# Patient Record
Sex: FEMALE | Race: WHITE | NOT HISPANIC OR LATINO | Employment: OTHER | URBAN - METROPOLITAN AREA
[De-identification: names, ages, dates, MRNs, and addresses within clinical notes are randomized per-mention and may not be internally consistent; named-entity substitution may affect disease eponyms.]

---

## 2017-01-03 ENCOUNTER — HOSPITAL ENCOUNTER (OUTPATIENT)
Dept: RADIOLOGY | Facility: HOSPITAL | Age: 72
Discharge: HOME | End: 2017-01-03
Attending: INTERNAL MEDICINE
Payer: MEDICARE

## 2017-01-03 DIAGNOSIS — Z85.048 HISTORY OF RECTAL OR ANAL CANCER: ICD-10-CM

## 2017-01-03 PROCEDURE — 78815 PET IMAGE W/CT SKULL-THIGH: CPT | Mod: TC,PS

## 2017-01-03 PROCEDURE — A9552 F18 FDG: HCPCS

## 2017-01-03 PROCEDURE — 78815 PET IMAGE W/CT SKULL-THIGH: CPT | Mod: 26,PS,, | Performed by: RADIOLOGY

## 2017-01-05 ENCOUNTER — LAB VISIT (OUTPATIENT)
Dept: LAB | Facility: HOSPITAL | Age: 72
End: 2017-01-05
Attending: INTERNAL MEDICINE
Payer: MEDICARE

## 2017-01-05 ENCOUNTER — OFFICE VISIT (OUTPATIENT)
Dept: HEMATOLOGY/ONCOLOGY | Facility: CLINIC | Age: 72
End: 2017-01-05
Payer: MEDICARE

## 2017-01-05 VITALS
WEIGHT: 157.88 LBS | HEIGHT: 62 IN | HEART RATE: 67 BPM | SYSTOLIC BLOOD PRESSURE: 136 MMHG | DIASTOLIC BLOOD PRESSURE: 61 MMHG | BODY MASS INDEX: 29.05 KG/M2 | TEMPERATURE: 98 F | OXYGEN SATURATION: 99 % | RESPIRATION RATE: 18 BRPM

## 2017-01-05 DIAGNOSIS — Z85.048 HISTORY OF RECTAL OR ANAL CANCER: Primary | ICD-10-CM

## 2017-01-05 DIAGNOSIS — N18.30 TYPE 2 DM WITH CKD STAGE 3 AND HYPERTENSION: Chronic | ICD-10-CM

## 2017-01-05 DIAGNOSIS — I12.9 TYPE 2 DM WITH CKD STAGE 3 AND HYPERTENSION: Chronic | ICD-10-CM

## 2017-01-05 DIAGNOSIS — E11.22 TYPE 2 DM WITH CKD STAGE 3 AND HYPERTENSION: Chronic | ICD-10-CM

## 2017-01-05 DIAGNOSIS — Z85.048 HISTORY OF RECTAL OR ANAL CANCER: ICD-10-CM

## 2017-01-05 LAB
ALBUMIN SERPL BCP-MCNC: 3 G/DL
ALP SERPL-CCNC: 50 U/L
ALT SERPL W/O P-5'-P-CCNC: 18 U/L
ANION GAP SERPL CALC-SCNC: 8 MMOL/L
AST SERPL-CCNC: 35 U/L
BILIRUB SERPL-MCNC: 0.4 MG/DL
BUN SERPL-MCNC: 43 MG/DL
CALCIUM SERPL-MCNC: 8.8 MG/DL
CHLORIDE SERPL-SCNC: 112 MMOL/L
CO2 SERPL-SCNC: 21 MMOL/L
CREAT SERPL-MCNC: 1.9 MG/DL
ERYTHROCYTE [DISTWIDTH] IN BLOOD BY AUTOMATED COUNT: 14.6 %
EST. GFR  (AFRICAN AMERICAN): 30.6 ML/MIN/1.73 M^2
EST. GFR  (NON AFRICAN AMERICAN): 26.5 ML/MIN/1.73 M^2
GLUCOSE SERPL-MCNC: 138 MG/DL
HCT VFR BLD AUTO: 30.4 %
HGB BLD-MCNC: 10 G/DL
MCH RBC QN AUTO: 31.9 PG
MCHC RBC AUTO-ENTMCNC: 32.9 %
MCV RBC AUTO: 97 FL
NEUTROPHILS # BLD AUTO: 1.9 K/UL
PLATELET # BLD AUTO: 136 K/UL
PMV BLD AUTO: 9.8 FL
POTASSIUM SERPL-SCNC: 4.5 MMOL/L
PROT SERPL-MCNC: 6.2 G/DL
RBC # BLD AUTO: 3.13 M/UL
SODIUM SERPL-SCNC: 141 MMOL/L
WBC # BLD AUTO: 3.08 K/UL

## 2017-01-05 PROCEDURE — 1160F RVW MEDS BY RX/DR IN RCRD: CPT | Mod: S$GLB,,, | Performed by: INTERNAL MEDICINE

## 2017-01-05 PROCEDURE — 99214 OFFICE O/P EST MOD 30 MIN: CPT | Mod: S$GLB,,, | Performed by: INTERNAL MEDICINE

## 2017-01-05 PROCEDURE — 80053 COMPREHEN METABOLIC PANEL: CPT

## 2017-01-05 PROCEDURE — 1157F ADVNC CARE PLAN IN RCRD: CPT | Mod: S$GLB,,, | Performed by: INTERNAL MEDICINE

## 2017-01-05 PROCEDURE — 3075F SYST BP GE 130 - 139MM HG: CPT | Mod: S$GLB,,, | Performed by: INTERNAL MEDICINE

## 2017-01-05 PROCEDURE — 3044F HG A1C LEVEL LT 7.0%: CPT | Mod: S$GLB,,, | Performed by: INTERNAL MEDICINE

## 2017-01-05 PROCEDURE — 85027 COMPLETE CBC AUTOMATED: CPT

## 2017-01-05 PROCEDURE — 99999 PR PBB SHADOW E&M-EST. PATIENT-LVL IV: CPT | Mod: PBBFAC,,, | Performed by: INTERNAL MEDICINE

## 2017-01-05 PROCEDURE — 3078F DIAST BP <80 MM HG: CPT | Mod: S$GLB,,, | Performed by: INTERNAL MEDICINE

## 2017-01-05 PROCEDURE — 36415 COLL VENOUS BLD VENIPUNCTURE: CPT

## 2017-01-05 PROCEDURE — 2022F DILAT RTA XM EVC RTNOPTHY: CPT | Mod: S$GLB,,, | Performed by: INTERNAL MEDICINE

## 2017-01-05 PROCEDURE — 1126F AMNT PAIN NOTED NONE PRSNT: CPT | Mod: S$GLB,,, | Performed by: INTERNAL MEDICINE

## 2017-01-05 PROCEDURE — 1159F MED LIST DOCD IN RCRD: CPT | Mod: S$GLB,,, | Performed by: INTERNAL MEDICINE

## 2017-01-05 PROCEDURE — 3060F POS MICROALBUMINURIA REV: CPT | Mod: S$GLB,,, | Performed by: INTERNAL MEDICINE

## 2017-01-05 RX ORDER — ERGOCALCIFEROL 1.25 MG/1
50000 CAPSULE ORAL
COMMUNITY
End: 2023-05-02

## 2017-01-05 NOTE — PROGRESS NOTES
"PATIENT: Steph Monroe  MRN: [id number]  DATE: 1/5/2017    Subjective:     Chief complaint:  Chief Complaint   Patient presents with    History of rectal or anal cancer       Oncologic History:  Ms. Monroe is a 70-year-old female with essential  hypertension, chronic kidney disease, hyperlipidemia, peripheral vascular diseasediagnosis of anal cancer.  She has been worked up in [place] so far and was noted to have bright red blood per  rectum gushing at the time of hospitalization and the scope was done by Dr. Buitrago.  Plavix was stopped to perform the scope safely and pathology  revealed squamous cell carcinoma.  She was then referred to see Dr. De Santiago for    evaluation and he referred her to me today for further evaluation of anal squamous cell carcinoma.      She completed Mitomycin and 5FU and RT (DAYAMI PROTOCOL) on 5/16/16 8/17/16 PET scan  reveals "Soft tissue thickening and persistent hypermetabolic activity is again identified at the anus. Interval decrease in size and activity of the previously seen right groin lymph node.  No new areas of abnormal hypermetabolic foci"    9/12/16 Anoscopy Procedure Note: "Findings: Right Anterior thickening with small superficial ulceration appreciated, no active bleeding . Pt tolerated procedure well. No complications."     Interval History: Ms. Monroe returns for follow up and to review PET. 1/3/17 PET reveals "No evidence of recurrent or residual neoplasm."  She denies any nausea, vomiting, diarrhea, constipation, abdominal pain, weight loss or loss of appetite, chest pain, shortness of breath, leg swelling, fatigue, pain, headache, dizziness, or mood changes. She is alone.    ECOG Performance Status:   ECOG SCORE    0 - Fully active-able to carry on all pre-disease performance without restriction          PMFSH: all information reviewed and updated as relevant to today's visit    Review of Systems:   Review of Systems   Constitutional: Negative for activity " "change, appetite change, fatigue and fever.   HENT: Negative for mouth sores, nosebleeds and sore throat.    Eyes: Negative for visual disturbance.   Respiratory: Negative for cough and shortness of breath.    Cardiovascular: Negative for chest pain, palpitations and leg swelling.   Gastrointestinal: Negative for abdominal pain, constipation, diarrhea, nausea and vomiting.   Genitourinary: Negative for difficulty urinating and frequency.   Musculoskeletal: Negative for arthralgias and back pain.   Skin: Negative for rash.   Neurological: Negative for dizziness, numbness and headaches.   Hematological: Negative for adenopathy. Does not bruise/bleed easily.   Psychiatric/Behavioral: Negative for confusion and sleep disturbance. The patient is not nervous/anxious.    All other systems reviewed and are negative.        Objective:      Vitals:   Vitals:    01/05/17 1030   BP: 136/61   Pulse: 67   Resp: 18   Temp: 98.4 °F (36.9 °C)   TempSrc: Oral   SpO2: 99%   Weight: 71.6 kg (157 lb 13.6 oz)   Height: 5' 2" (1.575 m)     BMI: Body mass index is 28.87 kg/(m^2).      Physical Exam:   Physical Exam   Constitutional: She is oriented to person, place, and time. She appears well-developed and well-nourished. No distress.   HENT:   Head: Normocephalic.   Right Ear: External ear normal.   Left Ear: External ear normal.   Mouth/Throat: No oropharyngeal exudate.   No sinus tenderness.   Eyes: Conjunctivae and lids are normal. Pupils are equal, round, and reactive to light. No scleral icterus.   Neck: Trachea normal and normal range of motion. Neck supple. No thyromegaly present.   Cardiovascular: Normal rate, regular rhythm and normal heart sounds.    Pulmonary/Chest: Effort normal and breath sounds normal.   Abdominal: Soft. Normal appearance and bowel sounds are normal. She exhibits no distension and no mass. There is no tenderness.   Musculoskeletal: Normal range of motion.   Lymphadenopathy:        Head (right side): No " submental and no submandibular adenopathy present.        Head (left side): No submental and no submandibular adenopathy present.     She has no cervical adenopathy.   Neurological: She is alert and oriented to person, place, and time. She has normal strength and normal reflexes. No cranial nerve deficit. Gait normal.   Skin: Skin is warm, dry and intact. No bruising and no rash noted. No cyanosis. Nails show no clubbing.   Psychiatric: She has a normal mood and affect. Her speech is normal and behavior is normal.   Nursing note and vitals reviewed.        Laboratory Data:  WBC   Date Value Ref Range Status   01/05/2017 3.08 (L) 3.90 - 12.70 K/uL Final     Hemoglobin   Date Value Ref Range Status   01/05/2017 10.0 (L) 12.0 - 16.0 g/dL Final     Hematocrit   Date Value Ref Range Status   01/05/2017 30.4 (L) 37.0 - 48.5 % Final     Platelets   Date Value Ref Range Status   01/05/2017 136 (L) 150 - 350 K/uL Final     Gran #   Date Value Ref Range Status   01/05/2017 1.9 1.8 - 7.7 K/uL Final     Comment:     The ANC is based on a white cell differential from an   automated cell counter. It has not been microscopically   reviewed for the presence of abnormal cells. Clinical   correlation is required.       Gran%   Date Value Ref Range Status   06/16/2016 43.4 38.0 - 73.0 % Final       Chemistry        Component Value Date/Time     01/05/2017 0915    K 4.5 01/05/2017 0915     (H) 01/05/2017 0915    CO2 21 (L) 01/05/2017 0915    BUN 43 (H) 01/05/2017 0915    CREATININE 1.9 (H) 01/05/2017 0915     (H) 01/05/2017 0915        Component Value Date/Time    CALCIUM 8.8 01/05/2017 0915    ALKPHOS 50 (L) 01/05/2017 0915    AST 35 01/05/2017 0915    ALT 18 01/05/2017 0915    BILITOT 0.4 01/05/2017 0915              Assessment/Plan:     1. History of rectal or anal cancer    2. 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 Patient is clinically stable. PET without evidence of recurrence. Ok to  remove PORT-Will schedule an appointment with Dr. Florez. RTC 6 months to see Dr. Contreras with CBC, CMP, PET scan. If PET negative in 6 months then no more scans needed.   2. BUN/Crt elevated. Patient states she has not taken in any liquids in about 12 hours. Encouraged hydration. Follow up with Nephrologist Dr. Goodrich.   Med and Orders:  Orders Placed This Encounter    NM PET CT Routine Skull to Mid Thigh    CBC Oncology    Comprehensive metabolic panel       Follow Up:  Return in about 6 months (around 7/5/2017).        Patient was also seen and examined by Dr. Contreras. Patient is in agreement with the proposed treatment plan. All questions were answered to the patient's satisfaction. Pt knows to call clinic if anything is needed before the next clinic visit.    MASON Gardiner-CRISTIAN  Hematology and Medical Oncology      STAFF NOTE:  I have reviewed the notes, assessments, and/or procedures performed by the nurse practitioner, as above.  I have personally interviewed the patient at the beside, and rounded with the nurse practitioner. I formulated the plan of care.  I concur with her documentation of Steph Monroe.

## 2017-01-05 NOTE — Clinical Note
Please schedule an appoinment with Dr. Florez for port removal.  RTC 6 months to see Dr. Contreras with CBC, CMP, PET scan.

## 2017-01-17 ENCOUNTER — TELEPHONE (OUTPATIENT)
Dept: HEMATOLOGY/ONCOLOGY | Facility: CLINIC | Age: 72
End: 2017-01-17

## 2017-01-17 NOTE — TELEPHONE ENCOUNTER
----- Message from Sonny Lay NP sent at 1/5/2017 11:10 AM CST -----  Please schedule an appoinment with Dr. Florez for port removal.   RTC 6 months to see Dr. Contreras with CBC, CMP, PET scan.

## 2017-04-17 ENCOUNTER — OFFICE VISIT (OUTPATIENT)
Dept: SURGERY | Facility: CLINIC | Age: 72
End: 2017-04-17
Payer: MEDICARE

## 2017-04-17 VITALS
HEIGHT: 62 IN | WEIGHT: 164.88 LBS | HEART RATE: 75 BPM | DIASTOLIC BLOOD PRESSURE: 66 MMHG | SYSTOLIC BLOOD PRESSURE: 175 MMHG | BODY MASS INDEX: 30.34 KG/M2

## 2017-04-17 DIAGNOSIS — Z85.048 HISTORY OF ANAL CANCER: Primary | ICD-10-CM

## 2017-04-17 PROCEDURE — 99999 PR PBB SHADOW E&M-EST. PATIENT-LVL III: CPT | Mod: PBBFAC,,, | Performed by: COLON & RECTAL SURGERY

## 2017-04-17 PROCEDURE — 1159F MED LIST DOCD IN RCRD: CPT | Mod: S$GLB,,, | Performed by: COLON & RECTAL SURGERY

## 2017-04-17 PROCEDURE — 3077F SYST BP >= 140 MM HG: CPT | Mod: S$GLB,,, | Performed by: COLON & RECTAL SURGERY

## 2017-04-17 PROCEDURE — 1160F RVW MEDS BY RX/DR IN RCRD: CPT | Mod: S$GLB,,, | Performed by: COLON & RECTAL SURGERY

## 2017-04-17 PROCEDURE — 99213 OFFICE O/P EST LOW 20 MIN: CPT | Mod: 25,S$GLB,, | Performed by: COLON & RECTAL SURGERY

## 2017-04-17 PROCEDURE — 1126F AMNT PAIN NOTED NONE PRSNT: CPT | Mod: S$GLB,,, | Performed by: COLON & RECTAL SURGERY

## 2017-04-17 PROCEDURE — 3078F DIAST BP <80 MM HG: CPT | Mod: S$GLB,,, | Performed by: COLON & RECTAL SURGERY

## 2017-04-21 NOTE — PROGRESS NOTES
HPI 71F with anal squamous cell carcinoma presents after chemo radiation therapy. Last treatment was in May2015. She denies any pain or bleeding from anus. Her last PET CT Jan 2017 scan showed no activity. Good bowel function with increased bms/day.   Review of Systems   Constitutional: Negative for chills and fever.   Respiratory: Negative for cough and shortness of breath.   Genitourinary: Negative for dysuria and urgency.   Neurological: Negative for seizures and numbness.       Objective:      Physical Exam   Constitutional: She is oriented to person, place, and time. She appears well-developed and well-nourished.   Eyes: EOM are normal. Pupils are equal, round, and reactive to light.   Abdominal: Soft. She exhibits no distension.   Genitourinary:   Genitourinary Comments: Anorectal: Normal perianal skin. BETZAIDA: no masses. Anoscopy: no mass, normal distal rectal mucosa.       Musculoskeletal: Normal range of motion. She exhibits no edema.   Neurological: She is alert and oriented to person, place, and time.   Skin: Skin is warm and dry.   Psychiatric: She has a normal mood and affect. Her behavior is normal.       Assessment:       1. History of anal cancer no evidence of recurrence       Plan:       F/u with me in 4 months.

## 2017-07-17 ENCOUNTER — HOSPITAL ENCOUNTER (OUTPATIENT)
Dept: RADIOLOGY | Facility: HOSPITAL | Age: 72
Discharge: HOME | End: 2017-07-17
Attending: NURSE PRACTITIONER
Payer: MEDICARE

## 2017-07-17 DIAGNOSIS — Z85.048 HISTORY OF RECTAL OR ANAL CANCER: ICD-10-CM

## 2017-07-17 PROCEDURE — 78815 PET IMAGE W/CT SKULL-THIGH: CPT | Mod: 26,PS,, | Performed by: NUCLEAR MEDICINE

## 2017-07-17 PROCEDURE — A9552 F18 FDG: HCPCS

## 2017-07-18 ENCOUNTER — OFFICE VISIT (OUTPATIENT)
Dept: HEMATOLOGY/ONCOLOGY | Facility: CLINIC | Age: 72
End: 2017-07-18
Payer: MEDICARE

## 2017-07-18 VITALS
WEIGHT: 176.56 LBS | HEART RATE: 71 BPM | SYSTOLIC BLOOD PRESSURE: 180 MMHG | BODY MASS INDEX: 32.49 KG/M2 | HEIGHT: 62 IN | DIASTOLIC BLOOD PRESSURE: 73 MMHG | RESPIRATION RATE: 19 BRPM | OXYGEN SATURATION: 99 % | TEMPERATURE: 98 F

## 2017-07-18 DIAGNOSIS — Z85.048 HISTORY OF RECTAL OR ANAL CANCER: Primary | ICD-10-CM

## 2017-07-18 DIAGNOSIS — N18.30 TYPE 2 DM WITH CKD STAGE 3 AND HYPERTENSION: Chronic | ICD-10-CM

## 2017-07-18 DIAGNOSIS — I12.9 TYPE 2 DM WITH CKD STAGE 3 AND HYPERTENSION: Chronic | ICD-10-CM

## 2017-07-18 DIAGNOSIS — E11.22 TYPE 2 DM WITH CKD STAGE 3 AND HYPERTENSION: Chronic | ICD-10-CM

## 2017-07-18 DIAGNOSIS — N28.89 RENAL MASS: ICD-10-CM

## 2017-07-18 PROCEDURE — 1159F MED LIST DOCD IN RCRD: CPT | Mod: S$GLB,,, | Performed by: INTERNAL MEDICINE

## 2017-07-18 PROCEDURE — 1126F AMNT PAIN NOTED NONE PRSNT: CPT | Mod: S$GLB,,, | Performed by: INTERNAL MEDICINE

## 2017-07-18 PROCEDURE — 99499 UNLISTED E&M SERVICE: CPT | Mod: S$GLB,,, | Performed by: INTERNAL MEDICINE

## 2017-07-18 PROCEDURE — 99214 OFFICE O/P EST MOD 30 MIN: CPT | Mod: S$GLB,,, | Performed by: INTERNAL MEDICINE

## 2017-07-18 PROCEDURE — 99999 PR PBB SHADOW E&M-EST. PATIENT-LVL III: CPT | Mod: PBBFAC,,, | Performed by: INTERNAL MEDICINE

## 2017-07-18 NOTE — PROGRESS NOTES
Patient, Steph Monroe (MRN #[id number]), presented with a recent Platelet count less than 150 K/uL consistent with the definition of thrombocytopenia (ICD10 - D69.6).    Platelets   Date Value Ref Range Status   07/17/2017 149 (L) 150 - 350 K/uL Final     The patient's thrombocytopenia was monitored, evaluated, addressed and/or treated. This addendum to the medical record is made on 07/18/2017.

## 2017-07-18 NOTE — PROGRESS NOTES
"Subjective:       Patient ID: Steph Monroe is a 71 y.o. female.    Chief Complaint: History of rectal or anal cancer  Oncologic History:  Ms. Monroe is a 70-year-old female with essential  hypertension, chronic kidney disease, hyperlipidemia, peripheral vascular diseasediagnosis of anal cancer.  She has been worked up in [place] so far and was noted to have bright red blood per  rectum gushing at the time of hospitalization and the scope was done by Dr. Buitrago.  Plavix was stopped to perform the scope safely and pathology  revealed squamous cell carcinoma.  She was then referred to see Dr. De Santiago for    evaluation and he referred her to me today for further evaluation of anal squamous cell carcinoma.      She completed Mitomycin and 5FU and RT (DAYAMI PROTOCOL) on 5/16/16     HPIShe comes in to review her PET scan which reveals "In this patient with a history of anal cancer, status post chemoradiation, and no PET/CT findings to suggest recurrent or metastatic disease. Abnormal soft tissue hypermetabolic activity in the right medial gluteal region and left thigh, thought to represent postinflammatory changes. Clinical correlation and evaluation with direct visualization is recommended. Soft tissue mass in the lower pole of the left kidney that demonstrates no evidence of increased FDG uptake.  Further evaluation with dedicated retroperitoneal ultrasound is recommended"  She notes that she fell about a year ago and has a scar in her buttock area.\  She had Anoscopy done in April 2017 with Dr. De Santiago and was normal.    Review of Systems   Constitutional: Negative for appetite change, fatigue and unexpected weight change.   HENT: Negative for mouth sores.    Eyes: Negative for visual disturbance.   Respiratory: Negative for cough and shortness of breath.    Cardiovascular: Negative for chest pain.   Gastrointestinal: Negative for abdominal pain and diarrhea.   Genitourinary: Negative for frequency. "   Musculoskeletal: Negative for back pain.   Skin: Negative for rash.   Neurological: Negative for headaches.   Hematological: Negative for adenopathy.   Psychiatric/Behavioral: The patient is not nervous/anxious.    All other systems reviewed and are negative.      Objective:      Physical Exam   Constitutional: She is oriented to person, place, and time. She appears well-developed and well-nourished.   HENT:   Mouth/Throat: No oropharyngeal exudate.   Cardiovascular: Normal rate and normal heart sounds.    Pulmonary/Chest: Effort normal and breath sounds normal. She has no wheezes.   Abdominal: Soft. Bowel sounds are normal. There is no tenderness.   Musculoskeletal: She exhibits no edema or tenderness.   Lymphadenopathy:     She has no cervical adenopathy.   Neurological: She is alert and oriented to person, place, and time. Coordination normal.   Skin: Skin is warm and dry. No rash noted.   Psychiatric: She has a normal mood and affect. Judgment and thought content normal.   Vitals reviewed.      LABS:  WBC   Date Value Ref Range Status   07/17/2017 3.90 3.90 - 12.70 K/uL Final     Hemoglobin   Date Value Ref Range Status   07/17/2017 11.7 (L) 12.0 - 16.0 g/dL Final     Hematocrit   Date Value Ref Range Status   07/17/2017 36.1 (L) 37.0 - 48.5 % Final     Platelets   Date Value Ref Range Status   07/17/2017 149 (L) 150 - 350 K/uL Final     Gran #   Date Value Ref Range Status   07/17/2017 2.4 1.8 - 7.7 K/uL Final     Comment:     The ANC is based on a white cell differential from an   automated cell counter. It has not been microscopically   reviewed for the presence of abnormal cells. Clinical   correlation is required.         Chemistry        Component Value Date/Time     07/17/2017 0844    K 5.3 (H) 07/17/2017 0844     (H) 07/17/2017 0844    CO2 20 (L) 07/17/2017 0844    BUN 47 (H) 07/17/2017 0844    CREATININE 2.0 (H) 07/17/2017 0844     (H) 07/17/2017 0844        Component Value  Date/Time    CALCIUM 9.2 07/17/2017 0844    ALKPHOS 73 07/17/2017 0844    AST 33 07/17/2017 0844    ALT 19 07/17/2017 0844    BILITOT 0.4 07/17/2017 0844    ESTGFRAFRICA 28.7 (A) 07/17/2017 0844    EGFRNONAA 24.9 (A) 07/17/2017 0844          Assessment:       1. History of rectal or anal cancer    2. 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 Renal mass        Plan:        1. Reviewed PET scan, no evidence of anal cancer. The gluteal thickening noted on medial side of right buttock on PET is scar tissue. Will repeat PET in 6 months  2,3. She has an outside nephrologist and is undergoing work-up with them with renal ultrasound for kidney mass. She knows to keep me posted on that,    Above care plan was discussed with patient and all questions were addressed to her satisfaction

## 2018-01-11 ENCOUNTER — HOSPITAL ENCOUNTER (OUTPATIENT)
Dept: RADIOLOGY | Facility: HOSPITAL | Age: 73
Discharge: HOME | End: 2018-01-11
Attending: INTERNAL MEDICINE
Payer: MEDICARE

## 2018-01-11 DIAGNOSIS — Z85.048 HISTORY OF RECTAL OR ANAL CANCER: ICD-10-CM

## 2018-01-11 LAB — POCT GLUCOSE: 103 MG/DL (ref 70–110)

## 2018-01-11 PROCEDURE — 78815 PET IMAGE W/CT SKULL-THIGH: CPT | Mod: TC,PS

## 2018-01-11 PROCEDURE — 78815 PET IMAGE W/CT SKULL-THIGH: CPT | Mod: 26,PS,, | Performed by: RADIOLOGY

## 2018-01-11 PROCEDURE — A9552 F18 FDG: HCPCS

## 2018-01-16 ENCOUNTER — OFFICE VISIT (OUTPATIENT)
Dept: HEMATOLOGY/ONCOLOGY | Facility: CLINIC | Age: 73
End: 2018-01-16
Payer: MEDICARE

## 2018-01-16 ENCOUNTER — LAB VISIT (OUTPATIENT)
Dept: LAB | Facility: HOSPITAL | Age: 73
End: 2018-01-16
Attending: INTERNAL MEDICINE
Payer: MEDICARE

## 2018-01-16 ENCOUNTER — TELEPHONE (OUTPATIENT)
Dept: HEMATOLOGY/ONCOLOGY | Facility: CLINIC | Age: 73
End: 2018-01-16

## 2018-01-16 VITALS
HEIGHT: 62 IN | OXYGEN SATURATION: 99 % | BODY MASS INDEX: 35.25 KG/M2 | RESPIRATION RATE: 20 BRPM | DIASTOLIC BLOOD PRESSURE: 75 MMHG | SYSTOLIC BLOOD PRESSURE: 181 MMHG | WEIGHT: 191.56 LBS | HEART RATE: 68 BPM | TEMPERATURE: 98 F

## 2018-01-16 DIAGNOSIS — N18.30 TYPE 2 DM WITH CKD STAGE 3 AND HYPERTENSION: Chronic | ICD-10-CM

## 2018-01-16 DIAGNOSIS — E11.22 TYPE 2 DM WITH CKD STAGE 3 AND HYPERTENSION: Chronic | ICD-10-CM

## 2018-01-16 DIAGNOSIS — I73.9 PVD (PERIPHERAL VASCULAR DISEASE): Chronic | ICD-10-CM

## 2018-01-16 DIAGNOSIS — N18.4 STAGE 4 CHRONIC KIDNEY DISEASE: ICD-10-CM

## 2018-01-16 DIAGNOSIS — I12.9 TYPE 2 DM WITH CKD STAGE 3 AND HYPERTENSION: Chronic | ICD-10-CM

## 2018-01-16 DIAGNOSIS — Z85.048 HISTORY OF RECTAL OR ANAL CANCER: Primary | ICD-10-CM

## 2018-01-16 DIAGNOSIS — Z85.048 HISTORY OF RECTAL OR ANAL CANCER: ICD-10-CM

## 2018-01-16 LAB
ALBUMIN SERPL BCP-MCNC: 2.9 G/DL
ALP SERPL-CCNC: 63 U/L
ALT SERPL W/O P-5'-P-CCNC: 18 U/L
ANION GAP SERPL CALC-SCNC: 5 MMOL/L
AST SERPL-CCNC: 27 U/L
BILIRUB SERPL-MCNC: 0.5 MG/DL
BUN SERPL-MCNC: 47 MG/DL
CALCIUM SERPL-MCNC: 9.2 MG/DL
CHLORIDE SERPL-SCNC: 112 MMOL/L
CO2 SERPL-SCNC: 24 MMOL/L
CREAT SERPL-MCNC: 2.1 MG/DL
ERYTHROCYTE [DISTWIDTH] IN BLOOD BY AUTOMATED COUNT: 15 %
EST. GFR  (AFRICAN AMERICAN): 26.9 ML/MIN/1.73 M^2
EST. GFR  (NON AFRICAN AMERICAN): 23.3 ML/MIN/1.73 M^2
GLUCOSE SERPL-MCNC: 80 MG/DL
HCT VFR BLD AUTO: 33.8 %
HGB BLD-MCNC: 10.8 G/DL
IMM GRANULOCYTES # BLD AUTO: 0.03 K/UL
MCH RBC QN AUTO: 30.5 PG
MCHC RBC AUTO-ENTMCNC: 32 G/DL
MCV RBC AUTO: 96 FL
NEUTROPHILS # BLD AUTO: 3 K/UL
PLATELET # BLD AUTO: 163 K/UL
PMV BLD AUTO: 10.7 FL
POTASSIUM SERPL-SCNC: 5.7 MMOL/L
PROT SERPL-MCNC: 6.5 G/DL
RBC # BLD AUTO: 3.54 M/UL
SODIUM SERPL-SCNC: 141 MMOL/L
WBC # BLD AUTO: 5.17 K/UL

## 2018-01-16 PROCEDURE — 99999 PR PBB SHADOW E&M-EST. PATIENT-LVL IV: CPT | Mod: PBBFAC,,, | Performed by: INTERNAL MEDICINE

## 2018-01-16 PROCEDURE — 85027 COMPLETE CBC AUTOMATED: CPT

## 2018-01-16 PROCEDURE — 80053 COMPREHEN METABOLIC PANEL: CPT

## 2018-01-16 PROCEDURE — 99214 OFFICE O/P EST MOD 30 MIN: CPT | Mod: S$GLB,,, | Performed by: INTERNAL MEDICINE

## 2018-01-16 PROCEDURE — 36415 COLL VENOUS BLD VENIPUNCTURE: CPT

## 2018-01-16 RX ORDER — SODIUM BICARBONATE 650 MG/1
650 TABLET ORAL 2 TIMES DAILY
Status: ON HOLD | COMMUNITY
End: 2023-05-16 | Stop reason: HOSPADM

## 2018-01-16 NOTE — TELEPHONE ENCOUNTER
----- Message from Sonny Link NP sent at 1/16/2018  3:36 PM CST -----  Please let patient know her Potassium was elevated. Encourage her to hydrate and recheck CMP tomorrow. Thanks,

## 2018-01-16 NOTE — TELEPHONE ENCOUNTER
Spoke with patient.  Notified her her K is elevated.   Encouraged her to drink plenty fluids---will recheck cmp tomorrow in [place].  Patient thanked nurse.

## 2018-01-16 NOTE — PROGRESS NOTES
Patient, Steph Monroe (MRN #[id number]), presented with a recorded BMI of 35.04 kg/m^2 and a documented comorbidity(s):  - Diabetes Mellitus Type 2  - Hypertension  to which the severe obesity is a contributing factor. This is consistent with the definition of severe obesity (BMI 35.0-35.9) with comorbidity (ICD-10 E66.01, Z68.35). The patient's severe obesity was monitored, evaluated, addressed and/or treated. This addendum to the medical record is made on 01/16/2018.

## 2018-01-16 NOTE — PROGRESS NOTES
"PATIENT: Steph Monroe  MRN: [id number]  DATE: 1/16/2018    Subjective:     Chief complaint:  Chief Complaint   Patient presents with    History of rectal or anal cancer       Oncologic History:  Ms. Monroe is a 70-year-old female with essential  hypertension, chronic kidney disease, hyperlipidemia, peripheral vascular diseasediagnosis of anal cancer.  She has been worked up in [place] so far and was noted to have bright red blood per  rectum gushing at the time of hospitalization and the scope was done by Dr. Buitrago.  Plavix was stopped to perform the scope safely and pathology  revealed squamous cell carcinoma.  She was then referred to see Dr. De Santiago for    evaluation and he referred her to me today for further evaluation of anal squamous cell carcinoma.      She completed Mitomycin and 5FU and RT (DAYAMI PROTOCOL) on 5/16/16       PET scan which reveals "In this patient with a history of anal cancer, status post chemoradiation, and no PET/CT findings to suggest recurrent or metastatic disease. Abnormal soft tissue hypermetabolic activity in the right medial gluteal region and left thigh, thought to represent postinflammatory changes. Clinical correlation and evaluation with direct visualization is recommended. Soft tissue mass in the lower pole of the left kidney that demonstrates no evidence of increased FDG uptake.  Further evaluation with dedicated retroperitoneal ultrasound is recommended"    She had Anoscopy done in April 2017 with Dr. De Santiago and was normal.     Interval History: Ms. Monroe returns for follow up and PET results. PET from 1/11/18 reveals "No evidence of recurrent or residual malignancy".   She denies any nausea, vomiting, diarrhea, constipation, abdominal pain, weight loss or loss of appetite, chest pain, shortness of breath, leg swelling, fatigue, pain, headache, dizziness, or mood changes. She is alone.     ECOG Performance Status:   ECOG SCORE    0 - Fully active-able to carry on all " "pre-disease performance without restriction         PMFSH: all information reviewed and updated as relevant to today's visit    Review of Systems:   Review of Systems   Constitutional: Negative for activity change, appetite change, fatigue and fever.   HENT: Negative for mouth sores, nosebleeds and sore throat.    Eyes: Negative for visual disturbance.   Respiratory: Negative for cough and shortness of breath.    Cardiovascular: Negative for chest pain, palpitations and leg swelling.   Gastrointestinal: Negative for abdominal pain, constipation, diarrhea, nausea and vomiting.   Genitourinary: Negative for difficulty urinating and frequency.   Musculoskeletal: Negative for arthralgias and back pain.   Skin: Negative for rash.   Neurological: Negative for dizziness, numbness and headaches.   Hematological: Negative for adenopathy. Does not bruise/bleed easily.   Psychiatric/Behavioral: Negative for confusion and sleep disturbance. The patient is not nervous/anxious.    All other systems reviewed and are negative.        Objective:      Vitals:   Vitals:    01/16/18 1158   BP: (!) 181/75   Pulse: 68   Resp: 20   Temp: 97.5 °F (36.4 °C)   TempSrc: Oral   SpO2: 99%   Weight: 86.9 kg (191 lb 9.3 oz)   Height: 5' 2" (1.575 m)     BMI: Body mass index is 35.04 kg/m².      Physical Exam:   Physical Exam   Constitutional: She is oriented to person, place, and time. She appears well-developed and well-nourished. No distress.   HENT:   Head: Normocephalic.   Right Ear: External ear normal.   Left Ear: External ear normal.   Mouth/Throat: No oropharyngeal exudate.   No sinus tenderness.   Eyes: Conjunctivae and lids are normal. Pupils are equal, round, and reactive to light. No scleral icterus.   Neck: Trachea normal and normal range of motion. Neck supple. No thyromegaly present.   Cardiovascular: Normal rate, regular rhythm and normal heart sounds.    Pulmonary/Chest: Effort normal and breath sounds normal.   Abdominal: Soft. " Normal appearance and bowel sounds are normal. She exhibits no distension and no mass. There is no tenderness.   Musculoskeletal: Normal range of motion.   Lymphadenopathy:        Head (right side): No submental and no submandibular adenopathy present.        Head (left side): No submental and no submandibular adenopathy present.     She has no cervical adenopathy.   Neurological: She is alert and oriented to person, place, and time. She has normal strength and normal reflexes. No cranial nerve deficit. Gait normal.   Skin: Skin is warm, dry and intact. No bruising and no rash noted. No cyanosis. Nails show no clubbing.   Psychiatric: She has a normal mood and affect. Her speech is normal and behavior is normal.   Nursing note and vitals reviewed.        Laboratory Data:  WBC   Date Value Ref Range Status   01/16/2018 5.17 3.90 - 12.70 K/uL Final     Hemoglobin   Date Value Ref Range Status   01/16/2018 10.8 (L) 12.0 - 16.0 g/dL Final     Hematocrit   Date Value Ref Range Status   01/16/2018 33.8 (L) 37.0 - 48.5 % Final     Platelets   Date Value Ref Range Status   01/16/2018 163 150 - 350 K/uL Final     Gran #   Date Value Ref Range Status   01/16/2018 3.0 1.8 - 7.7 K/uL Final     Comment:     The ANC is based on a white cell differential from an   automated cell counter. It has not been microscopically   reviewed for the presence of abnormal cells. Clinical   correlation is required.         Chemistry        Component Value Date/Time     01/16/2018 1040    K 5.7 (H) 01/16/2018 1040     (H) 01/16/2018 1040    CO2 24 01/16/2018 1040    BUN 47 (H) 01/16/2018 1040    CREATININE 2.1 (H) 01/16/2018 1040    GLU 80 01/16/2018 1040        Component Value Date/Time    CALCIUM 9.2 01/16/2018 1040    ALKPHOS 63 01/16/2018 1040    AST 27 01/16/2018 1040    ALT 18 01/16/2018 1040    BILITOT 0.5 01/16/2018 1040    ESTGFRAFRICA 26.9 (A) 01/16/2018 1040    EGFRNONAA 23.3 (A) 01/16/2018 1040               Assessment/Plan:     1. History of rectal or anal cancer    2. 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 PVD (peripheral vascular disease)    4. Stage 4 chronic kidney disease        Plan:    PET scan without evidence of disease. See Dr. De Santiago as scheduled next week. RTC in 6 months to see Dr. Contreras with CBC, CMP and PET scan.  Continue to Monitor glucose and follow up with appropriate Md. F/u with cards for PVD.  Patient left prior to lab results. Potassium elevated today. Encouraged hydration and Repeat potassium tomorrow and follow up with nephrology.  4. Stable monitor for now.    Med and Orders:  Orders Placed This Encounter    NM PET CT Routine Skull to Mid Thigh    CBC Oncology    Comprehensive metabolic panel       Follow Up:  Follow-up in about 6 months (around 7/16/2018).      More than 30 mins were spent during this encounter, greater than 50% was spent in direct counseling and/or coordination of care.   Patient was also seen and examined by Dr. Contreras who agrees with above plan. Patient is in agreement with the proposed treatment plan. All questions were answered to the patient's satisfaction. Pt knows to call clinic if anything is needed before the next clinic visit.      MASON Merino-CRISTIAN  Hematology and Medical Oncology    Distress Screening Results: Psychosocial Distress screening score of Distress Score: 0 noted and reviewed. No intervention indicated.     STAFF NOTE:  I have reviewed the notes, assessments, and/or procedures performed by the nurse practitioner, as above.  I have personally interviewed the patient at the beside, and rounded with the nurse practitioner. I formulated the plan of care.  I concur with her documentation of Steph Monroe.

## 2018-01-16 NOTE — Clinical Note
Please let patient know her Potassium was elevated. Encourage her to hydrate and recheck CMP tomorrow. Thanks,

## 2018-01-19 ENCOUNTER — TELEPHONE (OUTPATIENT)
Dept: HEMATOLOGY/ONCOLOGY | Facility: CLINIC | Age: 73
End: 2018-01-19

## 2018-01-19 ENCOUNTER — LAB VISIT (OUTPATIENT)
Dept: LAB | Facility: HOSPITAL | Age: 73
End: 2018-01-19
Attending: NURSE PRACTITIONER
Payer: MEDICARE

## 2018-01-19 DIAGNOSIS — Z85.048 HISTORY OF RECTAL OR ANAL CANCER: ICD-10-CM

## 2018-01-19 LAB
ALBUMIN SERPL BCP-MCNC: 2.8 G/DL
ALP SERPL-CCNC: 64 U/L
ALT SERPL W/O P-5'-P-CCNC: 15 U/L
ANION GAP SERPL CALC-SCNC: 8 MMOL/L
AST SERPL-CCNC: 26 U/L
BILIRUB SERPL-MCNC: 0.5 MG/DL
BUN SERPL-MCNC: 44 MG/DL
CALCIUM SERPL-MCNC: 9.2 MG/DL
CHLORIDE SERPL-SCNC: 109 MMOL/L
CO2 SERPL-SCNC: 25 MMOL/L
CREAT SERPL-MCNC: 2.2 MG/DL
EST. GFR  (AFRICAN AMERICAN): 25 ML/MIN/1.73 M^2
EST. GFR  (NON AFRICAN AMERICAN): 22 ML/MIN/1.73 M^2
GLUCOSE SERPL-MCNC: 132 MG/DL
POTASSIUM SERPL-SCNC: 4.9 MMOL/L
PROT SERPL-MCNC: 6.6 G/DL
SODIUM SERPL-SCNC: 142 MMOL/L

## 2018-01-19 PROCEDURE — 80053 COMPREHEN METABOLIC PANEL: CPT

## 2018-01-19 PROCEDURE — 36415 COLL VENOUS BLD VENIPUNCTURE: CPT

## 2018-01-19 NOTE — TELEPHONE ENCOUNTER
Spoke with patient. Informed patient her K is normal. Kidney function is elevated. Patient states she has a nephrologist. She sees dr garcia (neph) in a private practice. She understands to contact dr garcia asap for evaluation.   Informed patient if she needs nurse to fax recent labs to her nephrologist---to contact nurse with fax number.  Patient voiced understanding.

## 2018-01-19 NOTE — TELEPHONE ENCOUNTER
----- Message from Sonny Link NP sent at 1/19/2018  4:02 PM CST -----  Potassium normal. Kidney function elevated. Does she see renal? If not, she needs to get in to see her PCP for evaluation and referral. LEDA

## 2018-01-26 ENCOUNTER — OFFICE VISIT (OUTPATIENT)
Dept: SURGERY | Facility: CLINIC | Age: 73
End: 2018-01-26
Payer: MEDICARE

## 2018-01-26 VITALS
HEART RATE: 76 BPM | WEIGHT: 189.13 LBS | BODY MASS INDEX: 34.8 KG/M2 | HEIGHT: 62 IN | SYSTOLIC BLOOD PRESSURE: 171 MMHG | DIASTOLIC BLOOD PRESSURE: 82 MMHG

## 2018-01-26 DIAGNOSIS — Z85.048 HISTORY OF ANAL CANCER: Primary | ICD-10-CM

## 2018-01-26 PROCEDURE — 99999 PR PBB SHADOW E&M-EST. PATIENT-LVL III: CPT | Mod: PBBFAC,,, | Performed by: COLON & RECTAL SURGERY

## 2018-01-26 PROCEDURE — 3008F BODY MASS INDEX DOCD: CPT | Mod: S$GLB,,, | Performed by: COLON & RECTAL SURGERY

## 2018-01-26 PROCEDURE — 1126F AMNT PAIN NOTED NONE PRSNT: CPT | Mod: S$GLB,,, | Performed by: COLON & RECTAL SURGERY

## 2018-01-26 PROCEDURE — 46600 DIAGNOSTIC ANOSCOPY SPX: CPT | Mod: S$GLB,,, | Performed by: COLON & RECTAL SURGERY

## 2018-01-26 PROCEDURE — 1159F MED LIST DOCD IN RCRD: CPT | Mod: S$GLB,,, | Performed by: COLON & RECTAL SURGERY

## 2018-01-26 PROCEDURE — 99213 OFFICE O/P EST LOW 20 MIN: CPT | Mod: 25,S$GLB,, | Performed by: COLON & RECTAL SURGERY

## 2018-01-26 RX ORDER — CLOPIDOGREL BISULFATE 75 MG/1
TABLET ORAL
COMMUNITY
Start: 2018-01-25 | End: 2023-05-02 | Stop reason: SDUPTHER

## 2018-01-26 NOTE — LETTER
February 2, 2018      Amanda Contreras MD  [address]  [facility] [address]           [facility]  [address]  [facility] [address]  Phone: [phone]          Patient: Steph Monroe   MR Number: [id number]   YOB: 1945   Date of Visit: 1/26/2018       Dear Dr. Amanda Contreras:    Thank you for referring Steph Monroe to me for evaluation. Attached you will find relevant portions of my assessment and plan of care.    If you have questions, please do not hesitate to call me. I look forward to following Steph Monroe along with you.    Sincerely,    Se De Santiago MD    Enclosure  CC:  No Recipients    If you would like to receive this communication electronically, please contact [email or web address] or [phone] to request more information on [employer] Link access.    For providers and/or their staff who would like to refer a patient to [facility], please contact us through our one-stop-shop provider referral line, [facility], at [phone].    If you feel you have received this communication in error or would no longer like to receive these types of communications, please e-mail [email or web address]

## 2018-01-31 NOTE — PROGRESS NOTES
Subjective:       Patient ID: Steph Monroe is a 72 y.o. female.    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 Last treatment was in May2015. She denies any pain or bleeding from anus. Her last PET CT Jan 2017 scan showed no activity. Good bowel function with increased bms/day.   PET 1/11 - There is physiologic intracranial, head, and neck activity. There is vocal cord activation. Heart and mediastinum are normal. There is a stable lesion lower pole left kidney not hypermetabolic. Right kidney is normal. There is inflammatory activity right gluteal left hip region. Bone show nothing unusual. There is physiologic liver, spleen, GI and  activity. Lungs are clear.     Review of Systems   Constitutional: Negative for chills and fever.   Respiratory: Negative for cough.    Genitourinary: Negative for dysuria and urgency.   Neurological: Negative for seizures and numbness.       Objective:      Physical Exam   Constitutional: She is oriented to person, place, and time. She appears well-developed and well-nourished.   Pulmonary/Chest: Effort normal. No respiratory distress.   Abdominal: Soft. She exhibits no distension.   Genitourinary:   Genitourinary Comments: Anorectal: Normal perianal skin. BETZAIDA: no masses. Anoscopy: no mass, normal distal rectal mucosa.    Musculoskeletal: Normal range of motion. She exhibits no edema.   Neurological: She is alert and oriented to person, place, and time.       Assessment:       1. History of anal cancer        Plan:       No evidence of recurrence.  F/u 4 months for surveillance exam.

## 2018-07-12 ENCOUNTER — HOSPITAL ENCOUNTER (OUTPATIENT)
Dept: RADIOLOGY | Facility: HOSPITAL | Age: 73
Discharge: HOME | End: 2018-07-12
Attending: NURSE PRACTITIONER
Payer: MEDICARE

## 2018-07-12 DIAGNOSIS — Z85.048 HISTORY OF RECTAL OR ANAL CANCER: ICD-10-CM

## 2018-07-12 LAB — POCT GLUCOSE: 105 MG/DL (ref 70–110)

## 2018-07-12 PROCEDURE — A9552 F18 FDG: HCPCS

## 2018-07-12 PROCEDURE — 78815 PET IMAGE W/CT SKULL-THIGH: CPT | Mod: TC,PS

## 2018-07-12 PROCEDURE — 78815 PET IMAGE W/CT SKULL-THIGH: CPT | Mod: 26,PS,, | Performed by: RADIOLOGY

## 2018-07-16 ENCOUNTER — LAB VISIT (OUTPATIENT)
Dept: LAB | Facility: HOSPITAL | Age: 73
End: 2018-07-16
Payer: MEDICARE

## 2018-07-16 ENCOUNTER — OFFICE VISIT (OUTPATIENT)
Dept: HEMATOLOGY/ONCOLOGY | Facility: CLINIC | Age: 73
End: 2018-07-16
Payer: MEDICARE

## 2018-07-16 VITALS
HEIGHT: 62 IN | OXYGEN SATURATION: 98 % | TEMPERATURE: 98 F | WEIGHT: 190.25 LBS | BODY MASS INDEX: 35.01 KG/M2 | DIASTOLIC BLOOD PRESSURE: 78 MMHG | HEART RATE: 71 BPM | RESPIRATION RATE: 18 BRPM | SYSTOLIC BLOOD PRESSURE: 186 MMHG

## 2018-07-16 DIAGNOSIS — Z85.048 HISTORY OF RECTAL OR ANAL CANCER: ICD-10-CM

## 2018-07-16 DIAGNOSIS — Z85.048 HISTORY OF RECTAL OR ANAL CANCER: Primary | ICD-10-CM

## 2018-07-16 LAB
ALBUMIN SERPL BCP-MCNC: 2.9 G/DL
ALP SERPL-CCNC: 53 U/L
ALT SERPL W/O P-5'-P-CCNC: 18 U/L
ANION GAP SERPL CALC-SCNC: 7 MMOL/L
AST SERPL-CCNC: 28 U/L
BILIRUB SERPL-MCNC: 0.4 MG/DL
BUN SERPL-MCNC: 44 MG/DL
CALCIUM SERPL-MCNC: 9.7 MG/DL
CHLORIDE SERPL-SCNC: 110 MMOL/L
CO2 SERPL-SCNC: 22 MMOL/L
CREAT SERPL-MCNC: 1.8 MG/DL
ERYTHROCYTE [DISTWIDTH] IN BLOOD BY AUTOMATED COUNT: 16.5 %
EST. GFR  (AFRICAN AMERICAN): 32.4 ML/MIN/1.73 M^2
EST. GFR  (NON AFRICAN AMERICAN): 28.1 ML/MIN/1.73 M^2
GLUCOSE SERPL-MCNC: 99 MG/DL
HCT VFR BLD AUTO: 33.4 %
HGB BLD-MCNC: 10.6 G/DL
IMM GRANULOCYTES # BLD AUTO: 0.02 K/UL
MCH RBC QN AUTO: 30.5 PG
MCHC RBC AUTO-ENTMCNC: 31.7 G/DL
MCV RBC AUTO: 96 FL
NEUTROPHILS # BLD AUTO: 2.6 K/UL
PLATELET # BLD AUTO: 179 K/UL
PMV BLD AUTO: 10.4 FL
POTASSIUM SERPL-SCNC: 5.5 MMOL/L
PROT SERPL-MCNC: 6.3 G/DL
RBC # BLD AUTO: 3.48 M/UL
SODIUM SERPL-SCNC: 139 MMOL/L
WBC # BLD AUTO: 4.5 K/UL

## 2018-07-16 PROCEDURE — 99214 OFFICE O/P EST MOD 30 MIN: CPT | Mod: S$GLB,,, | Performed by: INTERNAL MEDICINE

## 2018-07-16 PROCEDURE — 3077F SYST BP >= 140 MM HG: CPT | Mod: CPTII,S$GLB,, | Performed by: INTERNAL MEDICINE

## 2018-07-16 PROCEDURE — 80053 COMPREHEN METABOLIC PANEL: CPT

## 2018-07-16 PROCEDURE — 36415 COLL VENOUS BLD VENIPUNCTURE: CPT

## 2018-07-16 PROCEDURE — 99999 PR PBB SHADOW E&M-EST. PATIENT-LVL III: CPT | Mod: PBBFAC,,, | Performed by: INTERNAL MEDICINE

## 2018-07-16 PROCEDURE — 3078F DIAST BP <80 MM HG: CPT | Mod: CPTII,S$GLB,, | Performed by: INTERNAL MEDICINE

## 2018-07-16 PROCEDURE — 85027 COMPLETE CBC AUTOMATED: CPT

## 2018-07-16 NOTE — PROGRESS NOTES
"Subjective:       Patient ID: Steph Monroe is a 72 y.o. female.    Chief Complaint: History of rectal or anal cancer  Oncologic History:  Ms. Monroe is a 72-year-old female with essential  hypertension, chronic kidney disease, hyperlipidemia, peripheral vascular diseasediagnosis of anal cancer.  She has been worked up in [place] so far and was noted to have bright red blood per  rectum gushing at the time of hospitalization and the scope was done by Dr. Buitrago.  Plavix was stopped to perform the scope safely and pathology  revealed squamous cell carcinoma.  She was then referred to see Dr. De Santiago for    evaluation and he referred her to me today for further evaluation of anal squamous cell carcinoma.      She completed Mitomycin and 5FU and RT (DAYAMI PROTOCOL) on 5/16/16       PET scan which reveals "In this patient with a history of anal cancer, status post chemoradiation, and no PET/CT findings to suggest recurrent or metastatic disease. Abnormal soft tissue hypermetabolic activity in the right medial gluteal region and left thigh, thought to represent postinflammatory changes. Clinical correlation and evaluation with direct visualization is recommended. Soft tissue mass in the lower pole of the left kidney that demonstrates no evidence of increased FDG uptake.  Further evaluation with dedicated retroperitoneal ultrasound is recommended"     She had Anoscopy done in April 2017 with Dr. De Santiago and was normal.    HPIShe comes in to review her PET scan which reveals no evidence of recurrence,  She feels well and denies any new issues    Review of Systems   Constitutional: Negative for appetite change, fatigue and unexpected weight change.   HENT: Negative for mouth sores.    Eyes: Negative for visual disturbance.   Respiratory: Negative for cough and shortness of breath.    Cardiovascular: Negative for chest pain.   Gastrointestinal: Negative for abdominal pain and diarrhea.   Genitourinary: Negative for " frequency.   Musculoskeletal: Negative for back pain.   Skin: Negative for rash.   Neurological: Negative for headaches.   Hematological: Negative for adenopathy.   Psychiatric/Behavioral: The patient is not nervous/anxious.    All other systems reviewed and are negative.      Objective:      Physical Exam   Constitutional: She is oriented to person, place, and time. She appears well-developed and well-nourished.   HENT:   Mouth/Throat: No oropharyngeal exudate.   Cardiovascular: Normal rate and normal heart sounds.    Pulmonary/Chest: Effort normal and breath sounds normal. She has no wheezes.   Abdominal: Soft. Bowel sounds are normal. There is no tenderness.   Musculoskeletal: She exhibits no edema or tenderness.   Lymphadenopathy:     She has no cervical adenopathy.   Neurological: She is alert and oriented to person, place, and time. Coordination normal.   Skin: Skin is warm and dry. No rash noted.   Psychiatric: She has a normal mood and affect. Judgment and thought content normal.   Vitals reviewed.        LABS:  WBC   Date Value Ref Range Status   07/16/2018 4.50 3.90 - 12.70 K/uL Final     Hemoglobin   Date Value Ref Range Status   07/16/2018 10.6 (L) 12.0 - 16.0 g/dL Final     Hematocrit   Date Value Ref Range Status   07/16/2018 33.4 (L) 37.0 - 48.5 % Final     Platelets   Date Value Ref Range Status   07/16/2018 179 150 - 350 K/uL Final     Gran # (ANC)   Date Value Ref Range Status   07/16/2018 2.6 1.8 - 7.7 K/uL Final     Comment:     The ANC is based on a white cell differential from an   automated cell counter. It has not been microscopically   reviewed for the presence of abnormal cells. Clinical   correlation is required.         Chemistry        Component Value Date/Time     07/16/2018 1403    K 5.5 (H) 07/16/2018 1403     07/16/2018 1403    CO2 22 (L) 07/16/2018 1403    BUN 44 (H) 07/16/2018 1403    CREATININE 1.8 (H) 07/16/2018 1403    GLU 99 07/16/2018 1403        Component Value  Date/Time    CALCIUM 9.7 07/16/2018 1403    ALKPHOS 53 (L) 07/16/2018 1403    AST 28 07/16/2018 1403    ALT 18 07/16/2018 1403    BILITOT 0.4 07/16/2018 1403    ESTGFRAFRICA 32.4 (A) 07/16/2018 1403    EGFRNONAA 28.1 (A) 07/16/2018 1403          Assessment:       1. History of rectal or anal cancer        Plan:        1. She is doing well with no evidence of recurrence on EPt scan. Will see her in 1 yeat with PET scan. Will have her follow-up with Dr. De Santiago.    Above care plan was discussed with patient and accompanying daughter and all questions were addressed to their satisfaction

## 2018-08-13 ENCOUNTER — OFFICE VISIT (OUTPATIENT)
Dept: SURGERY | Facility: CLINIC | Age: 73
End: 2018-08-13
Payer: MEDICARE

## 2018-08-13 VITALS
DIASTOLIC BLOOD PRESSURE: 71 MMHG | SYSTOLIC BLOOD PRESSURE: 140 MMHG | HEART RATE: 75 BPM | WEIGHT: 189.63 LBS | BODY MASS INDEX: 34.89 KG/M2 | HEIGHT: 62 IN

## 2018-08-13 DIAGNOSIS — C21.0 ANAL CANCER: Primary | ICD-10-CM

## 2018-08-13 PROCEDURE — 99213 OFFICE O/P EST LOW 20 MIN: CPT | Mod: 25,S$GLB,, | Performed by: COLON & RECTAL SURGERY

## 2018-08-13 PROCEDURE — 99999 PR PBB SHADOW E&M-EST. PATIENT-LVL III: CPT | Mod: PBBFAC,,, | Performed by: COLON & RECTAL SURGERY

## 2018-08-13 PROCEDURE — 3077F SYST BP >= 140 MM HG: CPT | Mod: CPTII,S$GLB,, | Performed by: COLON & RECTAL SURGERY

## 2018-08-13 PROCEDURE — 3078F DIAST BP <80 MM HG: CPT | Mod: CPTII,S$GLB,, | Performed by: COLON & RECTAL SURGERY

## 2018-08-13 PROCEDURE — 46600 DIAGNOSTIC ANOSCOPY SPX: CPT | Mod: S$GLB,,, | Performed by: COLON & RECTAL SURGERY

## 2018-08-13 NOTE — PROGRESS NOTES
Subjective:       Patient ID: Steph Monroe is a 72 y.o. female.    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 Last treatment was in May 2016. She denies any pain or bleeding from anus. Her last PET CT July 2018 scan showed no activity.  Scheduled for nephrectomy next week.       Review of Systems   Constitutional: Negative for chills and fever.   Respiratory: Negative for cough and shortness of breath.    Cardiovascular: Negative for chest pain and palpitations.   Gastrointestinal: Negative for nausea and vomiting.   Genitourinary: Negative for dysuria and urgency.   Neurological: Negative for seizures and numbness.       Objective:      Physical Exam   Constitutional: She is oriented to person, place, and time. She appears well-developed and well-nourished.   Eyes: Conjunctivae and EOM are normal.   Pulmonary/Chest: Effort normal. No respiratory distress.   Abdominal: Soft. She exhibits no distension.   Genitourinary:   Genitourinary Comments: Anorectal Exam:     Perianal skin: normal    BETZAIDA: Normal resting and squeeze tone.  No masses. Nontender    Anoscopy:  Normal distal rectal mucosa. Internal hemorrhoids without stigmata of bleeding or prolapse. Mild radiation changes in distal rectum/prox anal canal without contact bleeding.    Musculoskeletal: Normal range of motion. She exhibits no edema.   Neurological: She is alert and oriented to person, place, and time.   Skin: Skin is warm and dry.   Psychiatric: She has a normal mood and affect. Her behavior is normal.       Assessment:       1. Anal cancer        Plan:       No evidence of disease.  Follow-up 6 months for surveillance exam.

## 2018-08-13 NOTE — LETTER
August 13, 2018      Mane Carmona MD  [address] [place] [address] [address]  [facility] [address]           [facility] include 234 beds at the [facility]  [address]  [place] [address]  Phone: [phone]          Patient: Steph Monroe   MR Number: [id number]   YOB: 1945   Date of Visit: 8/13/2018       Dear Dr. Mane Carmona:    Thank you for referring Steph Monroe to me for evaluation. Attached you will find relevant portions of my assessment and plan of care.    If you have questions, please do not hesitate to call me. I look forward to following Steph Monroe along with you.    Sincerely,    Se De Santiago MD    Enclosure  CC:  No Recipients    If you would like to receive this communication electronically, please contact [email or web address] or [phone] to request more information on [employer] Link access.    For providers and/or their staff who would like to refer a patient to [facility], please contact us through our one-stop-shop provider referral line, [facility], at [phone].    If you feel you have received this communication in error or would no longer like to receive these types of communications, please e-mail [email or web address]

## 2019-01-31 ENCOUNTER — OFFICE VISIT (OUTPATIENT)
Dept: SURGERY | Facility: CLINIC | Age: 74
End: 2019-01-31
Payer: MEDICARE

## 2019-01-31 VITALS
BODY MASS INDEX: 34.89 KG/M2 | DIASTOLIC BLOOD PRESSURE: 76 MMHG | WEIGHT: 189.63 LBS | HEIGHT: 62 IN | SYSTOLIC BLOOD PRESSURE: 149 MMHG | HEART RATE: 76 BPM

## 2019-01-31 DIAGNOSIS — Z85.048 HISTORY OF ANAL CANCER: Primary | ICD-10-CM

## 2019-01-31 PROCEDURE — 99999 PR PBB SHADOW E&M-EST. PATIENT-LVL III: CPT | Mod: PBBFAC,,, | Performed by: COLON & RECTAL SURGERY

## 2019-01-31 PROCEDURE — 46600 DIAGNOSTIC ANOSCOPY SPX: CPT | Mod: S$GLB,,, | Performed by: COLON & RECTAL SURGERY

## 2019-01-31 PROCEDURE — 3078F DIAST BP <80 MM HG: CPT | Mod: CPTII,S$GLB,, | Performed by: COLON & RECTAL SURGERY

## 2019-01-31 PROCEDURE — 1101F PT FALLS ASSESS-DOCD LE1/YR: CPT | Mod: CPTII,S$GLB,, | Performed by: COLON & RECTAL SURGERY

## 2019-01-31 PROCEDURE — 1101F PR PT FALLS ASSESS DOC 0-1 FALLS W/OUT INJ PAST YR: ICD-10-PCS | Mod: CPTII,S$GLB,, | Performed by: COLON & RECTAL SURGERY

## 2019-01-31 PROCEDURE — 99213 OFFICE O/P EST LOW 20 MIN: CPT | Mod: 25,S$GLB,, | Performed by: COLON & RECTAL SURGERY

## 2019-01-31 PROCEDURE — 3077F PR MOST RECENT SYSTOLIC BLOOD PRESSURE >= 140 MM HG: ICD-10-PCS | Mod: CPTII,S$GLB,, | Performed by: COLON & RECTAL SURGERY

## 2019-01-31 PROCEDURE — 46600 PR DIAG2STIC A2SCOPY: ICD-10-PCS | Mod: S$GLB,,, | Performed by: COLON & RECTAL SURGERY

## 2019-01-31 PROCEDURE — 3078F PR MOST RECENT DIASTOLIC BLOOD PRESSURE < 80 MM HG: ICD-10-PCS | Mod: CPTII,S$GLB,, | Performed by: COLON & RECTAL SURGERY

## 2019-01-31 PROCEDURE — 99999 PR PBB SHADOW E&M-EST. PATIENT-LVL III: ICD-10-PCS | Mod: PBBFAC,,, | Performed by: COLON & RECTAL SURGERY

## 2019-01-31 PROCEDURE — 99213 PR OFFICE/OUTPT VISIT, EST, LEVL III, 20-29 MIN: ICD-10-PCS | Mod: 25,S$GLB,, | Performed by: COLON & RECTAL SURGERY

## 2019-01-31 PROCEDURE — 3077F SYST BP >= 140 MM HG: CPT | Mod: CPTII,S$GLB,, | Performed by: COLON & RECTAL SURGERY

## 2019-01-31 RX ORDER — CARVEDILOL 25 MG/1
TABLET ORAL
COMMUNITY
Start: 2019-01-21 | End: 2023-05-02 | Stop reason: SDUPTHER

## 2019-01-31 NOTE — PROGRESS NOTES
Subjective:       Patient ID: Steph Monroe is a 73 y.o. female.    Chief Complaint: anal cancer    HPI established pt. History of anal squamous cell carcinoma presents after chemo radiation therapy. Last treatment was in May 2016.  C/o small amt BRB on toilet paper.  Cscope 2016    Past Surgical History:   Procedure Laterality Date    COLONOSCOPY N/A 3/28/2016    Performed by Mitul Buitrago Jr., MD at [facility]O    EYE SURGERY Left     cataract removal with lens implant.    FEMORAL ARTERY STENT  before 2010    INSERTION-PORT Right 4/7/2016    Performed by Amanda Florez MD at [facility] OR    RENAL ARTERY STENT  2010         Review of Systems   Constitutional: Negative for chills and fever.   Respiratory: Negative for cough and shortness of breath.    Cardiovascular: Negative for chest pain and palpitations.   Gastrointestinal: Negative for nausea and vomiting.   Genitourinary: Negative for dysuria and urgency.   Neurological: Negative for seizures and numbness.       Objective:      Physical Exam   Constitutional: She is oriented to person, place, and time. She appears well-developed and well-nourished.   Eyes: Conjunctivae and EOM are normal.   Pulmonary/Chest: Effort normal. No respiratory distress.   Abdominal: Soft. She exhibits no distension.   Genitourinary:   Genitourinary Comments: Anorectal Exam:     Perianal skin: normal    BETZAIDA: Normal resting and squeeze tone.  No masses. Nontender    Anoscopy: Distal 3cm of rectal mucosa as circumferential telangectasia c/w prior radiation tx. Internal hemorrhoids without stigmata of bleeding or prolapse.    Musculoskeletal: Normal range of motion. She exhibits no edema.   Neurological: She is alert and oriented to person, place, and time.   Skin: Skin is warm and dry.   Psychiatric: She has a normal mood and affect. Her behavior is normal.       Assessment:       1. History of anal cancer        Plan:       No evidence of recurrence.  F/u 6 months for  surveillance.

## 2019-07-11 ENCOUNTER — HOSPITAL ENCOUNTER (OUTPATIENT)
Dept: RADIOLOGY | Facility: HOSPITAL | Age: 74
Discharge: HOME | End: 2019-07-11
Attending: INTERNAL MEDICINE
Payer: MEDICARE

## 2019-07-11 DIAGNOSIS — Z85.048 HISTORY OF RECTAL OR ANAL CANCER: ICD-10-CM

## 2019-07-11 LAB — POCT GLUCOSE: 107 MG/DL (ref 70–110)

## 2019-07-11 PROCEDURE — 78815 PET IMAGE W/CT SKULL-THIGH: CPT | Mod: 26,PS,, | Performed by: RADIOLOGY

## 2019-07-11 PROCEDURE — 78815 NM PET CT ROUTINE: ICD-10-PCS | Mod: 26,PS,, | Performed by: RADIOLOGY

## 2019-07-11 PROCEDURE — 78815 PET IMAGE W/CT SKULL-THIGH: CPT | Mod: TC

## 2019-07-11 PROCEDURE — A9552 F18 FDG: HCPCS

## 2019-07-15 ENCOUNTER — OFFICE VISIT (OUTPATIENT)
Dept: HEMATOLOGY/ONCOLOGY | Facility: CLINIC | Age: 74
End: 2019-07-15
Payer: MEDICARE

## 2019-07-15 VITALS
WEIGHT: 186.31 LBS | DIASTOLIC BLOOD PRESSURE: 70 MMHG | OXYGEN SATURATION: 95 % | RESPIRATION RATE: 20 BRPM | HEART RATE: 69 BPM | SYSTOLIC BLOOD PRESSURE: 167 MMHG | TEMPERATURE: 98 F | BODY MASS INDEX: 34.29 KG/M2 | HEIGHT: 62 IN

## 2019-07-15 DIAGNOSIS — Z85.048 HISTORY OF RECTAL OR ANAL CANCER: Primary | ICD-10-CM

## 2019-07-15 PROCEDURE — 3078F DIAST BP <80 MM HG: CPT | Mod: CPTII,S$GLB,, | Performed by: INTERNAL MEDICINE

## 2019-07-15 PROCEDURE — 99213 PR OFFICE/OUTPT VISIT, EST, LEVL III, 20-29 MIN: ICD-10-PCS | Mod: S$GLB,,, | Performed by: INTERNAL MEDICINE

## 2019-07-15 PROCEDURE — 3077F SYST BP >= 140 MM HG: CPT | Mod: CPTII,S$GLB,, | Performed by: INTERNAL MEDICINE

## 2019-07-15 PROCEDURE — 1101F PT FALLS ASSESS-DOCD LE1/YR: CPT | Mod: CPTII,S$GLB,, | Performed by: INTERNAL MEDICINE

## 2019-07-15 PROCEDURE — 99999 PR PBB SHADOW E&M-EST. PATIENT-LVL III: ICD-10-PCS | Mod: PBBFAC,,, | Performed by: INTERNAL MEDICINE

## 2019-07-15 PROCEDURE — 99999 PR PBB SHADOW E&M-EST. PATIENT-LVL III: CPT | Mod: PBBFAC,,, | Performed by: INTERNAL MEDICINE

## 2019-07-15 PROCEDURE — 3078F PR MOST RECENT DIASTOLIC BLOOD PRESSURE < 80 MM HG: ICD-10-PCS | Mod: CPTII,S$GLB,, | Performed by: INTERNAL MEDICINE

## 2019-07-15 PROCEDURE — 3077F PR MOST RECENT SYSTOLIC BLOOD PRESSURE >= 140 MM HG: ICD-10-PCS | Mod: CPTII,S$GLB,, | Performed by: INTERNAL MEDICINE

## 2019-07-15 PROCEDURE — 1101F PR PT FALLS ASSESS DOC 0-1 FALLS W/OUT INJ PAST YR: ICD-10-PCS | Mod: CPTII,S$GLB,, | Performed by: INTERNAL MEDICINE

## 2019-07-15 PROCEDURE — 99213 OFFICE O/P EST LOW 20 MIN: CPT | Mod: S$GLB,,, | Performed by: INTERNAL MEDICINE

## 2019-07-15 NOTE — PROGRESS NOTES
"Subjective:       Patient ID: Steph Monroe is a 73 y.o. female.    Chief Complaint: History of rectal or anal cancer  Oncologic History:  Ms. Monroe is a 72-year-old female with essential  hypertension, chronic kidney disease, hyperlipidemia, peripheral vascular diseasediagnosis of anal cancer.  She has been worked up in [place] so far and was noted to have bright red blood per  rectum gushing at the time of hospitalization and the scope was done by Dr. Buitrago.  Plavix was stopped to perform the scope safely and pathology  revealed squamous cell carcinoma.  She was then referred to see Dr. De Santiago for    evaluation and he referred her to me today for further evaluation of anal squamous cell carcinoma.      She completed Mitomycin and 5FU and RT (DAYAMI PROTOCOL) on 5/16/16       PET scan which reveals "In this patient with a history of anal cancer, status post chemoradiation, and no PET/CT findings to suggest recurrent or metastatic disease. Abnormal soft tissue hypermetabolic activity in the right medial gluteal region and left thigh, thought to represent postinflammatory changes. Clinical correlation and evaluation with direct visualization is recommended. Soft tissue mass in the lower pole of the left kidney that demonstrates no evidence of increased FDG uptake.  Further evaluation with dedicated retroperitoneal ultrasound is recommended"     She had Anoscopy done in April 2017 with Dr. De Santiago and was normal.     HPI She comes in to review her PET scan from 7/11/19 which reveals no evidence of recurrence  She feels well and denies any new issues    Review of Systems   Constitutional: Negative for appetite change, fatigue and unexpected weight change.   HENT: Negative for mouth sores.    Eyes: Negative for visual disturbance.   Respiratory: Negative for cough and shortness of breath.    Cardiovascular: Negative for chest pain.   Gastrointestinal: Negative for abdominal pain and diarrhea.   Genitourinary: " Negative for frequency.   Musculoskeletal: Negative for back pain.   Skin: Negative for rash.   Neurological: Negative for headaches.   Hematological: Negative for adenopathy.   Psychiatric/Behavioral: The patient is not nervous/anxious.    All other systems reviewed and are negative.      PMFSH: all information reviewed and updated as relevant to today's visit  Objective:      Physical Exam   Constitutional: She is oriented to person, place, and time. She appears well-developed and well-nourished.   HENT:   Mouth/Throat: No oropharyngeal exudate.   Cardiovascular: Normal rate and normal heart sounds.   Pulmonary/Chest: Effort normal and breath sounds normal. She has no wheezes.   Abdominal: Soft. Bowel sounds are normal. There is no tenderness.   Musculoskeletal: She exhibits no edema or tenderness.   Lymphadenopathy:     She has no cervical adenopathy.   Neurological: She is alert and oriented to person, place, and time. Coordination normal.   Skin: Skin is warm and dry. No rash noted.   Psychiatric: She has a normal mood and affect. Judgment and thought content normal.   Vitals reviewed.      LABS:  WBC   Date Value Ref Range Status   07/16/2018 4.50 3.90 - 12.70 K/uL Final     Hemoglobin   Date Value Ref Range Status   07/16/2018 10.6 (L) 12.0 - 16.0 g/dL Final     Hematocrit   Date Value Ref Range Status   07/16/2018 33.4 (L) 37.0 - 48.5 % Final     Platelets   Date Value Ref Range Status   07/16/2018 179 150 - 350 K/uL Final     Gran # (ANC)   Date Value Ref Range Status   07/16/2018 2.6 1.8 - 7.7 K/uL Final     Comment:     The ANC is based on a white cell differential from an   automated cell counter. It has not been microscopically   reviewed for the presence of abnormal cells. Clinical   correlation is required.         Chemistry        Component Value Date/Time     07/16/2018 1403    K 5.5 (H) 07/16/2018 1403     07/16/2018 1403    CO2 22 (L) 07/16/2018 1403    BUN 44 (H) 07/16/2018 1403     CREATININE 1.8 (H) 07/16/2018 1403    GLU 99 07/16/2018 1403        Component Value Date/Time    CALCIUM 9.7 07/16/2018 1403    ALKPHOS 53 (L) 07/16/2018 1403    AST 28 07/16/2018 1403    ALT 18 07/16/2018 1403    BILITOT 0.4 07/16/2018 1403    ESTGFRAFRICA 32.4 (A) 07/16/2018 1403    EGFRNONAA 28.1 (A) 07/16/2018 1403           Assessment:       1. History of rectal or anal cancer        Plan:        1. She is doing well with no evidence of recurrence  And will return in 1 year with labs. She will follow-up with Dr. De Santiago.    Above care plan was discussed with patient and accompanying daughter and all questions were addressed to their satisfaction

## 2020-07-16 ENCOUNTER — TELEPHONE (OUTPATIENT)
Dept: HEMATOLOGY/ONCOLOGY | Facility: CLINIC | Age: 75
End: 2020-07-16

## 2020-07-16 DIAGNOSIS — Z85.048 HISTORY OF RECTAL OR ANAL CANCER: Primary | ICD-10-CM

## 2020-08-06 ENCOUNTER — TELEPHONE (OUTPATIENT)
Dept: SURGERY | Facility: CLINIC | Age: 75
End: 2020-08-06

## 2020-08-06 ENCOUNTER — LAB VISIT (OUTPATIENT)
Dept: LAB | Facility: HOSPITAL | Age: 75
End: 2020-08-06
Payer: MEDICARE

## 2020-08-06 ENCOUNTER — OFFICE VISIT (OUTPATIENT)
Dept: HEMATOLOGY/ONCOLOGY | Facility: CLINIC | Age: 75
End: 2020-08-06
Payer: MEDICARE

## 2020-08-06 VITALS
DIASTOLIC BLOOD PRESSURE: 86 MMHG | HEIGHT: 62 IN | BODY MASS INDEX: 31.11 KG/M2 | SYSTOLIC BLOOD PRESSURE: 143 MMHG | RESPIRATION RATE: 18 BRPM | OXYGEN SATURATION: 97 % | TEMPERATURE: 98 F | WEIGHT: 169.06 LBS | HEART RATE: 67 BPM

## 2020-08-06 DIAGNOSIS — I12.9 TYPE 2 DM WITH CKD STAGE 3 AND HYPERTENSION: Chronic | ICD-10-CM

## 2020-08-06 DIAGNOSIS — Z85.048 HISTORY OF RECTAL OR ANAL CANCER: ICD-10-CM

## 2020-08-06 DIAGNOSIS — E11.22 TYPE 2 DM WITH CKD STAGE 3 AND HYPERTENSION: Chronic | ICD-10-CM

## 2020-08-06 DIAGNOSIS — Z85.048 HISTORY OF RECTAL OR ANAL CANCER: Primary | ICD-10-CM

## 2020-08-06 DIAGNOSIS — N18.30 TYPE 2 DM WITH CKD STAGE 3 AND HYPERTENSION: Chronic | ICD-10-CM

## 2020-08-06 DIAGNOSIS — N18.4 STAGE 4 CHRONIC KIDNEY DISEASE: ICD-10-CM

## 2020-08-06 LAB
ALBUMIN SERPL BCP-MCNC: 3.2 G/DL (ref 3.5–5.2)
ALP SERPL-CCNC: 84 U/L (ref 55–135)
ALT SERPL W/O P-5'-P-CCNC: 9 U/L (ref 10–44)
ANION GAP SERPL CALC-SCNC: 6 MMOL/L (ref 8–16)
AST SERPL-CCNC: 18 U/L (ref 10–40)
BILIRUB SERPL-MCNC: 0.7 MG/DL (ref 0.1–1)
BUN SERPL-MCNC: 34 MG/DL (ref 8–23)
CALCIUM SERPL-MCNC: 9.3 MG/DL (ref 8.7–10.5)
CHLORIDE SERPL-SCNC: 111 MMOL/L (ref 95–110)
CO2 SERPL-SCNC: 23 MMOL/L (ref 23–29)
CREAT SERPL-MCNC: 2.2 MG/DL (ref 0.5–1.4)
ERYTHROCYTE [DISTWIDTH] IN BLOOD BY AUTOMATED COUNT: 17 % (ref 11.5–14.5)
EST. GFR  (AFRICAN AMERICAN): 25.1 ML/MIN/1.73 M^2
EST. GFR  (NON AFRICAN AMERICAN): 21.7 ML/MIN/1.73 M^2
GLUCOSE SERPL-MCNC: 169 MG/DL (ref 70–110)
HCT VFR BLD AUTO: 33.6 % (ref 37–48.5)
HGB BLD-MCNC: 10.2 G/DL (ref 12–16)
IMM GRANULOCYTES # BLD AUTO: 0.02 K/UL (ref 0–0.04)
MCH RBC QN AUTO: 29.6 PG (ref 27–31)
MCHC RBC AUTO-ENTMCNC: 30.4 G/DL (ref 32–36)
MCV RBC AUTO: 97 FL (ref 82–98)
NEUTROPHILS # BLD AUTO: 3.1 K/UL (ref 1.8–7.7)
PLATELET # BLD AUTO: 95 K/UL (ref 150–350)
PMV BLD AUTO: 11.1 FL (ref 9.2–12.9)
POTASSIUM SERPL-SCNC: 5.1 MMOL/L (ref 3.5–5.1)
PROT SERPL-MCNC: 7 G/DL (ref 6–8.4)
RBC # BLD AUTO: 3.45 M/UL (ref 4–5.4)
SODIUM SERPL-SCNC: 140 MMOL/L (ref 136–145)
WBC # BLD AUTO: 4.11 K/UL (ref 3.9–12.7)

## 2020-08-06 PROCEDURE — 3008F PR BODY MASS INDEX (BMI) DOCUMENTED: ICD-10-PCS | Mod: CPTII,S$GLB,, | Performed by: INTERNAL MEDICINE

## 2020-08-06 PROCEDURE — 36415 COLL VENOUS BLD VENIPUNCTURE: CPT

## 2020-08-06 PROCEDURE — 80053 COMPREHEN METABOLIC PANEL: CPT

## 2020-08-06 PROCEDURE — 3077F SYST BP >= 140 MM HG: CPT | Mod: CPTII,S$GLB,, | Performed by: INTERNAL MEDICINE

## 2020-08-06 PROCEDURE — 1126F AMNT PAIN NOTED NONE PRSNT: CPT | Mod: S$GLB,,, | Performed by: INTERNAL MEDICINE

## 2020-08-06 PROCEDURE — 85027 COMPLETE CBC AUTOMATED: CPT

## 2020-08-06 PROCEDURE — 3008F BODY MASS INDEX DOCD: CPT | Mod: CPTII,S$GLB,, | Performed by: INTERNAL MEDICINE

## 2020-08-06 PROCEDURE — 3079F DIAST BP 80-89 MM HG: CPT | Mod: CPTII,S$GLB,, | Performed by: INTERNAL MEDICINE

## 2020-08-06 PROCEDURE — 3077F PR MOST RECENT SYSTOLIC BLOOD PRESSURE >= 140 MM HG: ICD-10-PCS | Mod: CPTII,S$GLB,, | Performed by: INTERNAL MEDICINE

## 2020-08-06 PROCEDURE — 3079F PR MOST RECENT DIASTOLIC BLOOD PRESSURE 80-89 MM HG: ICD-10-PCS | Mod: CPTII,S$GLB,, | Performed by: INTERNAL MEDICINE

## 2020-08-06 PROCEDURE — 99999 PR PBB SHADOW E&M-EST. PATIENT-LVL III: ICD-10-PCS | Mod: PBBFAC,,, | Performed by: INTERNAL MEDICINE

## 2020-08-06 PROCEDURE — 99213 PR OFFICE/OUTPT VISIT, EST, LEVL III, 20-29 MIN: ICD-10-PCS | Mod: S$GLB,,, | Performed by: INTERNAL MEDICINE

## 2020-08-06 PROCEDURE — 1101F PT FALLS ASSESS-DOCD LE1/YR: CPT | Mod: CPTII,S$GLB,, | Performed by: INTERNAL MEDICINE

## 2020-08-06 PROCEDURE — 99999 PR PBB SHADOW E&M-EST. PATIENT-LVL III: CPT | Mod: PBBFAC,,, | Performed by: INTERNAL MEDICINE

## 2020-08-06 PROCEDURE — 1159F PR MEDICATION LIST DOCUMENTED IN MEDICAL RECORD: ICD-10-PCS | Mod: S$GLB,,, | Performed by: INTERNAL MEDICINE

## 2020-08-06 PROCEDURE — 1101F PR PT FALLS ASSESS DOC 0-1 FALLS W/OUT INJ PAST YR: ICD-10-PCS | Mod: CPTII,S$GLB,, | Performed by: INTERNAL MEDICINE

## 2020-08-06 PROCEDURE — 1126F PR PAIN SEVERITY QUANTIFIED, NO PAIN PRESENT: ICD-10-PCS | Mod: S$GLB,,, | Performed by: INTERNAL MEDICINE

## 2020-08-06 PROCEDURE — 99213 OFFICE O/P EST LOW 20 MIN: CPT | Mod: S$GLB,,, | Performed by: INTERNAL MEDICINE

## 2020-08-06 PROCEDURE — 1159F MED LIST DOCD IN RCRD: CPT | Mod: S$GLB,,, | Performed by: INTERNAL MEDICINE

## 2020-08-06 NOTE — Clinical Note
Please schedule follow-up with Dr. De Santiago for history of anal cancer, She was supposed to see him 6 months ago

## 2020-08-06 NOTE — TELEPHONE ENCOUNTER
Spoke with patient.  Informed her of a follow up that is due for anal cancer.  Appointment scheduled on Thursday 8/13 @ 9:40.

## 2020-08-06 NOTE — TELEPHONE ENCOUNTER
----- Message from Yolande Smith sent at 8/6/2020  2:31 PM CDT -----  Please schedule follow-up with Dr. De Santiago for history of anal cancer, She was supposed to see him 6 months ago       Can you all assist with this?    Thanks!

## 2020-08-06 NOTE — PROGRESS NOTES
"Subjective:       Patient ID: Steph Monroe is a 74 y.o. female.    Chief Complaint: History of rectal or anal cancer  Oncologic History:  Ms. Monroe is a 74-year-old female with essential  hypertension, chronic kidney disease, hyperlipidemia, peripheral vascular diseasediagnosis of anal cancer.  She has been worked up in [place] so far and was noted to have bright red blood per  rectum gushing at the time of hospitalization and the scope was done by Dr. Buitrago.  Plavix was stopped to perform the scope safely and pathology  revealed squamous cell carcinoma.  She was then referred to see Dr. De Santiago for    evaluation and he referred her to me today for further evaluation of anal squamous cell carcinoma.      She completed Mitomycin and 5FU and RT (DAYAMI PROTOCOL) on 5/16/16       PET scan reveals "In this patient with a history of anal cancer, status post chemoradiation, and no PET/CT findings to suggest recurrent or metastatic disease. Abnormal soft tissue hypermetabolic activity in the right medial gluteal region and left thigh, thought to represent postinflammatory changes. Clinical correlation and evaluation with direct visualization is recommended. Soft tissue mass in the lower pole of the left kidney that demonstrates no evidence of increased FDG uptake.  Further evaluation with dedicated retroperitoneal ultrasound is recommended"     She had Anoscopy done in April 2017 with Dr. De Santiago and was normal.     She is on surveillance     HPIHer last PET scan in July 2019 was normal. She feels well and denies any new issues. Bowel movements are regular. Her ECOG PS is zero  She is accompanied by her daughter.  She is eating healthy and has lost 16 lbs in 1 year. Appetite is good. She is able to do her own chores.      Review of Systems   Constitutional: Negative for appetite change, fatigue and unexpected weight change.   HENT: Negative for mouth sores.    Eyes: Negative for visual disturbance.   Respiratory: " Negative for cough and shortness of breath.    Cardiovascular: Negative for chest pain.   Gastrointestinal: Negative for abdominal pain and diarrhea.   Genitourinary: Negative for frequency.   Musculoskeletal: Negative for back pain.   Integumentary:  Negative for rash.   Neurological: Negative for headaches.   Hematological: Negative for adenopathy.   Psychiatric/Behavioral: The patient is not nervous/anxious.    All other systems reviewed and are negative.        Objective:      Physical Exam  Vitals signs reviewed.   Constitutional:       Appearance: She is well-developed.   HENT:      Mouth/Throat:      Pharynx: No oropharyngeal exudate.   Cardiovascular:      Rate and Rhythm: Normal rate.      Heart sounds: Normal heart sounds.   Pulmonary:      Effort: Pulmonary effort is normal.      Breath sounds: Normal breath sounds. No wheezing.   Abdominal:      General: Bowel sounds are normal.      Palpations: Abdomen is soft.      Tenderness: There is no abdominal tenderness.   Musculoskeletal:         General: No tenderness.   Lymphadenopathy:      Cervical: No cervical adenopathy.   Skin:     General: Skin is warm and dry.      Findings: No rash.   Neurological:      Mental Status: She is alert and oriented to person, place, and time.      Coordination: Coordination normal.   Psychiatric:         Thought Content: Thought content normal.         Judgment: Judgment normal.         LABS:  WBC   Date Value Ref Range Status   08/06/2020 4.11 3.90 - 12.70 K/uL Final     Hemoglobin   Date Value Ref Range Status   08/06/2020 10.2 (L) 12.0 - 16.0 g/dL Final     Hematocrit   Date Value Ref Range Status   08/06/2020 33.6 (L) 37.0 - 48.5 % Final     Platelets   Date Value Ref Range Status   08/06/2020 95 (L) 150 - 350 K/uL Final     Gran # (ANC)   Date Value Ref Range Status   08/06/2020 3.1 1.8 - 7.7 K/uL Final     Comment:     The ANC is based on a white cell differential from an   automated cell counter. It has not been  microscopically   reviewed for the presence of abnormal cells. Clinical   correlation is required.         Chemistry        Component Value Date/Time     07/16/2018 1403    K 5.5 (H) 07/16/2018 1403     07/16/2018 1403    CO2 22 (L) 07/16/2018 1403    BUN 44 (H) 07/16/2018 1403    CREATININE 1.8 (H) 07/16/2018 1403    GLU 99 07/16/2018 1403        Component Value Date/Time    CALCIUM 9.7 07/16/2018 1403    ALKPHOS 53 (L) 07/16/2018 1403    AST 28 07/16/2018 1403    ALT 18 07/16/2018 1403    BILITOT 0.4 07/16/2018 1403    ESTGFRAFRICA 32.4 (A) 07/16/2018 1403    EGFRNONAA 28.1 (A) 07/16/2018 1403          Assessment:       1. History of rectal or anal cancer    2. 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 Stage 4 chronic kidney disease        Plan:        1. She is doing well clinically, will have her return to see Dr. De Santiago for Anoscopy, Will be due for a full colonoscopy in March 2021  2, Stable on Actos., sees PCP  3. Stable, followed by Nephrology    Above care plan was discussed with patient and accompanying daughter and all questions were addressed to their satisfaction

## 2020-08-13 ENCOUNTER — OFFICE VISIT (OUTPATIENT)
Dept: SURGERY | Facility: CLINIC | Age: 75
End: 2020-08-13
Payer: MEDICARE

## 2020-08-13 VITALS
BODY MASS INDEX: 30.83 KG/M2 | DIASTOLIC BLOOD PRESSURE: 80 MMHG | WEIGHT: 167.56 LBS | HEIGHT: 62 IN | SYSTOLIC BLOOD PRESSURE: 120 MMHG

## 2020-08-13 DIAGNOSIS — Z85.048 HISTORY OF ANAL CANCER: Primary | ICD-10-CM

## 2020-08-13 PROCEDURE — 3074F PR MOST RECENT SYSTOLIC BLOOD PRESSURE < 130 MM HG: ICD-10-PCS | Mod: CPTII,S$GLB,, | Performed by: COLON & RECTAL SURGERY

## 2020-08-13 PROCEDURE — 99999 PR PBB SHADOW E&M-EST. PATIENT-LVL III: CPT | Mod: PBBFAC,,, | Performed by: COLON & RECTAL SURGERY

## 2020-08-13 PROCEDURE — 3079F PR MOST RECENT DIASTOLIC BLOOD PRESSURE 80-89 MM HG: ICD-10-PCS | Mod: CPTII,S$GLB,, | Performed by: COLON & RECTAL SURGERY

## 2020-08-13 PROCEDURE — 99499 UNLISTED E&M SERVICE: CPT | Mod: S$GLB,,, | Performed by: COLON & RECTAL SURGERY

## 2020-08-13 PROCEDURE — 3008F PR BODY MASS INDEX (BMI) DOCUMENTED: ICD-10-PCS | Mod: CPTII,S$GLB,, | Performed by: COLON & RECTAL SURGERY

## 2020-08-13 PROCEDURE — 99999 PR PBB SHADOW E&M-EST. PATIENT-LVL III: ICD-10-PCS | Mod: PBBFAC,,, | Performed by: COLON & RECTAL SURGERY

## 2020-08-13 PROCEDURE — 1159F PR MEDICATION LIST DOCUMENTED IN MEDICAL RECORD: ICD-10-PCS | Mod: CPTII,S$GLB,, | Performed by: COLON & RECTAL SURGERY

## 2020-08-13 PROCEDURE — 1125F PR PAIN SEVERITY QUANTIFIED, PAIN PRESENT: ICD-10-PCS | Mod: CPTII,S$GLB,, | Performed by: COLON & RECTAL SURGERY

## 2020-08-13 PROCEDURE — 3074F SYST BP LT 130 MM HG: CPT | Mod: CPTII,S$GLB,, | Performed by: COLON & RECTAL SURGERY

## 2020-08-13 PROCEDURE — 1101F PR PT FALLS ASSESS DOC 0-1 FALLS W/OUT INJ PAST YR: ICD-10-PCS | Mod: CPTII,S$GLB,, | Performed by: COLON & RECTAL SURGERY

## 2020-08-13 PROCEDURE — 3079F DIAST BP 80-89 MM HG: CPT | Mod: CPTII,S$GLB,, | Performed by: COLON & RECTAL SURGERY

## 2020-08-13 PROCEDURE — 3288F PR FALLS RISK ASSESSMENT DOCUMENTED: ICD-10-PCS | Mod: CPTII,S$GLB,, | Performed by: COLON & RECTAL SURGERY

## 2020-08-13 PROCEDURE — 1101F PT FALLS ASSESS-DOCD LE1/YR: CPT | Mod: CPTII,S$GLB,, | Performed by: COLON & RECTAL SURGERY

## 2020-08-13 PROCEDURE — 3288F FALL RISK ASSESSMENT DOCD: CPT | Mod: CPTII,S$GLB,, | Performed by: COLON & RECTAL SURGERY

## 2020-08-13 PROCEDURE — 1159F MED LIST DOCD IN RCRD: CPT | Mod: CPTII,S$GLB,, | Performed by: COLON & RECTAL SURGERY

## 2020-08-13 PROCEDURE — 1125F AMNT PAIN NOTED PAIN PRSNT: CPT | Mod: CPTII,S$GLB,, | Performed by: COLON & RECTAL SURGERY

## 2020-08-13 PROCEDURE — 3008F BODY MASS INDEX DOCD: CPT | Mod: CPTII,S$GLB,, | Performed by: COLON & RECTAL SURGERY

## 2020-08-13 PROCEDURE — 99499 NO LOS: ICD-10-PCS | Mod: S$GLB,,, | Performed by: COLON & RECTAL SURGERY

## 2020-08-13 NOTE — PROGRESS NOTES
Subjective:       Patient ID: Steph Monroe is a 74 y.o. female.    Chief Complaint: Follow-up    HPI  history of anal cancer and status post chemoradiation in May 2016.  She has been without evidence of recurrence since that time.    Review of Systems      Objective:      Physical Exam    Assessment:       No diagnosis found.    Plan:       Incomplete documentation   Completing record

## 2020-08-13 NOTE — PROGRESS NOTES
CRS Office Visit    SUBJECTIVE:     Chief Complaint: history of anal cancer    History of Present Illness:  Patient is a 74 y.o. female presents for annual follow up related to her history of anal squamous cell cancer.  The patient completed Vamshi Protocol in .  We have been seeing her in clinic on an annual basis for surveillance.  She has been without evidence of recurrence in the interim. The patient was recently seen by oncology.  She is scheduled for her regular colonoscopy in .    As it relates to her history of anal cancer, the patient denies blood per rectum, pain at rest, or pain with defecation.  Denies fevers, chills, nausea, vomiting, chest pain, SOB, constipation, diarrhea, melena, hematochezia, hematemesis, hemoptysis, hematuria, dysuria, lightheadedness, dizziness, changes in mood, changes in behavior.    Review of patient's allergies indicates:   Allergen Reactions    Chantix [varenicline]      Tongue swelling      Wellbutrin [bupropion hcl]      Tongue swelling         Past Medical History:   Diagnosis Date    Cancer     rectum    CKD (chronic kidney disease)     Diabetes mellitus, type 2     Hyperlipidemia     Hypertension     PVD (peripheral vascular disease)      Past Surgical History:   Procedure Laterality Date    COLONOSCOPY N/A 3/28/2016    Procedure: COLONOSCOPY;  Surgeon: Mitul Buitrago Jr., MD;  Location: [facility];  Service: Endoscopy;  Laterality: N/A;    EYE SURGERY Left     cataract removal with lens implant.    FEMORAL ARTERY STENT  before     RENAL ARTERY STENT       Family History   Problem Relation Age of Onset    Diabetes Father     Heart attack Mother     Hypertension Sister      Social History     Tobacco Use    Smoking status: Former Smoker     Packs/day: 1.50     Years: 45.00     Pack years: 67.50     Types: Cigarettes     Quit date: 2013     Years since quittin.1    Smokeless tobacco: Never Used   Substance Use Topics    Alcohol  use: No     Alcohol/week: 0.0 standard drinks    Drug use: No        Review of Systems:  Negative but for that in the HPI    OBJECTIVE:     Vital Signs (Most Recent)  BP: 120/80 (08/13/20 0928)    Physical Exam:  General: White female in NAD sitting in chair in clinic  Neuro: aaox4 maex4 perrl  Respiratory: resps even unlabored  Cardiac: cap refill <2 sec  Abdomen: Abdomen soft, nontender. BS normal. No masses, organomegaly or scars.  Extremities: Warm dry and intact  Anorectal Exam:     Perianal skin: normal    BETZAIDA: Normal resting and squeeze tone.  No masses. Nontender. Small focus of fibrosis on anterior aspect of rectum.    Anoscopy:  Normal distal rectal mucosa. Internal hemorrhoids without stigmata of bleeding or prolapse.       ASSESSMENT/PLAN:     History of anal squamous cell cancer    The patient's anal canal appears to be in good health. There does not appear to be any evidence of recurrence. We will plan to see the patient back next year for her scheduled colonoscopy.

## 2020-10-24 ENCOUNTER — IMMUNIZATION (OUTPATIENT)
Dept: INTERNAL MEDICINE | Facility: CLINIC | Age: 75
End: 2020-10-24
Payer: MEDICARE

## 2021-01-04 ENCOUNTER — IMMUNIZATION (OUTPATIENT)
Dept: INTERNAL MEDICINE | Facility: CLINIC | Age: 76
End: 2021-01-04
Payer: MEDICARE

## 2021-01-04 DIAGNOSIS — Z23 NEED FOR VACCINATION: ICD-10-CM

## 2021-01-04 PROCEDURE — 91300 COVID-19, MRNA, LNP-S, PF, 30 MCG/0.3 ML DOSE VACCINE: CPT | Mod: PBBFAC | Performed by: FAMILY MEDICINE

## 2021-01-25 ENCOUNTER — IMMUNIZATION (OUTPATIENT)
Dept: INTERNAL MEDICINE | Facility: CLINIC | Age: 76
End: 2021-01-25
Payer: MEDICARE

## 2021-01-25 DIAGNOSIS — Z23 NEED FOR VACCINATION: Primary | ICD-10-CM

## 2021-01-25 PROCEDURE — 0002A COVID-19, MRNA, LNP-S, PF, 30 MCG/0.3 ML DOSE VACCINE: CPT | Mod: PBBFAC | Performed by: FAMILY MEDICINE

## 2021-01-25 PROCEDURE — 91300 COVID-19, MRNA, LNP-S, PF, 30 MCG/0.3 ML DOSE VACCINE: CPT | Mod: PBBFAC | Performed by: FAMILY MEDICINE

## 2021-02-06 ENCOUNTER — PATIENT MESSAGE (OUTPATIENT)
Dept: HEMATOLOGY/ONCOLOGY | Facility: CLINIC | Age: 76
End: 2021-02-06

## 2021-10-09 ENCOUNTER — IMMUNIZATION (OUTPATIENT)
Dept: INTERNAL MEDICINE | Facility: CLINIC | Age: 76
End: 2021-10-09
Payer: MEDICARE

## 2021-11-04 ENCOUNTER — IMMUNIZATION (OUTPATIENT)
Dept: INTERNAL MEDICINE | Facility: CLINIC | Age: 76
End: 2021-11-04
Payer: MEDICARE

## 2021-11-04 DIAGNOSIS — Z23 NEED FOR VACCINATION: Primary | ICD-10-CM

## 2021-11-04 PROCEDURE — 91300 COVID-19, MRNA, LNP-S, PF, 30 MCG/0.3 ML DOSE VACCINE: CPT | Mod: PBBFAC | Performed by: FAMILY MEDICINE

## 2021-11-04 PROCEDURE — 0003A COVID-19, MRNA, LNP-S, PF, 30 MCG/0.3 ML DOSE VACCINE: CPT | Mod: PBBFAC | Performed by: FAMILY MEDICINE

## 2023-03-28 ENCOUNTER — OFFICE VISIT (OUTPATIENT)
Dept: URGENT CARE | Facility: CLINIC | Age: 78
End: 2023-03-28
Payer: MEDICARE

## 2023-03-28 VITALS
HEIGHT: 62 IN | DIASTOLIC BLOOD PRESSURE: 78 MMHG | TEMPERATURE: 98 F | WEIGHT: 126 LBS | BODY MASS INDEX: 23.19 KG/M2 | HEART RATE: 72 BPM | SYSTOLIC BLOOD PRESSURE: 162 MMHG | OXYGEN SATURATION: 98 % | RESPIRATION RATE: 20 BRPM

## 2023-03-28 DIAGNOSIS — L03.116 CELLULITIS OF LEFT LOWER EXTREMITY: ICD-10-CM

## 2023-03-28 DIAGNOSIS — L60.0 ONYCHOMYCOSIS WITH INGROWN TOENAIL: Primary | ICD-10-CM

## 2023-03-28 DIAGNOSIS — B35.1 ONYCHOMYCOSIS WITH INGROWN TOENAIL: Primary | ICD-10-CM

## 2023-03-28 PROCEDURE — 99203 OFFICE O/P NEW LOW 30 MIN: CPT | Mod: S$GLB,,,

## 2023-03-28 PROCEDURE — 99203 PR OFFICE/OUTPT VISIT, NEW, LEVL III, 30-44 MIN: ICD-10-PCS | Mod: S$GLB,,,

## 2023-03-28 RX ORDER — MUPIROCIN 20 MG/G
OINTMENT TOPICAL 3 TIMES DAILY
Qty: 1 G | Refills: 0 | Status: SHIPPED | OUTPATIENT
Start: 2023-03-28 | End: 2023-05-02

## 2023-03-28 RX ORDER — SULFAMETHOXAZOLE AND TRIMETHOPRIM 800; 160 MG/1; MG/1
1 TABLET ORAL 2 TIMES DAILY
Qty: 14 TABLET | Refills: 0 | Status: SHIPPED | OUTPATIENT
Start: 2023-03-28 | End: 2023-04-04

## 2023-03-28 RX ORDER — FUROSEMIDE 20 MG/1
TABLET ORAL
COMMUNITY
Start: 2023-02-01 | End: 2023-05-02 | Stop reason: SDUPTHER

## 2023-03-28 NOTE — PROGRESS NOTES
"Subjective:      Patient ID: Steph Monroe is a 77 y.o. female.    Vitals:  height is 5' 2" (1.575 m) and weight is 57.2 kg (126 lb). Her oral temperature is 97.6 °F (36.4 °C). Her blood pressure is 162/78 (abnormal) and her pulse is 72. Her respiration is 20 and oxygen saturation is 98%.     Chief Complaint: Foot Swelling (right foot has redness and swelling possible cellulitis/)    This is a 77 y.o. female who presents to the clinic today with a chief complaint of right foot swelling and redness. Pt presents with her daughter and reports that the redness has been going on for a couple of weeks and is not improving. Pt denies fever, chills, nausea/vomiting or decreased ROM.    Pain  This is a recurrent problem. The current episode started 1 to 4 weeks ago. The problem occurs constantly. The problem has been gradually worsening. The symptoms are aggravated by bending, standing and walking. She has tried nothing for the symptoms. The treatment provided no relief.     Musculoskeletal:  Positive for pain.   Skin:  Positive for erythema.    Objective:     Physical Exam   Constitutional: She is oriented to person, place, and time. She appears well-developed.   HENT:   Head: Normocephalic and atraumatic. Head is without abrasion, without contusion and without laceration.   Ears:   Right Ear: External ear normal.   Left Ear: External ear normal.   Nose: Nose normal.   Mouth/Throat: Oropharynx is clear and moist and mucous membranes are normal.   Eyes: Conjunctivae, EOM and lids are normal. Pupils are equal, round, and reactive to light.   Neck: Trachea normal and phonation normal. Neck supple.   Cardiovascular: Normal rate, regular rhythm and normal heart sounds.   Pulmonary/Chest: Effort normal and breath sounds normal. No stridor. No respiratory distress.   Musculoskeletal: Normal range of motion.         General: Normal range of motion.   Neurological: She is alert and oriented to person, place, and time.   Skin: " Skin is warm, dry, intact and no rash. Capillary refill takes less than 2 seconds. erythema No abrasion, No burn, No bruising, No ecchymosis and No lesion         Comments: Erythema to toes and dorsum of foot with minimal swelling   Psychiatric: Her speech is normal and behavior is normal. Judgment and thought content normal.   Nursing note and vitals reviewed.    Assessment:     1. Onychomycosis with ingrown toenail    2. Cellulitis of left lower extremity      Plan:       Onychomycosis with ingrown toenail  -     Ambulatory referral/consult to Podiatry  -     sulfamethoxazole-trimethoprim 800-160mg (BACTRIM DS) 800-160 mg Tab; Take 1 tablet by mouth 2 (two) times daily. for 7 days  Dispense: 14 tablet; Refill: 0    Cellulitis of left lower extremity  -     mupirocin (BACTROBAN) 2 % ointment; Apply topically 3 (three) times daily.  Dispense: 1 g; Refill: 0  -     Ambulatory referral/consult to Podiatry  -     sulfamethoxazole-trimethoprim 800-160mg (BACTRIM DS) 800-160 mg Tab; Take 1 tablet by mouth 2 (two) times daily. for 7 days  Dispense: 14 tablet; Refill: 0    Pt in no acute distress. Vitals reassuring. Discussed treatment with Bactroban and Bactrim. Discussed warm water soaks with Epsom salt. Bactroban applied and dressed. Discussed f/u with podiatry. Discussed the importance of further evaluation if symptoms worsen. Patient stated verbal understanding.    Patient Instructions   A referral was placed for you, call [phone] to schedule appointment.    Warm water soaks with epsom salt.    Make sure to wear right size shoe.    Apply bactroban to toenail to prevent infection.    Please return or see your primary care doctor if you develop new or worsening symptoms.        Please return here or go to the Emergency Department for any concerns or worsening of condition.    If you were prescribed antibiotics, please take them to completion.    Please arrange follow up with your primary medical clinic as soon as  possible. You must understand that you've received an Urgent Care treatment only and that you may be released before all of your medical problems are known or treated. You, the patient, will arrange for follow up as instructed. If your symptoms worsen or fail to improve you should go to the Emergency Room.

## 2023-03-28 NOTE — PATIENT INSTRUCTIONS
A referral was placed for you, call [phone] to schedule appointment.    Warm water soaks with epsom salt.    Make sure to wear right size shoe.    Apply bactroban to toenail to prevent infection.    Please return or see your primary care doctor if you develop new or worsening symptoms.        Please return here or go to the Emergency Department for any concerns or worsening of condition.    If you were prescribed antibiotics, please take them to completion.    Please arrange follow up with your primary medical clinic as soon as possible. You must understand that you've received an Urgent Care treatment only and that you may be released before all of your medical problems are known or treated. You, the patient, will arrange for follow up as instructed. If your symptoms worsen or fail to improve you should go to the Emergency Room.

## 2023-05-02 ENCOUNTER — HOSPITAL ENCOUNTER (INPATIENT)
Facility: HOSPITAL | Age: 78
LOS: 4 days | Discharge: ANOTHER HOSPITAL | DRG: 300 | End: 2023-05-06
Attending: EMERGENCY MEDICINE | Admitting: STUDENT IN AN ORGANIZED HEALTH CARE EDUCATION/TRAINING PROGRAM
Payer: MEDICARE

## 2023-05-02 ENCOUNTER — OFFICE VISIT (OUTPATIENT)
Dept: PODIATRY | Facility: CLINIC | Age: 78
End: 2023-05-02
Payer: MEDICARE

## 2023-05-02 ENCOUNTER — TELEPHONE (OUTPATIENT)
Dept: PODIATRY | Facility: CLINIC | Age: 78
End: 2023-05-02
Payer: MEDICARE

## 2023-05-02 ENCOUNTER — HOSPITAL ENCOUNTER (OUTPATIENT)
Dept: RADIOLOGY | Facility: HOSPITAL | Age: 78
Discharge: HOME | DRG: 300 | End: 2023-05-02
Attending: STUDENT IN AN ORGANIZED HEALTH CARE EDUCATION/TRAINING PROGRAM
Payer: MEDICARE

## 2023-05-02 VITALS
DIASTOLIC BLOOD PRESSURE: 79 MMHG | WEIGHT: 126 LBS | HEART RATE: 87 BPM | HEIGHT: 62 IN | BODY MASS INDEX: 23.19 KG/M2 | SYSTOLIC BLOOD PRESSURE: 157 MMHG

## 2023-05-02 DIAGNOSIS — I73.9 PVD (PERIPHERAL VASCULAR DISEASE): Chronic | ICD-10-CM

## 2023-05-02 DIAGNOSIS — R07.9 CHEST PAIN: ICD-10-CM

## 2023-05-02 DIAGNOSIS — I70.221 CRITICAL LIMB ISCHEMIA OF RIGHT LOWER EXTREMITY: Primary | ICD-10-CM

## 2023-05-02 DIAGNOSIS — I35.0 AORTIC STENOSIS: ICD-10-CM

## 2023-05-02 DIAGNOSIS — M79.671 RIGHT FOOT PAIN: ICD-10-CM

## 2023-05-02 DIAGNOSIS — M79.671 RIGHT FOOT PAIN: Primary | ICD-10-CM

## 2023-05-02 PROBLEM — K74.60 CIRRHOSIS: Status: ACTIVE | Noted: 2023-05-02

## 2023-05-02 PROBLEM — I50.32 CHRONIC HEART FAILURE WITH PRESERVED EJECTION FRACTION: Status: ACTIVE | Noted: 2023-05-02

## 2023-05-02 PROBLEM — J43.9 PULMONARY EMPHYSEMA: Status: ACTIVE | Noted: 2023-05-02

## 2023-05-02 LAB
ALBUMIN SERPL BCP-MCNC: 3.3 G/DL (ref 3.5–5.2)
ALP SERPL-CCNC: 89 U/L (ref 55–135)
ALT SERPL W/O P-5'-P-CCNC: 11 U/L (ref 10–44)
ANION GAP SERPL CALC-SCNC: 10 MMOL/L (ref 8–16)
APTT PPP: 25.2 SEC (ref 21–32)
APTT PPP: 95.6 SEC (ref 21–32)
AST SERPL-CCNC: 21 U/L (ref 10–40)
BASOPHILS # BLD AUTO: 0.01 K/UL (ref 0–0.2)
BASOPHILS NFR BLD: 0.2 % (ref 0–1.9)
BILIRUB SERPL-MCNC: 0.7 MG/DL (ref 0.1–1)
BILIRUB UR QL STRIP: NEGATIVE
BUN SERPL-MCNC: 49 MG/DL (ref 8–23)
CALCIUM SERPL-MCNC: 9.6 MG/DL (ref 8.7–10.5)
CHLORIDE SERPL-SCNC: 112 MMOL/L (ref 95–110)
CHOLEST SERPL-MCNC: 154 MG/DL (ref 120–199)
CHOLEST/HDLC SERPL: 3.1 {RATIO} (ref 2–5)
CLARITY UR: CLEAR
CO2 SERPL-SCNC: 19 MMOL/L (ref 23–29)
COLOR UR: YELLOW
CREAT SERPL-MCNC: 1.8 MG/DL (ref 0.5–1.4)
DIFFERENTIAL METHOD: ABNORMAL
EOSINOPHIL # BLD AUTO: 0 K/UL (ref 0–0.5)
EOSINOPHIL NFR BLD: 0.2 % (ref 0–8)
ERYTHROCYTE [DISTWIDTH] IN BLOOD BY AUTOMATED COUNT: 19.4 % (ref 11.5–14.5)
EST. GFR  (NO RACE VARIABLE): 29 ML/MIN/1.73 M^2
GLUCOSE SERPL-MCNC: 93 MG/DL (ref 70–110)
GLUCOSE UR QL STRIP: NEGATIVE
HCT VFR BLD AUTO: 34.4 % (ref 37–48.5)
HDLC SERPL-MCNC: 50 MG/DL (ref 40–75)
HDLC SERPL: 32.5 % (ref 20–50)
HGB BLD-MCNC: 10.6 G/DL (ref 12–16)
HGB UR QL STRIP: NEGATIVE
IMM GRANULOCYTES # BLD AUTO: 0.02 K/UL (ref 0–0.04)
IMM GRANULOCYTES NFR BLD AUTO: 0.4 % (ref 0–0.5)
INR PPP: 1.1 (ref 0.8–1.2)
KETONES UR QL STRIP: NEGATIVE
LDLC SERPL CALC-MCNC: 94.4 MG/DL (ref 63–159)
LEUKOCYTE ESTERASE UR QL STRIP: ABNORMAL
LYMPHOCYTES # BLD AUTO: 0.7 K/UL (ref 1–4.8)
LYMPHOCYTES NFR BLD: 15.9 % (ref 18–48)
MAGNESIUM SERPL-MCNC: 2 MG/DL (ref 1.6–2.6)
MCH RBC QN AUTO: 27 PG (ref 27–31)
MCHC RBC AUTO-ENTMCNC: 30.8 G/DL (ref 32–36)
MCV RBC AUTO: 88 FL (ref 82–98)
MICROSCOPIC COMMENT: ABNORMAL
MONOCYTES # BLD AUTO: 0.5 K/UL (ref 0.3–1)
MONOCYTES NFR BLD: 9.9 % (ref 4–15)
NEUTROPHILS # BLD AUTO: 3.4 K/UL (ref 1.8–7.7)
NEUTROPHILS NFR BLD: 73.4 % (ref 38–73)
NITRITE UR QL STRIP: NEGATIVE
NONHDLC SERPL-MCNC: 104 MG/DL
NRBC BLD-RTO: 0 /100 WBC
PH UR STRIP: 7 [PH] (ref 5–8)
PHOSPHATE SERPL-MCNC: 4 MG/DL (ref 2.7–4.5)
PLATELET # BLD AUTO: 125 K/UL (ref 150–450)
PMV BLD AUTO: 9.4 FL (ref 9.2–12.9)
POTASSIUM SERPL-SCNC: 4.8 MMOL/L (ref 3.5–5.1)
PROT SERPL-MCNC: 7.9 G/DL (ref 6–8.4)
PROT UR QL STRIP: ABNORMAL
PROTHROMBIN TIME: 12.2 SEC (ref 9–12.5)
RBC # BLD AUTO: 3.92 M/UL (ref 4–5.4)
RBC #/AREA URNS HPF: 3 /HPF (ref 0–4)
SODIUM SERPL-SCNC: 141 MMOL/L (ref 136–145)
SP GR UR STRIP: 1.01 (ref 1–1.03)
SQUAMOUS #/AREA URNS HPF: 0 /HPF
TRIGL SERPL-MCNC: 48 MG/DL (ref 30–150)
UNIDENT CRYS URNS QL MICRO: 2
URN SPEC COLLECT METH UR: ABNORMAL
UROBILINOGEN UR STRIP-ACNC: NEGATIVE EU/DL
WBC # BLD AUTO: 4.65 K/UL (ref 3.9–12.7)
WBC #/AREA URNS HPF: 8 /HPF (ref 0–5)

## 2023-05-02 PROCEDURE — 85610 PROTHROMBIN TIME: CPT | Performed by: EMERGENCY MEDICINE

## 2023-05-02 PROCEDURE — 3078F DIAST BP <80 MM HG: CPT | Mod: CPTII,S$GLB,, | Performed by: STUDENT IN AN ORGANIZED HEALTH CARE EDUCATION/TRAINING PROGRAM

## 2023-05-02 PROCEDURE — 63600175 PHARM REV CODE 636 W HCPCS: Performed by: NURSE PRACTITIONER

## 2023-05-02 PROCEDURE — 99204 OFFICE O/P NEW MOD 45 MIN: CPT | Mod: S$GLB,,, | Performed by: STUDENT IN AN ORGANIZED HEALTH CARE EDUCATION/TRAINING PROGRAM

## 2023-05-02 PROCEDURE — 85025 COMPLETE CBC W/AUTO DIFF WBC: CPT | Performed by: PHYSICIAN ASSISTANT

## 2023-05-02 PROCEDURE — 1125F PR PAIN SEVERITY QUANTIFIED, PAIN PRESENT: ICD-10-PCS | Mod: CPTII,S$GLB,, | Performed by: STUDENT IN AN ORGANIZED HEALTH CARE EDUCATION/TRAINING PROGRAM

## 2023-05-02 PROCEDURE — 99223 1ST HOSP IP/OBS HIGH 75: CPT | Mod: ,,, | Performed by: INTERNAL MEDICINE

## 2023-05-02 PROCEDURE — 84100 ASSAY OF PHOSPHORUS: CPT | Performed by: PHYSICIAN ASSISTANT

## 2023-05-02 PROCEDURE — 99999 PR PBB SHADOW E&M-EST. PATIENT-LVL IV: ICD-10-PCS | Mod: PBBFAC,,, | Performed by: STUDENT IN AN ORGANIZED HEALTH CARE EDUCATION/TRAINING PROGRAM

## 2023-05-02 PROCEDURE — 73630 X-RAY EXAM OF FOOT: CPT | Mod: TC,PN,RT

## 2023-05-02 PROCEDURE — 11000001 HC ACUTE MED/SURG PRIVATE ROOM

## 2023-05-02 PROCEDURE — 81000 URINALYSIS NONAUTO W/SCOPE: CPT | Performed by: NURSE PRACTITIONER

## 2023-05-02 PROCEDURE — 80061 LIPID PANEL: CPT | Performed by: NURSE PRACTITIONER

## 2023-05-02 PROCEDURE — 1101F PR PT FALLS ASSESS DOC 0-1 FALLS W/OUT INJ PAST YR: ICD-10-PCS | Mod: CPTII,S$GLB,, | Performed by: STUDENT IN AN ORGANIZED HEALTH CARE EDUCATION/TRAINING PROGRAM

## 2023-05-02 PROCEDURE — 73630 X-RAY EXAM OF FOOT: CPT | Mod: 26,RT,, | Performed by: RADIOLOGY

## 2023-05-02 PROCEDURE — 73630 XR FOOT COMPLETE 3 VIEW RIGHT: ICD-10-PCS | Mod: 26,RT,, | Performed by: RADIOLOGY

## 2023-05-02 PROCEDURE — 3077F PR MOST RECENT SYSTOLIC BLOOD PRESSURE >= 140 MM HG: ICD-10-PCS | Mod: CPTII,S$GLB,, | Performed by: STUDENT IN AN ORGANIZED HEALTH CARE EDUCATION/TRAINING PROGRAM

## 2023-05-02 PROCEDURE — 3288F PR FALLS RISK ASSESSMENT DOCUMENTED: ICD-10-PCS | Mod: CPTII,S$GLB,, | Performed by: STUDENT IN AN ORGANIZED HEALTH CARE EDUCATION/TRAINING PROGRAM

## 2023-05-02 PROCEDURE — 63600175 PHARM REV CODE 636 W HCPCS: Performed by: EMERGENCY MEDICINE

## 2023-05-02 PROCEDURE — 99285 EMERGENCY DEPT VISIT HI MDM: CPT | Mod: 25

## 2023-05-02 PROCEDURE — 3077F SYST BP >= 140 MM HG: CPT | Mod: CPTII,S$GLB,, | Performed by: STUDENT IN AN ORGANIZED HEALTH CARE EDUCATION/TRAINING PROGRAM

## 2023-05-02 PROCEDURE — 83735 ASSAY OF MAGNESIUM: CPT | Performed by: PHYSICIAN ASSISTANT

## 2023-05-02 PROCEDURE — 25000003 PHARM REV CODE 250: Performed by: NURSE PRACTITIONER

## 2023-05-02 PROCEDURE — 85730 THROMBOPLASTIN TIME PARTIAL: CPT | Performed by: EMERGENCY MEDICINE

## 2023-05-02 PROCEDURE — 1101F PT FALLS ASSESS-DOCD LE1/YR: CPT | Mod: CPTII,S$GLB,, | Performed by: STUDENT IN AN ORGANIZED HEALTH CARE EDUCATION/TRAINING PROGRAM

## 2023-05-02 PROCEDURE — 99999 PR PBB SHADOW E&M-EST. PATIENT-LVL IV: CPT | Mod: PBBFAC,,, | Performed by: STUDENT IN AN ORGANIZED HEALTH CARE EDUCATION/TRAINING PROGRAM

## 2023-05-02 PROCEDURE — 1159F PR MEDICATION LIST DOCUMENTED IN MEDICAL RECORD: ICD-10-PCS | Mod: CPTII,S$GLB,, | Performed by: STUDENT IN AN ORGANIZED HEALTH CARE EDUCATION/TRAINING PROGRAM

## 2023-05-02 PROCEDURE — 85730 THROMBOPLASTIN TIME PARTIAL: CPT | Mod: 91 | Performed by: STUDENT IN AN ORGANIZED HEALTH CARE EDUCATION/TRAINING PROGRAM

## 2023-05-02 PROCEDURE — 3078F PR MOST RECENT DIASTOLIC BLOOD PRESSURE < 80 MM HG: ICD-10-PCS | Mod: CPTII,S$GLB,, | Performed by: STUDENT IN AN ORGANIZED HEALTH CARE EDUCATION/TRAINING PROGRAM

## 2023-05-02 PROCEDURE — 99204 PR OFFICE/OUTPT VISIT, NEW, LEVL IV, 45-59 MIN: ICD-10-PCS | Mod: S$GLB,,, | Performed by: STUDENT IN AN ORGANIZED HEALTH CARE EDUCATION/TRAINING PROGRAM

## 2023-05-02 PROCEDURE — 25000003 PHARM REV CODE 250: Performed by: EMERGENCY MEDICINE

## 2023-05-02 PROCEDURE — 80053 COMPREHEN METABOLIC PANEL: CPT | Performed by: PHYSICIAN ASSISTANT

## 2023-05-02 PROCEDURE — 3288F FALL RISK ASSESSMENT DOCD: CPT | Mod: CPTII,S$GLB,, | Performed by: STUDENT IN AN ORGANIZED HEALTH CARE EDUCATION/TRAINING PROGRAM

## 2023-05-02 PROCEDURE — 1125F AMNT PAIN NOTED PAIN PRSNT: CPT | Mod: CPTII,S$GLB,, | Performed by: STUDENT IN AN ORGANIZED HEALTH CARE EDUCATION/TRAINING PROGRAM

## 2023-05-02 PROCEDURE — 1159F MED LIST DOCD IN RCRD: CPT | Mod: CPTII,S$GLB,, | Performed by: STUDENT IN AN ORGANIZED HEALTH CARE EDUCATION/TRAINING PROGRAM

## 2023-05-02 PROCEDURE — 99223 PR INITIAL HOSPITAL CARE,LEVL III: ICD-10-PCS | Mod: ,,, | Performed by: INTERNAL MEDICINE

## 2023-05-02 RX ORDER — SODIUM CHLORIDE 9 MG/ML
INJECTION, SOLUTION INTRAVENOUS CONTINUOUS
Status: CANCELLED | OUTPATIENT
Start: 2023-05-02 | End: 2023-05-03

## 2023-05-02 RX ORDER — ASPIRIN 325 MG
325 TABLET ORAL
Status: COMPLETED | OUTPATIENT
Start: 2023-05-02 | End: 2023-05-02

## 2023-05-02 RX ORDER — CARVEDILOL 25 MG/1
25 TABLET ORAL 2 TIMES DAILY
Status: DISCONTINUED | OUTPATIENT
Start: 2023-05-02 | End: 2023-05-06 | Stop reason: HOSPADM

## 2023-05-02 RX ORDER — AMLODIPINE BESYLATE 5 MG/1
1 TABLET ORAL DAILY
Status: ON HOLD | COMMUNITY
Start: 2023-01-18 | End: 2023-05-04 | Stop reason: HOSPADM

## 2023-05-02 RX ORDER — HYDRALAZINE HYDROCHLORIDE 25 MG/1
50 TABLET, FILM COATED ORAL 2 TIMES DAILY
Status: DISCONTINUED | OUTPATIENT
Start: 2023-05-02 | End: 2023-05-06 | Stop reason: HOSPADM

## 2023-05-02 RX ORDER — MORPHINE SULFATE 2 MG/ML
2 INJECTION, SOLUTION INTRAMUSCULAR; INTRAVENOUS EVERY 6 HOURS PRN
Status: DISCONTINUED | OUTPATIENT
Start: 2023-05-02 | End: 2023-05-06 | Stop reason: HOSPADM

## 2023-05-02 RX ORDER — HEPARIN SODIUM,PORCINE/D5W 25000/250
0-40 INTRAVENOUS SOLUTION INTRAVENOUS CONTINUOUS
Status: DISCONTINUED | OUTPATIENT
Start: 2023-05-02 | End: 2023-05-03

## 2023-05-02 RX ORDER — GLUCAGON 1 MG
1 KIT INJECTION
Status: DISCONTINUED | OUTPATIENT
Start: 2023-05-02 | End: 2023-05-06 | Stop reason: HOSPADM

## 2023-05-02 RX ORDER — HYDRALAZINE HYDROCHLORIDE 25 MG/1
TABLET, FILM COATED ORAL
COMMUNITY
Start: 2023-04-19 | End: 2023-05-02 | Stop reason: SDUPTHER

## 2023-05-02 RX ORDER — FUROSEMIDE 20 MG/1
20 TABLET ORAL DAILY
COMMUNITY
Start: 2023-01-31

## 2023-05-02 RX ORDER — BUMETANIDE 1 MG/1
1 TABLET ORAL 2 TIMES DAILY
Status: ON HOLD | COMMUNITY
Start: 2022-12-03 | End: 2023-05-04 | Stop reason: HOSPADM

## 2023-05-02 RX ORDER — AMLODIPINE BESYLATE 5 MG/1
TABLET ORAL
COMMUNITY
Start: 2023-03-29 | End: 2023-05-02 | Stop reason: SDUPTHER

## 2023-05-02 RX ORDER — EZETIMIBE 10 MG/1
10 TABLET ORAL
COMMUNITY
Start: 2023-03-29 | End: 2023-05-02 | Stop reason: SDUPTHER

## 2023-05-02 RX ORDER — HYDRALAZINE HYDROCHLORIDE 25 MG/1
50 TABLET, FILM COATED ORAL 2 TIMES DAILY
Status: DISCONTINUED | OUTPATIENT
Start: 2023-05-02 | End: 2023-05-02

## 2023-05-02 RX ORDER — DIPHENHYDRAMINE HCL 50 MG
50 CAPSULE ORAL ONCE
Status: CANCELLED | OUTPATIENT
Start: 2023-05-02 | End: 2023-05-02

## 2023-05-02 RX ORDER — HYDROCODONE BITARTRATE AND ACETAMINOPHEN 5; 325 MG/1; MG/1
1 TABLET ORAL EVERY 6 HOURS PRN
Status: DISCONTINUED | OUTPATIENT
Start: 2023-05-02 | End: 2023-05-06 | Stop reason: HOSPADM

## 2023-05-02 RX ORDER — FUROSEMIDE 20 MG/1
20 TABLET ORAL DAILY
Status: DISCONTINUED | OUTPATIENT
Start: 2023-05-02 | End: 2023-05-05

## 2023-05-02 RX ORDER — CLOPIDOGREL BISULFATE 75 MG/1
300 TABLET ORAL
Status: COMPLETED | OUTPATIENT
Start: 2023-05-02 | End: 2023-05-02

## 2023-05-02 RX ORDER — ATORVASTATIN CALCIUM 40 MG/1
40 TABLET, FILM COATED ORAL DAILY
Status: DISCONTINUED | OUTPATIENT
Start: 2023-05-03 | End: 2023-05-03

## 2023-05-02 RX ORDER — CLOPIDOGREL BISULFATE 75 MG/1
75 TABLET ORAL DAILY
COMMUNITY
Start: 2022-07-27

## 2023-05-02 RX ORDER — CARVEDILOL 25 MG/1
1 TABLET ORAL 2 TIMES DAILY
COMMUNITY
Start: 2023-04-24

## 2023-05-02 RX ORDER — INSULIN ASPART 100 [IU]/ML
0-5 INJECTION, SOLUTION INTRAVENOUS; SUBCUTANEOUS EVERY 6 HOURS PRN
Status: DISCONTINUED | OUTPATIENT
Start: 2023-05-02 | End: 2023-05-06 | Stop reason: HOSPADM

## 2023-05-02 RX ORDER — IPRATROPIUM BROMIDE AND ALBUTEROL SULFATE 2.5; .5 MG/3ML; MG/3ML
3 SOLUTION RESPIRATORY (INHALATION) EVERY 6 HOURS PRN
Status: DISCONTINUED | OUTPATIENT
Start: 2023-05-02 | End: 2023-05-06 | Stop reason: HOSPADM

## 2023-05-02 RX ORDER — SODIUM CHLORIDE 0.9 % (FLUSH) 0.9 %
10 SYRINGE (ML) INJECTION EVERY 12 HOURS PRN
Status: DISCONTINUED | OUTPATIENT
Start: 2023-05-02 | End: 2023-05-06 | Stop reason: HOSPADM

## 2023-05-02 RX ORDER — NALOXONE HCL 0.4 MG/ML
0.02 VIAL (ML) INJECTION
Status: DISCONTINUED | OUTPATIENT
Start: 2023-05-02 | End: 2023-05-06 | Stop reason: HOSPADM

## 2023-05-02 RX ORDER — SPIRONOLACTONE 50 MG/1
TABLET, FILM COATED ORAL
COMMUNITY
Start: 2023-01-31 | End: 2023-05-02 | Stop reason: SDUPTHER

## 2023-05-02 RX ORDER — ATORVASTATIN CALCIUM 40 MG/1
1 TABLET, FILM COATED ORAL DAILY
Status: ON HOLD | COMMUNITY
Start: 2023-04-19 | End: 2023-05-04 | Stop reason: HOSPADM

## 2023-05-02 RX ORDER — HYDRALAZINE HYDROCHLORIDE 20 MG/ML
10 INJECTION INTRAMUSCULAR; INTRAVENOUS EVERY 8 HOURS PRN
Status: DISCONTINUED | OUTPATIENT
Start: 2023-05-02 | End: 2023-05-06 | Stop reason: HOSPADM

## 2023-05-02 RX ORDER — EZETIMIBE 10 MG/1
10 TABLET ORAL DAILY
COMMUNITY
Start: 2023-03-29

## 2023-05-02 RX ORDER — SODIUM CHLORIDE 0.9 % (FLUSH) 0.9 %
10 SYRINGE (ML) INJECTION
Status: DISCONTINUED | OUTPATIENT
Start: 2023-05-02 | End: 2023-05-06 | Stop reason: HOSPADM

## 2023-05-02 RX ORDER — SPIRONOLACTONE 50 MG/1
50 TABLET, FILM COATED ORAL
COMMUNITY
Start: 2023-01-31 | End: 2023-05-02 | Stop reason: SDUPTHER

## 2023-05-02 RX ORDER — ACETAMINOPHEN 325 MG/1
650 TABLET ORAL EVERY 8 HOURS PRN
Status: DISCONTINUED | OUTPATIENT
Start: 2023-05-02 | End: 2023-05-06 | Stop reason: HOSPADM

## 2023-05-02 RX ORDER — ONDANSETRON 2 MG/ML
4 INJECTION INTRAMUSCULAR; INTRAVENOUS EVERY 8 HOURS PRN
Status: DISCONTINUED | OUTPATIENT
Start: 2023-05-02 | End: 2023-05-06 | Stop reason: HOSPADM

## 2023-05-02 RX ORDER — SODIUM BICARBONATE 650 MG/1
650 TABLET ORAL 2 TIMES DAILY
Status: DISCONTINUED | OUTPATIENT
Start: 2023-05-02 | End: 2023-05-06 | Stop reason: HOSPADM

## 2023-05-02 RX ORDER — HYDRALAZINE HYDROCHLORIDE 25 MG/1
2 TABLET, FILM COATED ORAL 2 TIMES DAILY
Status: ON HOLD | COMMUNITY
Start: 2023-02-10 | End: 2023-05-16 | Stop reason: HOSPADM

## 2023-05-02 RX ADMIN — FUROSEMIDE 20 MG: 20 TABLET ORAL at 03:05

## 2023-05-02 RX ADMIN — HYDRALAZINE HYDROCHLORIDE 10 MG: 20 INJECTION, SOLUTION INTRAMUSCULAR; INTRAVENOUS at 05:05

## 2023-05-02 RX ADMIN — ASPIRIN 325 MG ORAL TABLET 325 MG: 325 PILL ORAL at 12:05

## 2023-05-02 RX ADMIN — SODIUM BICARBONATE 650 MG TABLET 650 MG: at 10:05

## 2023-05-02 RX ADMIN — CLOPIDOGREL BISULFATE 300 MG: 75 TABLET ORAL at 12:05

## 2023-05-02 RX ADMIN — HEPARIN SODIUM 18 UNITS/KG/HR: 10000 INJECTION, SOLUTION INTRAVENOUS at 01:05

## 2023-05-02 RX ADMIN — CARVEDILOL 25 MG: 25 TABLET, FILM COATED ORAL at 02:05

## 2023-05-02 RX ADMIN — CARVEDILOL 25 MG: 25 TABLET, FILM COATED ORAL at 10:05

## 2023-05-02 RX ADMIN — HYDRALAZINE HYDROCHLORIDE 50 MG: 25 TABLET, FILM COATED ORAL at 05:05

## 2023-05-02 NOTE — PROGRESS NOTES
Subjective:     Patient ID: Steph Monroe is a 77 y.o. female.    Chief Complaint: Foot Pain    Steph is a 77 y.o. female who presents to the clinic upon referral from Dr. Hoover  for evaluation and treatment of diabetic feet. Steph has a past medical history of Cancer, CKD (chronic kidney disease), Diabetes mellitus, type 2, Hyperlipidemia, Hypertension, and PVD (peripheral vascular disease). Patient relates no major problem with feet. Only complaints today consist of painful right forefoot. Relates she has been having pain for several months and has history of pain/tiredness to legs with walking. States pain significantly increased over past few weeks and her foot has become more red. Relates she was prescribed antibiotics in urgent care which did not help. No further pedal complaints. History of smoking, relates she quit 10 years ago. Her Cardiologist is located at [facility].    PCP: Mane Carmona MD    Date Last Seen by PCP:     Current shoe gear: Casual shoes    Hemoglobin A1C   Date Value Ref Range Status   05/26/2016 6.3 (H) 4.5 - 6.2 % Final         Review of Systems   Constitutional: Negative for chills, decreased appetite, diaphoresis and fever.   HENT:  Negative for congestion and hearing loss.    Cardiovascular:  Positive for claudication. Negative for chest pain, leg swelling and syncope.   Respiratory:  Negative for cough and shortness of breath.    Skin:  Positive for color change and nail changes. Negative for dry skin, flushing, itching, poor wound healing and rash.   Musculoskeletal:  Negative for arthritis, back pain, joint pain and joint swelling.   Gastrointestinal:  Negative for nausea and vomiting.   Neurological:  Negative for focal weakness, numbness, paresthesias and weakness.   Psychiatric/Behavioral:  Negative for altered mental status. The patient is not nervous/anxious.       Objective:     Physical Exam  Constitutional:       General: She is not in acute distress.     Appearance:  She is well-developed. She is not diaphoretic.   Cardiovascular:      Pulses:           Dorsalis pedis pulses are 0 on the right side and 0 on the left side.        Posterior tibial pulses are 0 on the right side and 0 on the left side.      Comments: Dorsalis pedis and posterior tibial pulses are diminished. Skin temperature is within normal limits. Toes are cool to touch and feet are warm proximally. Hair growth is diminished. Skin is mildly atrophic and with mild hyperpigmentation. No edema noted, bilaterally.  Musculoskeletal:         General: No tenderness.      Comments: Adequate joint range of motion without pain, limitation, nor crepitation to bilateral feet and ankle joints. Muscle strength is 5/5 in all groups bilaterally.       Lymphadenopathy:      Comments: Negative lymphangitic streaking    Skin:     General: Skin is warm and dry.      Findings: No lesion.      Comments: Skin is warm and dry, no acute signs of infection noted. No open wounds, macerations or hyperkeratotic lesions, bilaterally.     Toenails are thickened by 2-4 mm's, dystrophic, and are darkened in coloration with subungual fungal debris, bilaterally.  Skin is very dry, bilaterally.     Erythema to distal right foot extending from toes to midfoot. No open wounds or signs of infection. Skin is cold to touch. Foot with ischemic appearance      Neurological:      Mental Status: She is alert and oriented to person, place, and time.      Sensory: No sensory deficit.      Motor: No abnormal muscle tone.      Comments: Light touch within normal limits.    Psychiatric:         Behavior: Behavior normal.         Thought Content: Thought content normal.         Judgment: Judgment normal.         Assessment:      Encounter Diagnosis   Name Primary?    Right foot pain Yes     Plan:     Steph was seen today for foot pain.    Diagnoses and all orders for this visit:    Right foot pain  -     X-Ray Foot Complete Right; Future  -     Uric acid;  Future      I counseled the patient on her conditions, their implications and medical management.  Xray reviewed, no fracture noted  Unable to doppler pulses. Patient seen and case discussed with Dr. Fuchs, recommend patient to ED for worsening CLI and ischemic right foot.   Patient sent to ED  She is to follow up in clinic 1-2 weeks after D/C

## 2023-05-02 NOTE — Clinical Note
An angiography was performed of the right lower extremity via hand injection with 0 mL of contrast Co2 used

## 2023-05-02 NOTE — HPI
"76 yo female with a PMH of rectal CA, HCC, CKD IV, DM II, HLD, HTN, PVD, s/p nephrectomy, HFpEF, former smoker 10 years ago, presents with RLE walking pain progressively worse for atleast the past year. The distances she can walk have become progressively shorter. She can now only walk around her home before the RLE pain is significant. The pain is worse with walking, but occurs at rest, is intermittent, is shooting, and is rated 6/10. The R foot is also purple. She notes a small scab to her R heel. The only home medications she is taking is lasix and na bicarbonate. She quit all of her other medications about a year ago because she "no longer felt like taking all kind of pills".  She states she unintentionally lost 56 pounds in the past year. She denies HA, vision changes, dizziness, lightheadedness, CP, SOB, abdominal pain, n/v/d/f/c/c, urinary changes, LE edema, ETOH, tobacco, illicit drugs. She lives at home with family.   "

## 2023-05-02 NOTE — ASSESSMENT & PLAN NOTE
Patient with known CAD s/p unknown coronary anatomy, which is controlled Will continue ASA and Plavix and monitor for S/Sx of angina/ACS. Continue to monitor on telemetry.

## 2023-05-02 NOTE — ASSESSMENT & PLAN NOTE
Hx of nephrectomy   Follows nephrology op     etiology include HTN/DM/microvascular Dz/nephrectomy; BL 1.8-2.0

## 2023-05-02 NOTE — ED NOTES
Pt arrive from home complains of foot pain . Pt. Was seen by Dr. Fuchs consulted by Podiatry today and was referred to the ED. Pt has a HX Of PAD with distant bilateral femoral bypass. Pt appears to be comfortable and in no distress.

## 2023-05-02 NOTE — ED PROVIDER NOTES
Emergency Department Encounter  Provider Note    Steph Monroe  [id number]  5/2/2023    Evaluation:    History:     Chief Complaint   Patient presents with    Foot Pain     Hx. Of PAD with distant bilateral femoral bypass. C/o worsening BLE for months (R>L). Pt. Was seen by Dr. Fuchs consulted by Podiatry today at office and sent here for for further treatment and admission.      Steph Monroe is a 77 y.o. female who has a past medical history of Cancer, CKD (chronic kidney disease), Diabetes mellitus, type 2, Hyperlipidemia, Hypertension, and PVD (peripheral vascular disease).    The patient presents to the ED due to poor foot circulation.  She has history of PAD with prior bypass many years ago.   She reports chronic bilateral foot pain, but states the R foot has gotten worse over the last week.  She denies any fever, N/V/D, CP, SOB. She is not currently on any ABX.  She was seen in clinic earlier today and sent to the ED for further evaluation.  She reports she recently saw a new Cardiologist. She denies any recent leg or foot surgery.       Past Medical History:   Diagnosis Date    Cancer     rectum    CKD (chronic kidney disease)     Diabetes mellitus, type 2     Hyperlipidemia     Hypertension     PVD (peripheral vascular disease)      Past Surgical History:   Procedure Laterality Date    COLONOSCOPY N/A 3/28/2016    Procedure: COLONOSCOPY;  Surgeon: Mitul Buitrago Jr., MD;  Location: [facility];  Service: Endoscopy;  Laterality: N/A;    EYE SURGERY Left     cataract removal with lens implant.    FEMORAL ARTERY STENT  before 2010    RENAL ARTERY STENT  2010     Family History   Problem Relation Age of Onset    Diabetes Father     Heart attack Mother     Hypertension Sister      Social History     Socioeconomic History    Marital status:     Number of children: 2   Occupational History    Occupation: Retired   Tobacco Use    Smoking status: Former     Packs/day: 1.50     Years: 45.00     Pack  years: 67.50     Types: Cigarettes     Quit date: 2013     Years since quittin.8     Passive exposure: Never    Smokeless tobacco: Never   Substance and Sexual Activity    Alcohol use: No     Alcohol/week: 0.0 standard drinks    Drug use: No    Sexual activity: Not Currently     Review of patient's allergies indicates:   Allergen Reactions    Chantix [varenicline]      Tongue swelling      Wellbutrin [bupropion hcl]      Tongue swelling         Review of Systems   Musculoskeletal:  Positive for arthralgias and myalgias.     Physical Exam:     Initial Vitals [23 1045]   BP Pulse Resp Temp SpO2   (!) 208/82 80 20 97.7 °F (36.5 °C) 98 %      MAP       --         Physical Exam    Nursing note and vitals reviewed.  Constitutional: She appears well-developed and well-nourished. She is not diaphoretic. No distress.   HENT:   Head: Normocephalic and atraumatic.   Mouth/Throat: Oropharynx is clear and moist.   Eyes: EOM are normal. Pupils are equal, round, and reactive to light.   Neck: No tracheal deviation present.   Cardiovascular:  Normal rate, regular rhythm, normal heart sounds and intact distal pulses.           Pulmonary/Chest: Breath sounds normal. No stridor. No respiratory distress. She has no wheezes.   Abdominal: Abdomen is soft. Bowel sounds are normal. She exhibits no distension and no mass. There is no abdominal tenderness.   Musculoskeletal:         General: No edema. Normal range of motion.      Comments: Bilateral foot erythema and tenderness.  R foot with pale, tender toes.   Unable to palpate DP or PT pulses.  No Doppler signals present to RLE.      Neurological: She is alert and oriented to person, place, and time. She has normal strength. No cranial nerve deficit or sensory deficit.   Skin: Skin is warm and dry. Capillary refill takes less than 2 seconds. No pallor.   Psychiatric: She has a normal mood and affect. Her behavior is normal. Thought content normal.       ED Course:    Critical Care    Date/Time: 5/2/2023 12:10 PM  Performed by: Rafael Whiteside MD  Authorized by: Rafael Whiteside MD   Total critical care time (exclusive of procedural time) : 0 (45) minutes  Critical care was necessary to treat or prevent imminent or life-threatening deterioration of the following conditions: critical limb ischemia.  Critical care was time spent personally by me on the following activities: discussions with consultants, evaluation of patient's response to treatment, examination of patient, obtaining history from patient or surrogate, ordering and review of laboratory studies, ordering and review of radiographic studies, re-evaluation of patient's condition and review of old charts.        Medical Decision Making:    History Acquisition:   Additional historians utilized:  none    Prior medical records were reviewed:   Podiatry visit earlier today, sent to ED for poor perfusion  Cards visit 4/17 for f/u PAD, CHF  UC visit 3/28 for onychomycosis    The patient's list of active medical problems, social history, medications, and allergies as documented has been reviewed.     Differential Diagnoses:   Based on available information and initial assessment, Differential Diagnosis includes, but is not limited to:  Fracture, dislocation, compartment syndrome, nerve injury/palsy, vascular injury, DVT, rhabdomyolysis, hemarthrosis, septic joint, cellulitis, bursitis, muscle strain, ligament tear/sprain, laceration, foreign body, abrasion, soft tissue contusion, osteoarthritis.        EKG:       Labs:     Labs Reviewed   CBC W/ AUTO DIFFERENTIAL - Abnormal; Notable for the following components:       Result Value    RBC 3.92 (*)     Hemoglobin 10.6 (*)     Hematocrit 34.4 (*)     MCHC 30.8 (*)     RDW 19.4 (*)     Platelets 125 (*)     Lymph # 0.7 (*)     Gran % 73.4 (*)     Lymph % 15.9 (*)     All other components within normal limits   COMPREHENSIVE METABOLIC PANEL - Abnormal; Notable for the following  components:    Chloride 112 (*)     CO2 19 (*)     BUN 49 (*)     Creatinine 1.8 (*)     Albumin 3.3 (*)     eGFR 29 (*)     All other components within normal limits   CULTURE, BLOOD   CULTURE, BLOOD   PROTIME-INR    Narrative:     Draw baseline aPTT prior to starting the heparin bolus or  infusion   APTT    Narrative:     Draw baseline aPTT prior to starting the heparin bolus or  infusion   MAGNESIUM   PHOSPHORUS   PHOSPHORUS   MAGNESIUM   PROCALCITONIN   HEMOGLOBIN A1C   POCT GLUCOSE MONITORING CONTINUOUS   POCT GLUCOSE MONITORING CONTINUOUS     Independent review of the labs ordered include:   See ED course    Imaging:     Imaging Results              US Lower Extremity Arteries Bilateral (Final result)  Result time 05/02/23 15:13:30   Procedure changed from US Lower Extremity Arteries Right     Final result by Boy Canada MD (05/02/23 15:13:30)                   Impression:      Lower extremity peripheral vascular disease with velocity parameters as above.  Majority monophasic waveforms throughout as can be seen with vessel hardening related to atherosclerosis versus more central, upstream stenosis.    Velocity elevation at the left deep profundus artery as can be seen with sequela of hemodynamically significant stenosis.    Limited color visualization with relative reduced flow velocity at the left proximal SFA.      Electronically signed by: Boy Canada  Date:    05/02/2023  Time:    15:13               Narrative:    EXAMINATION:  US LOWER EXTREMITY ARTERIES BILATERAL    CLINICAL HISTORY:  critical limb ischemia of rt lower extremity;  Atherosclerosis of native arteries of extremities with rest pain, right leg    TECHNIQUE:  Bilateral lower extremity arterial duplex ultrasound examination performed. Multiple gray scale and color doppler images were obtained in addition to waveform analysis.    COMPARISON:  None.    FINDINGS:  The peak systolic velocities on the right are as follows, in  centimeters/second:    Common femoral artery: 111    Deep profundus: 43    Superficial femoral artery, proximal: 42    Superficial femoral artery, mid portion: 34    Superficial femoral artery, distal: 15    Popliteal artery: 26    Distal popliteal artery: 66    Anterior tibial artery: 28-44    Posterior tibial artery: 8-24    The peak systolic velocities on the left are as follows, in centimeters/second:    Common femoral artery: 108    Deep profundus: 298    Superficial femoral artery, proximal: 17    Superficial femoral artery, mid portion: 115    Superficial femoral artery, distal: 93    Popliteal artery: 73    Distal popliteal artery: 118    Anterior tibial artery: 41-98    Posterior tibial artery: 103-109 with nearby collateral.    Monophasic waveforms throughout with some vessels demonstrating associated parvus tardus.                                       X-Ray Chest 1 View (Final result)  Result time 05/02/23 13:02:04      Final result by Avery Ariza MD (05/02/23 13:02:04)                   Impression:      See above      Electronically signed by: Avery Ariza MD  Date:    05/02/2023  Time:    13:02               Narrative:    EXAMINATION:  XR CHEST 1 VIEW    CLINICAL HISTORY:  admission;    TECHNIQUE:  Single frontal view of the chest was performed.    COMPARISON:  05/30/2016    FINDINGS:  Cardiac size is normal.  Lungs are clear without infiltrate.  Vascular catheter seen with its tip the superior vena cava.                                         Additional Consideration:   Additional testing considered during clinical course: none    Social determinants of health considered during development of treatment plan include: poor access to care    Current co-morbidities considered which impacted clinical decision making: PAD, DM, HTN, CKD, CAD    Case discussed with additional provider: Interventional Cardiology for PAD,  service for admission and medical management    Medications   sodium  chloride 0.9% flush 10 mL (has no administration in time range)   acetaminophen tablet 650 mg (has no administration in time range)   naloxone 0.4 mg/mL injection 0.02 mg (has no administration in time range)   ondansetron injection 4 mg (has no administration in time range)   hydrALAZINE injection 10 mg (10 mg Intravenous Given 5/2/23 1711)   glucagon (human recombinant) injection 1 mg (has no administration in time range)   dextrose 10% bolus 125 mL 125 mL (has no administration in time range)   dextrose 10% bolus 250 mL 250 mL (has no administration in time range)   insulin aspart U-100 pen 0-5 Units (0 Units Subcutaneous Not Given 5/2/23 1637)   HYDROcodone-acetaminophen 5-325 mg per tablet 1 tablet (1 tablet Oral Given 5/5/23 0801)   morphine injection 2 mg (has no administration in time range)   sodium chloride 0.9% flush 10 mL (has no administration in time range)   carvediloL tablet 25 mg (25 mg Oral Given 5/5/23 0836)   sodium bicarbonate tablet 650 mg (650 mg Oral Given 5/5/23 0836)   albuterol-ipratropium 2.5 mg-0.5 mg/3 mL nebulizer solution 3 mL (has no administration in time range)   hydrALAZINE tablet 50 mg (50 mg Oral Given 5/5/23 0837)   NIFEdipine 24 hr tablet 60 mg (60 mg Oral Given 5/5/23 0836)   mupirocin 2 % ointment ( Nasal Given 5/5/23 0837)   heparin infusion 1,000 units/500 ml in 0.9% NaCl (pressure line flush) (2,000 Units/hr Irrigation New Bag 5/3/23 0855)   aspirin chewable tablet 81 mg (81 mg Oral Given 5/5/23 0836)   clopidogreL tablet 75 mg (75 mg Oral Given 5/5/23 0836)   atorvastatin tablet 80 mg (80 mg Oral Given 5/5/23 0837)   gabapentin capsule 100 mg (100 mg Oral Given 5/5/23 0836)   heparin 25,000 units in dextrose 5% (100 units/ml) IV bolus from bag INITIAL BOLUS (4,360 Units Intravenous Not Given 5/4/23 1715)   heparin 25,000 units in dextrose 5% 250 mL (100 units/mL) infusion HIGH INTENSITY nomogram - [facility] (14 Units/kg/hr × 54.5 kg (Adjusted) Intravenous No Dose/Rate Change  5/5/23 0700)   heparin 25,000 units in dextrose 5% (100 units/ml) IV bolus from bag - ADDITIONAL PRN BOLUS - 60 units/kg (has no administration in time range)   heparin 25,000 units in dextrose 5% (100 units/ml) IV bolus from bag - ADDITIONAL PRN BOLUS - 30 units/kg (has no administration in time range)   heparin 25,000 units in dextrose 5% (100 units/ml) IV bolus from bag INITIAL BOLUS (4,232 Units Intravenous Bolus from Bag 5/2/23 1307)   aspirin tablet 325 mg (325 mg Oral Given 5/2/23 1227)   clopidogreL tablet 300 mg (300 mg Oral Given 5/2/23 1226)   lactated ringers bolus 250 mL (0 mLs Intravenous Stopped 5/4/23 1842)        ED Course as of 05/05/23 1005   Tue May 02, 2023   1215 Due to lab downtime, labs not crossing over electronically.  Printout reviewed.  CMP with CO2 19, BUN 49, creatinine 1.8, albumin 3.3.   [SS]   1215 CBC with H and H 10/34, platelets 125.  Otherwise unremarkable [SS]      ED Course User Index  [SS] Rafael Whiteside MD       Medical Decision Making:   Initial Assessment:   78 yo F with R foot pain. Exam concerning for CLI with inability to appreciate palpable or doppler signals.  Will obtain labs, and discuss with Interventional Cardiology.  Will start heparin and request admission to  for medical management of critical limb ischemia.   Clinical Tests:   Lab Tests: Reviewed and Ordered  Radiological Study: Ordered and Reviewed    On re-evaluation, the patient's status has remained serious.  At this time, I believe the patient should be admitted to the hospital for further evaluation and management of critical limb ischemia.  [facility] service was contacted and the case was discussed.   The consulting physician/team agrees with plan and will admit under their service.   The patient and family were updated with test results, overall impression, and further plan of care. All questions were answered. The patient expressed understanding and agrees with the current plan.                  Clinical Impression:       ICD-10-CM ICD-9-CM   1. Critical limb ischemia of right lower extremity  I70.221 440.22   2. Chest pain  R07.9 786.50   3. Aortic stenosis  I35.0 424.1   4. PVD (peripheral vascular disease)  I73.9 443.9            ED Disposition Condition    Admit                Rafael Whiteside MD  05/03/23 0936       Rafael Whiteside MD  05/05/23 1007

## 2023-05-02 NOTE — PLAN OF CARE
VIRTUAL NURSE:  Cued into patient's room.  Permission received per patient to turn camera to view patient.  Introduced as VN that will be working with floor nurse and nursing assistant.  Educated patient on VN's role in patient care and  [facility] model.  Plan of care reviewed with patient.  Education per flowsheet.   Informed patient that staff will round on them every 2 hours but to use call light for any other needs they may have; informed of fall risk and fall precautions.  Patient verbalized understanding.  Call light within reach; bed siderails up x3.  Opportunity given for questions and questions answered.  Admission assessment questions answered.  Patient denies complaints or any needs at this time. Instructed to call for assistance.  Will cont to monitor and intervene as needed.    Labs, notes, orders, and careplan reviewed.   Problem: Adult Inpatient Plan of Care  Goal: Plan of Care Review  5/2/2023 1723 by Nelsy Tapia RN  Outcome: Ongoing, Progressing  5/2/2023 1652 by Nelsy Tapia RN  Outcome: Ongoing, Progressing  Goal: Patient-Specific Goal (Individualized)  5/2/2023 1723 by Nelsy Tapia RN  Outcome: Ongoing, Progressing  5/2/2023 1652 by Nelsy Tapia RN  Outcome: Ongoing, Progressing  Goal: Absence of Hospital-Acquired Illness or Injury  5/2/2023 1723 by Nelsy Tapia RN  Outcome: Ongoing, Progressing  5/2/2023 1652 by Nelsy Tapia RN  Outcome: Ongoing, Progressing  Goal: Optimal Comfort and Wellbeing  5/2/2023 1723 by Nelsy Tapia RN  Outcome: Ongoing, Progressing  5/2/2023 1652 by Nelsy Tapia RN  Outcome: Ongoing, Progressing  Goal: Readiness for Transition of Care  5/2/2023 1723 by Nelsy Tapia RN  Outcome: Ongoing, Progressing  5/2/2023 1652 by Nelsy Tapia RN  Outcome: Ongoing, Progressing     Problem: Diabetes Comorbidity  Goal: Blood Glucose Level Within Targeted Range  5/2/2023 1723 by Nelsy Tapia RN  Outcome: Ongoing, Progressing  5/2/2023 1652 by Nelsy Tapia RN  Outcome:  Ongoing, Progressing     Problem: Bowel Motility Impaired (Revascularization)  Goal: Effective Bowel Elimination  5/2/2023 1723 by Nelsy Tapia RN  Outcome: Ongoing, Progressing  5/2/2023 1652 by Nelsy Tapia RN  Outcome: Ongoing, Progressing     Problem: Bleeding (Revascularization)  Goal: Absence of Bleeding  5/2/2023 1723 by Nelsy Tapia RN  Outcome: Ongoing, Progressing  5/2/2023 1652 by Nelsy Tapia RN  Outcome: Ongoing, Progressing     Problem: Tissue Perfusion Altered (Revascularization)  Goal: Effective Tissue Perfusion  5/2/2023 1723 by Nelsy Tapia RN  Outcome: Ongoing, Progressing  5/2/2023 1652 by Nelsy Tapia RN  Outcome: Ongoing, Progressing

## 2023-05-02 NOTE — SUBJECTIVE & OBJECTIVE
Past Medical History:   Diagnosis Date    Cancer     rectum    CKD (chronic kidney disease)     Diabetes mellitus, type 2     Hyperlipidemia     Hypertension     PVD (peripheral vascular disease)        Past Surgical History:   Procedure Laterality Date    COLONOSCOPY N/A 3/28/2016    Procedure: COLONOSCOPY;  Surgeon: Mitul Buitrago Jr., MD;  Location: [facility];  Service: Endoscopy;  Laterality: N/A;    EYE SURGERY Left     cataract removal with lens implant.    FEMORAL ARTERY STENT  before 2010    RENAL ARTERY STENT  2010       Review of patient's allergies indicates:   Allergen Reactions    Chantix [varenicline]      Tongue swelling      Wellbutrin [bupropion hcl]      Tongue swelling         No current facility-administered medications on file prior to encounter.     Current Outpatient Medications on File Prior to Encounter   Medication Sig    furosemide (LASIX) 20 MG tablet Take 20 mg by mouth once daily.    sodium bicarbonate 650 MG tablet Take 650 mg by mouth 2 (two) times daily.    [DISCONTINUED] ezetimibe (ZETIA) 10 mg tablet Take 10 mg by mouth.    [DISCONTINUED] spironolactone (ALDACTONE) 50 MG tablet Take 50 mg by mouth.    amLODIPine (NORVASC) 5 MG tablet Take 1 tablet by mouth once daily.    atorvastatin (LIPITOR) 40 MG tablet Take 1 tablet by mouth once daily.    bumetanide (BUMEX) 1 MG tablet Take 1 mg by mouth 2 (two) times a day.    carvediloL (COREG) 25 MG tablet Take 1 tablet by mouth 2 (two) times daily.    clopidogreL (PLAVIX) 75 mg tablet Take 75 mg by mouth once daily.    ezetimibe (ZETIA) 10 mg tablet Take 10 mg by mouth once daily.    hydrALAZINE (APRESOLINE) 25 MG tablet Take 2 tablets by mouth 2 (two) times daily.    [DISCONTINUED] amlodipine (NORVASC) 10 MG tablet Take 10 mg by mouth once daily.    [DISCONTINUED] amLODIPine (NORVASC) 5 MG tablet     [DISCONTINUED] atorvastatin (LIPITOR) 40 MG tablet Take 40 mg by mouth once daily.    [DISCONTINUED] calcium citrate-vitamin D2  1,500-200 mg-unit Tab     [DISCONTINUED] carvedilol (COREG) 25 MG tablet     [DISCONTINUED] clopidogrel (PLAVIX) 75 mg tablet     [DISCONTINUED] ergocalciferol (ERGOCALCIFEROL) 50,000 unit Cap Take 50,000 Units by mouth every 7 days.    [DISCONTINUED] fenofibrate 160 MG Tab Take 160 mg by mouth once daily.    [DISCONTINUED] furosemide (LASIX) 20 MG tablet Take by mouth.    [DISCONTINUED] hydrALAZINE (APRESOLINE) 25 MG tablet     [DISCONTINUED] mupirocin (BACTROBAN) 2 % ointment Apply topically 3 (three) times daily.    [DISCONTINUED] pioglitazone (ACTOS) 15 MG tablet Take 15 mg by mouth once daily.    [DISCONTINUED] spironolactone (ALDACTONE) 50 MG tablet      Family History       Problem Relation (Age of Onset)    Diabetes Father    Heart attack Mother    Hypertension Sister          Tobacco Use    Smoking status: Former     Packs/day: 1.50     Years: 45.00     Pack years: 67.50     Types: Cigarettes     Quit date: 2013     Years since quittin.8     Passive exposure: Never    Smokeless tobacco: Never   Substance and Sexual Activity    Alcohol use: No     Alcohol/week: 0.0 standard drinks    Drug use: No    Sexual activity: Not Currently     Review of Systems  Objective:     Vital Signs (Most Recent):  Temp: 97.7 °F (36.5 °C) (23 1045)  Pulse: 74 (23 1235)  Resp: 20 (23 1045)  BP: (!) 168/80 (23 1414)  SpO2: 97 % (23 1235)   Vital Signs (24h Range):  Temp:  [97.7 °F (36.5 °C)] 97.7 °F (36.5 °C)  Pulse:  [74-87] 74  Resp:  [20] 20  SpO2:  [97 %-100 %] 97 %  BP: (157-228)/(79-92) 168/80     Weight: 57.2 kg (126 lb)  Body mass index is 23.05 kg/m².    Physical Exam  Constitutional:       Appearance: Normal appearance.   HENT:      Head: Normocephalic and atraumatic.      Nose: Nose normal.      Mouth/Throat:      Mouth: Mucous membranes are moist.      Pharynx: Oropharynx is clear.   Eyes:      Extraocular Movements: Extraocular movements intact.      Conjunctiva/sclera:  Conjunctivae normal.   Cardiovascular:      Rate and Rhythm: Normal rate and regular rhythm.      Heart sounds: Normal heart sounds.      Comments: Absent pedal pulses   LLE and foot warm   R foot purple and cold   Pulmonary:      Effort: Pulmonary effort is normal.      Breath sounds: Normal breath sounds.   Abdominal:      General: Abdomen is flat. Bowel sounds are normal.      Palpations: Abdomen is soft.   Musculoskeletal:         General: Tenderness present. No swelling. Normal range of motion.      Cervical back: Normal range of motion and neck supple.      Comments: RLE tender    Skin:     General: Skin is warm and dry.   Neurological:      General: No focal deficit present.      Mental Status: She is alert and oriented to person, place, and time.           Significant Labs: All pertinent labs within the past 24 hours have been reviewed.    Significant Imaging: I have reviewed all pertinent imaging results/findings within the past 24 hours.

## 2023-05-02 NOTE — ASSESSMENT & PLAN NOTE
Patient's FSGs are controlled on current medication regimen.  Last A1c reviewed-   Lab Results   Component Value Date    HGBA1C 6.3 (H) 05/26/2016     Most recent fingerstick glucose reviewed- No results for input(s): POCTGLUCOSE in the last 24 hours.  Current correctional scale  Low  Maintain anti-hyperglycemic dose as follows-   Antihyperglycemics (From admission, onward)    Start     Stop Route Frequency Ordered    05/02/23 1346  insulin aspart U-100 pen 0-5 Units         -- SubQ Every 6 hours PRN 05/02/23 1246        Hold Oral hypoglycemics while patient is in the hospital.

## 2023-05-02 NOTE — H&P
"[facility] Medicine  History & Physical    Patient Name: Steph Monroe  MRN: [id number]  Patient Class: IP- Inpatient  Admission Date: 5/2/2023  Attending Physician: Saige Arce MD   Primary Care Provider: Mane Carmona MD         Patient information was obtained from patient, past medical records and ER records.     Subjective:     Principal Problem:Critical limb ischemia of right lower extremity    Chief Complaint:   Chief Complaint   Patient presents with    Foot Pain     Hx. Of PAD with distant bilateral femoral bypass. C/o worsening BLE for months (R>L). Pt. Was seen by Dr. Fuchs consulted by Podiatry today at office and sent here for for further treatment and admission.         HPI: 76 yo female with a PMH of rectal CA, HCC, CKD IV, DM II, HLD, HTN, PVD, s/p nephrectomy, HFpEF, former smoker 10 years ago, presents with RLE walking pain progressively worse for atleast the past year. The distances she can walk have become progressively shorter. She can now only walk around her home before the RLE pain is significant. The pain is worse with walking, but occurs at rest, is intermittent, is shooting, and is rated 6/10. The R foot is also purple. She notes a small scab to her R heel. The only home medications she is taking is lasix and na bicarbonate. She quit all of her other medications about a year ago because she "no longer felt like taking all kind of pills".  She states she unintentionally lost 56 pounds in the past year. She denies HA, vision changes, dizziness, lightheadedness, CP, SOB, abdominal pain, n/v/d/f/c/c, urinary changes, LE edema, ETOH, tobacco, illicit drugs. She lives at home with family.       Past Medical History:   Diagnosis Date    Cancer     rectum    CKD (chronic kidney disease)     Diabetes mellitus, type 2     Hyperlipidemia     Hypertension     PVD (peripheral vascular disease)        Past Surgical History:   Procedure Laterality Date    COLONOSCOPY N/A " 3/28/2016    Procedure: COLONOSCOPY;  Surgeon: Mitul Buitrago Jr., MD;  Location: [facility];  Service: Endoscopy;  Laterality: N/A;    EYE SURGERY Left     cataract removal with lens implant.    FEMORAL ARTERY STENT  before 2010    RENAL ARTERY STENT  2010       Review of patient's allergies indicates:   Allergen Reactions    Chantix [varenicline]      Tongue swelling      Wellbutrin [bupropion hcl]      Tongue swelling         No current facility-administered medications on file prior to encounter.     Current Outpatient Medications on File Prior to Encounter   Medication Sig    furosemide (LASIX) 20 MG tablet Take 20 mg by mouth once daily.    sodium bicarbonate 650 MG tablet Take 650 mg by mouth 2 (two) times daily.    [DISCONTINUED] ezetimibe (ZETIA) 10 mg tablet Take 10 mg by mouth.    [DISCONTINUED] spironolactone (ALDACTONE) 50 MG tablet Take 50 mg by mouth.    amLODIPine (NORVASC) 5 MG tablet Take 1 tablet by mouth once daily.    atorvastatin (LIPITOR) 40 MG tablet Take 1 tablet by mouth once daily.    bumetanide (BUMEX) 1 MG tablet Take 1 mg by mouth 2 (two) times a day.    carvediloL (COREG) 25 MG tablet Take 1 tablet by mouth 2 (two) times daily.    clopidogreL (PLAVIX) 75 mg tablet Take 75 mg by mouth once daily.    ezetimibe (ZETIA) 10 mg tablet Take 10 mg by mouth once daily.    hydrALAZINE (APRESOLINE) 25 MG tablet Take 2 tablets by mouth 2 (two) times daily.    [DISCONTINUED] amlodipine (NORVASC) 10 MG tablet Take 10 mg by mouth once daily.    [DISCONTINUED] amLODIPine (NORVASC) 5 MG tablet     [DISCONTINUED] atorvastatin (LIPITOR) 40 MG tablet Take 40 mg by mouth once daily.    [DISCONTINUED] calcium citrate-vitamin D2 1,500-200 mg-unit Tab     [DISCONTINUED] carvedilol (COREG) 25 MG tablet     [DISCONTINUED] clopidogrel (PLAVIX) 75 mg tablet     [DISCONTINUED] ergocalciferol (ERGOCALCIFEROL) 50,000 unit Cap Take 50,000 Units by mouth every 7 days.    [DISCONTINUED]  fenofibrate 160 MG Tab Take 160 mg by mouth once daily.    [DISCONTINUED] furosemide (LASIX) 20 MG tablet Take by mouth.    [DISCONTINUED] hydrALAZINE (APRESOLINE) 25 MG tablet     [DISCONTINUED] mupirocin (BACTROBAN) 2 % ointment Apply topically 3 (three) times daily.    [DISCONTINUED] pioglitazone (ACTOS) 15 MG tablet Take 15 mg by mouth once daily.    [DISCONTINUED] spironolactone (ALDACTONE) 50 MG tablet      Family History       Problem Relation (Age of Onset)    Diabetes Father    Heart attack Mother    Hypertension Sister          Tobacco Use    Smoking status: Former     Packs/day: 1.50     Years: 45.00     Pack years: 67.50     Types: Cigarettes     Quit date: 2013     Years since quittin.8     Passive exposure: Never    Smokeless tobacco: Never   Substance and Sexual Activity    Alcohol use: No     Alcohol/week: 0.0 standard drinks    Drug use: No    Sexual activity: Not Currently     Review of Systems  Objective:     Vital Signs (Most Recent):  Temp: 97.7 °F (36.5 °C) (23 1045)  Pulse: 74 (23 1235)  Resp: 20 (23 1045)  BP: (!) 168/80 (23 1414)  SpO2: 97 % (23 1235)   Vital Signs (24h Range):  Temp:  [97.7 °F (36.5 °C)] 97.7 °F (36.5 °C)  Pulse:  [74-87] 74  Resp:  [20] 20  SpO2:  [97 %-100 %] 97 %  BP: (157-228)/(79-92) 168/80     Weight: 57.2 kg (126 lb)  Body mass index is 23.05 kg/m².    Physical Exam  Constitutional:       Appearance: Normal appearance.   HENT:      Head: Normocephalic and atraumatic.      Nose: Nose normal.      Mouth/Throat:      Mouth: Mucous membranes are moist.      Pharynx: Oropharynx is clear.   Eyes:      Extraocular Movements: Extraocular movements intact.      Conjunctiva/sclera: Conjunctivae normal.   Cardiovascular:      Rate and Rhythm: Normal rate and regular rhythm.      Heart sounds: Normal heart sounds.      Comments: Absent pedal pulses   LLE and foot warm   R foot purple and cold   Pulmonary:      Effort: Pulmonary  effort is normal.      Breath sounds: Normal breath sounds.   Abdominal:      General: Abdomen is flat. Bowel sounds are normal.      Palpations: Abdomen is soft.   Musculoskeletal:         General: Tenderness present. No swelling. Normal range of motion.      Cervical back: Normal range of motion and neck supple.      Comments: RLE tender    Skin:     General: Skin is warm and dry.   Neurological:      General: No focal deficit present.      Mental Status: She is alert and oriented to person, place, and time.           Significant Labs: All pertinent labs within the past 24 hours have been reviewed.    Significant Imaging: I have reviewed all pertinent imaging results/findings within the past 24 hours.    Assessment/Plan:     * Critical limb ischemia of right lower extremity    Pain with increased claudication unknown duration, at least a year. Now with absent pulse and R foot discoloration   On ASA, plavix, heparin gtt   No signs of infection   Consult wound care for small skin break   Cardiology planning peripheral intervention tomorrow   NPO after MN   General surgery consulted   PRN pain regime   Imaging reviewed: Lower extremity peripheral vascular disease with velocity parameters as above.  Majority monophasic waveforms throughout as can be seen with vessel hardening related to atherosclerosis versus more central, upstream stenosis.     Velocity elevation at the left deep profundus artery as can be seen with sequela of hemodynamically significant stenosis.     Limited color visualization with relative reduced flow velocity at the left proximal SFA.      Bones are demineralized.  Dorsal and plantar calcaneal enthesopathic spurring is present.  No definite fracture or effusion.  Prominent atherosclerotic vascular calcifications        Cirrhosis    HCC  Per chart review: -carlton continues between oncologist and GI; PET scan without malignancy  -last paracentesis 11/2022 - gets albumin replacement  -Pt stopped all  meds 11/2022 - she feels better and she has noticed that her ascitic fluid accumulation has not returned as quickly compared to when she was on medications  -cytology neg from ascitic fluid - low yield; follows with Yovanny      Pulmonary emphysema  Lungs clear   PRN duoneb ordered     Chronic heart failure with preserved ejection fraction    Aortic Stenosis   Pulmonary HTN     Patient is identified as having Diastolic (HFpEF) heart failure that is Chronic. CHF is currently controlled. Latest ECHO performed and demonstrates- No results found for this or any previous visit.  . Continue Beta Blocker Furosemide and monitor clinical status closely. Monitor on telemetry. Patient is off CHF pathway.  Monitor strict Is&Os and daily weights.  Place on fluid restriction of 1.5 L. Continue to stress to patient importance of self efficacy and  on diet for CHF. Last BNP reviewed- and noted below No results for input(s): BNP, BNPTRIAGEBLO in the last 168 hours..          Stage 4 chronic kidney disease    Hx of nephrectomy   Follows nephrology op     etiology include HTN/DM/microvascular Dz/nephrectomy; BL 1.8-2.0      History of rectal or anal cancer    Patient reports treated with radiation and chemotherapy 6 years ago and was discharge from clinic     Coronary artery disease involving native coronary artery of native heart without angina pectoris    Patient with known CAD s/p unknown coronary anatomy, which is controlled Will continue ASA and Plavix, statin and monitor for S/Sx of angina/ACS. Continue to monitor on telemetry.       PVD (peripheral vascular disease)    PAD   Hx of iliac, renal, femoral stents   See critical limb     HLD (hyperlipidemia)    Lab Results   Component Value Date    LDLCALC 62 03/22/2022     Resume statin     Essential hypertension  Hypertensive Urgency  PRN IV hydralazine   Resume lasix   Coreg  Patient has not taken home BP medications in atleast a year   Cardiac monitor     Type 2 DM with  CKD stage 3 and hypertension  Patient's FSGs are controlled on current medication regimen.  Last A1c reviewed-   Lab Results   Component Value Date    HGBA1C 6.3 (H) 05/26/2016     Most recent fingerstick glucose reviewed- No results for input(s): POCTGLUCOSE in the last 24 hours.  Current correctional scale  Low  Maintain anti-hyperglycemic dose as follows-   Antihyperglycemics (From admission, onward)    Start     Stop Route Frequency Ordered    05/02/23 1346  insulin aspart U-100 pen 0-5 Units         -- SubQ Every 6 hours PRN 05/02/23 1246        Hold Oral hypoglycemics while patient is in the hospital.      VTE Risk Mitigation (From admission, onward)         Ordered     heparin 25,000 units in dextrose 5% (100 units/ml) IV bolus from bag - ADDITIONAL PRN BOLUS - 60 units/kg  As needed (PRN)        Question:  Heparin Infusion Adjustment (DO NOT MODIFY ANSWER)  Answer:  \\[email or web address]\epic\Images\Pharmacy\HeparinInfusions\heparin HIGH INTENSITY nomogram for [facility] [id number].pdf    05/02/23 1210     heparin 25,000 units in dextrose 5% (100 units/ml) IV bolus from bag - ADDITIONAL PRN BOLUS - 30 units/kg  As needed (PRN)        Question:  Heparin Infusion Adjustment (DO NOT MODIFY ANSWER)  Answer:  [email or web address]\Images\Pharmacy\HeparinInfusions\heparin HIGH INTENSITY nomogram for [facility] [id number].pdf    05/02/23 1210     Reason for No Pharmacological VTE Prophylaxis  Once        Question:  Reasons:  Answer:  IV Heparin w/in 24 hrs. Pre or Post-Op    05/02/23 1237     IP VTE HIGH RISK PATIENT  Once         05/02/23 1237     Place sequential compression device  Until discontinued         05/02/23 1237     heparin 25,000 units in dextrose 5% 250 mL (100 units/mL) infusion HIGH INTENSITY nomogram - [facility]  Continuous        Question Answer Comment   Heparin Infusion Adjustment (DO NOT MODIFY ANSWER) [email or web address]\[email or web address]\Images\Pharmacy\HeparinInfusions\heparin HIGH INTENSITY nomogram for [facility] [id number].pdf    Begin at (in units/kg/hr)  18        05/02/23 1210                           Aspen Pagan NP  Department of [facility]  [facility]

## 2023-05-02 NOTE — ASSESSMENT & PLAN NOTE
Aortic Stenosis   Pulmonary HTN     Patient is identified as having Diastolic (HFpEF) heart failure that is Chronic. CHF is currently controlled. Latest ECHO performed and demonstrates- No results found for this or any previous visit.  . Continue Beta Blocker Furosemide and monitor clinical status closely. Monitor on telemetry. Patient is off CHF pathway.  Monitor strict Is&Os and daily weights.  Place on fluid restriction of 1.5 L. Continue to stress to patient importance of self efficacy and  on diet for CHF. Last BNP reviewed- and noted below No results for input(s): BNP, BNPTRIAGEBLO in the last 168 hours..

## 2023-05-02 NOTE — Clinical Note
The sheath was removed from the right radial artery. [Prepares cereal] : prepares cereal [Brushes teeth, no help] : brushes teeth, no help [Plays board/card games] : plays board/card games [Able to tie knot] : able to tie knot [Draws person with 6 parts] : draws person with 6 parts [Prints some letters and numbers] : prints some letters and numbers [Copies square and triangle] : copies square and triangle [Balances on one foot 5-6 seconds] : balances on one foot 5-6 seconds [Heel-to-toe walk] : heel to toe walk [Good articulation and language skills] : good articulation and language skills [Counts to 10] : counts to 10 [Names 4+ colors] : names 4+ colors [Follows simple directions] : follows simple directions [Listens and attends] : listens and attends [Defines 5-7 words] : defines 5-7 words [Knows 2 opposites] : knows 2 opposites [Knows 3 adjectives] : knows 3 adjectives

## 2023-05-02 NOTE — PHARMACY MED REC
"Admission Medication History     The home medication history was taken by Kimberly Joy CPhT.    Medication history obtained from, Patient Verified    You may go to "Admission" then "Reconcile Home Medications" tabs to review and/or act upon these items.     The home medication list has been updated by the Pharmacy department.   Please read ALL comments highlighted in yellow.   Please address this information as you see fit.    Feel free to contact us if you have any questions or require assistance.      The medications listed below were removed from the home medication list.  Please reorder if appropriate:  Patient reports no longer taking the following medication(s):  Calcium citrate-D2 1,500-200 mg-unit  Vitamin D 50,000 unit  Fenofibrate 160 mg  Mupirocin 2% ointment  Pioglitazone 15 mg  Spironolactone 50 mg      Kimberly Joy CPhT.  Ext [phone]               .        "

## 2023-05-02 NOTE — CONSULTS
[facility] [place]  Consultation  Cardiology      Steph Monroe  YOB: 1945  Medical Record Number:  [id number]  Attending Physician:  Saige Arce MD   Date of Admission: 5/2/2023       Hospital Day:  0  Current Principal Problem:  Critical limb ischemia of right lower extremity    HISTORY:       Cc:  Right foot pain    HPI:     78 Yo female with a history of diabetes, hypertension, hyperlipidemia, renal artery stenosis status post stenting, left nephrectomy, peripheral arterial disease status post iliac stent (unknown which side), moderate aortic stenosis previously followed by Dr. Elliott at a [facility] who presents to the hospital secondary to right foot pain.  Patient saw podiatry today,  for right foot pain that has been ongoing for the past several months.  Her pain is brought about with walking and sometimes at rest.  She was recently prescribed antibiotics without any relief.  Currently she is pain-free.  Cardiology consulted for possible acute limb ischemia.    Medications - Outpatient  Prior to Admission medications    Medication Sig Start Date End Date Taking? Authorizing Provider   amlodipine (NORVASC) 10 MG tablet Take 10 mg by mouth once daily.    Historical Provider   atorvastatin (LIPITOR) 40 MG tablet Take 40 mg by mouth once daily.    Historical Provider   calcium citrate-vitamin D2 1,500-200 mg-unit Tab  1/6/19   Historical Provider   carvedilol (COREG) 25 MG tablet  1/21/19   Historical Provider   clopidogrel (PLAVIX) 75 mg tablet  1/25/18   Historical Provider   ergocalciferol (ERGOCALCIFEROL) 50,000 unit Cap Take 50,000 Units by mouth every 7 days.    Historical Provider   fenofibrate 160 MG Tab Take 160 mg by mouth once daily.    Historical Provider   furosemide (LASIX) 20 MG tablet Take by mouth. 2/1/23   Historical Provider   mupirocin (BACTROBAN) 2 % ointment Apply topically 3 (three) times daily. 3/28/23   Jagruti Aguilera NP   pioglitazone (ACTOS) 15  MG tablet Take 15 mg by mouth once daily.    Historical Provider   sodium bicarbonate 650 MG tablet Take 650 mg by mouth 2 (two) times daily.    Historical Provider         Medications - Current  Scheduled Meds:   heparin (PORCINE)  80 Units/kg (Adjusted) Intravenous Once     Continuous Infusions:   heparin (porcine) in D5W 18 Units/kg/hr (23 1307)     PRN Meds:.acetaminophen, dextrose 10%, dextrose 10%, glucagon (human recombinant), heparin (PORCINE), heparin (PORCINE), hydrALAZINE, insulin aspart U-100, naloxone, ondansetron, sodium chloride 0.9%      Allergies  Review of patient's allergies indicates:   Allergen Reactions    Chantix [varenicline]      Tongue swelling      Wellbutrin [bupropion hcl]      Tongue swelling           Past Medical History  Past Medical History:   Diagnosis Date    Cancer     rectum    CKD (chronic kidney disease)     Diabetes mellitus, type 2     Hyperlipidemia     Hypertension     PVD (peripheral vascular disease)          Past Surgical History  Past Surgical History:   Procedure Laterality Date    COLONOSCOPY N/A 3/28/2016    Procedure: COLONOSCOPY;  Surgeon: Mitul Buitrago Jr., MD;  Location: [facility];  Service: Endoscopy;  Laterality: N/A;    EYE SURGERY Left     cataract removal with lens implant.    FEMORAL ARTERY STENT  before     RENAL ARTERY STENT           Social History  Social History     Socioeconomic History    Marital status:     Number of children: 2   Occupational History    Occupation: Retired   Tobacco Use    Smoking status: Former     Packs/day: 1.50     Years: 45.00     Pack years: 67.50     Types: Cigarettes     Quit date: 2013     Years since quittin.8     Passive exposure: Never    Smokeless tobacco: Never   Substance and Sexual Activity    Alcohol use: No     Alcohol/week: 0.0 standard drinks    Drug use: No    Sexual activity: Not Currently         ROS:      Admits Denies   Constitutional  Chills, diaphoresis, malaise   Eyes   Visual changes   ENMT  Dysphagia, Epistaxis, nasal congestion, hearing loss   Cardiovascular  Chest pain, palpitations, edema   Respiratory  Cough, dyspnea, wheezing   Gastrointestinal  Nausea, vomiting, constipation, diarrhea, anorexia.     Genitourinary  Dysuria, incontinence   Musculoskeletal  Myalgias, joint pain, joint swelling   Integumentary  Rash, inflammation, burning   Neruo-Psychiatric  Anxiety, insomnia.  Changes in speech, strength, sensation.     Endocrine     Hematologic  Abnormal bruising, bleeding   Immunologic  Inflammation, pain at IV sites.  Pruritis.       PHYSICAL EXAM:     Vital Signs  Vitals  Temp: 97.7 °F (36.5 °C)  Temp Source: Oral  Pulse: 74  Resp: 20  SpO2: 97 %  BP: (!) 228/92  MAP (mmHg): 132          24 Hour VS Range    Temp:  [97.7 °F (36.5 °C)]   Pulse:  [74-87]   Resp:  [20]   BP: (157-228)/(79-92)   SpO2:  [97 %-100 %]   No intake or output data in the 24 hours ending 05/02/23 1308      General:  Lying in bed no acute distress  Head: NCAT  Eyes: conjunctivae and lids normal, no scleral icterus, EOMI.  ENMT:  no gingival bleeding, normal oral mucosa without pallor or cyanosis.   Neck:  JVP normal.  Trachea non-displaced.     Chest:  Normal respiratory effort.  Chest clear to auscultation.  No wheezes, rales, or rhonchi.  Heart:  Normal PMI, S1 S2 normal quality, splitting.  No murmurs rubs or gallops.  Peripheral pulses 2+ in carotids and radials.  Doppler pulses and right PT that is monophasic, left PT.  Non dopplerable pulses in left and right DP.  Right peroneal pulse dopplerable..  Abdomen:  Non-distended, normal bowel sounds, non-tender.  No hepato-jugular reflux.  Extremities:  No edema. Normal capillary refill.    Skin:  Warm and dry.  No cyanosis or pallor.  No ulcers, stasis.  IV sites without tenderness or inflammation.    Neurological / Psychiatric:  Oriented to person, time, and place.  No facial asymmetry, drift.  Fluent without dysarthria.  Mood euthymic, affect  normal.         DATA:       No results for input(s): WBC, HGB, HCT, PLT in the last 168 hours.     No results for input(s): PROTIME, INR in the last 168 hours.     Recent Labs   Lab 05/02/23  1114      K 4.8   *   CO2 19*   BUN 49*   CREATININE 1.8*   ANIONGAP 10   CALCIUM 9.6        Recent Labs   Lab 05/02/23  1114   PROT 7.9   ALBUMIN 3.3*   BILITOT 0.7   ALKPHOS 89   AST 21   ALT 11        ASSESSMENT/PLAN:     Critical limb ischemia:  Patient is noted to have dopplerable pulses.  She has perfusion to her right foot.  It is erythematous and tender on lateral aspect of her malleolus.  No ulcers or open wounds noted.  Pictures uploaded to media.    -Will plan for angiogram tomorrow plus or minus intervention.  Given her chronic kidney disease as well as history of solitary kidney due to left nephrectomy will likely do diagnostic with staged intervention.  -continue aspirin and Plavix  -high-intensity statin  - Creatinine/CrCl:   Estimated Creatinine Clearance: 21.4 mL/min (A) (based on SCr of 1.8 mg/dL (H)).  - Allergies:   - No shellfish / Iodine allergy  - No Latex allergy   - No Aspirin allergy    - No history of HIT  - Pre-Hydration: NS 3cc/kg x 1 hour   - Pre-Op Med: Bendaryl 50mg pO   - All patient's questions were answered.  -The risks, benefits and alternatives of the procedure were explained to the patient.   -The risks of moderate sedation include hypotension, respiratory depression, arrhythmias, bronchospasm, and death.   - Informed consent was obtained and the  patient is agreeable to proceed with the procedure.    Peripheral vascular disease  -management as above    Moderate aortic stenosis  -recommend echo      Signed:  Sammy Kingsley M.D.  Page # [phone]  Cardiovascular Fellow  [facility]

## 2023-05-02 NOTE — ASSESSMENT & PLAN NOTE
Pain with increased claudication unknown duration, at least a year. Now with absent pulse and R foot discoloration   On ASA, plavix, heparin gtt   No signs of infection   Consult wound care for small skin break   Cardiology planning peripheral intervention tomorrow   NPO after MN   General surgery consulted   PRN pain regime   Imaging reviewed: Lower extremity peripheral vascular disease with velocity parameters as above.  Majority monophasic waveforms throughout as can be seen with vessel hardening related to atherosclerosis versus more central, upstream stenosis.     Velocity elevation at the left deep profundus artery as can be seen with sequela of hemodynamically significant stenosis.     Limited color visualization with relative reduced flow velocity at the left proximal SFA.      Bones are demineralized.  Dorsal and plantar calcaneal enthesopathic spurring is present.  No definite fracture or effusion.  Prominent atherosclerotic vascular calcifications

## 2023-05-02 NOTE — ASSESSMENT & PLAN NOTE
Hypertensive Urgency  PRN IV hydralazine   Resume lasix   Coreg  Patient has not taken home BP medications in atleast a year   Cardiac monitor

## 2023-05-02 NOTE — Clinical Note
An angiography was performed of the left lower extremity via hand injection with 0 mL of contrast Co2 used

## 2023-05-02 NOTE — ASSESSMENT & PLAN NOTE
Patient reports treated with radiation and chemotherapy 6 years ago and was discharge from clinic

## 2023-05-02 NOTE — H&P (VIEW-ONLY)
[facility], [place]  Consultation  Cardiology      Steph Monroe  YOB: 1945  Medical Record Number:  [id number]  Attending Physician:  Saige Arce MD   Date of Admission: 5/2/2023       Hospital Day:  0  Current Principal Problem:  Critical limb ischemia of right lower extremity    HISTORY:       Cc:  Right foot pain    HPI:     76 Yo female with a history of diabetes, hypertension, hyperlipidemia, renal artery stenosis status post stenting, left nephrectomy, peripheral arterial disease status post iliac stent (unknown which side), moderate aortic stenosis previously followed by Dr. Elliott at a [facility] who presents to the hospital secondary to right foot pain.  Patient saw podiatry today,  for right foot pain that has been ongoing for the past several months.  Her pain is brought about with walking and sometimes at rest.  She was recently prescribed antibiotics without any relief.  Currently she is pain-free.  Cardiology consulted for possible acute limb ischemia.    Medications - Outpatient  Prior to Admission medications    Medication Sig Start Date End Date Taking? Authorizing Provider   amlodipine (NORVASC) 10 MG tablet Take 10 mg by mouth once daily.    Historical Provider   atorvastatin (LIPITOR) 40 MG tablet Take 40 mg by mouth once daily.    Historical Provider   calcium citrate-vitamin D2 1,500-200 mg-unit Tab  1/6/19   Historical Provider   carvedilol (COREG) 25 MG tablet  1/21/19   Historical Provider   clopidogrel (PLAVIX) 75 mg tablet  1/25/18   Historical Provider   ergocalciferol (ERGOCALCIFEROL) 50,000 unit Cap Take 50,000 Units by mouth every 7 days.    Historical Provider   fenofibrate 160 MG Tab Take 160 mg by mouth once daily.    Historical Provider   furosemide (LASIX) 20 MG tablet Take by mouth. 2/1/23   Historical Provider   mupirocin (BACTROBAN) 2 % ointment Apply topically 3 (three) times daily. 3/28/23   Jagruti Aguilera NP   pioglitazone (ACTOS) 15  MG tablet Take 15 mg by mouth once daily.    Historical Provider   sodium bicarbonate 650 MG tablet Take 650 mg by mouth 2 (two) times daily.    Historical Provider         Medications - Current  Scheduled Meds:   heparin (PORCINE)  80 Units/kg (Adjusted) Intravenous Once     Continuous Infusions:   heparin (porcine) in D5W 18 Units/kg/hr (23 1307)     PRN Meds:.acetaminophen, dextrose 10%, dextrose 10%, glucagon (human recombinant), heparin (PORCINE), heparin (PORCINE), hydrALAZINE, insulin aspart U-100, naloxone, ondansetron, sodium chloride 0.9%      Allergies  Review of patient's allergies indicates:   Allergen Reactions    Chantix [varenicline]      Tongue swelling      Wellbutrin [bupropion hcl]      Tongue swelling           Past Medical History  Past Medical History:   Diagnosis Date    Cancer     rectum    CKD (chronic kidney disease)     Diabetes mellitus, type 2     Hyperlipidemia     Hypertension     PVD (peripheral vascular disease)          Past Surgical History  Past Surgical History:   Procedure Laterality Date    COLONOSCOPY N/A 3/28/2016    Procedure: COLONOSCOPY;  Surgeon: Mitul Buitrago Jr., MD;  Location: [facility];  Service: Endoscopy;  Laterality: N/A;    EYE SURGERY Left     cataract removal with lens implant.    FEMORAL ARTERY STENT  before     RENAL ARTERY STENT           Social History  Social History     Socioeconomic History    Marital status:     Number of children: 2   Occupational History    Occupation: Retired   Tobacco Use    Smoking status: Former     Packs/day: 1.50     Years: 45.00     Pack years: 67.50     Types: Cigarettes     Quit date: 2013     Years since quittin.8     Passive exposure: Never    Smokeless tobacco: Never   Substance and Sexual Activity    Alcohol use: No     Alcohol/week: 0.0 standard drinks    Drug use: No    Sexual activity: Not Currently         ROS:      Admits Denies   Constitutional  Chills, diaphoresis, malaise   Eyes   Visual changes   ENMT  Dysphagia, Epistaxis, nasal congestion, hearing loss   Cardiovascular  Chest pain, palpitations, edema   Respiratory  Cough, dyspnea, wheezing   Gastrointestinal  Nausea, vomiting, constipation, diarrhea, anorexia.     Genitourinary  Dysuria, incontinence   Musculoskeletal  Myalgias, joint pain, joint swelling   Integumentary  Rash, inflammation, burning   Neruo-Psychiatric  Anxiety, insomnia.  Changes in speech, strength, sensation.     Endocrine     Hematologic  Abnormal bruising, bleeding   Immunologic  Inflammation, pain at IV sites.  Pruritis.       PHYSICAL EXAM:     Vital Signs  Vitals  Temp: 97.7 °F (36.5 °C)  Temp Source: Oral  Pulse: 74  Resp: 20  SpO2: 97 %  BP: (!) 228/92  MAP (mmHg): 132          24 Hour VS Range    Temp:  [97.7 °F (36.5 °C)]   Pulse:  [74-87]   Resp:  [20]   BP: (157-228)/(79-92)   SpO2:  [97 %-100 %]   No intake or output data in the 24 hours ending 05/02/23 1308      General:  Lying in bed no acute distress  Head: NCAT  Eyes: conjunctivae and lids normal, no scleral icterus, EOMI.  ENMT:  no gingival bleeding, normal oral mucosa without pallor or cyanosis.   Neck:  JVP normal.  Trachea non-displaced.     Chest:  Normal respiratory effort.  Chest clear to auscultation.  No wheezes, rales, or rhonchi.  Heart:  Normal PMI, S1 S2 normal quality, splitting.  No murmurs rubs or gallops.  Peripheral pulses 2+ in carotids and radials.  Doppler pulses and right PT that is monophasic, left PT.  Non dopplerable pulses in left and right DP.  Right peroneal pulse dopplerable..  Abdomen:  Non-distended, normal bowel sounds, non-tender.  No hepato-jugular reflux.  Extremities:  No edema. Normal capillary refill.    Skin:  Warm and dry.  No cyanosis or pallor.  No ulcers, stasis.  IV sites without tenderness or inflammation.    Neurological / Psychiatric:  Oriented to person, time, and place.  No facial asymmetry, drift.  Fluent without dysarthria.  Mood euthymic, affect  normal.         DATA:       No results for input(s): WBC, HGB, HCT, PLT in the last 168 hours.     No results for input(s): PROTIME, INR in the last 168 hours.     Recent Labs   Lab 05/02/23  1114      K 4.8   *   CO2 19*   BUN 49*   CREATININE 1.8*   ANIONGAP 10   CALCIUM 9.6        Recent Labs   Lab 05/02/23  1114   PROT 7.9   ALBUMIN 3.3*   BILITOT 0.7   ALKPHOS 89   AST 21   ALT 11        ASSESSMENT/PLAN:     Critical limb ischemia:  Patient is noted to have dopplerable pulses.  She has perfusion to her right foot.  It is erythematous and tender on lateral aspect of her malleolus.  No ulcers or open wounds noted.  Pictures uploaded to media.    -Will plan for angiogram tomorrow plus or minus intervention.  Given her chronic kidney disease as well as history of solitary kidney due to left nephrectomy will likely do diagnostic with staged intervention.  -continue aspirin and Plavix  -high-intensity statin  - Creatinine/CrCl:   Estimated Creatinine Clearance: 21.4 mL/min (A) (based on SCr of 1.8 mg/dL (H)).  - Allergies:   - No shellfish / Iodine allergy  - No Latex allergy   - No Aspirin allergy    - No history of HIT  - Pre-Hydration: NS 3cc/kg x 1 hour   - Pre-Op Med: Bendaryl 50mg pO   - All patient's questions were answered.  -The risks, benefits and alternatives of the procedure were explained to the patient.   -The risks of moderate sedation include hypotension, respiratory depression, arrhythmias, bronchospasm, and death.   - Informed consent was obtained and the  patient is agreeable to proceed with the procedure.    Peripheral vascular disease  -management as above    Moderate aortic stenosis  -recommend echo      Signed:  Sammy Kingsley M.D.  Page # [phone]  Cardiovascular Fellow  [facility]

## 2023-05-02 NOTE — ASSESSMENT & PLAN NOTE
HCC  Per chart review: -carlton continues between oncologist and GI; PET scan without malignancy  -last paracentesis 11/2022 - gets albumin replacement  -Pt stopped all meds 11/2022 - she feels better and she has noticed that her ascitic fluid accumulation has not returned as quickly compared to when she was on medications  -cytology neg from ascitic fluid - low yield; follows with Yovanny

## 2023-05-02 NOTE — Clinical Note
An angiography was performed of the distal infrarenal abdominal aorta  via hand injection with 0 mL of contrast Co2 used

## 2023-05-02 NOTE — FIRST PROVIDER EVALUATION
Emergency Department TeleTriage Encounter Note      CHIEF COMPLAINT    Chief Complaint   Patient presents with    Foot Pain     Hx. Of PAD with distant bilateral femoral bypass. C/o worsening BLE for months (R>L). Pt. Was seen by Dr. Fuchs consulted by Podiatry today at office and sent here for for further treatment and admission.        VITAL SIGNS   Initial Vitals [05/02/23 1045]   BP Pulse Resp Temp SpO2   (!) 208/82 80 20 97.7 °F (36.5 °C) 98 %      MAP       --            ALLERGIES    Review of patient's allergies indicates:   Allergen Reactions    Chantix [varenicline]      Tongue swelling      Wellbutrin [bupropion hcl]      Tongue swelling         PROVIDER TRIAGE NOTE  Patient sent to the ED by podiatry for admission due to worsening vascular issues to bilateral lower extremities.       ORDERS  Labs Reviewed - No data to display    ED Orders (720h ago, onward)      None              Virtual Visit Note: The provider triage portion of this emergency department evaluation and documentation was performed via [employer], a HIPAA-compliant telemedicine application, in concert with a tele-presenter in the room. A face to face patient evaluation with one of my colleagues will occur once the patient is placed in an emergency department room.      DISCLAIMER: This note was prepared with [employer] voice recognition transcription software. Garbled syntax, mangled pronouns, and other bizarre constructions may be attributed to that software system.

## 2023-05-02 NOTE — Clinical Note
The groin, left radial and left brachial was prepped. The site was prepped with ChloraPrep. The patient was draped. The patient was positioned supine.

## 2023-05-03 LAB
ALBUMIN SERPL BCP-MCNC: 3 G/DL (ref 3.5–5.2)
ALP SERPL-CCNC: 83 U/L (ref 55–135)
ALT SERPL W/O P-5'-P-CCNC: 9 U/L (ref 10–44)
ANION GAP SERPL CALC-SCNC: 11 MMOL/L (ref 8–16)
APTT PPP: 26.2 SEC (ref 21–32)
APTT PPP: 52.7 SEC (ref 21–32)
APTT PPP: 68.3 SEC (ref 21–32)
AST SERPL-CCNC: 19 U/L (ref 10–40)
BASOPHILS # BLD AUTO: 0 K/UL (ref 0–0.2)
BASOPHILS NFR BLD: 0 % (ref 0–1.9)
BILIRUB SERPL-MCNC: 0.4 MG/DL (ref 0.1–1)
BUN SERPL-MCNC: 60 MG/DL (ref 8–23)
CALCIUM SERPL-MCNC: 9.1 MG/DL (ref 8.7–10.5)
CHLORIDE SERPL-SCNC: 114 MMOL/L (ref 95–110)
CO2 SERPL-SCNC: 18 MMOL/L (ref 23–29)
CREAT SERPL-MCNC: 2.1 MG/DL (ref 0.5–1.4)
DIFFERENTIAL METHOD: ABNORMAL
EOSINOPHIL # BLD AUTO: 0 K/UL (ref 0–0.5)
EOSINOPHIL NFR BLD: 0.2 % (ref 0–8)
ERYTHROCYTE [DISTWIDTH] IN BLOOD BY AUTOMATED COUNT: 19.4 % (ref 11.5–14.5)
EST. GFR  (NO RACE VARIABLE): 24 ML/MIN/1.73 M^2
GLUCOSE SERPL-MCNC: 82 MG/DL (ref 70–110)
HCT VFR BLD AUTO: 30.8 % (ref 37–48.5)
HGB BLD-MCNC: 9.6 G/DL (ref 12–16)
IMM GRANULOCYTES # BLD AUTO: 0.02 K/UL (ref 0–0.04)
IMM GRANULOCYTES NFR BLD AUTO: 0.4 % (ref 0–0.5)
INR PPP: 1.2 (ref 0.8–1.2)
LYMPHOCYTES # BLD AUTO: 0.6 K/UL (ref 1–4.8)
LYMPHOCYTES NFR BLD: 11.8 % (ref 18–48)
MAGNESIUM SERPL-MCNC: 2 MG/DL (ref 1.6–2.6)
MCH RBC QN AUTO: 27.7 PG (ref 27–31)
MCHC RBC AUTO-ENTMCNC: 31.2 G/DL (ref 32–36)
MCV RBC AUTO: 89 FL (ref 82–98)
MONOCYTES # BLD AUTO: 0.4 K/UL (ref 0.3–1)
MONOCYTES NFR BLD: 7.7 % (ref 4–15)
NEUTROPHILS # BLD AUTO: 3.7 K/UL (ref 1.8–7.7)
NEUTROPHILS NFR BLD: 79.9 % (ref 38–73)
NRBC BLD-RTO: 0 /100 WBC
PHOSPHATE SERPL-MCNC: 4.6 MG/DL (ref 2.7–4.5)
PLATELET # BLD AUTO: 110 K/UL (ref 150–450)
PMV BLD AUTO: 9.7 FL (ref 9.2–12.9)
POC ACTIVATED CLOTTING TIME K: 143 SEC (ref 74–137)
POC ACTIVATED CLOTTING TIME K: 179 SEC (ref 74–137)
POCT GLUCOSE: 177 MG/DL (ref 70–110)
POCT GLUCOSE: 75 MG/DL (ref 70–110)
POCT GLUCOSE: 90 MG/DL (ref 70–110)
POCT GLUCOSE: 98 MG/DL (ref 70–110)
POTASSIUM SERPL-SCNC: 4.4 MMOL/L (ref 3.5–5.1)
PROT SERPL-MCNC: 7.1 G/DL (ref 6–8.4)
PROTHROMBIN TIME: 13.1 SEC (ref 9–12.5)
RBC # BLD AUTO: 3.47 M/UL (ref 4–5.4)
SAMPLE: ABNORMAL
SAMPLE: ABNORMAL
SODIUM SERPL-SCNC: 143 MMOL/L (ref 136–145)
WBC # BLD AUTO: 4.65 K/UL (ref 3.9–12.7)

## 2023-05-03 PROCEDURE — 25000003 PHARM REV CODE 250: Performed by: NURSE PRACTITIONER

## 2023-05-03 PROCEDURE — 99152 MOD SED SAME PHYS/QHP 5/>YRS: CPT | Mod: ,,, | Performed by: INTERNAL MEDICINE

## 2023-05-03 PROCEDURE — 36200 PR PLACE CATH AORTA: ICD-10-PCS | Mod: RT,,, | Performed by: INTERNAL MEDICINE

## 2023-05-03 PROCEDURE — 75716 ARTERY X-RAYS ARMS/LEGS: CPT | Performed by: INTERNAL MEDICINE

## 2023-05-03 PROCEDURE — 75716 PR  ANGIO EXTERMITY BILAT: ICD-10-PCS | Mod: 26,,, | Performed by: INTERNAL MEDICINE

## 2023-05-03 PROCEDURE — 85025 COMPLETE CBC W/AUTO DIFF WBC: CPT | Performed by: INTERNAL MEDICINE

## 2023-05-03 PROCEDURE — 75625 CONTRAST EXAM ABDOMINL AORTA: CPT | Mod: 26,,, | Performed by: INTERNAL MEDICINE

## 2023-05-03 PROCEDURE — C1894 INTRO/SHEATH, NON-LASER: HCPCS | Performed by: INTERNAL MEDICINE

## 2023-05-03 PROCEDURE — 75625 PR  ANGIO AORTOGRAM ABD SERIAL: ICD-10-PCS | Mod: 26,,, | Performed by: INTERNAL MEDICINE

## 2023-05-03 PROCEDURE — 84100 ASSAY OF PHOSPHORUS: CPT | Performed by: NURSE PRACTITIONER

## 2023-05-03 PROCEDURE — 85730 THROMBOPLASTIN TIME PARTIAL: CPT | Performed by: STUDENT IN AN ORGANIZED HEALTH CARE EDUCATION/TRAINING PROGRAM

## 2023-05-03 PROCEDURE — 11000001 HC ACUTE MED/SURG PRIVATE ROOM

## 2023-05-03 PROCEDURE — 80053 COMPREHEN METABOLIC PANEL: CPT | Performed by: NURSE PRACTITIONER

## 2023-05-03 PROCEDURE — 36200 PLACE CATHETER IN AORTA: CPT | Mod: RT,,, | Performed by: INTERNAL MEDICINE

## 2023-05-03 PROCEDURE — 99153 MOD SED SAME PHYS/QHP EA: CPT | Performed by: INTERNAL MEDICINE

## 2023-05-03 PROCEDURE — 85610 PROTHROMBIN TIME: CPT | Performed by: INTERNAL MEDICINE

## 2023-05-03 PROCEDURE — 75625 CONTRAST EXAM ABDOMINL AORTA: CPT | Performed by: INTERNAL MEDICINE

## 2023-05-03 PROCEDURE — 36415 COLL VENOUS BLD VENIPUNCTURE: CPT | Performed by: STUDENT IN AN ORGANIZED HEALTH CARE EDUCATION/TRAINING PROGRAM

## 2023-05-03 PROCEDURE — 75716 ARTERY X-RAYS ARMS/LEGS: CPT | Mod: 26,,, | Performed by: INTERNAL MEDICINE

## 2023-05-03 PROCEDURE — C1887 CATHETER, GUIDING: HCPCS | Performed by: INTERNAL MEDICINE

## 2023-05-03 PROCEDURE — 99900035 HC TECH TIME PER 15 MIN (STAT)

## 2023-05-03 PROCEDURE — 99152 MOD SED SAME PHYS/QHP 5/>YRS: CPT | Performed by: INTERNAL MEDICINE

## 2023-05-03 PROCEDURE — 63600175 PHARM REV CODE 636 W HCPCS: Performed by: EMERGENCY MEDICINE

## 2023-05-03 PROCEDURE — 99152 PR MOD CONSCIOUS SEDATION, SAME PHYS, 5+ YRS, FIRST 15 MIN: ICD-10-PCS | Mod: ,,, | Performed by: INTERNAL MEDICINE

## 2023-05-03 PROCEDURE — 94761 N-INVAS EAR/PLS OXIMETRY MLT: CPT

## 2023-05-03 PROCEDURE — 85347 COAGULATION TIME ACTIVATED: CPT | Performed by: INTERNAL MEDICINE

## 2023-05-03 PROCEDURE — 63600175 PHARM REV CODE 636 W HCPCS: Performed by: INTERNAL MEDICINE

## 2023-05-03 PROCEDURE — 36415 COLL VENOUS BLD VENIPUNCTURE: CPT | Performed by: INTERNAL MEDICINE

## 2023-05-03 PROCEDURE — 85730 THROMBOPLASTIN TIME PARTIAL: CPT | Mod: 91 | Performed by: INTERNAL MEDICINE

## 2023-05-03 PROCEDURE — C1769 GUIDE WIRE: HCPCS | Performed by: INTERNAL MEDICINE

## 2023-05-03 PROCEDURE — 25000003 PHARM REV CODE 250: Performed by: INTERNAL MEDICINE

## 2023-05-03 PROCEDURE — 83735 ASSAY OF MAGNESIUM: CPT | Performed by: NURSE PRACTITIONER

## 2023-05-03 PROCEDURE — 27000221 HC OXYGEN, UP TO 24 HOURS

## 2023-05-03 PROCEDURE — 25000003 PHARM REV CODE 250: Performed by: STUDENT IN AN ORGANIZED HEALTH CARE EDUCATION/TRAINING PROGRAM

## 2023-05-03 PROCEDURE — 36200 PLACE CATHETER IN AORTA: CPT | Performed by: INTERNAL MEDICINE

## 2023-05-03 RX ORDER — HEPARIN SODIUM,PORCINE/D5W 25000/250
14 INTRAVENOUS SOLUTION INTRAVENOUS CONTINUOUS
Status: DISCONTINUED | OUTPATIENT
Start: 2023-05-03 | End: 2023-05-04

## 2023-05-03 RX ORDER — FENTANYL CITRATE 50 UG/ML
INJECTION, SOLUTION INTRAMUSCULAR; INTRAVENOUS
Status: DISCONTINUED | OUTPATIENT
Start: 2023-05-03 | End: 2023-05-03 | Stop reason: HOSPADM

## 2023-05-03 RX ORDER — NIFEDIPINE 60 MG/1
60 TABLET, EXTENDED RELEASE ORAL DAILY
Status: DISCONTINUED | OUTPATIENT
Start: 2023-05-03 | End: 2023-05-06 | Stop reason: HOSPADM

## 2023-05-03 RX ORDER — HEPARIN SODIUM 200 [USP'U]/100ML
INJECTION, SOLUTION INTRAVENOUS
Status: COMPLETED | OUTPATIENT
Start: 2023-05-03 | End: 2023-05-03

## 2023-05-03 RX ORDER — MIDAZOLAM HYDROCHLORIDE 1 MG/ML
INJECTION, SOLUTION INTRAMUSCULAR; INTRAVENOUS
Status: DISCONTINUED | OUTPATIENT
Start: 2023-05-03 | End: 2023-05-03 | Stop reason: HOSPADM

## 2023-05-03 RX ORDER — CLOPIDOGREL BISULFATE 75 MG/1
75 TABLET ORAL DAILY
Status: DISCONTINUED | OUTPATIENT
Start: 2023-05-03 | End: 2023-05-06

## 2023-05-03 RX ORDER — ATORVASTATIN CALCIUM 40 MG/1
80 TABLET, FILM COATED ORAL DAILY
Status: DISCONTINUED | OUTPATIENT
Start: 2023-05-04 | End: 2023-05-06 | Stop reason: HOSPADM

## 2023-05-03 RX ORDER — LIDOCAINE HYDROCHLORIDE 10 MG/ML
INJECTION, SOLUTION EPIDURAL; INFILTRATION; INTRACAUDAL; PERINEURAL
Status: DISCONTINUED | OUTPATIENT
Start: 2023-05-03 | End: 2023-05-03 | Stop reason: HOSPADM

## 2023-05-03 RX ORDER — HEPARIN SODIUM,PORCINE/D5W 25000/250
12 INTRAVENOUS SOLUTION INTRAVENOUS CONTINUOUS
Status: DISCONTINUED | OUTPATIENT
Start: 2023-05-03 | End: 2023-05-03

## 2023-05-03 RX ORDER — VERAPAMIL HYDROCHLORIDE 2.5 MG/ML
INJECTION, SOLUTION INTRAVENOUS
Status: DISCONTINUED | OUTPATIENT
Start: 2023-05-03 | End: 2023-05-03 | Stop reason: HOSPADM

## 2023-05-03 RX ORDER — MUPIROCIN 20 MG/G
OINTMENT TOPICAL 2 TIMES DAILY
Status: DISCONTINUED | OUTPATIENT
Start: 2023-05-03 | End: 2023-05-06 | Stop reason: HOSPADM

## 2023-05-03 RX ORDER — HEPARIN SODIUM 1000 [USP'U]/ML
INJECTION, SOLUTION INTRAVENOUS; SUBCUTANEOUS
Status: DISCONTINUED | OUTPATIENT
Start: 2023-05-03 | End: 2023-05-03 | Stop reason: HOSPADM

## 2023-05-03 RX ORDER — NAPROXEN SODIUM 220 MG/1
81 TABLET, FILM COATED ORAL DAILY
Status: DISCONTINUED | OUTPATIENT
Start: 2023-05-03 | End: 2023-05-06 | Stop reason: HOSPADM

## 2023-05-03 RX ADMIN — HYDROCODONE BITARTRATE AND ACETAMINOPHEN 1 TABLET: 5; 325 TABLET ORAL at 11:05

## 2023-05-03 RX ADMIN — SODIUM BICARBONATE 650 MG TABLET 650 MG: at 08:05

## 2023-05-03 RX ADMIN — NIFEDIPINE 60 MG: 60 TABLET, FILM COATED, EXTENDED RELEASE ORAL at 08:05

## 2023-05-03 RX ADMIN — CARVEDILOL 25 MG: 25 TABLET, FILM COATED ORAL at 09:05

## 2023-05-03 RX ADMIN — CARVEDILOL 25 MG: 25 TABLET, FILM COATED ORAL at 08:05

## 2023-05-03 RX ADMIN — MUPIROCIN: 20 OINTMENT TOPICAL at 08:05

## 2023-05-03 RX ADMIN — FUROSEMIDE 20 MG: 20 TABLET ORAL at 08:05

## 2023-05-03 RX ADMIN — ASPIRIN 81 MG: 81 TABLET, CHEWABLE ORAL at 02:05

## 2023-05-03 RX ADMIN — ATORVASTATIN CALCIUM 40 MG: 40 TABLET, FILM COATED ORAL at 08:05

## 2023-05-03 RX ADMIN — HEPARIN SODIUM 14 UNITS/KG/HR: 10000 INJECTION, SOLUTION INTRAVENOUS at 12:05

## 2023-05-03 RX ADMIN — HYDRALAZINE HYDROCHLORIDE 50 MG: 25 TABLET, FILM COATED ORAL at 08:05

## 2023-05-03 RX ADMIN — HEPARIN SODIUM 14 UNITS/KG/HR: 10000 INJECTION, SOLUTION INTRAVENOUS at 01:05

## 2023-05-03 RX ADMIN — CLOPIDOGREL BISULFATE 75 MG: 75 TABLET ORAL at 02:05

## 2023-05-03 NOTE — PLAN OF CARE
2 mL of air removed from radial vasc band.  No hematoma or bleeding noted.  +2 tip radial pulses palpated. Skin normal in color, warm to touch, < 3 sec cap refill.   Will continue to monitor pt.

## 2023-05-03 NOTE — PLAN OF CARE
Patient transferred to recovery cath lab slot 2 via stretcher with side rails up x2 .  Pt AAO X 4 and able to follow commands. Pt is stable when connecting to cardiac monitors.  VSS. Left radial vasc band in place with 10ml of air in band c.d.i. no bleeding or hematoma noted. Bilateral groin site with guaze and tegaderm. +2 tip radial pulses palpated. +2 tip pedal pulses.Skin normal in color and warm to touch, <3 sec cap refill.  Fall risk precautions given and patient acknowledges.  AIDET completed to pt.  Will continue to monitor patient.

## 2023-05-03 NOTE — ASSESSMENT & PLAN NOTE
Pain with increased claudication unknown duration, at least a year. Now with absent pulse and R foot discoloration   On ASA, plavix, heparin gtt   No signs of infection   Consult wound care for small skin break   Cardiology planning peripheral intervention tomorrow   NPO after MN   General surgery consulted   PRN pain regime   Imaging reviewed: Lower extremity peripheral vascular disease with velocity parameters as above.  Majority monophasic waveforms throughout as can be seen with vessel hardening related to atherosclerosis versus more central, upstream stenosis.     Velocity elevation at the left deep profundus artery as can be seen with sequela of hemodynamically significant stenosis.     Limited color visualization with relative reduced flow velocity at the left proximal SFA.      Bones are demineralized.  Dorsal and plantar calcaneal enthesopathic spurring is present.  No definite fracture or effusion.  Prominent atherosclerotic vascular calcifications    5/3  S/p peripheral angiogram    Aorta: Distal aneurysmal segment     Right (views are not dedicated as unable to cross up and over from L-CFA)  Common iliaic artery: Prior stent with severe ISR. 95-99% stenosis distal to the stent  External iliac artery: 70-80% stenosis  Common femoral artery: Occluded  Superficial femoral artery: Possibly occluded. Possible reconstitution distal SFA with flow into the popliteal artery  Profunda: Flow present  Flow noted in the AT and peroneal; not well visualized. Daniel patent     Left  Common iliaic artery: Prior stent with ISR, 80% at the proximal segment  External iliac artery: Mild-moderate diffuse disease  Common femoral artery: Distal CFA 99% occluded  Superficial femoral artery: Occluded, reconstitutes via profunda distal segment  Profunda: Patent  Popliteal artery: Patent  AT: Normal  PT Trunk: Normal  PT: Normal  Peroneal: Diminutive vessel  DP: Fills likely via collaterals (not well  visualized)     Recommendations  - Restart heparin gtt, titrate per PTT  - Aspirin, clopidogrel  - High intensity statin

## 2023-05-03 NOTE — NURSING
Heparin drip re-started on arrival from cath lab at 14 units/kg/hr per order. APTT ordered to be drawn at 1900.

## 2023-05-03 NOTE — BRIEF OP NOTE
Procedure: Peripheral angiogram lower extremities b/l, CO2 angiography  Access: Right radial artery --> L-CFA (unable to access R-CFA)  Indication: Right foot pain    Findings  Aorta: Distal aneurysmal segment    Right (views are not dedicated as unable to cross up and over from L-CFA)  Common iliaic artery: Prior stent with severe ISR. 95-99% stenosis distal to the stent  External iliac artery: 70-80% stenosis  Common femoral artery: Occluded  Superficial femoral artery: Possibly occluded. Possible reconstitution distal SFA with flow into the popliteal artery  Profunda: Flow present  Flow noted in the AT and peroneal; not well visualized. Daniel patent    Left  Common iliaic artery: Prior stent with ISR, 80% at the proximal segment  External iliac artery: Mild-moderate diffuse disease  Common femoral artery: Distal CFA 99% occluded  Superficial femoral artery: Occluded, reconstitutes via profunda distal segment  Profunda: Patent  Popliteal artery: Patent  AT: Normal  PT Trunk: Normal  PT: Normal  Peroneal: Diminutive vessel  DP: Fills likely via collaterals (not well visualized)    Recommendations  - Restart heparin gtt, titrate per PTT  - Aspirin, clopidogrel  - High intensity statin    Updated daughter Lety over the phone  Discussed with Dr Lorenzo

## 2023-05-03 NOTE — ASSESSMENT & PLAN NOTE
Patient reports treated with radiation and chemotherapy 6 years ago and was discharged from clinic

## 2023-05-03 NOTE — PLAN OF CARE
Patient transferred to floor on tele monitor.  VSS. No c/o pain.   Patient attached to tele monitor upon arrival in room 456.  Right groin, left groin, and right wrist with gauze/tegaderm CDI. No bleeding or hematoma noted. Site reviewed with ALVARO Singer at bedside.  All questions answered.

## 2023-05-03 NOTE — PLAN OF CARE
Remaining air removed from right radial vasc band. Gauze and tegaderm dressing applied c.d.i.   No drainage or shadowing noted. No bleeding or hematoma noted around site. +2 tip radial pulses palpated. Skin normal in color, warm to touch, < 3 sec cap refill. Pt tolerated well.  Will continue to monitor pt.

## 2023-05-03 NOTE — ASSESSMENT & PLAN NOTE
Patient with known CAD s/p unknown coronary anatomy, which is controlled Will continue ASA, Plavix and Statin and monitor for S/Sx of angina/ACS. Continue to monitor on telemetry.

## 2023-05-03 NOTE — PROGRESS NOTES
"[facility] Medicine  Progress Note    Patient Name: Steph Monroe  MRN: [id number]  Patient Class: IP- Inpatient   Admission Date: 5/2/2023  Length of Stay: 1 days  Attending Physician: Saige Arce MD  Primary Care Provider: Mane Carmona MD        Subjective:     Principal Problem:Critical limb ischemia of right lower extremity        HPI:  78 yo female with a PMH of rectal CA, HCC, CKD IV, DM II, HLD, HTN, PVD, s/p nephrectomy, HFpEF, former smoker 10 years ago, presents with RLE walking pain progressively worse for atleast the past year. The distances she can walk have become progressively shorter. She can now only walk around her home before the RLE pain is significant. The pain is worse with walking, but occurs at rest, is intermittent, is shooting, and is rated 6/10. The R foot is also purple. She notes a small scab to her R heel. The only home medications she is taking is lasix and na bicarbonate. She quit all of her other medications about a year ago because she "no longer felt like taking all kind of pills".  She states she unintentionally lost 56 pounds in the past year. She denies HA, vision changes, dizziness, lightheadedness, CP, SOB, abdominal pain, n/v/d/f/c/c, urinary changes, LE edema, ETOH, tobacco, illicit drugs. She lives at home with family.       Overview/Hospital Course:  No notes on file    Interval hx: patient with intermittent tenderness to RLE with ischemic limb. Peripheral angio gram today.       Review of Systems  Objective:     Vital Signs (Most Recent):  Temp: 97 °F (36.1 °C) (05/03/23 1030)  Pulse: 72 (05/03/23 1230)  Resp: 16 (05/03/23 1230)  BP: (!) 101/52 (05/03/23 1230)  SpO2: (!) 93 % (05/03/23 1230)   Vital Signs (24h Range):  Temp:  [96.1 °F (35.6 °C)-97.8 °F (36.6 °C)] 97 °F (36.1 °C)  Pulse:  [70-90] 72  Resp:  [14-20] 16  SpO2:  [93 %-99 %] 93 %  BP: (101-195)/(52-82) 101/52     Weight: 61.3 kg (135 lb 2.3 oz)  Body mass index is 24.72 " kg/m².    Physical Exam  Constitutional:       Appearance: Normal appearance.   HENT:      Head: Normocephalic and atraumatic.      Nose: Nose normal.      Mouth/Throat:      Mouth: Mucous membranes are moist.      Pharynx: Oropharynx is clear.   Eyes:      Extraocular Movements: Extraocular movements intact.      Conjunctiva/sclera: Conjunctivae normal.   Cardiovascular:      Rate and Rhythm: Normal rate and regular rhythm.      Heart sounds: Normal heart sounds.      Comments: Absent pedal pulses   LLE and foot warm   R foot purple and cold   Pulmonary:      Effort: Pulmonary effort is normal.      Breath sounds: Normal breath sounds.   Abdominal:      General: Abdomen is flat. Bowel sounds are normal.      Palpations: Abdomen is soft.   Musculoskeletal:         General: Tenderness present. No swelling. Normal range of motion.      Cervical back: Normal range of motion and neck supple.      Comments: RLE tender    Skin:     General: Skin is warm and dry.   Neurological:      General: No focal deficit present.      Mental Status: She is alert and oriented to person, place, and time.           Significant Labs: All pertinent labs within the past 24 hours have been reviewed.    Significant Imaging: I have reviewed all pertinent imaging results/findings within the past 24 hours.      Assessment/Plan:      * Critical limb ischemia of right lower extremity    Pain with increased claudication unknown duration, at least a year. Now with absent pulse and R foot discoloration   On ASA, plavix, heparin gtt   No signs of infection   Consult wound care for small skin break   Cardiology planning peripheral intervention tomorrow   NPO after MN   General surgery consulted   PRN pain regime   Imaging reviewed: Lower extremity peripheral vascular disease with velocity parameters as above.  Majority monophasic waveforms throughout as can be seen with vessel hardening related to atherosclerosis versus more central, upstream  stenosis.     Velocity elevation at the left deep profundus artery as can be seen with sequela of hemodynamically significant stenosis.     Limited color visualization with relative reduced flow velocity at the left proximal SFA.      Bones are demineralized.  Dorsal and plantar calcaneal enthesopathic spurring is present.  No definite fracture or effusion.  Prominent atherosclerotic vascular calcifications    5/3  S/p peripheral angiogram    Aorta: Distal aneurysmal segment     Right (views are not dedicated as unable to cross up and over from L-CFA)  Common iliaic artery: Prior stent with severe ISR. 95-99% stenosis distal to the stent  External iliac artery: 70-80% stenosis  Common femoral artery: Occluded  Superficial femoral artery: Possibly occluded. Possible reconstitution distal SFA with flow into the popliteal artery  Profunda: Flow present  Flow noted in the AT and peroneal; not well visualized. Daniel patent     Left  Common iliaic artery: Prior stent with ISR, 80% at the proximal segment  External iliac artery: Mild-moderate diffuse disease  Common femoral artery: Distal CFA 99% occluded  Superficial femoral artery: Occluded, reconstitutes via profunda distal segment  Profunda: Patent  Popliteal artery: Patent  AT: Normal  PT Trunk: Normal  PT: Normal  Peroneal: Diminutive vessel  DP: Fills likely via collaterals (not well visualized)     Recommendations  - Restart heparin gtt, titrate per PTT  - Aspirin, clopidogrel  - High intensity statin    Cirrhosis    HCC  Per chart review: -carlton continues between oncologist and GI; PET scan without malignancy  -last paracentesis 11/2022 - gets albumin replacement  -Pt stopped all meds 11/2022 - she feels better and she has noticed that her ascitic fluid accumulation has not returned as quickly compared to when she was on medications  -cytology neg from ascitic fluid - low yield; follows with Yovanny      Pulmonary emphysema  Lungs clear   PRN duoneb ordered      Chronic heart failure with preserved ejection fraction    Aortic Stenosis   Pulmonary HTN     Patient is identified as having Diastolic (HFpEF) heart failure that is Chronic. CHF is currently controlled. Latest ECHO performed and demonstrates- No results found for this or any previous visit.  . Continue Beta Blocker Furosemide and monitor clinical status closely. Monitor on telemetry. Patient is off CHF pathway.  Monitor strict Is&Os and daily weights.  Place on fluid restriction of 1.5 L. Continue to stress to patient importance of self efficacy and  on diet for CHF. Last BNP reviewed- and noted below No results for input(s): BNP, BNPTRIAGEBLO in the last 168 hours..      -repeat 2d echo     Stage 4 chronic kidney disease    Hx of nephrectomy   Follows nephrology op     etiology include HTN/DM/microvascular Dz/nephrectomy; BL 1.8-2.0      History of rectal or anal cancer    Patient reports treated with radiation and chemotherapy 6 years ago and was discharged from clinic     Coronary artery disease involving native coronary artery of native heart without angina pectoris    Patient with known CAD s/p unknown coronary anatomy, which is controlled Will continue ASA, Plavix and Statin and monitor for S/Sx of angina/ACS. Continue to monitor on telemetry.       PVD (peripheral vascular disease)    PAD   Hx of iliac, renal, femoral stents   See critical limb     HLD (hyperlipidemia)  High intensity statin   Lab Results   Component Value Date    LDLCALC 94.4 05/02/2023         Essential hypertension  Hypertensive Urgency  PRN IV hydralazine   Resume lasix   Coreg  PO hydralazine   PO nifedipine   Patient has not taken home BP medications in atleast a year   Cardiac monitor     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 hypertension  Patient's FSGs are controlled on current medication regimen.  Last A1c reviewed-   Lab Results   Component Value Date    HGBA1C 6.3 (H) 05/26/2016     Most recent fingerstick glucose reviewed-    Recent Labs   Lab 05/03/23  0030 05/03/23  0820   POCTGLUCOSE 98 75     Current correctional scale  Low  Maintain anti-hyperglycemic dose as follows-   Antihyperglycemics (From admission, onward)    Start     Stop Route Frequency Ordered    05/02/23 1346  insulin aspart U-100 pen 0-5 Units         -- SubQ Every 6 hours PRN 05/02/23 1246        Hold Oral hypoglycemics while patient is in the hospital.      VTE Risk Mitigation (From admission, onward)         Ordered     heparin 25,000 units in dextrose 5% 250 mL (100 units/mL) infusion HIGH INTENSITY nomogram - [facility]  Continuous        Question Answer Comment   Heparin Infusion Adjustment (DO NOT MODIFY ANSWER) \\[email or web address]\epic\Images\Pharmacy\HeparinInfusions\heparin HIGH INTENSITY nomogram for [facility] [id number].pdf    Begin at (in units/kg/hr) 14        05/03/23 1034     heparin 25,000 units in dextrose 5% (100 units/ml) IV bolus from bag - ADDITIONAL PRN BOLUS - 60 units/kg  As needed (PRN)        Question:  Heparin Infusion Adjustment (DO NOT MODIFY ANSWER)  Answer:  \\[email or web address]\epic\Images\Pharmacy\HeparinInfusions\heparin HIGH INTENSITY nomogram for [facility] [id number].pdf    05/03/23 1009     heparin 25,000 units in dextrose 5% (100 units/ml) IV bolus from bag - ADDITIONAL PRN BOLUS - 30 units/kg  As needed (PRN)        Question:  Heparin Infusion Adjustment (DO NOT MODIFY ANSWER)  Answer:  \\[email or web address]\epic\Images\Pharmacy\HeparinInfusions\heparin HIGH INTENSITY nomogram for [facility] [id number].pdf    05/03/23 1009     heparin (porcine) injection  As needed (PRN)         05/03/23 0913     heparin infusion 1,000 units/500 ml in 0.9% NaCl (pressure line flush)  Intra-op continuous PRN         05/03/23 0856     Reason for No Pharmacological VTE Prophylaxis  Once        Question:  Reasons:  Answer:  IV Heparin w/in 24 hrs. Pre or Post-Op    05/02/23 1237     IP VTE HIGH RISK PATIENT  Once         05/02/23 1237     Place sequential compression device  Until discontinued          05/02/23 1237                Discharge Planning   PÉREZ:      Code Status: Full Code   Is the patient medically ready for discharge?:     Reason for patient still in hospital (select all that apply): Patient trending condition                     Aspen Pagan NP  Department of Hospital Medicine   [facility] ([facility])

## 2023-05-03 NOTE — CONSULTS
Today`s Date: 5/3/2023     Admit Date: 5/2/2023    Admitting Physician: Saige Arce MD    Patient`s Name: Steph Monroe , 77 y.o. 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 hypertension     Essential hypertension     HLD (hyperlipidemia)     PVD (peripheral vascular disease)     Coronary artery disease involving native coronary artery of native heart without angina pectoris     History of rectal or anal cancer     Arm swelling     Swelling of limb     Swelling     Weakness     Neutropenic fever     Decubitus ulcer of sacral region, stage 2     Debility     Stage 4 chronic kidney disease     Critical limb ischemia of right lower extremity     Chronic heart failure with preserved ejection fraction     Pulmonary emphysema     Cirrhosis      Past Medical History:  No date: Cancer      Comment:  rectum  No date: CKD (chronic kidney disease)  No date: Diabetes mellitus, type 2  No date: Hyperlipidemia  No date: Hypertension  No date: PVD (peripheral vascular disease)    Past Surgical History:  3/28/2016: COLONOSCOPY; N/A      Comment:  Procedure: COLONOSCOPY;  Surgeon: Mitul Buitrago Jr., MD;  Location: [facility];  Service: Endoscopy;                 Laterality: N/A;  No date: EYE SURGERY; Left      Comment:  cataract removal with lens implant.  before 2010: FEMORAL ARTERY STENT  2010: RENAL ARTERY STENT    Prior to Admission medications :  Medication furosemide (LASIX) 20 MG tablet, Sig Take 20 mg by mouth once daily., Start Date 1/31/23, End Date , Taking? Yes, Authorizing Provider Historical Provider    Medication sodium bicarbonate 650 MG tablet, Sig Take 650 mg by mouth 2 (two) times daily., Start Date , End Date , Taking? Yes, Authorizing Provider Historical Provider    Medication amLODIPine (NORVASC) 5 MG tablet, Sig Take 1 tablet by mouth once daily., Start  Date 1/18/23, End Date , Taking? , Authorizing Provider Historical Provider    Medication atorvastatin (LIPITOR) 40 MG tablet, Sig Take 1 tablet by mouth once daily., Start Date 4/19/23, End Date , Taking? , Authorizing Provider Historical Provider    Medication bumetanide (BUMEX) 1 MG tablet, Sig Take 1 mg by mouth 2 (two) times a day., Start Date 12/3/22, End Date , Taking? , Authorizing Provider Historical Provider    Medication carvediloL (COREG) 25 MG tablet, Sig Take 1 tablet by mouth 2 (two) times daily., Start Date 4/24/23, End Date , Taking? , Authorizing Provider Historical Provider    Medication clopidogreL (PLAVIX) 75 mg tablet, Sig Take 75 mg by mouth once daily., Start Date 7/27/22, End Date , Taking? , Authorizing Provider Historical Provider    Medication ezetimibe (ZETIA) 10 mg tablet, Sig Take 10 mg by mouth once daily., Start Date 3/29/23, End Date , Taking? , Authorizing Provider Historical Provider    Medication hydrALAZINE (APRESOLINE) 25 MG tablet, Sig Take 2 tablets by mouth 2 (two) times daily., Start Date 2/10/23, End Date , Taking? , Authorizing Provider Historical Provider      No current facility-administered medications on file prior to encounter.  Current Outpatient Medications on File Prior to Encounter:  furosemide (LASIX) 20 MG tablet, Take 20 mg by mouth once daily., Disp: , Rfl:   sodium bicarbonate 650 MG tablet, Take 650 mg by mouth 2 (two) times daily., Disp: , Rfl:   amLODIPine (NORVASC) 5 MG tablet, Take 1 tablet by mouth once daily., Disp: , Rfl:   atorvastatin (LIPITOR) 40 MG tablet, Take 1 tablet by mouth once daily., Disp: , Rfl:   bumetanide (BUMEX) 1 MG tablet, Take 1 mg by mouth 2 (two) times a day., Disp: , Rfl:   carvediloL (COREG) 25 MG tablet, Take 1 tablet by mouth 2 (two) times daily., Disp: , Rfl:   clopidogreL (PLAVIX) 75 mg tablet, Take 75 mg by mouth once daily., Disp: , Rfl:   ezetimibe (ZETIA) 10 mg tablet, Take 10 mg by mouth once daily., Disp: , Rfl:    hydrALAZINE (APRESOLINE) 25 MG tablet, Take 2 tablets by mouth 2 (two) times daily., Disp: , Rfl:          Review of patient's allergies indicates:   -- Chantix [varenicline]     --  Tongue swelling   -- Wellbutrin [bupropion hcl]     --  Tongue swelling    Social History:   reports that she quit smoking about 9 years ago. Her smoking use included cigarettes. She has a 67.50 pack-year smoking history. She has never been exposed to tobacco smoke. She has never used smokeless tobacco. She reports that she does not drink alcohol and does not use drugs.     Review of patient's family history indicates:      PHYSICAL EXAMINATION  Temp:  [96.1 °F (35.6 °C)-97.8 °F (36.6 °C)] 97.3 °F (36.3 °C)  Pulse:  [68-90] 68  Resp:  [14-18] 18  SpO2:  [93 %-99 %] 99 %  BP: (101-182)/(52-77) 129/59    General Condition:   Alert x 3    Head & Neck  Anemia: None  Jaundice: None  Neck vein: Not distended  Carotid Bruits: none  Lymph nodes: none palpable  Thyroid: normal    Chest: normal    Heart: normal    Rt. Breast: not examined  Lt. Breast: not examined  Axillary lymph nodes: none    Abdomen: Soft,  None tender with no palpable mass or organ  Hernia: none    Rectal: Defered    Extremities: normal    Vascular: normal    Specific focus Examination    Imp: ischemic right foot with advanced pvd , no palpable pulse, htn, dm    Plan: agree with w/u and vascular intervention per angiogram findings

## 2023-05-03 NOTE — ASSESSMENT & PLAN NOTE
Aortic Stenosis   Pulmonary HTN     Patient is identified as having Diastolic (HFpEF) heart failure that is Chronic. CHF is currently controlled. Latest ECHO performed and demonstrates- No results found for this or any previous visit.  . Continue Beta Blocker Furosemide and monitor clinical status closely. Monitor on telemetry. Patient is off CHF pathway.  Monitor strict Is&Os and daily weights.  Place on fluid restriction of 1.5 L. Continue to stress to patient importance of self efficacy and  on diet for CHF. Last BNP reviewed- and noted below No results for input(s): BNP, BNPTRIAGEBLO in the last 168 hours..      -repeat 2d echo

## 2023-05-03 NOTE — PLAN OF CARE
Problem: Adult Inpatient Plan of Care  Goal: Plan of Care Review  Outcome: Ongoing, Progressing  Goal: Optimal Comfort and Wellbeing  Outcome: Ongoing, Progressing  Goal: Readiness for Transition of Care  Outcome: Ongoing, Progressing     Problem: Diabetes Comorbidity  Goal: Blood Glucose Level Within Targeted Range  Outcome: Ongoing, Progressing     Problem: Bleeding (Revascularization)  Goal: Absence of Bleeding  Outcome: Ongoing, Progressing     Problem: Pain (Revascularization)  Goal: Acceptable Pain Control  Outcome: Ongoing, Progressing     Problem: Hypertension Comorbidity  Goal: Blood Pressure in Desired Range  Outcome: Ongoing, Progressing

## 2023-05-03 NOTE — ASSESSMENT & PLAN NOTE
Hypertensive Urgency  PRN IV hydralazine   Resume lasix   Coreg  PO hydralazine   PO nifedipine   Patient has not taken home BP medications in atleast a year   Cardiac monitor

## 2023-05-03 NOTE — PLAN OF CARE
Recommendation:   1. Encourage intake at meals as tolerated   2. Add Boost Glucose Control 1x daily.   3. Monitor weight/labs.   4. RD to follow to monitor po intake    Goals:  Pt will tolerate diet with at least 50-75% intake at meals by RD follow up  Nutrition Goal Status: new

## 2023-05-03 NOTE — ASSESSMENT & PLAN NOTE
Patient's FSGs are controlled on current medication regimen.  Last A1c reviewed-   Lab Results   Component Value Date    HGBA1C 6.3 (H) 05/26/2016     Most recent fingerstick glucose reviewed-   Recent Labs   Lab 05/03/23  0030 05/03/23  0820   POCTGLUCOSE 98 75     Current correctional scale  Low  Maintain anti-hyperglycemic dose as follows-   Antihyperglycemics (From admission, onward)    Start     Stop Route Frequency Ordered    05/02/23 1346  insulin aspart U-100 pen 0-5 Units         -- SubQ Every 6 hours PRN 05/02/23 1246        Hold Oral hypoglycemics while patient is in the hospital.

## 2023-05-03 NOTE — NURSING
Verification of PTT sent as hard copy from lab via tube system. Unable to obtain via epic or fax. Per nomogram heparin infusion paused for two hours and provider notified. Isbael Armstrong NP messaged via secure chat.

## 2023-05-03 NOTE — SUBJECTIVE & OBJECTIVE
Interval hx: patient with intermittent tenderness to RLE with ischemic limb. Peripheral angio gram today.       Review of Systems  Objective:     Vital Signs (Most Recent):  Temp: 97 °F (36.1 °C) (05/03/23 1030)  Pulse: 72 (05/03/23 1230)  Resp: 16 (05/03/23 1230)  BP: (!) 101/52 (05/03/23 1230)  SpO2: (!) 93 % (05/03/23 1230)   Vital Signs (24h Range):  Temp:  [96.1 °F (35.6 °C)-97.8 °F (36.6 °C)] 97 °F (36.1 °C)  Pulse:  [70-90] 72  Resp:  [14-20] 16  SpO2:  [93 %-99 %] 93 %  BP: (101-195)/(52-82) 101/52     Weight: 61.3 kg (135 lb 2.3 oz)  Body mass index is 24.72 kg/m².    Physical Exam  Constitutional:       Appearance: Normal appearance.   HENT:      Head: Normocephalic and atraumatic.      Nose: Nose normal.      Mouth/Throat:      Mouth: Mucous membranes are moist.      Pharynx: Oropharynx is clear.   Eyes:      Extraocular Movements: Extraocular movements intact.      Conjunctiva/sclera: Conjunctivae normal.   Cardiovascular:      Rate and Rhythm: Normal rate and regular rhythm.      Heart sounds: Normal heart sounds.      Comments: Absent pedal pulses   LLE and foot warm   R foot purple and cold   Pulmonary:      Effort: Pulmonary effort is normal.      Breath sounds: Normal breath sounds.   Abdominal:      General: Abdomen is flat. Bowel sounds are normal.      Palpations: Abdomen is soft.   Musculoskeletal:         General: Tenderness present. No swelling. Normal range of motion.      Cervical back: Normal range of motion and neck supple.      Comments: RLE tender    Skin:     General: Skin is warm and dry.   Neurological:      General: No focal deficit present.      Mental Status: She is alert and oriented to person, place, and time.           Significant Labs: All pertinent labs within the past 24 hours have been reviewed.    Significant Imaging: I have reviewed all pertinent imaging results/findings within the past 24 hours.

## 2023-05-03 NOTE — CONSULTS
"  [facility] - Telemetry  Adult Nutrition  Consult Note    SUMMARY     Recommendations    Recommendation:   1. Encourage intake at meals as tolerated   2. Add Boost Glucose Control 1x daily.   3. Monitor weight/labs.   4. RD to follow to monitor po intake    Goals:  Pt will tolerate diet with at least 50-75% intake at meals by RD follow up  Nutrition Goal Status: new  Communication of RD Recs: reviewed with RN    Assessment and Plan  No nutrition dx at this time     Malnutrition Assessment  Unable to assess NFPE 2/2 pt off unit at visit      Reason for Assessment  Reason For Assessment: consult (poor nutrition)  Diagnosis:  (critical limb ischemia RLE)  Relevant Medical History: HTN, DM, HLD, PVD, rectum cancer  General Information Comments: Pt on Cardiac consistent carb diet with 1500ml fluid restriction. Noted ~50% intake at meals. Pt s/p cath lab today. Pt was off unit at visit-unable to assess NFPE. Francesco 17-skin intact. No recent weight loss noted.  Nutrition Discharge Planning: pt to d/c on Cardiac consistent carb diet    Nutrition Risk Screen  Nutrition Risk Screen: no indicators present    Nutrition/Diet History  Food Preferences: no Restoration or cultural food prefs identified  Factors Affecting Nutritional Intake: None identified at this time    Anthropometrics  Temp: 97.3 °F (36.3 °C)  Height Method: Stated  Height: 5' 2" (157.5 cm)  Height (inches): 62 in  Weight Method: Bed Scale  Weight: 61.2 kg (134 lb 14.7 oz)  Weight (lb): 134.92 lb  Ideal Body Weight (IBW), Female: 110 lb  % Ideal Body Weight, Female (lb): 122.65 %  BMI (Calculated): 24.7  BMI Grade: 18.5-24.9 - normal  Usual Body Weight (UBW), k.2 kg (3/28)  % Usual Body Weight: 107.22  % Weight Change From Usual Weight: 6.99 %     Lab/Procedures/Meds  Pertinent Labs Reviewed: reviewed  Pertinent Labs Comments: BUN 60H,  Crea 2.1H, Phos 4.6H, Alb 3.0L  Pertinent Medications Reviewed: reviewed  Pertinent Medications Comments: carvedilol, " Lasix    Estimated/Assessed Needs  Weight Used For Calorie Calculations: 61.2 kg (134 lb 14.7 oz)  Energy Calorie Requirements (kcal): 1836 (30 kcal/kg)  Energy Need Method: Kcal/kg  Protein Requirements: 61g (1.0g/kg)  Weight Used For Protein Calculations: 61.2 kg (134 lb 14.7 oz)  Estimated Fluid Requirement Method: RDA Method  RDA Method (mL): 1836     Nutrition Prescription Ordered  Current Diet Order: Cardiac consistent carb    Evaluation of Received Nutrient/Fluid Intake  I/O: none recorded  Energy Calories Required: not meeting needs  Protein Required: not meeting needs  Fluid Required: not meeting needs  Comments: LBM 5/1  % Intake of Estimated Energy Needs: 50 - 75 %  % Meal Intake: 50 - 75 %    Nutrition Risk  Level of Risk/Frequency of Follow-up:  (2xweekly)     Monitor and Evaluation  Food and Nutrient Intake: food and beverage intake  Food and Nutrient Adminstration: diet order  Physical Activity and Function: nutrition-related ADLs and IADLs  Anthropometric Measurements: weight  Biochemical Data, Medical Tests and Procedures: electrolyte and renal panel  Nutrition-Focused Physical Findings: overall appearance     Nutrition Follow-Up  RD Follow-up?: Yes

## 2023-05-04 LAB
ALBUMIN SERPL BCP-MCNC: 3 G/DL (ref 3.5–5.2)
ALP SERPL-CCNC: 81 U/L (ref 55–135)
ALT SERPL W/O P-5'-P-CCNC: 10 U/L (ref 10–44)
ANION GAP SERPL CALC-SCNC: 13 MMOL/L (ref 8–16)
ANION GAP SERPL CALC-SCNC: 7 MMOL/L (ref 8–16)
APTT PPP: 26.8 SEC (ref 21–32)
APTT PPP: 58.8 SEC (ref 21–32)
AST SERPL-CCNC: 23 U/L (ref 10–40)
AV INDEX (PROSTH): 0.45
AV MEAN GRADIENT: 10 MMHG
AV PEAK GRADIENT: 22 MMHG
AV REGURGITATION PRESSURE HALF TIME: 469.25 MS
AV VALVE AREA: 1.28 CM2
AV VELOCITY RATIO: 0.44
BASOPHILS # BLD AUTO: 0.01 K/UL (ref 0–0.2)
BASOPHILS # BLD AUTO: 0.01 K/UL (ref 0–0.2)
BASOPHILS NFR BLD: 0.2 % (ref 0–1.9)
BASOPHILS NFR BLD: 0.2 % (ref 0–1.9)
BILIRUB SERPL-MCNC: 0.4 MG/DL (ref 0.1–1)
BSA FOR ECHO PROCEDURE: 1.64 M2
BUN SERPL-MCNC: 73 MG/DL (ref 8–23)
BUN SERPL-MCNC: 74 MG/DL (ref 8–23)
CALCIUM SERPL-MCNC: 8.6 MG/DL (ref 8.7–10.5)
CALCIUM SERPL-MCNC: 8.8 MG/DL (ref 8.7–10.5)
CHLORIDE SERPL-SCNC: 114 MMOL/L (ref 95–110)
CHLORIDE SERPL-SCNC: 115 MMOL/L (ref 95–110)
CO2 SERPL-SCNC: 14 MMOL/L (ref 23–29)
CO2 SERPL-SCNC: 21 MMOL/L (ref 23–29)
CREAT SERPL-MCNC: 2.3 MG/DL (ref 0.5–1.4)
CREAT SERPL-MCNC: 2.3 MG/DL (ref 0.5–1.4)
CV ECHO LV RWT: 0.68 CM
DIFFERENTIAL METHOD: ABNORMAL
DIFFERENTIAL METHOD: ABNORMAL
DOP CALC AO PEAK VEL: 2.33 M/S
DOP CALC AO VTI: 56.6 CM
DOP CALC LVOT AREA: 2.8 CM2
DOP CALC LVOT DIAMETER: 1.9 CM
DOP CALC LVOT PEAK VEL: 1.02 M/S
DOP CALC LVOT STROKE VOLUME: 72.26 CM3
DOP CALC MV VTI: 60 CM
DOP CALCLVOT PEAK VEL VTI: 25.5 CM
ECHO LV POSTERIOR WALL: 1.19 CM (ref 0.6–1.1)
EJECTION FRACTION: 65 %
EOSINOPHIL # BLD AUTO: 0 K/UL (ref 0–0.5)
EOSINOPHIL # BLD AUTO: 0 K/UL (ref 0–0.5)
EOSINOPHIL NFR BLD: 0.2 % (ref 0–8)
EOSINOPHIL NFR BLD: 0.7 % (ref 0–8)
ERYTHROCYTE [DISTWIDTH] IN BLOOD BY AUTOMATED COUNT: 19.5 % (ref 11.5–14.5)
ERYTHROCYTE [DISTWIDTH] IN BLOOD BY AUTOMATED COUNT: 19.7 % (ref 11.5–14.5)
EST. GFR  (NO RACE VARIABLE): 22 ML/MIN/1.73 M^2
EST. GFR  (NO RACE VARIABLE): 22 ML/MIN/1.73 M^2
ESTIMATED AVG GLUCOSE: 97 MG/DL (ref 68–131)
FRACTIONAL SHORTENING: 42 % (ref 28–44)
GLUCOSE SERPL-MCNC: 109 MG/DL (ref 70–110)
GLUCOSE SERPL-MCNC: 93 MG/DL (ref 70–110)
HBA1C MFR BLD: 5 % (ref 4–5.6)
HCT VFR BLD AUTO: 30 % (ref 37–48.5)
HCT VFR BLD AUTO: 30.5 % (ref 37–48.5)
HGB BLD-MCNC: 9.3 G/DL (ref 12–16)
HGB BLD-MCNC: 9.4 G/DL (ref 12–16)
HR MV ECHO: 66 BPM
IMM GRANULOCYTES # BLD AUTO: 0 K/UL (ref 0–0.04)
IMM GRANULOCYTES # BLD AUTO: 0.01 K/UL (ref 0–0.04)
IMM GRANULOCYTES NFR BLD AUTO: 0 % (ref 0–0.5)
IMM GRANULOCYTES NFR BLD AUTO: 0.2 % (ref 0–0.5)
INR PPP: 1.1 (ref 0.8–1.2)
INTERVENTRICULAR SEPTUM: 1.22 CM (ref 0.6–1.1)
IVC DIAMETER: 1.7 CM
LA MAJOR: 4.9 CM
LA MINOR: 4.36 CM
LA WIDTH: 3.6 CM
LEFT ATRIUM SIZE: 3.26 CM
LEFT ATRIUM VOLUME INDEX MOD: 30.2 ML/M2
LEFT ATRIUM VOLUME INDEX: 28.4 ML/M2
LEFT ATRIUM VOLUME MOD: 48.99 CM3
LEFT ATRIUM VOLUME: 46.03 CM3
LEFT INTERNAL DIMENSION IN SYSTOLE: 2.05 CM (ref 2.1–4)
LEFT VENTRICLE DIASTOLIC VOLUME INDEX: 31.92 ML/M2
LEFT VENTRICLE DIASTOLIC VOLUME: 51.71 ML
LEFT VENTRICLE MASS INDEX: 85 G/M2
LEFT VENTRICLE SYSTOLIC VOLUME INDEX: 8.3 ML/M2
LEFT VENTRICLE SYSTOLIC VOLUME: 13.48 ML
LEFT VENTRICULAR INTERNAL DIMENSION IN DIASTOLE: 3.52 CM (ref 3.5–6)
LEFT VENTRICULAR MASS: 137.81 G
LVOT MG: 2.12 MMHG
LVOT MV: 0.69 CM/S
LYMPHOCYTES # BLD AUTO: 0.7 K/UL (ref 1–4.8)
LYMPHOCYTES # BLD AUTO: 0.9 K/UL (ref 1–4.8)
LYMPHOCYTES NFR BLD: 15.7 % (ref 18–48)
LYMPHOCYTES NFR BLD: 18.7 % (ref 18–48)
MAGNESIUM SERPL-MCNC: 2.1 MG/DL (ref 1.6–2.6)
MCH RBC QN AUTO: 27.8 PG (ref 27–31)
MCH RBC QN AUTO: 28 PG (ref 27–31)
MCHC RBC AUTO-ENTMCNC: 30.8 G/DL (ref 32–36)
MCHC RBC AUTO-ENTMCNC: 31 G/DL (ref 32–36)
MCV RBC AUTO: 90 FL (ref 82–98)
MCV RBC AUTO: 91 FL (ref 82–98)
MONOCYTES # BLD AUTO: 0.5 K/UL (ref 0.3–1)
MONOCYTES # BLD AUTO: 0.6 K/UL (ref 0.3–1)
MONOCYTES NFR BLD: 11.5 % (ref 4–15)
MONOCYTES NFR BLD: 12.3 % (ref 4–15)
MV MEAN GRADIENT: 5 MMHG
MV PEAK GRADIENT: 14 MMHG
MV STENOSIS PRESSURE HALF TIME: 106.69 MS
MV VALVE AREA BY CONTINUITY EQUATION: 1.2 CM2
MV VALVE AREA P 1/2 METHOD: 2.06 CM2
NEUTROPHILS # BLD AUTO: 3.1 K/UL (ref 1.8–7.7)
NEUTROPHILS # BLD AUTO: 3.1 K/UL (ref 1.8–7.7)
NEUTROPHILS NFR BLD: 68.4 % (ref 38–73)
NEUTROPHILS NFR BLD: 71.9 % (ref 38–73)
NRBC BLD-RTO: 0 /100 WBC
NRBC BLD-RTO: 0 /100 WBC
OHS LV EJECTION FRACTION SIMPSONS BIPLANE MOD: 8 %
PHOSPHATE SERPL-MCNC: 4.9 MG/DL (ref 2.7–4.5)
PISA AR MAX VEL: 3.02 M/S
PISA MRMAX VEL: 4.32 M/S
PISA TR MAX VEL: 2.83 M/S
PLATELET # BLD AUTO: 115 K/UL (ref 150–450)
PLATELET # BLD AUTO: 121 K/UL (ref 150–450)
PMV BLD AUTO: 10 FL (ref 9.2–12.9)
PMV BLD AUTO: 10.3 FL (ref 9.2–12.9)
POCT GLUCOSE: 119 MG/DL (ref 70–110)
POCT GLUCOSE: 129 MG/DL (ref 70–110)
POCT GLUCOSE: 96 MG/DL (ref 70–110)
POTASSIUM SERPL-SCNC: 4.3 MMOL/L (ref 3.5–5.1)
POTASSIUM SERPL-SCNC: 4.3 MMOL/L (ref 3.5–5.1)
PROT SERPL-MCNC: 7.1 G/DL (ref 6–8.4)
PROTHROMBIN TIME: 12.4 SEC (ref 9–12.5)
PULM VEIN S/D RATIO: 1.66
PV PEAK D VEL: 0.44 M/S
PV PEAK S VEL: 0.73 M/S
RA MAJOR: 3.93 CM
RA PRESSURE: 3 MMHG
RBC # BLD AUTO: 3.34 M/UL (ref 4–5.4)
RBC # BLD AUTO: 3.36 M/UL (ref 4–5.4)
RIGHT VENTRICULAR END-DIASTOLIC DIMENSION: 2.91 CM
RV TISSUE DOPPLER FREE WALL SYSTOLIC VELOCITY 1 (APICAL 4 CHAMBER VIEW): 0.01 CM/S
SODIUM SERPL-SCNC: 142 MMOL/L (ref 136–145)
SODIUM SERPL-SCNC: 142 MMOL/L (ref 136–145)
TDI LATERAL: 0.05 M/S
TDI SEPTAL: 0.04 M/S
TDI: 0.05 M/S
TR MAX PG: 32 MMHG
TV REST PULMONARY ARTERY PRESSURE: 35 MMHG
WBC # BLD AUTO: 4.33 K/UL (ref 3.9–12.7)
WBC # BLD AUTO: 4.55 K/UL (ref 3.9–12.7)

## 2023-05-04 PROCEDURE — 94761 N-INVAS EAR/PLS OXIMETRY MLT: CPT

## 2023-05-04 PROCEDURE — 85730 THROMBOPLASTIN TIME PARTIAL: CPT | Mod: 91 | Performed by: NURSE PRACTITIONER

## 2023-05-04 PROCEDURE — 84100 ASSAY OF PHOSPHORUS: CPT | Performed by: NURSE PRACTITIONER

## 2023-05-04 PROCEDURE — 99233 PR SUBSEQUENT HOSPITAL CARE,LEVL III: ICD-10-PCS | Mod: ,,, | Performed by: INTERNAL MEDICINE

## 2023-05-04 PROCEDURE — 85025 COMPLETE CBC W/AUTO DIFF WBC: CPT | Mod: 91 | Performed by: NURSE PRACTITIONER

## 2023-05-04 PROCEDURE — 99233 SBSQ HOSP IP/OBS HIGH 50: CPT | Mod: ,,, | Performed by: INTERNAL MEDICINE

## 2023-05-04 PROCEDURE — 11000001 HC ACUTE MED/SURG PRIVATE ROOM

## 2023-05-04 PROCEDURE — 63600175 PHARM REV CODE 636 W HCPCS: Performed by: INTERNAL MEDICINE

## 2023-05-04 PROCEDURE — 25000003 PHARM REV CODE 250: Performed by: NURSE PRACTITIONER

## 2023-05-04 PROCEDURE — 63600175 PHARM REV CODE 636 W HCPCS: Performed by: NURSE PRACTITIONER

## 2023-05-04 PROCEDURE — 99900035 HC TECH TIME PER 15 MIN (STAT)

## 2023-05-04 PROCEDURE — 85730 THROMBOPLASTIN TIME PARTIAL: CPT | Performed by: INTERNAL MEDICINE

## 2023-05-04 PROCEDURE — 80053 COMPREHEN METABOLIC PANEL: CPT | Performed by: NURSE PRACTITIONER

## 2023-05-04 PROCEDURE — 80048 BASIC METABOLIC PNL TOTAL CA: CPT | Mod: XB | Performed by: NURSE PRACTITIONER

## 2023-05-04 PROCEDURE — 85610 PROTHROMBIN TIME: CPT | Performed by: NURSE PRACTITIONER

## 2023-05-04 PROCEDURE — 83735 ASSAY OF MAGNESIUM: CPT | Performed by: NURSE PRACTITIONER

## 2023-05-04 PROCEDURE — 85025 COMPLETE CBC W/AUTO DIFF WBC: CPT | Performed by: INTERNAL MEDICINE

## 2023-05-04 PROCEDURE — 36415 COLL VENOUS BLD VENIPUNCTURE: CPT | Performed by: NURSE PRACTITIONER

## 2023-05-04 PROCEDURE — 83036 HEMOGLOBIN GLYCOSYLATED A1C: CPT | Performed by: NURSE PRACTITIONER

## 2023-05-04 RX ORDER — NIFEDIPINE 60 MG/1
60 TABLET, EXTENDED RELEASE ORAL DAILY
Qty: 30 TABLET | Refills: 11 | Status: ON HOLD | OUTPATIENT
Start: 2023-05-05 | End: 2023-05-16 | Stop reason: HOSPADM

## 2023-05-04 RX ORDER — GABAPENTIN 100 MG/1
100 CAPSULE ORAL 2 TIMES DAILY
Status: DISCONTINUED | OUTPATIENT
Start: 2023-05-04 | End: 2023-05-06 | Stop reason: HOSPADM

## 2023-05-04 RX ORDER — DIPHENHYDRAMINE HCL 25 MG
50 CAPSULE ORAL ONCE
Status: CANCELLED | OUTPATIENT
Start: 2023-05-04 | End: 2023-05-04

## 2023-05-04 RX ORDER — GABAPENTIN 100 MG/1
100 CAPSULE ORAL 2 TIMES DAILY
Qty: 60 CAPSULE | Refills: 11 | Status: SHIPPED | OUTPATIENT
Start: 2023-05-04 | End: 2023-05-22

## 2023-05-04 RX ORDER — TRAMADOL HYDROCHLORIDE 50 MG/1
50 TABLET ORAL EVERY 8 HOURS PRN
Qty: 21 TABLET | Refills: 0 | Status: ON HOLD | OUTPATIENT
Start: 2023-05-04 | End: 2023-05-10 | Stop reason: HOSPADM

## 2023-05-04 RX ORDER — HEPARIN SODIUM,PORCINE/D5W 25000/250
0-40 INTRAVENOUS SOLUTION INTRAVENOUS CONTINUOUS
Status: DISCONTINUED | OUTPATIENT
Start: 2023-05-04 | End: 2023-05-06 | Stop reason: HOSPADM

## 2023-05-04 RX ORDER — NAPROXEN SODIUM 220 MG/1
81 TABLET, FILM COATED ORAL DAILY
Qty: 30 TABLET | Refills: 5 | Status: SHIPPED | OUTPATIENT
Start: 2023-05-05 | End: 2024-05-04

## 2023-05-04 RX ORDER — ATORVASTATIN CALCIUM 80 MG/1
80 TABLET, FILM COATED ORAL DAILY
Qty: 90 TABLET | Refills: 3 | Status: SHIPPED | OUTPATIENT
Start: 2023-05-05 | End: 2024-05-04

## 2023-05-04 RX ADMIN — CARVEDILOL 25 MG: 25 TABLET, FILM COATED ORAL at 09:05

## 2023-05-04 RX ADMIN — SODIUM BICARBONATE 650 MG TABLET 650 MG: at 09:05

## 2023-05-04 RX ADMIN — HEPARIN SODIUM 14 UNITS/KG/HR: 10000 INJECTION, SOLUTION INTRAVENOUS at 06:05

## 2023-05-04 RX ADMIN — CARVEDILOL 25 MG: 25 TABLET, FILM COATED ORAL at 08:05

## 2023-05-04 RX ADMIN — SODIUM CHLORIDE, POTASSIUM CHLORIDE, SODIUM LACTATE AND CALCIUM CHLORIDE 250 ML: 600; 310; 30; 20 INJECTION, SOLUTION INTRAVENOUS at 04:05

## 2023-05-04 RX ADMIN — CLOPIDOGREL BISULFATE 75 MG: 75 TABLET ORAL at 08:05

## 2023-05-04 RX ADMIN — NIFEDIPINE 60 MG: 60 TABLET, FILM COATED, EXTENDED RELEASE ORAL at 08:05

## 2023-05-04 RX ADMIN — ATORVASTATIN CALCIUM 80 MG: 40 TABLET, FILM COATED ORAL at 08:05

## 2023-05-04 RX ADMIN — FUROSEMIDE 20 MG: 20 TABLET ORAL at 08:05

## 2023-05-04 RX ADMIN — MUPIROCIN: 20 OINTMENT TOPICAL at 09:05

## 2023-05-04 RX ADMIN — HYDRALAZINE HYDROCHLORIDE 50 MG: 25 TABLET, FILM COATED ORAL at 08:05

## 2023-05-04 RX ADMIN — SODIUM BICARBONATE 650 MG TABLET 650 MG: at 08:05

## 2023-05-04 RX ADMIN — MUPIROCIN: 20 OINTMENT TOPICAL at 08:05

## 2023-05-04 RX ADMIN — ASPIRIN 81 MG: 81 TABLET, CHEWABLE ORAL at 08:05

## 2023-05-04 RX ADMIN — HYDRALAZINE HYDROCHLORIDE 50 MG: 25 TABLET, FILM COATED ORAL at 09:05

## 2023-05-04 RX ADMIN — GABAPENTIN 100 MG: 100 CAPSULE ORAL at 09:05

## 2023-05-04 NOTE — ASSESSMENT & PLAN NOTE
Hx of nephrectomy   Follows nephrology op     etiology include HTN/DM/microvascular Dz/nephrectomy; BL 1.8-2.2  Cr 2.3 - will bolus 250 ml IV LR  s/p angiogram and poor PO intake     Cr worsening 2.6  -urine studies   -renal US   -consult nephrology

## 2023-05-04 NOTE — CONSULTS
[facility] - Telemetry  Wound Care    Patient Name:  Steph Monroe   MRN:  [id number]  Date: 2023  Diagnosis: Critical limb ischemia of right lower extremity    History:     Past Medical History:   Diagnosis Date    Cancer     rectum    CKD (chronic kidney disease)     Diabetes mellitus, type 2     Hyperlipidemia     Hypertension     PVD (peripheral vascular disease)        Social History     Socioeconomic History    Marital status:     Number of children: 2   Occupational History    Occupation: Retired   Tobacco Use    Smoking status: Former     Packs/day: 1.50     Years: 45.00     Pack years: 67.50     Types: Cigarettes     Quit date: 2013     Years since quittin.8     Passive exposure: Never    Smokeless tobacco: Never   Substance and Sexual Activity    Alcohol use: No     Alcohol/week: 0.0 standard drinks    Drug use: No    Sexual activity: Not Currently       Precautions:     Allergies as of 2023 - Reviewed 2023   Allergen Reaction Noted    Chantix [varenicline]  2016    Wellbutrin [bupropion hcl]  2016       WOC Assessment Details/Treatment     Pt plans for discharge today.    R foot- ischemic color changes- no open wound        R heel- dry fissures        R buttocks- soft nodular lesion that pt reports has been present for 7 years after a fall at home      Recommend nursing to continue with routine skin care.    2023

## 2023-05-04 NOTE — PLAN OF CARE
[facility] - Telemetry  Initial Discharge Assessment       Primary Care Provider: Mane Carmona MD    Admission Diagnosis: Chest pain [R07.9]  Critical limb ischemia of right lower extremity [I70.221]    Admission Date: 5/2/2023  Expected Discharge Date: 5/4/2023  Pt oriented.  DCA done via telephone with pt's daughter Lety on telephone. Pt ilves with other daughter. Had no HH. Does have home concentrator. Demographics/Ins/NextofKin/PCP verified with patient/family. Denies any DME needs at present from pt/family. Patient/family plans to return home with family. Educated on bedside RX. Patient consents for TN to discuss discharge plan with next of kin. Preferences appointment times and location obtained. Patient reports they will have transportation home upon discharge.         Payor: [employer] MEDICARE / Plan: [employer] MEDICARE HMO / Product Type: Capitation /     Extended Emergency Contact Information  Primary Emergency Contact: Lety Boyd   [facility]  Home Phone: [phone]  Mobile Phone: [phone]  Relation: Daughter  Secondary Emergency Contact: Deana Reddy   [facility]  Home Phone: [phone]  Mobile Phone: [phone]  Relation: Daughter    Discharge Plan A: Home, Home with family         [id number] [place] - [address] [facility]  [address] [address]  Phone: [phone] Fax: [phone]      Initial Assessment (most recent)       Adult Discharge Assessment - 05/04/23 1555          Discharge Assessment    Assessment Type Discharge Planning Assessment     Confirmed/corrected address, phone number and insurance Yes     Confirmed Demographics Correct on Facesheet     Source of Information family;health record     Do you expect to return to your current living situation? Yes     Do you have help at home or someone to help you manage your care at home? Yes     Prior to hospitilization cognitive status:  Alert/Oriented;No Deficits     Current cognitive status: Alert/Oriented;No Deficits     Walking or Climbing Stairs ambulation difficulty, requires equipment     Dressing/Bathing bathing difficulty, assistance 1 person;dressing difficulty, assistance 1 person   Walkin shower    Home Accessibility wheelchair accessible     Equipment Currently Used at Home walker, rolling;oxygen   home concentrator at home.    Do you currently have service(s) that help you manage your care at home? No     Do you have any problems affording any of your prescribed medications? No     Is the patient taking medications as prescribed? yes     How do you get to doctors appointments? family or friend will provide     Are you on dialysis? No     Do you take coumadin? No     Discharge Plan A Home;Home with family                   Future Appointments   Date Time Provider Department Center   5/24/2023 11:00 AM Mauricio Jones MD [facility]      Follow-up Information       Tacos Lorenzo MD. Go in 1 week(s).    Specialties: Interventional Cardiology, Cardiology  Why: FOLLOW UP WITH CARDIOLOGIST AFTER DISCHARGE  Contact information:  [address]  [address]  [place] [address]  [phone]               [facility]. Go in 1 week(s).    Specialty: Priority Care  Why: FOLLOW UP WITH PCP AFTER PR[facility] CARE CLINIC APPT  Contact information:  [address]  [facility] [address] [phone]  Additional information:  Please park in Lot C or D and use [address] entrance. Take Medical Office Bldg elevators. [address]             [facility]. Go in 1 week(s).    Specialty: Podiatry  Why: FOLLOW UP WITH PODIATRY AFTER DISCHARGE  Contact information:  [address]  [facility] [address] [phone]  Additional information:  Please park in Lot C or D and use [address] entrance. Take Medical Office Bldg elevators.                          aspirin  81 mg Oral Daily     atorvastatin  80 mg Oral Daily    carvediloL  25 mg Oral BID    clopidogreL  75 mg Oral Daily    furosemide  20 mg Oral Daily    hydrALAZINE  50 mg Oral BID    lactated ringers  250 mL Intravenous Once    mupirocin   Nasal BID    NIFEdipine  60 mg Oral Daily    sodium bicarbonate  650 mg Oral BID     Consults (From admission, onward)          Status Ordering Provider     Inpatient consult to Registered Dietitian/Nutritionist  Once        Provider:  (Not yet assigned)    Completed DUSTIN NIX     IP consult to case management  Once        Provider:  (Not yet assigned)    Acknowledged DUSTIN NIX     Inpatient consult to General Surgery  Once        Provider:  (Not yet assigned)    Acknowledged JACINDA LOMELI     Inpatient consult to Cardiology-[facility]  Once        Provider:  (Not yet assigned)    Completed JAMIL HICKS

## 2023-05-04 NOTE — ASSESSMENT & PLAN NOTE
Hypertensive Urgency  PRN IV hydralazine   Resume lasix - stop with worsening renal function   Coreg  PO hydralazine   PO nifedipine   Patient has not taken home BP medications in atleast a year   Cardiac monitor

## 2023-05-04 NOTE — ASSESSMENT & PLAN NOTE
Pain with increased claudication unknown duration, at least a year. Now with absent pulse and R foot discoloration   On ASA, plavix, heparin gtt   No signs of infection   Consult wound care for small skin break   Cardiology planning peripheral intervention tomorrow   NPO after MN   General surgery consulted   PRN pain regime   Imaging reviewed: Lower extremity peripheral vascular disease with velocity parameters as above.  Majority monophasic waveforms throughout as can be seen with vessel hardening related to atherosclerosis versus more central, upstream stenosis.     Velocity elevation at the left deep profundus artery as can be seen with sequela of hemodynamically significant stenosis.     Limited color visualization with relative reduced flow velocity at the left proximal SFA.      Bones are demineralized.  Dorsal and plantar calcaneal enthesopathic spurring is present.  No definite fracture or effusion.  Prominent atherosclerotic vascular calcifications    5/3  S/p peripheral angiogram    Aorta: Distal aneurysmal segment     Right (views are not dedicated as unable to cross up and over from L-CFA)  Common iliaic artery: Prior stent with severe ISR. 95-99% stenosis distal to the stent  External iliac artery: 70-80% stenosis  Common femoral artery: Occluded  Superficial femoral artery: Possibly occluded. Possible reconstitution distal SFA with flow into the popliteal artery  Profunda: Flow present  Flow noted in the AT and peroneal; not well visualized. Daniel patent     Left  Common iliaic artery: Prior stent with ISR, 80% at the proximal segment  External iliac artery: Mild-moderate diffuse disease  Common femoral artery: Distal CFA 99% occluded  Superficial femoral artery: Occluded, reconstitutes via profunda distal segment  Profunda: Patent  Popliteal artery: Patent  AT: Normal  PT Trunk: Normal  PT: Normal  Peroneal: Diminutive vessel  DP: Fills likely via collaterals (not well  visualized)     Recommendations  - Restart heparin gtt, titrate per PTT  - Aspirin, clopidogrel  - High intensity statin    5/4  per cardiology continue ASA, plavix, high intensity statin   Add gabapentin   Continue heparin gtt  NPO after MN  Plan for repeat peripheral angiogram 5/5 5/5 caution with repeat angiogram given decline in renal function- will discuss with cardiology

## 2023-05-04 NOTE — PROGRESS NOTES
"Surgery follow up  BP (!) 154/68 (BP Location: Right arm, Patient Position: Lying)   Pulse 72   Temp 98.4 °F (36.9 °C) (Oral)   Resp 18   Ht 5' 2" (1.575 m)   Wt 61.2 kg (134 lb 14.7 oz)   SpO2 98%   BMI 24.68 kg/m²   I/O last 3 completed shifts:  In: -   Out: 300 [Urine:300]  No intake/output data recorded.    Recent Results (from the past 336 hour(s))   CBC auto differential    Collection Time: 05/04/23  2:56 AM   Result Value Ref Range    WBC 4.33 3.90 - 12.70 K/uL    Hemoglobin 9.4 (L) 12.0 - 16.0 g/dL    Hematocrit 30.5 (L) 37.0 - 48.5 %    Platelets 121 (L) 150 - 450 K/uL   CBC auto differential    Collection Time: 05/03/23 11:16 AM   Result Value Ref Range    WBC 4.65 3.90 - 12.70 K/uL    Hemoglobin 9.6 (L) 12.0 - 16.0 g/dL    Hematocrit 30.8 (L) 37.0 - 48.5 %    Platelets 110 (L) 150 - 450 K/uL   CBC auto differential    Collection Time: 05/02/23 11:14 AM   Result Value Ref Range    WBC 4.65 3.90 - 12.70 K/uL    Hemoglobin 10.6 (L) 12.0 - 16.0 g/dL    Hematocrit 34.4 (L) 37.0 - 48.5 %    Platelets 125 (L) 150 - 450 K/uL     Discussed with Dr. Lorenzo , agree with plan to improve inflow   May need axillofemoral as surgical alternative.  "

## 2023-05-04 NOTE — PLAN OF CARE
"   05/04/23 1119   Post-Acute Status   Post-Acute Authorization Other   Other Status Awaiting f/u Appts        Follow-up Information       Tacos Lorenzo MD. Go in 1 week(s).    Specialties: Interventional Cardiology, Cardiology  Why: FOLLOW UP WITH CARDIOLOGIST AFTER DISCHARGE  Contact information:  [address]  [address]  [facility] [address]  [phone]               [facility]. Go in 1 week(s).    Specialty: Priority Care  Why: FOLLOW UP WITH PCP AFTER PRIORITY CARE CLINIC APPT  Contact information:  [address]  [facility] [address] [phone]  Additional information:  Please park in Lot [address] or D and use [address] entrance. Take Medical Office Bldg elevators. [address]             [facility]. Go in 1 week(s).    Specialty: Podiatry  Why: FOLLOW UP WITH PODIATRY AFTER DISCHARGE  Contact information:  [address]  [facility] [address] [phone]  Additional information:  Please park in Lot C or D and use [address] entrance. Take Medical Office Bldg elevators.                         No future appointments.  BP (!) 154/68 (BP Location: Right arm, Patient Position: Lying)   Pulse 72   Temp 98.4 °F (36.9 °C) (Oral)   Resp 18   Ht 5' 2" (1.575 m)   Wt 61.2 kg (134 lb 14.7 oz)   SpO2 98%   BMI 24.68 kg/m²    aspirin  81 mg Oral Daily    atorvastatin  80 mg Oral Daily    carvediloL  25 mg Oral BID    clopidogreL  75 mg Oral Daily    furosemide  20 mg Oral Daily    hydrALAZINE  50 mg Oral BID    lactated ringers  250 mL Intravenous Once    mupirocin   Nasal BID    NIFEdipine  60 mg Oral Daily    sodium bicarbonate  650 mg Oral BID       "

## 2023-05-04 NOTE — NURSING
New order to restart Heparin gtt at 18 units/kg/hr and 80 unit bolus. NP contacted to clarify if she wants us to just restart where she was at. She was off the heparin for less than a hour. Per NP ok to restart her Heparin at 14 units/kg/hr.

## 2023-05-04 NOTE — PLAN OF CARE
Discharge order noted pending cardiology clearance. AVS preparation started. Awaiting final clearance and discharge order reconciliation to complete AVS.

## 2023-05-04 NOTE — ASSESSMENT & PLAN NOTE
Aortic Stenosis   Pulmonary HTN     Patient is identified as having Diastolic (HFpEF) heart failure that is Chronic. CHF is currently controlled. Latest ECHO performed and demonstrates- No results found for this or any previous visit.  . Continue Beta Blocker Furosemide and monitor clinical status closely. Monitor on telemetry. Patient is off CHF pathway.  Monitor strict Is&Os and daily weights.  Place on fluid restriction of 1.5 L. Continue to stress to patient importance of self efficacy and  on diet for CHF. Last BNP reviewed- and noted below No results for input(s): BNP, BNPTRIAGEBLO in the last 168 hours..      -repeat 2d echo    The left ventricle is normal in size with concentric remodeling and normal systolic function.   The estimated ejection fraction is 65%.   Indeterminate left ventricular diastolic function.   There is mild mitral stenosis.   The mean diastolic gradient across the mitral valve is 5 mmHg at a heart rate of 66 bpm.   Normal right ventricular size with normal right ventricular systolic function.   There is mild aortic valve stenosis.   Aortic valve area is 1.28 cm2; peak velocity is 2.33 m/s; mean gradient is 10 mmHg.   Mild-to-moderate aortic regurgitation.   Normal central venous pressure (3 mmHg).   The estimated PA systolic pressure is 35 mmHg.    -hold lasix

## 2023-05-04 NOTE — PLAN OF CARE
"  Problem: Adult Inpatient Plan of Care  Goal: Plan of Care Review  Outcome: Ongoing, Progressing     Problem: Adult Inpatient Plan of Care  Goal: Optimal Comfort and Wellbeing  Outcome: Ongoing, Progressing     Problem: Diabetes Comorbidity  Goal: Blood Glucose Level Within Targeted Range  Outcome: Ongoing, Progressing     Problem: Fall Injury Risk  Goal: Absence of Fall and Fall-Related Injury  Outcome: Ongoing, Progressing     .Plan of care reviewed with the patient. Scheduled medicines given and the patient tolerated well. On continuous IV infusion of Heparin @ 14 U/kg/hr. Her Aptt is 58.8 sec. Fall and safety precautions taken and the standard interventions are in place. On Telemetry monitoring with NSR, no true "red alarms' noted, no acute distress reported either in the shift. Patient's Accu Check was 177 mg/dl. Patient's BP was 104/50, held the dose of Hydralazine 50 mg PO on NP orders. Advised the patient to call for the assistance. Continued monitoring the patient.   "

## 2023-05-05 LAB
ALBUMIN SERPL BCP-MCNC: 3 G/DL (ref 3.5–5.2)
ALP SERPL-CCNC: 81 U/L (ref 55–135)
ALT SERPL W/O P-5'-P-CCNC: 10 U/L (ref 10–44)
ANION GAP SERPL CALC-SCNC: 11 MMOL/L (ref 8–16)
APTT PPP: 49.3 SEC (ref 21–32)
APTT PPP: 52.7 SEC (ref 21–32)
AST SERPL-CCNC: 22 U/L (ref 10–40)
BACTERIA #/AREA URNS HPF: ABNORMAL /HPF
BASOPHILS # BLD AUTO: 0.01 K/UL (ref 0–0.2)
BASOPHILS NFR BLD: 0.2 % (ref 0–1.9)
BILIRUB SERPL-MCNC: 0.4 MG/DL (ref 0.1–1)
BILIRUB UR QL STRIP: NEGATIVE
BUN SERPL-MCNC: 78 MG/DL (ref 8–23)
C3 SERPL-MCNC: 85 MG/DL (ref 50–180)
C4 SERPL-MCNC: 27 MG/DL (ref 11–44)
CALCIUM SERPL-MCNC: 8.7 MG/DL (ref 8.7–10.5)
CHLORIDE SERPL-SCNC: 113 MMOL/L (ref 95–110)
CK SERPL-CCNC: 325 U/L (ref 20–180)
CLARITY UR: ABNORMAL
CO2 SERPL-SCNC: 17 MMOL/L (ref 23–29)
COLOR UR: YELLOW
CREAT SERPL-MCNC: 2.6 MG/DL (ref 0.5–1.4)
CREAT UR-MCNC: 130.4 MG/DL (ref 15–325)
CREAT UR-MCNC: 130.4 MG/DL (ref 15–325)
DIFFERENTIAL METHOD: ABNORMAL
EOSINOPHIL # BLD AUTO: 0 K/UL (ref 0–0.5)
EOSINOPHIL NFR BLD: 0.7 % (ref 0–8)
EOSINOPHIL URNS QL WRIGHT STN: NORMAL
ERYTHROCYTE [DISTWIDTH] IN BLOOD BY AUTOMATED COUNT: 19.6 % (ref 11.5–14.5)
EST. GFR  (NO RACE VARIABLE): 19 ML/MIN/1.73 M^2
GLUCOSE SERPL-MCNC: 102 MG/DL (ref 70–110)
GLUCOSE UR QL STRIP: NEGATIVE
HCT VFR BLD AUTO: 30.7 % (ref 37–48.5)
HGB BLD-MCNC: 9.4 G/DL (ref 12–16)
HGB UR QL STRIP: NEGATIVE
HYALINE CASTS #/AREA URNS LPF: 2 /LPF
IMM GRANULOCYTES # BLD AUTO: 0.02 K/UL (ref 0–0.04)
IMM GRANULOCYTES NFR BLD AUTO: 0.4 % (ref 0–0.5)
KETONES UR QL STRIP: NEGATIVE
LEUKOCYTE ESTERASE UR QL STRIP: ABNORMAL
LYMPHOCYTES # BLD AUTO: 0.7 K/UL (ref 1–4.8)
LYMPHOCYTES NFR BLD: 15.8 % (ref 18–48)
MAGNESIUM SERPL-MCNC: 2.2 MG/DL (ref 1.6–2.6)
MCH RBC QN AUTO: 27.2 PG (ref 27–31)
MCHC RBC AUTO-ENTMCNC: 30.6 G/DL (ref 32–36)
MCV RBC AUTO: 89 FL (ref 82–98)
MICROSCOPIC COMMENT: ABNORMAL
MONOCYTES # BLD AUTO: 0.6 K/UL (ref 0.3–1)
MONOCYTES NFR BLD: 13.6 % (ref 4–15)
NEUTROPHILS # BLD AUTO: 3.2 K/UL (ref 1.8–7.7)
NEUTROPHILS NFR BLD: 69.3 % (ref 38–73)
NITRITE UR QL STRIP: NEGATIVE
NON-SQ EPI CELLS #/AREA URNS HPF: 0 /HPF
NRBC BLD-RTO: 0 /100 WBC
PH UR STRIP: 6 [PH] (ref 5–8)
PHOSPHATE SERPL-MCNC: 4.8 MG/DL (ref 2.7–4.5)
PLATELET # BLD AUTO: 119 K/UL (ref 150–450)
PMV BLD AUTO: 10.1 FL (ref 9.2–12.9)
POCT GLUCOSE: 107 MG/DL (ref 70–110)
POCT GLUCOSE: 129 MG/DL (ref 70–110)
POCT GLUCOSE: 201 MG/DL (ref 70–110)
POTASSIUM SERPL-SCNC: 4.3 MMOL/L (ref 3.5–5.1)
PROT SERPL-MCNC: 7 G/DL (ref 6–8.4)
PROT UR QL STRIP: ABNORMAL
PROT UR-MCNC: 40 MG/DL (ref 0–15)
PROT/CREAT UR: 0.31 MG/G{CREAT} (ref 0–0.2)
RBC # BLD AUTO: 3.45 M/UL (ref 4–5.4)
RBC #/AREA URNS HPF: 2 /HPF (ref 0–4)
SODIUM SERPL-SCNC: 141 MMOL/L (ref 136–145)
SODIUM UR-SCNC: 55 MMOL/L (ref 20–250)
SP GR UR STRIP: 1.02 (ref 1–1.03)
SQUAMOUS #/AREA URNS HPF: 1 /HPF
URN SPEC COLLECT METH UR: ABNORMAL
UROBILINOGEN UR STRIP-ACNC: NEGATIVE EU/DL
WBC # BLD AUTO: 4.55 K/UL (ref 3.9–12.7)
WBC #/AREA URNS HPF: 7 /HPF (ref 0–5)
YEAST URNS QL MICRO: ABNORMAL

## 2023-05-05 PROCEDURE — 84100 ASSAY OF PHOSPHORUS: CPT | Performed by: NURSE PRACTITIONER

## 2023-05-05 PROCEDURE — 99900035 HC TECH TIME PER 15 MIN (STAT)

## 2023-05-05 PROCEDURE — C1887 CATHETER, GUIDING: HCPCS | Performed by: INTERNAL MEDICINE

## 2023-05-05 PROCEDURE — 86162 COMPLEMENT TOTAL (CH50): CPT | Performed by: STUDENT IN AN ORGANIZED HEALTH CARE EDUCATION/TRAINING PROGRAM

## 2023-05-05 PROCEDURE — 36415 COLL VENOUS BLD VENIPUNCTURE: CPT | Performed by: STUDENT IN AN ORGANIZED HEALTH CARE EDUCATION/TRAINING PROGRAM

## 2023-05-05 PROCEDURE — 85730 THROMBOPLASTIN TIME PARTIAL: CPT | Performed by: STUDENT IN AN ORGANIZED HEALTH CARE EDUCATION/TRAINING PROGRAM

## 2023-05-05 PROCEDURE — 36200 PLACE CATHETER IN AORTA: CPT | Mod: LT,,, | Performed by: INTERNAL MEDICINE

## 2023-05-05 PROCEDURE — 85730 THROMBOPLASTIN TIME PARTIAL: CPT | Mod: 91 | Performed by: STUDENT IN AN ORGANIZED HEALTH CARE EDUCATION/TRAINING PROGRAM

## 2023-05-05 PROCEDURE — 97530 THERAPEUTIC ACTIVITIES: CPT | Performed by: PHYSICAL THERAPIST

## 2023-05-05 PROCEDURE — 75710 ARTERY X-RAYS ARM/LEG: CPT | Mod: 26,RT,, | Performed by: INTERNAL MEDICINE

## 2023-05-05 PROCEDURE — 82550 ASSAY OF CK (CPK): CPT | Performed by: STUDENT IN AN ORGANIZED HEALTH CARE EDUCATION/TRAINING PROGRAM

## 2023-05-05 PROCEDURE — 81000 URINALYSIS NONAUTO W/SCOPE: CPT | Performed by: NURSE PRACTITIONER

## 2023-05-05 PROCEDURE — 63600175 PHARM REV CODE 636 W HCPCS: Performed by: INTERNAL MEDICINE

## 2023-05-05 PROCEDURE — A4217 STERILE WATER/SALINE, 500 ML: HCPCS | Performed by: STUDENT IN AN ORGANIZED HEALTH CARE EDUCATION/TRAINING PROGRAM

## 2023-05-05 PROCEDURE — C1894 INTRO/SHEATH, NON-LASER: HCPCS | Performed by: INTERNAL MEDICINE

## 2023-05-05 PROCEDURE — 97530 THERAPEUTIC ACTIVITIES: CPT

## 2023-05-05 PROCEDURE — 63600175 PHARM REV CODE 636 W HCPCS: Performed by: NURSE PRACTITIONER

## 2023-05-05 PROCEDURE — 97535 SELF CARE MNGMENT TRAINING: CPT

## 2023-05-05 PROCEDURE — 86160 COMPLEMENT ANTIGEN: CPT | Performed by: STUDENT IN AN ORGANIZED HEALTH CARE EDUCATION/TRAINING PROGRAM

## 2023-05-05 PROCEDURE — 11000001 HC ACUTE MED/SURG PRIVATE ROOM

## 2023-05-05 PROCEDURE — 83735 ASSAY OF MAGNESIUM: CPT | Performed by: NURSE PRACTITIONER

## 2023-05-05 PROCEDURE — C1769 GUIDE WIRE: HCPCS | Performed by: INTERNAL MEDICINE

## 2023-05-05 PROCEDURE — 25000003 PHARM REV CODE 250: Performed by: NURSE PRACTITIONER

## 2023-05-05 PROCEDURE — 75710 ARTERY X-RAYS ARM/LEG: CPT | Mod: RT | Performed by: INTERNAL MEDICINE

## 2023-05-05 PROCEDURE — 25000003 PHARM REV CODE 250: Performed by: STUDENT IN AN ORGANIZED HEALTH CARE EDUCATION/TRAINING PROGRAM

## 2023-05-05 PROCEDURE — 85025 COMPLETE CBC W/AUTO DIFF WBC: CPT | Performed by: NURSE PRACTITIONER

## 2023-05-05 PROCEDURE — 51702 INSERT TEMP BLADDER CATH: CPT

## 2023-05-05 PROCEDURE — 80053 COMPREHEN METABOLIC PANEL: CPT | Performed by: NURSE PRACTITIONER

## 2023-05-05 PROCEDURE — 97162 PT EVAL MOD COMPLEX 30 MIN: CPT | Performed by: PHYSICAL THERAPIST

## 2023-05-05 PROCEDURE — 97165 OT EVAL LOW COMPLEX 30 MIN: CPT

## 2023-05-05 PROCEDURE — 94761 N-INVAS EAR/PLS OXIMETRY MLT: CPT

## 2023-05-05 PROCEDURE — 84300 ASSAY OF URINE SODIUM: CPT | Performed by: NURSE PRACTITIONER

## 2023-05-05 PROCEDURE — 75710 PR  ANGIO EXTREMITY UNILAT: ICD-10-PCS | Mod: 26,RT,, | Performed by: INTERNAL MEDICINE

## 2023-05-05 PROCEDURE — 51798 US URINE CAPACITY MEASURE: CPT

## 2023-05-05 PROCEDURE — 36200 PLACE CATHETER IN AORTA: CPT | Performed by: INTERNAL MEDICINE

## 2023-05-05 PROCEDURE — 87205 SMEAR GRAM STAIN: CPT | Performed by: NURSE PRACTITIONER

## 2023-05-05 PROCEDURE — 84156 ASSAY OF PROTEIN URINE: CPT | Performed by: NURSE PRACTITIONER

## 2023-05-05 PROCEDURE — 86160 COMPLEMENT ANTIGEN: CPT | Mod: 59 | Performed by: STUDENT IN AN ORGANIZED HEALTH CARE EDUCATION/TRAINING PROGRAM

## 2023-05-05 PROCEDURE — 36200 PR PLACE CATH AORTA: ICD-10-PCS | Mod: LT,,, | Performed by: INTERNAL MEDICINE

## 2023-05-05 PROCEDURE — 25000003 PHARM REV CODE 250: Performed by: INTERNAL MEDICINE

## 2023-05-05 RX ORDER — VERAPAMIL HYDROCHLORIDE 2.5 MG/ML
INJECTION, SOLUTION INTRAVENOUS
Status: DISCONTINUED | OUTPATIENT
Start: 2023-05-05 | End: 2023-05-05 | Stop reason: HOSPADM

## 2023-05-05 RX ORDER — HEPARIN SODIUM 200 [USP'U]/100ML
INJECTION, SOLUTION INTRAVENOUS
Status: DISCONTINUED | OUTPATIENT
Start: 2023-05-05 | End: 2023-05-06 | Stop reason: HOSPADM

## 2023-05-05 RX ORDER — HEPARIN SODIUM 1000 [USP'U]/ML
INJECTION, SOLUTION INTRAVENOUS; SUBCUTANEOUS
Status: DISCONTINUED | OUTPATIENT
Start: 2023-05-05 | End: 2023-05-05 | Stop reason: HOSPADM

## 2023-05-05 RX ORDER — LIDOCAINE HYDROCHLORIDE 10 MG/ML
INJECTION, SOLUTION EPIDURAL; INFILTRATION; INTRACAUDAL; PERINEURAL
Status: DISCONTINUED | OUTPATIENT
Start: 2023-05-05 | End: 2023-05-05 | Stop reason: HOSPADM

## 2023-05-05 RX ORDER — ACETAMINOPHEN 325 MG/1
650 TABLET ORAL EVERY 4 HOURS PRN
Status: DISCONTINUED | OUTPATIENT
Start: 2023-05-05 | End: 2023-05-06 | Stop reason: HOSPADM

## 2023-05-05 RX ORDER — ONDANSETRON 8 MG/1
8 TABLET, ORALLY DISINTEGRATING ORAL EVERY 8 HOURS PRN
Status: DISCONTINUED | OUTPATIENT
Start: 2023-05-05 | End: 2023-05-06 | Stop reason: HOSPADM

## 2023-05-05 RX ADMIN — CARVEDILOL 25 MG: 25 TABLET, FILM COATED ORAL at 10:05

## 2023-05-05 RX ADMIN — GABAPENTIN 100 MG: 100 CAPSULE ORAL at 10:05

## 2023-05-05 RX ADMIN — ASPIRIN 81 MG: 81 TABLET, CHEWABLE ORAL at 08:05

## 2023-05-05 RX ADMIN — HYDRALAZINE HYDROCHLORIDE 50 MG: 25 TABLET, FILM COATED ORAL at 10:05

## 2023-05-05 RX ADMIN — HYDROCODONE BITARTRATE AND ACETAMINOPHEN 1 TABLET: 5; 325 TABLET ORAL at 05:05

## 2023-05-05 RX ADMIN — HYDROCODONE BITARTRATE AND ACETAMINOPHEN 1 TABLET: 5; 325 TABLET ORAL at 08:05

## 2023-05-05 RX ADMIN — NIFEDIPINE 60 MG: 60 TABLET, FILM COATED, EXTENDED RELEASE ORAL at 08:05

## 2023-05-05 RX ADMIN — CLOPIDOGREL BISULFATE 75 MG: 75 TABLET ORAL at 08:05

## 2023-05-05 RX ADMIN — ATORVASTATIN CALCIUM 80 MG: 40 TABLET, FILM COATED ORAL at 08:05

## 2023-05-05 RX ADMIN — SODIUM BICARBONATE 650 MG TABLET 650 MG: at 10:05

## 2023-05-05 RX ADMIN — MUPIROCIN: 20 OINTMENT TOPICAL at 10:05

## 2023-05-05 RX ADMIN — CARVEDILOL 25 MG: 25 TABLET, FILM COATED ORAL at 08:05

## 2023-05-05 RX ADMIN — HYDRALAZINE HYDROCHLORIDE 50 MG: 25 TABLET, FILM COATED ORAL at 08:05

## 2023-05-05 RX ADMIN — HEPARIN SODIUM 14 UNITS/KG/HR: 10000 INJECTION, SOLUTION INTRAVENOUS at 06:05

## 2023-05-05 RX ADMIN — MUPIROCIN: 20 OINTMENT TOPICAL at 08:05

## 2023-05-05 RX ADMIN — GABAPENTIN 100 MG: 100 CAPSULE ORAL at 08:05

## 2023-05-05 RX ADMIN — SODIUM BICARBONATE: 84 INJECTION, SOLUTION INTRAVENOUS at 06:05

## 2023-05-05 RX ADMIN — SODIUM BICARBONATE 650 MG TABLET 650 MG: at 08:05

## 2023-05-05 NOTE — PLAN OF CARE
VIRTUAL NURSE:  Labs, notes, orders, and careplan reviewed. VN to be available as needed.    Problem: Adult Inpatient Plan of Care  Goal: Plan of Care Review  Outcome: Ongoing, Progressing  Goal: Patient-Specific Goal (Individualized)  Outcome: Ongoing, Progressing  Goal: Absence of Hospital-Acquired Illness or Injury  Outcome: Ongoing, Progressing  Goal: Optimal Comfort and Wellbeing  Outcome: Ongoing, Progressing  Goal: Readiness for Transition of Care  Outcome: Ongoing, Progressing     Problem: Diabetes Comorbidity  Goal: Blood Glucose Level Within Targeted Range  Outcome: Ongoing, Progressing     Problem: Infection  Goal: Absence of Infection Signs and Symptoms  Outcome: Ongoing, Progressing     Problem: Fall Injury Risk  Goal: Absence of Fall and Fall-Related Injury  Outcome: Ongoing, Progressing     Problem: Bleeding (Revascularization)  Goal: Absence of Bleeding  Outcome: Ongoing, Progressing     Problem: Infection (Revascularization)  Goal: Absence of Infection Signs and Symptoms  Outcome: Ongoing, Progressing

## 2023-05-05 NOTE — PLAN OF CARE
Problem: Physical Therapy  Goal: Physical Therapy Goal  Description: Goals to be met by: 23     Patient will increase functional independence with mobility by performin.  Supine to/from sit with Mod Ind  2.  Bed to/from chair with RW with supervision  3.  Up in chair 2 hours  4.  Ambulate 80' with RW with supervision    Outcome: Ongoing, Progressing

## 2023-05-05 NOTE — PT/OT/SLP EVAL
Physical Therapy Evaluation    Patient Name:  Steph Monroe   MRN:  [id number]    Recommendations:     Discharge Recommendations: home health PT   Discharge Equipment Recommendations: 3-in-1 commode, walker, rolling   Barriers to discharge:  decreased functional mobility tolerance    Assessment:     Steph Monroe is a 77 y.o. female admitted with a medical diagnosis of Critical limb ischemia of right lower extremity.  She presents with the following impairments/functional limitations: weakness, impaired functional mobility, impaired cardiopulmonary response to activity, impaired endurance, gait instability, impaired balance, impaired self care skills, decreased lower extremity function.    Rehab Prognosis: Good; patient would benefit from acute skilled PT services to address these deficits and reach maximum level of function.    Recent Surgery: Procedure(s) (LRB):  Peripheral angiography (N/A) 2 Days Post-Op    Plan:     During this hospitalization, patient to be seen 5 x/week to address the identified rehab impairments via gait training, therapeutic activities, therapeutic exercises, neuromuscular re-education and progress toward the following goals:    Plan of Care Expires:  06/02/23    Subjective     Chief Complaint: right foot/leg pain  Patient/Family Comments/goals: get the right foot better  Pain/Comfort:  Pain Rating 1:  (0-6/10 RLE foot pain, usually worse with walking)  Pain Addressed 1: Reposition, Distraction, Cessation of Activity    Patients cultural, spiritual, Shinto conflicts given the current situation:      Living Environment:  Pt lives with her daughter in a 2SH with her bedroom and bathroom on the first floor.  Pt has a WIS with BIS. Pt's daughter assists with dressing occasionally.  Prior to admission, patients level of function was ambulation with RW household distance.  Equipment used at home: oxygen, RW, BIS, raised toilet seat.  DME owned (not currently used): none.  Upon discharge,  patient will have assistance from her daughter.    Objective:     Communicated with nurse prior to session.  Patient found HOB elevated with peripheral IV, telemetry, PureWick  upon PT entry to room.    General Precautions: Standard, fall  Orthopedic Precautions:    Braces:    Respiratory Status: Nasal cannula, flow 2 L/min    Exams:  Pt is oriented x 4.  Pt has decreased DF AROM and strength in RLE.  Pt grossly has 4-/5 BLE hip flexion and abd, 4+/5 knee ext BLE and 4/5 knee flexion BLE's.    Functional Mobility:  Pt went supine to/from sit with CG.  Pt went sit to stand with CG x 2 with RW.  Pt ambulated 3 steps for/back and 2 steps to HOB with RW with CG. Pt unable to go sit to stand without use of BUE's. Pt scoots to HOB in sitting with supervision.      AM-PAC 6 CLICK MOBILITY  Total Score:18       Treatment & Education:  Pt sat at EOB over 10 min.    Patient left HOB elevated with call button in reach, bed alarm on, and nurse notified.    GOALS:   Multidisciplinary Problems       Physical Therapy Goals          Problem: Physical Therapy    Goal Priority Disciplines Outcome Goal Variances Interventions   Physical Therapy Goal     PT, PT/OT Ongoing, Progressing     Description: Goals to be met by: 23     Patient will increase functional independence with mobility by performin.  Supine to/from sit with Mod Ind  2.  Bed to/from chair with RW with supervision  3.  Up in chair 2 hours  4.  Ambulate 80' with RW with supervision                         History:     Past Medical History:   Diagnosis Date    Cancer     rectum    CKD (chronic kidney disease)     Diabetes mellitus, type 2     Hyperlipidemia     Hypertension     PVD (peripheral vascular disease)        Past Surgical History:   Procedure Laterality Date    COLONOSCOPY N/A 3/28/2016    Procedure: COLONOSCOPY;  Surgeon: Mitul Buitrago Jr., MD;  Location: [facility];  Service: Endoscopy;  Laterality: N/A;    EYE SURGERY Left     cataract removal  with lens implant.    FEMORAL ARTERY STENT  before 2010    RENAL ARTERY STENT  2010       Time Tracking:     PT Received On: 05/05/23  PT Start Time: 1102     PT Stop Time: 1135  PT Total Time (min): 33 min     Billable Minutes: Evaluation 17 and Therapeutic Activity 16      05/05/2023

## 2023-05-05 NOTE — PLAN OF CARE
Occupational therapy evaluation completed, skilled acute OT services to follow while hospitalized. Patient with RLE pain, proximal strength deficits (unable to stand without use of BUE), endurance/activity tolerance limitations, and hypersensitive to RLE touch (warranting increased LB ADL assist). Performs minimal stepping bedside with rolling walker and CGA. Anticipate upon medically stable patient to d/c return to home with family and home health OT / PT to ease reintegration to community setting.    Problem: Occupational Therapy  Goal: Occupational Therapy Goal  Description: Goals to be met by: 6/2/2023     Patient will increase functional independence with ADLs by performing:    LE Dressing with Stand-by Assistance.  Toileting from toilet with Modified [place] for hygiene and clothing management.   Toilet transfer to toilet with Modified [place].  Progression of standing balance from [facility] Standing Balance Scale of 2 (supports self with both UE) to score of 3+ (stands without UE support for at least 30 seconds) to reduce risk of falling during dynamic ADL tasks (ie. Manipulating clothing during LB dressing/toileting)  Functional mobility with least restrictive device and supervision assist with incorporation of fall risk reduction techniques to access bathroom/closets as needed.  Outcome: Ongoing, Progressing

## 2023-05-05 NOTE — PROGRESS NOTES
"[facility] Medicine  Progress Note    Patient Name: Steph Monroe  MRN: [id number]  Patient Class: IP- Inpatient   Admission Date: 5/2/2023  Length of Stay: 3 days  Attending Physician: Saige Arce MD  Primary Care Provider: Mane Carmona MD        Subjective:     Principal Problem:Critical limb ischemia of right lower extremity        HPI:  78 yo female with a PMH of rectal CA, HCC, CKD IV, DM II, HLD, HTN, PVD, s/p nephrectomy, HFpEF, former smoker 10 years ago, presents with RLE walking pain progressively worse for atleast the past year. The distances she can walk have become progressively shorter. She can now only walk around her home before the RLE pain is significant. The pain is worse with walking, but occurs at rest, is intermittent, is shooting, and is rated 6/10. The R foot is also purple. She notes a small scab to her R heel. The only home medications she is taking is lasix and na bicarbonate. She quit all of her other medications about a year ago because she "no longer felt like taking all kind of pills".  She states she unintentionally lost 56 pounds in the past year. She denies HA, vision changes, dizziness, lightheadedness, CP, SOB, abdominal pain, n/v/d/f/c/c, urinary changes, LE edema, ETOH, tobacco, illicit drugs. She lives at home with family.       Overview/Hospital Course:  No notes on file    Interval hx: patient with intermittent tenderness to RLE with ischemic limb. She denies further complaints.       Review of Systems  Objective:     Vital Signs (Most Recent):  Temp: 96.1 °F (35.6 °C) (05/05/23 0804)  Pulse: 65 (05/05/23 0804)  Resp: 18 (05/05/23 0801)  BP: 135/62 (05/05/23 0804)  SpO2: (!) 94 % (05/05/23 0853)   Vital Signs (24h Range):  Temp:  [96.1 °F (35.6 °C)-97.9 °F (36.6 °C)] 96.1 °F (35.6 °C)  Pulse:  [64-75] 65  Resp:  [18-20] 18  SpO2:  [94 %-98 %] 94 %  BP: (113-144)/(53-62) 135/62     Weight: 58.3 kg (128 lb 8.5 oz)  Body mass index is 23.51 " kg/m².    Physical Exam  Constitutional:       Appearance: Normal appearance.   HENT:      Head: Normocephalic and atraumatic.      Nose: Nose normal.      Mouth/Throat:      Mouth: Mucous membranes are moist.      Pharynx: Oropharynx is clear.   Eyes:      Extraocular Movements: Extraocular movements intact.      Conjunctiva/sclera: Conjunctivae normal.   Cardiovascular:      Rate and Rhythm: Normal rate and regular rhythm.      Heart sounds: Normal heart sounds.      Comments: Absent pedal pulses   LLE and foot warm   R foot purple and cold   Pulmonary:      Effort: Pulmonary effort is normal.      Breath sounds: Normal breath sounds.   Abdominal:      General: Abdomen is flat. Bowel sounds are normal.      Palpations: Abdomen is soft.   Musculoskeletal:         General: Tenderness present. No swelling. Normal range of motion.      Cervical back: Normal range of motion and neck supple.      Comments: RLE tender    Skin:     General: Skin is warm and dry.   Neurological:      General: No focal deficit present.      Mental Status: She is alert and oriented to person, place, and time.           Significant Labs: All pertinent labs within the past 24 hours have been reviewed.    Significant Imaging: I have reviewed all pertinent imaging results/findings within the past 24 hours.      Assessment/Plan:      * Critical limb ischemia of right lower extremity    Pain with increased claudication unknown duration, at least a year. Now with absent pulse and R foot discoloration   On ASA, plavix, heparin gtt   No signs of infection   Consult wound care for small skin break   Cardiology planning peripheral intervention tomorrow   NPO after MN   General surgery consulted   PRN pain regime   Imaging reviewed: Lower extremity peripheral vascular disease with velocity parameters as above.  Majority monophasic waveforms throughout as can be seen with vessel hardening related to atherosclerosis versus more central, upstream  stenosis.     Velocity elevation at the left deep profundus artery as can be seen with sequela of hemodynamically significant stenosis.     Limited color visualization with relative reduced flow velocity at the left proximal SFA.      Bones are demineralized.  Dorsal and plantar calcaneal enthesopathic spurring is present.  No definite fracture or effusion.  Prominent atherosclerotic vascular calcifications    5/3  S/p peripheral angiogram    Aorta: Distal aneurysmal segment     Right (views are not dedicated as unable to cross up and over from L-CFA)  Common iliaic artery: Prior stent with severe ISR. 95-99% stenosis distal to the stent  External iliac artery: 70-80% stenosis  Common femoral artery: Occluded  Superficial femoral artery: Possibly occluded. Possible reconstitution distal SFA with flow into the popliteal artery  Profunda: Flow present  Flow noted in the AT and peroneal; not well visualized. Daniel patent     Left  Common iliaic artery: Prior stent with ISR, 80% at the proximal segment  External iliac artery: Mild-moderate diffuse disease  Common femoral artery: Distal CFA 99% occluded  Superficial femoral artery: Occluded, reconstitutes via profunda distal segment  Profunda: Patent  Popliteal artery: Patent  AT: Normal  PT Trunk: Normal  PT: Normal  Peroneal: Diminutive vessel  DP: Fills likely via collaterals (not well visualized)     Recommendations  - Restart heparin gtt, titrate per PTT  - Aspirin, clopidogrel  - High intensity statin    5/4  per cardiology continue ASA, plavix, high intensity statin   Add gabapentin   Continue heparin gtt  NPO after MN  Plan for repeat peripheral angiogram 5/5 5/5 caution with repeat angiogram given decline in renal function- will discuss with cardiology     Cirrhosis    HCC  Per chart review: -bianka continues between oncologist and GI; PET scan without malignancy  -last paracentesis 11/2022 - gets albumin replacement  -Pt stopped all meds 11/2022 - she feels  better and she has noticed that her ascitic fluid accumulation has not returned as quickly compared to when she was on medications  -cytology neg from ascitic fluid - low yield; follows with Yovanny      Pulmonary emphysema  Lungs clear   PRN duoneb ordered     Chronic heart failure with preserved ejection fraction    Aortic Stenosis   Pulmonary HTN     Patient is identified as having Diastolic (HFpEF) heart failure that is Chronic. CHF is currently controlled. Latest ECHO performed and demonstrates- No results found for this or any previous visit.  . Continue Beta Blocker Furosemide and monitor clinical status closely. Monitor on telemetry. Patient is off CHF pathway.  Monitor strict Is&Os and daily weights.  Place on fluid restriction of 1.5 L. Continue to stress to patient importance of self efficacy and  on diet for CHF. Last BNP reviewed- and noted below No results for input(s): BNP, BNPTRIAGEBLO in the last 168 hours..      -repeat 2d echo    The left ventricle is normal in size with concentric remodeling and normal systolic function.   The estimated ejection fraction is 65%.   Indeterminate left ventricular diastolic function.   There is mild mitral stenosis.   The mean diastolic gradient across the mitral valve is 5 mmHg at a heart rate of 66 bpm.   Normal right ventricular size with normal right ventricular systolic function.   There is mild aortic valve stenosis.   Aortic valve area is 1.28 cm2; peak velocity is 2.33 m/s; mean gradient is 10 mmHg.   Mild-to-moderate aortic regurgitation.   Normal central venous pressure (3 mmHg).   The estimated PA systolic pressure is 35 mmHg.    -hold lasix       Stage 4 chronic kidney disease    Hx of nephrectomy   Follows nephrology op     etiology include HTN/DM/microvascular Dz/nephrectomy; BL 1.8-2.2  Cr 2.3 - will bolus 250 ml IV LR  s/p angiogram and poor PO intake     Cr worsening 2.6  -urine studies   -renal US   -consult nephrology     History  of rectal or anal cancer    Patient reports treated with radiation and chemotherapy 6 years ago and was discharged from clinic     Coronary artery disease involving native coronary artery of native heart without angina pectoris    Patient with known CAD s/p unknown coronary anatomy, which is controlled Will continue ASA, Plavix and Statin and monitor for S/Sx of angina/ACS. Continue to monitor on telemetry.       PVD (peripheral vascular disease)    PAD   Hx of iliac, renal, femoral stents   See critical limb     HLD (hyperlipidemia)  High intensity statin   Lab Results   Component Value Date    LDLCALC 94.4 05/02/2023         Essential hypertension  Hypertensive Urgency  PRN IV hydralazine   Resume lasix - stop with worsening renal function   Coreg  PO hydralazine   PO nifedipine   Patient has not taken home BP medications in atleast a year   Cardiac monitor     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 hypertension    Lab Results   Component Value Date    HGBA1C 5.0 05/04/2023     Continue diet control       VTE Risk Mitigation (From admission, onward)         Ordered     heparin 25,000 units in dextrose 5% (100 units/ml) IV bolus from bag - ADDITIONAL PRN BOLUS - 60 units/kg  As needed (PRN)        Question:  Heparin Infusion Adjustment (DO NOT MODIFY ANSWER)  Answer:  \\[email or web address]\epic\Images\Pharmacy\HeparinInfusions\heparin HIGH INTENSITY nomogram for [facility] [id number]    05/04/23 1711     heparin 25,000 units in dextrose 5% (100 units/ml) IV bolus from bag - ADDITIONAL PRN BOLUS - 30 units/kg  As needed (PRN)        Question:  Heparin Infusion Adjustment (DO NOT MODIFY ANSWER)  Answer:  \\[email or web address]\epic\Images\Pharmacy\HeparinInfusions\heparin HIGH INTENSITY nomogram for [facility] [id number].pdf    05/04/23 1711     heparin 25,000 units in dextrose 5% (100 units/ml) IV bolus from bag INITIAL BOLUS  Once        Question:  Heparin Infusion Adjustment (DO NOT MODIFY ANSWER)  Answer:   [email or web address]\epic\Images\Pharmacy\HeparinInfusions\heparin HIGH INTENSITY nomogram for [facility] [id number]    05/04/23 1711     heparin 25,000 units in dextrose 5% 250 mL (100 units/mL) infusion HIGH INTENSITY nomogram - [facility]  Continuous        Question Answer Comment   Heparin Infusion Adjustment (DO NOT MODIFY ANSWER) [email or web address]\epic\Images\Pharmacy\HeparinInfusions\heparin HIGH INTENSITY nomogram for [facility] [id number].pdf    Begin at (in units/kg/hr) 18        05/04/23 1711     heparin infusion 1,000 units/500 ml in 0.9% NaCl (pressure line flush)  Intra-op continuous PRN         05/03/23 0856     Reason for No Pharmacological VTE Prophylaxis  Once        Question:  Reasons:  Answer:  IV Heparin w/in 24 hrs. Pre or Post-Op    05/02/23 1237     IP VTE HIGH RISK PATIENT  Once         05/02/23 1237     Place sequential compression device  Until discontinued         05/02/23 1237                Discharge Planning   PÉREZ: 5/4/2023     Code Status: Full Code   Is the patient medically ready for discharge?:     Reason for patient still in hospital (select all that apply): Patient trending condition  Discharge Plan A: Home, Home with family                  Aspen Pagan NP  Department of Hospital Medicine   [facility]

## 2023-05-05 NOTE — PROGRESS NOTES
"Surgery follow up  BP (!) 116/50 (BP Location: Left arm, Patient Position: Lying)   Pulse 68   Temp 96.3 °F (35.7 °C) (Oral)   Resp 16   Ht 5' 2" (1.575 m)   Wt 58.3 kg (128 lb 8.5 oz)   SpO2 97%   BMI 23.51 kg/m²   I/O last 3 completed shifts:  In: -   Out: 1051 [Urine:1050; Stool:1]  No intake/output data recorded.    Recent Results (from the past 336 hour(s))   CBC auto differential    Collection Time: 05/05/23  5:18 AM   Result Value Ref Range    WBC 4.55 3.90 - 12.70 K/uL    Hemoglobin 9.4 (L) 12.0 - 16.0 g/dL    Hematocrit 30.7 (L) 37.0 - 48.5 %    Platelets 119 (L) 150 - 450 K/uL   CBC auto differential    Collection Time: 05/04/23  5:18 PM   Result Value Ref Range    WBC 4.55 3.90 - 12.70 K/uL    Hemoglobin 9.3 (L) 12.0 - 16.0 g/dL    Hematocrit 30.0 (L) 37.0 - 48.5 %    Platelets 115 (L) 150 - 450 K/uL   CBC auto differential    Collection Time: 05/04/23  2:56 AM   Result Value Ref Range    WBC 4.33 3.90 - 12.70 K/uL    Hemoglobin 9.4 (L) 12.0 - 16.0 g/dL    Hematocrit 30.5 (L) 37.0 - 48.5 %    Platelets 121 (L) 150 - 450 K/uL     Recent Results (from the past 336 hour(s))   Basic metabolic panel    Collection Time: 05/04/23  1:03 PM   Result Value Ref Range    Sodium 142 136 - 145 mmol/L    Potassium 4.3 3.5 - 5.1 mmol/L    Chloride 114 (H) 95 - 110 mmol/L    CO2 21 (L) 23 - 29 mmol/L    BUN 74 (H) 8 - 23 mg/dL    Creatinine 2.3 (H) 0.5 - 1.4 mg/dL    Calcium 8.6 (L) 8.7 - 10.5 mg/dL    Anion Gap 7 (L) 8 - 16 mmol/L     Foot unchanged , needs vascular intervention .  to decide.  "

## 2023-05-05 NOTE — PROGRESS NOTES
"[facility] ([facility])  Adult Nutrition  Progress Note    SUMMARY       Recommendations    Recommendation:  1. Resume diet and supplements when medically accpetable.   2. Monitor weight/labs.   3. RD to continue to follow to monitor po intake    Goals:  Pt will tolerate diet with at least 50-75% intake at meals by RD follow up  Nutrition Goal Status: progressing towards goal  Communication of RD Recs: reviewed with RN    Assessment and Plan  No nutrition dx at this time     Malnutrition Assessment  Unable to assess NFPE 2/2 pt off unit at visit      Reason for Assessment  Reason For Assessment: RD follow-up  Diagnosis:  (critical limb ischemia RLE)  Relevant Medical History: HTN, DM, HLD, PVD, rectum cancer  General Information Comments: Pt currently NPO for procedure. Pt was previously on Cardiac Renal consistent carb diet with Boost Glucose. Noted fair intake at meals. Francesco 17- R wrist, L groin incisions. Weight stable. Pt was off the unit for procedure at visit. Unable to assess NFPE  Nutrition Discharge Planning: pt to d/c on Cardiac consistent carb diet    Nutrition Risk Screen  Nutrition Risk Screen: no indicators present    Nutrition/Diet History  Food Preferences: no Buddhism or cultural food prefs identified  Factors Affecting Nutritional Intake: None identified at this time    Anthropometrics  Temp: 96.3 °F (35.7 °C)  Height Method: Stated  Height: 5' 2" (157.5 cm)  Height (inches): 62 in  Weight Method: Bed Scale  Weight: 58.3 kg (128 lb 8.5 oz)  Weight (lb): 128.53 lb  Ideal Body Weight (IBW), Female: 110 lb  % Ideal Body Weight, Female (lb): 116.85 %  BMI (Calculated): 23.5  BMI Grade: 18.5-24.9 - normal  Usual Body Weight (UBW), k.2 kg (3/28)  % Usual Body Weight: 102.14  % Weight Change From Usual Weight: 1.92 %     Lab/Procedures/Meds  Pertinent Labs Reviewed: reviewed  Pertinent Labs Comments: BUN 78H, Crea 2.6H, Phos 4.8H, Alb 3.0L  Pertinent Medications Reviewed: reviewed  Pertinent " Medications Comments: aspirin, gabapentin, heparin    Estimated/Assessed Needs  Weight Used For Calorie Calculations: 58.3 kg (128 lb 8.5 oz)  Energy Calorie Requirements (kcal): 1749 (30 kcal/kg)  Energy Need Method: Kcal/kg  Protein Requirements: 58g (1.0g/kg)  Weight Used For Protein Calculations: 58.3 kg (128 lb 8.5 oz)  Estimated Fluid Requirement Method: RDA Method  RDA Method (mL): 1749     Nutrition Prescription Ordered  Current Diet Order: NPO    Evaluation of Received Nutrient/Fluid Intake  I/O: 0/751  Energy Calories Required: not meeting needs  Protein Required: not meeting needs  Fluid Required: not meeting needs  Comments: LBM 5/2  % Intake of Estimated Energy Needs: Other: NPO today for procedure  % Meal Intake: NPO    Nutrition Risk  Level of Risk/Frequency of Follow-up:  (2xweekly)     Monitor and Evaluation  Food and Nutrient Intake: food and beverage intake  Food and Nutrient Adminstration: diet order  Physical Activity and Function: nutrition-related ADLs and IADLs  Anthropometric Measurements: weight  Biochemical Data, Medical Tests and Procedures: electrolyte and renal panel  Nutrition-Focused Physical Findings: overall appearance     Nutrition Follow-Up  RD Follow-up?: Yes

## 2023-05-05 NOTE — PROGRESS NOTES
Seen this pm  Spoke with patient and niece  Reviewed the angiogram with CLI team           Vitals:    05/04/23 1557 05/04/23 1702 05/04/23 2017 05/04/23 2030   BP:  (!) 144/59  (!) 113/53   Pulse: 71 75 70 70   Resp:   20 20   Temp:  97.9 °F (36.6 °C)  96.8 °F (36 °C)   TempSrc:  Oral  Oral   SpO2:  97% 96% 97%   Weight:       Height:                LABS  CBC  Recent Labs   Lab 05/03/23 1116 05/04/23 0256 05/04/23  1718   WBC 4.65 4.33 4.55   RBC 3.47* 3.36* 3.34*   HGB 9.6* 9.4* 9.3*   HCT 30.8* 30.5* 30.0*   * 121* 115*   MCV 89 91 90   MCH 27.7 28.0 27.8   MCHC 31.2* 30.8* 31.0*     BMP  Recent Labs   Lab 05/03/23 0619 05/04/23 0255 05/04/23  1303    142 142   K 4.4 4.3 4.3   CO2 18* 14* 21*   * 115* 114*   BUN 60* 73* 74*   CREATININE 2.1* 2.3* 2.3*   GLU 82 93 109       POCT-Glucose  POCT Glucose   Date Value Ref Range Status   05/04/2023 129 (H) 70 - 110 mg/dL Final   05/04/2023 119 (H) 70 - 110 mg/dL Final   05/04/2023 96 70 - 110 mg/dL Final   05/03/2023 177 (H) 70 - 110 mg/dL Final   05/03/2023 90 70 - 110 mg/dL Final   05/03/2023 75 70 - 110 mg/dL Final   05/03/2023 98 70 - 110 mg/dL Final       Recent Labs   Lab 05/02/23 1114 05/03/23 0619 05/04/23 0255 05/04/23  1303   CALCIUM 9.6 9.1 8.8 8.6*   MG 2.0 2.0 2.1  --    PHOS 4.0 4.6* 4.9*  --      LFT  Recent Labs   Lab 05/02/23 1114 05/03/23 0619 05/04/23  0255   PROT 7.9 7.1 7.1   ALBUMIN 3.3* 3.0* 3.0*   BILITOT 0.7 0.4 0.4   AST 21 19 23   ALKPHOS 89 83 81   ALT 11 9* 10     GFR     COAGS  Recent Labs   Lab 05/02/23  1243 05/02/23  1921 05/03/23  1116 05/03/23  1916 05/04/23  0255 05/04/23  1718   INR 1.1  --  1.2  --   --  1.1   APTT 25.2   < > 68.3* 52.7* 58.8* 26.8    < > = values in this interval not displayed.     CE  No results for input(s): TROPONINI, CKTOTAL, CKMB in the last 168 hours.  ABGs  No results for input(s): PH, PCO2, PO2, HCO3, POCSATURATED, BE in the last 24 hours.  BNP  No results for input(s): BNP in  the last 168 hours.    LAST HbA1c  Lab Results   Component Value Date    HGBA1C 5.0 05/04/2023       Lipid panel  Lab Results   Component Value Date    CHOL 154 05/02/2023    CHOL 120 03/22/2022     Lab Results   Component Value Date    HDL 50 05/02/2023    HDL 38 (L) 03/22/2022     Lab Results   Component Value Date    LDLCALC 94.4 05/02/2023    LDLCALC 62 03/22/2022     Lab Results   Component Value Date    TRIG 48 05/02/2023    TRIG 128 03/22/2022     Lab Results   Component Value Date    CHOLHDL 32.5 05/02/2023    CHOLHDL 3.16 03/22/2022          Imp:      CLI with R foot rest pain  HTN  HLP  PAD           Plan:      Dedicated angiogram of R leg to determine revascularization strategy  If she needs surgery we will communicate with vascular surgery  She expressed verbal understanding

## 2023-05-05 NOTE — PLAN OF CARE
2 cc of air removed from L radial vasc band. No hematoma or bleeding noted. +2 tip radial pulses palpated. Skin is normal in color, warm to touch, < 3 sec cap refill. VSS. Will continue to monitor pt for safety and needs.

## 2023-05-05 NOTE — PLAN OF CARE
Patient transfered to cath lab bay # 5via stretcher with side rails up x2. Pt drowsy but able to follow commands. Pt is stable when connecting to cardiac monitors. VSS.     Left radial vasc band in place with 12cc of air in band. Site is CDI. No redness, bruising, or hematoma noted around site. +2 tip radial pulses palpated. Dopplered .tip pedal pulses. Skin normal in color and warm to touch, <3 sec cap refill.  Fall risk precautions given and patient acknowledges.  AIDET completed to pt.. Will continue to monitor patient.

## 2023-05-05 NOTE — PLAN OF CARE
Remaining air removed from L radial vasc band. Gauze and tegaderm dressing applied. Site is CDI. No bleeding or hematoma noted around site. Site and surrounding areas soft. +2 tip radial pulses palpated. Skin normal in color, warm to touch, < 3 sec cap refill. Will continue to monitor pt.

## 2023-05-05 NOTE — PROGRESS NOTES
"[facility] Medicine  Progress Note     Patient Name: Steph Monroe  MRN: [id number]  Patient Class: IP- Inpatient     Admission Date: 5/2/2023  Length of Stay: 2 days  Attending Physician: Saige Arce MD  Primary Care Provider: Mane Carmona MD           Subjective:      Principal Problem:Critical limb ischemia of right lower extremity           HPI:  76 yo female with a PMH of rectal CA, HCC, CKD IV, DM II, HLD, HTN, PVD, s/p nephrectomy, HFpEF, former smoker 10 years ago, presents with RLE walking pain progressively worse for atleast the past year. The distances she can walk have become progressively shorter. She can now only walk around her home before the RLE pain is significant. The pain is worse with walking, but occurs at rest, is intermittent, is shooting, and is rated 6/10. The R foot is also purple. She notes a small scab to her R heel. The only home medications she is taking is lasix and na bicarbonate. She quit all of her other medications about a year ago because she "no longer felt like taking all kind of pills".  She states she unintentionally lost 56 pounds in the past year. She denies HA, vision changes, dizziness, lightheadedness, CP, SOB, abdominal pain, n/v/d/f/c/c, urinary changes, LE edema, ETOH, tobacco, illicit drugs. She lives at home with family.         Overview/Hospital Course:  No notes on file      Interval hx: patient with intermittent tenderness to RLE with ischemic limb. S/p peripheral angiogram 5/3. Awaiting further cardiology recs. Continue heparin gtt and pain control.         Review of Systems  Objective:           05/04/23 0811 98.4 °F (36.9 °C) -- 72 18 154/68 Abnormal  Lying 98 98 %     Physical Exam  Constitutional:       Appearance: Normal appearance.   HENT:      Head: Normocephalic and atraumatic.      Nose: Nose normal.      Mouth/Throat:      Mouth: Mucous membranes are moist.      Pharynx: Oropharynx is clear.   Eyes:      Extraocular Movements: " Extraocular movements intact.      Conjunctiva/sclera: Conjunctivae normal.   Cardiovascular:      Rate and Rhythm: Normal rate and regular rhythm.      Heart sounds: Normal heart sounds.      Comments: Absent pedal pulses   LLE and foot warm   R foot purple and cold   Pulmonary:      Effort: Pulmonary effort is normal.      Breath sounds: Normal breath sounds.   Abdominal:      General: Abdomen is flat. Bowel sounds are normal.      Palpations: Abdomen is soft.   Musculoskeletal:         General: Tenderness present. No swelling. Normal range of motion.      Cervical back: Normal range of motion and neck supple.      Comments: RLE tender    Skin:     General: Skin is warm and dry.   Neurological:      General: No focal deficit present.      Mental Status: She is alert and oriented to person, place, and time.            Significant Labs: All pertinent labs within the past 24 hours have been reviewed.     Significant Imaging: I have reviewed all pertinent imaging results/findings within the past 24 hours.        Assessment/Plan:      Pulmonary  Pulmonary emphysema  Lungs clear   PRN duoneb ordered      Cardiac/Vascular  Chronic heart failure with preserved ejection fraction     Aortic Stenosis   Pulmonary HTN      Patient is identified as having Diastolic (HFpEF) heart failure that is Chronic. CHF is currently controlled. Latest ECHO performed and demonstrates- No results found for this or any previous visit.  . Continue Beta Blocker Furosemide and monitor clinical status closely. Monitor on telemetry. Patient is off CHF pathway.  Monitor strict Is&Os and daily weights.  Place on fluid restriction of 1.5 L. Continue to stress to patient importance of self efficacy and  on diet for CHF. Last BNP reviewed- and noted below No results for input(s): BNP, BNPTRIAGEBLO in the last 168 hours..        -repeat 2d echo   The left ventricle is normal in size with concentric remodeling and normal systolic function.  The  estimated ejection fraction is 65%.  Indeterminate left ventricular diastolic function.  There is mild mitral stenosis.  The mean diastolic gradient across the mitral valve is 5 mmHg at a heart rate of 66 bpm.  Normal right ventricular size with normal right ventricular systolic function.  There is mild aortic valve stenosis.  Aortic valve area is 1.28 cm2; peak velocity is 2.33 m/s; mean gradient is 10 mmHg.  Mild-to-moderate aortic regurgitation.  Normal central venous pressure (3 mmHg).  The estimated PA systolic pressure is 35 mmHg.        Coronary artery disease involving native coronary artery of native heart without angina pectoris     Patient with known CAD s/p unknown coronary anatomy, which is controlled Will continue ASA, Plavix and Statin and monitor for S/Sx of angina/ACS. Continue to monitor on telemetry.         PVD (peripheral vascular disease)     PAD   Hx of iliac, renal, femoral stents   See critical limb      HLD (hyperlipidemia)  High intensity statin         Lab Results   Component Value Date     LDLCALC 94.4 05/02/2023            Essential hypertension  Hypertensive Urgency  PRN IV hydralazine   Resume lasix   Coreg  PO hydralazine   PO nifedipine   Patient has not taken home BP medications in atleast a year   Cardiac monitor      Renal/  Stage 4 chronic kidney disease     Hx of nephrectomy   Follows nephrology op      etiology include HTN/DM/microvascular Dz/nephrectomy; BL 1.8-2.2  Cr 2.3 - will bolus 250 ml IV LR  s/p angiogram and poor PO intake      Oncology  History of rectal or anal cancer     Patient reports treated with radiation and chemotherapy 6 years ago and was discharged from clinic      Endocrine  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 hypertension           Lab Results   Component Value Date     HGBA1C 5.0 05/04/2023      Continue diet control      GI  Cirrhosis     HCC  Per chart review: -carlton continues between oncologist and GI; PET scan without malignancy  -last paracentesis  11/2022 - gets albumin replacement  -Pt stopped all meds 11/2022 - she feels better and she has noticed that her ascitic fluid accumulation has not returned as quickly compared to when she was on medications  -cytology neg from ascitic fluid - low yield; follows with Yovanny Freitas  * Critical limb ischemia of right lower extremity     Pain with increased claudication unknown duration, at least a year. Now with absent pulse and R foot discoloration   On ASA, plavix, heparin gtt   No signs of infection   Consult wound care for small skin break   Cardiology planning peripheral intervention tomorrow   NPO after MN   General surgery consulted   PRN pain regime   Imaging reviewed: Lower extremity peripheral vascular disease with velocity parameters as above.  Majority monophasic waveforms throughout as can be seen with vessel hardening related to atherosclerosis versus more central, upstream stenosis.     Velocity elevation at the left deep profundus artery as can be seen with sequela of hemodynamically significant stenosis.     Limited color visualization with relative reduced flow velocity at the left proximal SFA.        Bones are demineralized.  Dorsal and plantar calcaneal enthesopathic spurring is present.  No definite fracture or effusion.  Prominent atherosclerotic vascular calcifications     5/3  S/p peripheral angiogram     Aorta: Distal aneurysmal segment     Right (views are not dedicated as unable to cross up and over from L-CFA)  Common iliaic artery: Prior stent with severe ISR. 95-99% stenosis distal to the stent  External iliac artery: 70-80% stenosis  Common femoral artery: Occluded  Superficial femoral artery: Possibly occluded. Possible reconstitution distal SFA with flow into the popliteal artery  Profunda: Flow present  Flow noted in the AT and peroneal; not well visualized. Daniel patent     Left  Common iliaic artery: Prior stent with ISR, 80% at the proximal segment  External iliac artery:  Mild-moderate diffuse disease  Common femoral artery: Distal CFA 99% occluded  Superficial femoral artery: Occluded, reconstitutes via profunda distal segment  Profunda: Patent  Popliteal artery: Patent  AT: Normal  PT Trunk: Normal  PT: Normal  Peroneal: Diminutive vessel  DP: Fills likely via collaterals (not well visualized)     Recommendations  - Restart heparin gtt, titrate per PTT  - Aspirin, clopidogrel  - High intensity statin     5/4 per cardiology continue ASA, plavix, high intensity statin   Add gabapentin   Continue heparin gtt  NPO after MN  Plan for repeat peripheral angiogram 5/5               VTE Risk Mitigation (From admission, onward)           Ordered       heparin 25,000 units in dextrose 5% 250 mL (100 units/mL) infusion HIGH INTENSITY nomogram - [facility]  Continuous        Question Answer Comment   Heparin Infusion Adjustment (DO NOT MODIFY ANSWER) \\[email or web address]\Images\Pharmacy\HeparinInfusions\heparin HIGH INTENSITY nomogram for [facility] [id number].[email or web address]     Begin at (in units/kg/hr) 14         05/03/23 1034       heparin 25,000 units in dextrose 5% (100 units/ml) IV bolus from bag - ADDITIONAL PRN BOLUS - 60 units/kg  As needed (PRN)        Question:  Heparin Infusion Adjustment (DO NOT MODIFY ANSWER)  Answer:  \\[email or web address]\Images\Pharmacy\HeparinInfusions\heparin HIGH INTENSITY nomogram for [facility] [id number].pdf    05/03/23 1009       heparin 25,000 units in dextrose 5% (100 units/ml) IV bolus from bag - ADDITIONAL PRN BOLUS - 30 units/kg  As needed (PRN)        Question:  Heparin Infusion Adjustment (DO NOT MODIFY ANSWER)  Answer:  \\[email or web address]\Images\Pharmacy\HeparinInfusions\heparin HIGH INTENSITY nomogram for [facility] [id number].pdf    05/03/23 1009       heparin (porcine) injection  As needed (PRN)         05/03/23 0913       heparin infusion 1,000 units/500 ml in 0.9% NaCl (pressure line flush)  Intra-op continuous PRN         05/03/23 0856       Reason for No Pharmacological VTE Prophylaxis  Once         Question:  Reasons:  Answer:  IV Heparin w/in 24 hrs. Pre or Post-Op    05/02/23 1237       IP VTE HIGH RISK PATIENT  Once         05/02/23 1237       Place sequential compression device  Until discontinued         05/02/23 1237                    Discharge Planning   PÉREZ:      Code Status: Full Code   Is the patient medically ready for discharge?:     Reason for patient still in hospital (select all that apply): Patient trending condition                        Aspen Pagan NP  Department of [facility] Medicine   [facility]

## 2023-05-05 NOTE — PT/OT/SLP EVAL
Occupational Therapy   Evaluation and treatment    Name: Steph Monroe  MRN: [id number]  Admitting Diagnosis: Critical limb ischemia of right lower extremity  Recent Surgery: Procedure(s) (LRB):  Peripheral angiography (N/A) 2 Days Post-Op    Recommendations:     Discharge Recommendations: home health PT, home health OT  Discharge Equipment Recommendations:  none  Barriers to discharge:  Other (Comment) (no therapy barriers)    Assessment:     Steph Monroe is a 77 y.o. female with a medical diagnosis of Critical limb ischemia of right lower extremity.  She presents with ..The primary encounter diagnosis was Critical limb ischemia of right lower extremity. Diagnoses of Chest pain, Aortic stenosis, and PVD (peripheral vascular disease) were also pertinent to this visit.  . Performance deficits affecting function: weakness, impaired endurance, impaired cognition, impaired joint extensibility, impaired muscle length, decreased ROM, impaired sensation, impaired self care skills, impaired functional mobility, decreased lower extremity function, impaired skin, gait instability, impaired balance, pain.      Occupational therapy evaluation completed, skilled acute OT services to follow while hospitalized. Patient with RLE pain, proximal strength deficits (unable to stand without use of BUE), endurance/activity tolerance limitations, and hypersensitive to RLE touch (warranting increased LB ADL assist). Performs minimal stepping bedside with rolling walker and CGA. Anticipate upon medically stable patient to d/c return to home with family and home health OT / PT to ease reintegration to community setting.    Rehab Prognosis: Good; patient would benefit from acute skilled OT services to address these deficits and reach maximum level of function.       Plan:     Patient to be seen 3 x/week to address the above listed problems via self-care/home management, therapeutic activities, therapeutic exercises, neuromuscular  re-education  Plan of Care Expires: 05/05/23  Plan of Care Reviewed with: patient    Subjective     Chief Complaint: reports recent weight loss, ongoing RLE pain that is intermittent  Patient/Family Comments/goals: Patient is hopeful to have restoration of function and improved medical status    Occupational Profile:  Living Environment: Patient resides at home with daughter whom does not work. They live in a 2 story home, 1 small step to enter without handrails. For past 7 years patient has not ascended stairs and thus does not go to 2nd floor. Patient's bedroom/bathroom on 1st floor. Walk in shower with built in seat and shower chair  Previous level of function: Prior to admission patient was ambulatory with household mobility with rolling walker, occasional without assistive device with modified independence. Patient has occasional light assist for shower/bathing and lower body dressing but usually can perform without assist. Patient relies on daughter for IADLs, transportation, etc. Patient enjoys doing cross word puzzles.  Equipment Used at Home: walker, rolling, oxygen, shower chair  Assistance upon Discharge: Patient will have assistance from daughter.    Pain/Comfort:  Pain Rating 1: 1/10 (RLE)  Pain Addressed 1: Reposition, Cessation of Activity, Nurse notified  Pain Rating Post-Intervention 1: other (see comments) (endorses did not increase in pain during session, however, reports intermittently pain increases during the day)      Objective:     Communicated with: nursing prior to session.  Patient found HOB elevated with telemetry, peripheral IV, bed alarm, PureWick upon OT entry to room.    General Precautions: Standard, fall  Orthopedic Precautions: N/A  Braces: N/A    Occupational Performance:    Bed Mobility:    Patient completed Supine to Sit with contact guard assistance  Patient completed Sit to Supine with contact guard assistance  Patient completed scooting upright with verbal cues   only.    Functional Mobility/Transfers:  Patient completed Sit <> Stand Transfer with contact guard assistance  with  rolling walker   Functional Mobility: sitting balance: good- although with right shoulder depression/ right hip hike noted (denies scoliosis).   Sit to stand: requires use of BUE due to proximal LB strength deficits  [facility] Standing Balance Scale of 2 (supports self with both UE); able to stand without use of device for ~ 5 seconds  Ambulated with rolling walker a few steps forward / backwards than side step to head of bed with CGA, min walker management cues.    Activities of Daily Living:  Grooming: setup; requiring items at bedside vs true bathroom context  Upper Body Dressing: set up  Lower Body Dressing: moderate assistance ; hypersensitivity to RLE; increased assist; instructed on cross leg approach  Toileting: dependence ; purewick ; does not trial bsc with therapist but anticipate ~ moderate assist needed    Cognitive/Visual Perceptual:  Cognitive/Psychosocial Skills:     -       Oriented to: Person, Place, Time, and Situation   -       Follows Commands/attention:Follows multistep  commands  -       Communication: clear/fluent  -       Memory: functional cognition intact  -       Safety awareness/insight to disability: min deficits; reports self-stopping majority of medications recently without medical oversight   -       Mood/Affect/Coping skills/emotional control: Cooperative and Pleasant  Visual/Perceptual:      -Intact      Physical Exam:  Postural examination/scapula alignment:    -       Rounded shoulders  -       Lateral weight shift of hips  -       right shoulder depressed  Skin integrity: discoloration in RLE; small cut on right small toe; mild redness on L foot  Edema:  None noted  Sensation:    -       Impaired  hypersenstive to touch on RLE - distal mid calf to foot  Upper Extremity Range of Motion:     -       Right Upper Extremity: WFL except limited cervical ROM  -        Left Upper Extremity: WFL except limited cervical ROM  Upper Extremity Strength:    -       Right Upper Extremity: grossly at 4-/5  -       Left Upper Extremity: grossly at 4-/5   Strength:    -       Right Upper Extremity: WFL  -       Left Upper Extremity: WFL    AMPAC 6 Click ADL:  AMPA Total Score: 20    Treatment & Education:  Patient educated on role of OT / POC development. Increased time for explanation of OT role. Patient reporting minimal RLE pain (rated at 1/10, but states can get up to a 6/10 and intermittently will hurt with increased pain if touched). Occupational profile developed. Denies recent falls. PT entered room for overlap evaluation. Guided to eob for assessment. Instructed patient in guided ADL / functional mobility initial assessment/ training. Patient denying SOB, but reporting mild fatigue. Sits eob with SBA primarily with occasional placement of UE on bed to stabilize. Instructed on gentle head turns, educated on importance of body repositioning, and recommendations for continued therapy with verbalized understanding. Returned to bed. Provided patient with pressure relief boot to RLE for offloading purposes. Elevated BLE.    Patient left HOB elevated with all lines intact, call button in reach, bed alarm on, and nursing notified    GOALS:   Multidisciplinary Problems       Occupational Therapy Goals          Problem: Occupational Therapy    Goal Priority Disciplines Outcome Interventions   Occupational Therapy Goal     OT, PT/OT Ongoing, Progressing    Description: Goals to be met by: 6/2/2023     Patient will increase functional independence with ADLs by performing:    LE Dressing with Stand-by Assistance.  Toileting from toilet with Modified [place] for hygiene and clothing management.   Toilet transfer to toilet with Modified [place].  Progression of standing balance from [facility] Standing Balance Scale of 2 (supports self with both UE) to score of 3+  (stands without UE support for at least 30 seconds) to reduce risk of falling during dynamic ADL tasks (ie. Manipulating clothing during LB dressing/toileting)  Functional mobility with least restrictive device and supervision assist with incorporation of fall risk reduction techniques to access bathroom/closets as needed.                       History:     Past Medical History:   Diagnosis Date    Cancer     rectum    CKD (chronic kidney disease)     Diabetes mellitus, type 2     Hyperlipidemia     Hypertension     PVD (peripheral vascular disease)          Past Surgical History:   Procedure Laterality Date    COLONOSCOPY N/A 3/28/2016    Procedure: COLONOSCOPY;  Surgeon: Mitul Buitrago Jr., MD;  Location: [facility] ENDO;  Service: Endoscopy;  Laterality: N/A;    EYE SURGERY Left     cataract removal with lens implant.    FEMORAL ARTERY STENT  before 2010    PERIPHERAL ANGIOGRAPHY N/A 5/3/2023    Procedure: Peripheral angiography;  Surgeon: Rasheeda Ny MD;  Location: [facility] CATH LAB/EP;  Service: Cardiology;  Laterality: N/A;    RENAL ARTERY STENT  2010       Time Tracking:     OT Date of Treatment: 05/05/23  OT Start Time: 1058  OT Stop Time: 1135  OT Total Time (min): 37 min total time; evaluation overlap with PT     Billable Minutes:Evaluation 10 min  Self Care/Home Management 15 min  Therapeutic Activity 8 min    5/5/2023

## 2023-05-05 NOTE — PLAN OF CARE
Patient transferred via stretcher / wheelchair to Room 456 4th floor tele on assigned cardiac tele monitor. VSS, AAOx4. No c/o pain.     Left radial gauze/tegaderm site CDI. No bleeding no hematoma noted. Site and surrounding area soft.  Assessed by ALVARO Plummer at bedside. +2 tip radial pulses. Dopplered tip pedal pulses. All questions answered.

## 2023-05-05 NOTE — BRIEF OP NOTE
Brief Operative Note:    : Remington Ribeiro MD     Referring Physician: Self,Aaareferral     All Operators: Surgeon(s):  MD Sammy Gonzalez MD     Preoperative Diagnosis: Critical limb ischemia of right lower extremity [I70.221]     Postop Diagnosis: Critical limb ischemia of right lower extremity [I70.221]    Treatments/Procedures: Procedure(s) (LRB):  Angiogram, Lower Arterial, Unilateral (Right)    Findings:  -high grade stenosis in right GUNNER.  of CFA collaterals from EIA and profunda artery which provides collaterals to chronically occluded SFA.  Initially 2V runoff with prox  of peroneal artery. Pt provides flow to foot, however has diffuse diease. Unable to adequately visualize right foot circulation.   See catheterization report for full details.    Recommendations:  -Patient will benefit from vascular surgery intervention  -continue medical management for now  -Left radial used for access    Estimated Blood loss: 20 cc    Specimens removed: No    Signed:  Sammy Kingsley MD  Cardiovascular Fellow  [facility]

## 2023-05-05 NOTE — SUBJECTIVE & OBJECTIVE
Interval hx: patient with intermittent tenderness to RLE with ischemic limb. She denies further complaints.       Review of Systems  Objective:     Vital Signs (Most Recent):  Temp: 96.1 °F (35.6 °C) (05/05/23 0804)  Pulse: 65 (05/05/23 0804)  Resp: 18 (05/05/23 0801)  BP: 135/62 (05/05/23 0804)  SpO2: (!) 94 % (05/05/23 0853)   Vital Signs (24h Range):  Temp:  [96.1 °F (35.6 °C)-97.9 °F (36.6 °C)] 96.1 °F (35.6 °C)  Pulse:  [64-75] 65  Resp:  [18-20] 18  SpO2:  [94 %-98 %] 94 %  BP: (113-144)/(53-62) 135/62     Weight: 58.3 kg (128 lb 8.5 oz)  Body mass index is 23.51 kg/m².    Physical Exam  Constitutional:       Appearance: Normal appearance.   HENT:      Head: Normocephalic and atraumatic.      Nose: Nose normal.      Mouth/Throat:      Mouth: Mucous membranes are moist.      Pharynx: Oropharynx is clear.   Eyes:      Extraocular Movements: Extraocular movements intact.      Conjunctiva/sclera: Conjunctivae normal.   Cardiovascular:      Rate and Rhythm: Normal rate and regular rhythm.      Heart sounds: Normal heart sounds.      Comments: Absent pedal pulses   LLE and foot warm   R foot purple and cold   Pulmonary:      Effort: Pulmonary effort is normal.      Breath sounds: Normal breath sounds.   Abdominal:      General: Abdomen is flat. Bowel sounds are normal.      Palpations: Abdomen is soft.   Musculoskeletal:         General: Tenderness present. No swelling. Normal range of motion.      Cervical back: Normal range of motion and neck supple.      Comments: RLE tender    Skin:     General: Skin is warm and dry.   Neurological:      General: No focal deficit present.      Mental Status: She is alert and oriented to person, place, and time.           Significant Labs: All pertinent labs within the past 24 hours have been reviewed.    Significant Imaging: I have reviewed all pertinent imaging results/findings within the past 24 hours.

## 2023-05-05 NOTE — PLAN OF CARE
Recommendation:  1. Resume diet and supplements when medically accpetable.   2. Monitor weight/labs.   3. RD to continue to follow to monitor po intake    Goals:  Pt will tolerate diet with at least 50-75% intake at meals by RD follow up  Nutrition Goal Status: progressing towards goal

## 2023-05-05 NOTE — INTERVAL H&P NOTE
The patient has been examined and the H&P has been reviewed:    I concur with the findings and changes have been noted since the H&P was written: Repeat angiogram through let radial to obtain more pictures     Anesthesia/Surgery risks, benefits and alternative options discussed and understood by patient/family.          Active Hospital Problems    Diagnosis  POA    *Critical limb ischemia of right lower extremity [I70.221]  Unknown    Chronic heart failure with preserved ejection fraction [I50.32]  Unknown    Pulmonary emphysema [J43.9]  Unknown    Cirrhosis [K74.60]  Unknown    Stage 4 chronic kidney disease [N18.4]  Yes    History of rectal or anal cancer [Z85.048]  Yes    Coronary artery disease involving native coronary artery of native heart without angina pectoris [I25.10]  Yes     Chronic    Essential hypertension [I10]  Yes     Chronic    HLD (hyperlipidemia) [E78.5]  Yes     Chronic    PVD (peripheral vascular disease) [I73.9]  Yes     Chronic    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 hypertension [E11.22, I12.9, N18.30]  Yes     Chronic      Resolved Hospital Problems   No resolved problems to display.

## 2023-05-05 NOTE — CONSULTS
Nephrology Consult  H&P      Consult Requested By: Saige Arce MD  Reason for Consult: ALESIA on CKD3      SUBJECTIVE:     History of Present Illness:  Steph Monroe is a 77 y.o.   female who  has a past medical history of Cancer, CKD (chronic kidney disease), Diabetes mellitus, type 2, Hyperlipidemia, Hypertension, and PVD (peripheral vascular disease).. The patient presented to the [facility] on 5/2/2023 with a primary  complaint of leg swelling, admitted for limb ischemia, Nephrology consulted for ALESIA.   ?    Review of Systems   Constitutional:  Negative for chills and fever.   HENT:  Negative for congestion and sore throat.    Eyes:  Negative for blurred vision, double vision and photophobia.   Respiratory:  Negative for cough and shortness of breath.    Cardiovascular:  Negative for chest pain, palpitations and leg swelling.   Gastrointestinal:  Negative for abdominal pain, diarrhea, nausea and vomiting.   Genitourinary:  Negative for dysuria and urgency.   Musculoskeletal:  Positive for myalgias (Leg pain). Negative for joint pain.   Skin:  Negative for itching and rash.   Neurological:  Negative for dizziness, sensory change, weakness and headaches.   Endo/Heme/Allergies:  Negative for polydipsia. Does not bruise/bleed easily.   Psychiatric/Behavioral:  Negative for depression.      Past Medical History:   Diagnosis Date    Cancer     rectum    CKD (chronic kidney disease)     Diabetes mellitus, type 2     Hyperlipidemia     Hypertension     PVD (peripheral vascular disease)      Past Surgical History:   Procedure Laterality Date    COLONOSCOPY N/A 3/28/2016    Procedure: COLONOSCOPY;  Surgeon: Mitul Buitrago Jr., MD;  Location: [facility];  Service: Endoscopy;  Laterality: N/A;    EYE SURGERY Left     cataract removal with lens implant.    FEMORAL ARTERY STENT  before 2010    RENAL ARTERY STENT  2010     Family History   Problem Relation Age of Onset    Diabetes Father     Heart attack Mother      Hypertension Sister      Social History     Tobacco Use    Smoking status: Former     Packs/day: 1.50     Years: 45.00     Pack years: 67.50     Types: Cigarettes     Quit date: 2013     Years since quittin.8     Passive exposure: Never    Smokeless tobacco: Never   Substance Use Topics    Alcohol use: No     Alcohol/week: 0.0 standard drinks    Drug use: No       Review of patient's allergies indicates:   Allergen Reactions    Chantix [varenicline]      Tongue swelling      Wellbutrin [bupropion hcl]      Tongue swelling              OBJECTIVE:     Vital Signs (Most Recent)  Vitals:    23 0801 23 0804 23 0853 23 1144   BP:  135/62     BP Location:  Left arm     Patient Position:  Lying     Pulse:  65     Resp: 18      Temp:  96.1 °F (35.6 °C)     TempSrc:  Oral     SpO2:  (!) 94% (!) 94% 95%   Weight:  58.3 kg (128 lb 8.5 oz)     Height:                     Medications:   aspirin  81 mg Oral Daily    atorvastatin  80 mg Oral Daily    carvediloL  25 mg Oral BID    clopidogreL  75 mg Oral Daily    gabapentin  100 mg Oral BID    heparin (PORCINE)  80 Units/kg (Adjusted) Intravenous Once    hydrALAZINE  50 mg Oral BID    mupirocin   Nasal BID    NIFEdipine  60 mg Oral Daily    sodium bicarbonate  650 mg Oral BID           Physical Exam  Vitals and nursing note reviewed.   Constitutional:       General: She is not in acute distress.     Appearance: She is not diaphoretic.   HENT:      Head: Normocephalic and atraumatic.      Mouth/Throat:      Pharynx: No oropharyngeal exudate.   Eyes:      General: No scleral icterus.     Conjunctiva/sclera: Conjunctivae normal.      Pupils: Pupils are equal, round, and reactive to light.   Cardiovascular:      Rate and Rhythm: Normal rate and regular rhythm.      Heart sounds: Normal heart sounds. No murmur heard.  Pulmonary:      Effort: Pulmonary effort is normal. No respiratory distress.      Breath sounds: Normal breath sounds.   Abdominal:       General: Bowel sounds are normal. There is no distension.      Palpations: Abdomen is soft.      Tenderness: There is no abdominal tenderness.   Musculoskeletal:         General: Swelling present. Normal range of motion.      Cervical back: Normal range of motion and neck supple.      Right lower leg: Edema present.   Skin:     General: Skin is warm and dry.      Findings: No erythema.   Neurological:      Mental Status: She is alert and oriented to person, place, and time.      Cranial Nerves: No cranial nerve deficit.   Psychiatric:         Mood and Affect: Affect normal.         Cognition and Memory: Memory normal.         Judgment: Judgment normal.       Laboratory:  Recent Labs   Lab 05/04/23  0256 05/04/23  1718 05/05/23  0518   WBC 4.33 4.55 4.55   HGB 9.4* 9.3* 9.4*   HCT 30.5* 30.0* 30.7*   * 115* 119*   MONO 11.5  0.5 12.3  0.6 13.6  0.6     Recent Labs   Lab 05/03/23  0619 05/04/23  0255 05/04/23  1303 05/05/23  0518    142 142 141   K 4.4 4.3 4.3 4.3   * 115* 114* 113*   CO2 18* 14* 21* 17*   BUN 60* 73* 74* 78*   CREATININE 2.1* 2.3* 2.3* 2.6*   CALCIUM 9.1 8.8 8.6* 8.7   PHOS 4.6* 4.9*  --  4.8*       Diagnostic Results:  X-Ray: Reviewed  US: Reviewed  1. Mild right-sided hydronephrosis.  2. Nonobstructing small intrarenal calculi on the right.  3. Elevated right-sided resistive index would be in keeping with medical renal disease.  4. Status post left nephrectomy.  Echo: Reviewed  ASSESSMENT/PLAN:     1. ALESIA -  on CKD3  single kidney  Cr baseline ~ 2.0-2.2  now ALESIA most likely  Due to obstruction   -- US kidney Mild Hydronephrosis   -- Consult Urology for hydronephrosis - Place spears now   -- check UPCR CPK Urine Lytes   Now NPO for procedure - will do IVF Bicarbonate for 10 hr   -- Daily Renal Function Panel  -- Avoid Hypotension.  -- Renally dose all meds  -- Please avoid nephrotoxins, including NSAIDs, aminoglycosides, IV contrast (unless absolutely necessary), gadolinium,  fleets and other phosphorous-based laxatives. Caution with antibiotics.    2. HTN (I10) - controlled   3. Anemia of chronic kidney disease treated        Recent Labs   Lab 05/04/23  0256 05/04/23 1718 05/05/23 0518   HGB 9.4* 9.3* 9.4*   HCT 30.5* 30.0* 30.7*   * 115* 119*       Iron   Lab Results   Component Value Date    IRON 60 04/04/2016    TIBC 367 04/04/2016    FERRITIN 81 04/04/2016       4. MBD (E88.9 M90.80) -  Recent Labs   Lab 05/05/23 0518   CALCIUM 8.7   PHOS 4.8*     Recent Labs   Lab 05/03/23  0619 05/04/23 0255 05/05/23 0518   MG 2.0 2.1 2.2       Lab Results   Component Value Date    CALCIUM 8.7 05/05/2023    PHOS 4.8 (H) 05/05/2023     No results found for: BCTEGBSC12VA  Acidosis- start IV bicarbonate then PO   Lab Results   Component Value Date    CO2 17 (L) 05/05/2023       5. Nutrition/Hypoalbuminemia (E88.09) -    Recent Labs   Lab 05/03/23  1116 05/04/23 0255 05/04/23 1718 05/05/23 0518   LABPROT 13.1*  --  12.4  --    ALBUMIN  --  3.0*  --  3.0*     Nepro with meals TID.       Thank you for allowing me to participate in care of your patient  With any question please call   Lacey Valera MD     [facility]  JED Garza MD,   OMD JOSE RAMON Spencer MD [address], [place]  [phone]   [place], [address] [phone]  After hours answering service: [phone]

## 2023-05-06 ENCOUNTER — HOSPITAL ENCOUNTER (INPATIENT)
Facility: HOSPITAL | Age: 78
LOS: 10 days | Discharge: HOME HEALTH | DRG: 252 | End: 2023-05-16
Attending: STUDENT IN AN ORGANIZED HEALTH CARE EDUCATION/TRAINING PROGRAM | Admitting: STUDENT IN AN ORGANIZED HEALTH CARE EDUCATION/TRAINING PROGRAM
Payer: MEDICARE

## 2023-05-06 VITALS
DIASTOLIC BLOOD PRESSURE: 52 MMHG | WEIGHT: 128.5 LBS | HEART RATE: 92 BPM | HEIGHT: 62 IN | BODY MASS INDEX: 23.65 KG/M2 | TEMPERATURE: 97 F | SYSTOLIC BLOOD PRESSURE: 113 MMHG | RESPIRATION RATE: 16 BRPM | OXYGEN SATURATION: 97 %

## 2023-05-06 DIAGNOSIS — I70.221 CRITICAL LIMB ISCHEMIA OF RIGHT LOWER EXTREMITY: ICD-10-CM

## 2023-05-06 DIAGNOSIS — E78.5 HYPERLIPIDEMIA, UNSPECIFIED HYPERLIPIDEMIA TYPE: Chronic | ICD-10-CM

## 2023-05-06 DIAGNOSIS — Z85.048 HISTORY OF RECTAL OR ANAL CANCER: ICD-10-CM

## 2023-05-06 DIAGNOSIS — E11.22 TYPE 2 DM WITH CKD STAGE 3 AND HYPERTENSION: Chronic | ICD-10-CM

## 2023-05-06 DIAGNOSIS — M79.89 ARM SWELLING: ICD-10-CM

## 2023-05-06 DIAGNOSIS — M79.89 SWELLING OF LIMB: ICD-10-CM

## 2023-05-06 DIAGNOSIS — R91.1 LUNG NODULE: ICD-10-CM

## 2023-05-06 DIAGNOSIS — E87.20 METABOLIC ACIDOSIS: ICD-10-CM

## 2023-05-06 DIAGNOSIS — I73.9 PVD (PERIPHERAL VASCULAR DISEASE): Chronic | ICD-10-CM

## 2023-05-06 DIAGNOSIS — I73.9 PAD (PERIPHERAL ARTERY DISEASE): Primary | ICD-10-CM

## 2023-05-06 DIAGNOSIS — I50.32 CHRONIC HEART FAILURE WITH PRESERVED EJECTION FRACTION: ICD-10-CM

## 2023-05-06 DIAGNOSIS — L89.152 DECUBITUS ULCER OF SACRAL REGION, STAGE 2: ICD-10-CM

## 2023-05-06 DIAGNOSIS — I25.10 CORONARY ARTERY DISEASE INVOLVING NATIVE CORONARY ARTERY OF NATIVE HEART WITHOUT ANGINA PECTORIS: Chronic | ICD-10-CM

## 2023-05-06 DIAGNOSIS — I27.20 PULMONARY HYPERTENSION: ICD-10-CM

## 2023-05-06 DIAGNOSIS — R60.9 SWELLING: ICD-10-CM

## 2023-05-06 DIAGNOSIS — N13.9 ACUTE URINARY OBSTRUCTION: ICD-10-CM

## 2023-05-06 DIAGNOSIS — I70.1 RENAL ARTERY STENOSIS: ICD-10-CM

## 2023-05-06 DIAGNOSIS — N18.30 TYPE 2 DM WITH CKD STAGE 3 AND HYPERTENSION: Chronic | ICD-10-CM

## 2023-05-06 DIAGNOSIS — I10 ESSENTIAL HYPERTENSION: Chronic | ICD-10-CM

## 2023-05-06 DIAGNOSIS — I12.9 TYPE 2 DM WITH CKD STAGE 3 AND HYPERTENSION: Chronic | ICD-10-CM

## 2023-05-06 DIAGNOSIS — R53.81 DEBILITY: ICD-10-CM

## 2023-05-06 DIAGNOSIS — R53.1 WEAKNESS: ICD-10-CM

## 2023-05-06 DIAGNOSIS — M19.90 ARTHRITIS: ICD-10-CM

## 2023-05-06 DIAGNOSIS — E55.9 VITAMIN D DEFICIENCY: ICD-10-CM

## 2023-05-06 PROBLEM — N17.9 AKI (ACUTE KIDNEY INJURY): Status: ACTIVE | Noted: 2018-01-16

## 2023-05-06 LAB
ALBUMIN SERPL BCP-MCNC: 2.8 G/DL (ref 3.5–5.2)
ANION GAP SERPL CALC-SCNC: 10 MMOL/L (ref 8–16)
APTT PPP: 54.3 SEC (ref 21–32)
APTT PPP: 59.5 SEC (ref 21–32)
BASOPHILS # BLD AUTO: 0.01 K/UL (ref 0–0.2)
BASOPHILS NFR BLD: 0.2 % (ref 0–1.9)
BUN SERPL-MCNC: 86 MG/DL (ref 8–23)
CALCIUM SERPL-MCNC: 8.2 MG/DL (ref 8.7–10.5)
CHLORIDE SERPL-SCNC: 108 MMOL/L (ref 95–110)
CO2 SERPL-SCNC: 20 MMOL/L (ref 23–29)
CREAT SERPL-MCNC: 2.8 MG/DL (ref 0.5–1.4)
DIFFERENTIAL METHOD: ABNORMAL
EOSINOPHIL # BLD AUTO: 0 K/UL (ref 0–0.5)
EOSINOPHIL NFR BLD: 0.5 % (ref 0–8)
ERYTHROCYTE [DISTWIDTH] IN BLOOD BY AUTOMATED COUNT: 19.4 % (ref 11.5–14.5)
EST. GFR  (NO RACE VARIABLE): 17 ML/MIN/1.73 M^2
GLUCOSE SERPL-MCNC: 110 MG/DL (ref 70–110)
HCT VFR BLD AUTO: 28.6 % (ref 37–48.5)
HGB BLD-MCNC: 8.9 G/DL (ref 12–16)
IMM GRANULOCYTES # BLD AUTO: 0.01 K/UL (ref 0–0.04)
IMM GRANULOCYTES NFR BLD AUTO: 0.2 % (ref 0–0.5)
LYMPHOCYTES # BLD AUTO: 0.3 K/UL (ref 1–4.8)
LYMPHOCYTES NFR BLD: 7.8 % (ref 18–48)
MCH RBC QN AUTO: 27.7 PG (ref 27–31)
MCHC RBC AUTO-ENTMCNC: 31.1 G/DL (ref 32–36)
MCV RBC AUTO: 89 FL (ref 82–98)
MONOCYTES # BLD AUTO: 0.6 K/UL (ref 0.3–1)
MONOCYTES NFR BLD: 13.9 % (ref 4–15)
NEUTROPHILS # BLD AUTO: 3.4 K/UL (ref 1.8–7.7)
NEUTROPHILS NFR BLD: 77.4 % (ref 38–73)
NRBC BLD-RTO: 0 /100 WBC
PHOSPHATE SERPL-MCNC: 4.7 MG/DL (ref 2.7–4.5)
PLATELET # BLD AUTO: 97 K/UL (ref 150–450)
PMV BLD AUTO: 11 FL (ref 9.2–12.9)
POCT GLUCOSE: 114 MG/DL (ref 70–110)
POCT GLUCOSE: 136 MG/DL (ref 70–110)
POCT GLUCOSE: 200 MG/DL (ref 70–110)
POTASSIUM SERPL-SCNC: 4.1 MMOL/L (ref 3.5–5.1)
RBC # BLD AUTO: 3.21 M/UL (ref 4–5.4)
SODIUM SERPL-SCNC: 138 MMOL/L (ref 136–145)
WBC # BLD AUTO: 4.38 K/UL (ref 3.9–12.7)

## 2023-05-06 PROCEDURE — 63600175 PHARM REV CODE 636 W HCPCS: Performed by: NURSE PRACTITIONER

## 2023-05-06 PROCEDURE — 25000003 PHARM REV CODE 250: Performed by: STUDENT IN AN ORGANIZED HEALTH CARE EDUCATION/TRAINING PROGRAM

## 2023-05-06 PROCEDURE — 94761 N-INVAS EAR/PLS OXIMETRY MLT: CPT

## 2023-05-06 PROCEDURE — 99222 PR INITIAL HOSPITAL CARE,LEVL II: ICD-10-PCS | Mod: ,,, | Performed by: UROLOGY

## 2023-05-06 PROCEDURE — 85025 COMPLETE CBC W/AUTO DIFF WBC: CPT | Performed by: NURSE PRACTITIONER

## 2023-05-06 PROCEDURE — 99222 PR INITIAL HOSPITAL CARE,LEVL II: ICD-10-PCS | Mod: GC,,, | Performed by: SURGERY

## 2023-05-06 PROCEDURE — 25000003 PHARM REV CODE 250: Performed by: NURSE PRACTITIONER

## 2023-05-06 PROCEDURE — 97116 GAIT TRAINING THERAPY: CPT | Mod: CQ

## 2023-05-06 PROCEDURE — 36415 COLL VENOUS BLD VENIPUNCTURE: CPT | Performed by: STUDENT IN AN ORGANIZED HEALTH CARE EDUCATION/TRAINING PROGRAM

## 2023-05-06 PROCEDURE — 99222 1ST HOSP IP/OBS MODERATE 55: CPT | Mod: GC,,, | Performed by: SURGERY

## 2023-05-06 PROCEDURE — 85730 THROMBOPLASTIN TIME PARTIAL: CPT | Performed by: STUDENT IN AN ORGANIZED HEALTH CARE EDUCATION/TRAINING PROGRAM

## 2023-05-06 PROCEDURE — 20600001 HC STEP DOWN PRIVATE ROOM

## 2023-05-06 PROCEDURE — 99222 1ST HOSP IP/OBS MODERATE 55: CPT | Mod: ,,, | Performed by: UROLOGY

## 2023-05-06 PROCEDURE — 85730 THROMBOPLASTIN TIME PARTIAL: CPT | Mod: 91 | Performed by: STUDENT IN AN ORGANIZED HEALTH CARE EDUCATION/TRAINING PROGRAM

## 2023-05-06 PROCEDURE — 63600175 PHARM REV CODE 636 W HCPCS: Performed by: STUDENT IN AN ORGANIZED HEALTH CARE EDUCATION/TRAINING PROGRAM

## 2023-05-06 PROCEDURE — 80069 RENAL FUNCTION PANEL: CPT | Performed by: STUDENT IN AN ORGANIZED HEALTH CARE EDUCATION/TRAINING PROGRAM

## 2023-05-06 PROCEDURE — 97530 THERAPEUTIC ACTIVITIES: CPT | Mod: CQ

## 2023-05-06 RX ORDER — SODIUM BICARBONATE 650 MG/1
650 TABLET ORAL 2 TIMES DAILY
Status: CANCELLED | OUTPATIENT
Start: 2023-05-06

## 2023-05-06 RX ORDER — ONDANSETRON 2 MG/ML
4 INJECTION INTRAMUSCULAR; INTRAVENOUS EVERY 8 HOURS PRN
Status: CANCELLED | OUTPATIENT
Start: 2023-05-06

## 2023-05-06 RX ORDER — NAPROXEN SODIUM 220 MG/1
81 TABLET, FILM COATED ORAL DAILY
Status: CANCELLED | OUTPATIENT
Start: 2023-05-07

## 2023-05-06 RX ORDER — NALOXONE HCL 0.4 MG/ML
0.02 VIAL (ML) INJECTION
Status: DISCONTINUED | OUTPATIENT
Start: 2023-05-06 | End: 2023-05-16 | Stop reason: HOSPADM

## 2023-05-06 RX ORDER — HYDROCODONE BITARTRATE AND ACETAMINOPHEN 5; 325 MG/1; MG/1
1 TABLET ORAL EVERY 6 HOURS PRN
Status: CANCELLED | OUTPATIENT
Start: 2023-05-06

## 2023-05-06 RX ORDER — GABAPENTIN 100 MG/1
100 CAPSULE ORAL 2 TIMES DAILY
Status: CANCELLED | OUTPATIENT
Start: 2023-05-06

## 2023-05-06 RX ORDER — ATORVASTATIN CALCIUM 40 MG/1
80 TABLET, FILM COATED ORAL DAILY
Status: DISCONTINUED | OUTPATIENT
Start: 2023-05-07 | End: 2023-05-06

## 2023-05-06 RX ORDER — EZETIMIBE 10 MG/1
10 TABLET ORAL DAILY
Status: DISCONTINUED | OUTPATIENT
Start: 2023-05-07 | End: 2023-05-16 | Stop reason: HOSPADM

## 2023-05-06 RX ORDER — ACETAMINOPHEN 325 MG/1
650 TABLET ORAL EVERY 8 HOURS PRN
Status: CANCELLED | OUTPATIENT
Start: 2023-05-06

## 2023-05-06 RX ORDER — CARVEDILOL 25 MG/1
25 TABLET ORAL 2 TIMES DAILY
Status: DISCONTINUED | OUTPATIENT
Start: 2023-05-06 | End: 2023-05-06

## 2023-05-06 RX ORDER — ATORVASTATIN CALCIUM 40 MG/1
80 TABLET, FILM COATED ORAL DAILY
Status: DISCONTINUED | OUTPATIENT
Start: 2023-05-07 | End: 2023-05-16 | Stop reason: HOSPADM

## 2023-05-06 RX ORDER — MORPHINE SULFATE 2 MG/ML
2 INJECTION, SOLUTION INTRAMUSCULAR; INTRAVENOUS EVERY 6 HOURS PRN
Status: CANCELLED | OUTPATIENT
Start: 2023-05-06

## 2023-05-06 RX ORDER — SODIUM CHLORIDE, SODIUM LACTATE, POTASSIUM CHLORIDE, CALCIUM CHLORIDE 600; 310; 30; 20 MG/100ML; MG/100ML; MG/100ML; MG/100ML
INJECTION, SOLUTION INTRAVENOUS CONTINUOUS
Status: DISCONTINUED | OUTPATIENT
Start: 2023-05-06 | End: 2023-05-07

## 2023-05-06 RX ORDER — MUPIROCIN 20 MG/G
OINTMENT TOPICAL 2 TIMES DAILY
Status: CANCELLED | OUTPATIENT
Start: 2023-05-06 | End: 2023-05-08

## 2023-05-06 RX ORDER — SODIUM BICARBONATE 650 MG/1
650 TABLET ORAL 2 TIMES DAILY
Status: DISCONTINUED | OUTPATIENT
Start: 2023-05-06 | End: 2023-05-07

## 2023-05-06 RX ORDER — SODIUM BICARBONATE 650 MG/1
650 TABLET ORAL 2 TIMES DAILY
Status: DISCONTINUED | OUTPATIENT
Start: 2023-05-06 | End: 2023-05-06

## 2023-05-06 RX ORDER — GLUCAGON 1 MG
1 KIT INJECTION
Status: CANCELLED | OUTPATIENT
Start: 2023-05-06

## 2023-05-06 RX ORDER — GLUCAGON 1 MG
1 KIT INJECTION
Status: DISCONTINUED | OUTPATIENT
Start: 2023-05-06 | End: 2023-05-10

## 2023-05-06 RX ORDER — CARVEDILOL 25 MG/1
25 TABLET ORAL 2 TIMES DAILY
Status: CANCELLED | OUTPATIENT
Start: 2023-05-06

## 2023-05-06 RX ORDER — SODIUM CHLORIDE 0.9 % (FLUSH) 0.9 %
10 SYRINGE (ML) INJECTION
Status: CANCELLED | OUTPATIENT
Start: 2023-05-06

## 2023-05-06 RX ORDER — NIFEDIPINE 60 MG/1
60 TABLET, EXTENDED RELEASE ORAL DAILY
Status: DISCONTINUED | OUTPATIENT
Start: 2023-05-07 | End: 2023-05-09

## 2023-05-06 RX ORDER — ACETAMINOPHEN 325 MG/1
650 TABLET ORAL EVERY 8 HOURS PRN
Status: DISCONTINUED | OUTPATIENT
Start: 2023-05-06 | End: 2023-05-16 | Stop reason: HOSPADM

## 2023-05-06 RX ORDER — SODIUM CHLORIDE 0.9 % (FLUSH) 0.9 %
10 SYRINGE (ML) INJECTION EVERY 12 HOURS PRN
Status: DISCONTINUED | OUTPATIENT
Start: 2023-05-06 | End: 2023-05-10

## 2023-05-06 RX ORDER — HYDRALAZINE HYDROCHLORIDE 20 MG/ML
10 INJECTION INTRAMUSCULAR; INTRAVENOUS EVERY 8 HOURS PRN
Status: CANCELLED | OUTPATIENT
Start: 2023-05-06

## 2023-05-06 RX ORDER — CLOPIDOGREL BISULFATE 75 MG/1
75 TABLET ORAL DAILY
Status: DISCONTINUED | OUTPATIENT
Start: 2023-05-07 | End: 2023-05-09

## 2023-05-06 RX ORDER — MORPHINE SULFATE 2 MG/ML
2 INJECTION, SOLUTION INTRAMUSCULAR; INTRAVENOUS EVERY 6 HOURS PRN
Status: DISCONTINUED | OUTPATIENT
Start: 2023-05-06 | End: 2023-05-09

## 2023-05-06 RX ORDER — ATORVASTATIN CALCIUM 40 MG/1
80 TABLET, FILM COATED ORAL DAILY
Status: CANCELLED | OUTPATIENT
Start: 2023-05-07

## 2023-05-06 RX ORDER — NALOXONE HCL 0.4 MG/ML
0.02 VIAL (ML) INJECTION
Status: CANCELLED | OUTPATIENT
Start: 2023-05-06

## 2023-05-06 RX ORDER — NIFEDIPINE 30 MG/1
60 TABLET, EXTENDED RELEASE ORAL DAILY
Status: DISCONTINUED | OUTPATIENT
Start: 2023-05-07 | End: 2023-05-06

## 2023-05-06 RX ORDER — NAPROXEN SODIUM 220 MG/1
81 TABLET, FILM COATED ORAL DAILY
Status: DISCONTINUED | OUTPATIENT
Start: 2023-05-07 | End: 2023-05-16 | Stop reason: HOSPADM

## 2023-05-06 RX ORDER — ONDANSETRON 2 MG/ML
4 INJECTION INTRAMUSCULAR; INTRAVENOUS EVERY 8 HOURS PRN
Status: DISCONTINUED | OUTPATIENT
Start: 2023-05-06 | End: 2023-05-09

## 2023-05-06 RX ORDER — INSULIN ASPART 100 [IU]/ML
0-5 INJECTION, SOLUTION INTRAVENOUS; SUBCUTANEOUS EVERY 6 HOURS PRN
Status: CANCELLED | OUTPATIENT
Start: 2023-05-06

## 2023-05-06 RX ORDER — CARVEDILOL 12.5 MG/1
25 TABLET ORAL 2 TIMES DAILY
Status: DISCONTINUED | OUTPATIENT
Start: 2023-05-06 | End: 2023-05-09

## 2023-05-06 RX ORDER — GABAPENTIN 100 MG/1
100 CAPSULE ORAL 2 TIMES DAILY
Status: DISCONTINUED | OUTPATIENT
Start: 2023-05-06 | End: 2023-05-06

## 2023-05-06 RX ORDER — HYDRALAZINE HYDROCHLORIDE 20 MG/ML
10 INJECTION INTRAMUSCULAR; INTRAVENOUS EVERY 8 HOURS PRN
Status: DISCONTINUED | OUTPATIENT
Start: 2023-05-06 | End: 2023-05-16 | Stop reason: HOSPADM

## 2023-05-06 RX ORDER — HYDRALAZINE HYDROCHLORIDE 50 MG/1
50 TABLET, FILM COATED ORAL 2 TIMES DAILY
Status: DISCONTINUED | OUTPATIENT
Start: 2023-05-06 | End: 2023-05-06

## 2023-05-06 RX ORDER — NAPROXEN SODIUM 220 MG/1
81 TABLET, FILM COATED ORAL DAILY
Status: DISCONTINUED | OUTPATIENT
Start: 2023-05-07 | End: 2023-05-06

## 2023-05-06 RX ORDER — SODIUM CHLORIDE 0.9 % (FLUSH) 0.9 %
10 SYRINGE (ML) INJECTION EVERY 12 HOURS PRN
Status: CANCELLED | OUTPATIENT
Start: 2023-05-06

## 2023-05-06 RX ORDER — HEPARIN SODIUM,PORCINE/D5W 25000/250
0-40 INTRAVENOUS SOLUTION INTRAVENOUS CONTINUOUS
Status: DISCONTINUED | OUTPATIENT
Start: 2023-05-06 | End: 2023-05-09

## 2023-05-06 RX ORDER — FUROSEMIDE 20 MG/1
20 TABLET ORAL DAILY
Status: DISCONTINUED | OUTPATIENT
Start: 2023-05-07 | End: 2023-05-06

## 2023-05-06 RX ORDER — HEPARIN SODIUM,PORCINE/D5W 25000/250
0-40 INTRAVENOUS SOLUTION INTRAVENOUS CONTINUOUS
Status: CANCELLED | OUTPATIENT
Start: 2023-05-06

## 2023-05-06 RX ORDER — HYDRALAZINE HYDROCHLORIDE 50 MG/1
50 TABLET, FILM COATED ORAL 2 TIMES DAILY
Status: DISCONTINUED | OUTPATIENT
Start: 2023-05-06 | End: 2023-05-09

## 2023-05-06 RX ORDER — HYDRALAZINE HYDROCHLORIDE 25 MG/1
50 TABLET, FILM COATED ORAL 2 TIMES DAILY
Status: CANCELLED | OUTPATIENT
Start: 2023-05-06

## 2023-05-06 RX ORDER — IPRATROPIUM BROMIDE AND ALBUTEROL SULFATE 2.5; .5 MG/3ML; MG/3ML
3 SOLUTION RESPIRATORY (INHALATION) EVERY 6 HOURS PRN
Status: CANCELLED | OUTPATIENT
Start: 2023-05-06

## 2023-05-06 RX ORDER — TRAMADOL HYDROCHLORIDE 50 MG/1
50 TABLET ORAL EVERY 4 HOURS PRN
Status: DISCONTINUED | OUTPATIENT
Start: 2023-05-06 | End: 2023-05-09

## 2023-05-06 RX ORDER — IPRATROPIUM BROMIDE AND ALBUTEROL SULFATE 2.5; .5 MG/3ML; MG/3ML
3 SOLUTION RESPIRATORY (INHALATION) EVERY 6 HOURS PRN
Status: DISCONTINUED | OUTPATIENT
Start: 2023-05-06 | End: 2023-05-16 | Stop reason: HOSPADM

## 2023-05-06 RX ORDER — NIFEDIPINE 60 MG/1
60 TABLET, EXTENDED RELEASE ORAL DAILY
Status: CANCELLED | OUTPATIENT
Start: 2023-05-07

## 2023-05-06 RX ORDER — INSULIN ASPART 100 [IU]/ML
0-5 INJECTION, SOLUTION INTRAVENOUS; SUBCUTANEOUS EVERY 6 HOURS PRN
Status: DISCONTINUED | OUTPATIENT
Start: 2023-05-06 | End: 2023-05-10

## 2023-05-06 RX ORDER — HYDROCODONE BITARTRATE AND ACETAMINOPHEN 5; 325 MG/1; MG/1
1 TABLET ORAL EVERY 6 HOURS PRN
Status: DISCONTINUED | OUTPATIENT
Start: 2023-05-06 | End: 2023-05-09

## 2023-05-06 RX ORDER — GABAPENTIN 100 MG/1
100 CAPSULE ORAL 2 TIMES DAILY
Status: DISCONTINUED | OUTPATIENT
Start: 2023-05-06 | End: 2023-05-16 | Stop reason: HOSPADM

## 2023-05-06 RX ORDER — SODIUM CHLORIDE 0.9 % (FLUSH) 0.9 %
10 SYRINGE (ML) INJECTION
Status: DISCONTINUED | OUTPATIENT
Start: 2023-05-06 | End: 2023-05-10

## 2023-05-06 RX ORDER — MUPIROCIN 20 MG/G
OINTMENT TOPICAL 2 TIMES DAILY
Status: COMPLETED | OUTPATIENT
Start: 2023-05-06 | End: 2023-05-07

## 2023-05-06 RX ADMIN — ASPIRIN 81 MG: 81 TABLET, CHEWABLE ORAL at 09:05

## 2023-05-06 RX ADMIN — HEPARIN SODIUM 14 UNITS/KG/HR: 10000 INJECTION, SOLUTION INTRAVENOUS at 03:05

## 2023-05-06 RX ADMIN — CARVEDILOL 25 MG: 25 TABLET, FILM COATED ORAL at 08:05

## 2023-05-06 RX ADMIN — CLOPIDOGREL BISULFATE 75 MG: 75 TABLET ORAL at 09:05

## 2023-05-06 RX ADMIN — NIFEDIPINE 60 MG: 60 TABLET, FILM COATED, EXTENDED RELEASE ORAL at 09:05

## 2023-05-06 RX ADMIN — HEPARIN SODIUM 14 UNITS/KG/HR: 10000 INJECTION, SOLUTION INTRAVENOUS at 04:05

## 2023-05-06 RX ADMIN — SODIUM CHLORIDE, POTASSIUM CHLORIDE, SODIUM LACTATE AND CALCIUM CHLORIDE: 600; 310; 30; 20 INJECTION, SOLUTION INTRAVENOUS at 07:05

## 2023-05-06 RX ADMIN — SODIUM BICARBONATE 650 MG: 650 TABLET ORAL at 08:05

## 2023-05-06 RX ADMIN — HYDRALAZINE HYDROCHLORIDE 50 MG: 50 TABLET ORAL at 08:05

## 2023-05-06 RX ADMIN — SODIUM BICARBONATE 650 MG TABLET 650 MG: at 09:05

## 2023-05-06 RX ADMIN — ATORVASTATIN CALCIUM 80 MG: 40 TABLET, FILM COATED ORAL at 09:05

## 2023-05-06 RX ADMIN — GABAPENTIN 100 MG: 100 CAPSULE ORAL at 08:05

## 2023-05-06 RX ADMIN — MUPIROCIN: 20 OINTMENT TOPICAL at 08:05

## 2023-05-06 RX ADMIN — CARVEDILOL 25 MG: 25 TABLET, FILM COATED ORAL at 09:05

## 2023-05-06 RX ADMIN — MUPIROCIN: 20 OINTMENT TOPICAL at 09:05

## 2023-05-06 RX ADMIN — HYDRALAZINE HYDROCHLORIDE 50 MG: 25 TABLET, FILM COATED ORAL at 09:05

## 2023-05-06 RX ADMIN — GABAPENTIN 100 MG: 100 CAPSULE ORAL at 09:05

## 2023-05-06 RX ADMIN — HYDROCODONE BITARTRATE AND ACETAMINOPHEN 1 TABLET: 5; 325 TABLET ORAL at 08:05

## 2023-05-06 NOTE — ASSESSMENT & PLAN NOTE
/61 on arrival. Patient has not taken BP meds for past year.     Continue PO Coreg, hydralazine, Nifedipine  PRN Hydralazine for SBP >180

## 2023-05-06 NOTE — PROGRESS NOTES
"Progress note  BP (!) 124/58 (BP Location: Left arm, Patient Position: Lying)   Pulse 71   Temp 98.4 °F (36.9 °C) (Oral)   Resp 16   Ht 5' 2" (1.575 m)   Wt 58.3 kg (128 lb 8.5 oz)   SpO2 95%   BMI 23.51 kg/m²   I/O last 3 completed shifts:  In: -   Out: 1200 [Urine:1200]  No intake/output data recorded.    Recent Results (from the past 336 hour(s))   CBC auto differential    Collection Time: 05/06/23  6:50 AM   Result Value Ref Range    WBC 4.38 3.90 - 12.70 K/uL    Hemoglobin 8.9 (L) 12.0 - 16.0 g/dL    Hematocrit 28.6 (L) 37.0 - 48.5 %    Platelets 97 (L) 150 - 450 K/uL   CBC auto differential    Collection Time: 05/05/23  5:18 AM   Result Value Ref Range    WBC 4.55 3.90 - 12.70 K/uL    Hemoglobin 9.4 (L) 12.0 - 16.0 g/dL    Hematocrit 30.7 (L) 37.0 - 48.5 %    Platelets 119 (L) 150 - 450 K/uL   CBC auto differential    Collection Time: 05/04/23  5:18 PM   Result Value Ref Range    WBC 4.55 3.90 - 12.70 K/uL    Hemoglobin 9.3 (L) 12.0 - 16.0 g/dL    Hematocrit 30.0 (L) 37.0 - 48.5 %    Platelets 115 (L) 150 - 450 K/uL     Recent Results (from the past 336 hour(s))   Basic metabolic panel    Collection Time: 05/04/23  1:03 PM   Result Value Ref Range    Sodium 142 136 - 145 mmol/L    Potassium 4.3 3.5 - 5.1 mmol/L    Chloride 114 (H) 95 - 110 mmol/L    CO2 21 (L) 23 - 29 mmol/L    BUN 74 (H) 8 - 23 mg/dL    Creatinine 2.3 (H) 0.5 - 1.4 mg/dL    Calcium 8.6 (L) 8.7 - 10.5 mg/dL    Anion Gap 7 (L) 8 - 16 mmol/L     W/u in progress ., foot unchanged.  "

## 2023-05-06 NOTE — PLAN OF CARE
Discharge order noted to transfer patient to [facility] once bed available. AVS remains prepared and up to date from prior plan to discharge.

## 2023-05-06 NOTE — ASSESSMENT & PLAN NOTE
Pain with increased claudication unknown duration, at least a year. Now with absent pulse and R foot discoloration   On ASA, plavix, heparin gtt   No signs of infection   Consult wound care for small skin break   Cardiology planning peripheral intervention tomorrow   NPO after MN   General surgery consulted   PRN pain regime   Imaging reviewed: Lower extremity peripheral vascular disease with velocity parameters as above.  Majority monophasic waveforms throughout as can be seen with vessel hardening related to atherosclerosis versus more central, upstream stenosis.     Velocity elevation at the left deep profundus artery as can be seen with sequela of hemodynamically significant stenosis.     Limited color visualization with relative reduced flow velocity at the left proximal SFA.      Bones are demineralized.  Dorsal and plantar calcaneal enthesopathic spurring is present.  No definite fracture or effusion.  Prominent atherosclerotic vascular calcifications    5/3  S/p peripheral angiogram    Aorta: Distal aneurysmal segment     Right (views are not dedicated as unable to cross up and over from L-CFA)  Common iliaic artery: Prior stent with severe ISR. 95-99% stenosis distal to the stent  External iliac artery: 70-80% stenosis  Common femoral artery: Occluded  Superficial femoral artery: Possibly occluded. Possible reconstitution distal SFA with flow into the popliteal artery  Profunda: Flow present  Flow noted in the AT and peroneal; not well visualized. Daniel patent     Left  Common iliaic artery: Prior stent with ISR, 80% at the proximal segment  External iliac artery: Mild-moderate diffuse disease  Common femoral artery: Distal CFA 99% occluded  Superficial femoral artery: Occluded, reconstitutes via profunda distal segment  Profunda: Patent  Popliteal artery: Patent  AT: Normal  PT Trunk: Normal  PT: Normal  Peroneal: Diminutive vessel  DP: Fills likely via collaterals (not well  visualized)     Recommendations  - Restart heparin gtt, titrate per PTT  - Aspirin, clopidogrel  - High intensity statin    5/4  per cardiology continue ASA, plavix, high intensity statin   Add gabapentin   Continue heparin gtt  NPO after MN  Plan for repeat peripheral angiogram 5/5 5/5 caution with repeat angiogram given decline in renal function- will discuss with cardiology     5/6   High grade stenosis in right GUNNER ISR.   - Rt EIA high grade stenosis   -  of  Rt CFA collaterals from EIA  fills profunda artery which provides collaterals to chronically occluded mid SFA.    - Rt Popliteal is patent   - Rt At patent prox, appears to be occluded distal   - Rt PT appears to be patent and supplies the foot.   - Peroneal occluded      Unable to adequately visualize right foot circulation.      Recommendations:  -Patient will benefit from vascular surgery intervention for CFA Endarterectomy and profundoplasty and Endovascular intervention to Rt GUNNER and EIA.     Patient would like to go to [facility] for surgical evaluation. Dr. Lorenzo to discuss with patient's family and vascular surgery   -heparin gtt continued   -transferred to [facility] for evaluation by vascular surgery

## 2023-05-06 NOTE — CONSULTS
"[facility] - Telemetry  Urology  Consult Note    Patient Name: Steph Monroe  MRN: [id number]  Admission Date: 5/2/2023  Attending Provider: Saige Arce MD   Consulting Provider: Alexei Alcantar MD  Principal Problem:Critical limb ischemia of right lower extremity    Inpatient consult to Urology  Consult performed by: Alexei Alcantar MD  Consult ordered by: Lacey Valera MD        Subjective:     HPI:   Steph Monroe is a 77 y.o. female here with RLE pain, urology consulted for ALESIA.      78 yo female with a PMH of rectal CA, HCC, CKD IV, DM II, HLD, HTN, PVD, s/p nephrectomy, HFpEF, former smoker 10 years ago, presents with RLE walking pain progressively worse for atleast the past year. The distances she can walk have become progressively shorter. She can now only walk around her home before the RLE pain is significant. The pain is worse with walking, but occurs at rest, is intermittent, is shooting, and is rated 6/10. The R foot is also purple. She notes a small scab to her R heel. The only home medications she is taking is lasix and na bicarbonate. She quit all of her other medications about a year ago because she "no longer felt like taking all kind of pills".  She states she unintentionally lost 56 pounds in the past year. She denies HA, vision changes, dizziness, lightheadedness, CP, SOB, abdominal pain, n/v/d/f/c/c, urinary changes, LE edema, ETOH, tobacco, illicit drugs. She lives at home with family.     05/06/2023  Urology consulted for ALESIA.  Patient's baseline creatinine is around 2-2.2.  Most recent was 2.8.  Patient has a right solitary kidney and had a left nephrectomy years ago.  She states that this was done at [facility].  Review of the chart indicates that this was done in 2018 by Dr. Jane.  She had a 2 cm left renal mass and additionally had a Lasix scan which showed a left kidney was 7% function therefore she underwent a left laparoscopic nephrectomy.  She initially had a ultrasound which " showed very mild right hydronephrosis.  A Jane was placed a CT scan revealed no hydro was present.    Past Medical History:   Diagnosis Date    Cancer     rectum    CKD (chronic kidney disease)     Diabetes mellitus, type 2     Hyperlipidemia     Hypertension     PVD (peripheral vascular disease)        Past Surgical History:   Procedure Laterality Date    COLONOSCOPY N/A 3/28/2016    Procedure: COLONOSCOPY;  Surgeon: Mitul Buitrago Jr., MD;  Location: [facility] ENDO;  Service: Endoscopy;  Laterality: N/A;    EYE SURGERY Left     cataract removal with lens implant.    FEMORAL ARTERY STENT  before     PERIPHERAL ANGIOGRAPHY N/A 5/3/2023    Procedure: Peripheral angiography;  Surgeon: Rasheeda Ny MD;  Location: [facility] CATH LAB/EP;  Service: Cardiology;  Laterality: N/A;    RENAL ARTERY STENT         Family History   Problem Relation Age of Onset    Diabetes Father     Heart attack Mother     Hypertension Sister        Social History     Tobacco Use    Smoking status: Former     Packs/day: 1.50     Years: 45.00     Pack years: 67.50     Types: Cigarettes     Quit date: 2013     Years since quittin.9     Passive exposure: Never    Smokeless tobacco: Never   Substance Use Topics    Alcohol use: No     Alcohol/week: 0.0 standard drinks    Drug use: No       No current facility-administered medications on file prior to encounter.     Current Outpatient Medications on File Prior to Encounter   Medication Sig Dispense Refill    furosemide (LASIX) 20 MG tablet Take 20 mg by mouth once daily.      sodium bicarbonate 650 MG tablet Take 650 mg by mouth 2 (two) times daily.      carvediloL (COREG) 25 MG tablet Take 1 tablet by mouth 2 (two) times daily.      clopidogreL (PLAVIX) 75 mg tablet Take 75 mg by mouth once daily.      ezetimibe (ZETIA) 10 mg tablet Take 10 mg by mouth once daily.      hydrALAZINE (APRESOLINE) 25 MG tablet Take 2 tablets by mouth 2 (two) times daily.         Review of patient's  allergies indicates:   Allergen Reactions    Chantix [varenicline]      Tongue swelling      Wellbutrin [bupropion hcl]      Tongue swelling         Review of Systems:  A review of 10+ systems was conducted with pertinent positive and negative findings documented in HPI with all other systems reviewed and negative.    Objective:     Vitals:   Temp:  [96.8 °F (36 °C)-98.4 °F (36.9 °C)] 96.8 °F (36 °C)  Pulse:  [60-74] 69  Resp:  [16-18] 16  SpO2:  [92 %-97 %] 97 %  BP: (113-136)/(52-61) 113/52       I/O:   Intake/Output Summary (Last 24 hours) at 5/6/2023 1250  Last data filed at 5/6/2023 0550  Gross per 24 hour   Intake --   Output 950 ml   Net -950 ml       Physical Exam:  GENERAL: patient sitting comfortably  HEENT: normocephalic  NECK: supple, no JVD  PULM: normal chest rise, no increased WOB  HEART: non-diaphoretic  ABDO: soft, non-distended, non-tender  :  Jane clear yellow  BACK: no CVA tenderness bilaterally  SKIN: warm, dry, well perfused  EXT: no bruising or edema  NEURO: grossly normal with no focal deficits  PSYCH: appropriate mood and affect    Significant Labs:  CBC:  Recent Labs   Lab 05/04/23  1718 05/05/23  0518 05/06/23  0650   WBC 4.55 4.55 4.38   HGB 9.3* 9.4* 8.9*   HCT 30.0* 30.7* 28.6*   * 119* 97*       BMP:  Recent Labs   Lab 05/04/23  1303 05/05/23  0518 05/06/23  0650    141 138   K 4.3 4.3 4.1   * 113* 108   CO2 21* 17* 20*   BUN 74* 78* 86*   CREATININE 2.3* 2.6* 2.8*   CALCIUM 8.6* 8.7 8.2*       Urinalysis  No results for input(s): COLORU, CLARITYU, SPECGRAV, PHUR, PROTEINUA, GLUCOSEU, BILIRUBINCON, BLOODU, WBCU, RBCU, BACTERIA, MUCUS, NITRITE, LEUKOCYTESUR, UROBILINOGEN, HYALINECASTS in the last 24 hours.    Imaging:  All pertinent imaging results/findings from the past 24 hours have been reviewed.    Results for orders placed or performed during the hospital encounter of 05/02/23 (from the past 2160 hour(s))   CT Abdomen Pelvis  Without Contrast    Narrative     EXAMINATION:  CT ABDOMEN PELVIS WITHOUT CONTRAST    CLINICAL HISTORY:  Concern for hydro on ultrasound, solitary kidney;    TECHNIQUE:  Multiplanar images were obtained of the abdomen and pelvis from the hemidiaphragms through the symphysis pubis without intravenous contrast.    COMPARISON:  PET-CT from 07/11/2019.  Renal ultrasound from 05/05/2023.    FINDINGS:  Lung Bases: There is a trace left pleural effusion.    Heart: There is cardiomegaly.  There are calcifications of the mitral valve.  There are coronary artery calcifications.  No pericardial effusion.    Liver: Normal in size and attenuation without focal hepatic lesion.    Biliary tract: No intrahepatic or extrahepatic biliary ductal dilatation.    Gallbladder: The gallbladder is distended.  There is no gallbladder wall thickening.  There is no radiodense gallstone.    Pancreas: Normal. No pancreatic ductal dilatation.    Spleen: Normal size without focal lesion.    Adrenals: Normal.    Kidneys and urinary collecting systems: The left kidney is surgically absent.  No evidence of nodularity in the nephrectomy bed.  The solitary right kidney demonstrates no evidence of hydronephrosis.  There are multiple calcifications in the right renal hilum which are favored to be vascular in nature.  Cannot rule out a small nonobstructing stone.    Lymph nodes: None enlarged.    Stomach and bowel: The stomach is normal.  Loops of small and large bowel are normal in caliber without evidence for inflammation or obstruction.  The appendix is normal.    Peritoneum and mesentery: There is moderate volume abdominopelvic ascites.    Vasculature: There is continued severe atherosclerosis, similar to prior.  There are bilateral common iliac stents.    Urinary bladder: There is a Jane catheter.  There is air in the urinary bladder, expected post catheter placement.    Reproductive organs: The uterus and adnexae are unremarkable.    Body wall: There is a partially calcified lesion  in the right gluteal subcutaneous tissues, adjacent to the coccyx, similar in size prior, likely a large focus of fat necrosis.  There is a suspected soft tissue ulcer overlying the left greater trochanter, with soft tissue thickening extending to the cortex.  Partially imaged calcification in the soft tissues over the left greater trochanter.    Musculoskeletal: There is an age-indeterminate, likely chronic fracture of the coccyx.  No aggressive osseous lesion.  There are degenerative changes.      Impression    1. Solitary right kidney.  No hydronephrosis.  2. Moderate abdominopelvic ascites.  3. Suspected soft tissue ulcer overlying the left greater trochanter.  4. Foci of dystrophic calcification overlying the distal sacrum and coccyx and over the left greater trochanter as above.  5. Age-indeterminate, likely chronic fracture of the coccyx, correlate with point tenderness.  6. Severe atherosclerotic disease.  Bilateral common iliac stents.      Electronically signed by: Jose Manuel Singer  Date:    05/06/2023  Time:    00:18     No results found for this or any previous visit (from the past 2160 hour(s)).    Urology Specific Assessment:     ALESIA in setting of right solitary kidney (no hydronephrosis)    Plan:      No hydronephrosis seen on cross-sectional imaging.  Although this was done after the Jane was placed that his incredibly unlikely that she was refluxing urine.  Additionally her creatinine has up trended even with a Jane.  I do not believe that this is an obstructive process causing her ALESIA as in a patient with a solitary kidney if obstructed her a creatinine would be more significantly up trended from baseline.  Additionally, she has no hydronephrosis or any evidence of obstructive uropathy including flank pain.  Would defer to Nephrology for management of this ALESIA which is unlikely to be post renal.  Jane can be removed at Nephrology and primary team's behest.    Thank you for your consult.  Urology  will sign off.    Alexei Alcantar MD  Urology  [facility]  [facility]    Disclaimer: This note has been generated using voice-recognition software. There may be typographical errors that have been missed during proof-reading.

## 2023-05-06 NOTE — ASSESSMENT & PLAN NOTE
Hx of nephrectomy. Baseline Cr 1.8-2.2.  Cr 2.8 on arrival  Seen by urology and nephrology who believe etiology to be post-renal. Renal US shows mild hydronephrosis. Jane placed. Patient had angiogram that could have worsened renal function.   No indications for HD at this time    Plan:  - Trend Cr  - Strict I&Os  - Avoid nephrotoxic agents  - Renally adjust medications  - Monitor for urinary retention  - If continues to worsen will consult nephrology

## 2023-05-06 NOTE — PROVIDER TRANSFER
"   Outside Transfer Acceptance Note / Regional Referral Center    Referring facility: [facility]   Referring provider: HAWA MERIDA  Accepting facility: [facility]  Accepting provider: Tk Collazo MD  Admitting provider:   Reason for transfer: Vascular surgery intervention    Transfer diagnosis: RLE critical leg ischemia  Transfer specialty requested: Vascular Surgery  Transfer specialty notified: Yes  Transfer level: NUMBER 1-5: 2  Bed type requested: Telemetry  Isolation status: No active isolations   Admission class or status: IP- Inpatient      Narrative     Steph Monroe is a 76yo F with a PMH of HFpEF, PAD, HTN, HLD, DM2, CKD4, single solitary kidney (s/p nephrectomy), hx of HCC, and hx of rectal cancer currently admitted to the [facility] medicine service for RLE pain. She underwent angiogram with interventional cardiology on 5/5/23, which showed: "high grade stenosis in right GUNNER.  of CFA collaterals from EIA and profunda artery which provides collaterals to chronically occluded SFA.  Initially 2V runoff with prox  of peroneal artery. Pt provides flow to foot, however has diffuse diease. Unable to adequately visualize right foot circulation."    Of note, pt also with worsening ALESIA on CKD. Nephrology following. Urology evaluated pt and placed spears; believed this to be post-renal in origin, and signed off.    IC recommended that pt would benefit from vascular surgery intervention. Dr. Merida with IC discussed case with Dr. Cagle from vascular, who recommended initiating heparin gtt and transferring to [facility] for femoral endardectomy; requested  admission. PFC contacted to facilitate transfer, which was accepted to [facility].    To do:  - Notify vascular surgery of arrival, they are already aware that she is coming  - Continue heparin gtt  - Consider continuing nephrology consult for ALESIA on CKD in pt with single solitary kidney    Past Medical History: "   Diagnosis Date    Cancer     rectum    CKD (chronic kidney disease)     Diabetes mellitus, type 2     Hyperlipidemia     Hypertension     PVD (peripheral vascular disease)          Objective     Vitals: Temp: 96.8 °F (36 °C) (05/06/23 1128)  Pulse: 69 (05/06/23 1141)  Resp: 16 (05/06/23 0738)  BP: (!) 113/52 (05/06/23 1128)  SpO2: 97 % (05/06/23 1227)       IV access:        Peripheral IV - Single Lumen 05/05/23 2100 22 G Distal;Left;Posterior Forearm (Active)   Site Assessment Clean;Dry;Intact 05/06/23 0400   Line Status Infusing 05/06/23 0400   Dressing Status Clean;Dry;Intact 05/06/23 0400            Peripheral IV - Single Lumen 05/06/23 0618 Anterior;Distal;Right Forearm (Active)   Site Assessment Clean;Dry;Intact 05/06/23 0618   Line Status Infusing 05/06/23 0618   Dressing Status Clean;Dry;Intact 05/06/23 0618   Dressing Intervention First dressing 05/06/23 0618       Allergies:   Review of patient's allergies indicates:   Allergen Reactions    Chantix [varenicline]      Tongue swelling      Wellbutrin [bupropion hcl]      Tongue swelling        NPO: No    Anticoagulation:   Anticoagulants       Ordered     Route Frequency Start Stop    05/05/23 1419  heparin (porcine)          Intra-op continuous PRN 05/05/23 1418 --    05/04/23 1711  heparin (PORCINE)  (HIGH INTENSITY heparin infusion nomogram - [facility] (Recommended Indications: Treatment of Venous Thromboembolism (VTE) including deep vein thrombosis (DVT) and pulmonary embolus (PE) and other indications where full anticoagulation is desired, fast time to therapeutic range is appropriate, and bleeding risk is low))         IV As needed (PRN) 05/04/23 1810 --    05/04/23 1711  heparin (PORCINE)  (HIGH INTENSITY heparin infusion nomogram - [facility] (Recommended Indications: Treatment of Venous Thromboembolism (VTE) including deep vein thrombosis (DVT) and pulmonary embolus (PE) and other indications where full anticoagulation is desired, fast time to therapeutic  range is appropriate, and bleeding risk is low))         IV As needed (PRN) 05/04/23 1810 --    05/04/23 1711  heparin (PORCINE)  (HIGH INTENSITY heparin infusion nomogram - [facility] (Recommended Indications: Treatment of Venous Thromboembolism (VTE) including deep vein thrombosis (DVT) and pulmonary embolus (PE) and other indications where full anticoagulation is desired, fast time to therapeutic range is appropriate, and bleeding risk is low))         IV Once 05/04/23 1715 --    05/04/23 1711  heparin (porcine) in D5W  (HIGH INTENSITY heparin infusion nomogram - [facility] (Recommended Indications: Treatment of Venous Thromboembolism (VTE) including deep vein thrombosis (DVT) and pulmonary embolus (PE) and other indications where full anticoagulation is desired, fast time to therapeutic range is appropriate, and bleeding risk is low))         IV Continuous 05/04/23 1715 --             Instructions      [facility]-  Admit to Hospital Medicine  Upon patient arrival to floor, please send SecureChat to [facility] or call extension [phone] (if no answer, do NOT leave a callback number after the beep, rather please send a SecureChat to [facility]), for Hospital Medicine admit team assignment and for additional admit orders for the patient.  Do not page the attending physician associated with the patient on arrival (this physician may not be on duty at the time of arrival).  Rather, always send a SecureChat to [facility] or call [phone] to reach the triage physician for orders and team assignment.

## 2023-05-06 NOTE — PLAN OF CARE
Problem: Adult Inpatient Plan of Care  Goal: Plan of Care Review  Outcome: Ongoing, Progressing  Goal: Optimal Comfort and Wellbeing  Outcome: Ongoing, Progressing     Problem: Diabetes Comorbidity  Goal: Blood Glucose Level Within Targeted Range  Outcome: Ongoing, Progressing     Problem: Infection  Goal: Absence of Infection Signs and Symptoms  Outcome: Ongoing, Progressing     Problem: Fall Injury Risk  Goal: Absence of Fall and Fall-Related Injury  Outcome: Ongoing, Progressing     Problem: Bleeding (Revascularization)  Goal: Absence of Bleeding  Outcome: Ongoing, Progressing     Problem: Infection (Revascularization)  Goal: Absence of Infection Signs and Symptoms  Outcome: Ongoing, Progressing     Problem: Pain (Revascularization)  Goal: Acceptable Pain Control  Outcome: Ongoing, Progressing

## 2023-05-06 NOTE — H&P
"[facility] - Intensive Care ([address] Medicine  History & Physical    Patient Name: Steph Monroe  MRN: [id number]  Patient Class: IP- Inpatient  Admission Date: 5/6/2023  Attending Physician: Mariela Finnegan MD   Primary Care Provider: Mane Carmona MD         Patient information was obtained from patient, past medical records and ER records.     Subjective:     Principal Problem:Critical limb ischemia of right lower extremity    Chief Complaint: Critical limb ischemia of right lower extremity         HPI: Steph Monroe is a 76yo F with a PMH of HFpEF, PAD, HTN, HLD, DM2, CKD4, single solitary kidney (s/p nephrectomy), hx of HCC, and hx of rectal cancer currently admitted to the [facility] medicine service for RLE pain.The only home medications she is taking is lasix and na bicarbonate. She quit all of her other medications about a year ago because she "no longer felt like taking all kind of pills". She underwent angiogram with interventional cardiology on 5/5/23, which showed: "high grade stenosis in right GUNNER.  of CFA collaterals from EIA and profunda artery which provides collaterals to chronically occluded SFA.  Initially 2V runoff with prox  of peroneal artery. Pt provides flow to foot, however has diffuse diease. Unable to adequately visualize right foot circulation."     Of note, pt also with worsening ALESIA on CKD. Nephrology following. Urology evaluated pt and placed spears; believed this to be post-renal in origin, and signed off. IC recommended that pt would benefit from vascular surgery intervention. Dr. Ny with IC discussed case with Dr. Cagle from vascular, who recommended initiating heparin gtt and transferring to [facility] for femoral endardectomy; requested  admission.    On arrival, patient was afebrile and HDS on room air. Heparin gtt infusing. Denies fever, chest pain, SOB, abd pain. Endorses moderate pain to R foot only. R foot appears cool and discolored.    "       Past Medical History:   Diagnosis Date    Cancer     rectum    CKD (chronic kidney disease)     Diabetes mellitus, type 2     Hyperlipidemia     Hypertension     PVD (peripheral vascular disease)        Past Surgical History:   Procedure Laterality Date    COLONOSCOPY N/A 3/28/2016    Procedure: COLONOSCOPY;  Surgeon: Mitul Buitrago Jr., MD;  Location: [facility] ENDO;  Service: Endoscopy;  Laterality: N/A;    EYE SURGERY Left     cataract removal with lens implant.    FEMORAL ARTERY STENT  before     PERIPHERAL ANGIOGRAPHY N/A 5/3/2023    Procedure: Peripheral angiography;  Surgeon: Rasheeda Ny MD;  Location: [facility] CATH LAB/EP;  Service: Cardiology;  Laterality: N/A;    RENAL ARTERY STENT         Review of patient's allergies indicates:   Allergen Reactions    Chantix [varenicline]      Tongue swelling      Wellbutrin [bupropion hcl]      Tongue swelling         Current Facility-Administered Medications on File Prior to Encounter   Medication    [] sodium bicarbonate 100 mEq in sterile water 1,000 mL infusion    [DISCONTINUED] acetaminophen tablet 650 mg    [DISCONTINUED] acetaminophen tablet 650 mg    [DISCONTINUED] albuterol-ipratropium 2.5 mg-0.5 mg/3 mL nebulizer solution 3 mL    [DISCONTINUED] aspirin chewable tablet 81 mg    [DISCONTINUED] atorvastatin tablet 80 mg    [DISCONTINUED] carvediloL tablet 25 mg    [DISCONTINUED] clopidogreL tablet 75 mg    [DISCONTINUED] dextrose 10% bolus 125 mL 125 mL    [DISCONTINUED] dextrose 10% bolus 250 mL 250 mL    [DISCONTINUED] gabapentin capsule 100 mg    [DISCONTINUED] glucagon (human recombinant) injection 1 mg    [DISCONTINUED] heparin 25,000 units in dextrose 5% (100 units/ml) IV bolus from bag - ADDITIONAL PRN BOLUS - 30 units/kg    [DISCONTINUED] heparin 25,000 units in dextrose 5% (100 units/ml) IV bolus from bag - ADDITIONAL PRN BOLUS - 60 units/kg    [DISCONTINUED] heparin 25,000 units in dextrose 5% (100  units/ml) IV bolus from bag INITIAL BOLUS    [DISCONTINUED] heparin 25,000 units in dextrose 5% 250 mL (100 units/mL) infusion HIGH INTENSITY nomogram - [facility]    [DISCONTINUED] heparin infusion 1,000 units/500 ml in 0.9% NaCl (pressure line flush)    [DISCONTINUED] hydrALAZINE injection 10 mg    [DISCONTINUED] hydrALAZINE tablet 50 mg    [DISCONTINUED] HYDROcodone-acetaminophen 5-325 mg per tablet 1 tablet    [DISCONTINUED] insulin aspart U-100 pen 0-5 Units    [DISCONTINUED] morphine injection 2 mg    [DISCONTINUED] mupirocin 2 % ointment    [DISCONTINUED] naloxone 0.4 mg/mL injection 0.02 mg    [DISCONTINUED] NIFEdipine 24 hr tablet 60 mg    [DISCONTINUED] ondansetron disintegrating tablet 8 mg    [DISCONTINUED] ondansetron injection 4 mg    [DISCONTINUED] sodium bicarbonate tablet 650 mg    [DISCONTINUED] sodium chloride 0.9% flush 10 mL    [DISCONTINUED] sodium chloride 0.9% flush 10 mL     Current Outpatient Medications on File Prior to Encounter   Medication Sig    aspirin 81 MG Chew Take 1 tablet (81 mg total) by mouth once daily.    atorvastatin (LIPITOR) 80 MG tablet Take 1 tablet (80 mg total) by mouth once daily.    carvediloL (COREG) 25 MG tablet Take 1 tablet by mouth 2 (two) times daily.    clopidogreL (PLAVIX) 75 mg tablet Take 75 mg by mouth once daily.    ezetimibe (ZETIA) 10 mg tablet Take 10 mg by mouth once daily.    furosemide (LASIX) 20 MG tablet Take 20 mg by mouth once daily.    gabapentin (NEURONTIN) 100 MG capsule Take 1 capsule (100 mg total) by mouth 2 (two) times daily.    hydrALAZINE (APRESOLINE) 25 MG tablet Take 2 tablets by mouth 2 (two) times daily.    NIFEdipine (PROCARDIA-XL) 60 MG ([facility]) 24 hr tablet Take 1 tablet (60 mg total) by mouth once daily.    sodium bicarbonate 650 MG tablet Take 650 mg by mouth 2 (two) times daily.    traMADoL (ULTRAM) 50 mg tablet Take 1 tablet (50 mg total) by mouth every 8 (eight) hours as needed for Pain.     Family History        Problem Relation (Age of Onset)    Diabetes Father    Heart attack Mother    Hypertension Sister          Tobacco Use    Smoking status: Former     Packs/day: 1.50     Years: 45.00     Pack years: 67.50     Types: Cigarettes     Quit date: 2013     Years since quittin.9     Passive exposure: Never    Smokeless tobacco: Never   Substance and Sexual Activity    Alcohol use: No     Alcohol/week: 0.0 standard drinks    Drug use: No    Sexual activity: Not Currently     Review of Systems   Constitutional:  Positive for activity change and unexpected weight change. Negative for chills and fever.   HENT:  Negative for sore throat.    Respiratory:  Negative for cough and shortness of breath.    Cardiovascular:  Negative for chest pain and leg swelling.   Gastrointestinal:  Negative for abdominal distention, abdominal pain, diarrhea and vomiting.   Genitourinary:  Negative for dysuria.   Musculoskeletal:  Positive for arthralgias and gait problem.   Neurological:  Negative for headaches.   Hematological:  Bruises/bleeds easily.   Psychiatric/Behavioral:  Negative for confusion.    Objective:     Vital Signs (Most Recent):  Temp: 97.2 °F (36.2 °C) (23 1700)  Pulse: 83 (23 1700)  Resp: (!) 24 (23 1700)  BP: 127/61 (23 1700) Vital Signs (24h Range):  Temp:  [96.8 °F (36 °C)-98.4 °F (36.9 °C)] 97.2 °F (36.2 °C)  Pulse:  [64-92] 83  Resp:  [16-24] 24  SpO2:  [92 %-97 %] 97 %  BP: (113-136)/(52-61) 127/61        There is no height or weight on file to calculate BMI.     Physical Exam  Vitals and nursing note reviewed.   Constitutional:       General: She is not in acute distress.     Appearance: Normal appearance. She is not ill-appearing.   HENT:      Head: Normocephalic.   Eyes:      General:         Right eye: No discharge.         Left eye: No discharge.      Extraocular Movements: Extraocular movements intact.      Conjunctiva/sclera: Conjunctivae normal.   Cardiovascular:      Rate  and Rhythm: Normal rate and regular rhythm.      Heart sounds: Normal heart sounds. No murmur heard.  Pulmonary:      Effort: Pulmonary effort is normal. No respiratory distress.      Breath sounds: Normal breath sounds. No wheezing.   Chest:      Chest wall: No tenderness.   Abdominal:      General: Abdomen is flat. There is no distension.      Palpations: Abdomen is soft.      Tenderness: There is no abdominal tenderness.   Musculoskeletal:         General: Tenderness present. No swelling. Normal range of motion.      Right lower leg: No edema.      Left lower leg: No edema.      Comments: LLE discolored, cool  Absent pedal pulses  Moderate pain to palpation    RLE warm, pulses noted DP, PT   Skin:     General: Skin is warm.      Findings: Bruising present. No rash.   Neurological:      General: No focal deficit present.      Mental Status: She is alert and oriented to person, place, and time. Mental status is at baseline.      Cranial Nerves: No cranial nerve deficit.   Psychiatric:         Mood and Affect: Mood normal.         Behavior: Behavior normal.         Thought Content: Thought content normal.              Significant Labs: All pertinent labs within the past 24 hours have been reviewed.  CBC:   Recent Labs   Lab 05/05/23 0518 05/06/23  0650   WBC 4.55 4.38   HGB 9.4* 8.9*   HCT 30.7* 28.6*   * 97*     CMP:   Recent Labs   Lab 05/05/23 0518 05/06/23  0650    138   K 4.3 4.1   * 108   CO2 17* 20*    110   BUN 78* 86*   CREATININE 2.6* 2.8*   CALCIUM 8.7 8.2*   PROT 7.0  --    ALBUMIN 3.0* 2.8*   BILITOT 0.4  --    ALKPHOS 81  --    AST 22  --    ALT 10  --    ANIONGAP 11 10     Coagulation:   Recent Labs   Lab 05/06/23  0650   APTT 54.3*       Significant Imaging: I have reviewed all pertinent imaging results/findings within the past 24 hours.  I have reviewed and interpreted all pertinent imaging results/findings within the past 24 hours.    Assessment/Plan:     * Critical limb  "ischemia of right lower extremity  76 yo F transferred from [facility] for vascular surgery evaluation and femoral endarterectomy. Patient presented to [place] with RLE pain that has hindered her walking. R foot with absent pedal pulse, pain, and discoloration. IC performed angiography on 5/5 that showed "high grade stenosis in right GUNNER.  of CFA collaterals from EIA and profunda artery which provides collaterals to chronically occluded SFA.  Initially 2V runoff with prox  of peroneal artery. Pt provides flow to foot, however has diffuse diease. Unable to adequately visualize right foot circulation." Transferred on heparin gtt.   No signs of infection noted on exam. No leukocytosis.    PLAN:  - Vascular surgery consulted for femoral endarterectomy, appreciate recs  -Continue heparin drip, ASA, plavix, and statin therapy  -Continue Gabapentin for neuropathy pain  -NPO for possible procedure  -PT/OT ordered  -Pain control w/ Tylenol, hydrocodone, and morphine  -CBC, CMP daily  -Daily coags      Pulmonary emphysema  PRN Duo nebs ordered      Chronic heart failure with preserved ejection fraction  Echo (05/03/2023) showed EF 65%  Home meds: None    Summary:   The left ventricle is normal in size with concentric remodeling and normal systolic function.   The estimated ejection fraction is 65%.   Indeterminate left ventricular diastolic function.   There is mild mitral stenosis.   The mean diastolic gradient across the mitral valve is 5 mmHg at a heart rate of 66 bpm.   Normal right ventricular size with normal right ventricular systolic function.   There is mild aortic valve stenosis.   Aortic valve area is 1.28 cm2; peak velocity is 2.33 m/s; mean gradient is 10 mmHg.   Mild-to-moderate aortic regurgitation.   Normal central venous pressure (3 mmHg).   The estimated PA systolic pressure is 35 mmHg.      Plan:  - Continue Coreg  - Hold Lasix in setting of ALESIA  - Daily weights (standing if " tolerated)  - Strict I/Os  - Fluid restriction at 1500mL  - Cardiac diet  -Telemetry      Stage 4 chronic kidney disease  Hx of nephrectomy. Baseline Cr 1.8-2.2.  Cr 2.8 on arrival  Seen by urology and nephrology who believe etiology to be post-renal. Renal US shows mild hydronephrosis. Jane placed. Patient had angiogram that could have worsened renal function.   No indications for HD at this time    Plan:  - Trend Cr  - Strict I&Os  - Avoid nephrotoxic agents  - Renally adjust medications  - Monitor for urinary retention  - If continues to worsen will consult nephrology      History of rectal or anal cancer  Hx of rectal cancer and treated with radiation and chemotherapy  No longer on chemotherapy      Coronary artery disease involving native coronary artery of native heart without angina pectoris  Continue DAPT and statin  Monitor for symptoms of angina  Currently on heparin gtt        PVD (peripheral vascular disease)  Hx of PAD w/ previous stents in renal, iliac, and femoral stents  Continue ASA, Plavix, and statin      HLD (hyperlipidemia)  Continue statin      Essential hypertension  /61 on arrival. Patient has not taken BP meds for past year.     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  Last A1c reviewed-   Lab Results   Component Value Date    HGBA1C 5.0 05/04/2023     Most recent fingerstick glucose reviewed-   Recent Labs   Lab 05/05/23  1947/23  0500 05/06/23  1126   POCTGLUCOSE 201* 114* 200*     Current correctional scale  Low  Maintain anti-hyperglycemic dose as follows-   Antihyperglycemics (From admission, onward)    Start     Stop Route Frequency Ordered    05/06/23 1710  insulin aspart U-100 pen 0-5 Units         -- SubQ Every 6 hours PRN 05/06/23 1711        Hold Oral hypoglycemics while patient is in the hospital.      VTE Risk Mitigation (From  admission, onward)         Ordered     heparin 25,000 units in dextrose 5% 250 mL (100 units/mL) infusion HIGH INTENSITY nomogram - [facility]  Continuous        Question Answer Comment   Heparin Infusion Adjustment (DO NOT MODIFY ANSWER) [email or web address]\epic\Images\Pharmacy\HeparinInfusions\heparin HIGH INTENSITY nomogram for [facility] [id number].pdf    Begin at (in units/kg/hr) 14        05/06/23 1711     IP VTE HIGH RISK PATIENT  Once         05/06/23 1711     Place sequential compression device  Until discontinued         05/06/23 1711     Reason for No Pharmacological VTE Prophylaxis  Once        Question:  Reasons:  Answer:  IV Heparin w/in 24 hrs. Pre or Post-Op    05/06/23 1711     heparin 25,000 units in dextrose 5% (100 units/ml) IV bolus from bag - ADDITIONAL PRN BOLUS - 60 units/kg  As needed (PRN)        Question:  Heparin Infusion Adjustment (DO NOT MODIFY ANSWER)  Answer:  \\[email or web address]\epic\Images\Pharmacy\HeparinInfusions\heparin HIGH INTENSITY nomogram for [facility] [id number].pdf    05/06/23 1711     heparin 25,000 units in dextrose 5% (100 units/ml) IV bolus from bag - ADDITIONAL PRN BOLUS - 30 units/kg  As needed (PRN)        Question:  Heparin Infusion Adjustment (DO NOT MODIFY ANSWER)  Answer:  \\[email or web address]\epic\Images\Pharmacy\HeparinInfusions\heparin HIGH INTENSITY nomogram for [facility] [id number].pdf    05/06/23 1711                           Jeremy Martel DO  Department of Hospital Medicine  [facility] - Intensive Care ([address])

## 2023-05-06 NOTE — SUBJECTIVE & OBJECTIVE
Interval hx: patient with intermittent tenderness to RLE with ischemic limb. She denies further complaints.       Review of Systems  Objective:     Vital Signs (Most Recent):  Temp: 98.4 °F (36.9 °C) (05/06/23 0738)  Pulse: 71 (05/06/23 0738)  Resp: 16 (05/06/23 0738)  BP: (!) 124/58 (05/06/23 0738)  SpO2: 95 % (05/06/23 0810)   Vital Signs (24h Range):  Temp:  [96.3 °F (35.7 °C)-98.4 °F (36.9 °C)] 98.4 °F (36.9 °C)  Pulse:  [60-73] 71  Resp:  [16-18] 16  SpO2:  [92 %-97 %] 95 %  BP: (116-136)/(50-61) 124/58     Weight: 58.3 kg (128 lb 8.5 oz)  Body mass index is 23.51 kg/m².    Physical Exam  Constitutional:       Appearance: Normal appearance.   HENT:      Head: Normocephalic and atraumatic.      Nose: Nose normal.      Mouth/Throat:      Mouth: Mucous membranes are moist.      Pharynx: Oropharynx is clear.   Eyes:      Extraocular Movements: Extraocular movements intact.      Conjunctiva/sclera: Conjunctivae normal.   Cardiovascular:      Rate and Rhythm: Normal rate and regular rhythm.      Heart sounds: Normal heart sounds.      Comments: Absent pedal pulses   LLE and foot warm   R foot purple and cold   Pulmonary:      Effort: Pulmonary effort is normal.      Breath sounds: Normal breath sounds.   Abdominal:      General: Abdomen is flat. Bowel sounds are normal.      Palpations: Abdomen is soft.   Musculoskeletal:         General: Tenderness present. No swelling. Normal range of motion.      Cervical back: Normal range of motion and neck supple.      Comments: RLE tender    Skin:     General: Skin is warm and dry.   Neurological:      General: No focal deficit present.      Mental Status: She is alert and oriented to person, place, and time.           Significant Labs: All pertinent labs within the past 24 hours have been reviewed.    Significant Imaging: I have reviewed all pertinent imaging results/findings within the past 24 hours.

## 2023-05-06 NOTE — ASSESSMENT & PLAN NOTE
Patient's FSGs are controlled on current medication regimen.  Last A1c reviewed-   Lab Results   Component Value Date    HGBA1C 5.0 05/04/2023     Most recent fingerstick glucose reviewed-   Recent Labs   Lab 05/05/23  1947/23  0500 05/06/23  1126   POCTGLUCOSE 201* 114* 200*     Current correctional scale  Low  Maintain anti-hyperglycemic dose as follows-   Antihyperglycemics (From admission, onward)    Start     Stop Route Frequency Ordered    05/06/23 1710  insulin aspart U-100 pen 0-5 Units         -- SubQ Every 6 hours PRN 05/06/23 1711        Hold Oral hypoglycemics while patient is in the hospital.

## 2023-05-06 NOTE — ASSESSMENT & PLAN NOTE
Pain with increased claudication unknown duration, at least a year. Now with absent pulse and R foot discoloration   On ASA, plavix, heparin gtt   No signs of infection   Consult wound care for small skin break   Cardiology planning peripheral intervention tomorrow   NPO after MN   General surgery consulted   PRN pain regime   Imaging reviewed: Lower extremity peripheral vascular disease with velocity parameters as above.  Majority monophasic waveforms throughout as can be seen with vessel hardening related to atherosclerosis versus more central, upstream stenosis.     Velocity elevation at the left deep profundus artery as can be seen with sequela of hemodynamically significant stenosis.     Limited color visualization with relative reduced flow velocity at the left proximal SFA.      Bones are demineralized.  Dorsal and plantar calcaneal enthesopathic spurring is present.  No definite fracture or effusion.  Prominent atherosclerotic vascular calcifications    5/3  S/p peripheral angiogram    Aorta: Distal aneurysmal segment     Right (views are not dedicated as unable to cross up and over from L-CFA)  Common iliaic artery: Prior stent with severe ISR. 95-99% stenosis distal to the stent  External iliac artery: 70-80% stenosis  Common femoral artery: Occluded  Superficial femoral artery: Possibly occluded. Possible reconstitution distal SFA with flow into the popliteal artery  Profunda: Flow present  Flow noted in the AT and peroneal; not well visualized. Daniel patent     Left  Common iliaic artery: Prior stent with ISR, 80% at the proximal segment  External iliac artery: Mild-moderate diffuse disease  Common femoral artery: Distal CFA 99% occluded  Superficial femoral artery: Occluded, reconstitutes via profunda distal segment  Profunda: Patent  Popliteal artery: Patent  AT: Normal  PT Trunk: Normal  PT: Normal  Peroneal: Diminutive vessel  DP: Fills likely via collaterals (not well  visualized)     Recommendations  - Restart heparin gtt, titrate per PTT  - Aspirin, clopidogrel  - High intensity statin    5/4  per cardiology continue ASA, plavix, high intensity statin   Add gabapentin   Continue heparin gtt  NPO after MN  Plan for repeat peripheral angiogram 5/5 5/5 caution with repeat angiogram given decline in renal function- will discuss with cardiology     5/6   High grade stenosis in right GUNNER ISR.   - Rt EIA high grade stenosis   -  of  Rt CFA collaterals from EIA  fills profunda artery which provides collaterals to chronically occluded mid SFA.    - Rt Popliteal is patent   - Rt At patent prox, appears to be occluded distal   - Rt PT appears to be patent and supplies the foot.   - Peroneal occluded      Unable to adequately visualize right foot circulation.      Recommendations:  -Patient will benefit from vascular surgery intervention for CFA Endarterectomy and profundoplasty and Endovascular intervention to Rt GUNNER and EIA.     Patient would like to go to [facility] [place] [facility] [place] for surgical evaluation. Dr. Lorenzo to discuss with patient's family and vascular surgery

## 2023-05-06 NOTE — ASSESSMENT & PLAN NOTE
Hx of PAD w/ previous stents in renal, iliac, and femoral stents  Continue ASA, Plavix, and statin

## 2023-05-06 NOTE — ASSESSMENT & PLAN NOTE
Hx of nephrectomy   Follows nephrology op     etiology include HTN/DM/microvascular Dz/nephrectomy; BL 1.8-2.2  Cr 2.3 - will bolus 250 ml IV LR  s/p angiogram and poor PO intake     Cr worsening 2.6  -urine studies   -renal US   -consult nephrology     5/6 Cr 2.8  Nephrotoxic drugs held   Renal US with mild hydronephrosis   CT abd without hydronephrosis   Jane in place   Nephrology and urology following

## 2023-05-06 NOTE — PLAN OF CARE
Problem: Physical Therapy  Goal: Physical Therapy Goal  Description: Goals to be met by: 23     Patient will increase functional independence with mobility by performin.  Supine to/from sit with Mod Ind  2.  Bed to/from chair with RW with supervision  3.  Up in chair 2 hours  4.  Ambulate 80' with RW with supervision    Outcome: Ongoing, Progressing   Pt continues to work and progress toward all goals. Able to perform ambulation ~10-15 ft by 2 trials with RW, IV pole in tow, requiring CGA.

## 2023-05-06 NOTE — DISCHARGE SUMMARY
"[facility] Medicine  Discharge Summary      Patient Name: Steph Monroe  MRN: [id number]  ALVARADO: [id number]  Patient Class: IP- Inpatient  Admission Date: 5/2/2023  Hospital Length of Stay: 4 days  Discharge Date and Time: 5/6/2023  4:21 PM  Attending Physician: No att. providers found   Discharging Provider: Jacinda Lomeli NP  Primary Care Provider: Mane Carmona MD    Primary Care Team: Networked reference to record PCT     HPI:   78 yo female with a PMH of rectal CA, HCC, CKD IV, DM II, HLD, HTN, PVD, s/p nephrectomy, HFpEF, former smoker 10 years ago, presents with RLE walking pain progressively worse for atleast the past year. The distances she can walk have become progressively shorter. She can now only walk around her home before the RLE pain is significant. The pain is worse with walking, but occurs at rest, is intermittent, is shooting, and is rated 6/10. The R foot is also purple. She notes a small scab to her R heel. The only home medications she is taking is lasix and na bicarbonate. She quit all of her other medications about a year ago because she "no longer felt like taking all kind of pills".  She states she unintentionally lost 56 pounds in the past year. She denies HA, vision changes, dizziness, lightheadedness, CP, SOB, abdominal pain, n/v/d/f/c/c, urinary changes, LE edema, ETOH, tobacco, illicit drugs. She lives at home with family.       Procedure(s) (LRB):  Angiogram, Lower Arterial, Unilateral (Right)      Hospital Course:   No notes on file     Goals of Care Treatment Preferences:  Code Status: Full Code      Consults:   Consults (From admission, onward)        Status Ordering Provider     Inpatient consult to Urology  Once        Provider:  (Not yet assigned)    Completed LYNN SHERWOOD     Inpatient consult to Nephrology-[facility] (Greg Fernandez Nimkevych)  Once        Provider:  (Not yet assigned)    Completed JACINDA LOMELI     Inpatient consult to " Registered Dietitian/Nutritionist  Once        Provider:  (Not yet assigned)    Completed DUSTIN NIX     IP consult to case management  Once        Provider:  (Not yet assigned)    Acknowledged DUSTIN NIX     Inpatient consult to General Surgery  Once        Provider:  (Not yet assigned)    Acknowledged JACINDA LOMELI     Inpatient consult to Cardiology-[facility]  Once        Provider:  (Not yet assigned)    Completed JAMIL HICKS          Pulmonary  Pulmonary emphysema  Lungs clear   PRN duoneb ordered     Cardiac/Vascular  Chronic heart failure with preserved ejection fraction    Aortic Stenosis   Pulmonary HTN     Patient is identified as having Diastolic (HFpEF) heart failure that is Chronic. CHF is currently controlled. Latest ECHO performed and demonstrates- No results found for this or any previous visit.  . Continue Beta Blocker Furosemide and monitor clinical status closely. Monitor on telemetry. Patient is off CHF pathway.  Monitor strict Is&Os and daily weights.  Place on fluid restriction of 1.5 L. Continue to stress to patient importance of self efficacy and  on diet for CHF. Last BNP reviewed- and noted below No results for input(s): BNP, BNPTRIAGEBLO in the last 168 hours..      -repeat 2d echo    The left ventricle is normal in size with concentric remodeling and normal systolic function.   The estimated ejection fraction is 65%.   Indeterminate left ventricular diastolic function.   There is mild mitral stenosis.   The mean diastolic gradient across the mitral valve is 5 mmHg at a heart rate of 66 bpm.   Normal right ventricular size with normal right ventricular systolic function.   There is mild aortic valve stenosis.   Aortic valve area is 1.28 cm2; peak velocity is 2.33 m/s; mean gradient is 10 mmHg.   Mild-to-moderate aortic regurgitation.   Normal central venous pressure (3 mmHg).   The estimated PA systolic pressure is 35 mmHg.    -hold lasix       Coronary  artery disease involving native coronary artery of native heart without angina pectoris    Patient with known CAD s/p unknown coronary anatomy, which is controlled Will continue ASA, Plavix and Statin and monitor for S/Sx of angina/ACS. Continue to monitor on telemetry.       PVD (peripheral vascular disease)    PAD   Hx of iliac, renal, femoral stents   See critical limb     HLD (hyperlipidemia)  High intensity statin   Lab Results   Component Value Date    LDLCALC 94.4 05/02/2023         Essential hypertension  Hypertensive Urgency  PRN IV hydralazine   Resume lasix - stop with worsening renal function   Coreg  PO hydralazine   PO nifedipine   Patient has not taken home BP medications in atleast a year   Cardiac monitor     Renal/  Stage 4 chronic kidney disease    Hx of nephrectomy   Follows nephrology op     etiology include HTN/DM/microvascular Dz/nephrectomy; BL 1.8-2.2  Cr 2.3 - will bolus 250 ml IV LR  s/p angiogram and poor PO intake     Cr worsening 2.6  -urine studies   -renal US   -consult nephrology     5/6 Cr 2.8  Nephrotoxic drugs held   Renal US with mild hydronephrosis   CT abd without hydronephrosis   Jane in place   Nephrology and urology following     Oncology  History of rectal or anal cancer    Patient reports treated with radiation and chemotherapy 6 years ago and was discharged from clinic     Endocrine  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 hypertension    Lab Results   Component Value Date    HGBA1C 5.0 05/04/2023     Continue diet control     GI  Cirrhosis    HCC  Per chart review: -carlton continues between oncologist and GI; PET scan without malignancy  -last paracentesis 11/2022 - gets albumin replacement  -Pt stopped all meds 11/2022 - she feels better and she has noticed that her ascitic fluid accumulation has not returned as quickly compared to when she was on medications  -cytology neg from ascitic fluid - low yield; follows with Yovanny Freitas  * Critical limb ischemia of right lower  extremity    Pain with increased claudication unknown duration, at least a year. Now with absent pulse and R foot discoloration   On ASA, plavix, heparin gtt   No signs of infection   Consult wound care for small skin break   Cardiology planning peripheral intervention tomorrow   NPO after MN   General surgery consulted   PRN pain regime   Imaging reviewed: Lower extremity peripheral vascular disease with velocity parameters as above.  Majority monophasic waveforms throughout as can be seen with vessel hardening related to atherosclerosis versus more central, upstream stenosis.     Velocity elevation at the left deep profundus artery as can be seen with sequela of hemodynamically significant stenosis.     Limited color visualization with relative reduced flow velocity at the left proximal SFA.      Bones are demineralized.  Dorsal and plantar calcaneal enthesopathic spurring is present.  No definite fracture or effusion.  Prominent atherosclerotic vascular calcifications    5/3  S/p peripheral angiogram    Aorta: Distal aneurysmal segment     Right (views are not dedicated as unable to cross up and over from L-CFA)  Common iliaic artery: Prior stent with severe ISR. 95-99% stenosis distal to the stent  External iliac artery: 70-80% stenosis  Common femoral artery: Occluded  Superficial femoral artery: Possibly occluded. Possible reconstitution distal SFA with flow into the popliteal artery  Profunda: Flow present  Flow noted in the AT and peroneal; not well visualized. Daniel patent     Left  Common iliaic artery: Prior stent with ISR, 80% at the proximal segment  External iliac artery: Mild-moderate diffuse disease  Common femoral artery: Distal CFA 99% occluded  Superficial femoral artery: Occluded, reconstitutes via profunda distal segment  Profunda: Patent  Popliteal artery: Patent  AT: Normal  PT Trunk: Normal  PT: Normal  Peroneal: Diminutive vessel  DP: Fills likely via collaterals (not well  visualized)     Recommendations  - Restart heparin gtt, titrate per PTT  - Aspirin, clopidogrel  - High intensity statin    5/4  per cardiology continue ASA, plavix, high intensity statin   Add gabapentin   Continue heparin gtt  NPO after MN  Plan for repeat peripheral angiogram 5/5 5/5 caution with repeat angiogram given decline in renal function- will discuss with cardiology     5/6   High grade stenosis in right GUNNER ISR.   - Rt EIA high grade stenosis   -  of  Rt CFA collaterals from EIA  fills profunda artery which provides collaterals to chronically occluded mid SFA.    - Rt Popliteal is patent   - Rt At patent prox, appears to be occluded distal   - Rt PT appears to be patent and supplies the foot.   - Peroneal occluded      Unable to adequately visualize right foot circulation.      Recommendations:  -Patient will benefit from vascular surgery intervention for CFA Endarterectomy and profundoplasty and Endovascular intervention to Rt GUNNER and EIA.     Patient would like to go to [facility] for surgical evaluation. Dr. Lorenzo to discuss with patient's family and vascular surgery   -heparin gtt continued   -transferred to [facility] for evaluation by vascular surgery         Final Active Diagnoses:    Diagnosis Date Noted POA    PRINCIPAL PROBLEM:  Critical limb ischemia of right lower extremity [I70.221] 05/02/2023 Unknown    Chronic heart failure with preserved ejection fraction [I50.32] 05/02/2023 Unknown    Pulmonary emphysema [J43.9] 05/02/2023 Unknown    Cirrhosis [K74.60] 05/02/2023 Unknown    Stage 4 chronic kidney disease [N18.4] 01/16/2018 Yes    History of rectal or anal cancer [Z85.048] 04/04/2016 Yes    Coronary artery disease involving native coronary artery of native heart without angina pectoris [I25.10] 03/26/2016 Yes     Chronic    Essential hypertension [I10] 03/26/2016 Yes     Chronic    HLD (hyperlipidemia) [E78.5] 03/26/2016 Yes     Chronic    PVD (peripheral  vascular disease) [I73.9] 03/26/2016 Yes     Chronic    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 hypertension [E11.22, I12.9, N18.30] 03/26/2016 Yes     Chronic      Problems Resolved During this Admission:       Discharged Condition: stable    Disposition: [place] [facility]   Follow Up:   Follow-up Information     Tacos Lorenzo MD. Go in 1 week(s).    Specialties: Interventional Cardiology, Cardiology  Why: FOLLOW UP WITH CARDIOLOGIST AFTER DISCHARGE  Contact information:  [address]  [address]  [facility] [address]  [phone]             [facility]. Go in 1 week(s).    Specialty: [facility] Care  Why: FOLLOW UP WITH PCP AFTER [facility] CLINIC APPT  Contact information:  [address]  [facility] [id number] [phone]  Additional information:  Please park in Lot C or D and use [address] entrance. Take Medical Office Bldg elevators. [address]           [facility]. Go in 1 week(s).    Specialty: Podiatry  Why: FOLLOW UP WITH PODIATRY AFTER DISCHARGE  Contact information:  [address]  [facility] [address] [phone]  Additional information:  Please park in Lot C or D and use [address] entrance. Take Medical Office Bldg elevators.           Anselmo Fuchs MD Follow up.    Specialties: Interventional Cardiology, Cardiology  Why: REPEAT ANGIOGRAM PLANNED FOR MONDAY 5/8/23  Contact information:  [address]  [address]  [facility] [id number] [phone]             Carlos Brooks MD Follow up in 1 week(s).    Specialties: General Surgery, Surgery  Contact information:  [address]  [address]  [place] [id number] [phone]             Avery Oakley II, MD Follow up in 1 week(s).    Specialty: Nephrology  Contact information:  [address]   [address]  [place] [id number] [phone]                       Patient Instructions:      Diet Cardiac     Diet renal     Diet diabetic     Notify your health care  provider if you experience any of the following:  temperature >100.4     Notify your health care provider if you experience any of the following:  persistent nausea and vomiting or diarrhea     Notify your health care provider if you experience any of the following:  severe uncontrolled pain     Notify your health care provider if you experience any of the following:  redness, tenderness, or signs of infection (pain, swelling, redness, odor or green/yellow discharge around incision site)     Notify your health care provider if you experience any of the following:  difficulty breathing or increased cough     Notify your health care provider if you experience any of the following:  severe persistent headache     Notify your health care provider if you experience any of the following:  worsening rash     Notify your health care provider if you experience any of the following:  persistent dizziness, light-headedness, or visual disturbances     Notify your health care provider if you experience any of the following:  increased confusion or weakness     Activity as tolerated       Significant Diagnostic Studies: Labs:   CMP   Recent Labs   Lab 05/05/23  0518 05/06/23  0650    138   K 4.3 4.1   * 108   CO2 17* 20*    110   BUN 78* 86*   CREATININE 2.6* 2.8*   CALCIUM 8.7 8.2*   PROT 7.0  --    ALBUMIN 3.0* 2.8*   BILITOT 0.4  --    ALKPHOS 81  --    AST 22  --    ALT 10  --    ANIONGAP 11 10    and CBC   Recent Labs   Lab 05/04/23  1718 05/05/23  0518 05/06/23  0650   WBC 4.55 4.55 4.38   HGB 9.3* 9.4* 8.9*   HCT 30.0* 30.7* 28.6*   * 119* 97*       Pending Diagnostic Studies:     Procedure Component Value Units Date/Time    Complement, total [[id number]] Collected: 05/05/23 1232    Order Status: Sent Lab Status: In process Updated: 05/05/23 1943    Specimen: Blood          Medications:  Transfer Medications (for Discharge Readmit only):   Current Facility-Administered Medications   Medication Dose  Route Frequency Provider Last Rate Last Admin    acetaminophen tablet 650 mg  650 mg Oral Q8H PRN Aspen Pagan NP        acetaminophen tablet 650 mg  650 mg Oral Q4H PRN Sammy Kingsley MD        albuterol-ipratropium 2.5 mg-0.5 mg/3 mL nebulizer solution 3 mL  3 mL Nebulization Q6H PRN Aspen Pagan NP        aspirin chewable tablet 81 mg  81 mg Oral Daily Aspen Pagan NP   81 mg at 05/06/23 0910    atorvastatin tablet 80 mg  80 mg Oral Daily Aspen Pagan NP   80 mg at 05/06/23 0910    carvediloL tablet 25 mg  25 mg Oral BID Aspen Pagan NP   25 mg at 05/06/23 0910    dextrose 10% bolus 125 mL 125 mL  12.5 g Intravenous PRN Aspen Pagan NP        dextrose 10% bolus 250 mL 250 mL  25 g Intravenous PRN Aspen Pagan NP        gabapentin capsule 100 mg  100 mg Oral BID Aspen Pagan NP   100 mg at 05/06/23 0910    glucagon (human recombinant) injection 1 mg  1 mg Intramuscular PRN Aspen Pagan NP        heparin 25,000 units in dextrose 5% (100 units/ml) IV bolus from bag - ADDITIONAL PRN BOLUS - 30 units/kg  30 Units/kg (Adjusted) Intravenous PRN Aspen Pagan NP        heparin 25,000 units in dextrose 5% (100 units/ml) IV bolus from bag - ADDITIONAL PRN BOLUS - 60 units/kg  60 Units/kg (Adjusted) Intravenous PRN Aspen Pagan NP        heparin 25,000 units in dextrose 5% (100 units/ml) IV bolus from bag INITIAL BOLUS  80 Units/kg (Adjusted) Intravenous Once Aspen Pagan NP        heparin 25,000 units in dextrose 5% 250 mL (100 units/mL) infusion HIGH INTENSITY nomogram - [facility]  0-40 Units/kg/hr (Adjusted) Intravenous Continuous Aspen Pagan NP 7.6 mL/hr at 05/06/23 1543 14 Units/kg/hr at 05/06/23 1543    heparin infusion 1,000 units/500 ml in 0.9% NaCl (pressure line flush)    Continuous PRN Remington Ribeiro  mL/hr at 05/05/23 1418 1,500 Units/hr at 05/05/23 1418    hydrALAZINE injection 10  mg  10 mg Intravenous Q8H PRN Aspen Pagan NP   10 mg at 05/02/23 1711    hydrALAZINE tablet 50 mg  50 mg Oral BID Aspen Pagan NP   50 mg at 05/06/23 0910    HYDROcodone-acetaminophen 5-325 mg per tablet 1 tablet  1 tablet Oral Q6H PRN Aspen Pagan NP   1 tablet at 05/05/23 1749    insulin aspart U-100 pen 0-5 Units  0-5 Units Subcutaneous Q6H PRN Aspen Pagan NP        morphine injection 2 mg  2 mg Intravenous Q6H PRN Aspen Pagan NP        mupirocin 2 % ointment   Nasal BID Saige Arce MD   Given at 05/06/23 0910    naloxone 0.4 mg/mL injection 0.02 mg  0.02 mg Intravenous PRN Aspen Pagan NP        NIFEdipine 24 hr tablet 60 mg  60 mg Oral Daily Aspen Pagan NP   60 mg at 05/06/23 0910    ondansetron disintegrating tablet 8 mg  8 mg Oral Q8H PRN Sammy Kingsley MD        ondansetron injection 4 mg  4 mg Intravenous Q8H PRN Aspen Pagan NP        sodium bicarbonate tablet 650 mg  650 mg Oral BID Aspen Pagan NP   650 mg at 05/06/23 0910    sodium chloride 0.9% flush 10 mL  10 mL Intravenous Q12H PRN Aspen Pagan NP        sodium chloride 0.9% flush 10 mL  10 mL Intravenous PRN Sammy Kingsley MD         Current Outpatient Medications   Medication Sig Dispense Refill    furosemide (LASIX) 20 MG tablet Take 20 mg by mouth once daily.      sodium bicarbonate 650 MG tablet Take 650 mg by mouth 2 (two) times daily.      aspirin 81 MG Chew Take 1 tablet (81 mg total) by mouth once daily. 30 tablet 5    atorvastatin (LIPITOR) 80 MG tablet Take 1 tablet (80 mg total) by mouth once daily. 90 tablet 3    carvediloL (COREG) 25 MG tablet Take 1 tablet by mouth 2 (two) times daily.      clopidogreL (PLAVIX) 75 mg tablet Take 75 mg by mouth once daily.      ezetimibe (ZETIA) 10 mg tablet Take 10 mg by mouth once daily.      gabapentin (NEURONTIN) 100 MG capsule Take 1 capsule (100 mg total) by mouth 2 (two)  times daily. 60 capsule 11    hydrALAZINE (APRESOLINE) 25 MG tablet Take 2 tablets by mouth 2 (two) times daily.      NIFEdipine (PROCARDIA-XL) 60 MG (OSM) 24 hr tablet Take 1 tablet (60 mg total) by mouth once daily. 30 tablet 11    traMADoL (ULTRAM) 50 mg tablet Take 1 tablet (50 mg total) by mouth every 8 (eight) hours as needed for Pain. 21 tablet 0       Indwelling Lines/Drains at time of discharge:   Lines/Drains/Airways     Central Venous Catheter Line  Duration                Port A Cath Single Lumen 06/09/16 right subclavian 2522 days          Drain  Duration                Urethral Catheter 05/05/23 2145 <1 day                Time spent on the discharge of patient: 60 minutes         Aspen Pagan NP  Department of Hospital Medicine  [facility]

## 2023-05-06 NOTE — SUBJECTIVE & OBJECTIVE
Past Medical History:   Diagnosis Date    Cancer     rectum    CKD (chronic kidney disease)     Diabetes mellitus, type 2     Hyperlipidemia     Hypertension     PVD (peripheral vascular disease)        Past Surgical History:   Procedure Laterality Date    COLONOSCOPY N/A 3/28/2016    Procedure: COLONOSCOPY;  Surgeon: Mitul Buitrago Jr., MD;  Location: [facility] ENDO;  Service: Endoscopy;  Laterality: N/A;    EYE SURGERY Left     cataract removal with lens implant.    FEMORAL ARTERY STENT  before     PERIPHERAL ANGIOGRAPHY N/A 5/3/2023    Procedure: Peripheral angiography;  Surgeon: Rasheeda Ny MD;  Location: [facility] CATH LAB/EP;  Service: Cardiology;  Laterality: N/A;    RENAL ARTERY STENT         Review of patient's allergies indicates:   Allergen Reactions    Chantix [varenicline]      Tongue swelling      Wellbutrin [bupropion hcl]      Tongue swelling         Current Facility-Administered Medications on File Prior to Encounter   Medication    [] sodium bicarbonate 100 mEq in sterile water 1,000 mL infusion    [DISCONTINUED] acetaminophen tablet 650 mg    [DISCONTINUED] acetaminophen tablet 650 mg    [DISCONTINUED] albuterol-ipratropium 2.5 mg-0.5 mg/3 mL nebulizer solution 3 mL    [DISCONTINUED] aspirin chewable tablet 81 mg    [DISCONTINUED] atorvastatin tablet 80 mg    [DISCONTINUED] carvediloL tablet 25 mg    [DISCONTINUED] clopidogreL tablet 75 mg    [DISCONTINUED] dextrose 10% bolus 125 mL 125 mL    [DISCONTINUED] dextrose 10% bolus 250 mL 250 mL    [DISCONTINUED] gabapentin capsule 100 mg    [DISCONTINUED] glucagon (human recombinant) injection 1 mg    [DISCONTINUED] heparin 25,000 units in dextrose 5% (100 units/ml) IV bolus from bag - ADDITIONAL PRN BOLUS - 30 units/kg    [DISCONTINUED] heparin 25,000 units in dextrose 5% (100 units/ml) IV bolus from bag - ADDITIONAL PRN BOLUS - 60 units/kg    [DISCONTINUED] heparin 25,000 units in dextrose 5% (100 units/ml) IV bolus from bag INITIAL  BOLUS    [DISCONTINUED] heparin 25,000 units in dextrose 5% 250 mL (100 units/mL) infusion HIGH INTENSITY nomogram - [facility]    [DISCONTINUED] heparin infusion 1,000 units/500 ml in 0.9% NaCl (pressure line flush)    [DISCONTINUED] hydrALAZINE injection 10 mg    [DISCONTINUED] hydrALAZINE tablet 50 mg    [DISCONTINUED] HYDROcodone-acetaminophen 5-325 mg per tablet 1 tablet    [DISCONTINUED] insulin aspart U-100 pen 0-5 Units    [DISCONTINUED] morphine injection 2 mg    [DISCONTINUED] mupirocin 2 % ointment    [DISCONTINUED] naloxone 0.4 mg/mL injection 0.02 mg    [DISCONTINUED] NIFEdipine 24 hr tablet 60 mg    [DISCONTINUED] ondansetron disintegrating tablet 8 mg    [DISCONTINUED] ondansetron injection 4 mg    [DISCONTINUED] sodium bicarbonate tablet 650 mg    [DISCONTINUED] sodium chloride 0.9% flush 10 mL    [DISCONTINUED] sodium chloride 0.9% flush 10 mL     Current Outpatient Medications on File Prior to Encounter   Medication Sig    aspirin 81 MG Chew Take 1 tablet (81 mg total) by mouth once daily.    atorvastatin (LIPITOR) 80 MG tablet Take 1 tablet (80 mg total) by mouth once daily.    carvediloL (COREG) 25 MG tablet Take 1 tablet by mouth 2 (two) times daily.    clopidogreL (PLAVIX) 75 mg tablet Take 75 mg by mouth once daily.    ezetimibe (ZETIA) 10 mg tablet Take 10 mg by mouth once daily.    furosemide (LASIX) 20 MG tablet Take 20 mg by mouth once daily.    gabapentin (NEURONTIN) 100 MG capsule Take 1 capsule (100 mg total) by mouth 2 (two) times daily.    hydrALAZINE (APRESOLINE) 25 MG tablet Take 2 tablets by mouth 2 (two) times daily.    NIFEdipine (PROCARDIA-XL) 60 MG (OSM) 24 hr tablet Take 1 tablet (60 mg total) by mouth once daily.    sodium bicarbonate 650 MG tablet Take 650 mg by mouth 2 (two) times daily.    traMADoL (ULTRAM) 50 mg tablet Take 1 tablet (50 mg total) by mouth every 8 (eight) hours as needed for Pain.     Family History       Problem Relation (Age of Onset)    Diabetes Father     Heart attack Mother    Hypertension Sister          Tobacco Use    Smoking status: Former     Packs/day: 1.50     Years: 45.00     Pack years: 67.50     Types: Cigarettes     Quit date: 2013     Years since quittin.9     Passive exposure: Never    Smokeless tobacco: Never   Substance and Sexual Activity    Alcohol use: No     Alcohol/week: 0.0 standard drinks    Drug use: No    Sexual activity: Not Currently     Review of Systems   Constitutional:  Positive for activity change and unexpected weight change. Negative for chills and fever.   HENT:  Negative for sore throat.    Respiratory:  Negative for cough and shortness of breath.    Cardiovascular:  Negative for chest pain and leg swelling.   Gastrointestinal:  Negative for abdominal distention, abdominal pain, diarrhea and vomiting.   Genitourinary:  Negative for dysuria.   Musculoskeletal:  Positive for arthralgias and gait problem.   Neurological:  Negative for headaches.   Hematological:  Bruises/bleeds easily.   Psychiatric/Behavioral:  Negative for confusion.    Objective:     Vital Signs (Most Recent):  Temp: 97.2 °F (36.2 °C) (23 1700)  Pulse: 83 (23 1700)  Resp: (!) 24 (23 1700)  BP: 127/61 (23 1700) Vital Signs (24h Range):  Temp:  [96.8 °F (36 °C)-98.4 °F (36.9 °C)] 97.2 °F (36.2 °C)  Pulse:  [64-92] 83  Resp:  [16-24] 24  SpO2:  [92 %-97 %] 97 %  BP: (113-136)/(52-61) 127/61        There is no height or weight on file to calculate BMI.     Physical Exam  Vitals and nursing note reviewed.   Constitutional:       General: She is not in acute distress.     Appearance: Normal appearance. She is not ill-appearing.   HENT:      Head: Normocephalic.   Eyes:      General:         Right eye: No discharge.         Left eye: No discharge.      Extraocular Movements: Extraocular movements intact.      Conjunctiva/sclera: Conjunctivae normal.   Cardiovascular:      Rate and Rhythm: Normal rate and regular rhythm.      Heart sounds:  Normal heart sounds. No murmur heard.  Pulmonary:      Effort: Pulmonary effort is normal. No respiratory distress.      Breath sounds: Normal breath sounds. No wheezing.   Chest:      Chest wall: No tenderness.   Abdominal:      General: Abdomen is flat. There is no distension.      Palpations: Abdomen is soft.      Tenderness: There is no abdominal tenderness.   Musculoskeletal:         General: Tenderness present. No swelling. Normal range of motion.      Right lower leg: No edema.      Left lower leg: No edema.      Comments: LLE discolored, cool  Absent pedal pulses  Moderate pain to palpation    RLE warm, pulses noted DP, PT   Skin:     General: Skin is warm.      Findings: Bruising present. No rash.   Neurological:      General: No focal deficit present.      Mental Status: She is alert and oriented to person, place, and time. Mental status is at baseline.      Cranial Nerves: No cranial nerve deficit.   Psychiatric:         Mood and Affect: Mood normal.         Behavior: Behavior normal.         Thought Content: Thought content normal.              Significant Labs: All pertinent labs within the past 24 hours have been reviewed.  CBC:   Recent Labs   Lab 05/05/23 0518 05/06/23  0650   WBC 4.55 4.38   HGB 9.4* 8.9*   HCT 30.7* 28.6*   * 97*     CMP:   Recent Labs   Lab 05/05/23 0518 05/06/23  0650    138   K 4.3 4.1   * 108   CO2 17* 20*    110   BUN 78* 86*   CREATININE 2.6* 2.8*   CALCIUM 8.7 8.2*   PROT 7.0  --    ALBUMIN 3.0* 2.8*   BILITOT 0.4  --    ALKPHOS 81  --    AST 22  --    ALT 10  --    ANIONGAP 11 10     Coagulation:   Recent Labs   Lab 05/06/23  0650   APTT 54.3*       Significant Imaging: I have reviewed all pertinent imaging results/findings within the past 24 hours.  I have reviewed and interpreted all pertinent imaging results/findings within the past 24 hours.

## 2023-05-06 NOTE — NURSING
"Bladder scan >562 at 20:21. Pt able  to void 50mL via  external catheter and states "I feel like I have to go  but only a little at at a time."     Pt expressed anxiety regarding spears placement stating "last time I had one, three people tried and no one could  get it. It was very painful and I don't know if I want one."    Educated patient on bladder scanning, spears placement procedure, and purpose of indwelling catheter. Discussed infection prevention. Discussed ALESIA as well as hydronephrosis and nephrectomy history. Pt verbalized understanding and agreed to allow spears placement.    This writer along with Charge Nurse placed spears per order. Emptied 700 mL urine.   "

## 2023-05-06 NOTE — ASSESSMENT & PLAN NOTE
"76 yo F transferred from [facility] for vascular surgery evaluation and femoral endarterectomy. Patient presented to [facility] with RLE pain that has hindered her walking. R foot with absent pedal pulse, pain, and discoloration. IC performed angiography on 5/5 that showed "high grade stenosis in right GUNNER.  of CFA collaterals from EIA and profunda artery which provides collaterals to chronically occluded SFA.  Initially 2V runoff with prox  of peroneal artery. Pt provides flow to foot, however has diffuse diease. Unable to adequately visualize right foot circulation." Transferred on heparin gtt.   No signs of infection noted on exam. No leukocytosis.    PLAN:  - Vascular surgery consulted for femoral endarterectomy, appreciate recs  -Continue heparin drip, ASA, plavix, and statin therapy  -Continue Gabapentin for neuropathy pain  -NPO for possible procedure  -PT/OT ordered  -Pain control w/ Tylenol, hydrocodone, and morphine  -CBC, CMP daily  -Daily coags    "

## 2023-05-06 NOTE — PROGRESS NOTES
Nephrology Progress Note     Consult Requested By: Saige Arce MD  Reason for Consult: ALESIA on CKD3      SUBJECTIVE:        ?    Review of Systems   Constitutional:  Negative for chills and fever.   HENT:  Negative for congestion and sore throat.    Eyes:  Negative for blurred vision, double vision and photophobia.   Respiratory:  Negative for cough and shortness of breath.    Cardiovascular:  Negative for chest pain, palpitations and leg swelling.   Gastrointestinal:  Negative for abdominal pain, diarrhea, nausea and vomiting.   Genitourinary:  Negative for dysuria and urgency.   Musculoskeletal:  Positive for myalgias (Leg pain). Negative for joint pain.   Skin:  Negative for itching and rash.   Neurological:  Negative for dizziness, sensory change, weakness and headaches.   Endo/Heme/Allergies:  Negative for polydipsia. Does not bruise/bleed easily.   Psychiatric/Behavioral:  Negative for depression.      Past Medical History:   Diagnosis Date    Cancer     rectum    CKD (chronic kidney disease)     Diabetes mellitus, type 2     Hyperlipidemia     Hypertension     PVD (peripheral vascular disease)      Past Surgical History:   Procedure Laterality Date    COLONOSCOPY N/A 3/28/2016    Procedure: COLONOSCOPY;  Surgeon: Mitul Buitrago Jr., MD;  Location: [facility] ENDO;  Service: Endoscopy;  Laterality: N/A;    EYE SURGERY Left     cataract removal with lens implant.    FEMORAL ARTERY STENT  before     PERIPHERAL ANGIOGRAPHY N/A 5/3/2023    Procedure: Peripheral angiography;  Surgeon: Rasheeda Ny MD;  Location: [facility] CATH LAB/EP;  Service: Cardiology;  Laterality: N/A;    RENAL ARTERY STENT       Family History   Problem Relation Age of Onset    Diabetes Father     Heart attack Mother     Hypertension Sister      Social History     Tobacco Use    Smoking status: Former     Packs/day: 1.50     Years: 45.00     Pack years: 67.50     Types: Cigarettes     Quit date: 2013     Years since quittin.9      Passive exposure: Never    Smokeless tobacco: Never   Substance Use Topics    Alcohol use: No     Alcohol/week: 0.0 standard drinks    Drug use: No       Review of patient's allergies indicates:   Allergen Reactions    Chantix [varenicline]      Tongue swelling      Wellbutrin [bupropion hcl]      Tongue swelling              OBJECTIVE:     Vital Signs (Most Recent)  Vitals:    05/06/23 0402 05/06/23 0503 05/06/23 0738 05/06/23 0810   BP:  (!) 120/58 (!) 124/58    BP Location:  Left arm Left arm    Patient Position:  Lying Lying    Pulse: 73 69 71    Resp:  18 16    Temp:  96.8 °F (36 °C) 98.4 °F (36.9 °C)    TempSrc:  Oral Oral    SpO2:  (!) 92% 95% 95%   Weight:       Height:                     Medications:   aspirin  81 mg Oral Daily    atorvastatin  80 mg Oral Daily    carvediloL  25 mg Oral BID    clopidogreL  75 mg Oral Daily    gabapentin  100 mg Oral BID    heparin (PORCINE)  80 Units/kg (Adjusted) Intravenous Once    hydrALAZINE  50 mg Oral BID    mupirocin   Nasal BID    NIFEdipine  60 mg Oral Daily    sodium bicarbonate  650 mg Oral BID           Physical Exam  Vitals and nursing note reviewed.   Constitutional:       General: She is not in acute distress.     Appearance: She is not diaphoretic.   HENT:      Head: Normocephalic and atraumatic.      Mouth/Throat:      Pharynx: No oropharyngeal exudate.   Eyes:      General: No scleral icterus.     Conjunctiva/sclera: Conjunctivae normal.      Pupils: Pupils are equal, round, and reactive to light.   Cardiovascular:      Rate and Rhythm: Normal rate and regular rhythm.      Heart sounds: Normal heart sounds. No murmur heard.  Pulmonary:      Effort: Pulmonary effort is normal. No respiratory distress.      Breath sounds: Normal breath sounds.   Abdominal:      General: Bowel sounds are normal. There is no distension.      Palpations: Abdomen is soft.      Tenderness: There is no abdominal tenderness.   Musculoskeletal:         General: Swelling  present. Normal range of motion.      Cervical back: Normal range of motion and neck supple.      Right lower leg: Edema present.   Skin:     General: Skin is warm and dry.      Findings: No erythema.   Neurological:      Mental Status: She is alert and oriented to person, place, and time.      Cranial Nerves: No cranial nerve deficit.   Psychiatric:         Mood and Affect: Affect normal.         Cognition and Memory: Memory normal.         Judgment: Judgment normal.       Laboratory:  Recent Labs   Lab 05/04/23  1718 05/05/23  0518 05/06/23  0650   WBC 4.55 4.55 4.38   HGB 9.3* 9.4* 8.9*   HCT 30.0* 30.7* 28.6*   * 119* 97*   MONO 12.3  0.6 13.6  0.6 13.9  0.6       Recent Labs   Lab 05/04/23  0255 05/04/23  1303 05/05/23  0518 05/06/23  0650    142 141 138   K 4.3 4.3 4.3 4.1   * 114* 113* 108   CO2 14* 21* 17* 20*   BUN 73* 74* 78* 86*   CREATININE 2.3* 2.3* 2.6* 2.8*   CALCIUM 8.8 8.6* 8.7 8.2*   PHOS 4.9*  --  4.8* 4.7*         Diagnostic Results:  X-Ray: Reviewed  US: Reviewed  1. Mild right-sided hydronephrosis.  2. Nonobstructing small intrarenal calculi on the right.  3. Elevated right-sided resistive index would be in keeping with medical renal disease.  4. Status post left nephrectomy.  Echo: Reviewed  ASSESSMENT/PLAN:     1. ALESIA -  on CKD3  single kidney  Cr baseline ~ 2.0-2.2  now ALESIA most likely  Due to obstruction - s/p spears CT abd no Hydronephrosis   -- US kidney Mild Hydronephrosis   NPO for procedure - given  IVF Bicarbonate for 10 hr now PO hydration monitor labs   -- Daily Renal Function Panel  -- Avoid Hypotension.  -- Renally dose all meds  -- Please avoid nephrotoxins, including NSAIDs, aminoglycosides, IV contrast (unless absolutely necessary), gadolinium, fleets and other phosphorous-based laxatives. Caution with antibiotics.    2. HTN (I10) - controlled   3. Anemia of chronic kidney disease treated        Recent Labs   Lab 05/04/23  1718 05/05/23  0518 05/06/23  0650    HGB 9.3* 9.4* 8.9*   HCT 30.0* 30.7* 28.6*   * 119* 97*         Iron   Lab Results   Component Value Date    IRON 60 04/04/2016    TIBC 367 04/04/2016    FERRITIN 81 04/04/2016       4. MBD (E88.9 M90.80) -  Recent Labs   Lab 05/06/23  0650   CALCIUM 8.2*   PHOS 4.7*       Recent Labs   Lab 05/03/23  0619 05/04/23  0255 05/05/23  0518   MG 2.0 2.1 2.2         Lab Results   Component Value Date    CALCIUM 8.2 (L) 05/06/2023    PHOS 4.7 (H) 05/06/2023     No results found for: [id number]  Acidosis- start IV bicarbonate then PO   Lab Results   Component Value Date    CO2 20 (L) 05/06/2023       5. Nutrition/Hypoalbuminemia (E88.09) -    Recent Labs   Lab 05/03/23  1116 05/04/23  0255 05/04/23  1718 05/05/23  0518 05/06/23  0650   LABPROT 13.1*  --  12.4  --   --    ALBUMIN  --    < >  --  3.0* 2.8*    < > = values in this interval not displayed.       Nepro with meals TID.       Thank you for allowing me to participate in care of your patient  With any question please call   Lacey Valera MD     [facility]  JED Garza MD,   MD JOSE RAMON Patten MD E. V. Harmon, NP  [address]   [place], [address] [phone]  After hours answering service: [phone]

## 2023-05-06 NOTE — NURSING
Transfere center called with room number. Called report to Nurse Aundrea, pt going to Bed 11132 med-tele unit [phone]. Transport set up stat ground. Gave report to nurse.  Techs preparing pt for transport.

## 2023-05-06 NOTE — HPI
"Steph Monroe is a 76yo F with a PMH of HFpEF, PAD, HTN, HLD, DM2, CKD4, single solitary kidney (s/p nephrectomy), hx of HCC, and hx of rectal cancer currently admitted to the [facility] medicine service for RLE pain.The only home medications she is taking is lasix and na bicarbonate. She quit all of her other medications about a year ago because she "no longer felt like taking all kind of pills". She underwent angiogram with interventional cardiology on 5/5/23, which showed: "high grade stenosis in right GUNNER.  of CFA collaterals from EIA and profunda artery which provides collaterals to chronically occluded SFA.  Initially 2V runoff with prox  of peroneal artery. Pt provides flow to foot, however has diffuse diease. Unable to adequately visualize right foot circulation."     Of note, pt also with worsening ALESIA on CKD. Nephrology following. Urology evaluated pt and placed spears; believed this to be post-renal in origin, and signed off. IC recommended that pt would benefit from vascular surgery intervention. Dr. Ny with IC discussed case with Dr. Cagle from vascular, who recommended initiating heparin gtt and transferring to [facility] for femoral endardectomy; requested  admission.    On arrival, patient was afebrile and HDS on room air. Heparin gtt infusing. Denies fever, chest pain, SOB, abd pain. Endorses moderate pain to R foot only. R foot appears cool and discolored.      "

## 2023-05-06 NOTE — PT/OT/SLP PROGRESS
Physical Therapy Treatment    Patient Name:  Steph Monroe   MRN:  [id number]    Recommendations:     Discharge Recommendations: home health PT  Discharge Equipment Recommendations: 3-in-1 commode, walker, rolling  Barriers to discharge:  Decreased functional mobility    Assessment:     Steph Monroe is a 77 y.o. female admitted with a medical diagnosis of Critical limb ischemia of right lower extremity.  She presents with the following impairments/functional limitations: weakness, impaired endurance, impaired self care skills, impaired functional mobility, gait instability, impaired balance, decreased coordination, decreased upper extremity function, decreased lower extremity function, decreased safety awareness, pain, decreased ROM, impaired coordination, impaired skin. Pt able to perform 2 ambulation training trials ~10-15 ft each with RW, IV pole in tow, requiring CGA. Would benefit from continued PT services to increase pt's out of bed therapeutic activity and exercise.    Rehab Prognosis: Good; patient would benefit from acute skilled PT services to address these deficits and reach maximum level of function.    Recent Surgery: Procedure(s) (LRB):  Angiogram, Lower Arterial, Unilateral (Right) 1 Day Post-Op    Plan:     During this hospitalization, patient to be seen 5 x/week to address the identified rehab impairments via gait training, therapeutic activities, therapeutic exercises, neuromuscular re-education and progress toward the following goals:    Plan of Care Expires:  06/02/23    Subjective     Chief Complaint: None Expressed  Patient/Family Comments/goals: None Expressed  Pain/Comfort:  Pain Rating 1:  (Did not rate. However, pt states it increases her pain when walking.)  Location - Side 1: Right  Location - Orientation 1: generalized  Location 1: foot  Pain Addressed 1: Reposition, Distraction, Cessation of Activity  Pain Rating Post-Intervention 1:  (Same as above)      Objective:      Communicated with nurse prior to session.  Patient found supine with bed alarm, PureWick, peripheral IV, telemetry upon PT entry to room.     General Precautions: Standard, fall  Orthopedic Precautions: N/A  Braces: N/A  Respiratory Status: Room air     Functional Mobility:  Bed Mobility:     Rolling Left:  stand by assistance  Scooting: stand by assistance  Supine to Sit: stand by assistance and contact guard assistance  Sit to Supine: stand by assistance and contact guard assistance  Transfers:     Sit to Stand:  contact guard assistance with rolling walker  Gait: ~10-15 ft with RW, IV pole in tow requiring CGA. Does not demonstrate full weight bearing to RLE secondary to increased pain when doing so.      AM-PAC 6 CLICK MOBILITY  Turning over in bed (including adjusting bedclothes, sheets and blankets)?: 4  Sitting down on and standing up from a chair with arms (e.g., wheelchair, bedside commode, etc.): 3  Moving from lying on back to sitting on the side of the bed?: 3  Moving to and from a bed to a chair (including a wheelchair)?: 3  Need to walk in hospital room?: 3  Climbing 3-5 steps with a railing?: 2  Basic Mobility Total Score: 18       Treatment & Education:  Pt able to perform 3 sit to stand transfer trials with RW requiring CGA. Also, performed 2 ambulation training trials ~10-15 ft each with RW, IV pole in tow, requiring CGA. Pt does not demonstrate full weight bearing to RLE secondary to increased pain to right foot when doing so. Requires increased time to perform all functional mobility secondary to requiring a seated rest break between between each activity performed.    Patient left HOB elevated with all lines intact, call button in reach, bed alarm on, nurse notified, and set up to eat lunch ..    GOALS:   Multidisciplinary Problems       Physical Therapy Goals          Problem: Physical Therapy    Goal Priority Disciplines Outcome Goal Variances Interventions   Physical Therapy Goal     PT,  PT/OT Ongoing, Progressing     Description: Goals to be met by: 23     Patient will increase functional independence with mobility by performin.  Supine to/from sit with Mod Ind  2.  Bed to/from chair with RW with supervision  3.  Up in chair 2 hours  4.  Ambulate 80' with RW with supervision                         Time Tracking:     PT Received On: 23  PT Start Time: 1219     PT Stop Time: 1243  PT Total Time (min): 24 min     Billable Minutes: Gait Training 15 and Therapeutic Activity 9    Treatment Type: Treatment  PT/PTA: PTA     Number of PTA visits since last PT visit: 2023

## 2023-05-06 NOTE — NURSING
Patient is alert and oriented x 4. Speech is clear. No WOB or sign of distress on room air. NPO status. MAEW. Feet are dusky with doppler pulses. Left is warmer than right. Heparin drip at 14 units. Jane intact for urinary retention. Urine yellow and clear. Safety measures in place. VS stable. Call light within reach.

## 2023-05-06 NOTE — ASSESSMENT & PLAN NOTE
Echo (05/03/2023) showed EF 65%  Home meds: None    Summary:   The left ventricle is normal in size with concentric remodeling and normal systolic function.   The estimated ejection fraction is 65%.   Indeterminate left ventricular diastolic function.   There is mild mitral stenosis.   The mean diastolic gradient across the mitral valve is 5 mmHg at a heart rate of 66 bpm.   Normal right ventricular size with normal right ventricular systolic function.   There is mild aortic valve stenosis.   Aortic valve area is 1.28 cm2; peak velocity is 2.33 m/s; mean gradient is 10 mmHg.   Mild-to-moderate aortic regurgitation.   Normal central venous pressure (3 mmHg).   The estimated PA systolic pressure is 35 mmHg.      Plan:  - Continue Coreg  - Hold Lasix in setting of ALESIA  - Daily weights (standing if tolerated)  - Strict I/Os  - Fluid restriction at 1500mL  - Cardiac diet  -Telemetry

## 2023-05-06 NOTE — PROGRESS NOTES
"[facility] Medicine  Progress Note    Patient Name: Steph Monroe  MRN: [id number]  Patient Class: IP- Inpatient   Admission Date: 5/2/2023  Length of Stay: 4 days  Attending Physician: Saige Arce MD  Primary Care Provider: Mane Carmona MD        Subjective:     Principal Problem:Critical limb ischemia of right lower extremity        HPI:  78 yo female with a PMH of rectal CA, HCC, CKD IV, DM II, HLD, HTN, PVD, s/p nephrectomy, HFpEF, former smoker 10 years ago, presents with RLE walking pain progressively worse for atleast the past year. The distances she can walk have become progressively shorter. She can now only walk around her home before the RLE pain is significant. The pain is worse with walking, but occurs at rest, is intermittent, is shooting, and is rated 6/10. The R foot is also purple. She notes a small scab to her R heel. The only home medications she is taking is lasix and na bicarbonate. She quit all of her other medications about a year ago because she "no longer felt like taking all kind of pills".  She states she unintentionally lost 56 pounds in the past year. She denies HA, vision changes, dizziness, lightheadedness, CP, SOB, abdominal pain, n/v/d/f/c/c, urinary changes, LE edema, ETOH, tobacco, illicit drugs. She lives at home with family.       Overview/Hospital Course:  No notes on file    Interval hx: patient with intermittent tenderness to RLE with ischemic limb. She denies further complaints.       Review of Systems  Objective:     Vital Signs (Most Recent):  Temp: 98.4 °F (36.9 °C) (05/06/23 0738)  Pulse: 71 (05/06/23 0738)  Resp: 16 (05/06/23 0738)  BP: (!) 124/58 (05/06/23 0738)  SpO2: 95 % (05/06/23 0810)   Vital Signs (24h Range):  Temp:  [96.3 °F (35.7 °C)-98.4 °F (36.9 °C)] 98.4 °F (36.9 °C)  Pulse:  [60-73] 71  Resp:  [16-18] 16  SpO2:  [92 %-97 %] 95 %  BP: (116-136)/(50-61) 124/58     Weight: 58.3 kg (128 lb 8.5 oz)  Body mass index is 23.51 " kg/m².    Physical Exam  Constitutional:       Appearance: Normal appearance.   HENT:      Head: Normocephalic and atraumatic.      Nose: Nose normal.      Mouth/Throat:      Mouth: Mucous membranes are moist.      Pharynx: Oropharynx is clear.   Eyes:      Extraocular Movements: Extraocular movements intact.      Conjunctiva/sclera: Conjunctivae normal.   Cardiovascular:      Rate and Rhythm: Normal rate and regular rhythm.      Heart sounds: Normal heart sounds.      Comments: Absent pedal pulses   LLE and foot warm   R foot purple and cold   Pulmonary:      Effort: Pulmonary effort is normal.      Breath sounds: Normal breath sounds.   Abdominal:      General: Abdomen is flat. Bowel sounds are normal.      Palpations: Abdomen is soft.   Musculoskeletal:         General: Tenderness present. No swelling. Normal range of motion.      Cervical back: Normal range of motion and neck supple.      Comments: RLE tender    Skin:     General: Skin is warm and dry.   Neurological:      General: No focal deficit present.      Mental Status: She is alert and oriented to person, place, and time.           Significant Labs: All pertinent labs within the past 24 hours have been reviewed.    Significant Imaging: I have reviewed all pertinent imaging results/findings within the past 24 hours.      Assessment/Plan:      * Critical limb ischemia of right lower extremity    Pain with increased claudication unknown duration, at least a year. Now with absent pulse and R foot discoloration   On ASA, plavix, heparin gtt   No signs of infection   Consult wound care for small skin break   Cardiology planning peripheral intervention tomorrow   NPO after MN   General surgery consulted   PRN pain regime   Imaging reviewed: Lower extremity peripheral vascular disease with velocity parameters as above.  Majority monophasic waveforms throughout as can be seen with vessel hardening related to atherosclerosis versus more central, upstream  stenosis.     Velocity elevation at the left deep profundus artery as can be seen with sequela of hemodynamically significant stenosis.     Limited color visualization with relative reduced flow velocity at the left proximal SFA.      Bones are demineralized.  Dorsal and plantar calcaneal enthesopathic spurring is present.  No definite fracture or effusion.  Prominent atherosclerotic vascular calcifications    5/3  S/p peripheral angiogram    Aorta: Distal aneurysmal segment     Right (views are not dedicated as unable to cross up and over from L-CFA)  Common iliaic artery: Prior stent with severe ISR. 95-99% stenosis distal to the stent  External iliac artery: 70-80% stenosis  Common femoral artery: Occluded  Superficial femoral artery: Possibly occluded. Possible reconstitution distal SFA with flow into the popliteal artery  Profunda: Flow present  Flow noted in the AT and peroneal; not well visualized. Daniel patent     Left  Common iliaic artery: Prior stent with ISR, 80% at the proximal segment  External iliac artery: Mild-moderate diffuse disease  Common femoral artery: Distal CFA 99% occluded  Superficial femoral artery: Occluded, reconstitutes via profunda distal segment  Profunda: Patent  Popliteal artery: Patent  AT: Normal  PT Trunk: Normal  PT: Normal  Peroneal: Diminutive vessel  DP: Fills likely via collaterals (not well visualized)     Recommendations  - Restart heparin gtt, titrate per PTT  - Aspirin, clopidogrel  - High intensity statin    5/4  per cardiology continue ASA, plavix, high intensity statin   Add gabapentin   Continue heparin gtt  NPO after MN  Plan for repeat peripheral angiogram 5/5 5/5 caution with repeat angiogram given decline in renal function- will discuss with cardiology     5/6   High grade stenosis in right GUNNER ISR.   - Rt EIA high grade stenosis   -  of  Rt CFA collaterals from EIA  fills profunda artery which provides collaterals to chronically occluded mid SFA.    -  Rt Popliteal is patent   - Rt At patent prox, appears to be occluded distal   - Rt PT appears to be patent and supplies the foot.   - Peroneal occluded      Unable to adequately visualize right foot circulation.      Recommendations:  -Patient will benefit from vascular surgery intervention for CFA Endarterectomy and profundoplasty and Endovascular intervention to Rt GUNNER and EIA.     Patient would like to go to [facility] for surgical evaluation. Dr. Lorenzo to discuss with patient's family and vascular surgery       Cirrhosis    HCC  Per chart review: -carlton continues between oncologist and GI; PET scan without malignancy  -last paracentesis 11/2022 - gets albumin replacement  -Pt stopped all meds 11/2022 - she feels better and she has noticed that her ascitic fluid accumulation has not returned as quickly compared to when she was on medications  -cytology neg from ascitic fluid - low yield; follows with Yovanny      Pulmonary emphysema  Lungs clear   PRN duoneb ordered     Chronic heart failure with preserved ejection fraction    Aortic Stenosis   Pulmonary HTN     Patient is identified as having Diastolic (HFpEF) heart failure that is Chronic. CHF is currently controlled. Latest ECHO performed and demonstrates- No results found for this or any previous visit.  . Continue Beta Blocker Furosemide and monitor clinical status closely. Monitor on telemetry. Patient is off CHF pathway.  Monitor strict Is&Os and daily weights.  Place on fluid restriction of 1.5 L. Continue to stress to patient importance of self efficacy and  on diet for CHF. Last BNP reviewed- and noted below No results for input(s): BNP, BNPTRIAGEBLO in the last 168 hours..      -repeat 2d echo    The left ventricle is normal in size with concentric remodeling and normal systolic function.   The estimated ejection fraction is 65%.   Indeterminate left ventricular diastolic function.   There is mild mitral stenosis.   The mean diastolic  gradient across the mitral valve is 5 mmHg at a heart rate of 66 bpm.   Normal right ventricular size with normal right ventricular systolic function.   There is mild aortic valve stenosis.   Aortic valve area is 1.28 cm2; peak velocity is 2.33 m/s; mean gradient is 10 mmHg.   Mild-to-moderate aortic regurgitation.   Normal central venous pressure (3 mmHg).   The estimated PA systolic pressure is 35 mmHg.    -hold lasix       Stage 4 chronic kidney disease    Hx of nephrectomy   Follows nephrology op     etiology include HTN/DM/microvascular Dz/nephrectomy; BL 1.8-2.2  Cr 2.3 - will bolus 250 ml IV LR  s/p angiogram and poor PO intake     Cr worsening 2.6  -urine studies   -renal US   -consult nephrology     5/6 Cr 2.8  Nephrotoxic drugs held   Renal US with mild hydronephrosis   CT abd without hydronephrosis   Jane in place   Nephrology and urology following     History of rectal or anal cancer    Patient reports treated with radiation and chemotherapy 6 years ago and was discharged from clinic     Coronary artery disease involving native coronary artery of native heart without angina pectoris    Patient with known CAD s/p unknown coronary anatomy, which is controlled Will continue ASA, Plavix and Statin and monitor for S/Sx of angina/ACS. Continue to monitor on telemetry.       PVD (peripheral vascular disease)    PAD   Hx of iliac, renal, femoral stents   See critical limb     HLD (hyperlipidemia)  High intensity statin   Lab Results   Component Value Date    LDLCALC 94.4 05/02/2023         Essential hypertension  Hypertensive Urgency  PRN IV hydralazine   Resume lasix - stop with worsening renal function   Coreg  PO hydralazine   PO nifedipine   Patient has not taken home BP medications in atleast a year   Cardiac monitor     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 hypertension    Lab Results   Component Value Date    HGBA1C 5.0 05/04/2023     Continue diet control       VTE Risk Mitigation (From admission,  onward)         Ordered     heparin infusion 1,000 units/500 ml in 0.9% NaCl (pressure line flush)  Intra-op continuous PRN         05/05/23 1419     heparin 25,000 units in dextrose 5% (100 units/ml) IV bolus from bag - ADDITIONAL PRN BOLUS - 60 units/kg  As needed (PRN)        Question:  Heparin Infusion Adjustment (DO NOT MODIFY ANSWER)  Answer:  [email or web address]\epic\Images\Pharmacy\HeparinInfusions\heparin HIGH INTENSITY nomogram for [facility] [id number]    05/04/23 1711     heparin 25,000 units in dextrose 5% (100 units/ml) IV bolus from bag - ADDITIONAL PRN BOLUS - 30 units/kg  As needed (PRN)        Question:  Heparin Infusion Adjustment (DO NOT MODIFY ANSWER)  Answer:  [email or web address]\epic\Images\Pharmacy\HeparinInfusions\heparin HIGH INTENSITY nomogram for [facility] [id number]    05/04/23 1711     heparin 25,000 units in dextrose 5% (100 units/ml) IV bolus from bag INITIAL BOLUS  Once        Question:  Heparin Infusion Adjustment (DO NOT MODIFY ANSWER)  Answer:  [email or web address]\epic\Images\Pharmacy\HeparinInfusions\heparin HIGH INTENSITY nomogram for [facility] [id number]    05/04/23 1711     heparin 25,000 units in dextrose 5% 250 mL (100 units/mL) infusion HIGH INTENSITY nomogram - [facility]  Continuous        Question Answer Comment   Heparin Infusion Adjustment (DO NOT MODIFY ANSWER) [email or web address]\epic\Images\Pharmacy\HeparinInfusions\heparin HIGH INTENSITY nomogram for [facility] [id number]    Begin at (in units/kg/hr) 18        05/04/23 1711     Reason for No Pharmacological VTE Prophylaxis  Once        Question:  Reasons:  Answer:  IV Heparin w/in 24 hrs. Pre or Post-Op    05/02/23 1237     IP VTE HIGH RISK PATIENT  Once         05/02/23 1237     Place sequential compression device  Until discontinued         05/02/23 1237                Discharge Planning   PÉREZ: 5/4/2023     Code Status: Full Code   Is the patient medically ready for discharge?:     Reason for patient still in hospital (select all that apply): Patient trending  condition  Discharge Plan A: Home, Home with family                  Aspen Pagan NP  Department of Hospital Medicine   [place] [facility]

## 2023-05-07 PROBLEM — I35.0 AORTIC STENOSIS: Status: ACTIVE | Noted: 2022-02-22

## 2023-05-07 PROBLEM — E55.9 VITAMIN D DEFICIENCY: Status: ACTIVE | Noted: 2022-02-22

## 2023-05-07 PROBLEM — I70.1 RENAL ARTERY STENOSIS: Status: ACTIVE | Noted: 2021-04-07

## 2023-05-07 PROBLEM — M19.90 ARTHRITIS: Status: ACTIVE | Noted: 2023-05-07

## 2023-05-07 PROBLEM — R91.1 LUNG NODULE: Status: ACTIVE | Noted: 2023-05-07

## 2023-05-07 PROBLEM — I27.20 PULMONARY HYPERTENSION: Status: ACTIVE | Noted: 2021-04-07

## 2023-05-07 LAB
ALBUMIN SERPL BCP-MCNC: 2.5 G/DL (ref 3.5–5.2)
ANION GAP SERPL CALC-SCNC: 9 MMOL/L (ref 8–16)
APTT PPP: 53.5 SEC (ref 21–32)
APTT PPP: 60.1 SEC (ref 21–32)
APTT PPP: 72.9 SEC (ref 21–32)
BASOPHILS # BLD AUTO: 0 K/UL (ref 0–0.2)
BASOPHILS NFR BLD: 0 % (ref 0–1.9)
BUN SERPL-MCNC: 95 MG/DL (ref 8–23)
CALCIUM SERPL-MCNC: 8.2 MG/DL (ref 8.7–10.5)
CHLORIDE SERPL-SCNC: 106 MMOL/L (ref 95–110)
CO2 SERPL-SCNC: 21 MMOL/L (ref 23–29)
CREAT SERPL-MCNC: 3.1 MG/DL (ref 0.5–1.4)
DIFFERENTIAL METHOD: ABNORMAL
EOSINOPHIL # BLD AUTO: 0 K/UL (ref 0–0.5)
EOSINOPHIL NFR BLD: 0.3 % (ref 0–8)
ERYTHROCYTE [DISTWIDTH] IN BLOOD BY AUTOMATED COUNT: 19.3 % (ref 11.5–14.5)
EST. GFR  (NO RACE VARIABLE): 15.1 ML/MIN/1.73 M^2
GLUCOSE SERPL-MCNC: 92 MG/DL (ref 70–110)
HCT VFR BLD AUTO: 28.8 % (ref 37–48.5)
HGB BLD-MCNC: 8.8 G/DL (ref 12–16)
IMM GRANULOCYTES # BLD AUTO: 0.02 K/UL (ref 0–0.04)
IMM GRANULOCYTES NFR BLD AUTO: 0.6 % (ref 0–0.5)
LYMPHOCYTES # BLD AUTO: 0.4 K/UL (ref 1–4.8)
LYMPHOCYTES NFR BLD: 12.5 % (ref 18–48)
MAGNESIUM SERPL-MCNC: 2.1 MG/DL (ref 1.6–2.6)
MCH RBC QN AUTO: 27.7 PG (ref 27–31)
MCHC RBC AUTO-ENTMCNC: 30.6 G/DL (ref 32–36)
MCV RBC AUTO: 91 FL (ref 82–98)
MONOCYTES # BLD AUTO: 0.4 K/UL (ref 0.3–1)
MONOCYTES NFR BLD: 12.5 % (ref 4–15)
NEUTROPHILS # BLD AUTO: 2.5 K/UL (ref 1.8–7.7)
NEUTROPHILS NFR BLD: 74.1 % (ref 38–73)
NRBC BLD-RTO: 0 /100 WBC
PHOSPHATE SERPL-MCNC: 4.5 MG/DL (ref 2.7–4.5)
PHOSPHATE SERPL-MCNC: 4.5 MG/DL (ref 2.7–4.5)
PLATELET # BLD AUTO: 86 K/UL (ref 150–450)
PMV BLD AUTO: 10.2 FL (ref 9.2–12.9)
POCT GLUCOSE: 111 MG/DL (ref 70–110)
POCT GLUCOSE: 138 MG/DL (ref 70–110)
POCT GLUCOSE: 93 MG/DL (ref 70–110)
POTASSIUM SERPL-SCNC: 4 MMOL/L (ref 3.5–5.1)
RBC # BLD AUTO: 3.18 M/UL (ref 4–5.4)
SODIUM SERPL-SCNC: 136 MMOL/L (ref 136–145)
WBC # BLD AUTO: 3.37 K/UL (ref 3.9–12.7)

## 2023-05-07 PROCEDURE — 99223 1ST HOSP IP/OBS HIGH 75: CPT | Mod: ,,, | Performed by: INTERNAL MEDICINE

## 2023-05-07 PROCEDURE — 25000003 PHARM REV CODE 250: Performed by: STUDENT IN AN ORGANIZED HEALTH CARE EDUCATION/TRAINING PROGRAM

## 2023-05-07 PROCEDURE — 25000003 PHARM REV CODE 250: Performed by: NURSE PRACTITIONER

## 2023-05-07 PROCEDURE — 99223 PR INITIAL HOSPITAL CARE,LEVL III: ICD-10-PCS | Mod: ,,, | Performed by: INTERNAL MEDICINE

## 2023-05-07 PROCEDURE — 36415 COLL VENOUS BLD VENIPUNCTURE: CPT | Performed by: NURSE PRACTITIONER

## 2023-05-07 PROCEDURE — 97116 GAIT TRAINING THERAPY: CPT

## 2023-05-07 PROCEDURE — 99223 PR INITIAL HOSPITAL CARE,LEVL III: ICD-10-PCS | Mod: GC,,, | Performed by: HOSPITALIST

## 2023-05-07 PROCEDURE — 83735 ASSAY OF MAGNESIUM: CPT | Performed by: STUDENT IN AN ORGANIZED HEALTH CARE EDUCATION/TRAINING PROGRAM

## 2023-05-07 PROCEDURE — 85730 THROMBOPLASTIN TIME PARTIAL: CPT | Mod: 91 | Performed by: NURSE PRACTITIONER

## 2023-05-07 PROCEDURE — 80069 RENAL FUNCTION PANEL: CPT | Performed by: NURSE PRACTITIONER

## 2023-05-07 PROCEDURE — 99223 1ST HOSP IP/OBS HIGH 75: CPT | Mod: GC,,, | Performed by: HOSPITALIST

## 2023-05-07 PROCEDURE — 85025 COMPLETE CBC W/AUTO DIFF WBC: CPT | Performed by: NURSE PRACTITIONER

## 2023-05-07 PROCEDURE — 20600001 HC STEP DOWN PRIVATE ROOM

## 2023-05-07 PROCEDURE — 85730 THROMBOPLASTIN TIME PARTIAL: CPT | Performed by: HOSPITALIST

## 2023-05-07 PROCEDURE — 36415 COLL VENOUS BLD VENIPUNCTURE: CPT | Performed by: HOSPITALIST

## 2023-05-07 PROCEDURE — 97161 PT EVAL LOW COMPLEX 20 MIN: CPT

## 2023-05-07 RX ORDER — SODIUM BICARBONATE 650 MG/1
650 TABLET ORAL 3 TIMES DAILY
Status: DISCONTINUED | OUTPATIENT
Start: 2023-05-07 | End: 2023-05-16 | Stop reason: HOSPADM

## 2023-05-07 RX ADMIN — NIFEDIPINE 60 MG: 30 TABLET, FILM COATED, EXTENDED RELEASE ORAL at 09:05

## 2023-05-07 RX ADMIN — HYDRALAZINE HYDROCHLORIDE 50 MG: 50 TABLET ORAL at 09:05

## 2023-05-07 RX ADMIN — HYDROCODONE BITARTRATE AND ACETAMINOPHEN 1 TABLET: 5; 325 TABLET ORAL at 09:05

## 2023-05-07 RX ADMIN — CLOPIDOGREL BISULFATE 75 MG: 75 TABLET ORAL at 09:05

## 2023-05-07 RX ADMIN — CARVEDILOL 25 MG: 25 TABLET, FILM COATED ORAL at 09:05

## 2023-05-07 RX ADMIN — GABAPENTIN 100 MG: 100 CAPSULE ORAL at 09:05

## 2023-05-07 RX ADMIN — SODIUM BICARBONATE 650 MG: 650 TABLET ORAL at 09:05

## 2023-05-07 RX ADMIN — ATORVASTATIN CALCIUM 80 MG: 40 TABLET, FILM COATED ORAL at 09:05

## 2023-05-07 RX ADMIN — MUPIROCIN: 20 OINTMENT TOPICAL at 09:05

## 2023-05-07 RX ADMIN — EZETIMIBE 10 MG: 10 TABLET ORAL at 09:05

## 2023-05-07 RX ADMIN — ASPIRIN 81 MG CHEWABLE TABLET 81 MG: 81 TABLET CHEWABLE at 09:05

## 2023-05-07 NOTE — ASSESSMENT & PLAN NOTE
Steph Monroe is a 77yoF with a complex medical history including significant peripheral arterial disease with previous bilateral common iliac stent placement. She presented to an outside hospital with worsening right lower extremity claudication. Her right foot with evidence of ischemic changes. She was transferred for vascular surgery evaluation.     - patient seen and examined  - labs, imaging and angiogram reviewed   - patient with worsening ALESIA; agree with nephrology consult   - angiogram demonstrates high grade stenosis of right common iliac stent, complete occlusion of right common femoral artery  - patient will need ABIs and toe pressures in the vascular lab prior to surgical intervention  - she will benefit from a right common femoral endartectomy; will plan for early this week, pending OR availability  - continue aspirin, plavix, heparin gtt, and high-intensity statin therapy  - ok for diet at this time  - rest of care per primary team  - discussed surgical plan with patient  - vascular to continue to follow

## 2023-05-07 NOTE — ASSESSMENT & PLAN NOTE
Hx of nephrectomy. Baseline Cr 1.8-2.2.  Cr 2.8 on arrival  Seen by urology and nephrology who believe etiology to be post-renal. Renal US shows mild hydronephrosis. Jane placed. Patient had angiogram that could have worsened renal function.   No indications for HD at this time    Plan:  - Nephrology consulted who recommend future HD need and line placement if does not improve.   - Trend Cr  - Strict I&Os  - Avoid nephrotoxic agents  - Renally adjust medications  - Monitor for urinary retention  - If continues to worsen will consult nephrology

## 2023-05-07 NOTE — SUBJECTIVE & OBJECTIVE
Medications Prior to Admission   Medication Sig Dispense Refill Last Dose    aspirin 81 MG Chew Take 1 tablet (81 mg total) by mouth once daily. 30 tablet 5     atorvastatin (LIPITOR) 80 MG tablet Take 1 tablet (80 mg total) by mouth once daily. 90 tablet 3     carvediloL (COREG) 25 MG tablet Take 1 tablet by mouth 2 (two) times daily.       clopidogreL (PLAVIX) 75 mg tablet Take 75 mg by mouth once daily.       ezetimibe (ZETIA) 10 mg tablet Take 10 mg by mouth once daily.       furosemide (LASIX) 20 MG tablet Take 20 mg by mouth once daily.       gabapentin (NEURONTIN) 100 MG capsule Take 1 capsule (100 mg total) by mouth 2 (two) times daily. 60 capsule 11     hydrALAZINE (APRESOLINE) 25 MG tablet Take 2 tablets by mouth 2 (two) times daily.       NIFEdipine (PROCARDIA-XL) 60 MG (OSM) 24 hr tablet Take 1 tablet (60 mg total) by mouth once daily. 30 tablet 11     sodium bicarbonate 650 MG tablet Take 650 mg by mouth 2 (two) times daily.       traMADoL (ULTRAM) 50 mg tablet Take 1 tablet (50 mg total) by mouth every 8 (eight) hours as needed for Pain. 21 tablet 0        Review of patient's allergies indicates:   Allergen Reactions    Chantix [varenicline]      Tongue swelling      Wellbutrin [bupropion hcl]      Tongue swelling         Past Medical History:   Diagnosis Date    Cancer     rectum    CKD (chronic kidney disease)     Diabetes mellitus, type 2     Hyperlipidemia     Hypertension     PVD (peripheral vascular disease)      Past Surgical History:   Procedure Laterality Date    COLONOSCOPY N/A 3/28/2016    Procedure: COLONOSCOPY;  Surgeon: Mitul Buitrago Jr., MD;  Location: [facility] ENDO;  Service: Endoscopy;  Laterality: N/A;    EYE SURGERY Left     cataract removal with lens implant.    FEMORAL ARTERY STENT  before 2010    PERIPHERAL ANGIOGRAPHY N/A 5/3/2023    Procedure: Peripheral angiography;  Surgeon: Rasheeda Ny MD;  Location: [facility] CATH LAB/EP;  Service: Cardiology;  Laterality: N/A;    RENAL  ARTERY STENT       Family History       Problem Relation (Age of Onset)    Diabetes Father    Heart attack Mother    Hypertension Sister          Tobacco Use    Smoking status: Former     Packs/day: 1.50     Years: 45.00     Pack years: 67.50     Types: Cigarettes     Quit date: 2013     Years since quittin.9     Passive exposure: Never    Smokeless tobacco: Never   Substance and Sexual Activity    Alcohol use: No     Alcohol/week: 0.0 standard drinks    Drug use: No    Sexual activity: Not Currently     Review of Systems   Constitutional:  Positive for activity change. Negative for appetite change, diaphoresis, fatigue and fever.   HENT: Negative.     Eyes: Negative.    Respiratory:  Negative for choking, chest tightness and shortness of breath.    Cardiovascular:  Negative for chest pain and leg swelling.   Gastrointestinal:  Negative for abdominal distention, abdominal pain, constipation, diarrhea, nausea and vomiting.   Musculoskeletal:  Positive for gait problem.   Skin:  Positive for color change.   Neurological:  Negative for weakness and numbness.   Objective:     Vital Signs (Most Recent):  Temp: 98.3 °F (36.8 °C) (23)  Pulse: 83 (23)  Resp: 20 (23)  BP: (!) 120/59 (23)  SpO2: 95 % (23)   Vital Signs (24h Range):  Temp:  [96.8 °F (36 °C)-98.4 °F (36.9 °C)] 98.3 °F (36.8 °C)  Pulse:  [64-92] 83  Resp:  [16-24] 20  SpO2:  [92 %-97 %] 95 %  BP: (113-136)/(52-61) 120/59        There is no height or weight on file to calculate BMI.      Physical Exam  Vitals and nursing note reviewed.   Constitutional:       General: She is not in acute distress.     Appearance: Normal appearance. She is ill-appearing (chronically).   HENT:      Nose: Nose normal.      Mouth/Throat:      Mouth: Mucous membranes are moist.   Cardiovascular:      Rate and Rhythm: Normal rate and regular rhythm.      Comments: No palpable femoral pulse on the right; 1+ femoral  pulse on the left  Monophasic signal to the right PT; no DP or AT appreciated  Biphasic signal to the left PT, monophasic signal to left DP  Pulmonary:      Effort: Pulmonary effort is normal. No respiratory distress.   Abdominal:      General: Abdomen is flat. There is distension.      Palpations: Abdomen is soft.      Tenderness: There is no abdominal tenderness.   Musculoskeletal:         General: Normal range of motion.      Comments: Motor function and strength remain intact; comparable between the right and left lower extremities   Skin:     Comments: Mottling appreciated to the distal third of the dorsal and plantar aspects of the right foot  Sensation remains intact   Neurological:      Mental Status: She is alert.        Significant Labs:  CBC:   Recent Labs   Lab 05/06/23  0650   WBC 4.38   RBC 3.21*   HGB 8.9*   HCT 28.6*   PLT 97*   MCV 89   MCH 27.7   MCHC 31.1*     CMP:   Recent Labs   Lab 05/05/23  0518 05/06/23  0650    110   CALCIUM 8.7 8.2*   ALBUMIN 3.0* 2.8*   PROT 7.0  --     138   K 4.3 4.1   CO2 17* 20*   * 108   BUN 78* 86*   CREATININE 2.6* 2.8*   ALKPHOS 81  --    ALT 10  --    AST 22  --    BILITOT 0.4  --        Significant Diagnostics:  I have reviewed all pertinent imaging results/findings within the past 24 hours.

## 2023-05-07 NOTE — PLAN OF CARE
Client is alert and oriented x 3 to person place and time. Rr even and unlabored vitals have been stable throughout shift. Pulses faint with doppler to right foot. No significant changes noted during shift.    Problem: Adult Inpatient Plan of Care  Goal: Plan of Care Review  Outcome: Ongoing, Progressing  Goal: Patient-Specific Goal (Individualized)  Outcome: Ongoing, Progressing  Goal: Absence of Hospital-Acquired Illness or Injury  Outcome: Ongoing, Progressing  Goal: Optimal Comfort and Wellbeing  Outcome: Ongoing, Progressing  Goal: Readiness for Transition of Care  Outcome: Ongoing, Progressing     Problem: Diabetes Comorbidity  Goal: Blood Glucose Level Within Targeted Range  Outcome: Ongoing, Progressing     Problem: Infection  Goal: Absence of Infection Signs and Symptoms  Outcome: Ongoing, Progressing     Problem: Fall Injury Risk  Goal: Absence of Fall and Fall-Related Injury  Outcome: Ongoing, Progressing     Problem: Skin Injury Risk Increased  Goal: Skin Health and Integrity  Outcome: Ongoing, Progressing     Problem: Fluid and Electrolyte Imbalance (Acute Kidney Injury/Impairment)  Goal: Fluid and Electrolyte Balance  Outcome: Ongoing, Progressing     Problem: Oral Intake Inadequate (Acute Kidney Injury/Impairment)  Goal: Optimal Nutrition Intake  Outcome: Ongoing, Progressing     Problem: Renal Function Impairment (Acute Kidney Injury/Impairment)  Goal: Effective Renal Function  Outcome: Ongoing, Progressing

## 2023-05-07 NOTE — SUBJECTIVE & OBJECTIVE
Interval History: NAEON. Vascular sx recommend continue AC and obtaining JOSE prior to surgical intervention. Nephrology recommend future line placement and HD if ALESIA doesn't improve.     Review of Systems   Constitutional:  Positive for activity change and unexpected weight change. Negative for chills and fever.   HENT:  Negative for sore throat.    Respiratory:  Negative for cough and shortness of breath.    Cardiovascular:  Negative for chest pain and leg swelling.   Gastrointestinal:  Negative for abdominal distention, abdominal pain, diarrhea and vomiting.   Genitourinary:  Negative for dysuria.   Musculoskeletal:  Positive for arthralgias and gait problem.   Neurological:  Negative for headaches.   Hematological:  Bruises/bleeds easily.   Psychiatric/Behavioral:  Negative for confusion.    Objective:     Vital Signs (Most Recent):  Temp: 98.2 °F (36.8 °C) (05/07/23 1556)  Pulse: 76 (05/07/23 1556)  Resp: 18 (05/07/23 1556)  BP: 132/61 (05/07/23 1556)  SpO2: 100 % (05/07/23 1556) Vital Signs (24h Range):  Temp:  [97.2 °F (36.2 °C)-98.3 °F (36.8 °C)] 98.2 °F (36.8 °C)  Pulse:  [69-83] 76  Resp:  [13-24] 18  SpO2:  [95 %-100 %] 100 %  BP: (104-134)/(53-65) 132/61     Weight: 58.1 kg (128 lb)  Body mass index is 23.41 kg/m².    Intake/Output Summary (Last 24 hours) at 5/7/2023 1611  Last data filed at 5/7/2023 0500  Gross per 24 hour   Intake 1304 ml   Output 300 ml   Net 1004 ml         Physical Exam  Vitals and nursing note reviewed.   Constitutional:       General: She is not in acute distress.     Appearance: Normal appearance. She is not ill-appearing.   HENT:      Head: Normocephalic.   Eyes:      General:         Right eye: No discharge.         Left eye: No discharge.      Extraocular Movements: Extraocular movements intact.      Conjunctiva/sclera: Conjunctivae normal.   Cardiovascular:      Rate and Rhythm: Normal rate and regular rhythm.      Heart sounds: Normal heart sounds. No murmur  heard.  Pulmonary:      Effort: Pulmonary effort is normal. No respiratory distress.      Breath sounds: Normal breath sounds. No wheezing.   Chest:      Chest wall: No tenderness.   Abdominal:      General: Abdomen is flat. There is no distension.      Palpations: Abdomen is soft.      Tenderness: There is no abdominal tenderness.   Musculoskeletal:         General: Tenderness present. No swelling. Normal range of motion.      Right lower leg: No edema.      Left lower leg: No edema.      Comments: LLE discolored, cool  Absent pedal pulses  Moderate pain to palpation    RLE warm, pulses noted DP, PT   Skin:     General: Skin is warm.      Findings: Bruising present. No rash.   Neurological:      General: No focal deficit present.      Mental Status: She is alert and oriented to person, place, and time. Mental status is at baseline.      Cranial Nerves: No cranial nerve deficit.   Psychiatric:         Mood and Affect: Mood normal.         Behavior: Behavior normal.         Thought Content: Thought content normal.           Significant Labs: All pertinent labs within the past 24 hours have been reviewed.  CBC:   Recent Labs   Lab 05/06/23  0650 05/07/23  0540   WBC 4.38 3.37*   HGB 8.9* 8.8*   HCT 28.6* 28.8*   PLT 97* 86*     CMP:   Recent Labs   Lab 05/06/23  0650 05/07/23  0540    136   K 4.1 4.0    106   CO2 20* 21*    92   BUN 86* 95*   CREATININE 2.8* 3.1*   CALCIUM 8.2* 8.2*   ALBUMIN 2.8* 2.5*   ANIONGAP 10 9     Coagulation:   Recent Labs   Lab 05/07/23  1059   APTT 60.1*       Significant Imaging: I have reviewed all pertinent imaging results/findings within the past 24 hours.  I have reviewed and interpreted all pertinent imaging results/findings within the past 24 hours.

## 2023-05-07 NOTE — ASSESSMENT & PLAN NOTE
Patient's FSGs are controlled on current medication regimen.  Last A1c reviewed-   Lab Results   Component Value Date    HGBA1C 5.0 05/04/2023     Most recent fingerstick glucose reviewed-   Recent Labs   Lab 05/06/23  2050 05/07/23  0900 05/07/23  1242 05/07/23  1554   POCTGLUCOSE 136* 93 111* 138*     Current correctional scale  Low  Maintain anti-hyperglycemic dose as follows-   Antihyperglycemics (From admission, onward)    Start     Stop Route Frequency Ordered    05/06/23 1710  insulin aspart U-100 pen 0-5 Units         -- SubQ Every 6 hours PRN 05/06/23 1711        Hold Oral hypoglycemics while patient is in the hospital.

## 2023-05-07 NOTE — PLAN OF CARE
Problem: Physical Therapy  Goal: Physical Therapy Goal  Description: Goals to be met by: 23     Patient will increase functional independence with mobility by performin. Supine to sit with Modified Izard  2. Sit to stand transfer with Supervision with RW  3. Gait  x 100 feet with Contact Guard Assistance using Rolling Walker.     Outcome: Ongoing, Progressing   Evaluation completed and goals appropriate. 2023

## 2023-05-07 NOTE — ASSESSMENT & PLAN NOTE
"78 yo F transferred from [facility] for vascular surgery evaluation and femoral endarterectomy. Patient presented to [place] with RLE pain that has hindered her walking. R foot with absent pedal pulse, pain, and discoloration. IC performed angiography on 5/5 that showed "high grade stenosis in right GUNNER.  of CFA collaterals from EIA and profunda artery which provides collaterals to chronically occluded SFA.  Initially 2V runoff with prox  of peroneal artery. Pt provides flow to foot, however has diffuse diease. Unable to adequately visualize right foot circulation." Transferred on heparin gtt.   No signs of infection noted on exam. No leukocytosis.    PLAN:  - Vascular surgery consulted for femoral endarterectomy, appreciate recs. Will obtain JOSE and toe pressures prior to surgical intervention.  -Continue heparin drip, ASA, plavix, and statin therapy  -Continue Gabapentin for neuropathy pain  -NPO for possible procedure  -PT/OT ordered  -Pain control w/ Tylenol, hydrocodone, and morphine  -CBC, CMP daily  -Daily coags    "

## 2023-05-07 NOTE — PROGRESS NOTES
"[facility] ([address] Medicine  Progress Note    Patient Name: Steph Monroe  MRN: [id number]  Patient Class: IP- Inpatient   Admission Date: 5/6/2023  Length of Stay: 1 days  Attending Physician: Mariela Finnegan MD  Primary Care Provider: Mane Carmona MD        Subjective:     Principal Problem:Critical limb ischemia of right lower extremity        HPI:  Steph Monroe is a 78yo F with a PMH of HFpEF, PAD, HTN, HLD, DM2, CKD4, single solitary kidney (s/p nephrectomy), hx of HCC, and hx of rectal cancer currently admitted to the [facility] medicine service for RLE pain.The only home medications she is taking is lasix and na bicarbonate. She quit all of her other medications about a year ago because she "no longer felt like taking all kind of pills". She underwent angiogram with interventional cardiology on 5/5/23, which showed: "high grade stenosis in right GUNNER.  of CFA collaterals from EIA and profunda artery which provides collaterals to chronically occluded SFA.  Initially 2V runoff with prox  of peroneal artery. Pt provides flow to foot, however has diffuse diease. Unable to adequately visualize right foot circulation."     Of note, pt also with worsening ALESIA on CKD. Nephrology following. Urology evaluated pt and placed spears; believed this to be post-renal in origin, and signed off. IC recommended that pt would benefit from vascular surgery intervention. Dr. Ny with IC discussed case with Dr. Cagle from vascular, who recommended initiating heparin gtt and transferring to [facility] for femoral endardectomy; requested  admission.    On arrival, patient was afebrile and HDS on room air. Heparin gtt infusing. Denies fever, chest pain, SOB, abd pain. Endorses moderate pain to R foot only. R foot appears cool and discolored.          Overview/Hospital Course:  76 yo F transferred from [facility] for vascular surgery evaluation for critical limb ischemia of RLE found by IC " angiography on 5/5. Will continue patient on ASA, plavix, and heparin gtt. Vascular sx consulted who will obtain JOSE and toe pressures prior to surgical intervention. Will plan for femoral endarterectomy in next few days. Nephrology consulted and recommend future need for HD and line placement for ALESIA.       Interval History: NAEON. Vascular sx recommend continue AC and obtaining JOSE prior to surgical intervention. Nephrology recommend future line placement and HD if ALESIA doesn't improve.     Review of Systems   Constitutional:  Positive for activity change and unexpected weight change. Negative for chills and fever.   HENT:  Negative for sore throat.    Respiratory:  Negative for cough and shortness of breath.    Cardiovascular:  Negative for chest pain and leg swelling.   Gastrointestinal:  Negative for abdominal distention, abdominal pain, diarrhea and vomiting.   Genitourinary:  Negative for dysuria.   Musculoskeletal:  Positive for arthralgias and gait problem.   Neurological:  Negative for headaches.   Hematological:  Bruises/bleeds easily.   Psychiatric/Behavioral:  Negative for confusion.    Objective:     Vital Signs (Most Recent):  Temp: 98.2 °F (36.8 °C) (05/07/23 1556)  Pulse: 76 (05/07/23 1556)  Resp: 18 (05/07/23 1556)  BP: 132/61 (05/07/23 1556)  SpO2: 100 % (05/07/23 1556) Vital Signs (24h Range):  Temp:  [97.2 °F (36.2 °C)-98.3 °F (36.8 °C)] 98.2 °F (36.8 °C)  Pulse:  [69-83] 76  Resp:  [13-24] 18  SpO2:  [95 %-100 %] 100 %  BP: (104-134)/(53-65) 132/61     Weight: 58.1 kg (128 lb)  Body mass index is 23.41 kg/m².    Intake/Output Summary (Last 24 hours) at 5/7/2023 1611  Last data filed at 5/7/2023 0500  Gross per 24 hour   Intake 1304 ml   Output 300 ml   Net 1004 ml         Physical Exam  Vitals and nursing note reviewed.   Constitutional:       General: She is not in acute distress.     Appearance: Normal appearance. She is not ill-appearing.   HENT:      Head: Normocephalic.   Eyes:       General:         Right eye: No discharge.         Left eye: No discharge.      Extraocular Movements: Extraocular movements intact.      Conjunctiva/sclera: Conjunctivae normal.   Cardiovascular:      Rate and Rhythm: Normal rate and regular rhythm.      Heart sounds: Normal heart sounds. No murmur heard.  Pulmonary:      Effort: Pulmonary effort is normal. No respiratory distress.      Breath sounds: Normal breath sounds. No wheezing.   Chest:      Chest wall: No tenderness.   Abdominal:      General: Abdomen is flat. There is no distension.      Palpations: Abdomen is soft.      Tenderness: There is no abdominal tenderness.   Musculoskeletal:         General: Tenderness present. No swelling. Normal range of motion.      Right lower leg: No edema.      Left lower leg: No edema.      Comments: LLE discolored, cool  Absent pedal pulses  Moderate pain to palpation    RLE warm, pulses noted DP, PT   Skin:     General: Skin is warm.      Findings: Bruising present. No rash.   Neurological:      General: No focal deficit present.      Mental Status: She is alert and oriented to person, place, and time. Mental status is at baseline.      Cranial Nerves: No cranial nerve deficit.   Psychiatric:         Mood and Affect: Mood normal.         Behavior: Behavior normal.         Thought Content: Thought content normal.           Significant Labs: All pertinent labs within the past 24 hours have been reviewed.  CBC:   Recent Labs   Lab 05/06/23  0650 05/07/23  0540   WBC 4.38 3.37*   HGB 8.9* 8.8*   HCT 28.6* 28.8*   PLT 97* 86*     CMP:   Recent Labs   Lab 05/06/23  0650 05/07/23  0540    136   K 4.1 4.0    106   CO2 20* 21*    92   BUN 86* 95*   CREATININE 2.8* 3.1*   CALCIUM 8.2* 8.2*   ALBUMIN 2.8* 2.5*   ANIONGAP 10 9     Coagulation:   Recent Labs   Lab 05/07/23  1059   APTT 60.1*       Significant Imaging: I have reviewed all pertinent imaging results/findings within the past 24 hours.  I have reviewed  "and interpreted all pertinent imaging results/findings within the past 24 hours.      Assessment/Plan:      * Critical limb ischemia of right lower extremity  76 yo F transferred from [facility] for vascular surgery evaluation and femoral endarterectomy. Patient presented to [place] with RLE pain that has hindered her walking. R foot with absent pedal pulse, pain, and discoloration. IC performed angiography on 5/5 that showed "high grade stenosis in right GUNNER.  of CFA collaterals from EIA and profunda artery which provides collaterals to chronically occluded SFA.  Initially 2V runoff with prox  of peroneal artery. Pt provides flow to foot, however has diffuse diease. Unable to adequately visualize right foot circulation." Transferred on heparin gtt.   No signs of infection noted on exam. No leukocytosis.    PLAN:  - Vascular surgery consulted for femoral endarterectomy, appreciate recs. Will obtain JOSE and toe pressures prior to surgical intervention.  -Continue heparin drip, ASA, plavix, and statin therapy  -Continue Gabapentin for neuropathy pain  -NPO for possible procedure  -PT/OT ordered  -Pain control w/ Tylenol, hydrocodone, and morphine  -CBC, CMP daily  -Daily coags      Metabolic acidosis  Continue home sodium bicarb      Pulmonary emphysema  PRN Duo nebs ordered      Chronic heart failure with preserved ejection fraction  Echo (05/03/2023) showed EF 65%  Home meds: None    Summary:   The left ventricle is normal in size with concentric remodeling and normal systolic function.   The estimated ejection fraction is 65%.   Indeterminate left ventricular diastolic function.   There is mild mitral stenosis.   The mean diastolic gradient across the mitral valve is 5 mmHg at a heart rate of 66 bpm.   Normal right ventricular size with normal right ventricular systolic function.   There is mild aortic valve stenosis.   Aortic valve area is 1.28 cm2; peak velocity is 2.33 m/s; mean gradient is 10 " mmHg.   Mild-to-moderate aortic regurgitation.   Normal central venous pressure (3 mmHg).   The estimated PA systolic pressure is 35 mmHg.      Plan:  - Continue Coreg  - Hold Lasix in setting of ALESIA  - Daily weights (standing if tolerated)  - Strict I/Os  - Fluid restriction at 1500mL  - Cardiac diet  -Telemetry      alesia on ckd stage 4  Hx of nephrectomy. Baseline Cr 1.8-2.2.  Cr 2.8 on arrival  Seen by urology and nephrology who believe etiology to be post-renal. Renal US shows mild hydronephrosis. Jane placed. Patient had angiogram that could have worsened renal function.   No indications for HD at this time    Plan:  - Nephrology consulted who recommend future HD need and line placement if does not improve.   - Trend Cr  - Strict I&Os  - Avoid nephrotoxic agents  - Renally adjust medications  - Monitor for urinary retention  - If continues to worsen will consult nephrology      History of rectal or anal cancer  Hx of rectal cancer and treated with radiation and chemotherapy  No longer on chemotherapy      Coronary artery disease involving native coronary artery of native heart without angina pectoris  Continue DAPT and statin  Monitor for symptoms of angina  Currently on heparin gtt        PVD (peripheral vascular disease)  Hx of PAD w/ previous stents in renal, iliac, and femoral stents  Continue ASA, Plavix, and statin      HLD (hyperlipidemia)  Continue statin      Essential hypertension  /61 on arrival. Patient has not taken BP meds for past year.     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  Last A1c reviewed-   Lab Results   Component Value Date    HGBA1C 5.0 05/04/2023     Most recent fingerstick glucose reviewed-   Recent Labs   Lab 05/06/23  2050 05/07/23  0900 05/07/23  1242 05/07/23  1554   POCTGLUCOSE 136* 93 111* 138*     Current correctional scale   Low  Maintain anti-hyperglycemic dose as follows-   Antihyperglycemics (From admission, onward)    Start     Stop Route Frequency Ordered    05/06/23 1710  insulin aspart U-100 pen 0-5 Units         -- SubQ Every 6 hours PRN 05/06/23 1711        Hold Oral hypoglycemics while patient is in the hospital.      VTE Risk Mitigation (From admission, onward)         Ordered     heparin 25,000 units in dextrose 5% 250 mL (100 units/mL) infusion HIGH INTENSITY nomogram - [facility]  Continuous        Question Answer Comment   Heparin Infusion Adjustment (DO NOT MODIFY ANSWER) \\[email or web address]\Images\Pharmacy\HeparinInfusions\heparin HIGH INTENSITY nomogram for [facility] [id number]    Begin at (in units/kg/hr) 14        05/06/23 1711     IP VTE HIGH RISK PATIENT  Once         05/06/23 1711     Place sequential compression device  Until discontinued         05/06/23 1711     Reason for No Pharmacological VTE Prophylaxis  Once        Question:  Reasons:  Answer:  IV Heparin w/in 24 hrs. Pre or Post-Op    05/06/23 1711     heparin 25,000 units in dextrose 5% (100 units/ml) IV bolus from bag - ADDITIONAL PRN BOLUS - 60 units/kg  As needed (PRN)        Question:  Heparin Infusion Adjustment (DO NOT MODIFY ANSWER)  Answer:  \[email or web address]\Images\Pharmacy\HeparinInfusions\heparin HIGH INTENSITY nomogram for [facility] [id number]    05/06/23 1711     heparin 25,000 units in dextrose 5% (100 units/ml) IV bolus from bag - ADDITIONAL PRN BOLUS - 30 units/kg  As needed (PRN)        Question:  Heparin Infusion Adjustment (DO NOT MODIFY ANSWER)  Answer:  \[email or web address]\Images\Pharmacy\HeparinInfusions\heparin HIGH INTENSITY nomogram for [facility] [id number]    05/06/23 1711                Discharge Planning   PÉREZ: 5/10/2023     Code Status: Full Code   Is the patient medically ready for discharge?: No    Reason for patient still in hospital (select all that apply): Patient trending condition and Consult recommendations                     Jeremy Martel    Department of Hospital Medicine   [facility] ([place])

## 2023-05-07 NOTE — ASSESSMENT & PLAN NOTE
Baseline sCr: ~2.0 based on labs in Care Everywhere for the past year  Admission sCr: 1.8  Lab Results   Component Value Date    CREATININE 3.1 (H) 05/07/2023       Recommendations:   -Unclear etiology of her ALESIA. She has not had contrast exposure as initially thought since pt had a CO2 angiogram of LE, despite IV fluids pt's sCr & BUN has continued to trend up and without evidence of hydronephrosis and given her history of arterial disease a dedicated renal vasculature assessment is reasonable, CRS is also a possibility, she does not have HRS. I did discuss the possibility of requiring KRT if her renal function continues to decline as she has advanced CKD IV, solitary R kidney, HFpEF and cirrhosis.     -urine microscopy when able   -Electrolytes:    K, goal >4, page nephrology if K >5.5, Mg, goal >2, Phos, goal >3 and <5  -Acid/base: NAGMA, Sodium bicarb tabs 650mg TID    -Fluid balance: mildly fluid overloaded, avoid fluids for now   -Anemia: Hgb 8.8, transfuse for Hgb <7.0  -Strict I/O's and daily weights  -Renal diet/tube feedings if not NPO  -Renal function panel and Mg level in AM  -Maintain MAP >65 for renal perfusion

## 2023-05-07 NOTE — PROGRESS NOTES
[facility] ([place] [address])  Vascular Surgery  Progress Note    Patient Name: Steph Monroe  MRN: [id number]  Admission Date: 5/6/2023  Primary Care Provider: Mane Carmona MD    Subjective:     Interval History: no acute events overnight, transferred from [place] for [facility] surgery evaluation     Post-Op Info:  * No surgery found *          Medications:  Continuous Infusions:   heparin (porcine) in D5W 12 Units/kg/hr (05/07/23 0632)     Scheduled Meds:   aspirin  81 mg Oral Daily    atorvastatin  80 mg Oral Daily    carvediloL  25 mg Oral BID    clopidogreL  75 mg Oral Daily    ezetimibe  10 mg Oral Daily    gabapentin  100 mg Oral BID    hydrALAZINE  50 mg Oral BID    mupirocin   Nasal BID    NIFEdipine  60 mg Oral Daily    sodium bicarbonate  650 mg Oral BID     PRN Meds:acetaminophen, albuterol-ipratropium, dextrose 10%, dextrose 10%, glucagon (human recombinant), heparin (PORCINE), heparin (PORCINE), hydrALAZINE, HYDROcodone-acetaminophen, insulin aspart U-100, morphine, naloxone, ondansetron, sodium chloride 0.9%, sodium chloride 0.9%, traMADoL     Objective:     Vital Signs (Most Recent):  Temp: 97.8 °F (36.6 °C) (05/07/23 0603)  Pulse: 69 (05/07/23 0603)  Resp: 14 (05/07/23 0603)  BP: 104/65 (05/07/23 0603)  SpO2: 100 % (05/07/23 0603) Vital Signs (24h Range):  Temp:  [96.8 °F (36 °C)-98.3 °F (36.8 °C)] 97.8 °F (36.6 °C)  Pulse:  [69-92] 69  Resp:  [13-24] 14  SpO2:  [95 %-100 %] 100 %  BP: (104-127)/(52-65) 104/65         Physical Exam  Vitals reviewed.   Constitutional:       Appearance: She is ill-appearing.   Cardiovascular:      Comments: 1+ left femoral  No right femoral pulse  PT and DP monophasic on the left  PT monophasic on the right  Pulmonary:      Effort: Pulmonary effort is normal.      Comments: On 2L NC  Abdominal:      General: Abdomen is flat.   Musculoskeletal:         General: Tenderness present.      Right lower leg: No edema.      Left lower leg: No edema.   Skin:      Capillary Refill: Capillary refill takes more than 3 seconds.      Coloration: Skin is pale.      Comments: Right foot mottled to forefoot    Neurological:      General: No focal deficit present.      Mental Status: She is alert and oriented to person, place, and time.       Significant Labs:  CBC:   Recent Labs   Lab 05/07/23  0540   WBC 3.37*   RBC 3.18*   HGB 8.8*   HCT 28.8*   PLT 86*   MCV 91   MCH 27.7   MCHC 30.6*     CMP:   Recent Labs   Lab 05/07/23  0540   GLU 92   CALCIUM 8.2*   ALBUMIN 2.5*      K 4.0   CO2 21*      BUN 95*   CREATININE 3.1*       Significant Diagnostics:  I have reviewed all pertinent imaging results/findings within the past 24 hours.    Assessment/Plan:     Active Diagnoses:    Diagnosis Date Noted POA    PRINCIPAL PROBLEM:  Critical limb ischemia of right lower extremity [I70.221] 05/02/2023 Yes    Metabolic acidosis [E87.20] 05/06/2023 Yes    Chronic heart failure with preserved ejection fraction [I50.32] 05/02/2023 Yes    Pulmonary emphysema [J43.9] 05/02/2023 Yes    adelaida on ckd stage 4 [N17.9] 01/16/2018 Yes    History of rectal or anal cancer [Z85.048] 04/04/2016 Yes    Coronary artery disease involving native coronary artery of native heart without angina pectoris [I25.10] 03/26/2016 Yes     Chronic    PVD (peripheral vascular disease) [I73.9] 03/26/2016 Yes     Chronic    HLD (hyperlipidemia) [E78.5] 03/26/2016 Yes     Chronic    Essential hypertension [I10] 03/26/2016 Yes     Chronic    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 hypertension [E11.22, I12.9, N18.30] 03/26/2016 Yes     Chronic      Problems Resolved During this Admission:   PVD (peripheral vascular disease)  Steph Monroe is a 77yoF with a complex medical history including significant peripheral arterial disease with previous bilateral common iliac stent placement. She presented to an outside hospital with worsening right lower extremity claudication. Her right foot with evidence of ischemic changes. She was  transferred for vascular surgery evaluation.      - patient seen and examined  - labs, imaging and angiogram reviewed          - patient with worsening ALESIA; agree with nephrology consult          - angiogram demonstrates high grade stenosis of right common iliac stent, complete occlusion of right common femoral artery  - patient will need ABIs and toe pressures in the vascular lab prior to surgical intervention  - she will benefit from a right common femoral endartectomy; will plan for early this week, pending OR availability  - continue aspirin, plavix, heparin gtt, and high-intensity statin therapy  - ok for diet at this time  - rest of care per primary team  - discussed surgical plan with patient  - vascular to continue to follow    Demario Rushing MD  Vascular Surgery  [facility] ([place])

## 2023-05-07 NOTE — HPI
Steph Monroe is a 77 y.o. 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 She developed cirrhosis with ascites (unclear etiology from records) requiring paracentesis (last done 11/2022), PVD w/ iliac stents , aortic stenosis, pulm HTN, lung nodule, anemia, cataracts, OA, vitamin D deficiency, and she is a former smoker who quit >10 yrs ago. Pt presented at [facility]  on 5/2/2023 for evaluation as recommended by her podiatrist worsening tip LE pain, worse in the R leg and distant bilateral femoral pulses.    At [facility] pt's presenting symptoms were concerning for critical limb ischemia based on the tip LE arterial doppler results, vascular surgery and cardiology were consulted. Pt was started on IV fluids and heparin gtt and then on 5/3/2023 pt had peripheral angiogram of tip LE with CO2 not with contrast via the R radial artery to the L common femoral artery and per operative report the findings were:     high grade stenosis in right GUNNER.  of CFA collaterals from EIA and profunda artery which provides collaterals to chronically occluded SFA.  Initially 2V runoff with prox  of peroneal artery. Pt provides flow to foot, however has diffuse diease. Unable to adequately visualize right foot circulation.     Pt had a renal ultrasound on 5/5/2023 that showed R sided hydronephrosis, non-obstructing small intrarenal calculi on the right, CKD changes, and known absent L kidney. Nephrology was consulted for ALESIA originally thought to be d/t hydronephrosis, pt was started on [place] x10hrs. A spears catheter was placed and Urology was consulted. CT abd & pelvis was done and this did NOT show hydronephrosis, it did show some abdominopelvic ascites, calcifications, and  "tip common iliac stents. Pt also had an echo and this showed an EF of 65%, with mild MS, mild AS, mild to mod AR, CVP 3, PASP 35 mmHg    [place] [facility] [place] anticipating vascular surgery intervention     On [place] [facility] [place] presented with the following VS:   Initial Vitals   BP Pulse Resp Temp SpO2   05/06/23 1700 05/06/23 1700 05/06/23 1700 05/06/23 1700 05/06/23 1910   127/61 83 (!) 24 97.2 °F (36.2 °C) 95 %     Nephrology was consulted for: "Worsening ALESIA"  Baseline sCr: ~2.0 based on labs in Care Everywhere for the past year  Admission sCr: 1.8  Pt follows outpatient with Dr. Oakley for CKD IV, she was last seen in Jan 2023 and was started on lasix and spironolactone which she was taking prior to admission. 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   Pt denies:  foam in urine, hematuria, dysuria, urinary frequency or urgency, fever, chills, shortness of breath, cough, chest pain, palpitations, nausea, vomiting, diarrhea, abd pain,headaches, visual disturbances or weakness, or rash.    Creatinine   Date Value Ref Range Status   05/06/2023 2.8 (H) 0.5 - 1.4 mg/dL Final   05/05/2023 2.6 (H) 0.5 - 1.4 mg/dL Final   05/04/2023 2.3 (H) 0.5 - 1.4 mg/dL Final   05/04/2023 2.3 (H) 0.5 - 1.4 mg/dL Final   05/03/2023 2.1 (H) 0.5 - 1.4 mg/dL Final   05/02/2023 1.8 (H) 0.5 - 1.4 mg/dL Final   08/06/2020 2.2 (H) 0.5 - 1.4 mg/dL Final   07/16/2018 1.8 (H) 0.5 - 1.4 mg/dL Final   01/19/2018 2.2 (H) 0.5 - 1.4 mg/dL Final   01/16/2018 2.1 (H) 0.5 - 1.4 mg/dL Final     BUN   Date Value Ref Range Status   05/06/2023 86 (H) 8 - 23 mg/dL Final   05/05/2023 78 (H) 8 - 23 mg/dL Final   05/04/2023 74 (H) 8 - 23 mg/dL Final   05/04/2023 73 (H) 8 - 23 mg/dL Final   05/03/2023 60 (H) 8 - 23 mg/dL Final     "

## 2023-05-07 NOTE — HOSPITAL COURSE
76 yo F transferred from [facility] for vascular surgery evaluation for critical limb ischemia of RLE found by IC angiography on 5/5. Will continue patient on ASA, plavix, and heparin gtt. Vascular sx consulted who will obtain JOSE and toe pressures prior to surgical intervention. Will plan for femoral endarterectomy on 5/9. Nephrology consulted and recommend future need for HD and line placement for ALESIA. Plan to give IVF pre and post op to prevent worsening ALESIA. Sent to SICU for 5/10 and stepped back down. Repeat ABIs shows severe arterial occlusion. Angiogram on 5/15 with 2 vessel run off to R foot. Repeat JOSE shows improvement in RLE. Hgb 6.0 will transfuse 1u RBC. Medically stable for discharge. Will send referral to  for PT/OT. Will need follow up with vascular sx, nephrology, and PCP. Will need to continue DAPT and statin therapy.

## 2023-05-07 NOTE — PT/OT/SLP EVAL
Physical Therapy Evaluation and treatment    Patient Name:  Steph Monroe   MRN:  [id number]    Recommendations:     Discharge Recommendations: other (see comments)   Discharge Equipment Recommendations:     Barriers to discharge: None    Assessment:     Steph Monroe is a 77 y.o. female admitted with a medical diagnosis of Critical limb ischemia of right lower extremity.  She presents with the following impairments/functional limitations: impaired endurance, impaired functional mobility, gait instability, impaired balance, decreased safety awareness pt tolerated treatment well and will benefit from skilled PT 3x/wk to progress physically. Pt will be able to discharge home with further PT needs when medically stable. Pt was transferred from [facility] with foot pain.     Rehab Prognosis: Good; patient would benefit from acute skilled PT services to address these deficits and reach maximum level of function.    Recent Surgery: * No surgery found *      Plan:     During this hospitalization, patient to be seen 3 x/week to address the identified rehab impairments via gait training, therapeutic activities and progress toward the following goals:    Plan of Care Expires:  06/05/23    Subjective     Chief Complaint: pt c/o slight foot pain during treatment.   Patient/Family Comments/goals: to get better and go home.   Pain/Comfort:  Pain Rating 1: 3/10 (R foot)  Pain Addressed 1: Reposition, Distraction  Pain Rating Post-Intervention 1: 3/10 (R foot)    Patients cultural, spiritual, Episcopal conflicts given the current situation: no    Living Environment:  Pt lives with her daughter in 2 story with B&B downstairs and slab entrance.   Prior to admission, patients level of function was modified Independent using RW.  Equipment used at home: walker, rolling, grab bar, raised toilet, wheelchair (walk in shower with built in bench).  DME owned (not currently used): none.  Upon discharge, patient will have assistance  from daughter.    Objective:     Communicated with nurse prior to session.  Patient found supine with telemetry, pulse ox (continuous), oxygen, spears catheter, peripheral IV  upon PT entry to room.    General Precautions: Standard, fall, hearing impaired  Orthopedic Precautions:    Braces:    Respiratory Status: Nasal cannula, flow 2 L/min    Exams:  Cognitive Exam:  Patient is oriented to Person, Place, Time, and Situation  RLE ROM: WFL  RLE Strength: WFL  LLE ROM: WFL  LLE Strength: WFL    Functional Mobility:  Bed Mobility:   pt needed verbal cues for hand placement and sequencing for functional mobility.   Rolling Left:  minimum assistance  Supine to Sit: minimum assistance    Transfers:     Sit to Stand:  contact guard assistance with rolling walker    Gait: pt received gait training ~ 24 ft then 20 ft  (44 ft total) with RW, O2 intact and CGA. Pt had sitting rest period between distance ambulated.     Balance: pt was SBA sitting on EOB.     Pt white board updated with current therapists name and level of mobility assistance needed.       AM-PAC 6 CLICK MOBILITY  Total Score:17       Treatment & Education:  Pt received verbal instructions in role of PT and POC. Pt verbally expressed understanding of such.     Patient left up in chair with all lines intact and call button in reach.    GOALS:   Multidisciplinary Problems       Physical Therapy Goals          Problem: Physical Therapy    Goal Priority Disciplines Outcome Goal Variances Interventions   Physical Therapy Goal     PT, PT/OT Ongoing, Progressing     Description: Goals to be met by: 23     Patient will increase functional independence with mobility by performin. Supine to sit with Modified [place]  2. Sit to stand transfer with Supervision with RW  3. Gait  x 100 feet with Contact Guard Assistance using Rolling Walker.                          History:     Past Medical History:   Diagnosis Date    Cancer     rectum    CKD (chronic  kidney disease)     Diabetes mellitus, type 2     Hyperlipidemia     Hypertension     Neutropenic fever 5/27/2016    PVD (peripheral vascular disease)        Past Surgical History:   Procedure Laterality Date    COLONOSCOPY N/A 3/28/2016    Procedure: COLONOSCOPY;  Surgeon: Mitul Buitrago Jr., MD;  Location: [facility] ENDO;  Service: Endoscopy;  Laterality: N/A;    EYE SURGERY Left     cataract removal with lens implant.    FEMORAL ARTERY STENT  before 2010    PERIPHERAL ANGIOGRAPHY N/A 5/3/2023    Procedure: Peripheral angiography;  Surgeon: Rasheeda Ny MD;  Location: [facility] CATH LAB/EP;  Service: Cardiology;  Laterality: N/A;    RENAL ARTERY STENT  2010       Time Tracking:     PT Received On: 05/07/23  PT Start Time: 1059     PT Stop Time: 1129  PT Total Time (min): 30 min     Billable Minutes: Evaluation 10 min  and Gait Training 20 min       05/07/2023

## 2023-05-07 NOTE — CONSULTS
[facility] ([place] [id number])  Vascular Surgery  Consult Note    Inpatient consult to Vascular Surgery  Consult performed by: Vijay Dennis MD  Consult ordered by: Sina Cummings MD        Subjective:     Chief Complaint/Reason for Admission: peripheral arterial disease    History of Present Illness: Steph Monroe is a 77-year-old female with past medical history significant for hypertension, hyperlipidemia, known peripheral arterial disease, CKD stage 4, status post chemo and radiation for rectal cancer, and known single kidney presented to an outside hospital with complaints of right lower extremity and right foot pain.  Was noted to have mottling of her right forefoot.  The patient says that this has been progressively worsening for the last few weeks.  She continues to ambulate, but her distance has been limited secondary to right foot pain.  At the outside hospital, she was found to have ALESIA while she was worked up for this claudication.  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  She was transferred to [facility] for vascular surgery evaluation.    On my exam the patient is resting comfortably in bed.  Her right forefoot has chronic ischemic changes.  There is no appreciable tissue loss.  She has a monophasic pulse to the right posterior tibial artery.  Sensation and motor remain intact.  She endorses intermittent rest pain.      Medications Prior to Admission   Medication Sig Dispense Refill Last Dose    aspirin 81 MG Chew Take 1 tablet (81 mg total) by mouth once daily. 30 tablet 5     atorvastatin (LIPITOR) 80 MG tablet Take 1 tablet (80 mg total) by mouth once daily. 90 tablet 3     carvediloL (COREG) 25 MG tablet Take 1 tablet by mouth 2 (two) times daily.       clopidogreL (PLAVIX) 75 mg tablet Take 75 mg by mouth once daily.       ezetimibe (ZETIA) 10 mg tablet  Take 10 mg by mouth once daily.       furosemide (LASIX) 20 MG tablet Take 20 mg by mouth once daily.       gabapentin (NEURONTIN) 100 MG capsule Take 1 capsule (100 mg total) by mouth 2 (two) times daily. 60 capsule 11     hydrALAZINE (APRESOLINE) 25 MG tablet Take 2 tablets by mouth 2 (two) times daily.       NIFEdipine (PROCARDIA-XL) 60 MG (OSM) 24 hr tablet Take 1 tablet (60 mg total) by mouth once daily. 30 tablet 11     sodium bicarbonate 650 MG tablet Take 650 mg by mouth 2 (two) times daily.       traMADoL (ULTRAM) 50 mg tablet Take 1 tablet (50 mg total) by mouth every 8 (eight) hours as needed for Pain. 21 tablet 0        Review of patient's allergies indicates:   Allergen Reactions    Chantix [varenicline]      Tongue swelling      Wellbutrin [bupropion hcl]      Tongue swelling         Past Medical History:   Diagnosis Date    Cancer     rectum    CKD (chronic kidney disease)     Diabetes mellitus, type 2     Hyperlipidemia     Hypertension     PVD (peripheral vascular disease)      Past Surgical History:   Procedure Laterality Date    COLONOSCOPY N/A 3/28/2016    Procedure: COLONOSCOPY;  Surgeon: Mitul Buitrago Jr., MD;  Location: [facility] ENDO;  Service: Endoscopy;  Laterality: N/A;    EYE SURGERY Left     cataract removal with lens implant.    FEMORAL ARTERY STENT  before     PERIPHERAL ANGIOGRAPHY N/A 5/3/2023    Procedure: Peripheral angiography;  Surgeon: Rasheeda Ny MD;  Location: [facility] CATH LAB/EP;  Service: Cardiology;  Laterality: N/A;    RENAL ARTERY STENT       Family History       Problem Relation (Age of Onset)    Diabetes Father    Heart attack Mother    Hypertension Sister          Tobacco Use    Smoking status: Former     Packs/day: 1.50     Years: 45.00     Pack years: 67.50     Types: Cigarettes     Quit date: 2013     Years since quittin.9     Passive exposure: Never    Smokeless tobacco: Never   Substance and Sexual Activity    Alcohol use: No     Alcohol/week:  0.0 standard drinks    Drug use: No    Sexual activity: Not Currently     Review of Systems   Constitutional:  Positive for activity change. Negative for appetite change, diaphoresis, fatigue and fever.   HENT: Negative.     Eyes: Negative.    Respiratory:  Negative for choking, chest tightness and shortness of breath.    Cardiovascular:  Negative for chest pain and leg swelling.   Gastrointestinal:  Negative for abdominal distention, abdominal pain, constipation, diarrhea, nausea and vomiting.   Musculoskeletal:  Positive for gait problem.   Skin:  Positive for color change.   Neurological:  Negative for weakness and numbness.   Objective:     Vital Signs (Most Recent):  Temp: 98.3 °F (36.8 °C) (05/06/23 1910)  Pulse: 83 (05/06/23 1910)  Resp: 20 (05/06/23 1910)  BP: (!) 120/59 (05/06/23 1910)  SpO2: 95 % (05/06/23 1910)   Vital Signs (24h Range):  Temp:  [96.8 °F (36 °C)-98.4 °F (36.9 °C)] 98.3 °F (36.8 °C)  Pulse:  [64-92] 83  Resp:  [16-24] 20  SpO2:  [92 %-97 %] 95 %  BP: (113-136)/(52-61) 120/59        There is no height or weight on file to calculate BMI.      Physical Exam  Vitals and nursing note reviewed.   Constitutional:       General: She is not in acute distress.     Appearance: Normal appearance. She is ill-appearing (chronically).   HENT:      Nose: Nose normal.      Mouth/Throat:      Mouth: Mucous membranes are moist.   Cardiovascular:      Rate and Rhythm: Normal rate and regular rhythm.      Comments: No palpable femoral pulse on the right; 1+ femoral pulse on the left  Monophasic signal to the right PT; no DP or AT appreciated  Biphasic signal to the left PT, monophasic signal to left DP  Pulmonary:      Effort: Pulmonary effort is normal. No respiratory distress.   Abdominal:      General: Abdomen is flat. There is distension.      Palpations: Abdomen is soft.      Tenderness: There is no abdominal tenderness.   Musculoskeletal:         General: Normal range of motion.      Comments: Motor  function and strength remain intact; comparable between the right and left lower extremities   Skin:     Comments: Mottling appreciated to the distal third of the dorsal and plantar aspects of the right foot  Sensation remains intact   Neurological:      Mental Status: She is alert.        Significant Labs:  CBC:   Recent Labs   Lab 05/06/23  0650   WBC 4.38   RBC 3.21*   HGB 8.9*   HCT 28.6*   PLT 97*   MCV 89   MCH 27.7   MCHC 31.1*     CMP:   Recent Labs   Lab 05/05/23  0518 05/06/23  0650    110   CALCIUM 8.7 8.2*   ALBUMIN 3.0* 2.8*   PROT 7.0  --     138   K 4.3 4.1   CO2 17* 20*   * 108   BUN 78* 86*   CREATININE 2.6* 2.8*   ALKPHOS 81  --    ALT 10  --    AST 22  --    BILITOT 0.4  --        Significant Diagnostics:  I have reviewed all pertinent imaging results/findings within the past 24 hours.    Assessment/Plan:     PVD (peripheral vascular disease)  Steph Monroe is a 77yoF with a complex medical history including significant peripheral arterial disease with previous bilateral common iliac stent placement. She presented to an outside hospital with worsening right lower extremity claudication. Her right foot with evidence of ischemic changes. She was transferred for vascular surgery evaluation.     - patient seen and examined  - labs, imaging and angiogram reviewed   - patient with worsening ALESIA; agree with nephrology consult   - angiogram demonstrates high grade stenosis of right common iliac stent, complete occlusion of right common femoral artery  - patient will need ABIs and toe pressures in the vascular lab prior to surgical intervention  - she will benefit from a right common femoral endartectomy; will plan for early this week, pending OR availability  - continue aspirin, plavix, heparin gtt, and high-intensity statin therapy  - ok for diet at this time  - rest of care per primary team  - discussed surgical plan with patient  - vascular to continue to follow        Thank you  for your consult. I will follow-up with patient. Please contact us if you have any additional questions.    Vijay Dennis MD  Vascular Surgery  [facility] ([place] [id number])      VASCULAR SURGERY ATTENDING ATTESTATION    I have seen and examined the patient and I have taken the history and reviewed the chart/studies and personally reviewed and interpreted the relevant imaging. Patient has kissing iliac stents. There is severe stenosis just beyond the end of the right common iliac stent and occlusion or 99% stenosis of her right common femoral artery. She has rest pain and cyanosis of her right forefoot. Recommend right femoral endarterectomy and iliac artery angiogram with common iliac +/- external iliac artery stenting to restore perfusion to her right leg. Will plan to perform angiogram with CO2 contrast to reduce risk of contrast nephropathy in setting of severe CKD.    FERNANDO Cagle II, MD, [facility]  Vascular Surgery  [facility]

## 2023-05-07 NOTE — CONSULTS
"[facility] ([place] [address])  Nephrology  Consult Note    Patient Name: Steph Monroe  MRN: [id number]  Admission Date: 5/6/2023  Hospital Length of Stay: 1 days  Attending Provider: Mariela Finnegan MD   Primary Care Physician: Mane Carmona MD  Principal Problem:Critical limb ischemia of right lower extremity    Inpatient consult to Nephrology  Consult performed by: Carissa Mcintosh MD  Consult ordered by: Sina Cummings MD  Reason for consult: "Worsening ALESIA"        Subjective:     HPI:   Steph Monroe is a 77 y.o. 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 She developed cirrhosis with ascites (unclear etiology from records) requiring paracentesis (last done 11/2022), PVD w/ iliac stents , aortic stenosis, pulm HTN, lung nodule, anemia, cataracts, OA, vitamin D deficiency, and she is a former smoker who quit >10 yrs ago. Pt presented at [facility]  on 5/2/2023 for evaluation as recommended by her podiatrist worsening tip LE pain, worse in the R leg and distant bilateral femoral pulses.    At [facility] pt's presenting symptoms were concerning for critical limb ischemia based on the tip LE arterial doppler results, vascular surgery and cardiology were consulted. Pt was started on IV fluids and heparin gtt and then on 5/3/2023 pt had peripheral angiogram of tip LE with CO2 not with contrast via the R radial artery to the L common femoral artery and per operative report the findings were:     high grade stenosis in right GUNNER.  of CFA collaterals from EIA and profunda artery which provides collaterals to chronically occluded SFA.  Initially 2V runoff with prox  of peroneal artery. Pt provides flow to foot, however has diffuse diease. Unable to " "adequately visualize right foot circulation.     Pt had a renal ultrasound on 5/5/2023 that showed R sided hydronephrosis, non-obstructing small intrarenal calculi on the right, CKD changes, and known absent L kidney. Nephrology was consulted for ALESIA originally thought to be d/t hydronephrosis, pt was started on [place] x10hrs. A spears catheter was placed and Urology was consulted. CT abd & pelvis was done and this did NOT show hydronephrosis, it did show some abdominopelvic ascites, calcifications, and tip common iliac stents. Pt also had an echo and this showed an EF of 65%, with mild MS, mild AS, mild to mod AR, CVP 3, PASP 35 mmHg    Pt was transferred to [facility] for higher level of care anticipating vascular surgery intervention     On arrival to [facility] pt presented with the following VS:   Initial Vitals   BP Pulse Resp Temp SpO2   05/06/23 1700 05/06/23 1700 05/06/23 1700 05/06/23 1700 05/06/23 1910   127/61 83 (!) 24 97.2 °F (36.2 °C) 95 %     Nephrology was consulted for: "Worsening ALESIA"  Baseline sCr: ~2.0 based on labs in Care Everywhere for the past year  Admission sCr: 1.8  Pt follows outpatient with Dr. Oakley for CKD IV, she was last seen in Jan 2023 and was started on lasix and spironolactone which she was taking prior to admission. 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   Pt denies:  foam in urine, hematuria, dysuria, urinary frequency or urgency, fever, chills, shortness of breath, cough, chest pain, palpitations, nausea, vomiting, diarrhea, abd pain,headaches, visual disturbances or weakness, or rash.    Creatinine   Date Value Ref Range Status   05/06/2023 2.8 (H) 0.5 - 1.4 mg/dL Final   05/05/2023 2.6 (H) 0.5 - 1.4 mg/dL Final   05/04/2023 2.3 (H) 0.5 - 1.4 mg/dL Final   05/04/2023 2.3 (H) 0.5 - 1.4 mg/dL Final   05/03/2023 2.1 " (H) 0.5 - 1.4 mg/dL Final   05/02/2023 1.8 (H) 0.5 - 1.4 mg/dL Final   08/06/2020 2.2 (H) 0.5 - 1.4 mg/dL Final   07/16/2018 1.8 (H) 0.5 - 1.4 mg/dL Final   01/19/2018 2.2 (H) 0.5 - 1.4 mg/dL Final   01/16/2018 2.1 (H) 0.5 - 1.4 mg/dL Final     BUN   Date Value Ref Range Status   05/06/2023 86 (H) 8 - 23 mg/dL Final   05/05/2023 78 (H) 8 - 23 mg/dL Final   05/04/2023 74 (H) 8 - 23 mg/dL Final   05/04/2023 73 (H) 8 - 23 mg/dL Final   05/03/2023 60 (H) 8 - 23 mg/dL Final       Past Medical History:   Diagnosis Date    Cancer     rectum    CKD (chronic kidney disease)     Diabetes mellitus, type 2     Hyperlipidemia     Hypertension     Neutropenic fever 5/27/2016    PVD (peripheral vascular disease)        Past Surgical History:   Procedure Laterality Date    COLONOSCOPY N/A 3/28/2016    Procedure: COLONOSCOPY;  Surgeon: Mitul Buitrago Jr., MD;  Location: [facility] ENDO;  Service: Endoscopy;  Laterality: N/A;    EYE SURGERY Left     cataract removal with lens implant.    FEMORAL ARTERY STENT  before 2010    PERIPHERAL ANGIOGRAPHY N/A 5/3/2023    Procedure: Peripheral angiography;  Surgeon: Rasheeda Ny MD;  Location: [facility] CATH LAB/EP;  Service: Cardiology;  Laterality: N/A;    RENAL ARTERY STENT  2010       Review of patient's allergies indicates:   Allergen Reactions    Chantix [varenicline]      Tongue swelling      Wellbutrin [bupropion hcl]      Tongue swelling       Current Facility-Administered Medications   Medication Frequency    acetaminophen tablet 650 mg Q8H PRN    albuterol-ipratropium 2.5 mg-0.5 mg/3 mL nebulizer solution 3 mL Q6H PRN    aspirin chewable tablet 81 mg Daily    atorvastatin tablet 80 mg Daily    carvediloL tablet 25 mg BID    clopidogreL tablet 75 mg Daily    dextrose 10% bolus 125 mL 125 mL PRN    dextrose 10% bolus 250 mL 250 mL PRN    ezetimibe tablet 10 mg Daily    gabapentin capsule 100 mg BID    glucagon (human recombinant) injection 1 mg PRN    heparin  25,000 units in dextrose 5% (100 units/ml) IV bolus from bag - ADDITIONAL PRN BOLUS - 30 units/kg PRN    heparin 25,000 units in dextrose 5% (100 units/ml) IV bolus from bag - ADDITIONAL PRN BOLUS - 60 units/kg PRN    heparin 25,000 units in dextrose 5% 250 mL (100 units/mL) infusion HIGH INTENSITY nomogram - [facility] Continuous    hydrALAZINE injection 10 mg Q8H PRN    hydrALAZINE tablet 50 mg BID    HYDROcodone-acetaminophen 5-325 mg per tablet 1 tablet Q6H PRN    insulin aspart U-100 pen 0-5 Units Q6H PRN    morphine injection 2 mg Q6H PRN    mupirocin 2 % ointment BID    naloxone 0.4 mg/mL injection 0.02 mg PRN    NIFEdipine 24 hr tablet 60 mg Daily    ondansetron injection 4 mg Q8H PRN    sodium bicarbonate tablet 650 mg BID    sodium chloride 0.9% flush 10 mL Q12H PRN    sodium chloride 0.9% flush 10 mL PRN    traMADoL tablet 50 mg Q4H PRN     Family History       Problem Relation (Age of Onset)    Diabetes Father    Heart attack Mother    Hypertension Sister          Tobacco Use    Smoking status: Former     Packs/day: 1.50     Years: 45.00     Pack years: 67.50     Types: Cigarettes     Quit date: 2013     Years since quittin.9     Passive exposure: Never    Smokeless tobacco: Never   Substance and Sexual Activity    Alcohol use: No     Alcohol/week: 0.0 standard drinks    Drug use: No    Sexual activity: Not Currently     Review of Systems  Objective:     Vital Signs (Most Recent):  Temp: 98.2 °F (36.8 °C) (23 1556)  Pulse: 76 (23 1556)  Resp: 18 (23 1556)  BP: 132/61 (23 1556)  SpO2: 100 % (23 1556) Vital Signs (24h Range):  Temp:  [97.2 °F (36.2 °C)-98.3 °F (36.8 °C)] 98.2 °F (36.8 °C)  Pulse:  [69-83] 76  Resp:  [13-24] 18  SpO2:  [95 %-100 %] 100 %  BP: (104-134)/(53-65) 132/61     Weight: 58.1 kg (128 lb) (23 0603)  Body mass index is 23.41 kg/m².  Body surface area is 1.59 meters squared.    I/O last 3 completed shifts:  In: 1304 [P.O.:320;  I.V.:984]  Out: 300 [Urine:300]     Physical Exam  Vitals and nursing note reviewed.   Constitutional:       General: She is not in acute distress.     Appearance: Normal appearance. She is ill-appearing. She is not toxic-appearing or diaphoretic.   Cardiovascular:      Rate and Rhythm: Normal rate and regular rhythm.      Pulses: Normal pulses.      Heart sounds: No murmur heard.     Comments: R upper chest wall port (old)   Pulmonary:      Effort: Pulmonary effort is normal. No respiratory distress.      Breath sounds: No wheezing, rhonchi or rales.   Abdominal:      General: Bowel sounds are normal. There is distension.      Palpations: Abdomen is soft. There is no mass.      Tenderness: There is no abdominal tenderness. There is no guarding or rebound.   Musculoskeletal:         General: Swelling present. No tenderness or deformity.      Right lower leg: No edema.      Left lower leg: No edema.      Comments: L leg with 1+ edema    Skin:     Capillary Refill: Capillary refill takes 2 to 3 seconds.      Coloration: Skin is not jaundiced or pale.      Findings: Bruising present. No erythema, lesion or rash.      Comments: Cold R foot, dusky color and w/ tenderness    Neurological:      Mental Status: She is alert and oriented to person, place, and time.        Significant Labs:  CBC:   Recent Labs   Lab 05/07/23  0540   WBC 3.37*   RBC 3.18*   HGB 8.8*   HCT 28.8*   PLT 86*   MCV 91   MCH 27.7   MCHC 30.6*     CMP:   Recent Labs   Lab 05/05/23  0518 05/06/23  0650 05/07/23  0540      < > 92   CALCIUM 8.7   < > 8.2*   ALBUMIN 3.0*   < > 2.5*   PROT 7.0  --   --       < > 136   K 4.3   < > 4.0   CO2 17*   < > 21*   *   < > 106   BUN 78*   < > 95*   CREATININE 2.6*   < > 3.1*   ALKPHOS 81  --   --    ALT 10  --   --    AST 22  --   --    BILITOT 0.4  --   --     < > = values in this interval not displayed.     All labs within the past 24 hours have been reviewed.    Significant Imaging:  Labs:  Reviewed  X-Ray: Reviewed  US: Reviewed  CT: Reviewed  Echo: Reviewed    Assessment/Plan:     Cardiac/Vascular  Chronic heart failure with preserved ejection fraction  -Plan per primary team       Renal/  Metabolic acidosis  See ALESIA    alesia on ckd stage 4  Baseline sCr: ~2.0 based on labs in Care Everywhere for the past year  Admission sCr: 1.8  Lab Results   Component Value Date    CREATININE 3.1 (H) 05/07/2023       Recommendations:   -Unclear etiology of her ALESIA. She has not had contrast exposure as initially thought since pt had a CO2 angiogram of LE, despite IV fluids pt's sCr & BUN has continued to trend up and without evidence of hydronephrosis and given her history of arterial disease a dedicated renal vasculature assessment is reasonable, CRS is also a possibility, she does not have HRS. I did discuss the possibility of requiring KRT if her renal function continues to decline as she has advanced CKD IV, solitary R kidney, HFpEF and cirrhosis.     -urine microscopy when able   -Electrolytes:    K, goal >4, page nephrology if K >5.5, Mg, goal >2, Phos, goal >3 and <5  -Acid/base: NAGMA, Sodium bicarb tabs 650mg TID    -Fluid balance: mildly fluid overloaded, avoid fluids for now   -Anemia: Hgb 8.8, transfuse for Hgb <7.0  -Strict I/O's and daily weights  -Renal diet/tube feedings if not NPO  -Renal function panel and Mg level in AM  -Maintain MAP >65 for renal perfusion    Other  * Critical limb ischemia of right lower extremity  -Plan per primary team & vascular surgery       Thank you for your consult. I will follow-up with patient. Please contact us if you have any additional questions.    Carissa Mcintosh MD  Nephrology  [facility] ([place]

## 2023-05-07 NOTE — SUBJECTIVE & OBJECTIVE
Past Medical History:   Diagnosis Date    Cancer     rectum    CKD (chronic kidney disease)     Diabetes mellitus, type 2     Hyperlipidemia     Hypertension     Neutropenic fever 5/27/2016    PVD (peripheral vascular disease)        Past Surgical History:   Procedure Laterality Date    COLONOSCOPY N/A 3/28/2016    Procedure: COLONOSCOPY;  Surgeon: Mitul Buitrago Jr., MD;  Location: [facility] ENDO;  Service: Endoscopy;  Laterality: N/A;    EYE SURGERY Left     cataract removal with lens implant.    FEMORAL ARTERY STENT  before 2010    PERIPHERAL ANGIOGRAPHY N/A 5/3/2023    Procedure: Peripheral angiography;  Surgeon: Rasheeda Ny MD;  Location: [facility] CATH LAB/EP;  Service: Cardiology;  Laterality: N/A;    RENAL ARTERY STENT  2010       Review of patient's allergies indicates:   Allergen Reactions    Chantix [varenicline]      Tongue swelling      Wellbutrin [bupropion hcl]      Tongue swelling       Current Facility-Administered Medications   Medication Frequency    acetaminophen tablet 650 mg Q8H PRN    albuterol-ipratropium 2.5 mg-0.5 mg/3 mL nebulizer solution 3 mL Q6H PRN    aspirin chewable tablet 81 mg Daily    atorvastatin tablet 80 mg Daily    carvediloL tablet 25 mg BID    clopidogreL tablet 75 mg Daily    dextrose 10% bolus 125 mL 125 mL PRN    dextrose 10% bolus 250 mL 250 mL PRN    ezetimibe tablet 10 mg Daily    gabapentin capsule 100 mg BID    glucagon (human recombinant) injection 1 mg PRN    heparin 25,000 units in dextrose 5% (100 units/ml) IV bolus from bag - ADDITIONAL PRN BOLUS - 30 units/kg PRN    heparin 25,000 units in dextrose 5% (100 units/ml) IV bolus from bag - ADDITIONAL PRN BOLUS - 60 units/kg PRN    heparin 25,000 units in dextrose 5% 250 mL (100 units/mL) infusion HIGH INTENSITY nomogram - [facility] Continuous    hydrALAZINE injection 10 mg Q8H PRN    hydrALAZINE tablet 50 mg BID    HYDROcodone-acetaminophen 5-325 mg per tablet 1 tablet Q6H PRN    insulin aspart U-100 pen 0-5 Units Q6H PRN     morphine injection 2 mg Q6H PRN    mupirocin 2 % ointment BID    naloxone 0.4 mg/mL injection 0.02 mg PRN    NIFEdipine 24 hr tablet 60 mg Daily    ondansetron injection 4 mg Q8H PRN    sodium bicarbonate tablet 650 mg BID    sodium chloride 0.9% flush 10 mL Q12H PRN    sodium chloride 0.9% flush 10 mL PRN    traMADoL tablet 50 mg Q4H PRN     Family History       Problem Relation (Age of Onset)    Diabetes Father    Heart attack Mother    Hypertension Sister          Tobacco Use    Smoking status: Former     Packs/day: 1.50     Years: 45.00     Pack years: 67.50     Types: Cigarettes     Quit date: 2013     Years since quittin.9     Passive exposure: Never    Smokeless tobacco: Never   Substance and Sexual Activity    Alcohol use: No     Alcohol/week: 0.0 standard drinks    Drug use: No    Sexual activity: Not Currently     Review of Systems  Objective:     Vital Signs (Most Recent):  Temp: 98.2 °F (36.8 °C) (23 1556)  Pulse: 76 (23 1556)  Resp: 18 (23 1556)  BP: 132/61 (23 1556)  SpO2: 100 % (23 1556) Vital Signs (24h Range):  Temp:  [97.2 °F (36.2 °C)-98.3 °F (36.8 °C)] 98.2 °F (36.8 °C)  Pulse:  [69-83] 76  Resp:  [13-24] 18  SpO2:  [95 %-100 %] 100 %  BP: (104-134)/(53-65) 132/61     Weight: 58.1 kg (128 lb) (23 0603)  Body mass index is 23.41 kg/m².  Body surface area is 1.59 meters squared.    I/O last 3 completed shifts:  In: 1304 [P.O.:320; I.V.:984]  Out: 300 [Urine:300]     Physical Exam  Vitals and nursing note reviewed.   Constitutional:       General: She is not in acute distress.     Appearance: Normal appearance. She is ill-appearing. She is not toxic-appearing or diaphoretic.   Cardiovascular:      Rate and Rhythm: Normal rate and regular rhythm.      Pulses: Normal pulses.      Heart sounds: No murmur heard.     Comments: R upper chest wall port (old)   Pulmonary:      Effort: Pulmonary effort is normal. No respiratory distress.      Breath sounds: No  wheezing, rhonchi or rales.   Abdominal:      General: Bowel sounds are normal. There is distension.      Palpations: Abdomen is soft. There is no mass.      Tenderness: There is no abdominal tenderness. There is no guarding or rebound.   Musculoskeletal:         General: Swelling present. No tenderness or deformity.      Right lower leg: No edema.      Left lower leg: No edema.      Comments: L leg with 1+ edema    Skin:     Capillary Refill: Capillary refill takes 2 to 3 seconds.      Coloration: Skin is not jaundiced or pale.      Findings: Bruising present. No erythema, lesion or rash.      Comments: Cold R foot, dusky color and w/ tenderness    Neurological:      Mental Status: She is alert and oriented to person, place, and time.        Significant Labs:  CBC:   Recent Labs   Lab 05/07/23  0540   WBC 3.37*   RBC 3.18*   HGB 8.8*   HCT 28.8*   PLT 86*   MCV 91   MCH 27.7   MCHC 30.6*     CMP:   Recent Labs   Lab 05/05/23  0518 05/06/23  0650 05/07/23  0540      < > 92   CALCIUM 8.7   < > 8.2*   ALBUMIN 3.0*   < > 2.5*   PROT 7.0  --   --       < > 136   K 4.3   < > 4.0   CO2 17*   < > 21*   *   < > 106   BUN 78*   < > 95*   CREATININE 2.6*   < > 3.1*   ALKPHOS 81  --   --    ALT 10  --   --    AST 22  --   --    BILITOT 0.4  --   --     < > = values in this interval not displayed.     All labs within the past 24 hours have been reviewed.    Significant Imaging:  Labs: Reviewed  X-Ray: Reviewed  US: Reviewed  CT: Reviewed  Echo: Reviewed

## 2023-05-07 NOTE — HPI
Steph Monroe is a 77-year-old female with past medical history significant for hypertension, hyperlipidemia, known peripheral arterial disease, CKD stage 4, status post chemo and radiation for rectal cancer, and known single kidney presented to an outside hospital with complaints of right lower extremity and right foot pain.  Was noted to have mottling of her right forefoot.  The patient says that this has been progressively worsening for the last few weeks.  She continues to ambulate, but her distance has been limited secondary to right foot pain.  At the outside hospital, she was found to have ALESIA while she was worked up for this claudication.  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  She was transferred to [facility] for vascular surgery evaluation.    On my exam the patient is resting comfortably in bed.  Her right forefoot has chronic ischemic changes.  There is no appreciable tissue loss.  She has a monophasic pulse to the right posterior tibial artery.  Sensation and motor remain intact.  She endorses intermittent rest pain.

## 2023-05-08 ENCOUNTER — ANESTHESIA EVENT (OUTPATIENT)
Dept: SURGERY | Facility: HOSPITAL | Age: 78
DRG: 252 | End: 2023-05-08
Payer: MEDICARE

## 2023-05-08 ENCOUNTER — PATIENT OUTREACH (OUTPATIENT)
Dept: ADMINISTRATIVE | Facility: CLINIC | Age: 78
End: 2023-05-08
Payer: MEDICARE

## 2023-05-08 LAB
ALBUMIN SERPL BCP-MCNC: 2.4 G/DL (ref 3.5–5.2)
ANION GAP SERPL CALC-SCNC: 11 MMOL/L (ref 8–16)
APTT PPP: 41 SEC (ref 21–32)
APTT PPP: 49.3 SEC (ref 21–32)
BASOPHILS # BLD AUTO: 0.01 K/UL (ref 0–0.2)
BASOPHILS NFR BLD: 0.3 % (ref 0–1.9)
BUN SERPL-MCNC: 105 MG/DL (ref 8–23)
CALCIUM SERPL-MCNC: 8 MG/DL (ref 8.7–10.5)
CH50 SERPL-ACNC: 63 U/ML (ref 42–95)
CHLORIDE SERPL-SCNC: 106 MMOL/L (ref 95–110)
CO2 SERPL-SCNC: 21 MMOL/L (ref 23–29)
CREAT SERPL-MCNC: 2.8 MG/DL (ref 0.5–1.4)
DIFFERENTIAL METHOD: ABNORMAL
EOSINOPHIL # BLD AUTO: 0 K/UL (ref 0–0.5)
EOSINOPHIL NFR BLD: 0.6 % (ref 0–8)
ERYTHROCYTE [DISTWIDTH] IN BLOOD BY AUTOMATED COUNT: 19.5 % (ref 11.5–14.5)
EST. GFR  (NO RACE VARIABLE): 17.1 ML/MIN/1.73 M^2
GLUCOSE SERPL-MCNC: 101 MG/DL (ref 70–110)
HCT VFR BLD AUTO: 26.6 % (ref 37–48.5)
HGB BLD-MCNC: 8.2 G/DL (ref 12–16)
IMM GRANULOCYTES # BLD AUTO: 0.01 K/UL (ref 0–0.04)
IMM GRANULOCYTES NFR BLD AUTO: 0.3 % (ref 0–0.5)
LYMPHOCYTES # BLD AUTO: 0.5 K/UL (ref 1–4.8)
LYMPHOCYTES NFR BLD: 15.1 % (ref 18–48)
MAGNESIUM SERPL-MCNC: 2.1 MG/DL (ref 1.6–2.6)
MCH RBC QN AUTO: 28.2 PG (ref 27–31)
MCHC RBC AUTO-ENTMCNC: 30.8 G/DL (ref 32–36)
MCV RBC AUTO: 91 FL (ref 82–98)
MONOCYTES # BLD AUTO: 0.5 K/UL (ref 0.3–1)
MONOCYTES NFR BLD: 15.1 % (ref 4–15)
NEUTROPHILS # BLD AUTO: 2.3 K/UL (ref 1.8–7.7)
NEUTROPHILS NFR BLD: 68.6 % (ref 38–73)
NRBC BLD-RTO: 0 /100 WBC
PHOSPHATE SERPL-MCNC: 4.1 MG/DL (ref 2.7–4.5)
PHOSPHATE SERPL-MCNC: 4.1 MG/DL (ref 2.7–4.5)
PLATELET # BLD AUTO: 81 K/UL (ref 150–450)
PMV BLD AUTO: 11.2 FL (ref 9.2–12.9)
POCT GLUCOSE: 107 MG/DL (ref 70–110)
POCT GLUCOSE: 111 MG/DL (ref 70–110)
POCT GLUCOSE: 112 MG/DL (ref 70–110)
POCT GLUCOSE: 125 MG/DL (ref 70–110)
POTASSIUM SERPL-SCNC: 4.2 MMOL/L (ref 3.5–5.1)
RBC # BLD AUTO: 2.91 M/UL (ref 4–5.4)
SODIUM SERPL-SCNC: 138 MMOL/L (ref 136–145)
WBC # BLD AUTO: 3.31 K/UL (ref 3.9–12.7)

## 2023-05-08 PROCEDURE — 25000003 PHARM REV CODE 250: Performed by: NURSE PRACTITIONER

## 2023-05-08 PROCEDURE — 36415 COLL VENOUS BLD VENIPUNCTURE: CPT | Performed by: NURSE PRACTITIONER

## 2023-05-08 PROCEDURE — 20600001 HC STEP DOWN PRIVATE ROOM

## 2023-05-08 PROCEDURE — 85730 THROMBOPLASTIN TIME PARTIAL: CPT | Performed by: NURSE PRACTITIONER

## 2023-05-08 PROCEDURE — 83735 ASSAY OF MAGNESIUM: CPT | Performed by: STUDENT IN AN ORGANIZED HEALTH CARE EDUCATION/TRAINING PROGRAM

## 2023-05-08 PROCEDURE — 99232 PR SUBSEQUENT HOSPITAL CARE,LEVL II: ICD-10-PCS | Mod: ,,, | Performed by: INTERNAL MEDICINE

## 2023-05-08 PROCEDURE — 99233 PR SUBSEQUENT HOSPITAL CARE,LEVL III: ICD-10-PCS | Mod: GC,,, | Performed by: HOSPITALIST

## 2023-05-08 PROCEDURE — 25000003 PHARM REV CODE 250: Performed by: STUDENT IN AN ORGANIZED HEALTH CARE EDUCATION/TRAINING PROGRAM

## 2023-05-08 PROCEDURE — 93922 UPR/L XTREMITY ART 2 LEVELS: CPT | Performed by: SURGERY

## 2023-05-08 PROCEDURE — 97530 THERAPEUTIC ACTIVITIES: CPT

## 2023-05-08 PROCEDURE — 97165 OT EVAL LOW COMPLEX 30 MIN: CPT

## 2023-05-08 PROCEDURE — 80069 RENAL FUNCTION PANEL: CPT | Performed by: NURSE PRACTITIONER

## 2023-05-08 PROCEDURE — 99233 SBSQ HOSP IP/OBS HIGH 50: CPT | Mod: GC,,, | Performed by: HOSPITALIST

## 2023-05-08 PROCEDURE — 99232 SBSQ HOSP IP/OBS MODERATE 35: CPT | Mod: ,,, | Performed by: INTERNAL MEDICINE

## 2023-05-08 PROCEDURE — 85025 COMPLETE CBC W/AUTO DIFF WBC: CPT | Performed by: NURSE PRACTITIONER

## 2023-05-08 PROCEDURE — 63600175 PHARM REV CODE 636 W HCPCS: Performed by: NURSE PRACTITIONER

## 2023-05-08 RX ORDER — SODIUM CHLORIDE, SODIUM LACTATE, POTASSIUM CHLORIDE, CALCIUM CHLORIDE 600; 310; 30; 20 MG/100ML; MG/100ML; MG/100ML; MG/100ML
INJECTION, SOLUTION INTRAVENOUS CONTINUOUS
Status: DISCONTINUED | OUTPATIENT
Start: 2023-05-09 | End: 2023-05-09

## 2023-05-08 RX ADMIN — EZETIMIBE 10 MG: 10 TABLET ORAL at 08:05

## 2023-05-08 RX ADMIN — NIFEDIPINE 60 MG: 30 TABLET, FILM COATED, EXTENDED RELEASE ORAL at 08:05

## 2023-05-08 RX ADMIN — HYDRALAZINE HYDROCHLORIDE 50 MG: 50 TABLET ORAL at 08:05

## 2023-05-08 RX ADMIN — SODIUM BICARBONATE 650 MG: 650 TABLET ORAL at 02:05

## 2023-05-08 RX ADMIN — SODIUM BICARBONATE 650 MG: 650 TABLET ORAL at 08:05

## 2023-05-08 RX ADMIN — SODIUM BICARBONATE 650 MG: 650 TABLET ORAL at 09:05

## 2023-05-08 RX ADMIN — CARVEDILOL 25 MG: 25 TABLET, FILM COATED ORAL at 09:05

## 2023-05-08 RX ADMIN — CLOPIDOGREL BISULFATE 75 MG: 75 TABLET ORAL at 08:05

## 2023-05-08 RX ADMIN — HYDROCODONE BITARTRATE AND ACETAMINOPHEN 1 TABLET: 5; 325 TABLET ORAL at 08:05

## 2023-05-08 RX ADMIN — ASPIRIN 81 MG CHEWABLE TABLET 81 MG: 81 TABLET CHEWABLE at 08:05

## 2023-05-08 RX ADMIN — HEPARIN SODIUM 11.93 UNITS/KG/HR: 10000 INJECTION, SOLUTION INTRAVENOUS at 01:05

## 2023-05-08 RX ADMIN — GABAPENTIN 100 MG: 100 CAPSULE ORAL at 09:05

## 2023-05-08 RX ADMIN — GABAPENTIN 100 MG: 100 CAPSULE ORAL at 08:05

## 2023-05-08 RX ADMIN — ATORVASTATIN CALCIUM 80 MG: 40 TABLET, FILM COATED ORAL at 08:05

## 2023-05-08 RX ADMIN — CARVEDILOL 25 MG: 25 TABLET, FILM COATED ORAL at 08:05

## 2023-05-08 RX ADMIN — HYDRALAZINE HYDROCHLORIDE 50 MG: 50 TABLET ORAL at 09:05

## 2023-05-08 NOTE — ANESTHESIA PREPROCEDURE EVALUATION
[facility]  Anesthesia Pre-Operative Evaluation         Patient Name: Steph Monroe  YOB: 1945  MRN: [id number]    SUBJECTIVE:     Pre-operative evaluation for Procedure(s) (LRB):  ENDARTERECTOMY, FEMORAL (Right)  Scheduled for 5/9/2023    HPI 05/09/2023:  Steph Monroe is a 77 y.o. 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 Admitted with RLE critical ischemia, angiogram with high grade stenosis of R common iliac stent and complete occlusion of R common femoral artery. Hospital course c/b worsening ALESIA on CKD in setting of single kidney. Presents now for above procedure.      TTE (5/3/23):   The left ventricle is normal in size with concentric remodeling and normal systolic function.   The estimated ejection fraction is 65%.   Indeterminate left ventricular diastolic function.   There is mild mitral stenosis.   The mean diastolic gradient across the mitral valve is 5 mmHg at a heart rate of 66 bpm.   Normal right ventricular size with normal right ventricular systolic function.   There is mild aortic valve stenosis.   Aortic valve area is 1.28 cm2; peak velocity is 2.33 m/s; mean gradient is 10 mmHg.   Mild-to-moderate aortic regurgitation.   Normal central venous pressure (3 mmHg).   The estimated PA systolic pressure is 35 mmHg.      Prev airway:   None on file.    Oxygen/Ventilation Requirements:  On low flow nasal cannula       Patient Active Problem List   Diagnosis    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 hypertension    Essential hypertension    HLD (hyperlipidemia)    PAD (peripheral artery disease)    Coronary artery disease involving native coronary artery of native heart without angina pectoris    History of rectal or anal cancer    Arm swelling    Swelling of limb    Swelling    Weakness     Decubitus ulcer of sacral region, stage 2    Debility    adelaida on ckd stage 4    Critical limb ischemia of right lower extremity    Chronic heart failure with preserved ejection fraction    Pulmonary emphysema    Cirrhosis    Metabolic acidosis    Aortic stenosis    Arthritis    Lung nodule    Pulmonary hypertension    Renal artery stenosis    Vitamin D deficiency       Review of patient's allergies indicates:   Allergen Reactions    Chantix [varenicline]      Tongue swelling      Wellbutrin [bupropion hcl]      Tongue swelling         Outpatient Medications:  No current facility-administered medications on file prior to encounter.     Current Outpatient Medications on File Prior to Encounter   Medication Sig Dispense Refill    aspirin 81 MG Chew Take 1 tablet (81 mg total) by mouth once daily. 30 tablet 5    atorvastatin (LIPITOR) 80 MG tablet Take 1 tablet (80 mg total) by mouth once daily. 90 tablet 3    carvediloL (COREG) 25 MG tablet Take 1 tablet by mouth 2 (two) times daily.      clopidogreL (PLAVIX) 75 mg tablet Take 75 mg by mouth once daily.      ezetimibe (ZETIA) 10 mg tablet Take 10 mg by mouth once daily.      furosemide (LASIX) 20 MG tablet Take 20 mg by mouth once daily.      gabapentin (NEURONTIN) 100 MG capsule Take 1 capsule (100 mg total) by mouth 2 (two) times daily. 60 capsule 11    hydrALAZINE (APRESOLINE) 25 MG tablet Take 2 tablets by mouth 2 (two) times daily.      NIFEdipine (PROCARDIA-XL) 60 MG (OSM) 24 hr tablet Take 1 tablet (60 mg total) by mouth once daily. 30 tablet 11    sodium bicarbonate 650 MG tablet Take 650 mg by mouth 2 (two) times daily.      traMADoL (ULTRAM) 50 mg tablet Take 1 tablet (50 mg total) by mouth every 8 (eight) hours as needed for Pain. 21 tablet 0        Current Inpatient Medications:   aspirin  81 mg Oral Daily    atorvastatin  80 mg Oral Daily    carvediloL  25 mg Oral BID    ezetimibe  10 mg Oral Daily    gabapentin  100 mg Oral  BID    hydrALAZINE  50 mg Oral BID    NIFEdipine  60 mg Oral Daily    sodium bicarbonate  650 mg Oral TID       Past Surgical History:   Procedure Laterality Date    ANGIOGRAM, LOWER ARTERIAL, UNILATERAL Right 2023    Procedure: Angiogram, Lower Arterial, Unilateral;  Surgeon: Remington Ribeiro MD;  Location: [facility] CATH LAB/EP;  Service: Cardiology;  Laterality: Right;    COLONOSCOPY N/A 3/28/2016    Procedure: COLONOSCOPY;  Surgeon: Mitul Buitrago Jr., MD;  Location: [facility] ENDO;  Service: Endoscopy;  Laterality: N/A;    EYE SURGERY Left     cataract removal with lens implant.    FEMORAL ARTERY STENT  before     PERIPHERAL ANGIOGRAPHY N/A 5/3/2023    Procedure: Peripheral angiography;  Surgeon: Rasheeda Ny MD;  Location: [facility] CATH LAB/EP;  Service: Cardiology;  Laterality: N/A;    RENAL ARTERY STENT         Social History     Socioeconomic History    Marital status:     Number of children: 2   Occupational History    Occupation: Retired   Tobacco Use    Smoking status: Former     Packs/day: 1.50     Years: 45.00     Pack years: 67.50     Types: Cigarettes     Quit date: 2013     Years since quittin.9     Passive exposure: Never    Smokeless tobacco: Never   Substance and Sexual Activity    Alcohol use: No     Alcohol/week: 0.0 standard drinks    Drug use: No    Sexual activity: Not Currently     Social Determinants of Health     Financial Resource Strain: Low Risk     Difficulty of Paying Living Expenses: Not hard at all   Food Insecurity: No Food Insecurity    Worried About Running Out of Food in the Last Year: Never true    Ran Out of Food in the Last Year: Never true   Transportation Needs: No Transportation Needs    Lack of Transportation (Medical): No    Lack of Transportation (Non-Medical): No   Physical Activity: Sufficiently Active    Days of Exercise per Week: 5 days    Minutes of Exercise per Session: 30 min   Stress: Stress Concern Present     Feeling of Stress : To some extent   Social Connections: Moderately Isolated    Frequency of Communication with Friends and Family: Once a week    Frequency of Social Gatherings with Friends and Family: Once a week    Attends Protestant Services: 1 to 4 times per year    Active Member of Clubs or Organizations: No    Marital Status:    Housing Stability: Unknown    Unable to Pay for Housing in the Last Year: No    Unstable Housing in the Last Year: No       OBJECTIVE:     Weight:  Wt Readings from Last 1 Encounters:   05/09/23 58.1 kg (128 lb 1.4 oz)     Body mass index is 23.43 kg/m².    Recent Blood Pressure Readings:  BP Readings from Last 3 Encounters:   05/09/23 (!) 141/63   05/06/23 (!) 113/52   05/02/23 (!) 157/79       Vital Signs Range (Last 24H):  Temp:  [36.3 °C (97.4 °F)-36.8 °C (98.3 °F)]   Pulse:  [78-82]   Resp:  [14-27]   BP: (119-153)/(59-80)   SpO2:  [92 %-98 %]       CBC:   Lab Results   Component Value Date    WBC 4.26 05/09/2023    HGB 8.0 (L) 05/09/2023    HCT 25.2 (L) 05/09/2023    MCV 88 05/09/2023    PLT 92 (L) 05/09/2023       CMP:     Chemistry        Component Value Date/Time     05/09/2023 0403    K 4.3 05/09/2023 0403     05/09/2023 0403    CO2 21 (L) 05/09/2023 0403    BUN 97 (H) 05/09/2023 0403    CREATININE 2.7 (H) 05/09/2023 0403     (H) 05/09/2023 0403        Component Value Date/Time    CALCIUM 8.4 (L) 05/09/2023 0403    ALKPHOS 81 05/05/2023 0518    AST 22 05/05/2023 0518    ALT 10 05/05/2023 0518    BILITOT 0.4 05/05/2023 0518    ESTGFRAFRICA 25.1 (A) 08/06/2020 1329    EGFRNONAA 21.7 (A) 08/06/2020 1329            INR:  Lab Results   Component Value Date    INR 1.1 05/04/2023    INR 1.2 05/03/2023    INR 1.1 05/02/2023       Diagnostic Studies:      EKG:    Results for orders placed or performed during the hospital encounter of 05/25/16   EKG 12-lead    Collection Time: 05/25/16 11:56 PM    Narrative    Test Reason : R07.9  Blood Pressure :   mmHG  Vent. Rate : 103 BPM     Atrial Rate : 103 BPM     P-R Int : 138 ms          QRS Dur : 066 ms      QT Int : 368 ms       P-R-T Axes : 036 056 047 degrees     QTc Int : 482 ms    Sinus tachycardia  Otherwise normal ECG  When compared with ECG of 28-MAR-2016 13:20,  No significant change was found  Confirmed by DENI RUSH MD ([id number]) on 5/26/2016 11:45:43 AM    Referred By: SELF REFERRAL           Confirmed By:DENI RUSH MD       2D Echo:    No results found for this or any previous visit.    Results for orders placed or performed during the hospital encounter of 05/02/23   Echo   Result Value Ref Range    BSA 1.64 m2    TDI SEPTAL 0.04 m/s    LA WIDTH 3.60 cm    IVC diameter 1.70 cm    Left Ventricular Outflow Tract Mean Velocity 0.69 cm/s    Left Ventricular Outflow Tract Mean Gradient 2.12 mmHg    TDI LATERAL 0.05 m/s    LVIDd 3.52 3.5 - 6.0 cm    IVS 1.22 (A) 0.6 - 1.1 cm    Posterior Wall 1.19 (A) 0.6 - 1.1 cm    LVIDs 2.05 (A) 2.1 - 4.0 cm    FS 42 28 - 44 %    LA volume 46.03 cm3    LV mass 137.81 g    LA size 3.26 cm    RVDD 2.91 cm    RV S' 0.01 cm/s    Left Ventricle Relative Wall Thickness 0.68 cm    AV regurgitation pressure 1/2 time [id number] ms    AV mean gradient 10 mmHg    AV valve area 1.28 cm2    AV Velocity Ratio 0.44     AV index (prosthetic) 0.45     MV mean gradient 5 mmHg    MV valve area p 1/2 method 2.06 cm2    MV valve area by continuity eq 1.20 cm2    Mean e' 0.05 m/s    Pulm vein S/D ratio 1.66     LVOT diameter 1.90 cm    LVOT area 2.8 cm2    LVOT peak sury 1.02 m/s    LVOT peak VTI 25.50 cm    Ao peak sury 2.33 m/s    Ao VTI 56.6 cm    Mr max sury 4.32 m/s    LVOT stroke volume 72.26 cm3    AV peak gradient 22 mmHg    MV peak gradient 14 mmHg    AR Max Sury 3.02 m/s    TR Max Sury 2.83 m/s    MV VTI 60.0 cm    MV stenosis pressure 1/2 time 106.69 ms    PV Peak S Sury 0.73 m/s    PV Peak D Sury 0.44 m/s    LV Systolic Volume 13.48 mL    LV Systolic Volume Index 8.3 mL/m2    LV  Diastolic Volume 51.71 mL    LV Diastolic Volume Index 31.92 mL/m2    LA Volume Index 28.4 mL/m2    LV Mass Index 85 g/m2    RA Major Axis 3.93 cm    Left Atrium Minor Axis 4.36 cm    Left Atrium Major Axis 4.90 cm    Triscuspid Valve Regurgitation Peak Gradient 32 mmHg    LA Volume Index (Mod) 30.2 mL/m2    LA volume (mod) 48.99 cm3    Albarran's Biplane MOD Ejection Fraction 8 %    Right Atrial Pressure (from IVC) 3 mmHg    EF 65 %    TV rest pulmonary artery pressure 35 mmHg    Mitral Valve Heart Rate 66 bpm    Narrative    · The left ventricle is normal in size with concentric remodeling and   normal systolic function.  · The estimated ejection fraction is 65%.  · Indeterminate left ventricular diastolic function.  · There is mild mitral stenosis.  · The mean diastolic gradient across the mitral valve is 5 mmHg at a heart   rate of 66 bpm.  · Normal right ventricular size with normal right ventricular systolic   function.  · There is mild aortic valve stenosis.  · Aortic valve area is 1.28 cm2; peak velocity is 2.33 m/s; mean gradient   is 10 mmHg.  · Mild-to-moderate aortic regurgitation.  · Normal central venous pressure (3 mmHg).  · The estimated PA systolic pressure is 35 mmHg.          No results found for this or any previous visit.      ASSESSMENT/PLAN:           Pre-op Assessment    I have reviewed the Patient Summary Reports.     I have reviewed the Nursing Notes. I have reviewed the NPO Status.   I have reviewed the Medications.     Review of Systems  Anesthesia Hx:  No problems with previous Anesthesia  Denies Family Hx of Anesthesia complications.   Denies Personal Hx of Anesthesia complications.   Social:  Former Smoker    Hematology/Oncology:         -- Cancer in past history:   Cardiovascular:   Hypertension CAD   Denies CABG/stent.  PVD hyperlipidemia    Pulmonary:   COPD    Renal/:   Chronic Renal Disease, CKD    Hepatic/GI:   Denies GERD. Liver Disease,    Neurological:   Denies CVA.  Denies  Dementia    Endocrine:   Diabetes  Denies Obesity / BMI > 30  Psych:   Denies Psychiatric History.          Physical Exam  General: Well nourished, Cooperative, Alert and Oriented    Airway:  Mallampati: II   Mouth Opening: Normal  TM Distance: Normal  Tongue: Normal  Neck ROM: Normal ROM    Dental:  Periodontal disease  Dentures up top      Anesthesia Plan  Type of Anesthesia, risks & benefits discussed:    Anesthesia Type: Gen ETT  Intra-op Monitoring Plan: Standard ASA Monitors and Art Line  Post Op Pain Control Plan: multimodal analgesia and IV/PO Opioids PRN  Induction:  IV  Airway Plan: Direct and Video, Post-Induction  Informed Consent: Informed consent signed with the Patient and all parties understand the risks and agree with anesthesia plan.  All questions answered.   ASA Score: 4  Day of Surgery Review of History & Physical: H&P Update referred to the surgeon/provider.    Ready For Surgery From Anesthesia Perspective.     .

## 2023-05-08 NOTE — ASSESSMENT & PLAN NOTE
Patient's FSGs are controlled on current medication regimen.  Last A1c reviewed-   Lab Results   Component Value Date    HGBA1C 5.0 05/04/2023     Most recent fingerstick glucose reviewed-   Recent Labs   Lab 05/07/23  0900 05/07/23  1242 05/07/23  1554 05/08/23  0612   POCTGLUCOSE 93 111* 138* 107     Current correctional scale  Low  Maintain anti-hyperglycemic dose as follows-   Antihyperglycemics (From admission, onward)    Start     Stop Route Frequency Ordered    05/06/23 1710  insulin aspart U-100 pen 0-5 Units         -- SubQ Every 6 hours PRN 05/06/23 1711        Hold Oral hypoglycemics while patient is in the hospital.

## 2023-05-08 NOTE — PROGRESS NOTES
"[facility] ([address] Medicine  Progress Note    Patient Name: Steph Monroe  MRN: [id number]  Patient Class: IP- Inpatient   Admission Date: 5/6/2023  Length of Stay: 2 days  Attending Physician: Mariela Finnegan MD  Primary Care Provider: Mane Carmona MD        Subjective:     Principal Problem:Critical limb ischemia of right lower extremity        HPI:  Steph Monroe is a 76yo F with a PMH of HFpEF, PAD, HTN, HLD, DM2, CKD4, single solitary kidney (s/p nephrectomy), hx of HCC, and hx of rectal cancer currently admitted to the [facility] medicine service for RLE pain.The only home medications she is taking is lasix and na bicarbonate. She quit all of her other medications about a year ago because she "no longer felt like taking all kind of pills". She underwent angiogram with interventional cardiology on 5/5/23, which showed: "high grade stenosis in right GUNNER.  of CFA collaterals from EIA and profunda artery which provides collaterals to chronically occluded SFA.  Initially 2V runoff with prox  of peroneal artery. Pt provides flow to foot, however has diffuse diease. Unable to adequately visualize right foot circulation."     Of note, pt also with worsening ALESIA on CKD. Nephrology following. Urology evaluated pt and placed spears; believed this to be post-renal in origin, and signed off. IC recommended that pt would benefit from vascular surgery intervention. Dr. Ny with IC discussed case with Dr. Cagle from vascular, who recommended initiating heparin gtt and [place] [facility] [place]; requested  admission.    On arrival, patient was afebrile and HDS on room air. Heparin gtt infusing. Denies fever, chest pain, SOB, abd pain. Endorses moderate pain to R foot only. R foot appears cool and discolored.          Overview/Hospital Course:  76 yo F transferred from [facility] for vascular surgery evaluation for critical limb ischemia of RLE found by IC " "angiography on 5/5. Will continue patient on ASA, plavix, and heparin gtt. Vascular sx consulted who will obtain JOSE and toe pressures prior to surgical intervention. Will plan for femoral endarterectomy in next few days. Nephrology consulted and recommend future need for HD and line placement for ALESIA.       No new subjective & objective note has been filed under this hospital service since the last note was generated.      Assessment/Plan:      * Critical limb ischemia of right lower extremity  78 yo F transferred from [facility] for vascular surgery evaluation and femoral endarterectomy. Patient presented to [place] with RLE pain that has hindered her walking. R foot with absent pedal pulse, pain, and discoloration. IC performed angiography on 5/5 that showed "high grade stenosis in right GUNNER.  of CFA collaterals from EIA and profunda artery which provides collaterals to chronically occluded SFA.  Initially 2V runoff with prox  of peroneal artery. Pt provides flow to foot, however has diffuse diease. Unable to adequately visualize right foot circulation." Transferred on heparin gtt.   No signs of infection noted on exam. No leukocytosis.    PLAN:  - Vascular surgery consulted for femoral endarterectomy, appreciate recs. Will obtain JOSE and toe pressures prior to surgical intervention.  -Surgery planned for 5/9 - patient to be npo at midnight  -Continue heparin drip, ASA, plavix, and statin therapy  -Continue Gabapentin for neuropathy pain  -PT/OT ordered  -Pain control w/ Tylenol, hydrocodone, and morphine  -CBC, CMP daily  -Daily coags      Metabolic acidosis  Continue home sodium bicarb      Pulmonary emphysema  PRN Duo nebs ordered      Chronic heart failure with preserved ejection fraction  Echo (05/03/2023) showed EF 65%  Home meds: None    Summary:   The left ventricle is normal in size with concentric remodeling and normal systolic function.   The estimated ejection fraction is " 65%.   Indeterminate left ventricular diastolic function.   There is mild mitral stenosis.   The mean diastolic gradient across the mitral valve is 5 mmHg at a heart rate of 66 bpm.   Normal right ventricular size with normal right ventricular systolic function.   There is mild aortic valve stenosis.   Aortic valve area is 1.28 cm2; peak velocity is 2.33 m/s; mean gradient is 10 mmHg.   Mild-to-moderate aortic regurgitation.   Normal central venous pressure (3 mmHg).   The estimated PA systolic pressure is 35 mmHg.      Plan:  - Continue Coreg  - Hold Lasix in setting of ALESIA  - Daily weights (standing if tolerated)  - Strict I/Os  - Fluid restriction at 1500mL  - Cardiac diet  -Telemetry      alesia on ckd stage 4  Hx of nephrectomy. Baseline Cr 1.8-2.2.  Cr 2.8 on arrival  Seen by urology and nephrology who believe etiology to be post-renal. Renal US shows mild hydronephrosis. Jane placed. Patient had angiogram that could have worsened renal function but per nephrology not exposed to contrast.   No indications for HD at this time    Plan:  - Nephrology consulted who recommend future HD need and line placement if does not improve.   -Avoid fluisd  - Trend Cr  - Strict I&Os  - Avoid nephrotoxic agents  - Renally adjust medications  - Monitor for urinary retention  - If continues to worsen will consult nephrology      History of rectal or anal cancer  Hx of rectal cancer and treated with radiation and chemotherapy  No longer on chemotherapy      Coronary artery disease involving native coronary artery of native heart without angina pectoris  Continue DAPT and statin  Monitor for symptoms of angina  Currently on heparin gtt        PAD (peripheral artery disease)  Hx of PAD w/ previous stents in renal, iliac, and femoral stents  Continue ASA, Plavix, and statin      HLD (hyperlipidemia)  Continue statin      Essential hypertension  /61 on arrival. Patient has not taken BP meds for past year.     Continue PO  Coreg, hydralazine, Nifedipine  PRN Hydralazine for SBP >180    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 hypertension  Patient's FSGs are controlled on current medication regimen.  Last A1c reviewed-   Lab Results   Component Value Date    HGBA1C 5.0 05/04/2023     Most recent fingerstick glucose reviewed-   Recent Labs   Lab 05/07/23  0900 05/07/23  1242 05/07/23  1554 05/08/23  0612   POCTGLUCOSE 93 111* 138* 107     Current correctional scale  Low  Maintain anti-hyperglycemic dose as follows-   Antihyperglycemics (From admission, onward)    Start     Stop Route Frequency Ordered    05/06/23 1710  insulin aspart U-100 pen 0-5 Units         -- SubQ Every 6 hours PRN 05/06/23 1711        Hold Oral hypoglycemics while patient is in the hospital.      VTE Risk Mitigation (From admission, onward)         Ordered     heparin 25,000 units in dextrose 5% 250 mL (100 units/mL) infusion HIGH INTENSITY nomogram - [facility]  Continuous        Question Answer Comment   Heparin Infusion Adjustment (DO NOT MODIFY ANSWER) \\[email or web address]\Images\Pharmacy\HeparinInfusions\heparin HIGH INTENSITY nomogram for [facility] [id number]    Begin at (in units/kg/hr) 14        05/06/23 1711     IP VTE HIGH RISK PATIENT  Once         05/06/23 1711     Place sequential compression device  Until discontinued         05/06/23 1711     Reason for No Pharmacological VTE Prophylaxis  Once        Question:  Reasons:  Answer:  IV Heparin w/in 24 hrs. Pre or Post-Op    05/06/23 1711     heparin 25,000 units in dextrose 5% (100 units/ml) IV bolus from bag - ADDITIONAL PRN BOLUS - 60 units/kg  As needed (PRN)        Question:  Heparin Infusion Adjustment (DO NOT MODIFY ANSWER)  Answer:  \\[email or web address]\Images\Pharmacy\HeparinInfusions\heparin HIGH INTENSITY nomogram for [facility] [id number].pdf    05/06/23 1711     heparin 25,000 units in dextrose 5% (100 units/ml) IV bolus from bag - ADDITIONAL PRN BOLUS - 30 units/kg  As needed (PRN)        Question:  Heparin Infusion  Adjustment (DO NOT MODIFY ANSWER)  Answer:  [email or web address]\Images\Pharmacy\HeparinInfusions\heparin HIGH INTENSITY nomogram for [facility] [id number].pdf    05/06/23 1711                Discharge Planning   PÉREZ: 5/10/2023     Code Status: Full Code   Is the patient medically ready for discharge?: No    Reason for patient still in hospital (select all that apply): Treatment                     Sina Cummings MD  Department of Hospital Medicine   [facility] - Intensive Care ([address])

## 2023-05-08 NOTE — MED STUDENT ASSESSMENT & PLAN
This is a 77 years old female patient with a history of CKD stage IV, DM, HTN, cirrhosis and HFpEF with an ALESIA, probably ischemic ATN due to an abrupt drop on her blood pressure on admission. Her urine output and labs are improving. Today sCr is 2.5 and BUN is 88.    # ALESIA   - Fluid balance: not overloaded, avoid fluids for now.  - Electrolytes: Na, K, Ca (corrected by albumin) and Cl are on normal values.   K, goal >4, page nephrology if K >5.5, Mg, goal >2, Phos, goal >3 and <5  - Strict input/output and daily weights.   - Acid/base: NAGMA, Sodium bicarb tabs 650mg TID   - Anemia: Hgb 8.8, transfuse for Hgb <7.0  - Maintain MAP >65 and systolic BP less than 130  - Renal diet/tube feedings if not NPO  - Anemia: Hgb 7.7, transfuse for Hgb <7.0    # CKD stage IV  Probably due to her medical history of DM, HTN, HFpEF and cirrhosis. She had an increased baseline Cr level (2 mg/dL) and was receiving spironolactone and lasik. We need to assess her renal fx after the ALESIA has resolved to recommend further therapy.

## 2023-05-08 NOTE — PLAN OF CARE
VASCULAR SURGERY CARE UPDATE    Planning for right femoral endarterectomy tomorrow 5/9.  Will obtain consent and jonny today.  NPO at midnight       Lucrecia Tiwari, PGY-1  General Surgery  [facility]  Pager# [phone]

## 2023-05-08 NOTE — ASSESSMENT & PLAN NOTE
Hx of nephrectomy. Baseline Cr 1.8-2.2.  Cr 2.8 on arrival  Seen by urology and nephrology who believe etiology to be post-renal. Renal US shows mild hydronephrosis. Jane placed. Patient had angiogram that could have worsened renal function but per nephrology not exposed to contrast.   No indications for HD at this time    Plan:  - Nephrology consulted who recommend future HD need and line placement if does not improve.   -Avoid fluisd  - Trend Cr  - Strict I&Os  - Avoid nephrotoxic agents  - Renally adjust medications  - Monitor for urinary retention  - If continues to worsen will consult nephrology

## 2023-05-08 NOTE — PROGRESS NOTES
Principal Problem: Critical limb ischemia of right lower extremity     Chief Complaint: No chief complaint on file.        HPI: Steph Monroe is a 77 y.o. 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 She developed cirrhosis with ascites (unclear etiology from records) requiring paracentesis (last done 11/2022), PVD w/ iliac stents , aortic stenosis, pulm HTN, lung nodule, anemia, cataracts, OA, vitamin D deficiency, and she is a former smoker who quit >10 yrs ago. Pt presented at [facility]  on 5/2/2023 for evaluation as recommended by her podiatrist worsening tip LE pain, worse in the R leg and distant bilateral femoral pulses.     At [facility] pt's presenting symptoms were concerning for critical limb ischemia based on the tip LE arterial doppler results, vascular surgery and cardiology were consulted. Pt was started on IV fluids and heparin gtt and then on 5/3/2023 pt had peripheral angiogram of tip LE with CO2 not with contrast via the R radial artery to the L common femoral artery and per operative report the findings were:      high grade stenosis in right GUNNER.  of CFA collaterals from EIA and profunda artery which provides collaterals to chronically occluded SFA.  Initially 2V runoff with prox  of peroneal artery. Pt provides flow to foot, however has diffuse diease. Unable to adequately visualize right foot circulation.      Pt had a renal ultrasound on 5/5/2023 that showed R sided hydronephrosis, non-obstructing small intrarenal calculi on the right, CKD changes, and known absent L kidney. Nephrology was consulted for ALESIA originally thought to be d/t hydronephrosis, pt was started on [place] x10hrs. A spears catheter was placed and Urology was  "consulted. CT abd & pelvis was done and this did NOT show hydronephrosis, it did show some abdominopelvic ascites, calcifications, and tip common iliac stents. Pt also had an echo and this showed an EF of 65%, with mild MS, mild AS, mild to mod AR, CVP 3, PASP 35 mmHg     Pt was transferred to [facility] [place] higher level of care anticipating vascular surgery intervention      On arrival to [facility] pt presented with the following VS:          Initial Vitals   BP Pulse Resp Temp SpO2   05/06/23 1700 05/06/23 1700 05/06/23 1700 05/06/23 1700 05/06/23 1910   127/61 83 (!) 24 97.2 °F (36.2 °C) 95 %      Nephrology was consulted for: "Worsening ALESIA"  Baseline sCr: ~2.0 based on labs in Care Everywhere for the past year  Admission sCr: 1.8  Pt follows outpatient with Dr. Oakley for CKD IV, she was last seen in Jan 2023 and was started on lasix and spironolactone which she was taking prior to admission. 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   Pt denies:  foam in urine, hematuria, dysuria, urinary frequency or urgency, fever, chills, shortness of breath, cough, chest pain, palpitations, nausea, vomiting, diarrhea, abd pain,headaches, visual disturbances or weakness, or rash.          Creatinine   Date Value Ref Range Status   05/06/2023 2.8 (H) 0.5 - 1.4 mg/dL Final   05/05/2023 2.6 (H) 0.5 - 1.4 mg/dL Final   05/04/2023 2.3 (H) 0.5 - 1.4 mg/dL Final   05/04/2023 2.3 (H) 0.5 - 1.4 mg/dL Final   05/03/2023 2.1 (H) 0.5 - 1.4 mg/dL Final   05/02/2023 1.8 (H) 0.5 - 1.4 mg/dL Final   08/06/2020 2.2 (H) 0.5 - 1.4 mg/dL Final   07/16/2018 1.8 (H) 0.5 - 1.4 mg/dL Final   01/19/2018 2.2 (H) 0.5 - 1.4 mg/dL Final   01/16/2018 2.1 (H) 0.5 - 1.4 mg/dL Final            BUN   Date Value Ref Range Status   05/06/2023 86 (H) 8 - 23 mg/dL Final   05/05/2023 78 (H) 8 - 23 mg/dL " Final   05/04/2023 74 (H) 8 - 23 mg/dL Final   05/04/2023 73 (H) 8 - 23 mg/dL Final   05/03/2023 60 (H) 8 - 23 mg/dL Final         Hospital Course: No notes on file     Interval History: Overnight, the patient didn't have any complaints. She denied CP, SOB, confusion, nausea or vomiting.            Past Medical History:   Diagnosis Date    Cancer       rectum    CKD (chronic kidney disease)      Diabetes mellitus, type 2      Hyperlipidemia      Hypertension      Neutropenic fever 5/27/2016    PVD (peripheral vascular disease)                 Past Surgical History:   Procedure Laterality Date    ANGIOGRAM, LOWER ARTERIAL, UNILATERAL Right 5/5/2023     Procedure: Angiogram, Lower Arterial, Unilateral;  Surgeon: Remington Ribeiro MD;  Location: [facility] CATH LAB/EP;  Service: Cardiology;  Laterality: Right;    COLONOSCOPY N/A 3/28/2016     Procedure: COLONOSCOPY;  Surgeon: Mitul Buitrago Jr., MD;  Location: [facility] ENDO;  Service: Endoscopy;  Laterality: N/A;    EYE SURGERY Left       cataract removal with lens implant.    FEMORAL ARTERY STENT   before 2010    PERIPHERAL ANGIOGRAPHY N/A 5/3/2023     Procedure: Peripheral angiography;  Surgeon: Rasheeda Ny MD;  Location: [facility] CATH LAB/EP;  Service: Cardiology;  Laterality: N/A;    RENAL ARTERY STENT   2010               Review of patient's allergies indicates:   Allergen Reactions    Chantix [varenicline]         Tongue swelling       Wellbutrin [bupropion hcl]         Tongue swelling            No current facility-administered medications on file prior to encounter.           Current Outpatient Medications on File Prior to Encounter   Medication Sig    aspirin 81 MG Chew Take 1 tablet (81 mg total) by mouth once daily.    atorvastatin (LIPITOR) 80 MG tablet Take 1 tablet (80 mg total) by mouth once daily.    carvediloL (COREG) 25 MG tablet Take 1 tablet by mouth 2 (two) times daily.    clopidogreL (PLAVIX) 75 mg tablet Take 75 mg by mouth once daily.    ezetimibe  (ZETIA) 10 mg tablet Take 10 mg by mouth once daily.    furosemide (LASIX) 20 MG tablet Take 20 mg by mouth once daily.    gabapentin (NEURONTIN) 100 MG capsule Take 1 capsule (100 mg total) by mouth 2 (two) times daily.    hydrALAZINE (APRESOLINE) 25 MG tablet Take 2 tablets by mouth 2 (two) times daily.    NIFEdipine (PROCARDIA-XL) 60 MG (OSM) 24 hr tablet Take 1 tablet (60 mg total) by mouth once daily.    sodium bicarbonate 650 MG tablet Take 650 mg by mouth 2 (two) times daily.    traMADoL (ULTRAM) 50 mg tablet Take 1 tablet (50 mg total) by mouth every 8 (eight) hours as needed for Pain.      Family History         Problem Relation (Age of Onset)     Diabetes Father     Heart attack Mother     Hypertension Sister                   Tobacco Use    Smoking status: Former       Packs/day: 1.50       Years: 45.00       Pack years: 67.50       Types: Cigarettes       Quit date: 2013       Years since quittin.9       Passive exposure: Never    Smokeless tobacco: Never   Substance and Sexual Activity    Alcohol use: No       Alcohol/week: 0.0 standard drinks    Drug use: No    Sexual activity: Not Currently      Review of Systems As per interval history.  Objective:      Vital Signs (Most Recent):  Temp: 97.9 °F (36.6 °C) (23 0815)  Pulse: 74 (23 0815)  Resp: 20 (23 0851)  BP: 127/60 (23 0815)  SpO2: 98 % (23 0815) Vital Signs (24h Range):  Temp:  [97.9 °F (36.6 °C)-98.2 °F (36.8 °C)] 97.9 °F (36.6 °C)  Pulse:  [71-80] 74  Resp:  [17-28] 20  SpO2:  [98 %-100 %] 98 %  BP: (117-132)/(56-61) 127/60      Weight: 58.1 kg (128 lb)  Body mass index is 23.41 kg/m².     Intake/Output Summary (Last 24 hours) at 2023 1159  Last data filed at 2023 1600      Gross per 24 hour   Intake 498 ml   Output 400 ml   Net 98 ml      Physical Exam  HENT:      Mouth/Throat:      Mouth: Mucous membranes are moist.   Cardiovascular:      Rate and Rhythm: Normal rate and regular rhythm.       Comments: Pedal pulses are diminished on the right foot.  Pulmonary:      Breath sounds: Normal breath sounds.   Abdominal:      General: Bowel sounds are normal. There is distension.      Palpations: Abdomen is soft.   Musculoskeletal:      Right lower leg: Edema present.      Left lower leg: Edema present.      Comments: LE edema 1+.    Skin:     Findings: Bruising and lesion present.      Comments: The patient has multiple bruises and injuries on her arms. Right foot is erythematosus, cold and tender.    Neurological:      Mental Status: She is alert and oriented to person, place, and time.         Significant Labs: All pertinent labs within the past 24 hours have been reviewed.  CBC:        Recent Labs   Lab 05/07/23  0540 05/08/23  0554   WBC 3.37* 3.31*   HGB 8.8* 8.2*   HCT 28.8* 26.6*   PLT 86* 81*      CMP:        Recent Labs   Lab 05/07/23  0540 05/08/23  0554    138   K 4.0 4.2    106   CO2 21* 21*   GLU 92 101   BUN 95* 105*   CREATININE 3.1* 2.8*   CALCIUM 8.2* 8.0*   ALBUMIN 2.5* 2.4*   ANIONGAP 9 11         Significant Imaging: I have reviewed all pertinent imaging results/findings within the past 24 hours.  US Doppler renal artery and vein (05/07/2023):   1. No sonographic evidence of right renal artery stenosis.  Interval resolution/near-resolution of right hydronephrosis compared to prior ultrasound 05/05/2023.  2. Right nonobstructing renal calculi.  3. Elevated renal arterial resistive indices may be seen with medical renal disease.  4. Left nephrectomy.  5. Moderate ascites.                Assessment/Plan:      This is a 77 years old female patient with a history of CKD stage IV, DM, HTN, cirrhosis and HFpEF who came to the ED because of worsening pain on the right leg. On admission, her serum Cr was 1.8 (baseline of 2 per labs of the past year) and it started to raise (today serum Cr is 2.8). Based on these findings, the patient has an ALESIA superimposed on CKD.    # ALESIA on CKD  4  likely ischemic ATN due to an abrupt drop of her blood pressure (BP at admission was 201/82 and the next day it dropped to 101/52).    in the setting of worsening anemia. Monitor for now not uremic   Fluid balance: mildly fluid overloaded, avoid fluids for now.  Electrolytes: Na, K, Ca (corrected by albumin) and Cl are on normal values.   K, goal >4, page nephrology if K >5.5, Mg, goal >2, Phos, goal >3 and <5  Strict input/output and daily weights.   Acid/base: NAGMA, Sodium bicarb tabs 650mg TID   Maintain MAP >65 for renal perfusion  If patient getting contrast with vascular tomorrow then will need IVF NS 75/cc/hr 6 hours before and after procedure       Limb ischemia: management per primary and vascular              MD Raphael Simmons - Intensive Care ([facility]

## 2023-05-08 NOTE — NURSING
Alert and oriented x 4. No WOB or sign of distress. O2 at 1 liter/ NC. Up in recliner most of afternoon. Safety measures in place. Heparin at 12 units.

## 2023-05-08 NOTE — PLAN OF CARE
Problem: Occupational Therapy  Goal: Occupational Therapy Goal  Description: Goals to be met by: 05-15-23     Patient will increase functional independence with ADLs by performing:    UE Dressing with Supervision.  LE Dressing with Supervision.  Grooming while standing at sink with Supervision.  Toileting from toilet with Supervision for hygiene and clothing management.   Stand pivot transfers with Supervision with RW  Toilet transfer to toilet with Supervision.    Outcome: Ongoing, Progressing

## 2023-05-08 NOTE — ASSESSMENT & PLAN NOTE
"78 yo F transferred from [facility] for vascular surgery evaluation and femoral endarterectomy. Patient presented to [place] with RLE pain that has hindered her walking. R foot with absent pedal pulse, pain, and discoloration. IC performed angiography on 5/5 that showed "high grade stenosis in right GUNNER.  of CFA collaterals from EIA and profunda artery which provides collaterals to chronically occluded SFA.  Initially 2V runoff with prox  of peroneal artery. Pt provides flow to foot, however has diffuse diease. Unable to adequately visualize right foot circulation." Transferred on heparin gtt.   No signs of infection noted on exam. No leukocytosis.    PLAN:  - Vascular surgery consulted for femoral endarterectomy, appreciate recs. Will obtain JOSE and toe pressures prior to surgical intervention.  -Surgery planned for 5/9 - patient to be npo at midnight  -Per nephrology recs fluids should be initiated 6 hours prior and 6 hours after procedure  -Continue heparin drip, ASA, plavix, and statin therapy  -Continue Gabapentin for neuropathy pain  -PT/OT ordered  -Pain control w/ Tylenol, hydrocodone, and morphine  -CBC, CMP daily  -Daily coags    "

## 2023-05-08 NOTE — MEDICAL/APP STUDENT
[facility] - Intensive Care ([facility]-)  Progress Note    Patient Name: Steph Monroe  MRN: [id number]  Patient Class: IP- Inpatient   Admission Date: 5/6/2023  Length of Stay: 2 days  Attending Physician: Mariela Finnegan MD  Primary Care Provider: Mane Carmona MD    Subjective:     Principal Problem: Critical limb ischemia of right lower extremity    Chief Complaint: No chief complaint on file.      HPI: Steph Monroe is a 77 y.o. 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 She developed cirrhosis with ascites (unclear etiology from records) requiring paracentesis (last done 11/2022), PVD w/ iliac stents , aortic stenosis, pulm HTN, lung nodule, anemia, cataracts, OA, vitamin D deficiency, and she is a former smoker who quit >10 yrs ago. Pt presented at [facility]  on 5/2/2023 for evaluation as recommended by her podiatrist worsening tip LE pain, worse in the R leg and distant bilateral femoral pulses.    At [facility] pt's presenting symptoms were concerning for critical limb ischemia based on the tip LE arterial doppler results, vascular surgery and cardiology were consulted. Pt was started on IV fluids and heparin gtt and then on 5/3/2023 pt had peripheral angiogram of tip LE with CO2 not with contrast via the R radial artery to the L common femoral artery and per operative report the findings were:     high grade stenosis in right GUNNER.  of CFA collaterals from EIA and profunda artery which provides collaterals to chronically occluded SFA.  Initially 2V runoff with prox  of peroneal artery. Pt provides flow to foot, however has diffuse diease. Unable to adequately visualize right foot circulation.     Pt had a renal ultrasound on 5/5/2023 that  "showed R sided hydronephrosis, non-obstructing small intrarenal calculi on the right, CKD changes, and known absent L kidney. Nephrology was consulted for ALESIA originally thought to be d/t hydronephrosis, pt was started on [place] x10hrs. A spears catheter was placed and Urology was consulted. CT abd & pelvis was done and this did NOT show hydronephrosis, it did show some abdominopelvic ascites, calcifications, and tip common iliac stents. Pt also had an echo and this showed an EF of 65%, with mild MS, mild AS, mild to mod AR, CVP 3, PASP 35 mmHg    Pt was transferred to [facility] for higher level of care anticipating vascular surgery intervention     On arrival to [facility] pt presented with the following VS:   Initial Vitals   BP Pulse Resp Temp SpO2   05/06/23 1700 05/06/23 1700 05/06/23 1700 05/06/23 1700 05/06/23 1910   127/61 83 (!) 24 97.2 °F (36.2 °C) 95 %     Nephrology was consulted for: "Worsening ALESIA"  Baseline sCr: ~2.0 based on labs in Care Everywhere for the past year  Admission sCr: 1.8  Pt follows outpatient with Dr. Oakley for CKD IV, she was last seen in Jan 2023 and was started on lasix and spironolactone which she was taking prior to admission. 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   Pt denies:  foam in urine, hematuria, dysuria, urinary frequency or urgency, fever, chills, shortness of breath, cough, chest pain, palpitations, nausea, vomiting, diarrhea, abd pain,headaches, visual disturbances or weakness, or rash.    Creatinine   Date Value Ref Range Status   05/06/2023 2.8 (H) 0.5 - 1.4 mg/dL Final   05/05/2023 2.6 (H) 0.5 - 1.4 mg/dL Final   05/04/2023 2.3 (H) 0.5 - 1.4 mg/dL Final   05/04/2023 2.3 (H) 0.5 - 1.4 mg/dL Final   05/03/2023 2.1 (H) 0.5 - 1.4 mg/dL Final   05/02/2023 1.8 (H) 0.5 - 1.4 mg/dL Final   08/06/2020 2.2 (H) 0.5 " - 1.4 mg/dL Final   07/16/2018 1.8 (H) 0.5 - 1.4 mg/dL Final   01/19/2018 2.2 (H) 0.5 - 1.4 mg/dL Final   01/16/2018 2.1 (H) 0.5 - 1.4 mg/dL Final     BUN   Date Value Ref Range Status   05/06/2023 86 (H) 8 - 23 mg/dL Final   05/05/2023 78 (H) 8 - 23 mg/dL Final   05/04/2023 74 (H) 8 - 23 mg/dL Final   05/04/2023 73 (H) 8 - 23 mg/dL Final   05/03/2023 60 (H) 8 - 23 mg/dL Final       Hospital Course: No notes on file    Interval History: Overnight, the patient didn't have any complaints. She denied CP, SOB, confusion, nausea or vomiting.      Past Medical History:   Diagnosis Date    Cancer     rectum    CKD (chronic kidney disease)     Diabetes mellitus, type 2     Hyperlipidemia     Hypertension     Neutropenic fever 5/27/2016    PVD (peripheral vascular disease)        Past Surgical History:   Procedure Laterality Date    ANGIOGRAM, LOWER ARTERIAL, UNILATERAL Right 5/5/2023    Procedure: Angiogram, Lower Arterial, Unilateral;  Surgeon: Remington Ribeiro MD;  Location: [facility] CATH LAB/EP;  Service: Cardiology;  Laterality: Right;    COLONOSCOPY N/A 3/28/2016    Procedure: COLONOSCOPY;  Surgeon: Mitul Buitrago Jr., MD;  Location: [facility] ENDO;  Service: Endoscopy;  Laterality: N/A;    EYE SURGERY Left     cataract removal with lens implant.    FEMORAL ARTERY STENT  before 2010    PERIPHERAL ANGIOGRAPHY N/A 5/3/2023    Procedure: Peripheral angiography;  Surgeon: Rasheeda Ny MD;  Location: [facility] CATH LAB/EP;  Service: Cardiology;  Laterality: N/A;    RENAL ARTERY STENT  2010       Review of patient's allergies indicates:   Allergen Reactions    Chantix [varenicline]      Tongue swelling      Wellbutrin [bupropion hcl]      Tongue swelling         No current facility-administered medications on file prior to encounter.     Current Outpatient Medications on File Prior to Encounter   Medication Sig    aspirin 81 MG Chew Take 1 tablet (81 mg total) by mouth once daily.    atorvastatin (LIPITOR) 80 MG tablet Take 1  tablet (80 mg total) by mouth once daily.    carvediloL (COREG) 25 MG tablet Take 1 tablet by mouth 2 (two) times daily.    clopidogreL (PLAVIX) 75 mg tablet Take 75 mg by mouth once daily.    ezetimibe (ZETIA) 10 mg tablet Take 10 mg by mouth once daily.    furosemide (LASIX) 20 MG tablet Take 20 mg by mouth once daily.    gabapentin (NEURONTIN) 100 MG capsule Take 1 capsule (100 mg total) by mouth 2 (two) times daily.    hydrALAZINE (APRESOLINE) 25 MG tablet Take 2 tablets by mouth 2 (two) times daily.    NIFEdipine (PROCARDIA-XL) 60 MG (OSM) 24 hr tablet Take 1 tablet (60 mg total) by mouth once daily.    sodium bicarbonate 650 MG tablet Take 650 mg by mouth 2 (two) times daily.    traMADoL (ULTRAM) 50 mg tablet Take 1 tablet (50 mg total) by mouth every 8 (eight) hours as needed for Pain.     Family History       Problem Relation (Age of Onset)    Diabetes Father    Heart attack Mother    Hypertension Sister          Tobacco Use    Smoking status: Former     Packs/day: 1.50     Years: 45.00     Pack years: 67.50     Types: Cigarettes     Quit date: 2013     Years since quittin.9     Passive exposure: Never    Smokeless tobacco: Never   Substance and Sexual Activity    Alcohol use: No     Alcohol/week: 0.0 standard drinks    Drug use: No    Sexual activity: Not Currently     Review of Systems As per interval history.  Objective:     Vital Signs (Most Recent):  Temp: 97.9 °F (36.6 °C) (23 0815)  Pulse: 74 (23 0815)  Resp: 20 (23 0851)  BP: 127/60 (23 0815)  SpO2: 98 % (23 0815) Vital Signs (24h Range):  Temp:  [97.9 °F (36.6 °C)-98.2 °F (36.8 °C)] 97.9 °F (36.6 °C)  Pulse:  [71-80] 74  Resp:  [17-28] 20  SpO2:  [98 %-100 %] 98 %  BP: (117-132)/(56-61) 127/60     Weight: 58.1 kg (128 lb)  Body mass index is 23.41 kg/m².    Intake/Output Summary (Last 24 hours) at 2023 1159  Last data filed at 2023 1600  Gross per 24 hour   Intake 498 ml   Output 400 ml   Net 98 ml       Physical Exam  HENT:      Mouth/Throat:      Mouth: Mucous membranes are moist.   Cardiovascular:      Rate and Rhythm: Normal rate and regular rhythm.      Comments: Pedal pulses are diminished on the right foot.  Pulmonary:      Breath sounds: Normal breath sounds.   Abdominal:      General: Bowel sounds are normal. There is distension.      Palpations: Abdomen is soft.   Musculoskeletal:      Right lower leg: Edema present.      Left lower leg: Edema present.      Comments: LE edema 1+.    Skin:     Findings: Bruising and lesion present.      Comments: The patient has multiple bruises and injuries on her arms. Right foot is erythematosus, cold and tender.    Neurological:      Mental Status: She is alert and oriented to person, place, and time.       Significant Labs: All pertinent labs within the past 24 hours have been reviewed.  CBC:   Recent Labs   Lab 05/07/23  0540 05/08/23  0554   WBC 3.37* 3.31*   HGB 8.8* 8.2*   HCT 28.8* 26.6*   PLT 86* 81*     CMP:   Recent Labs   Lab 05/07/23  0540 05/08/23  0554    138   K 4.0 4.2    106   CO2 21* 21*   GLU 92 101   BUN 95* 105*   CREATININE 3.1* 2.8*   CALCIUM 8.2* 8.0*   ALBUMIN 2.5* 2.4*   ANIONGAP 9 11       Significant Imaging: I have reviewed all pertinent imaging results/findings within the past 24 hours.  US Doppler renal artery and vein (05/07/2023):   1. No sonographic evidence of right renal artery stenosis.  Interval resolution/near-resolution of right hydronephrosis compared to prior ultrasound 05/05/2023.  2. Right nonobstructing renal calculi.  3. Elevated renal arterial resistive indices may be seen with medical renal disease.  4. Left nephrectomy.  5. Moderate ascites.         Assessment/Plan:      This is a 77 years old female patient with a history of CKD stage IV, DM, HTN, cirrhosis and HFpEF who came to the ED because of worsening pain on the right leg. On admission, her serum Cr was 1.8 (baseline of 2 per labs of the past year) and  it started to raise (today serum Cr is 2.8). Based on these findings, the patient has an ALESIA superimposed on CKD.    # ALESIA   The etiology is probably ischemic ATN probably due to an abrupt drop on her blood pressure (BP at admission was 201/82 and the next day it dropped to 101/52). Because BUN has a bigger increase (today is 105) than creatinine (today is 2.8), pre renal causes such as CRS or GI bleeding (she had a drop of Hb from 10.6 at admission to 8.2, dx of anal cancer and a thrombocytopenia of 86 k) need to be kept in mind. HRS is unlikely. She is still oliguric and her labs are not improving, but doesn't have any symptoms or signs of uremia.     - Fluid balance: mildly fluid overloaded, avoid fluids for now.  - Electrolytes: Na, K, Ca (corrected by albumin) and Cl are on normal values.   K, goal >4, page nephrology if K >5.5, Mg, goal >2, Phos, goal >3 and <5  - Strict input/output and daily weights.   - Acid/base: NAGMA, Sodium bicarb tabs 650mg TID   - Anemia: Hgb 8.8, transfuse for Hgb <7.0  - Maintain MAP >65 for renal perfusion  - Renal diet/tube feedings if not NPO  - Anemia: Hgb 8.8, transfuse for Hgb <7.0    She will have an arterectomy tomorrow and probably be exposed to contrast. We recommend administration of IV fluids at 1cc/kg/h 6h before and 6h after the procedure.     # CKD stage IV  Probably due to her medical history of DM, HTN, HFpEF and cirrhosis. She had an increased baseline Cr level (2 mg/dL) and was receiving spironolactone and lasik. We need to assess her renal fx after the ALESIA has resolved to recommend further therapy.     Active Diagnoses:    Diagnosis Date Noted POA    PRINCIPAL PROBLEM:  Critical limb ischemia of right lower extremity [I70.221] 05/02/2023 Yes    Metabolic acidosis [E87.20] 05/06/2023 Yes    Chronic heart failure with preserved ejection fraction [I50.32] 05/02/2023 Yes    Pulmonary emphysema [J43.9] 05/02/2023 Yes    alesia on ckd stage 4 [N17.9] 01/16/2018 Yes     History of rectal or anal cancer [Z85.048] 04/04/2016 Yes    Coronary artery disease involving native coronary artery of native heart without angina pectoris [I25.10] 03/26/2016 Yes     Chronic    PAD (peripheral artery disease) [I73.9] 03/26/2016 Yes    HLD (hyperlipidemia) [E78.5] 03/26/2016 Yes     Chronic    Essential hypertension [I10] 03/26/2016 Yes     Chronic    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 hypertension [E11.22, I12.9, N18.30] 03/26/2016 Yes     Chronic      Problems Resolved During this Admission:     VTE Risk Mitigation (From admission, onward)           Ordered     heparin 25,000 units in dextrose 5% 250 mL (100 units/mL) infusion HIGH INTENSITY nomogram - [facility]  Continuous        Question Answer Comment   Heparin Infusion Adjustment (DO NOT MODIFY ANSWER) \\[email or web address]\[email or web address]\Images\Pharmacy\HeparinInfusions\heparin HIGH INTENSITY nomogram for [facility] [id number].pdf    Begin at (in units/kg/hr) 14        05/06/23 1711     IP VTE HIGH RISK PATIENT  Once         05/06/23 1711     Place sequential compression device  Until discontinued         05/06/23 1711     Reason for No Pharmacological VTE Prophylaxis  Once        Question:  Reasons:  Answer:  IV Heparin w/in 24 hrs. Pre or Post-Op    05/06/23 1711     heparin 25,000 units in dextrose 5% (100 units/ml) IV bolus from bag - ADDITIONAL PRN BOLUS - 60 units/kg  As needed (PRN)        Question:  Heparin Infusion Adjustment (DO NOT MODIFY ANSWER)  Answer:  \[email or web address]\epic\Images\Pharmacy\HeparinInfusions\heparin HIGH INTENSITY nomogram for [facility] [id number].pdf    05/06/23 1711     heparin 25,000 units in dextrose 5% (100 units/ml) IV bolus from bag - ADDITIONAL PRN BOLUS - 30 units/kg  As needed (PRN)        Question:  Heparin Infusion Adjustment (DO NOT MODIFY ANSWER)  Answer:  \[email or web address]\epic\Images\Pharmacy\HeparinInfusions\heparin HIGH INTENSITY nomogram for [facility] [id number].pdf    05/06/23 1711                       [facility] - Intensive Care [facility]  [place]-[facility])

## 2023-05-08 NOTE — PLAN OF CARE
Client is alert and oriented x 4 to person, place, time and situation. Vitals have been stable throughout shift. Patient denies pain. No other significant changes noted throughout shift.     Problem: Adult Inpatient Plan of Care  Goal: Plan of Care Review  Outcome: Ongoing, Progressing  Goal: Patient-Specific Goal (Individualized)  Outcome: Ongoing, Progressing  Goal: Absence of Hospital-Acquired Illness or Injury  Outcome: Ongoing, Progressing  Goal: Optimal Comfort and Wellbeing  Outcome: Ongoing, Progressing  Goal: Readiness for Transition of Care  Outcome: Ongoing, Progressing     Problem: Diabetes Comorbidity  Goal: Blood Glucose Level Within Targeted Range  Outcome: Ongoing, Progressing     Problem: Infection  Goal: Absence of Infection Signs and Symptoms  Outcome: Ongoing, Progressing     Problem: Fall Injury Risk  Goal: Absence of Fall and Fall-Related Injury  Outcome: Ongoing, Progressing     Problem: Skin Injury Risk Increased  Goal: Skin Health and Integrity  Outcome: Ongoing, Progressing     Problem: Fluid and Electrolyte Imbalance (Acute Kidney Injury/Impairment)  Goal: Fluid and Electrolyte Balance  Outcome: Ongoing, Progressing     Problem: Oral Intake Inadequate (Acute Kidney Injury/Impairment)  Goal: Optimal Nutrition Intake  Outcome: Ongoing, Progressing     Problem: Renal Function Impairment (Acute Kidney Injury/Impairment)  Goal: Effective Renal Function  Outcome: Ongoing, Progressing

## 2023-05-09 ENCOUNTER — ANESTHESIA (OUTPATIENT)
Dept: SURGERY | Facility: HOSPITAL | Age: 78
DRG: 252 | End: 2023-05-09
Payer: MEDICARE

## 2023-05-09 LAB
ABO + RH BLD: NORMAL
ALBUMIN SERPL BCP-MCNC: 2.5 G/DL (ref 3.5–5.2)
ANION GAP SERPL CALC-SCNC: 10 MMOL/L (ref 8–16)
ANION GAP SERPL CALC-SCNC: 11 MMOL/L (ref 8–16)
ANION GAP SERPL CALC-SCNC: 11 MMOL/L (ref 8–16)
APTT PPP: 40.5 SEC (ref 21–32)
BASOPHILS # BLD AUTO: 0.01 K/UL (ref 0–0.2)
BASOPHILS NFR BLD: 0.1 % (ref 0–1.9)
BASOPHILS NFR BLD: 0.1 % (ref 0–1.9)
BASOPHILS NFR BLD: 0.2 % (ref 0–1.9)
BLD GP AB SCN CELLS X3 SERPL QL: NORMAL
BLD PROD TYP BPU: NORMAL
BLD PROD TYP BPU: NORMAL
BLOOD UNIT EXPIRATION DATE: NORMAL
BLOOD UNIT EXPIRATION DATE: NORMAL
BLOOD UNIT TYPE CODE: 6200
BLOOD UNIT TYPE CODE: 6200
BLOOD UNIT TYPE: NORMAL
BLOOD UNIT TYPE: NORMAL
BUN SERPL-MCNC: 83 MG/DL (ref 8–23)
BUN SERPL-MCNC: 83 MG/DL (ref 8–23)
BUN SERPL-MCNC: 97 MG/DL (ref 8–23)
CALCIUM SERPL-MCNC: 8.4 MG/DL (ref 8.7–10.5)
CHLORIDE SERPL-SCNC: 109 MMOL/L (ref 95–110)
CHLORIDE SERPL-SCNC: 110 MMOL/L (ref 95–110)
CHLORIDE SERPL-SCNC: 110 MMOL/L (ref 95–110)
CO2 SERPL-SCNC: 19 MMOL/L (ref 23–29)
CO2 SERPL-SCNC: 19 MMOL/L (ref 23–29)
CO2 SERPL-SCNC: 21 MMOL/L (ref 23–29)
CODING SYSTEM: NORMAL
CODING SYSTEM: NORMAL
CREAT SERPL-MCNC: 2.3 MG/DL (ref 0.5–1.4)
CREAT SERPL-MCNC: 2.3 MG/DL (ref 0.5–1.4)
CREAT SERPL-MCNC: 2.7 MG/DL (ref 0.5–1.4)
CROSSMATCH INTERPRETATION: NORMAL
CROSSMATCH INTERPRETATION: NORMAL
DIFFERENTIAL METHOD: ABNORMAL
DISPENSE STATUS: NORMAL
DISPENSE STATUS: NORMAL
EOSINOPHIL # BLD AUTO: 0 K/UL (ref 0–0.5)
EOSINOPHIL NFR BLD: 0 % (ref 0–8)
EOSINOPHIL NFR BLD: 0 % (ref 0–8)
EOSINOPHIL NFR BLD: 0.7 % (ref 0–8)
ERYTHROCYTE [DISTWIDTH] IN BLOOD BY AUTOMATED COUNT: 18.3 % (ref 11.5–14.5)
ERYTHROCYTE [DISTWIDTH] IN BLOOD BY AUTOMATED COUNT: 18.3 % (ref 11.5–14.5)
ERYTHROCYTE [DISTWIDTH] IN BLOOD BY AUTOMATED COUNT: 19.2 % (ref 11.5–14.5)
EST. GFR  (NO RACE VARIABLE): 17.8 ML/MIN/1.73 M^2
EST. GFR  (NO RACE VARIABLE): 21.6 ML/MIN/1.73 M^2
EST. GFR  (NO RACE VARIABLE): 21.6 ML/MIN/1.73 M^2
GLUCOSE SERPL-MCNC: 111 MG/DL (ref 70–110)
GLUCOSE SERPL-MCNC: 138 MG/DL (ref 70–110)
GLUCOSE SERPL-MCNC: 138 MG/DL (ref 70–110)
HCT VFR BLD AUTO: 25.2 % (ref 37–48.5)
HCT VFR BLD AUTO: 26.4 % (ref 37–48.5)
HCT VFR BLD AUTO: 26.4 % (ref 37–48.5)
HGB BLD-MCNC: 8 G/DL (ref 12–16)
HGB BLD-MCNC: 8.2 G/DL (ref 12–16)
HGB BLD-MCNC: 8.2 G/DL (ref 12–16)
IMM GRANULOCYTES # BLD AUTO: 0.01 K/UL (ref 0–0.04)
IMM GRANULOCYTES # BLD AUTO: 0.18 K/UL (ref 0–0.04)
IMM GRANULOCYTES # BLD AUTO: 0.18 K/UL (ref 0–0.04)
IMM GRANULOCYTES NFR BLD AUTO: 0.2 % (ref 0–0.5)
IMM GRANULOCYTES NFR BLD AUTO: 2.5 % (ref 0–0.5)
IMM GRANULOCYTES NFR BLD AUTO: 2.5 % (ref 0–0.5)
LYMPHOCYTES # BLD AUTO: 0.4 K/UL (ref 1–4.8)
LYMPHOCYTES # BLD AUTO: 0.4 K/UL (ref 1–4.8)
LYMPHOCYTES # BLD AUTO: 0.5 K/UL (ref 1–4.8)
LYMPHOCYTES NFR BLD: 10.6 % (ref 18–48)
LYMPHOCYTES NFR BLD: 5.3 % (ref 18–48)
LYMPHOCYTES NFR BLD: 5.3 % (ref 18–48)
MAGNESIUM SERPL-MCNC: 2.2 MG/DL (ref 1.6–2.6)
MCH RBC QN AUTO: 28.1 PG (ref 27–31)
MCH RBC QN AUTO: 28.3 PG (ref 27–31)
MCH RBC QN AUTO: 28.3 PG (ref 27–31)
MCHC RBC AUTO-ENTMCNC: 31.1 G/DL (ref 32–36)
MCHC RBC AUTO-ENTMCNC: 31.1 G/DL (ref 32–36)
MCHC RBC AUTO-ENTMCNC: 31.7 G/DL (ref 32–36)
MCV RBC AUTO: 88 FL (ref 82–98)
MCV RBC AUTO: 91 FL (ref 82–98)
MCV RBC AUTO: 91 FL (ref 82–98)
MONOCYTES # BLD AUTO: 0.2 K/UL (ref 0.3–1)
MONOCYTES # BLD AUTO: 0.2 K/UL (ref 0.3–1)
MONOCYTES # BLD AUTO: 0.6 K/UL (ref 0.3–1)
MONOCYTES NFR BLD: 13.6 % (ref 4–15)
MONOCYTES NFR BLD: 2.2 % (ref 4–15)
MONOCYTES NFR BLD: 2.2 % (ref 4–15)
NEUTROPHILS # BLD AUTO: 3.2 K/UL (ref 1.8–7.7)
NEUTROPHILS # BLD AUTO: 6.4 K/UL (ref 1.8–7.7)
NEUTROPHILS # BLD AUTO: 6.4 K/UL (ref 1.8–7.7)
NEUTROPHILS NFR BLD: 74.7 % (ref 38–73)
NEUTROPHILS NFR BLD: 89.9 % (ref 38–73)
NEUTROPHILS NFR BLD: 89.9 % (ref 38–73)
NRBC BLD-RTO: 0 /100 WBC
NUM UNITS TRANS FFP: NORMAL
PHOSPHATE SERPL-MCNC: 4.3 MG/DL (ref 2.7–4.5)
PHOSPHATE SERPL-MCNC: 4.3 MG/DL (ref 2.7–4.5)
PLATELET # BLD AUTO: 155 K/UL (ref 150–450)
PLATELET # BLD AUTO: 155 K/UL (ref 150–450)
PLATELET # BLD AUTO: 92 K/UL (ref 150–450)
PMV BLD AUTO: 10 FL (ref 9.2–12.9)
PMV BLD AUTO: 10 FL (ref 9.2–12.9)
PMV BLD AUTO: 10.8 FL (ref 9.2–12.9)
POCT GLUCOSE: 127 MG/DL (ref 70–110)
POCT GLUCOSE: 134 MG/DL (ref 70–110)
POCT GLUCOSE: 147 MG/DL (ref 70–110)
POTASSIUM SERPL-SCNC: 4.3 MMOL/L (ref 3.5–5.1)
POTASSIUM SERPL-SCNC: 4.9 MMOL/L (ref 3.5–5.1)
POTASSIUM SERPL-SCNC: 4.9 MMOL/L (ref 3.5–5.1)
RBC # BLD AUTO: 2.85 M/UL (ref 4–5.4)
RBC # BLD AUTO: 2.9 M/UL (ref 4–5.4)
RBC # BLD AUTO: 2.9 M/UL (ref 4–5.4)
SODIUM SERPL-SCNC: 140 MMOL/L (ref 136–145)
SPECIMEN OUTDATE: NORMAL
UNIT NUMBER: NORMAL
WBC # BLD AUTO: 4.26 K/UL (ref 3.9–12.7)
WBC # BLD AUTO: 7.14 K/UL (ref 3.9–12.7)
WBC # BLD AUTO: 7.14 K/UL (ref 3.9–12.7)

## 2023-05-09 PROCEDURE — 71000016 HC POSTOP RECOV ADDL HR: Performed by: SURGERY

## 2023-05-09 PROCEDURE — 12000002 HC ACUTE/MED SURGE SEMI-PRIVATE ROOM

## 2023-05-09 PROCEDURE — 25000003 PHARM REV CODE 250: Performed by: SURGERY

## 2023-05-09 PROCEDURE — 27201423 OPTIME MED/SURG SUP & DEVICES STERILE SUPPLY: Performed by: SURGERY

## 2023-05-09 PROCEDURE — C1769 GUIDE WIRE: HCPCS | Performed by: SURGERY

## 2023-05-09 PROCEDURE — 36000707: Performed by: SURGERY

## 2023-05-09 PROCEDURE — C1894 INTRO/SHEATH, NON-LASER: HCPCS | Performed by: SURGERY

## 2023-05-09 PROCEDURE — C1887 CATHETER, GUIDING: HCPCS | Performed by: SURGERY

## 2023-05-09 PROCEDURE — 36415 COLL VENOUS BLD VENIPUNCTURE: CPT | Performed by: NURSE PRACTITIONER

## 2023-05-09 PROCEDURE — 85730 THROMBOPLASTIN TIME PARTIAL: CPT | Performed by: NURSE PRACTITIONER

## 2023-05-09 PROCEDURE — 25000003 PHARM REV CODE 250: Performed by: REGISTERED NURSE

## 2023-05-09 PROCEDURE — 99233 PR SUBSEQUENT HOSPITAL CARE,LEVL III: ICD-10-PCS | Mod: GC,,, | Performed by: HOSPITALIST

## 2023-05-09 PROCEDURE — 71000015 HC POSTOP RECOV 1ST HR: Performed by: SURGERY

## 2023-05-09 PROCEDURE — P9040 RBC LEUKOREDUCED IRRADIATED: HCPCS | Performed by: STUDENT IN AN ORGANIZED HEALTH CARE EDUCATION/TRAINING PROGRAM

## 2023-05-09 PROCEDURE — C1874 STENT, COATED/COV W/DEL SYS: HCPCS | Performed by: SURGERY

## 2023-05-09 PROCEDURE — 80048 BASIC METABOLIC PNL TOTAL CA: CPT | Performed by: STUDENT IN AN ORGANIZED HEALTH CARE EDUCATION/TRAINING PROGRAM

## 2023-05-09 PROCEDURE — 37221 PR REVASCULARIZE ILIAC ARTERY,ANGIOPLASTY/STENT, INITIAL VESSEL: CPT | Mod: 51,RT,, | Performed by: SURGERY

## 2023-05-09 PROCEDURE — 36415 COLL VENOUS BLD VENIPUNCTURE: CPT | Performed by: STUDENT IN AN ORGANIZED HEALTH CARE EDUCATION/TRAINING PROGRAM

## 2023-05-09 PROCEDURE — 63600175 PHARM REV CODE 636 W HCPCS: Performed by: STUDENT IN AN ORGANIZED HEALTH CARE EDUCATION/TRAINING PROGRAM

## 2023-05-09 PROCEDURE — D9220A PRA ANESTHESIA: ICD-10-PCS | Mod: ANES,,, | Performed by: ANESTHESIOLOGY

## 2023-05-09 PROCEDURE — 35371 PR THROMBOENDARTECTMY FEMORAL COMMON: ICD-10-PCS | Mod: RT,,, | Performed by: SURGERY

## 2023-05-09 PROCEDURE — P9037 PLATE PHERES LEUKOREDU IRRAD: HCPCS | Performed by: STUDENT IN AN ORGANIZED HEALTH CARE EDUCATION/TRAINING PROGRAM

## 2023-05-09 PROCEDURE — 63600175 PHARM REV CODE 636 W HCPCS

## 2023-05-09 PROCEDURE — 83735 ASSAY OF MAGNESIUM: CPT | Performed by: STUDENT IN AN ORGANIZED HEALTH CARE EDUCATION/TRAINING PROGRAM

## 2023-05-09 PROCEDURE — 97116 GAIT TRAINING THERAPY: CPT | Mod: CQ

## 2023-05-09 PROCEDURE — P9017 PLASMA 1 DONOR FRZ W/IN 8 HR: HCPCS | Performed by: SURGERY

## 2023-05-09 PROCEDURE — D9220A PRA ANESTHESIA: Mod: ANES,,, | Performed by: ANESTHESIOLOGY

## 2023-05-09 PROCEDURE — 82962 GLUCOSE BLOOD TEST: CPT | Performed by: SURGERY

## 2023-05-09 PROCEDURE — 63600175 PHARM REV CODE 636 W HCPCS: Performed by: REGISTERED NURSE

## 2023-05-09 PROCEDURE — 37221 PR REVASCULARIZE ILIAC ARTERY,ANGIOPLASTY/STENT, INITIAL VESSEL: ICD-10-PCS | Mod: 51,RT,, | Performed by: SURGERY

## 2023-05-09 PROCEDURE — 63600175 PHARM REV CODE 636 W HCPCS: Performed by: NURSE ANESTHETIST, CERTIFIED REGISTERED

## 2023-05-09 PROCEDURE — 37000009 HC ANESTHESIA EA ADD 15 MINS: Performed by: SURGERY

## 2023-05-09 PROCEDURE — 71000033 HC RECOVERY, INTIAL HOUR: Performed by: SURGERY

## 2023-05-09 PROCEDURE — C1876 STENT, NON-COA/NON-COV W/DEL: HCPCS | Performed by: SURGERY

## 2023-05-09 PROCEDURE — 63600175 PHARM REV CODE 636 W HCPCS: Performed by: SURGERY

## 2023-05-09 PROCEDURE — 36000706: Performed by: SURGERY

## 2023-05-09 PROCEDURE — 86900 BLOOD TYPING SEROLOGIC ABO: CPT | Performed by: STUDENT IN AN ORGANIZED HEALTH CARE EDUCATION/TRAINING PROGRAM

## 2023-05-09 PROCEDURE — 99233 SBSQ HOSP IP/OBS HIGH 50: CPT | Mod: GC,,, | Performed by: HOSPITALIST

## 2023-05-09 PROCEDURE — 97530 THERAPEUTIC ACTIVITIES: CPT | Mod: CQ

## 2023-05-09 PROCEDURE — 99232 SBSQ HOSP IP/OBS MODERATE 35: CPT | Mod: ,,, | Performed by: INTERNAL MEDICINE

## 2023-05-09 PROCEDURE — D9220A PRA ANESTHESIA: ICD-10-PCS | Mod: CRNA,,, | Performed by: REGISTERED NURSE

## 2023-05-09 PROCEDURE — 25000003 PHARM REV CODE 250: Performed by: STUDENT IN AN ORGANIZED HEALTH CARE EDUCATION/TRAINING PROGRAM

## 2023-05-09 PROCEDURE — 25000003 PHARM REV CODE 250: Performed by: NURSE ANESTHETIST, CERTIFIED REGISTERED

## 2023-05-09 PROCEDURE — 25500020 PHARM REV CODE 255: Performed by: SURGERY

## 2023-05-09 PROCEDURE — 71000039 HC RECOVERY, EACH ADD'L HOUR: Performed by: SURGERY

## 2023-05-09 PROCEDURE — 63600175 PHARM REV CODE 636 W HCPCS: Performed by: ANESTHESIOLOGY

## 2023-05-09 PROCEDURE — 37223 PR REVASCULARIZE ILIAC ARTERY,ANGIOPLASTY/STENT, EA ADD VESSEL: CPT | Mod: RT,,, | Performed by: SURGERY

## 2023-05-09 PROCEDURE — C1768 GRAFT, VASCULAR: HCPCS | Performed by: SURGERY

## 2023-05-09 PROCEDURE — C1725 CATH, TRANSLUMIN NON-LASER: HCPCS | Performed by: SURGERY

## 2023-05-09 PROCEDURE — 99232 PR SUBSEQUENT HOSPITAL CARE,LEVL II: ICD-10-PCS | Mod: ,,, | Performed by: INTERNAL MEDICINE

## 2023-05-09 PROCEDURE — D9220A PRA ANESTHESIA: Mod: CRNA,,, | Performed by: REGISTERED NURSE

## 2023-05-09 PROCEDURE — 35371 RECHANNELING OF ARTERY: CPT | Mod: RT,,, | Performed by: SURGERY

## 2023-05-09 PROCEDURE — 85025 COMPLETE CBC W/AUTO DIFF WBC: CPT | Performed by: NURSE PRACTITIONER

## 2023-05-09 PROCEDURE — 80069 RENAL FUNCTION PANEL: CPT | Performed by: NURSE PRACTITIONER

## 2023-05-09 PROCEDURE — 37223 PR REVASCULARIZE ILIAC ARTERY,ANGIOPLASTY/STENT, EA ADD VESSEL: ICD-10-PCS | Mod: RT,,, | Performed by: SURGERY

## 2023-05-09 PROCEDURE — 85025 COMPLETE CBC W/AUTO DIFF WBC: CPT | Mod: 91 | Performed by: STUDENT IN AN ORGANIZED HEALTH CARE EDUCATION/TRAINING PROGRAM

## 2023-05-09 PROCEDURE — 86920 COMPATIBILITY TEST SPIN: CPT | Performed by: STUDENT IN AN ORGANIZED HEALTH CARE EDUCATION/TRAINING PROGRAM

## 2023-05-09 PROCEDURE — 37000008 HC ANESTHESIA 1ST 15 MINUTES: Performed by: SURGERY

## 2023-05-09 DEVICE — VIABAHN BX BALLOON EXP ENDO 7MMX39MM 7FR 80CMCATH HEPARIN
Type: IMPLANTABLE DEVICE | Site: ARTERIAL | Status: FUNCTIONAL
Brand: GORE VIABAHN VBX BALLOON EXPANDABLE ENDO

## 2023-05-09 DEVICE — VASCU-GUARD IS PREPARED FROM BOVINE PERICARDIUM CROSS-LINKED WITH GLUTARALDEHYDE, AND TREATED WITH 1 MOLAR SODIUM HYDROXIDE FOR A MINIMUM OF 60 MINUTES AT 20-25 DEGREES C. VASCU-GUARD IS TERMINALLY STERILIZED USING GAMMA IRRADIATION. AND PACKAGED WITHIN A DOUBLE-POUCH SYSTEM. THE CONTENTS OF THE UNOPENED, UNDAMAGED OUTER POUCH ARE STERILE.
Type: IMPLANTABLE DEVICE | Site: ARTERIAL | Status: FUNCTIONAL
Brand: VASCU-GUARD

## 2023-05-09 DEVICE — IMPLANTABLE DEVICE: Type: IMPLANTABLE DEVICE | Site: OTHER (ADD COMMENT) | Status: FUNCTIONAL

## 2023-05-09 RX ORDER — ONDANSETRON 2 MG/ML
4 INJECTION INTRAMUSCULAR; INTRAVENOUS EVERY 8 HOURS PRN
Status: DISCONTINUED | OUTPATIENT
Start: 2023-05-09 | End: 2023-05-16 | Stop reason: HOSPADM

## 2023-05-09 RX ORDER — HYDROMORPHONE HYDROCHLORIDE 1 MG/ML
0.2 INJECTION, SOLUTION INTRAMUSCULAR; INTRAVENOUS; SUBCUTANEOUS EVERY 5 MIN PRN
Status: DISCONTINUED | OUTPATIENT
Start: 2023-05-09 | End: 2023-05-09

## 2023-05-09 RX ORDER — HYDROMORPHONE HYDROCHLORIDE 1 MG/ML
0.2 INJECTION, SOLUTION INTRAMUSCULAR; INTRAVENOUS; SUBCUTANEOUS EVERY 6 HOURS PRN
Status: DISCONTINUED | OUTPATIENT
Start: 2023-05-09 | End: 2023-05-11

## 2023-05-09 RX ORDER — MAGNESIUM SULFATE HEPTAHYDRATE 40 MG/ML
2 INJECTION, SOLUTION INTRAVENOUS
Status: DISCONTINUED | OUTPATIENT
Start: 2023-05-09 | End: 2023-05-10

## 2023-05-09 RX ORDER — HEPARIN SODIUM 1000 [USP'U]/ML
INJECTION, SOLUTION INTRAVENOUS; SUBCUTANEOUS
Status: DISCONTINUED | OUTPATIENT
Start: 2023-05-09 | End: 2023-05-09 | Stop reason: HOSPADM

## 2023-05-09 RX ORDER — TRAMADOL HYDROCHLORIDE 50 MG/1
50 TABLET ORAL EVERY 4 HOURS PRN
Status: CANCELLED | OUTPATIENT
Start: 2023-05-09

## 2023-05-09 RX ORDER — PROTAMINE SULFATE 10 MG/ML
INJECTION, SOLUTION INTRAVENOUS
Status: DISCONTINUED | OUTPATIENT
Start: 2023-05-09 | End: 2023-05-09

## 2023-05-09 RX ORDER — CEFAZOLIN SODIUM 1 G/3ML
INJECTION, POWDER, FOR SOLUTION INTRAMUSCULAR; INTRAVENOUS
Status: DISCONTINUED | OUTPATIENT
Start: 2023-05-09 | End: 2023-05-09

## 2023-05-09 RX ORDER — ROCURONIUM BROMIDE 10 MG/ML
INJECTION, SOLUTION INTRAVENOUS
Status: DISCONTINUED | OUTPATIENT
Start: 2023-05-09 | End: 2023-05-09

## 2023-05-09 RX ORDER — FENTANYL CITRATE 50 UG/ML
25 INJECTION, SOLUTION INTRAMUSCULAR; INTRAVENOUS EVERY 5 MIN PRN
Status: DISCONTINUED | OUTPATIENT
Start: 2023-05-09 | End: 2023-05-09

## 2023-05-09 RX ORDER — POTASSIUM CHLORIDE 7.45 MG/ML
60 INJECTION INTRAVENOUS
Status: DISCONTINUED | OUTPATIENT
Start: 2023-05-09 | End: 2023-05-10

## 2023-05-09 RX ORDER — HYDROCODONE BITARTRATE AND ACETAMINOPHEN 5; 325 MG/1; MG/1
1 TABLET ORAL EVERY 4 HOURS PRN
Status: DISCONTINUED | OUTPATIENT
Start: 2023-05-09 | End: 2023-05-09

## 2023-05-09 RX ORDER — MAGNESIUM SULFATE HEPTAHYDRATE 40 MG/ML
4 INJECTION, SOLUTION INTRAVENOUS
Status: DISCONTINUED | OUTPATIENT
Start: 2023-05-09 | End: 2023-05-10

## 2023-05-09 RX ORDER — OXYCODONE HYDROCHLORIDE 5 MG/1
5 TABLET ORAL EVERY 6 HOURS PRN
Status: DISCONTINUED | OUTPATIENT
Start: 2023-05-09 | End: 2023-05-11

## 2023-05-09 RX ORDER — HEPARIN SODIUM 1000 [USP'U]/ML
INJECTION, SOLUTION INTRAVENOUS; SUBCUTANEOUS
Status: DISCONTINUED | OUTPATIENT
Start: 2023-05-09 | End: 2023-05-09

## 2023-05-09 RX ORDER — ONDANSETRON 2 MG/ML
4 INJECTION INTRAMUSCULAR; INTRAVENOUS DAILY PRN
Status: DISCONTINUED | OUTPATIENT
Start: 2023-05-09 | End: 2023-05-09

## 2023-05-09 RX ORDER — PROPOFOL 10 MG/ML
VIAL (ML) INTRAVENOUS
Status: DISCONTINUED | OUTPATIENT
Start: 2023-05-09 | End: 2023-05-09

## 2023-05-09 RX ORDER — IODIXANOL 320 MG/ML
INJECTION, SOLUTION INTRAVASCULAR
Status: DISCONTINUED | OUTPATIENT
Start: 2023-05-09 | End: 2023-05-09 | Stop reason: HOSPADM

## 2023-05-09 RX ORDER — FENTANYL CITRATE 50 UG/ML
INJECTION, SOLUTION INTRAMUSCULAR; INTRAVENOUS
Status: DISCONTINUED | OUTPATIENT
Start: 2023-05-09 | End: 2023-05-09

## 2023-05-09 RX ORDER — SODIUM CHLORIDE 0.9 % (FLUSH) 0.9 %
10 SYRINGE (ML) INJECTION
Status: DISCONTINUED | OUTPATIENT
Start: 2023-05-09 | End: 2023-05-16 | Stop reason: HOSPADM

## 2023-05-09 RX ORDER — PHENYLEPHRINE HYDROCHLORIDE 10 MG/ML
INJECTION INTRAVENOUS
Status: DISCONTINUED | OUTPATIENT
Start: 2023-05-09 | End: 2023-05-09

## 2023-05-09 RX ORDER — CLOPIDOGREL BISULFATE 75 MG/1
75 TABLET ORAL DAILY
Status: DISCONTINUED | OUTPATIENT
Start: 2023-05-10 | End: 2023-05-16 | Stop reason: HOSPADM

## 2023-05-09 RX ORDER — SODIUM CHLORIDE, SODIUM LACTATE, POTASSIUM CHLORIDE, CALCIUM CHLORIDE 600; 310; 30; 20 MG/100ML; MG/100ML; MG/100ML; MG/100ML
INJECTION, SOLUTION INTRAVENOUS CONTINUOUS
Status: DISCONTINUED | OUTPATIENT
Start: 2023-05-09 | End: 2023-05-09

## 2023-05-09 RX ORDER — HEPARIN SODIUM 5000 [USP'U]/ML
5000 INJECTION, SOLUTION INTRAVENOUS; SUBCUTANEOUS EVERY 8 HOURS
Status: DISCONTINUED | OUTPATIENT
Start: 2023-05-10 | End: 2023-05-16 | Stop reason: HOSPADM

## 2023-05-09 RX ORDER — CALCIUM GLUCONATE 20 MG/ML
3 INJECTION, SOLUTION INTRAVENOUS
Status: DISCONTINUED | OUTPATIENT
Start: 2023-05-09 | End: 2023-05-10

## 2023-05-09 RX ORDER — LIDOCAINE HYDROCHLORIDE 20 MG/ML
INJECTION INTRAVENOUS
Status: DISCONTINUED | OUTPATIENT
Start: 2023-05-09 | End: 2023-05-09

## 2023-05-09 RX ORDER — POTASSIUM CHLORIDE 7.45 MG/ML
80 INJECTION INTRAVENOUS
Status: DISCONTINUED | OUTPATIENT
Start: 2023-05-09 | End: 2023-05-10

## 2023-05-09 RX ORDER — MORPHINE SULFATE 2 MG/ML
3 INJECTION, SOLUTION INTRAMUSCULAR; INTRAVENOUS
Status: DISCONTINUED | OUTPATIENT
Start: 2023-05-09 | End: 2023-05-09

## 2023-05-09 RX ORDER — MUPIROCIN 20 MG/G
OINTMENT TOPICAL 2 TIMES DAILY
Status: COMPLETED | OUTPATIENT
Start: 2023-05-09 | End: 2023-05-14

## 2023-05-09 RX ORDER — ONDANSETRON 2 MG/ML
INJECTION INTRAMUSCULAR; INTRAVENOUS
Status: DISCONTINUED | OUTPATIENT
Start: 2023-05-09 | End: 2023-05-09

## 2023-05-09 RX ORDER — PROCHLORPERAZINE EDISYLATE 5 MG/ML
5 INJECTION INTRAMUSCULAR; INTRAVENOUS EVERY 30 MIN PRN
Status: DISCONTINUED | OUTPATIENT
Start: 2023-05-09 | End: 2023-05-09

## 2023-05-09 RX ORDER — SODIUM CHLORIDE, SODIUM LACTATE, POTASSIUM CHLORIDE, CALCIUM CHLORIDE 600; 310; 30; 20 MG/100ML; MG/100ML; MG/100ML; MG/100ML
INJECTION, SOLUTION INTRAVENOUS CONTINUOUS
Status: ACTIVE | OUTPATIENT
Start: 2023-05-09 | End: 2023-05-10

## 2023-05-09 RX ORDER — DEXAMETHASONE SODIUM PHOSPHATE 4 MG/ML
INJECTION, SOLUTION INTRA-ARTICULAR; INTRALESIONAL; INTRAMUSCULAR; INTRAVENOUS; SOFT TISSUE
Status: DISCONTINUED | OUTPATIENT
Start: 2023-05-09 | End: 2023-05-09

## 2023-05-09 RX ORDER — POTASSIUM CHLORIDE 7.45 MG/ML
40 INJECTION INTRAVENOUS
Status: DISCONTINUED | OUTPATIENT
Start: 2023-05-09 | End: 2023-05-10

## 2023-05-09 RX ORDER — HEPARIN SODIUM 5000 [USP'U]/ML
5000 INJECTION, SOLUTION INTRAVENOUS; SUBCUTANEOUS EVERY 8 HOURS
Status: DISCONTINUED | OUTPATIENT
Start: 2023-05-09 | End: 2023-05-09

## 2023-05-09 RX ORDER — CALCIUM GLUCONATE 20 MG/ML
1 INJECTION, SOLUTION INTRAVENOUS
Status: DISCONTINUED | OUTPATIENT
Start: 2023-05-09 | End: 2023-05-10

## 2023-05-09 RX ORDER — CALCIUM GLUCONATE 20 MG/ML
2 INJECTION, SOLUTION INTRAVENOUS
Status: DISCONTINUED | OUTPATIENT
Start: 2023-05-09 | End: 2023-05-10

## 2023-05-09 RX ADMIN — CARVEDILOL 25 MG: 25 TABLET, FILM COATED ORAL at 08:05

## 2023-05-09 RX ADMIN — HYDROCODONE BITARTRATE AND ACETAMINOPHEN 1 TABLET: 5; 325 TABLET ORAL at 08:05

## 2023-05-09 RX ADMIN — HYDRALAZINE HYDROCHLORIDE 50 MG: 50 TABLET ORAL at 08:05

## 2023-05-09 RX ADMIN — FENTANYL CITRATE 25 MCG: 50 INJECTION, SOLUTION INTRAMUSCULAR; INTRAVENOUS at 02:05

## 2023-05-09 RX ADMIN — ONDANSETRON 4 MG: 2 INJECTION INTRAMUSCULAR; INTRAVENOUS at 06:05

## 2023-05-09 RX ADMIN — SODIUM CHLORIDE: 0.9 INJECTION, SOLUTION INTRAVENOUS at 02:05

## 2023-05-09 RX ADMIN — ROCURONIUM BROMIDE 20 MG: 10 INJECTION INTRAVENOUS at 03:05

## 2023-05-09 RX ADMIN — SODIUM CHLORIDE, POTASSIUM CHLORIDE, SODIUM LACTATE AND CALCIUM CHLORIDE: 600; 310; 30; 20 INJECTION, SOLUTION INTRAVENOUS at 07:05

## 2023-05-09 RX ADMIN — LIDOCAINE HYDROCHLORIDE 60 MG: 20 INJECTION INTRAVENOUS at 02:05

## 2023-05-09 RX ADMIN — FENTANYL CITRATE 25 MCG: 50 INJECTION, SOLUTION INTRAMUSCULAR; INTRAVENOUS at 03:05

## 2023-05-09 RX ADMIN — ROCURONIUM BROMIDE 10 MG: 10 INJECTION INTRAVENOUS at 07:05

## 2023-05-09 RX ADMIN — SODIUM CHLORIDE, SODIUM ACETATE ANHYDROUS, SODIUM GLUCONATE, POTASSIUM CHLORIDE, AND MAGNESIUM CHLORIDE: 526; 222; 502; 37; 30 INJECTION, SOLUTION INTRAVENOUS at 02:05

## 2023-05-09 RX ADMIN — ROCURONIUM BROMIDE 50 MG: 10 INJECTION INTRAVENOUS at 02:05

## 2023-05-09 RX ADMIN — CEFAZOLIN 2 G: 330 INJECTION, POWDER, FOR SOLUTION INTRAMUSCULAR; INTRAVENOUS at 06:05

## 2023-05-09 RX ADMIN — GABAPENTIN 100 MG: 100 CAPSULE ORAL at 09:05

## 2023-05-09 RX ADMIN — HYDROMORPHONE HYDROCHLORIDE 0.2 MG: 1 INJECTION, SOLUTION INTRAMUSCULAR; INTRAVENOUS; SUBCUTANEOUS at 09:05

## 2023-05-09 RX ADMIN — PHENYLEPHRINE HYDROCHLORIDE 50 MCG: 10 INJECTION INTRAVENOUS at 02:05

## 2023-05-09 RX ADMIN — SODIUM BICARBONATE 650 MG: 650 TABLET ORAL at 08:05

## 2023-05-09 RX ADMIN — GABAPENTIN 100 MG: 100 CAPSULE ORAL at 08:05

## 2023-05-09 RX ADMIN — PHENYLEPHRINE HYDROCHLORIDE 50 MCG: 10 INJECTION INTRAVENOUS at 06:05

## 2023-05-09 RX ADMIN — HEPARIN SODIUM 2000 UNITS: 1000 INJECTION, SOLUTION INTRAVENOUS; SUBCUTANEOUS at 04:05

## 2023-05-09 RX ADMIN — HEPARIN SODIUM 6000 UNITS: 1000 INJECTION, SOLUTION INTRAVENOUS; SUBCUTANEOUS at 03:05

## 2023-05-09 RX ADMIN — PROPOFOL 50 MG: 10 INJECTION, EMULSION INTRAVENOUS at 02:05

## 2023-05-09 RX ADMIN — ROCURONIUM BROMIDE 20 MG: 10 INJECTION INTRAVENOUS at 04:05

## 2023-05-09 RX ADMIN — CEFAZOLIN 2 G: 330 INJECTION, POWDER, FOR SOLUTION INTRAMUSCULAR; INTRAVENOUS at 02:05

## 2023-05-09 RX ADMIN — ATORVASTATIN CALCIUM 80 MG: 40 TABLET, FILM COATED ORAL at 08:05

## 2023-05-09 RX ADMIN — EZETIMIBE 10 MG: 10 TABLET ORAL at 08:05

## 2023-05-09 RX ADMIN — SODIUM CHLORIDE, POTASSIUM CHLORIDE, SODIUM LACTATE AND CALCIUM CHLORIDE: 600; 310; 30; 20 INJECTION, SOLUTION INTRAVENOUS at 06:05

## 2023-05-09 RX ADMIN — HEPARIN SODIUM 2000 UNITS: 1000 INJECTION, SOLUTION INTRAVENOUS; SUBCUTANEOUS at 05:05

## 2023-05-09 RX ADMIN — NIFEDIPINE 60 MG: 30 TABLET, FILM COATED, EXTENDED RELEASE ORAL at 08:05

## 2023-05-09 RX ADMIN — PROTAMINE SULFATE 50 MG: 10 INJECTION, SOLUTION INTRAVENOUS at 06:05

## 2023-05-09 RX ADMIN — SODIUM BICARBONATE 650 MG: 650 TABLET ORAL at 09:05

## 2023-05-09 RX ADMIN — ROCURONIUM BROMIDE 10 MG: 10 INJECTION INTRAVENOUS at 05:05

## 2023-05-09 RX ADMIN — ROCURONIUM BROMIDE 20 MG: 10 INJECTION INTRAVENOUS at 02:05

## 2023-05-09 RX ADMIN — SUGAMMADEX 200 MG: 100 INJECTION, SOLUTION INTRAVENOUS at 07:05

## 2023-05-09 RX ADMIN — DEXAMETHASONE SODIUM PHOSPHATE 4 MG: 4 INJECTION, SOLUTION INTRAMUSCULAR; INTRAVENOUS at 02:05

## 2023-05-09 RX ADMIN — MUPIROCIN: 20 OINTMENT TOPICAL at 09:05

## 2023-05-09 RX ADMIN — HYDROMORPHONE HYDROCHLORIDE 0.2 MG: 1 INJECTION, SOLUTION INTRAMUSCULAR; INTRAVENOUS; SUBCUTANEOUS at 08:05

## 2023-05-09 NOTE — ANESTHESIA PROCEDURE NOTES
Intubation    Date/Time: 5/9/2023 2:16 PM  Performed by: Flower Ball MD  Authorized by: Flower Ball MD     Intubation:     Induction:  Intravenous    Intubated:  Postinduction    Mask Ventilation:  Easy mask    Attempts:  1    Attempted By:  CRNA    Method of Intubation:  Direct    Blade:  Gibbons 2    Laryngeal View Grade: Grade I - full view of cords      Difficult Airway Encountered?: No      Complications:  None    Airway Device:  Oral endotracheal tube    Airway Device Size:  7.0    Style/Cuff Inflation:  Cuffed (inflated to minimal occlusive pressure)    Inflation Amount (mL):  5    Tube secured:  20    Secured at:  The lips    Placement Verified By:  Revisualization with laryngoscopy and Capnometry    Complicating Factors:  None    Findings Post-Intubation:  Atraumatic/condition of teeth unchanged

## 2023-05-09 NOTE — PROGRESS NOTES
Chart reviewed, and pre-op nursing care complete per orders. Safe surgery checklist complete.   IP - all belongings left in pt's room. Pt transported to [facility] from floor with tele leads only, no box - leads stickered, bagged, and placed in PACU.   Pt ready to proceed to OR when team is. TV on, call light within reach.

## 2023-05-09 NOTE — PROGRESS NOTES
[facility] - Intensive Care ([place] [id number])  Vascular Surgery  Progress Note    Patient Name: Steph Monroe  MRN: [id number]  Admission Date: 5/6/2023  Primary Care Provider: Mane Carmona MD    Subjective:     Interval History: NAEO. OR today for R CFA endarterectomy. Remain NPO    Post-Op Info:  Procedure(s) (LRB):  ENDARTERECTOMY, FEMORAL (Right)           Medications:  Continuous Infusions:   lactated ringers 75 mL/hr at 05/09/23 0609     Scheduled Meds:   aspirin  81 mg Oral Daily    atorvastatin  80 mg Oral Daily    carvediloL  25 mg Oral BID    ezetimibe  10 mg Oral Daily    gabapentin  100 mg Oral BID    hydrALAZINE  50 mg Oral BID    NIFEdipine  60 mg Oral Daily    sodium bicarbonate  650 mg Oral TID     PRN Meds:acetaminophen, albuterol-ipratropium, dextrose 10%, dextrose 10%, glucagon (human recombinant), hydrALAZINE, HYDROcodone-acetaminophen, insulin aspart U-100, morphine, naloxone, ondansetron, sodium chloride 0.9%, sodium chloride 0.9%, traMADoL     Objective:     Vital Signs (Most Recent):  Temp: 98.3 °F (36.8 °C) (05/09/23 0433)  Pulse: 82 (05/09/23 0438)  Resp: 16 (05/09/23 0433)  BP: (!) 141/67 (05/09/23 0433)  SpO2: (!) 92 % (05/09/23 0433) Vital Signs (24h Range):  Temp:  [97.4 °F (36.3 °C)-98.3 °F (36.8 °C)] 98.3 °F (36.8 °C)  Pulse:  [69-82] 82  Resp:  [16-28] 16  SpO2:  [92 %-100 %] 92 %  BP: (119-150)/(59-67) 141/67          Physical Exam  Vitals reviewed.   Constitutional:       General: She is not in acute distress.     Appearance: She is ill-appearing.   Cardiovascular:      Rate and Rhythm: Normal rate.      Comments: No palpable femoral pulse on right   1+ femoral pulse on left  Monophasic PT; no DP or AT appreciated  Biphasic left PT, monophasic left DP  Pulmonary:      Effort: Pulmonary effort is normal. No respiratory distress.   Abdominal:      General: Abdomen is flat. There is distension.   Musculoskeletal:      Comments: Motor function and strength remain intact;  comparable between the right and left lower extremities    Skin:     Comments: Mottling appreciated to the distal third of the dorsal and plantar aspects of the right foot  Sensation remains intact    Neurological:      Mental Status: She is alert.        Significant Labs:  CBC:   Recent Labs   Lab 05/09/23  0403   WBC 4.26   RBC 2.85*   HGB 8.0*   HCT 25.2*   PLT 92*   MCV 88   MCH 28.1   MCHC 31.7*     CMP:   Recent Labs   Lab 05/09/23  0403   *   CALCIUM 8.4*   ALBUMIN 2.5*      K 4.3   CO2 21*      BUN 97*   CREATININE 2.7*       Significant Diagnostics:  I have reviewed all pertinent imaging results/findings within the past 24 hours.    Assessment/Plan:     PAD (peripheral artery disease)  Steph Monroe is a 77yoF with a complex medical history including significant peripheral arterial disease with previous bilateral common iliac stent placement. She presented to an outside hospital with worsening right lower extremity claudication. Her right foot with evidence of ischemic changes. She was transferred for vascular surgery evaluation.     - OR today for right common femoral endarterectomy   - consented and marked. Type and screen ordered    - remain NPO, mIVF   - hold heparin gtt on call to OR  - labs, imaging and angiogram reviewed   - angiogram demonstrates high grade stenosis of right common iliac stent, complete occlusion of right common femoral artery  - JOSE:  right 0.15;  Left 0.58  - continue aspirin, plavix, high-intensity statin therapy  - rest of care per primary team  - vascular to continue to follow        Lucrecia Tiwari MD  Vascular Surgery  [facility] - Intensive Care ([place]-[facility])

## 2023-05-09 NOTE — ASSESSMENT & PLAN NOTE
This is a 77 years old female patient with a history of CKD stage IV, DM, HTN, cirrhosis and HFpEF who came to the ED because of worsening pain on the right leg. On admission, her serum Cr was 1.8 (baseline of 2 per labs of the past year) and it started to raise (today serum Cr is 2.8). Based on these findings, the patient has an ALESIA superimposed on CKD.     # ALESIA on CKD 4  likely ischemic ATN due to an abrupt drop of her blood pressure (BP at admission was 201/82 and the next day it dropped to 101/52).   Scr stable 2.7 monitor stable compared to yesterday   No indication for RRT at this time   Strict input/output and daily weights.   Acid/base: NAGMA,c/w  Sodium bicarb tabs 650mg TID   Maintain MAP >65 for renal perfusion  Agree with LR prior to angiogram and post angiogram for 6 hours

## 2023-05-09 NOTE — PT/OT/SLP PROGRESS
"Physical Therapy Treatment    Patient Name:  Steph Monroe   MRN:  [id number]    Recommendations:     Discharge Recommendations: other (see comments)  Barriers to discharge: None    Assessment:     Steph Monroe is a 77 y.o. female admitted with a medical diagnosis of Critical limb ischemia of right lower extremity.  She presents with the following impairments/functional limitations: weakness, impaired endurance, impaired self care skills, impaired functional mobility, gait instability, decreased lower extremity function, impaired skin .   Pt with decreased amb distance this date due to somnolence from medication, however, pt remains motivated to participate in tx session.     Rehab Prognosis: Good; patient would benefit from acute skilled PT services to address these deficits and reach maximum level of function.    Recent Surgery: Procedure(s) (LRB):  ENDARTERECTOMY, FEMORAL (Right) Day of Surgery    Plan:     During this hospitalization, patient to be seen 3 x/week to address the identified rehab impairments via gait training, therapeutic activities and progress toward the following goals:    Plan of Care Expires:  06/05/23    Subjective     Chief Complaint: Slight lightheadedness upon standing "I feel woozy" Pt's daughter states "They gave her something"    Pain/Comfort:  Pain Rating 1: 0/10      Objective:     Communicated with nurse (Leon) prior to session.  Patient found left sidelying HOB at 45 degree angle, with pulse ox (continuous), peripheral IV, telemetry, spears catheter, oxygen via nasal cannula upon PT entry to room, accompanied by her daughter.     General Precautions: Standard, fall, hearing impaired  Orthopedic Precautions: N/A  Braces: N/A  Respiratory Status: Nasal cannula, flow 1.5 L/min     Functional Mobility:  Bed Mobility:     Rolling Left:  minimum assistance  Scooting: to EOB minimum assistance  Supine to Sit: minimum assistance, on the L side, HOB at 30* angle  Sit to Supine: moderate " assistance for LE's, HOB flat  Transfers:     Sit to Stand:  from EOB/BS chair minimum assistance with rolling walker  Gait: Pt amb 6-8', L <> R along EOB with RW CGA;  RW CGA 10' x2 trials followed by PTA with bedside chair, seated rest  Balance: EOB min A to SBA w/ RW; standing min-SBA w/ RW    AM-PAC 6 CLICK MOBILITY  Turning over in bed (including adjusting bedclothes, sheets and blankets)?: 3  Sitting down on and standing up from a chair with arms (e.g., wheelchair, bedside commode, etc.): 3  Moving from lying on back to sitting on the side of the bed?: 3  Moving to and from a bed to a chair (including a wheelchair)?: 3  Need to walk in hospital room?: 3  Climbing 3-5 steps with a railing?: 2 (activity not performed)  Basic Mobility Total Score: 17       Treatment & Education:  Pt educated on necessity of ambulation and sitting up in the bedside chair.    Patient left supine with all lines intact, call button in reach, w/ daughter present.    GOALS:   Multidisciplinary Problems       Physical Therapy Goals          Problem: Physical Therapy    Goal Priority Disciplines Outcome Goal Variances Interventions   Physical Therapy Goal     PT, PT/OT Ongoing, Progressing     Description: Goals to be met by: 23     Patient will increase functional independence with mobility by performin. Supine to sit with Modified Benjamin  2. Sit to stand transfer with Supervision with RW  3. Gait  x 100 feet with Contact Guard Assistance using Rolling Walker.                          Time Tracking:     PT Received On: 23  PT Start Time: 952     PT Stop Time: 1038  PT Total Time (min): 46 min     Billable Minutes: Gait Training 25 and Therapeutic Activity 21    Treatment Type: Treatment  PT/PTA: PTA     Number of PTA visits since last PT visit: 2023

## 2023-05-09 NOTE — ASSESSMENT & PLAN NOTE
"76 yo F transferred from [facility] for vascular surgery evaluation and femoral endarterectomy. Patient presented to [facility] with RLE pain that has hindered her walking. R foot with absent pedal pulse, pain, and discoloration. IC performed angiography on 5/5 that showed "high grade stenosis in right GUNNER.  of CFA collaterals from EIA and profunda artery which provides collaterals to chronically occluded SFA.  Initially 2V runoff with prox  of peroneal artery. Pt provides flow to foot, however has diffuse diease. Unable to adequately visualize right foot circulation." Transferred on heparin gtt.   No signs of infection noted on exam. No leukocytosis.    JOSE: Right 0.15;  Left 0.58    PLAN:  - Vascular surgery consulted for femoral endarterectomy, appreciate recs.   -Surgery planned for 5/9 - patient to be npo at midnight and heparin gtt stopped morning of.  -Per nephrology recs fluids should be initiated 6 hours prior and 6 hours after procedure  -Continue heparin drip, ASA, plavix, and statin therapy  -Continue Gabapentin for neuropathy pain  -PT/OT ordered  -Pain control w/ Tylenol, hydrocodone, and morphine  -CBC, CMP daily  -Daily coags    "

## 2023-05-09 NOTE — PROGRESS NOTES
[facility] - [facility] ([facility])  Nephrology  Progress Note    Patient Name: Steph Monroe  MRN: [id number]  Admission Date: 5/6/2023  Hospital Length of Stay: 3 days  Attending Provider: Mariela Finnegan MD   Primary Care Physician: Mane Carmona MD  Principal Problem:Critical limb ischemia of right lower extremity    Subjective:     HPI:   Steph Monroe is a 77 y.o. female w/ known CKD  IV 2/2 L nephrectomy d/t RCC (2018), renal artery stenosis, nephrolithiasis, DM II, & HTN, pt also has a h/o Anal cancer treated with mitomycin, 5FU and radiation (DYAAMI protocol) in 2016, follow up anoscopy in 2017 was normal and currently on surveillance protocol. She developed cirrhosis with ascites (unclear etiology from records) requiring paracentesis (last done 11/2022), PVD w/ iliac stents , aortic stenosis, pulm HTN, lung nodule, anemia, cataracts, OA, vitamin D deficiency, and she is a former smoker who quit >10 yrs ago. Pt presented at [facility]  on 5/2/2023 for evaluation as recommended by her podiatrist worsening tip LE pain, worse in the R leg and distant bilateral femoral pulses.    At [facility] pt's presenting symptoms were concerning for critical limb ischemia based on the tip LE arterial doppler results, vascular surgery and cardiology were consulted. Pt was started on IV fluids and heparin gtt and then on 5/3/2023 pt had peripheral angiogram of tip LE with CO2 not with contrast via the R radial artery to the L common femoral artery and per operative report the findings were:     high grade stenosis in right GUNNER.  of CFA collaterals from EIA and profunda artery which provides collaterals to chronically occluded SFA.  Initially 2V runoff with prox  of peroneal artery. Pt provides flow to foot, however has diffuse diease. Unable to adequately visualize right foot circulation.     Pt had a renal ultrasound on 5/5/2023 that showed R sided hydronephrosis, non-obstructing small intrarenal calculi on the  "right, CKD changes, and known absent L kidney. Nephrology was consulted for ALESIA originally thought to be d/t hydronephrosis, pt was started on [place] x10hrs. A spears catheter was placed and Urology was consulted. CT abd & pelvis was done and this did NOT show hydronephrosis, it did show some abdominopelvic ascites, calcifications, and tip common iliac stents. Pt also had an echo and this showed an EF of 65%, with mild MS, mild AS, mild to mod AR, CVP 3, PASP 35 mmHg    Pt was transferred to [facility] for higher level of care anticipating vascular surgery intervention     On arrival to [facility] pt presented with the following VS:   Initial Vitals   BP Pulse Resp Temp SpO2   05/06/23 1700 05/06/23 1700 05/06/23 1700 05/06/23 1700 05/06/23 1910   127/61 83 (!) 24 97.2 °F (36.2 °C) 95 %     Nephrology was consulted for: "Worsening ALESIA"  Baseline sCr: ~2.0 based on labs in Care Everywhere for the past year  Admission sCr: 1.8  Pt follows outpatient with Dr. Oakley for CKD IV, she was last seen in Jan 2023 and was started on lasix and spironolactone which she was taking prior to admission. 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   Pt denies:  foam in urine, hematuria, dysuria, urinary frequency or urgency, fever, chills, shortness of breath, cough, chest pain, palpitations, nausea, vomiting, diarrhea, abd pain,headaches, visual disturbances or weakness, or rash.    Creatinine   Date Value Ref Range Status   05/06/2023 2.8 (H) 0.5 - 1.4 mg/dL Final   05/05/2023 2.6 (H) 0.5 - 1.4 mg/dL Final   05/04/2023 2.3 (H) 0.5 - 1.4 mg/dL Final   05/04/2023 2.3 (H) 0.5 - 1.4 mg/dL Final   05/03/2023 2.1 (H) 0.5 - 1.4 mg/dL Final   05/02/2023 1.8 (H) 0.5 - 1.4 mg/dL Final   08/06/2020 2.2 (H) 0.5 - 1.4 mg/dL Final   07/16/2018 1.8 (H) 0.5 - 1.4 mg/dL Final   01/19/2018 2.2 " (H) 0.5 - 1.4 mg/dL Final   01/16/2018 2.1 (H) 0.5 - 1.4 mg/dL Final     BUN   Date Value Ref Range Status   05/06/2023 86 (H) 8 - 23 mg/dL Final   05/05/2023 78 (H) 8 - 23 mg/dL Final   05/04/2023 74 (H) 8 - 23 mg/dL Final   05/04/2023 73 (H) 8 - 23 mg/dL Final   05/03/2023 60 (H) 8 - 23 mg/dL Final       Interval History: scr stable this morning 2.7 from 2.8 yesterday had 550 cc of urine in last 24 hours     Review of patient's allergies indicates:   Allergen Reactions    Chantix [varenicline]      Tongue swelling      Wellbutrin [bupropion hcl]      Tongue swelling       Current Facility-Administered Medications   Medication Frequency    acetaminophen tablet 650 mg Q8H PRN    albuterol-ipratropium 2.5 mg-0.5 mg/3 mL nebulizer solution 3 mL Q6H PRN    aspirin chewable tablet 81 mg Daily    atorvastatin tablet 80 mg Daily    carvediloL tablet 25 mg BID    dextrose 10% bolus 125 mL 125 mL PRN    dextrose 10% bolus 250 mL 250 mL PRN    ezetimibe tablet 10 mg Daily    gabapentin capsule 100 mg BID    glucagon (human recombinant) injection 1 mg PRN    hydrALAZINE injection 10 mg Q8H PRN    hydrALAZINE tablet 50 mg BID    HYDROcodone-acetaminophen 5-325 mg per tablet 1 tablet Q6H PRN    insulin aspart U-100 pen 0-5 Units Q6H PRN    lactated ringers infusion Continuous    morphine injection 2 mg Q6H PRN    naloxone 0.4 mg/mL injection 0.02 mg PRN    NIFEdipine 24 hr tablet 60 mg Daily    ondansetron injection 4 mg Q8H PRN    sodium bicarbonate tablet 650 mg TID    sodium chloride 0.9% flush 10 mL Q12H PRN    sodium chloride 0.9% flush 10 mL PRN    traMADoL tablet 50 mg Q4H PRN       Objective:     Vital Signs (Most Recent):  Temp: 98.1 °F (36.7 °C) (05/09/23 1208)  Pulse: 79 (05/09/23 1211)  Resp: 18 (05/09/23 1208)  BP: (!) 141/63 (05/09/23 1208)  SpO2: (!) 94 % (05/09/23 1208) Vital Signs (24h Range):  Temp:  [97.4 °F (36.3 °C)-98.3 °F (36.8 °C)] 98.1 °F (36.7 °C)  Pulse:  [69-82] 79  Resp:   [14-27] 18  SpO2:  [92 %-99 %] 94 %  BP: (119-153)/(59-80) 141/63     Weight: 58.1 kg (128 lb 1.4 oz) (05/09/23 1208)  Body mass index is 23.43 kg/m².  Body surface area is 1.59 meters squared.    I/O last 3 completed shifts:  In: -   Out: 550 [Urine:550]     Physical Exam  Vitals reviewed.   Constitutional:       Appearance: Normal appearance.   HENT:      Head: Normocephalic and atraumatic.      Mouth/Throat:      Mouth: Mucous membranes are moist.   Eyes:      Conjunctiva/sclera: Conjunctivae normal.      Pupils: Pupils are equal, round, and reactive to light.   Cardiovascular:      Rate and Rhythm: Normal rate and regular rhythm.      Heart sounds: Normal heart sounds.   Pulmonary:      Effort: Pulmonary effort is normal.      Breath sounds: Normal breath sounds.   Abdominal:      General: Bowel sounds are normal.      Palpations: Abdomen is soft.      Tenderness: There is no abdominal tenderness. There is no right CVA tenderness, left CVA tenderness or guarding.   Musculoskeletal:      Right lower leg: Edema present.      Left lower leg: Edema present.      Comments: Right foot is erythematosus, cold and tender   Skin:     Coloration: Skin is not jaundiced.      Findings: No rash.   Neurological:      General: No focal deficit present.        Significant Labs:  CBC:   Recent Labs   Lab 05/09/23  0403   WBC 4.26   RBC 2.85*   HGB 8.0*   HCT 25.2*   PLT 92*   MCV 88   MCH 28.1   MCHC 31.7*     CMP:   Recent Labs   Lab 05/05/23  0518 05/06/23  0650 05/09/23  0403      < > 111*   CALCIUM 8.7   < > 8.4*   ALBUMIN 3.0*   < > 2.5*   PROT 7.0  --   --       < > 140   K 4.3   < > 4.3   CO2 17*   < > 21*   *   < > 109   BUN 78*   < > 97*   CREATININE 2.6*   < > 2.7*   ALKPHOS 81  --   --    ALT 10  --   --    AST 22  --   --    BILITOT 0.4  --   --     < > = values in this interval not displayed.        Significant Imaging:  Reviewed     Assessment/Plan:     Cardiac/Vascular  Chronic heart failure with  preserved ejection fraction  -Plan per primary team       Renal/  Metabolic acidosis  See ALESIA    alesia on ckd stage 4  This is a 77 years old female patient with a history of CKD stage IV, DM, HTN, cirrhosis and HFpEF who came to the ED because of worsening pain on the right leg. On admission, her serum Cr was 1.8 (baseline of 2 per labs of the past year) and it started to raise (today serum Cr is 2.8). Based on these findings, the patient has an ALESIA superimposed on CKD.     # ALESIA on CKD 4  likely ischemic ATN due to an abrupt drop of her blood pressure (BP at admission was 201/82 and the next day it dropped to 101/52).   Scr stable 2.7 monitor stable compared to yesterday   No indication for RRT at this time   Strict input/output and daily weights.   Acid/base: NAGMA,c/w  Sodium bicarb tabs 650mg TID   Maintain MAP >65 for renal perfusion  Agree with LR prior to angiogram and post angiogram for 6 hours              Other  * Critical limb ischemia of right lower extremity  -Plan per primary team & vascular surgery             Allie Porras MD  Nephrology  [facility] - Surgery (2nd Fl)

## 2023-05-09 NOTE — NURSING
Patient to be transferred to SICU post surgery, family gathered belongings and waiting on the 10th floor in waiting area.

## 2023-05-09 NOTE — SUBJECTIVE & OBJECTIVE
Medications:  Continuous Infusions:   lactated ringers 75 mL/hr at 05/09/23 0609     Scheduled Meds:   aspirin  81 mg Oral Daily    atorvastatin  80 mg Oral Daily    carvediloL  25 mg Oral BID    ezetimibe  10 mg Oral Daily    gabapentin  100 mg Oral BID    hydrALAZINE  50 mg Oral BID    NIFEdipine  60 mg Oral Daily    sodium bicarbonate  650 mg Oral TID     PRN Meds:acetaminophen, albuterol-ipratropium, dextrose 10%, dextrose 10%, glucagon (human recombinant), hydrALAZINE, HYDROcodone-acetaminophen, insulin aspart U-100, morphine, naloxone, ondansetron, sodium chloride 0.9%, sodium chloride 0.9%, traMADoL     Objective:     Vital Signs (Most Recent):  Temp: 98.3 °F (36.8 °C) (05/09/23 0433)  Pulse: 82 (05/09/23 0438)  Resp: 16 (05/09/23 0433)  BP: (!) 141/67 (05/09/23 0433)  SpO2: (!) 92 % (05/09/23 0433) Vital Signs (24h Range):  Temp:  [97.4 °F (36.3 °C)-98.3 °F (36.8 °C)] 98.3 °F (36.8 °C)  Pulse:  [69-82] 82  Resp:  [16-28] 16  SpO2:  [92 %-100 %] 92 %  BP: (119-150)/(59-67) 141/67          Physical Exam  Vitals reviewed.   Constitutional:       General: She is not in acute distress.     Appearance: She is ill-appearing.   Cardiovascular:      Rate and Rhythm: Normal rate.      Comments: No palpable femoral pulse on right   1+ femoral pulse on left  Monophasic PT; no DP or AT appreciated  Biphasic left PT, monophasic left DP  Pulmonary:      Effort: Pulmonary effort is normal. No respiratory distress.   Abdominal:      General: Abdomen is flat. There is distension.   Musculoskeletal:      Comments: Motor function and strength remain intact; comparable between the right and left lower extremities    Skin:     Comments: Mottling appreciated to the distal third of the dorsal and plantar aspects of the right foot  Sensation remains intact    Neurological:      Mental Status: She is alert.        Significant Labs:  CBC:   Recent Labs   Lab 05/09/23  0403   WBC 4.26   RBC 2.85*   HGB 8.0*   HCT 25.2*   PLT 92*    MCV 88   MCH 28.1   MCHC 31.7*     CMP:   Recent Labs   Lab 05/09/23  0403   *   CALCIUM 8.4*   ALBUMIN 2.5*      K 4.3   CO2 21*      BUN 97*   CREATININE 2.7*       Significant Diagnostics:  I have reviewed all pertinent imaging results/findings within the past 24 hours.

## 2023-05-09 NOTE — HPI
Steph Monroe is a 77-year-old female with HFpEF, PAD, HTN, HLD, DM2, CKD4, single solitary kidney (s/p nephrectomy for RCC in 2018), hx of HCC, and hx of rectal cancer presented to an outside hospital with complaints of right lower extremity and right foot pain.  Was noted to have mottling of her right forefoot.  The patient says that this has been progressively worsening for the last few weeks.  She continues to ambulate, but her distance has been limited secondary to right foot pain.  At the outside hospital, she was found to have ALESIA while she was worked up for this claudication.  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  She was transferred to [facility] for vascular surgery evaluation.     S/p right common femoral endarterectomy with bovine patch angioplasty, stenting of distal right common iliac, PTA/stenting of right external iliac 5/p. Assessed in PACU HDS off pressors.

## 2023-05-09 NOTE — ASSESSMENT & PLAN NOTE
Steph Monroe is a 77yoF with a complex medical history including significant peripheral arterial disease with previous bilateral common iliac stent placement. She presented to an outside hospital with worsening right lower extremity claudication. Her right foot with evidence of ischemic changes. She was transferred for vascular surgery evaluation.     - OR today for right common femoral endarterectomy   - consented and marked. Type and screen ordered    - remain NPO, mIVF   - hold heparin gtt on call to OR  - labs, imaging and angiogram reviewed   - angiogram demonstrates high grade stenosis of right common iliac stent, complete occlusion of right common femoral artery  - JOSE:  right 0.15;  Left 0.58  - continue aspirin, plavix, high-intensity statin therapy  - rest of care per primary team  - vascular to continue to follow

## 2023-05-09 NOTE — NURSING
No events overnight. Pt remains stable on 1L NC, VS within normal limits. NPO since midnight. Heparin gtt stopped at 0545 and LR started at 75ml/hr. Pt resting comfortably with bed locked and call bell within reach.

## 2023-05-09 NOTE — ASSESSMENT & PLAN NOTE
Hx of nephrectomy. Baseline Cr 1.8-2.2.  Cr 2.8 on arrival  Seen by urology and nephrology who believe etiology to be post-renal. Renal US shows mild hydronephrosis. Jane placed. Patient had angiogram that could have worsened renal function but per nephrology not exposed to contrast.   No indications for HD at this time    Plan:  - Nephrology consulted who recommend future HD need and line placement if does not improve.   - Avoid fluids  - IVF given 6 hrs pre and post op to prevent worsening ALESIA due to contrast  - Trend Cr  - Strict I&Os  - Avoid nephrotoxic agents  - Renally adjust medications  - Monitor for urinary retention  - If continues to worsen will consult nephrology

## 2023-05-09 NOTE — SUBJECTIVE & OBJECTIVE
Interval History: NAEON. Patient unable to sleep well given R foot pain. Denies SOB or pain unrelated to R foot. Will have vascular sx perform femoral endarterectomy today. NPO and heparin gtt stopped. Will give IVF pre and post op to prevent worsening ALESIA.     Review of Systems   Constitutional:  Positive for activity change and unexpected weight change. Negative for chills and fever.   HENT:  Negative for sore throat.    Respiratory:  Negative for cough and shortness of breath.    Cardiovascular:  Negative for chest pain and leg swelling.   Gastrointestinal:  Negative for abdominal distention, abdominal pain, diarrhea and vomiting.   Genitourinary:  Negative for dysuria.   Musculoskeletal:  Positive for arthralgias and gait problem.   Neurological:  Negative for headaches.   Hematological:  Bruises/bleeds easily.   Psychiatric/Behavioral:  Negative for confusion.    Objective:     Vital Signs (Most Recent):  Temp: 98.1 °F (36.7 °C) (05/09/23 1208)  Pulse: 79 (05/09/23 1211)  Resp: 18 (05/09/23 1208)  BP: (!) 141/63 (05/09/23 1229)  SpO2: (!) 94 % (05/09/23 1208) Vital Signs (24h Range):  Temp:  [97.4 °F (36.3 °C)-98.3 °F (36.8 °C)] 98.1 °F (36.7 °C)  Pulse:  [78-82] 79  Resp:  [14-27] 18  SpO2:  [92 %-98 %] 94 %  BP: (119-153)/(59-80) 141/63     Weight: 58.1 kg (128 lb 1.4 oz)  Body mass index is 23.43 kg/m².    Intake/Output Summary (Last 24 hours) at 5/9/2023 1443  Last data filed at 5/9/2023 0107  Gross per 24 hour   Intake --   Output 550 ml   Net -550 ml         Physical Exam  Vitals and nursing note reviewed.   Constitutional:       General: She is not in acute distress.     Appearance: Normal appearance. She is not ill-appearing.   HENT:      Head: Normocephalic.   Eyes:      General:         Right eye: No discharge.         Left eye: No discharge.      Extraocular Movements: Extraocular movements intact.      Conjunctiva/sclera: Conjunctivae normal.   Cardiovascular:      Rate and Rhythm: Normal rate and  regular rhythm.      Heart sounds: Normal heart sounds. No murmur heard.  Pulmonary:      Effort: Pulmonary effort is normal. No respiratory distress.      Breath sounds: Normal breath sounds. No wheezing.   Chest:      Chest wall: No tenderness.   Abdominal:      General: Abdomen is flat. There is no distension.      Palpations: Abdomen is soft.      Tenderness: There is no abdominal tenderness.   Musculoskeletal:         General: Tenderness present. No swelling. Normal range of motion.      Right lower leg: No edema.      Left lower leg: No edema.      Comments: RLE discolored, cool  Absent pedal pulses  Moderate pain to palpation    LLE warm, pulses noted DP, PT   Skin:     General: Skin is warm.      Findings: Bruising (Noted on all extremities) present. No rash.   Neurological:      General: No focal deficit present.      Mental Status: She is alert and oriented to person, place, and time. Mental status is at baseline.      Cranial Nerves: No cranial nerve deficit.   Psychiatric:         Mood and Affect: Mood normal.         Behavior: Behavior normal.         Thought Content: Thought content normal.           Significant Labs: All pertinent labs within the past 24 hours have been reviewed.  CBC:   Recent Labs   Lab 05/08/23  0554 05/09/23  0403   WBC 3.31* 4.26   HGB 8.2* 8.0*   HCT 26.6* 25.2*   PLT 81* 92*     CMP:   Recent Labs   Lab 05/08/23  0554 05/09/23  0403    140   K 4.2 4.3    109   CO2 21* 21*    111*   * 97*   CREATININE 2.8* 2.7*   CALCIUM 8.0* 8.4*   ALBUMIN 2.4* 2.5*   ANIONGAP 11 10     Coagulation:   Recent Labs   Lab 05/09/23  0403   APTT 40.5*       Significant Imaging: I have reviewed all pertinent imaging results/findings within the past 24 hours.  I have reviewed and interpreted all pertinent imaging results/findings within the past 24 hours.

## 2023-05-09 NOTE — PROGRESS NOTES
"[facility] - Surgery ([address] [place] [address] Medicine  Progress Note    Patient Name: Steph Monroe  MRN: [id number]  Patient Class: IP- Inpatient   Admission Date: 5/6/2023  Length of Stay: 3 days  Attending Physician: Mariela Finnegan MD  Primary Care Provider: Mane Carmona MD        Subjective:     Principal Problem:Critical limb ischemia of right lower extremity        HPI:  Steph Monroe is a 78yo F with a PMH of HFpEF, PAD, HTN, HLD, DM2, CKD4, single solitary kidney (s/p nephrectomy), hx of HCC, and hx of rectal cancer currently admitted to the hospitals medicine service for RLE pain.The only home medications she is taking is lasix and na bicarbonate. She quit all of her other medications about a year ago because she "no longer felt like taking all kind of pills". She underwent angiogram with interventional cardiology on 5/5/23, which showed: "high grade stenosis in right GUNNER.  of CFA collaterals from EIA and profunda artery which provides collaterals to chronically occluded SFA.  Initially 2V runoff with prox  of peroneal artery. Pt provides flow to foot, however has diffuse diease. Unable to adequately visualize right foot circulation."     Of note, pt also with worsening ALESIA on CKD. Nephrology following. Urology evaluated pt and placed spears; believed this to be post-renal in origin, and signed off. IC recommended that pt would benefit from vascular surgery intervention. Dr. Ny with IC discussed case with Dr. Cagle from vascular, who recommended initiating heparin gtt and [place] [facility] [place]; requested  admission.    On arrival, patient was afebrile and HDS on room air. Heparin gtt infusing. Denies fever, chest pain, SOB, abd pain. Endorses moderate pain to R foot only. R foot appears cool and discolored.          Overview/Hospital Course:  76 yo F transferred from [facility] for vascular surgery evaluation for critical limb ischemia of RLE found by IC angiography " on 5/5. Will continue patient on ASA, plavix, and heparin gtt. Vascular sx consulted who will obtain JOSE and toe pressures prior to surgical intervention. Will plan for femoral endarterectomy on 5/9. Nephrology consulted and recommend future need for HD and line placement for ALESIA. Plan to give IVF pre and post op to prevent worsening ALESIA.       Interval History: NAEON. Patient unable to sleep well given R foot pain. Denies SOB or pain unrelated to R foot. Will have vascular sx perform femoral endarterectomy today. NPO and heparin gtt stopped. Will give IVF pre and post op to prevent worsening ALESIA.     Review of Systems   Constitutional:  Positive for activity change and unexpected weight change. Negative for chills and fever.   HENT:  Negative for sore throat.    Respiratory:  Negative for cough and shortness of breath.    Cardiovascular:  Negative for chest pain and leg swelling.   Gastrointestinal:  Negative for abdominal distention, abdominal pain, diarrhea and vomiting.   Genitourinary:  Negative for dysuria.   Musculoskeletal:  Positive for arthralgias and gait problem.   Neurological:  Negative for headaches.   Hematological:  Bruises/bleeds easily.   Psychiatric/Behavioral:  Negative for confusion.    Objective:     Vital Signs (Most Recent):  Temp: 98.1 °F (36.7 °C) (05/09/23 1208)  Pulse: 79 (05/09/23 1211)  Resp: 18 (05/09/23 1208)  BP: (!) 141/63 (05/09/23 1229)  SpO2: (!) 94 % (05/09/23 1208) Vital Signs (24h Range):  Temp:  [97.4 °F (36.3 °C)-98.3 °F (36.8 °C)] 98.1 °F (36.7 °C)  Pulse:  [78-82] 79  Resp:  [14-27] 18  SpO2:  [92 %-98 %] 94 %  BP: (119-153)/(59-80) 141/63     Weight: 58.1 kg (128 lb 1.4 oz)  Body mass index is 23.43 kg/m².    Intake/Output Summary (Last 24 hours) at 5/9/2023 1443  Last data filed at 5/9/2023 0107  Gross per 24 hour   Intake --   Output 550 ml   Net -550 ml         Physical Exam  Vitals and nursing note reviewed.   Constitutional:       General: She is not in acute  distress.     Appearance: Normal appearance. She is not ill-appearing.   HENT:      Head: Normocephalic.   Eyes:      General:         Right eye: No discharge.         Left eye: No discharge.      Extraocular Movements: Extraocular movements intact.      Conjunctiva/sclera: Conjunctivae normal.   Cardiovascular:      Rate and Rhythm: Normal rate and regular rhythm.      Heart sounds: Normal heart sounds. No murmur heard.  Pulmonary:      Effort: Pulmonary effort is normal. No respiratory distress.      Breath sounds: Normal breath sounds. No wheezing.   Chest:      Chest wall: No tenderness.   Abdominal:      General: Abdomen is flat. There is no distension.      Palpations: Abdomen is soft.      Tenderness: There is no abdominal tenderness.   Musculoskeletal:         General: Tenderness present. No swelling. Normal range of motion.      Right lower leg: No edema.      Left lower leg: No edema.      Comments: RLE discolored, cool  Absent pedal pulses  Moderate pain to palpation    LLE warm, pulses noted DP, PT   Skin:     General: Skin is warm.      Findings: Bruising (Noted on all extremities) present. No rash.   Neurological:      General: No focal deficit present.      Mental Status: She is alert and oriented to person, place, and time. Mental status is at baseline.      Cranial Nerves: No cranial nerve deficit.   Psychiatric:         Mood and Affect: Mood normal.         Behavior: Behavior normal.         Thought Content: Thought content normal.           Significant Labs: All pertinent labs within the past 24 hours have been reviewed.  CBC:   Recent Labs   Lab 05/08/23  0554 05/09/23  0403   WBC 3.31* 4.26   HGB 8.2* 8.0*   HCT 26.6* 25.2*   PLT 81* 92*     CMP:   Recent Labs   Lab 05/08/23  0554 05/09/23  0403    140   K 4.2 4.3    109   CO2 21* 21*    111*   * 97*   CREATININE 2.8* 2.7*   CALCIUM 8.0* 8.4*   ALBUMIN 2.4* 2.5*   ANIONGAP 11 10     Coagulation:   Recent Labs   Lab  "05/09/23  0403   APTT 40.5*       Significant Imaging: I have reviewed all pertinent imaging results/findings within the past 24 hours.  I have reviewed and interpreted all pertinent imaging results/findings within the past 24 hours.      Assessment/Plan:      * Critical limb ischemia of right lower extremity  78 yo F transferred from [facility] for vascular surgery evaluation and femoral endarterectomy. Patient presented to [facility] with RLE pain that has hindered her walking. R foot with absent pedal pulse, pain, and discoloration. IC performed angiography on 5/5 that showed "high grade stenosis in right GUNNER.  of CFA collaterals from EIA and profunda artery which provides collaterals to chronically occluded SFA.  Initially 2V runoff with prox  of peroneal artery. Pt provides flow to foot, however has diffuse diease. Unable to adequately visualize right foot circulation." Transferred on heparin gtt.   No signs of infection noted on exam. No leukocytosis.    JOSE: Right 0.15;  Left 0.58    PLAN:  - Vascular surgery consulted for femoral endarterectomy, appreciate recs.   -Surgery planned for 5/9 - patient to be npo at midnight and heparin gtt stopped morning of.  -Per nephrology recs fluids should be initiated 6 hours prior and 6 hours after procedure  -Continue heparin drip, ASA, plavix, and statin therapy  -Continue Gabapentin for neuropathy pain  -PT/OT ordered  -Pain control w/ Tylenol, hydrocodone, and morphine  -CBC, CMP daily  -Daily coags      Metabolic acidosis  Continue home sodium bicarb      Pulmonary emphysema  PRN Duo nebs ordered      Chronic heart failure with preserved ejection fraction  Echo (05/03/2023) showed EF 65%  Home meds: None    Summary:   The left ventricle is normal in size with concentric remodeling and normal systolic function.   The estimated ejection fraction is 65%.   Indeterminate left ventricular diastolic function.   There is mild mitral stenosis.   The mean diastolic " gradient across the mitral valve is 5 mmHg at a heart rate of 66 bpm.   Normal right ventricular size with normal right ventricular systolic function.   There is mild aortic valve stenosis.   Aortic valve area is 1.28 cm2; peak velocity is 2.33 m/s; mean gradient is 10 mmHg.   Mild-to-moderate aortic regurgitation.   Normal central venous pressure (3 mmHg).   The estimated PA systolic pressure is 35 mmHg.      Plan:  - Continue Coreg  - Hold Lasix in setting of ALEISA  - Daily weights (standing if tolerated)  - Strict I/Os  - Fluid restriction at 1500mL  - Cardiac diet  -Telemetry      alesia on ckd stage 4  Hx of nephrectomy. Baseline Cr 1.8-2.2.  Cr 2.8 on arrival  Seen by urology and nephrology who believe etiology to be post-renal. Renal US shows mild hydronephrosis. Jane placed. Patient had angiogram that could have worsened renal function but per nephrology not exposed to contrast.   No indications for HD at this time    Plan:  - Nephrology consulted who recommend future HD need and line placement if does not improve.   - Avoid fluids  - IVF given 6 hrs pre and post op to prevent worsening ALESIA due to contrast  - Trend Cr  - Strict I&Os  - Avoid nephrotoxic agents  - Renally adjust medications  - Monitor for urinary retention  - If continues to worsen will consult nephrology      History of rectal or anal cancer  Hx of rectal cancer and treated with radiation and chemotherapy  No longer on chemotherapy      Coronary artery disease involving native coronary artery of native heart without angina pectoris  Continue DAPT and statin  Monitor for symptoms of angina  Heparin gtt held for surgery        PAD (peripheral artery disease)  Hx of PAD w/ previous stents in renal, iliac, and femoral stents  Continue ASA, Plavix, and statin      HLD (hyperlipidemia)  Continue statin      Essential hypertension  /61 on arrival. Patient has not taken BP meds for past year.     Continue PO Coreg, hydralazine,  Nifedipine  PRN Hydralazine for SBP >180    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 hypertension  Patient's FSGs are controlled on current medication regimen.  Last A1c reviewed-   Lab Results   Component Value Date    HGBA1C 5.0 05/04/2023     Most recent fingerstick glucose reviewed-   Recent Labs   Lab 05/08/23  1720 05/08/23  1927 05/09/23  0758   POCTGLUCOSE 111* 134* 127*     Current correctional scale  Low  Maintain anti-hyperglycemic dose as follows-   Antihyperglycemics (From admission, onward)    Start     Stop Route Frequency Ordered    05/06/23 1710  insulin aspart U-100 pen 0-5 Units         -- SubQ Every 6 hours PRN 05/06/23 1711        Hold Oral hypoglycemics while patient is in the hospital.      VTE Risk Mitigation (From admission, onward)         Ordered     IP VTE HIGH RISK PATIENT  Once         05/06/23 1711     Place sequential compression device  Until discontinued         05/06/23 1711     Reason for No Pharmacological VTE Prophylaxis  Once        Question:  Reasons:  Answer:  IV Heparin w/in 24 hrs. Pre or Post-Op    05/06/23 1711                Discharge Planning   PÉREZ: 5/17/2023     Code Status: Full Code   Is the patient medically ready for discharge?: No    Reason for patient still in hospital (select all that apply): Patient trending condition, Treatment and Consult recommendations  Discharge Plan A: Home with family                  Jeremy Martel DO  Department of Hospital Medicine   [facility] - Surgery ([facility])

## 2023-05-09 NOTE — PLAN OF CARE
[facility] - Int[facility] Care ([place] [address])  Initial Discharge Assessment       Primary Care Provider: Mane Carmona MD    Admission Diagnosis: Critical limb ischemia of right lower extremity [I70.221]    Admission Date: 5/6/2023  Expected Discharge Date: 5/17/2023    Discharge Barriers Identified: None    Payor: HUMANA MANAGED MEDICARE / Plan: HUMANA MEDICARE HMO / Product Type: Capitation /     Extended Emergency Contact Information  Primary Emergency Contact: JimmybjLety   [facility]  Home Phone: [phone]  Mobile Phone: [phone]  Relation: Daughter  Secondary Emergency Contact: Deana Reddy   [facility]  Home Phone: [phone]  Mobile Phone: [phone]  Relation: Daughter    Discharge Plan A: Home with family  Discharge Plan B: Home Health      [id number] [place] - [address] [facility]  [address]  [place] [address]  Phone: [phone] Fax: [phone]      Initial Assessment (most recent)       Adult Discharge Assessment - 05/08/23 0825          Discharge Assessment    Assessment Type Discharge Planning Assessment     Confirmed/corrected address, phone number and insurance Yes     Confirmed Demographics Correct on Facesheet     Source of Information patient     When was your last doctors appointment? --   3/2023    Reason For Admission Critical limb ischemia of right lower extremity     People in Home child(hector), adult   Deana Reddy # [phone]    Do you expect to return to your current living situation? Yes     Do you have help at home or someone to help you manage your care at home? No     Prior to hospitilization cognitive status: Alert/Oriented     Current cognitive status: Alert/Oriented     Equipment Currently Used at Home walker, rolling;cane, straight;shower chair;grab bar     Readmission within 30 days? Yes     Patient currently being followed by outpatient case management? No   referr to [facility]RIST[facility] CABAN    Do you  currently have service(s) that help you manage your care at home? No     Do you take prescription medications? Yes     Do you have prescription coverage? Yes     Coverage HU[employer]NA MANAGED MEDICARE - HUMANA MEDICARE HMO     Do you have any problems affording any of your prescribed medications? No     Is the patient taking medications as prescribed? yes     Who is going to help you get home at discharge? zeke Reddy # [phone]     How do you get to doctors appointments? family or friend will provide     Are you on dialysis? No     Do you take coumadin? No     Discharge Plan A Home with family     Discharge Plan B Home Health     DME Needed Upon Discharge  other (see comments)   TBD    Discharge Plan discussed with: Patient     Discharge Barriers Identified None        Physical Activity    On average, how many days per week do you engage in moderate to strenuous exercise (like a brisk walk)? 5 days     On average, how many minutes do you engage in exercise at this level? 30 min        Financial Resource Strain    How hard is it for you to pay for the very basics like food, housing, medical care, and heating? Not hard at all        Housing Stability    In the last 12 months, was there a time when you were not able to pay the mortgage or rent on time? No     In the last 12 months, was there a time when you did not have a steady place to sleep or slept in a shelter (including now)? No        Transportation Needs    In the past 12 months, has lack of transportation kept you from medical appointments or from getting medications? No     In the past 12 months, has lack of transportation kept you from meetings, work, or from getting things needed for daily living? No        Food Insecurity    Within the past 12 months, you worried that your food would run out before you got the money to buy more. Never true     Within the past 12 months, the food you bought just didn't last and you didn't have money to get more. Never true         Stress    Do you feel stress - tense, restless, nervous, or anxious, or unable to sleep at night because your mind is troubled all the time - these days? To some extent        Social Connections    In a typical week, how many times do you talk on the phone with family, friends, or neighbors? Once a week     How often do you get together with friends or relatives? Once a week     How often do you attend Hinduism or Latter-day services? 1 to 4 times per year     Do you belong to any clubs or organizations such as Hinduism groups, unions, fraXATA or athletic groups, or school groups? No     Are you , , , , never , or living with a partner?         Alcohol Use    Q1: How often do you have a drink containing alcohol? Never     Q2: How many drinks containing alcohol do you have on a typical day when you are drinking? Patient does not drink     Q3: How often do you have six or more drinks on one occasion? Never                        CM met with patient in room at bedside, patient sitting up in chair. Patient AAOx 3 and able to recall daughters cell number.  Patient reports being independent with ADLs and using g the following HME: rolling walker, straight cane, grab bars, raised toilet seat, and shower bench[chair].  Patient is not oxygen dependent.  Patient preferred pharmacy is [id number] in [place], [place].  Patient is not on HD nor on BTs. Patient is on Plavix 75 mg regimen.    Patient is willing to use bedside pharmacy for delivery of med's.  Patient does not drive and Deana montelongo will be picking up at time of discharge.  CM to cont to access and address any needs throughout inpatient stay.    Patient reports being on diabetic med's years ago, but A1C has been WNL and PCP informed to not cont diabetic regimen.     Patient denies any post acute needs.      Any Cordon RN  Case Management  [facility]  [phone]

## 2023-05-09 NOTE — ASSESSMENT & PLAN NOTE
Patient's FSGs are controlled on current medication regimen.  Last A1c reviewed-   Lab Results   Component Value Date    HGBA1C 5.0 05/04/2023     Most recent fingerstick glucose reviewed-   Recent Labs   Lab 05/08/23  1720 05/08/23  1927 05/09/23  0758   POCTGLUCOSE 111* 134* 127*     Current correctional scale  Low  Maintain anti-hyperglycemic dose as follows-   Antihyperglycemics (From admission, onward)    Start     Stop Route Frequency Ordered    05/06/23 1710  insulin aspart U-100 pen 0-5 Units         -- SubQ Every 6 hours PRN 05/06/23 1711        Hold Oral hypoglycemics while patient is in the hospital.

## 2023-05-09 NOTE — SUBJECTIVE & OBJECTIVE
Interval History: scr stable this morning 2.7 from 2.8 yesterday had 550 cc of urine in last 24 hours     Review of patient's allergies indicates:   Allergen Reactions    Chantix [varenicline]      Tongue swelling      Wellbutrin [bupropion hcl]      Tongue swelling       Current Facility-Administered Medications   Medication Frequency    acetaminophen tablet 650 mg Q8H PRN    albuterol-ipratropium 2.5 mg-0.5 mg/3 mL nebulizer solution 3 mL Q6H PRN    aspirin chewable tablet 81 mg Daily    atorvastatin tablet 80 mg Daily    carvediloL tablet 25 mg BID    dextrose 10% bolus 125 mL 125 mL PRN    dextrose 10% bolus 250 mL 250 mL PRN    ezetimibe tablet 10 mg Daily    gabapentin capsule 100 mg BID    glucagon (human recombinant) injection 1 mg PRN    hydrALAZINE injection 10 mg Q8H PRN    hydrALAZINE tablet 50 mg BID    HYDROcodone-acetaminophen 5-325 mg per tablet 1 tablet Q6H PRN    insulin aspart U-100 pen 0-5 Units Q6H PRN    lactated ringers infusion Continuous    morphine injection 2 mg Q6H PRN    naloxone 0.4 mg/mL injection 0.02 mg PRN    NIFEdipine 24 hr tablet 60 mg Daily    ondansetron injection 4 mg Q8H PRN    sodium bicarbonate tablet 650 mg TID    sodium chloride 0.9% flush 10 mL Q12H PRN    sodium chloride 0.9% flush 10 mL PRN    traMADoL tablet 50 mg Q4H PRN       Objective:     Vital Signs (Most Recent):  Temp: 98.1 °F (36.7 °C) (05/09/23 1208)  Pulse: 79 (05/09/23 1211)  Resp: 18 (05/09/23 1208)  BP: (!) 141/63 (05/09/23 1208)  SpO2: (!) 94 % (05/09/23 1208) Vital Signs (24h Range):  Temp:  [97.4 °F (36.3 °C)-98.3 °F (36.8 °C)] 98.1 °F (36.7 °C)  Pulse:  [69-82] 79  Resp:  [14-27] 18  SpO2:  [92 %-99 %] 94 %  BP: (119-153)/(59-80) 141/63     Weight: 58.1 kg (128 lb 1.4 oz) (05/09/23 1208)  Body mass index is 23.43 kg/m².  Body surface area is 1.59 meters squared.    I/O last 3 completed shifts:  In: -   Out: 550 [Urine:550]     Physical Exam  Vitals reviewed.   Constitutional:       Appearance: Normal  appearance.   HENT:      Head: Normocephalic and atraumatic.      Mouth/Throat:      Mouth: Mucous membranes are moist.   Eyes:      Conjunctiva/sclera: Conjunctivae normal.      Pupils: Pupils are equal, round, and reactive to light.   Cardiovascular:      Rate and Rhythm: Normal rate and regular rhythm.      Heart sounds: Normal heart sounds.   Pulmonary:      Effort: Pulmonary effort is normal.      Breath sounds: Normal breath sounds.   Abdominal:      General: Bowel sounds are normal.      Palpations: Abdomen is soft.      Tenderness: There is no abdominal tenderness. There is no right CVA tenderness, left CVA tenderness or guarding.   Musculoskeletal:      Right lower leg: Edema present.      Left lower leg: Edema present.      Comments: Right foot is erythematosus, cold and tender   Skin:     Coloration: Skin is not jaundiced.      Findings: No rash.   Neurological:      General: No focal deficit present.        Significant Labs:  CBC:   Recent Labs   Lab 05/09/23  0403   WBC 4.26   RBC 2.85*   HGB 8.0*   HCT 25.2*   PLT 92*   MCV 88   MCH 28.1   MCHC 31.7*     CMP:   Recent Labs   Lab 05/05/23  0518 05/06/23  0650 05/09/23  0403      < > 111*   CALCIUM 8.7   < > 8.4*   ALBUMIN 3.0*   < > 2.5*   PROT 7.0  --   --       < > 140   K 4.3   < > 4.3   CO2 17*   < > 21*   *   < > 109   BUN 78*   < > 97*   CREATININE 2.6*   < > 2.7*   ALKPHOS 81  --   --    ALT 10  --   --    AST 22  --   --    BILITOT 0.4  --   --     < > = values in this interval not displayed.        Significant Imaging:  Reviewed

## 2023-05-09 NOTE — PLAN OF CARE
Problem: Adult Inpatient Plan of Care  Goal: Plan of Care Review  Outcome: Ongoing, Progressing  Goal: Patient-Specific Goal (Individualized)  Outcome: Ongoing, Progressing  Goal: Absence of Hospital-Acquired Illness or Injury  Outcome: Ongoing, Progressing  Goal: Optimal Comfort and Wellbeing  Outcome: Ongoing, Progressing  Goal: Readiness for Transition of Care  Outcome: Ongoing, Progressing     Problem: Diabetes Comorbidity  Goal: Blood Glucose Level Within Targeted Range  Outcome: Ongoing, Progressing     Problem: Infection  Goal: Absence of Infection Signs and Symptoms  Outcome: Ongoing, Progressing     Problem: Fall Injury Risk  Goal: Absence of Fall and Fall-Related Injury  Outcome: Ongoing, Progressing     Problem: Skin Injury Risk Increased  Goal: Skin Health and Integrity  Outcome: Ongoing, Progressing     Problem: Fluid and Electrolyte Imbalance (Acute Kidney Injury/Impairment)  Goal: Fluid and Electrolyte Balance  Outcome: Ongoing, Progressing     Problem: Oral Intake Inadequate (Acute Kidney Injury/Impairment)  Goal: Optimal Nutrition Intake  Outcome: Ongoing, Progressing     Problem: Renal Function Impairment (Acute Kidney Injury/Impairment)  Goal: Effective Renal Function  Outcome: Ongoing, Progressing     Problem: Impaired Wound Healing  Goal: Optimal Wound Healing  Outcome: Ongoing, Progressing

## 2023-05-10 LAB
ALBUMIN SERPL BCP-MCNC: 2.5 G/DL (ref 3.5–5.2)
ALBUMIN SERPL BCP-MCNC: 2.5 G/DL (ref 3.5–5.2)
ALP SERPL-CCNC: 83 U/L (ref 55–135)
ALT SERPL W/O P-5'-P-CCNC: 22 U/L (ref 10–44)
ANION GAP SERPL CALC-SCNC: 10 MMOL/L (ref 8–16)
ANION GAP SERPL CALC-SCNC: 10 MMOL/L (ref 8–16)
AST SERPL-CCNC: 36 U/L (ref 10–40)
BASOPHILS # BLD AUTO: 0 K/UL (ref 0–0.2)
BASOPHILS NFR BLD: 0 % (ref 0–1.9)
BILIRUB SERPL-MCNC: 0.3 MG/DL (ref 0.1–1)
BUN SERPL-MCNC: 89 MG/DL (ref 8–23)
BUN SERPL-MCNC: 89 MG/DL (ref 8–23)
CALCIUM SERPL-MCNC: 8.2 MG/DL (ref 8.7–10.5)
CALCIUM SERPL-MCNC: 8.2 MG/DL (ref 8.7–10.5)
CHLORIDE SERPL-SCNC: 111 MMOL/L (ref 95–110)
CHLORIDE SERPL-SCNC: 111 MMOL/L (ref 95–110)
CO2 SERPL-SCNC: 21 MMOL/L (ref 23–29)
CO2 SERPL-SCNC: 21 MMOL/L (ref 23–29)
CREAT SERPL-MCNC: 2.4 MG/DL (ref 0.5–1.4)
CREAT SERPL-MCNC: 2.4 MG/DL (ref 0.5–1.4)
DIFFERENTIAL METHOD: ABNORMAL
EOSINOPHIL # BLD AUTO: 0 K/UL (ref 0–0.5)
EOSINOPHIL NFR BLD: 0 % (ref 0–8)
ERYTHROCYTE [DISTWIDTH] IN BLOOD BY AUTOMATED COUNT: 18.8 % (ref 11.5–14.5)
EST. GFR  (NO RACE VARIABLE): 20.5 ML/MIN/1.73 M^2
EST. GFR  (NO RACE VARIABLE): 20.5 ML/MIN/1.73 M^2
GLUCOSE SERPL-MCNC: 105 MG/DL (ref 70–110)
GLUCOSE SERPL-MCNC: 119 MG/DL (ref 70–110)
GLUCOSE SERPL-MCNC: 142 MG/DL (ref 70–110)
GLUCOSE SERPL-MCNC: 142 MG/DL (ref 70–110)
HCO3 UR-SCNC: 21 MMOL/L (ref 24–28)
HCO3 UR-SCNC: 21.9 MMOL/L (ref 24–28)
HCT VFR BLD AUTO: 25.2 % (ref 37–48.5)
HCT VFR BLD CALC: 20 %PCV (ref 36–54)
HCT VFR BLD CALC: 22 %PCV (ref 36–54)
HGB BLD-MCNC: 7.6 G/DL (ref 12–16)
IMM GRANULOCYTES # BLD AUTO: 0.02 K/UL (ref 0–0.04)
IMM GRANULOCYTES NFR BLD AUTO: 0.6 % (ref 0–0.5)
LYMPHOCYTES # BLD AUTO: 0.3 K/UL (ref 1–4.8)
LYMPHOCYTES NFR BLD: 9.7 % (ref 18–48)
MAGNESIUM SERPL-MCNC: 2.2 MG/DL (ref 1.6–2.6)
MCH RBC QN AUTO: 27.4 PG (ref 27–31)
MCHC RBC AUTO-ENTMCNC: 30.2 G/DL (ref 32–36)
MCV RBC AUTO: 91 FL (ref 82–98)
MONOCYTES # BLD AUTO: 0.1 K/UL (ref 0.3–1)
MONOCYTES NFR BLD: 2.9 % (ref 4–15)
NEUTROPHILS # BLD AUTO: 3 K/UL (ref 1.8–7.7)
NEUTROPHILS NFR BLD: 86.8 % (ref 38–73)
NRBC BLD-RTO: 0 /100 WBC
PCO2 BLDA: 37.5 MMHG (ref 35–45)
PCO2 BLDA: 41.6 MMHG (ref 35–45)
PH SMN: 7.33 [PH] (ref 7.35–7.45)
PH SMN: 7.36 [PH] (ref 7.35–7.45)
PHOSPHATE SERPL-MCNC: 5.5 MG/DL (ref 2.7–4.5)
PHOSPHATE SERPL-MCNC: 5.5 MG/DL (ref 2.7–4.5)
PLATELET # BLD AUTO: 106 K/UL (ref 150–450)
PMV BLD AUTO: 10 FL (ref 9.2–12.9)
PO2 BLDA: 178 MMHG (ref 80–100)
PO2 BLDA: 195 MMHG (ref 80–100)
POC ACTIVATED CLOTTING TIME K: 251 SEC (ref 74–137)
POC ACTIVATED CLOTTING TIME K: 257 SEC (ref 74–137)
POC ACTIVATED CLOTTING TIME K: 263 SEC (ref 74–137)
POC ACTIVATED CLOTTING TIME K: 275 SEC (ref 74–137)
POC BE: -4 MMOL/L
POC BE: -4 MMOL/L
POC IONIZED CALCIUM: 1.15 MMOL/L (ref 1.06–1.42)
POC IONIZED CALCIUM: 1.18 MMOL/L (ref 1.06–1.42)
POC SATURATED O2: 100 % (ref 95–100)
POC SATURATED O2: 100 % (ref 95–100)
POC TCO2: 22 MMOL/L (ref 23–27)
POC TCO2: 23 MMOL/L (ref 23–27)
POCT GLUCOSE: 108 MG/DL (ref 70–110)
POCT GLUCOSE: 123 MG/DL (ref 70–110)
POCT GLUCOSE: 131 MG/DL (ref 70–110)
POTASSIUM BLD-SCNC: 4.1 MMOL/L (ref 3.5–5.1)
POTASSIUM BLD-SCNC: 4.5 MMOL/L (ref 3.5–5.1)
POTASSIUM SERPL-SCNC: 4.7 MMOL/L (ref 3.5–5.1)
POTASSIUM SERPL-SCNC: 4.7 MMOL/L (ref 3.5–5.1)
PROT SERPL-MCNC: 5.7 G/DL (ref 6–8.4)
RBC # BLD AUTO: 2.77 M/UL (ref 4–5.4)
SAMPLE: ABNORMAL
SODIUM BLD-SCNC: 141 MMOL/L (ref 136–145)
SODIUM BLD-SCNC: 142 MMOL/L (ref 136–145)
SODIUM SERPL-SCNC: 142 MMOL/L (ref 136–145)
SODIUM SERPL-SCNC: 142 MMOL/L (ref 136–145)
WBC # BLD AUTO: 3.4 K/UL (ref 3.9–12.7)

## 2023-05-10 PROCEDURE — 99291 CRITICAL CARE FIRST HOUR: CPT | Mod: ,,, | Performed by: ANESTHESIOLOGY

## 2023-05-10 PROCEDURE — 25000003 PHARM REV CODE 250

## 2023-05-10 PROCEDURE — 80053 COMPREHEN METABOLIC PANEL: CPT

## 2023-05-10 PROCEDURE — 25000003 PHARM REV CODE 250: Performed by: STUDENT IN AN ORGANIZED HEALTH CARE EDUCATION/TRAINING PROGRAM

## 2023-05-10 PROCEDURE — 20600001 HC STEP DOWN PRIVATE ROOM

## 2023-05-10 PROCEDURE — 27000221 HC OXYGEN, UP TO 24 HOURS

## 2023-05-10 PROCEDURE — 63600175 PHARM REV CODE 636 W HCPCS

## 2023-05-10 PROCEDURE — 63600175 PHARM REV CODE 636 W HCPCS: Performed by: STUDENT IN AN ORGANIZED HEALTH CARE EDUCATION/TRAINING PROGRAM

## 2023-05-10 PROCEDURE — 94761 N-INVAS EAR/PLS OXIMETRY MLT: CPT

## 2023-05-10 PROCEDURE — 99291 PR CRITICAL CARE, E/M 30-74 MINUTES: ICD-10-PCS | Mod: ,,, | Performed by: ANESTHESIOLOGY

## 2023-05-10 PROCEDURE — 85025 COMPLETE CBC W/AUTO DIFF WBC: CPT

## 2023-05-10 PROCEDURE — 83735 ASSAY OF MAGNESIUM: CPT

## 2023-05-10 PROCEDURE — 51798 US URINE CAPACITY MEASURE: CPT

## 2023-05-10 PROCEDURE — 36620 INSERTION CATHETER ARTERY: CPT | Mod: 59,,, | Performed by: ANESTHESIOLOGY

## 2023-05-10 PROCEDURE — 80069 RENAL FUNCTION PANEL: CPT | Performed by: NURSE PRACTITIONER

## 2023-05-10 PROCEDURE — 99900035 HC TECH TIME PER 15 MIN (STAT)

## 2023-05-10 PROCEDURE — 36620 ARTERIAL: ICD-10-PCS | Mod: 59,,, | Performed by: ANESTHESIOLOGY

## 2023-05-10 RX ORDER — GLUCAGON 1 MG
1 KIT INJECTION
Status: DISCONTINUED | OUTPATIENT
Start: 2023-05-10 | End: 2023-05-16 | Stop reason: HOSPADM

## 2023-05-10 RX ORDER — POLYETHYLENE GLYCOL 3350 17 G/17G
17 POWDER, FOR SOLUTION ORAL DAILY
Status: DISCONTINUED | OUTPATIENT
Start: 2023-05-10 | End: 2023-05-16 | Stop reason: HOSPADM

## 2023-05-10 RX ORDER — AMOXICILLIN 250 MG
1 CAPSULE ORAL DAILY
Status: DISCONTINUED | OUTPATIENT
Start: 2023-05-10 | End: 2023-05-16 | Stop reason: HOSPADM

## 2023-05-10 RX ORDER — DEXTROSE 40 %
15 GEL (GRAM) ORAL
Status: DISCONTINUED | OUTPATIENT
Start: 2023-05-10 | End: 2023-05-16 | Stop reason: HOSPADM

## 2023-05-10 RX ORDER — INSULIN ASPART 100 [IU]/ML
0-5 INJECTION, SOLUTION INTRAVENOUS; SUBCUTANEOUS
Status: DISCONTINUED | OUTPATIENT
Start: 2023-05-10 | End: 2023-05-16 | Stop reason: HOSPADM

## 2023-05-10 RX ORDER — DEXTROSE 40 %
30 GEL (GRAM) ORAL
Status: DISCONTINUED | OUTPATIENT
Start: 2023-05-10 | End: 2023-05-16 | Stop reason: HOSPADM

## 2023-05-10 RX ORDER — NIFEDIPINE 30 MG/1
30 TABLET, EXTENDED RELEASE ORAL DAILY
Status: DISCONTINUED | OUTPATIENT
Start: 2023-05-10 | End: 2023-05-16 | Stop reason: HOSPADM

## 2023-05-10 RX ORDER — CARVEDILOL 3.12 MG/1
3.12 TABLET ORAL 2 TIMES DAILY
Status: DISCONTINUED | OUTPATIENT
Start: 2023-05-10 | End: 2023-05-10

## 2023-05-10 RX ADMIN — MUPIROCIN: 20 OINTMENT TOPICAL at 09:05

## 2023-05-10 RX ADMIN — GABAPENTIN 100 MG: 100 CAPSULE ORAL at 09:05

## 2023-05-10 RX ADMIN — ATORVASTATIN CALCIUM 80 MG: 40 TABLET, FILM COATED ORAL at 09:05

## 2023-05-10 RX ADMIN — SODIUM BICARBONATE 650 MG: 650 TABLET ORAL at 09:05

## 2023-05-10 RX ADMIN — OXYCODONE 5 MG: 5 TABLET ORAL at 01:05

## 2023-05-10 RX ADMIN — CLOPIDOGREL BISULFATE 75 MG: 75 TABLET ORAL at 09:05

## 2023-05-10 RX ADMIN — EZETIMIBE 10 MG: 10 TABLET ORAL at 09:05

## 2023-05-10 RX ADMIN — NIFEDIPINE 30 MG: 30 TABLET, FILM COATED, EXTENDED RELEASE ORAL at 04:05

## 2023-05-10 RX ADMIN — CEFAZOLIN 2 G: 2 INJECTION, POWDER, FOR SOLUTION INTRAMUSCULAR; INTRAVENOUS at 02:05

## 2023-05-10 RX ADMIN — ASPIRIN 81 MG CHEWABLE TABLET 81 MG: 81 TABLET CHEWABLE at 09:05

## 2023-05-10 RX ADMIN — HEPARIN SODIUM 5000 UNITS: 5000 INJECTION INTRAVENOUS; SUBCUTANEOUS at 09:05

## 2023-05-10 RX ADMIN — HEPARIN SODIUM 5000 UNITS: 5000 INJECTION INTRAVENOUS; SUBCUTANEOUS at 06:05

## 2023-05-10 RX ADMIN — HEPARIN SODIUM 5000 UNITS: 5000 INJECTION INTRAVENOUS; SUBCUTANEOUS at 04:05

## 2023-05-10 NOTE — ANESTHESIA POSTPROCEDURE EVALUATION
Anesthesia Post Evaluation    Patient: Steph Monroe    Procedure(s) Performed: Procedure(s) (LRB):  ENDARTERECTOMY, FEMORAL (Right)  STENT, ILIAC ARTERY (Right)    Final Anesthesia Type: general      Patient location during evaluation: PACU  Patient participation: Yes- Able to Participate  Level of consciousness: awake and alert and oriented  Post-procedure vital signs: reviewed and stable  Pain management: adequate  Airway patency: patent    PONV status at discharge: No PONV  Anesthetic complications: no      Cardiovascular status: hemodynamically stable  Respiratory status: unassisted, spontaneous ventilation and room air  Hydration status: euvolemic  Follow-up not needed.          Vitals Value Taken Time   /60 05/10/23 0001   Temp 36.9 °C (98.4 °F) 05/09/23 2300   Pulse 64 05/10/23 0055   Resp 8 05/10/23 0055   SpO2 99 % 05/10/23 0055   Vitals shown include unvalidated device data.      No case tracking events are documented in the log.      Pain/Nohelia Score: Pain Rating Prior to Med Admin: 6 (5/9/2023  9:18 PM)  Pain Rating Post Med Admin: 0 (5/9/2023 11:00 PM)  Nohelia Score: 8 (5/9/2023  9:00 PM)

## 2023-05-10 NOTE — MEDICAL/APP STUDENT
Raphael Loo - Surgery ([facility])  Progress Note    Patient Name: Steph Monroe  MRN: [id number]  Patient Class: IP- Inpatient   Admission Date: 5/6/2023  Length of Stay: 4 days  Attending Physician: Mariela Finnegan MD  Primary Care Provider: Mane Carmona MD    Subjective:     Principal Problem: Critical limb ischemia of right lower extremity    Chief Complaint: No chief complaint on file.      HPI: Steph Monroe is a 77 y.o. 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 She developed cirrhosis with ascites (unclear etiology from records) requiring paracentesis (last done 11/2022), PVD w/ iliac stents , aortic stenosis, pulm HTN, lung nodule, anemia, cataracts, OA, vitamin D deficiency, and she is a former smoker who quit >10 yrs ago. Pt presented at [facility]  on 5/2/2023 for evaluation as recommended by her podiatrist worsening tip LE pain, worse in the R leg and distant bilateral femoral pulses.    At [facility] pt's presenting symptoms were concerning for critical limb ischemia based on the tip LE arterial doppler results, vascular surgery and cardiology were consulted. Pt was started on IV fluids and heparin gtt and then on 5/3/2023 pt had peripheral angiogram of tip LE with CO2 not with contrast via the R radial artery to the L common femoral artery and per operative report the findings were:     high grade stenosis in right GUNNER.  of CFA collaterals from EIA and profunda artery which provides collaterals to chronically occluded SFA.  Initially 2V runoff with prox  of peroneal artery. Pt provides flow to foot, however has diffuse diease. Unable to adequately visualize right foot circulation.     Pt had a renal ultrasound on 5/5/2023 that showed R sided  "hydronephrosis, non-obstructing small intrarenal calculi on the right, CKD changes, and known absent L kidney. Nephrology was consulted for ALESIA originally thought to be d/t hydronephrosis, pt was started on [place] x10hrs. A spears catheter was placed and Urology was consulted. CT abd & pelvis was done and this did NOT show hydronephrosis, it did show some abdominopelvic ascites, calcifications, and tip common iliac stents. Pt also had an echo and this showed an EF of 65%, with mild MS, mild AS, mild to mod AR, CVP 3, PASP 35 mmHg    [place] [facility] [place] anticipating vascular surgery intervention     [place] [facility] [place] with the following VS:   Initial Vitals   BP Pulse Resp Temp SpO2   05/06/23 1700 05/06/23 1700 05/06/23 1700 05/06/23 1700 05/06/23 1910   127/61 83 (!) 24 97.2 °F (36.2 °C) 95 %     Nephrology was consulted for: "Worsening ALESIA"  Baseline sCr: ~2.0 based on labs in Care Everywhere for the past year  Admission sCr: 1.8  Pt follows outpatient with Dr. Oakley for CKD IV, she was last seen in Jan 2023 and was started on lasix and spironolactone which she was taking prior to admission. 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   Pt denies:  foam in urine, hematuria, dysuria, urinary frequency or urgency, fever, chills, shortness of breath, cough, chest pain, palpitations, nausea, vomiting, diarrhea, abd pain,headaches, visual disturbances or weakness, or rash.    Creatinine   Date Value Ref Range Status   05/06/2023 2.8 (H) 0.5 - 1.4 mg/dL Final   05/05/2023 2.6 (H) 0.5 - 1.4 mg/dL Final   05/04/2023 2.3 (H) 0.5 - 1.4 mg/dL Final   05/04/2023 2.3 (H) 0.5 - 1.4 mg/dL Final   05/03/2023 2.1 (H) 0.5 - 1.4 mg/dL Final   05/02/2023 1.8 (H) 0.5 - 1.4 mg/dL Final   08/06/2020 2.2 (H) 0.5 - 1.4 mg/dL " Final   07/16/2018 1.8 (H) 0.5 - 1.4 mg/dL Final   01/19/2018 2.2 (H) 0.5 - 1.4 mg/dL Final   01/16/2018 2.1 (H) 0.5 - 1.4 mg/dL Final     BUN   Date Value Ref Range Status   05/06/2023 86 (H) 8 - 23 mg/dL Final   05/05/2023 78 (H) 8 - 23 mg/dL Final   05/04/2023 74 (H) 8 - 23 mg/dL Final   05/04/2023 73 (H) 8 - 23 mg/dL Final   05/03/2023 60 (H) 8 - 23 mg/dL Final       Hospital Course: No notes on file    Interval History: She underwent a femoral endarterectomy, femoral and the placement of a stent on the iliac artery. She lost 600 mL of blood approximately (Hb went from 8.2 to 7.6). Overnight, the patient didn't have any complaints.     Past Medical History:   Diagnosis Date    Cancer     rectum    CKD (chronic kidney disease)     Diabetes mellitus, type 2     Hyperlipidemia     Hypertension     Neutropenic fever 5/27/2016    PVD (peripheral vascular disease)        Past Surgical History:   Procedure Laterality Date    ANGIOGRAM, LOWER ARTERIAL, UNILATERAL Right 5/5/2023    Procedure: Angiogram, Lower Arterial, Unilateral;  Surgeon: Remington Ribeiro MD;  Location: [facility] CATH LAB/EP;  Service: Cardiology;  Laterality: Right;    COLONOSCOPY N/A 3/28/2016    Procedure: COLONOSCOPY;  Surgeon: Mitul Buitrago Jr., MD;  Location: [facility] ENDO;  Service: Endoscopy;  Laterality: N/A;    EYE SURGERY Left     cataract removal with lens implant.    FEMORAL ARTERY STENT  before 2010    PERIPHERAL ANGIOGRAPHY N/A 5/3/2023    Procedure: Peripheral angiography;  Surgeon: Rasheeda Ny MD;  Location: [facility] CATH LAB/EP;  Service: Cardiology;  Laterality: N/A;    RENAL ARTERY STENT  2010       Review of patient's allergies indicates:   Allergen Reactions    Chantix [varenicline]      Tongue swelling      Wellbutrin [bupropion hcl]      Tongue swelling         No current facility-administered medications on file prior to encounter.     Current Outpatient Medications on File Prior to Encounter   Medication Sig    aspirin 81 MG  Chew Take 1 tablet (81 mg total) by mouth once daily.    atorvastatin (LIPITOR) 80 MG tablet Take 1 tablet (80 mg total) by mouth once daily.    carvediloL (COREG) 25 MG tablet Take 1 tablet by mouth 2 (two) times daily.    clopidogreL (PLAVIX) 75 mg tablet Take 75 mg by mouth once daily.    ezetimibe (ZETIA) 10 mg tablet Take 10 mg by mouth once daily.    furosemide (LASIX) 20 MG tablet Take 20 mg by mouth once daily.    gabapentin (NEURONTIN) 100 MG capsule Take 1 capsule (100 mg total) by mouth 2 (two) times daily.    hydrALAZINE (APRESOLINE) 25 MG tablet Take 2 tablets by mouth 2 (two) times daily.    NIFEdipine (PROCARDIA-XL) 60 MG (OSM) 24 hr tablet Take 1 tablet (60 mg total) by mouth once daily.    sodium bicarbonate 650 MG tablet Take 650 mg by mouth 2 (two) times daily.    [DISCONTINUED] traMADoL (ULTRAM) 50 mg tablet Take 1 tablet (50 mg total) by mouth every 8 (eight) hours as needed for Pain.     Family History       Problem Relation (Age of Onset)    Diabetes Father    Heart attack Mother    Hypertension Sister          Tobacco Use    Smoking status: Former     Packs/day: 1.50     Years: 45.00     Pack years: 67.50     Types: Cigarettes     Quit date: 2013     Years since quittin.9     Passive exposure: Never    Smokeless tobacco: Never   Substance and Sexual Activity    Alcohol use: No     Alcohol/week: 0.0 standard drinks    Drug use: No    Sexual activity: Not Currently     Review of Systems As per interval history    Objective:     Vital Signs (Most Recent):  Temp: 97.2 °F (36.2 °C) (05/10/23 1600)  Pulse: 72 (05/10/23 1600)  Resp: 19 (05/10/23 1600)  BP: (!) 146/72 (05/10/23 1315)  SpO2: 98 % (05/10/23 1600) Vital Signs (24h Range):  Temp:  [96.6 °F (35.9 °C)-98.6 °F (37 °C)] 97.2 °F (36.2 °C)  Pulse:  [64-81] 72  Resp:  [8-27] 19  SpO2:  [91 %-100 %] 98 %  BP: (114-163)/(54-98) 146/72  Arterial Line BP: (114-149)/(36-49) 119/38     Weight: 58.1 kg (128 lb 1.4 oz)  Body mass index is  23.43 kg/m².    Intake/Output Summary (Last 24 hours) at 5/10/2023 1745  Last data filed at 5/10/2023 0600  Gross per 24 hour   Intake 2536.82 ml   Output 665 ml   Net 1871.82 ml      Physical Exam  Cardiovascular:      Rate and Rhythm: Normal rate and regular rhythm.      Comments: Pedal pulses are diminished on both feet.   Pulmonary:      Breath sounds: Normal breath sounds.   Abdominal:      General: There is distension.      Palpations: Abdomen is soft.      Tenderness: There is no abdominal tenderness. There is no guarding.   Musculoskeletal:         General: Tenderness present.      Comments: Tenderness on the right leg.   Skin:     Findings: Lesion present.      Comments: Multiple bruises over her arms.   Neurological:      Mental Status: She is alert and oriented to person, place, and time.       Significant Labs: All pertinent labs within the past 24 hours have been reviewed.  CBC:   Recent Labs   Lab 05/09/23  0403 05/09/23  1953 05/10/23  0300   WBC 4.26 7.14  7.14 3.40*   HGB 8.0* 8.2*  8.2* 7.6*   HCT 25.2* 26.4*  26.4* 25.2*   PLT 92* 155  155 106*     CMP:   Recent Labs   Lab 05/09/23  0403 05/09/23  1953 05/10/23  0300    140  140 142  142   K 4.3 4.9  4.9 4.7  4.7    110  110 111*  111*   CO2 21* 19*  19* 21*  21*   * 138*  138* 142*  142*   BUN 97* 83*  83* 89*  89*   CREATININE 2.7* 2.3*  2.3* 2.4*  2.4*   CALCIUM 8.4* 8.4*  8.4* 8.2*  8.2*   PROT  --   --  5.7*   ALBUMIN 2.5*  --  2.5*  2.5*   BILITOT  --   --  0.3   ALKPHOS  --   --  83   AST  --   --  36   ALT  --   --  22   ANIONGAP 10 11  11 10  10     Magnesium:   Recent Labs   Lab 05/09/23  0403 05/10/23  0300   MG 2.2 2.2       Significant Imaging: I have reviewed all pertinent imaging results/findings within the past 24 hours.        Assessment/Plan:      This is a 77 years old female patient with a history of CKD stage IV, DM, HTN, cirrhosis and HFpEF who came to the ED because of worsening pain  on the right leg. On admission, her serum Cr was 1.8 (baseline of 2 per labs of the past year) and it started to raise (today serum Cr is 2.4). Based on these findings, the patient has an ALESIA superimposed on CKD.    # ALESIA   The etiology is probably ischemic ATN probably due to an abrupt drop on her blood pressure (BP at admission was 201/82 and the next day it dropped to 101/52). Because BUN has a bigger increase (today is 89) than creatinine (today is 2.4), pre renal causes such as CRS need to be kept in mind. HRS is unlikely. Her urine output and labs are improving.    - Fluid balance: not overloaded, avoid fluids for now.  - Electrolytes: Na, K, Ca (corrected by albumin) and Cl are on normal values.   K, goal >4, page nephrology if K >5.5, Mg, goal >2, Phos, goal >3 and <5  - Strict input/output and daily weights.   - Acid/base: NAGMA, Sodium bicarb tabs 650mg TID   - Anemia: Hgb 8.8, transfuse for Hgb <7.0  - Maintain MAP >65 and systolic BP less than 130  - Renal diet/tube feedings if not NPO  - Anemia: Hgb 7.6, transfuse for Hgb <7.0    # CKD stage IV  Probably due to her medical history of DM, HTN, HFpEF and cirrhosis. She had an increased baseline Cr level (2 mg/dL) and was receiving spironolactone and lasik. We need to assess her renal fx after the ALESIA has resolved to recommend further therapy.     Active Diagnoses:    Diagnosis Date Noted POA    PRINCIPAL PROBLEM:  Critical limb ischemia of right lower extremity [I70.221] 05/02/2023 Yes    Metabolic acidosis [E87.20] 05/06/2023 Yes    Chronic heart failure with preserved ejection fraction [I50.32] 05/02/2023 Yes    Pulmonary emphysema [J43.9] 05/02/2023 Yes    alesia on ckd stage 4 [N17.9] 01/16/2018 Yes    History of rectal or anal cancer [Z85.048] 04/04/2016 Yes    Coronary artery disease involving native coronary artery of native heart without angina pectoris [I25.10] 03/26/2016 Yes     Chronic    PAD (peripheral artery disease) [I73.9] 03/26/2016 Yes     HLD (hyperlipidemia) [E78.5] 03/26/2016 Yes     Chronic    Essential hypertension [I10] 03/26/2016 Yes     Chronic    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 hypertension [E11.22, I12.9, N18.30] 03/26/2016 Yes     Chronic      Problems Resolved During this Admission:     VTE Risk Mitigation (From admission, onward)           Ordered     heparin (porcine) injection 5,000 Units  Every 8 hours         05/09/23 2051     IP VTE HIGH RISK PATIENT  Once         05/06/23 1711     Place sequential compression device  Until discontinued         05/06/23 1711     Reason for No Pharmacological VTE Prophylaxis  Once        Question:  Reasons:  Answer:  IV Heparin w/in 24 hrs. Pre or Post-Op    05/06/23 1711                       Vicenta Loo - Surgery (2nd Fl)

## 2023-05-10 NOTE — MED STUDENT SUBJECTIVE & OBJECTIVE
Medical Student Subjective & Objective     Principal Problem: Critical limb ischemia of right lower extremity    Chief Complaint: No chief complaint on file.      HPI: Steph Monroe is a 77 y.o. 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 She developed cirrhosis with ascites (unclear etiology from records) requiring paracentesis (last done 11/2022), PVD w/ iliac stents , aortic stenosis, pulm HTN, lung nodule, anemia, cataracts, OA, vitamin D deficiency, and she is a former smoker who quit >10 yrs ago. Pt presented at [facility]  on 5/2/2023 for evaluation as recommended by her podiatrist worsening tip LE pain, worse in the R leg and distant bilateral femoral pulses.    At [facility] pt's presenting symptoms were concerning for critical limb ischemia based on the tip LE arterial doppler results, vascular surgery and cardiology were consulted. Pt was started on IV fluids and heparin gtt and then on 5/3/2023 pt had peripheral angiogram of tip LE with CO2 not with contrast via the R radial artery to the L common femoral artery and per operative report the findings were:     high grade stenosis in right GUNNER.  of CFA collaterals from EIA and profunda artery which provides collaterals to chronically occluded SFA.  Initially 2V runoff with prox  of peroneal artery. Pt provides flow to foot, however has diffuse diease. Unable to adequately visualize right foot circulation.     Pt had a renal ultrasound on 5/5/2023 that showed R sided hydronephrosis, non-obstructing small intrarenal calculi on the right, CKD changes, and known absent L kidney. Nephrology was consulted for ALESIA originally thought to be d/t hydronephrosis, pt was started on Nabicarb x10hrs. A spears  "catheter was placed and Urology was consulted. CT abd & pelvis was done and this did NOT show hydronephrosis, it did show some abdominopelvic ascites, calcifications, and tip common iliac stents. Pt also had an echo and this showed an EF of 65%, with mild MS, mild AS, mild to mod AR, CVP 3, PASP 35 mmHg    [facility] of care anticipating vascular surgery intervention     [facility] pt presented with the following VS:   Initial Vitals   BP Pulse Resp Temp SpO2   05/06/23 1700 05/06/23 1700 05/06/23 1700 05/06/23 1700 05/06/23 1910   127/61 83 (!) 24 97.2 °F (36.2 °C) 95 %     Nephrology was consulted for: "Worsening ALESIA"  Baseline sCr: ~2.0 based on labs in Care Everywhere for the past year  Admission sCr: 1.8  Pt follows outpatient with Dr. Oakley for CKD IV, she was last seen in Jan 2023 and was started on lasix and spironolactone which she was taking prior to admission. 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   Pt denies:  foam in urine, hematuria, dysuria, urinary frequency or urgency, fever, chills, shortness of breath, cough, chest pain, palpitations, nausea, vomiting, diarrhea, abd pain,headaches, visual disturbances or weakness, or rash.    Creatinine   Date Value Ref Range Status   05/06/2023 2.8 (H) 0.5 - 1.4 mg/dL Final   05/05/2023 2.6 (H) 0.5 - 1.4 mg/dL Final   05/04/2023 2.3 (H) 0.5 - 1.4 mg/dL Final   05/04/2023 2.3 (H) 0.5 - 1.4 mg/dL Final   05/03/2023 2.1 (H) 0.5 - 1.4 mg/dL Final   05/02/2023 1.8 (H) 0.5 - 1.4 mg/dL Final   08/06/2020 2.2 (H) 0.5 - 1.4 mg/dL Final   07/16/2018 1.8 (H) 0.5 - 1.4 mg/dL Final   01/19/2018 2.2 (H) 0.5 - 1.4 mg/dL Final   01/16/2018 2.1 (H) 0.5 - 1.4 mg/dL Final     BUN   Date Value Ref Range Status   05/06/2023 86 (H) 8 - 23 mg/dL Final   05/05/2023 78 (H) 8 " - 23 mg/dL Final   05/04/2023 74 (H) 8 - 23 mg/dL Final   05/04/2023 73 (H) 8 - 23 mg/dL Final   05/03/2023 60 (H) 8 - 23 mg/dL Final       Hospital Course: No notes on file    Interval History: She underwent a femoral endarterectomy, femoral and the placement of a stent on the iliac artery. She lost 600 mL of blood approximately (Hb went from 8.2 to 7.6). Overnight, the patient didn't have any complaints.     Past Medical History:   Diagnosis Date    Cancer     rectum    CKD (chronic kidney disease)     Diabetes mellitus, type 2     Hyperlipidemia     Hypertension     Neutropenic fever 5/27/2016    PVD (peripheral vascular disease)        Past Surgical History:   Procedure Laterality Date    ANGIOGRAM, LOWER ARTERIAL, UNILATERAL Right 5/5/2023    Procedure: Angiogram, Lower Arterial, Unilateral;  Surgeon: Remington Ribeiro MD;  Location: [facility] CATH LAB/EP;  Service: Cardiology;  Laterality: Right;    COLONOSCOPY N/A 3/28/2016    Procedure: COLONOSCOPY;  Surgeon: Mitul Buitrago Jr., MD;  Location: [facility] ENDO;  Service: Endoscopy;  Laterality: N/A;    EYE SURGERY Left     cataract removal with lens implant.    FEMORAL ARTERY STENT  before 2010    PERIPHERAL ANGIOGRAPHY N/A 5/3/2023    Procedure: Peripheral angiography;  Surgeon: Rasheeda Ny MD;  Location: [facility] CATH LAB/EP;  Service: Cardiology;  Laterality: N/A;    RENAL ARTERY STENT  2010       Review of patient's allergies indicates:   Allergen Reactions    Chantix [varenicline]      Tongue swelling      Wellbutrin [bupropion hcl]      Tongue swelling         No current facility-administered medications on file prior to encounter.     Current Outpatient Medications on File Prior to Encounter   Medication Sig    aspirin 81 MG Chew Take 1 tablet (81 mg total) by mouth once daily.    atorvastatin (LIPITOR) 80 MG tablet Take 1 tablet (80 mg total) by mouth once daily.    carvediloL (COREG) 25 MG tablet Take 1 tablet by mouth 2 (two) times daily.    clopidogreL  (PLAVIX) 75 mg tablet Take 75 mg by mouth once daily.    ezetimibe (ZETIA) 10 mg tablet Take 10 mg by mouth once daily.    furosemide (LASIX) 20 MG tablet Take 20 mg by mouth once daily.    gabapentin (NEURONTIN) 100 MG capsule Take 1 capsule (100 mg total) by mouth 2 (two) times daily.    hydrALAZINE (APRESOLINE) 25 MG tablet Take 2 tablets by mouth 2 (two) times daily.    NIFEdipine (PROCARDIA-XL) 60 MG (OSM) 24 hr tablet Take 1 tablet (60 mg total) by mouth once daily.    sodium bicarbonate 650 MG tablet Take 650 mg by mouth 2 (two) times daily.    [DISCONTINUED] traMADoL (ULTRAM) 50 mg tablet Take 1 tablet (50 mg total) by mouth every 8 (eight) hours as needed for Pain.     Family History       Problem Relation (Age of Onset)    Diabetes Father    Heart attack Mother    Hypertension Sister          Tobacco Use    Smoking status: Former     Packs/day: 1.50     Years: 45.00     Pack years: 67.50     Types: Cigarettes     Quit date: 2013     Years since quittin.9     Passive exposure: Never    Smokeless tobacco: Never   Substance and Sexual Activity    Alcohol use: No     Alcohol/week: 0.0 standard drinks    Drug use: No    Sexual activity: Not Currently     Review of Systems As per interval history    Objective:     Vital Signs (Most Recent):  Temp: 97.2 °F (36.2 °C) (05/10/23 1600)  Pulse: 72 (05/10/23 1600)  Resp: 19 (05/10/23 1600)  BP: (!) 146/72 (05/10/23 1315)  SpO2: 98 % (05/10/23 1600) Vital Signs (24h Range):  Temp:  [96.6 °F (35.9 °C)-98.6 °F (37 °C)] 97.2 °F (36.2 °C)  Pulse:  [64-81] 72  Resp:  [8-27] 19  SpO2:  [91 %-100 %] 98 %  BP: (114-163)/(54-98) 146/72  Arterial Line BP: (114-149)/(36-49) 119/38     Weight: 58.1 kg (128 lb 1.4 oz)  Body mass index is 23.43 kg/m².    Intake/Output Summary (Last 24 hours) at 5/10/2023 1745  Last data filed at 5/10/2023 0600  Gross per 24 hour   Intake 2536.82 ml   Output 665 ml   Net 1871.82 ml      Physical Exam  Cardiovascular:      Rate and Rhythm:  Normal rate and regular rhythm.      Comments: Pedal pulses are diminished on both feet.   Pulmonary:      Breath sounds: Normal breath sounds.   Abdominal:      General: There is distension.      Palpations: Abdomen is soft.      Tenderness: There is no abdominal tenderness. There is no guarding.   Musculoskeletal:         General: Tenderness present.      Comments: Tenderness on the right leg.   Skin:     Findings: Lesion present.      Comments: Multiple bruises over her arms.   Neurological:      Mental Status: She is alert and oriented to person, place, and time.       Significant Labs: All pertinent labs within the past 24 hours have been reviewed.  CBC:   Recent Labs   Lab 05/09/23  0403 05/09/23  1953 05/10/23  0300   WBC 4.26 7.14  7.14 3.40*   HGB 8.0* 8.2*  8.2* 7.6*   HCT 25.2* 26.4*  26.4* 25.2*   PLT 92* 155  155 106*     CMP:   Recent Labs   Lab 05/09/23  0403 05/09/23  1953 05/10/23  0300    140  140 142  142   K 4.3 4.9  4.9 4.7  4.7    110  110 111*  111*   CO2 21* 19*  19* 21*  21*   * 138*  138* 142*  142*   BUN 97* 83*  83* 89*  89*   CREATININE 2.7* 2.3*  2.3* 2.4*  2.4*   CALCIUM 8.4* 8.4*  8.4* 8.2*  8.2*   PROT  --   --  5.7*   ALBUMIN 2.5*  --  2.5*  2.5*   BILITOT  --   --  0.3   ALKPHOS  --   --  83   AST  --   --  36   ALT  --   --  22   ANIONGAP 10 11  11 10  10     Magnesium:   Recent Labs   Lab 05/09/23  0403 05/10/23  0300   MG 2.2 2.2       Significant Imaging: I have reviewed all pertinent imaging results/findings within the past 24 hours.    Medical Student Subjective & Objective

## 2023-05-10 NOTE — PROGRESS NOTES
Daughters @ [facility] - updated on pt status and POC. They went home for the night and will return in AM.

## 2023-05-10 NOTE — SUBJECTIVE & OBJECTIVE
Follow-up For: Procedure(s) (LRB):  ENDARTERECTOMY, FEMORAL (Right)  STENT, ILIAC ARTERY (Right)    Post-Operative Day: Day of Surgery     Past Medical History:   Diagnosis Date    Cancer     rectum    CKD (chronic kidney disease)     Diabetes mellitus, type 2     Hyperlipidemia     Hypertension     Neutropenic fever 2016    PVD (peripheral vascular disease)        Past Surgical History:   Procedure Laterality Date    ANGIOGRAM, LOWER ARTERIAL, UNILATERAL Right 2023    Procedure: Angiogram, Lower Arterial, Unilateral;  Surgeon: Remington Ribeiro MD;  Location: [facility] CATH LAB/EP;  Service: Cardiology;  Laterality: Right;    COLONOSCOPY N/A 3/28/2016    Procedure: COLONOSCOPY;  Surgeon: Mitul Buitrago Jr., MD;  Location: [facility] ENDO;  Service: Endoscopy;  Laterality: N/A;    EYE SURGERY Left     cataract removal with lens implant.    FEMORAL ARTERY STENT  before     PERIPHERAL ANGIOGRAPHY N/A 5/3/2023    Procedure: Peripheral angiography;  Surgeon: Rasheeda Ny MD;  Location: [facility] CATH LAB/EP;  Service: Cardiology;  Laterality: N/A;    RENAL ARTERY STENT         Review of patient's allergies indicates:   Allergen Reactions    Chantix [varenicline]      Tongue swelling      Wellbutrin [bupropion hcl]      Tongue swelling         Family History       Problem Relation (Age of Onset)    Diabetes Father    Heart attack Mother    Hypertension Sister          Tobacco Use    Smoking status: Former     Packs/day: 1.50     Years: 45.00     Pack years: 67.50     Types: Cigarettes     Quit date: 2013     Years since quittin.9     Passive exposure: Never    Smokeless tobacco: Never   Substance and Sexual Activity    Alcohol use: No     Alcohol/week: 0.0 standard drinks    Drug use: No    Sexual activity: Not Currently      Review of Systems   Reason unable to perform ROS: Lethargic.   Objective:     Vital Signs (Most Recent):  Temp: 98.4 °F (36.9 °C) (23)  Pulse: 75 (23)  Resp:  14 (05/09/23 2015)  BP: 131/60 (05/09/23 2015)  SpO2: (!) 94 % (05/09/23 2015) Vital Signs (24h Range):  Temp:  [97.7 °F (36.5 °C)-98.4 °F (36.9 °C)] 98.4 °F (36.9 °C)  Pulse:  [75-82] 75  Resp:  [14-19] 14  SpO2:  [92 %-97 %] 94 %  BP: (131-153)/(60-80) 131/60  Arterial Line BP: (114-117)/(36-37) 114/36     Weight: 58.1 kg (128 lb 1.4 oz)  Body mass index is 23.43 kg/m².      Intake/Output Summary (Last 24 hours) at 5/9/2023 2046  Last data filed at 5/9/2023 1947  Gross per 24 hour   Intake 2753 ml   Output 1200 ml   Net 1553 ml          Physical Exam  Vitals reviewed.   Constitutional:       General: She is not in acute distress.     Appearance: Normal appearance. She is not ill-appearing.   HENT:      Head: Atraumatic.   Eyes:      Extraocular Movements: Extraocular movements intact.   Cardiovascular:      Rate and Rhythm: Normal rate.      Comments: Right groin access CDI. No evidence of hematoma  No dopplerable R DP. Left DP dopplerable.   Pulmonary:      Effort: Pulmonary effort is normal. No respiratory distress.   Abdominal:      General: Abdomen is flat. There is distension.      Palpations: Abdomen is soft.   Musculoskeletal:         General: Normal range of motion.      Comments: Motor function and strength remain intact; comparable between the right and left lower extremities    Skin:     General: Skin is warm and dry.      Comments: Sensation remains intact    Neurological:      General: No focal deficit present.      Mental Status: She is alert.   Psychiatric:         Mood and Affect: Mood normal.         Behavior: Behavior normal.          Vents:       Lines/Drains/Airways       Central Venous Catheter Line  Duration                  Port A Cath Single Lumen 06/09/16 right subclavian 2525 days              Drain  Duration                  Urethral Catheter 05/05/23 2145 3 days              Peripheral Intravenous Line  Duration                  Peripheral IV - Single Lumen 05/05/23 2100 22 G  Distal;Left;Posterior Forearm 3 days         Peripheral IV - Single Lumen 05/06/23 0618 Anterior;Distal;Right Forearm 3 days         Peripheral IV - Single Lumen 05/09/23 1427 16 G Right Forearm <1 day                    Significant Labs:    CBC/Anemia Profile:  Recent Labs   Lab 05/08/23  0554 05/09/23 0403 05/09/23 1953   WBC 3.31* 4.26 7.14  7.14   HGB 8.2* 8.0* 8.2*  8.2*   HCT 26.6* 25.2* 26.4*  26.4*   PLT 81* 92* 155  155   MCV 91 88 91  91   RDW 19.5* 19.2* 18.3*  18.3*        Chemistries:  Recent Labs   Lab 05/08/23 0554 05/09/23 0403 05/09/23 1953    140 140  140   K 4.2 4.3 4.9  4.9    109 110  110   CO2 21* 21* 19*  19*   * 97* 83*  83*   CREATININE 2.8* 2.7* 2.3*  2.3*   CALCIUM 8.0* 8.4* 8.4*  8.4*   ALBUMIN 2.4* 2.5*  --    MG 2.1 2.2  --    PHOS 4.1  4.1 4.3  4.3  --        All pertinent labs within the past 24 hours have been reviewed.    Significant Imaging: I have reviewed all pertinent imaging results/findings within the past 24 hours.

## 2023-05-10 NOTE — H&P
[facility] ([facility])  Critical Care - Surgery  History & Physical    Patient Name: Steph Mornoe  MRN: [id number]  Admission Date: 5/6/2023  Code Status: Full Code  Attending Physician: Mariela Finnegan MD   Primary Care Provider: Mane Carmona MD   Principal Problem: Critical limb ischemia of right lower extremity    Subjective:     HPI:  Steph Monroe is a 77-year-old female with HFpEF, PAD, HTN, HLD, DM2, CKD4, single solitary kidney (s/p nephrectomy for RCC in 2018), hx of HCC, and hx of rectal cancer presented to an outside hospital with complaints of right lower extremity and right foot pain.  Was noted to have mottling of her right forefoot.  The patient says that this has been progressively worsening for the last few weeks.  She continues to ambulate, but her distance has been limited secondary to right foot pain.  At the outside hospital, she was found to have ALESIA while she was worked up for this claudication.  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  She was transferred to [facility] for vascular surgery evaluation.     S/p right common femoral endarterectomy with bovine patch angioplasty, stenting of distal right common iliac, PTA/stenting of right external iliac 5/p. Assessed in PACU HDS off pressors.       Hospital/ICU Course:  No notes on file    Follow-up For: Procedure(s) (LRB):  ENDARTERECTOMY, FEMORAL (Right)  STENT, ILIAC ARTERY (Right)    Post-Operative Day: Day of Surgery     Past Medical History:   Diagnosis Date    Cancer     rectum    CKD (chronic kidney disease)     Diabetes mellitus, type 2     Hyperlipidemia     Hypertension     Neutropenic fever 5/27/2016    PVD (peripheral vascular disease)        Past Surgical History:   Procedure Laterality Date    ANGIOGRAM, LOWER ARTERIAL, UNILATERAL Right 5/5/2023    Procedure: Angiogram, Lower Arterial,  Unilateral;  Surgeon: Remington Ribeiro MD;  Location: [facility] CATH LAB/EP;  Service: Cardiology;  Laterality: Right;    COLONOSCOPY N/A 3/28/2016    Procedure: COLONOSCOPY;  Surgeon: Mitul Buitrago Jr., MD;  Location: [facility] ENDO;  Service: Endoscopy;  Laterality: N/A;    EYE SURGERY Left     cataract removal with lens implant.    FEMORAL ARTERY STENT  before     PERIPHERAL ANGIOGRAPHY N/A 5/3/2023    Procedure: Peripheral angiography;  Surgeon: Rasheeda Ny MD;  Location: [facility] CATH LAB/EP;  Service: Cardiology;  Laterality: N/A;    RENAL ARTERY STENT         Review of patient's allergies indicates:   Allergen Reactions    Chantix [varenicline]      Tongue swelling      Wellbutrin [bupropion hcl]      Tongue swelling         Family History       Problem Relation (Age of Onset)    Diabetes Father    Heart attack Mother    Hypertension Sister          Tobacco Use    Smoking status: Former     Packs/day: 1.50     Years: 45.00     Pack years: 67.50     Types: Cigarettes     Quit date: 2013     Years since quittin.9     Passive exposure: Never    Smokeless tobacco: Never   Substance and Sexual Activity    Alcohol use: No     Alcohol/week: 0.0 standard drinks    Drug use: No    Sexual activity: Not Currently      Review of Systems   Reason unable to perform ROS: Lethargic.   Objective:     Vital Signs (Most Recent):  Temp: 98.4 °F (36.9 °C) (23)  Pulse: 75 (23)  Resp: 14 (23)  BP: 131/60 (23)  SpO2: (!) 94 % (23) Vital Signs (24h Range):  Temp:  [97.7 °F (36.5 °C)-98.4 °F (36.9 °C)] 98.4 °F (36.9 °C)  Pulse:  [75-82] 75  Resp:  [14-19] 14  SpO2:  [92 %-97 %] 94 %  BP: (131-153)/(60-80) 131/60  Arterial Line BP: (114-117)/(36-37) 114/36     Weight: 58.1 kg (128 lb 1.4 oz)  Body mass index is 23.43 kg/m².      Intake/Output Summary (Last 24 hours) at 2023  Last data filed at 2023  Gross per 24 hour   Intake 2753 ml    Output 1200 ml   Net 1553 ml          Physical Exam  Vitals reviewed.   Constitutional:       General: She is not in acute distress.     Appearance: Normal appearance. She is not ill-appearing.   HENT:      Head: Atraumatic.   Eyes:      Extraocular Movements: Extraocular movements intact.   Cardiovascular:      Rate and Rhythm: Normal rate.      Comments: Right groin access CDI. No evidence of hematoma  No dopplerable R DP. Left DP dopplerable.   Pulmonary:      Effort: Pulmonary effort is normal. No respiratory distress.   Abdominal:      General: Abdomen is flat. There is distension.      Palpations: Abdomen is soft.   Musculoskeletal:         General: Normal range of motion.      Comments: Motor function and strength remain intact; comparable between the right and left lower extremities    Skin:     General: Skin is warm and dry.      Comments: Sensation remains intact    Neurological:      General: No focal deficit present.      Mental Status: She is alert.   Psychiatric:         Mood and Affect: Mood normal.         Behavior: Behavior normal.          Vents:       Lines/Drains/Airways       Central Venous Catheter Line  Duration                  Port A Cath Single Lumen 06/09/16 right subclavian 2525 days              Drain  Duration                  Urethral Catheter 05/05/23 2145 3 days              Peripheral Intravenous Line  Duration                  Peripheral IV - Single Lumen 05/05/23 2100 22 G Distal;Left;Posterior Forearm 3 days         Peripheral IV - Single Lumen 05/06/23 0618 Anterior;Distal;Right Forearm 3 days         Peripheral IV - Single Lumen 05/09/23 1427 16 G Right Forearm <1 day                    Significant Labs:    CBC/Anemia Profile:  Recent Labs   Lab 05/08/23  0554 05/09/23  0403 05/09/23  1953   WBC 3.31* 4.26 7.14  7.14   HGB 8.2* 8.0* 8.2*  8.2*   HCT 26.6* 25.2* 26.4*  26.4*   PLT 81* 92* 155  155   MCV 91 88 91  91   RDW 19.5* 19.2* 18.3*  18.3*         Chemistries:  Recent Labs   Lab 05/08/23  0554 05/09/23  0403 05/09/23 1953    140 140  140   K 4.2 4.3 4.9  4.9    109 110  110   CO2 21* 21* 19*  19*   * 97* 83*  83*   CREATININE 2.8* 2.7* 2.3*  2.3*   CALCIUM 8.0* 8.4* 8.4*  8.4*   ALBUMIN 2.4* 2.5*  --    MG 2.1 2.2  --    PHOS 4.1  4.1 4.3  4.3  --        All pertinent labs within the past 24 hours have been reviewed.    Significant Imaging: I have reviewed all pertinent imaging results/findings within the past 24 hours.    Assessment/Plan:     Other  * Critical limb ischemia of right lower extremity  Steph Monroe is a 77-year-old female with HFpEF, PAD, HTN, HLD, DM2, CKD4, single solitary kidney (s/p nephrectomy for RCC in 2018), hx of HCC, and hx of rectal cancer presented to an outside hospital with complaints of right lower extremity and right foot pain. S/p right common femoral endarterectomy with bovine patch angioplasty, stenting of distal right common iliac, PTA/stenting of right external iliac  Received 1 unit pRBCs, 1 FFP, 1 platelets. Assessed in PACU HDS off pressors.       Neuro/Psych:   -- Sedation:  none  -- Pain: tylenol, gabapentin, oxy and dilaudid prn             Cards:   -- HDS  -- ASA, statin, plavix starting 5/10  -- Hx HTN: carvedilol 25mg BID, hydralazine 50 mg BID, nifedipine 60 mg daily    -holding postoperatively      Pulm:   -- Goal O2 sat > 90%  -- Wean as able      Renal:  -- Keep spears for strict I/O  -- BUN/Cr 83/2.3 (baseline ~1.8)      FEN / GI:   -- Net +2L/24hr  -- Replace lytes as needed  -- Nutrition: renal diet  -- LR 75cc/hr      ID:   -- Tm: afebrile; WBC 7  -- Abx ancef 2 doses post op      Heme/Onc:   -- H/H stable 8.2/26  --received 1 unit pRBCs, FFP, platelets  -- Daily CBC  --platelets 155 from 92      Endo:   -- Gluc goal 140-180  -- Hx T2DM (HgbA1c 5)  --SSI      PPx:   Feeding: renal diet  Analgesia/Sedation: tylenol, gabapentin, oxy and dilaudid prn  Thromboembolic  prevention: subq heparin  HOB >30: flat for 2 hours post op, then HOB >30  Stress Ulcer ppx: no  Glucose control: Critical care goal 140-180 g/dl, ISS    Lines/Drains/Airway: PIV x 3, right subclavian port, spears, art line      Dispo/Code Status/Palliative:   -- SICU / Full Code           Critical care was time spent personally by me on the following activities: development of treatment plan with patient or surrogate and bedside caregivers, discussions with consultants, evaluation of patient's response to treatment, examination of patient, ordering and performing treatments and interventions, ordering and review of laboratory studies, ordering and review of radiographic studies, pulse oximetry, re-evaluation of patient's condition.  This critical care time did not overlap with that of any other provider or involve time for any procedures.     BLAZE KOENIG MD  Critical Care - Surgery  [facility] - Surgery ([facility])

## 2023-05-10 NOTE — ANESTHESIA PROCEDURE NOTES
Arterial    Diagnosis: peripheral arterial disease    Patient location during procedure: done in OR  Procedure start time: 5/9/2023 2:20 PM  Timeout: 5/9/2023 2:20 PM  Procedure end time: 5/9/2023 2:30 PM    Staffing  Authorizing Provider: Mukund Zambrano MD  Performing Provider: Kenzie Recinos MD    Anesthesiologist was present at the time of the procedure.    Preanesthetic Checklist  Completed: patient identified, IV checked, site marked, risks and benefits discussed, surgical consent, monitors and equipment checked, pre-op evaluation, timeout performed and anesthesia consent givenArterial  Skin Prep: chlorhexidine gluconate  Local Infiltration: none  Orientation: left  Location: radial    Catheter Size: 20 G  Catheter placement by Ultrasound guidance. Heme positive aspiration all ports.   Vessel Caliber: small, patent  Needle advanced into vessel with real time Ultrasound guidance.Insertion Attempts: 1  Assessment  Dressing: secured with tape and tegaderm  Patient: Tolerated well

## 2023-05-10 NOTE — TRANSFER OF CARE
"Anesthesia Transfer of Care Note    Patient: Steph Monroe    Procedure(s) Performed: Procedure(s) (LRB):  ENDARTERECTOMY, FEMORAL (Right)  STENT, ILIAC ARTERY (Right)    Patient location: PACU    Anesthesia Type: general    Transport from OR: Transported from OR on 6-10 L/min O2 by face mask with adequate spontaneous ventilation. Continuous ECG monitoring in transport. Continuous SpO2 monitoring in transport    Post pain: adequate analgesia    Post assessment: no apparent anesthetic complications and tolerated procedure well    Post vital signs: stable    Level of consciousness: sedated    Nausea/Vomiting: no nausea/vomiting    Complications: none    Transfer of care protocol was followed      Last vitals:   Visit Vitals  BP (!) 141/63   Pulse 79   Temp 36.7 °C (98.1 °F) (Temporal)   Resp 18   Ht 5' 2" (1.575 m)   Wt 58.1 kg (128 lb 1.4 oz)   SpO2 (!) 94%   Breastfeeding No   BMI 23.43 kg/m²     "

## 2023-05-10 NOTE — ASSESSMENT & PLAN NOTE
Steph Monroe is a 77yoF with a complex medical history including significant peripheral arterial disease with previous bilateral common iliac stent placement. She presented to an outside hospital with worsening right lower extremity claudication. Her right foot with evidence of ischemic changes. She was transferred for vascular surgery evaluation.     - d/c regan  - d/c jessi  - angiogram demonstrated high grade stenosis of right common iliac stent, complete occlusion of right common femoral artery   Pre-op JOSE:  right 0.15;  Left 0.58  - continue aspirin, plavix, high-intensity statin therapy  - PT/OT. OOB and ambulate today  - stable for stepdown from ICU level care. Spent the night in PACU due to lack of ICU beds   - rest of care per primary team  - vascular to continue to follow

## 2023-05-10 NOTE — PROGRESS NOTES
Dr Carolina (SICU) notified of AM lab results and 30ml/hr of hour for last several hours. No orders received.

## 2023-05-10 NOTE — SUBJECTIVE & OBJECTIVE
Medications:  Continuous Infusions:  Scheduled Meds:   aspirin  81 mg Oral Daily    atorvastatin  80 mg Oral Daily    clopidogreL  75 mg Oral Daily    ezetimibe  10 mg Oral Daily    gabapentin  100 mg Oral BID    heparin (porcine)  5,000 Units Subcutaneous Q8H    mupirocin   Nasal BID    NIFEdipine  30 mg Oral Daily    polyethylene glycol  17 g Oral Daily    senna-docusate 8.6-50 mg  1 tablet Oral Daily    sodium bicarbonate  650 mg Oral TID     PRN Meds:acetaminophen, albuterol-ipratropium, dextrose 10%, dextrose 10%, dextrose, dextrose, glucagon (human recombinant), hydrALAZINE, HYDROmorphone, insulin aspart U-100, naloxone, ondansetron, oxyCODONE, oxyCODONE, sodium chloride 0.9%     Objective:     Vital Signs (Most Recent):  Temp: 97.2 °F (36.2 °C) (05/10/23 0700)  Pulse: 78 (05/10/23 1345)  Resp: 20 (05/10/23 1345)  BP: (!) 146/72 (05/10/23 1315)  SpO2: 97 % (05/10/23 1345) Vital Signs (24h Range):  Temp:  [97.2 °F (36.2 °C)-98.6 °F (37 °C)] 97.2 °F (36.2 °C)  Pulse:  [64-81] 78  Resp:  [8-27] 20  SpO2:  [91 %-100 %] 97 %  BP: (114-163)/(54-98) 146/72  Arterial Line BP: (114-149)/(36-49) 131/44          Physical Exam  Constitutional:       General: She is not in acute distress.     Appearance: She is not ill-appearing.   Cardiovascular:      Rate and Rhythm: Normal rate.      Comments: Biphasic PT right foot   No signal to DP  Pulmonary:      Effort: Pulmonary effort is normal. No respiratory distress.   Skin:     Comments: Right foot skin mottling marginally improving   Neurological:      General: No focal deficit present.      Mental Status: She is alert.        Significant Labs:  CBC:   Recent Labs   Lab 05/10/23  0300   WBC 3.40*   RBC 2.77*   HGB 7.6*   HCT 25.2*   *   MCV 91   MCH 27.4   MCHC 30.2*     CMP:   Recent Labs   Lab 05/10/23  0300   *  142*   CALCIUM 8.2*  8.2*   ALBUMIN 2.5*  2.5*   PROT 5.7*     142   K 4.7  4.7   CO2 21*  21*   *  111*   BUN 89*  89*    CREATININE 2.4*  2.4*   ALKPHOS 83   ALT 22   AST 36   BILITOT 0.3       Significant Diagnostics:  I have reviewed all pertinent imaging results/findings within the past 24 hours.

## 2023-05-10 NOTE — PROGRESS NOTES
[facility] - [facility] ([facility])  Vascular Surgery  Progress Note    Patient Name: Steph Monroe  MRN: [id number]  Admission Date: 5/6/2023  Primary Care Provider: Mane Carmona MD    Subjective:     Interval History: NAEO. Pain improved and right foot coloring marginally improved. Biphasic PT signal   Spent the night in PACU due to lack of ICU beds.     Post-Op Info:  Procedure(s) (LRB):  ENDARTERECTOMY, FEMORAL (Right)  STENT, ILIAC ARTERY (Right)   1 Day Post-Op       Medications:  Continuous Infusions:  Scheduled Meds:   aspirin  81 mg Oral Daily    atorvastatin  80 mg Oral Daily    clopidogreL  75 mg Oral Daily    ezetimibe  10 mg Oral Daily    gabapentin  100 mg Oral BID    heparin (porcine)  5,000 Units Subcutaneous Q8H    mupirocin   Nasal BID    NIFEdipine  30 mg Oral Daily    polyethylene glycol  17 g Oral Daily    senna-docusate 8.6-50 mg  1 tablet Oral Daily    sodium bicarbonate  650 mg Oral TID     PRN Meds:acetaminophen, albuterol-ipratropium, dextrose 10%, dextrose 10%, dextrose, dextrose, glucagon (human recombinant), hydrALAZINE, HYDROmorphone, insulin aspart U-100, naloxone, ondansetron, oxyCODONE, oxyCODONE, sodium chloride 0.9%     Objective:     Vital Signs (Most Recent):  Temp: 97.2 °F (36.2 °C) (05/10/23 0700)  Pulse: 78 (05/10/23 1345)  Resp: 20 (05/10/23 1345)  BP: (!) 146/72 (05/10/23 1315)  SpO2: 97 % (05/10/23 1345) Vital Signs (24h Range):  Temp:  [97.2 °F (36.2 °C)-98.6 °F (37 °C)] 97.2 °F (36.2 °C)  Pulse:  [64-81] 78  Resp:  [8-27] 20  SpO2:  [91 %-100 %] 97 %  BP: (114-163)/(54-98) 146/72  Arterial Line BP: (114-149)/(36-49) 131/44          Physical Exam  Constitutional:       General: She is not in acute distress.     Appearance: She is not ill-appearing.   Cardiovascular:      Rate and Rhythm: Normal rate.      Comments: Biphasic PT right foot   No signal to DP  Pulmonary:      Effort: Pulmonary effort is normal. No respiratory distress.   Skin:     Comments: Right foot skin  Viktor Hale remains blunted and withdrawn  He continues to be compliant with his behavioral plan  He left with his mother at 58 to go on a day pass into the community until 2000 tonight  Awaiting his call in at 1600 and eventual return at 2000  mottling marginally improving   Neurological:      General: No focal deficit present.      Mental Status: She is alert.        Significant Labs:  CBC:   Recent Labs   Lab 05/10/23  0300   WBC 3.40*   RBC 2.77*   HGB 7.6*   HCT 25.2*   *   MCV 91   MCH 27.4   MCHC 30.2*     CMP:   Recent Labs   Lab 05/10/23  0300   *  142*   CALCIUM 8.2*  8.2*   ALBUMIN 2.5*  2.5*   PROT 5.7*     142   K 4.7  4.7   CO2 21*  21*   *  111*   BUN 89*  89*   CREATININE 2.4*  2.4*   ALKPHOS 83   ALT 22   AST 36   BILITOT 0.3       Significant Diagnostics:  I have reviewed all pertinent imaging results/findings within the past 24 hours.    Assessment/Plan:     PAD (peripheral artery disease)  Steph Monroe is a 77yoF with a complex medical history including significant peripheral arterial disease with previous bilateral common iliac stent placement. She presented to an outside hospital with worsening right lower extremity claudication. Her right foot with evidence of ischemic changes. She was transferred for vascular surgery evaluation.     - d/c regan  - d/c jessi  - angiogram demonstrated high grade stenosis of right common iliac stent, complete occlusion of right common femoral artery   Pre-op JOSE:  right 0.15;  Left 0.58  - continue aspirin, plavix, high-intensity statin therapy  - PT/OT. OOB and ambulate today  - stable for stepdown from ICU level care. Spent the night in PACU due to lack of ICU beds   - rest of care per primary team  - vascular to continue to follow        Lucrecia Tiwari MD  Vascular Surgery  [facility] - Surgery ([facility])

## 2023-05-10 NOTE — PROGRESS NOTES
[facility] - Surgery ([facility])  Critical Care - Surgery  Progress Note    Patient Name: Steph Monroe  MRN: [id number]  Admission Date: 5/6/2023  Hospital Length of Stay: 4 days  Code Status: Full Code  Attending Provider: Mariela Finnegan MD  Primary Care Provider: Mane Carmona MD   Principal Problem: Critical limb ischemia of right lower extremity    Subjective:     Hospital/ICU Course:  Steph Monroe is a 77-year-old female with HFpEF, PAD, HTN, HLD, DM2, CKD4, single solitary kidney (s/p nephrectomy for RCC in 2018), hx of HCC, and hx of rectal cancer presented to an outside hospital with complaints of right lower extremity and right foot pain.  Was noted to have mottling of her right forefoot.  The patient says that this has been progressively worsening for the last few weeks.  She continues to ambulate, but her distance has been limited secondary to right foot pain.  At the outside hospital, she was found to have ALESIA while she was worked up for this claudication.  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  She was transferred to [facility] for vascular surgery evaluation.     S/p right common femoral endarterectomy with bovine patch angioplasty, stenting of distal right common iliac, PTA/stenting of right external iliac 5/p. Assessed in PACU HDS off pressors.     Interval History/Significant Events: NAOE. HDS. Remains boarded in PACU.   Hgb 7.6 from 8.2    Follow-up For: Procedure(s) (LRB):  ENDARTERECTOMY, FEMORAL (Right)  STENT, ILIAC ARTERY (Right)    Post-Operative Day: 1 Day Post-Op    Objective:     Vital Signs (Most Recent):  Temp: 98.6 °F (37 °C) (05/10/23 0300)  Pulse: 69 (05/10/23 0430)  Resp: 11 (05/10/23 0430)  BP: (!) 127/57 (05/10/23 0400)  SpO2: 100 % (05/10/23 0430) Vital Signs (24h Range):  Temp:  [97.7 °F (36.5 °C)-98.6 °F (37 °C)] 98.6 °F (37 °C)  Pulse:  [64-80]  69  Resp:  [8-20] 11  SpO2:  [91 %-100 %] 100 %  BP: (114-153)/(54-80) 127/57  Arterial Line BP: (114-141)/(36-47) 141/47     Weight: 58.1 kg (128 lb 1.4 oz)  Body mass index is 23.43 kg/m².      Intake/Output Summary (Last 24 hours) at 5/10/2023 0452  Last data filed at 5/10/2023 0400  Gross per 24 hour   Intake 3526.91 ml   Output 895 ml   Net 2631.91 ml          Physical Exam  Vitals reviewed.   Constitutional:       General: She is not in acute distress.     Appearance: Normal appearance. She is not ill-appearing.   HENT:      Head: Atraumatic.   Eyes:      Extraocular Movements: Extraocular movements intact.   Cardiovascular:      Rate and Rhythm: Normal rate.      Comments: Right groin access CDI. No evidence of hematoma  No dopplerable R DP. Left DP dopplerable.   Pulmonary:      Effort: Pulmonary effort is normal. No respiratory distress.   Abdominal:      General: Abdomen is flat. There is distension.      Palpations: Abdomen is soft.   Musculoskeletal:         General: Normal range of motion.      Comments: Motor function and strength remain intact; comparable between the right and left lower extremities    Skin:     General: Skin is warm and dry.      Comments: Sensation remains intact    Neurological:      General: No focal deficit present.      Mental Status: She is alert.   Psychiatric:         Mood and Affect: Mood normal.         Behavior: Behavior normal.          Vents:       Lines/Drains/Airways       Central Venous Catheter Line  Duration                  Port A Cath Single Lumen 06/09/16 right subclavian 2526 days              Drain  Duration                  Urethral Catheter 05/05/23 2145 4 days              Peripheral Intravenous Line  Duration                  Peripheral IV - Single Lumen 05/05/23 2100 22 G Distal;Left;Posterior Forearm 4 days         Peripheral IV - Single Lumen 05/06/23 0618 Anterior;Distal;Right Forearm 3 days         Peripheral IV - Single Lumen 05/09/23 1427 16 G Right  Forearm <1 day                    Significant Labs:    CBC/Anemia Profile:  Recent Labs   Lab 05/09/23  0403 05/09/23  1953 05/10/23  0300   WBC 4.26 7.14  7.14 3.40*   HGB 8.0* 8.2*  8.2* 7.6*   HCT 25.2* 26.4*  26.4* 25.2*   PLT 92* 155  155 106*   MCV 88 91  91 91   RDW 19.2* 18.3*  18.3* 18.8*        Chemistries:  Recent Labs   Lab 05/08/23  0554 05/09/23  0403 05/09/23  1953 05/10/23  0300    140 140  140 142  142   K 4.2 4.3 4.9  4.9 4.7  4.7    109 110  110 111*  111*   CO2 21* 21* 19*  19* 21*  21*   * 97* 83*  83* 89*  89*   CREATININE 2.8* 2.7* 2.3*  2.3* 2.4*  2.4*   CALCIUM 8.0* 8.4* 8.4*  8.4* 8.2*  8.2*   ALBUMIN 2.4* 2.5*  --  2.5*  2.5*   PROT  --   --   --  5.7*   BILITOT  --   --   --  0.3   ALKPHOS  --   --   --  83   ALT  --   --   --  22   AST  --   --   --  36   MG 2.1 2.2  --  2.2   PHOS 4.1  4.1 4.3  4.3  --  5.5*  5.5*       All pertinent labs within the past 24 hours have been reviewed.    Significant Imaging:  I have reviewed all pertinent imaging results/findings within the past 24 hours.    Assessment/Plan:     Other  * Critical limb ischemia of right lower extremity  Steph Monroe is a 77-year-old female with HFpEF, PAD, HTN, HLD, DM2, CKD4, single solitary kidney (s/p nephrectomy for RCC in 2018), hx of HCC, and hx of rectal cancer presented to an outside hospital with complaints of right lower extremity and right foot pain. S/p right common femoral endarterectomy with bovine patch angioplasty, stenting of distal right common iliac, PTA/stenting of right external iliac  Received 1 unit pRBCs, 1 FFP, 1 platelets. Assessed in PACU HDS off pressors.       Neuro/Psych:   -- Sedation:  none  -- Pain: tylenol, gabapentin, oxy and dilaudid prn             Cards:   -- HDS  -- ASA, statin, plavix starting 5/10  -- Hx HTN: carvedilol 25mg BID, hydralazine 50 mg BID, nifedipine 60 mg daily    -holding postoperatively      Pulm:   -- Goal O2 sat >  90%  -- 4L NC. Wean as able      Renal:  -- Keep spears for strict I/O  -- BUN/Cr 83/2.3 --> 89/2.4   -- (baseline ~1.8)   --CKD4 and ALESIA now downtrending. Nephrology following along.   --UOP 740cc/24hr     FEN / GI:   -- Net +1L/24hr  -- Replace lytes as needed  -- Nutrition: renal diet      ID:   -- Tm: afebrile; WBC 7 --> 3.4  -- Abx ancef 2 doses post op      Heme/Onc:   -- H/H stable 8.2/26 --> 7.6/25  --received 1 unit pRBCs, FFP, platelets intraop  -- Daily CBC      Endo:   -- Gluc goal 140-180  -- Hx T2DM (HgbA1c 5)  --SSI      PPx:   Feeding: renal diet  Analgesia/Sedation: tylenol, gabapentin, oxy and dilaudid prn  Thromboembolic prevention: subq heparin  HOB >30: flat for 2 hours post op, then HOB >30  Stress Ulcer ppx: no  Glucose control: Critical care goal 140-180 g/dl, ISS    Lines/Drains/Airway: PIV x 3, right subclavian port, spears, art line      Dispo/Code Status/Palliative:   -- SICU / Full Code           Critical care was time spent personally by me on the following activities: development of treatment plan with patient or surrogate and bedside caregivers, discussions with consultants, evaluation of patient's response to treatment, examination of patient, ordering and performing treatments and interventions, ordering and review of laboratory studies, ordering and review of radiographic studies, pulse oximetry, re-evaluation of patient's condition.  This critical care time did not overlap with that of any other provider or involve time for any procedures.     BLAZE KOENIG MD  Critical Care - Surgery  [facility] - Surgery (2nd Fl)

## 2023-05-10 NOTE — ASSESSMENT & PLAN NOTE
Steph Monroe is a 77-year-old female with HFpEF, PAD, HTN, HLD, DM2, CKD4, single solitary kidney (s/p nephrectomy for RCC in 2018), hx of HCC, and hx of rectal cancer presented to an outside hospital with complaints of right lower extremity and right foot pain. S/p right common femoral endarterectomy with bovine patch angioplasty, stenting of distal right common iliac, PTA/stenting of right external iliac  Received 1 unit pRBCs, 1 FFP, 1 platelets. Assessed in PACU HDS off pressors.       Neuro/Psych:   -- Sedation:  none  -- Pain: tylenol, gabapentin, oxy and dilaudid prn             Cards:   -- HDS  -- ASA, statin, plavix starting 5/10  -- Hx HTN: carvedilol 25mg BID, hydralazine 50 mg BID, nifedipine 60 mg daily    -holding postoperatively      Pulm:   -- Goal O2 sat > 90%  -- 4L NC. Wean as able      Renal:  -- Keep spears for strict I/O  -- BUN/Cr 83/2.3 --> 89/2.4   -- (baseline ~1.8)   --CKD4 and ALESIA now downtrending. Nephrology following along.      FEN / GI:   -- Net +1L/24hr  -- Replace lytes as needed  -- Nutrition: renal diet      ID:   -- Tm: afebrile; WBC 7 --> 3.4  -- Abx ancef 2 doses post op      Heme/Onc:   -- H/H stable 8.2/26 --> 7.6/25  --received 1 unit pRBCs, FFP, platelets intraop  -- Daily CBC      Endo:   -- Gluc goal 140-180  -- Hx T2DM (HgbA1c 5)  --SSI      PPx:   Feeding: renal diet  Analgesia/Sedation: tylenol, gabapentin, oxy and dilaudid prn  Thromboembolic prevention: subq heparin  HOB >30: flat for 2 hours post op, then HOB >30  Stress Ulcer ppx: no  Glucose control: Critical care goal 140-180 g/dl, ISS    Lines/Drains/Airway: PIV x 3, right subclavian port, spears, art line      Dispo/Code Status/Palliative:   -- SICU / Full Code

## 2023-05-10 NOTE — ASSESSMENT & PLAN NOTE
Steph Monroe is a 77-year-old female with HFpEF, PAD, HTN, HLD, DM2, CKD4, single solitary kidney (s/p nephrectomy for RCC in 2018), hx of HCC, and hx of rectal cancer presented to an outside hospital with complaints of right lower extremity and right foot pain. S/p right common femoral endarterectomy with bovine patch angioplasty, stenting of distal right common iliac, PTA/stenting of right external iliac  Received 1 unit pRBCs, 1 FFP, 1 platelets. Assessed in PACU HDS off pressors.       Neuro/Psych:   -- Sedation:  none  -- Pain: tylenol, gabapentin, oxy and dilaudid prn             Cards:   -- HDS  -- ASA, statin, plavix starting 5/10  -- Hx HTN: carvedilol 25mg BID, hydralazine 50 mg BID, nifedipine 60 mg daily    -holding postoperatively      Pulm:   -- Goal O2 sat > 90%  -- Wean as able      Renal:  -- Keep spears for strict I/O  -- BUN/Cr 83/2.3 (baseline ~1.8)      FEN / GI:   -- Net +2L/24hr  -- Replace lytes as needed  -- Nutrition: renal diet  -- LR 75cc/hr      ID:   -- Tm: afebrile; WBC 7  -- Abx ancef 2 doses post op      Heme/Onc:   -- H/H stable 8.2/26  --received 1 unit pRBCs, FFP, platelets  -- Daily CBC  --platelets 155 from 92      Endo:   -- Gluc goal 140-180  -- Hx T2DM (HgbA1c 5)  --SSI      PPx:   Feeding: renal diet  Analgesia/Sedation: tylenol, gabapentin, oxy and dilaudid prn  Thromboembolic prevention: subq heparin  HOB >30: flat for 2 hours post op, then HOB >30  Stress Ulcer ppx: no  Glucose control: Critical care goal 140-180 g/dl, ISS    Lines/Drains/Airway: PIV x 3, right subclavian port, spears, art line      Dispo/Code Status/Palliative:   -- SICU / Full Code

## 2023-05-10 NOTE — SUBJECTIVE & OBJECTIVE
Interval History/Significant Events: NAOE. HDS. Remains boarded in PACU.   Hgb 7.6 from 8.2    Follow-up For: Procedure(s) (LRB):  ENDARTERECTOMY, FEMORAL (Right)  STENT, ILIAC ARTERY (Right)    Post-Operative Day: 1 Day Post-Op    Objective:     Vital Signs (Most Recent):  Temp: 98.6 °F (37 °C) (05/10/23 0300)  Pulse: 69 (05/10/23 0430)  Resp: 11 (05/10/23 0430)  BP: (!) 127/57 (05/10/23 0400)  SpO2: 100 % (05/10/23 0430) Vital Signs (24h Range):  Temp:  [97.7 °F (36.5 °C)-98.6 °F (37 °C)] 98.6 °F (37 °C)  Pulse:  [64-80] 69  Resp:  [8-20] 11  SpO2:  [91 %-100 %] 100 %  BP: (114-153)/(54-80) 127/57  Arterial Line BP: (114-141)/(36-47) 141/47     Weight: 58.1 kg (128 lb 1.4 oz)  Body mass index is 23.43 kg/m².      Intake/Output Summary (Last 24 hours) at 5/10/2023 0452  Last data filed at 5/10/2023 0400  Gross per 24 hour   Intake 3526.91 ml   Output 895 ml   Net 2631.91 ml          Physical Exam  Vitals reviewed.   Constitutional:       General: She is not in acute distress.     Appearance: Normal appearance. She is not ill-appearing.   HENT:      Head: Atraumatic.   Eyes:      Extraocular Movements: Extraocular movements intact.   Cardiovascular:      Rate and Rhythm: Normal rate.      Comments: Right groin access CDI. No evidence of hematoma  No dopplerable R DP. Left DP dopplerable.   Pulmonary:      Effort: Pulmonary effort is normal. No respiratory distress.   Abdominal:      General: Abdomen is flat. There is distension.      Palpations: Abdomen is soft.   Musculoskeletal:         General: Normal range of motion.      Comments: Motor function and strength remain intact; comparable between the right and left lower extremities    Skin:     General: Skin is warm and dry.      Comments: Sensation remains intact    Neurological:      General: No focal deficit present.      Mental Status: She is alert.   Psychiatric:         Mood and Affect: Mood normal.         Behavior: Behavior normal.          Vents:        Lines/Drains/Airways       Central Venous Catheter Line  Duration                  Port A Cath Single Lumen 06/09/16 right subclavian 2526 days              Drain  Duration                  Urethral Catheter 05/05/23 2145 4 days              Peripheral Intravenous Line  Duration                  Peripheral IV - Single Lumen 05/05/23 2100 22 G Distal;Left;Posterior Forearm 4 days         Peripheral IV - Single Lumen 05/06/23 0618 Anterior;Distal;Right Forearm 3 days         Peripheral IV - Single Lumen 05/09/23 1427 16 G Right Forearm <1 day                    Significant Labs:    CBC/Anemia Profile:  Recent Labs   Lab 05/09/23  0403 05/09/23  1953 05/10/23  0300   WBC 4.26 7.14  7.14 3.40*   HGB 8.0* 8.2*  8.2* 7.6*   HCT 25.2* 26.4*  26.4* 25.2*   PLT 92* 155  155 106*   MCV 88 91  91 91   RDW 19.2* 18.3*  18.3* 18.8*        Chemistries:  Recent Labs   Lab 05/08/23  0554 05/09/23  0403 05/09/23  1953 05/10/23  0300    140 140  140 142  142   K 4.2 4.3 4.9  4.9 4.7  4.7    109 110  110 111*  111*   CO2 21* 21* 19*  19* 21*  21*   * 97* 83*  83* 89*  89*   CREATININE 2.8* 2.7* 2.3*  2.3* 2.4*  2.4*   CALCIUM 8.0* 8.4* 8.4*  8.4* 8.2*  8.2*   ALBUMIN 2.4* 2.5*  --  2.5*  2.5*   PROT  --   --   --  5.7*   BILITOT  --   --   --  0.3   ALKPHOS  --   --   --  83   ALT  --   --   --  22   AST  --   --   --  36   MG 2.1 2.2  --  2.2   PHOS 4.1  4.1 4.3  4.3  --  5.5*  5.5*       All pertinent labs within the past 24 hours have been reviewed.    Significant Imaging:  I have reviewed all pertinent imaging results/findings within the past 24 hours.

## 2023-05-10 NOTE — BRIEF OP NOTE
[facility] - Surgery ([facility])  Surgery Department  Operative Note    SUMMARY     Date of Procedure: 5/9/2023     Procedure: Procedure(s) (LRB):  ENDARTERECTOMY, FEMORAL (Right) . Right common and external iliac artery PTA/stenting    Surgeon(s) and Role:     * FERNANDO Cagle II, MD - Primary     * Demario Rushing MD - Fellow  *Lucrecia Tiwari MD-- Resident    Pre-Operative Diagnosis: PAD (peripheral artery disease) [I73.9]    Post-Operative Diagnosis: Post-Op Diagnosis Codes:     * PAD (peripheral artery disease) [I73.9]    Anesthesia: General/MAC    Operative Findings (including complications, if any): Right common femoral artery occlusion. Severe right external and common iliac artery stenosis    Description of Technical Procedures: Right common femoral endarterectomy with bovine patch angioplasty. Covered balloon expandable stenting of distal right common iliac (7x39 VBX). PTA/stenting of right external iliac (7x80 lifestent)      Estimated Blood Loss (EBL): 250ml           Implants:   Implant Name Type Inv. Item Serial No.  Lot No. LRB No. Used Action   Vascu-Guard     [id number] Right 1 Implanted   STENT VIABAHN BX [id number] 80CM - [id number]  STENT VIABAHN BX [id number] 80CM [id number] [employer]  Right 1 Implanted   STENT LIFESTENT [id number] - [id number] stent peripheral bms - self exp STENT LIFESTENT [id number]  [employer] [id number] Right 1 Implanted       Specimens:   Specimen (24h ago, onward)      None                    Condition: Fair    Disposition: ICU - extubated and stable.    Attestation: I was present and scrubbed for the entire procedure.

## 2023-05-11 LAB
ALBUMIN SERPL BCP-MCNC: 2.4 G/DL (ref 3.5–5.2)
ALBUMIN SERPL BCP-MCNC: 2.4 G/DL (ref 3.5–5.2)
ALP SERPL-CCNC: 80 U/L (ref 55–135)
ALT SERPL W/O P-5'-P-CCNC: <5 U/L (ref 10–44)
ANION GAP SERPL CALC-SCNC: 10 MMOL/L (ref 8–16)
ANION GAP SERPL CALC-SCNC: 10 MMOL/L (ref 8–16)
AST SERPL-CCNC: 47 U/L (ref 10–40)
BASOPHILS # BLD AUTO: 0.01 K/UL (ref 0–0.2)
BASOPHILS NFR BLD: 0.2 % (ref 0–1.9)
BILIRUB SERPL-MCNC: 0.3 MG/DL (ref 0.1–1)
BUN SERPL-MCNC: 88 MG/DL (ref 8–23)
BUN SERPL-MCNC: 88 MG/DL (ref 8–23)
CALCIUM SERPL-MCNC: 8.2 MG/DL (ref 8.7–10.5)
CALCIUM SERPL-MCNC: 8.3 MG/DL (ref 8.7–10.5)
CHLORIDE SERPL-SCNC: 110 MMOL/L (ref 95–110)
CHLORIDE SERPL-SCNC: 111 MMOL/L (ref 95–110)
CO2 SERPL-SCNC: 20 MMOL/L (ref 23–29)
CO2 SERPL-SCNC: 21 MMOL/L (ref 23–29)
CREAT SERPL-MCNC: 2.4 MG/DL (ref 0.5–1.4)
CREAT SERPL-MCNC: 2.5 MG/DL (ref 0.5–1.4)
DIFFERENTIAL METHOD: ABNORMAL
EOSINOPHIL # BLD AUTO: 0 K/UL (ref 0–0.5)
EOSINOPHIL NFR BLD: 0.2 % (ref 0–8)
ERYTHROCYTE [DISTWIDTH] IN BLOOD BY AUTOMATED COUNT: 19.4 % (ref 11.5–14.5)
EST. GFR  (NO RACE VARIABLE): 19.6 ML/MIN/1.73 M^2
EST. GFR  (NO RACE VARIABLE): 20.5 ML/MIN/1.73 M^2
GLUCOSE SERPL-MCNC: 108 MG/DL (ref 70–110)
GLUCOSE SERPL-MCNC: 109 MG/DL (ref 70–110)
HCT VFR BLD AUTO: 25.4 % (ref 37–48.5)
HGB BLD-MCNC: 7.7 G/DL (ref 12–16)
IMM GRANULOCYTES # BLD AUTO: 0.02 K/UL (ref 0–0.04)
IMM GRANULOCYTES NFR BLD AUTO: 0.3 % (ref 0–0.5)
LYMPHOCYTES # BLD AUTO: 0.6 K/UL (ref 1–4.8)
LYMPHOCYTES NFR BLD: 9.8 % (ref 18–48)
MAGNESIUM SERPL-MCNC: 2.2 MG/DL (ref 1.6–2.6)
MCH RBC QN AUTO: 27.9 PG (ref 27–31)
MCHC RBC AUTO-ENTMCNC: 30.3 G/DL (ref 32–36)
MCV RBC AUTO: 92 FL (ref 82–98)
MONOCYTES # BLD AUTO: 0.9 K/UL (ref 0.3–1)
MONOCYTES NFR BLD: 14.6 % (ref 4–15)
NEUTROPHILS # BLD AUTO: 4.5 K/UL (ref 1.8–7.7)
NEUTROPHILS NFR BLD: 74.9 % (ref 38–73)
NRBC BLD-RTO: 0 /100 WBC
PHOSPHATE SERPL-MCNC: 4 MG/DL (ref 2.7–4.5)
PHOSPHATE SERPL-MCNC: 4.1 MG/DL (ref 2.7–4.5)
PLATELET # BLD AUTO: 143 K/UL (ref 150–450)
PMV BLD AUTO: 10.6 FL (ref 9.2–12.9)
POC ACTIVATED CLOTTING TIME K: 227 SEC (ref 74–137)
POC ACTIVATED CLOTTING TIME K: 239 SEC (ref 74–137)
POCT GLUCOSE: 124 MG/DL (ref 70–110)
POCT GLUCOSE: 135 MG/DL (ref 70–110)
POCT GLUCOSE: 145 MG/DL (ref 70–110)
POTASSIUM SERPL-SCNC: 4.3 MMOL/L (ref 3.5–5.1)
POTASSIUM SERPL-SCNC: 4.4 MMOL/L (ref 3.5–5.1)
PROT SERPL-MCNC: 5.8 G/DL (ref 6–8.4)
RBC # BLD AUTO: 2.76 M/UL (ref 4–5.4)
SAMPLE: ABNORMAL
SAMPLE: ABNORMAL
SODIUM SERPL-SCNC: 141 MMOL/L (ref 136–145)
SODIUM SERPL-SCNC: 141 MMOL/L (ref 136–145)
WBC # BLD AUTO: 6.04 K/UL (ref 3.9–12.7)

## 2023-05-11 PROCEDURE — 84100 ASSAY OF PHOSPHORUS: CPT

## 2023-05-11 PROCEDURE — 99233 SBSQ HOSP IP/OBS HIGH 50: CPT | Mod: GC,,, | Performed by: HOSPITALIST

## 2023-05-11 PROCEDURE — 99233 PR SUBSEQUENT HOSPITAL CARE,LEVL III: ICD-10-PCS | Mod: GC,,, | Performed by: HOSPITALIST

## 2023-05-11 PROCEDURE — 63600175 PHARM REV CODE 636 W HCPCS

## 2023-05-11 PROCEDURE — 20600001 HC STEP DOWN PRIVATE ROOM

## 2023-05-11 PROCEDURE — 80053 COMPREHEN METABOLIC PANEL: CPT

## 2023-05-11 PROCEDURE — 25000003 PHARM REV CODE 250: Performed by: STUDENT IN AN ORGANIZED HEALTH CARE EDUCATION/TRAINING PROGRAM

## 2023-05-11 PROCEDURE — 93923 UPR/LXTR ART STDY 3+ LVLS: CPT | Mod: 26,,, | Performed by: SURGERY

## 2023-05-11 PROCEDURE — 93923 PR NON-INVASIVE PHYSIOLOGIC STUDY EXTREMITY 3 LEVELS: ICD-10-PCS | Mod: 26,,, | Performed by: SURGERY

## 2023-05-11 PROCEDURE — 25000003 PHARM REV CODE 250: Performed by: NURSE PRACTITIONER

## 2023-05-11 PROCEDURE — 80069 RENAL FUNCTION PANEL: CPT | Performed by: NURSE PRACTITIONER

## 2023-05-11 PROCEDURE — 25000003 PHARM REV CODE 250

## 2023-05-11 PROCEDURE — 51702 INSERT TEMP BLADDER CATH: CPT

## 2023-05-11 PROCEDURE — 83735 ASSAY OF MAGNESIUM: CPT

## 2023-05-11 PROCEDURE — 51798 US URINE CAPACITY MEASURE: CPT

## 2023-05-11 PROCEDURE — 85025 COMPLETE CBC W/AUTO DIFF WBC: CPT

## 2023-05-11 PROCEDURE — 36415 COLL VENOUS BLD VENIPUNCTURE: CPT | Performed by: NURSE PRACTITIONER

## 2023-05-11 RX ORDER — OXYCODONE HYDROCHLORIDE 5 MG/1
5 TABLET ORAL EVERY 6 HOURS PRN
Status: DISCONTINUED | OUTPATIENT
Start: 2023-05-11 | End: 2023-05-16 | Stop reason: HOSPADM

## 2023-05-11 RX ORDER — HYDROMORPHONE HYDROCHLORIDE 1 MG/ML
0.2 INJECTION, SOLUTION INTRAMUSCULAR; INTRAVENOUS; SUBCUTANEOUS EVERY 6 HOURS PRN
Status: DISCONTINUED | OUTPATIENT
Start: 2023-05-11 | End: 2023-05-16 | Stop reason: HOSPADM

## 2023-05-11 RX ADMIN — MUPIROCIN: 20 OINTMENT TOPICAL at 09:05

## 2023-05-11 RX ADMIN — MUPIROCIN: 20 OINTMENT TOPICAL at 08:05

## 2023-05-11 RX ADMIN — POLYETHYLENE GLYCOL 3350 17 G: 17 POWDER, FOR SOLUTION ORAL at 08:05

## 2023-05-11 RX ADMIN — HEPARIN SODIUM 5000 UNITS: 5000 INJECTION INTRAVENOUS; SUBCUTANEOUS at 03:05

## 2023-05-11 RX ADMIN — SODIUM BICARBONATE 650 MG: 650 TABLET ORAL at 03:05

## 2023-05-11 RX ADMIN — ATORVASTATIN CALCIUM 80 MG: 40 TABLET, FILM COATED ORAL at 08:05

## 2023-05-11 RX ADMIN — SENNOSIDES AND DOCUSATE SODIUM 1 TABLET: 8.6; 5 TABLET ORAL at 08:05

## 2023-05-11 RX ADMIN — ACETAMINOPHEN 650 MG: 325 TABLET ORAL at 08:05

## 2023-05-11 RX ADMIN — GABAPENTIN 100 MG: 100 CAPSULE ORAL at 09:05

## 2023-05-11 RX ADMIN — CLOPIDOGREL BISULFATE 75 MG: 75 TABLET ORAL at 08:05

## 2023-05-11 RX ADMIN — SODIUM BICARBONATE 650 MG: 650 TABLET ORAL at 09:05

## 2023-05-11 RX ADMIN — EZETIMIBE 10 MG: 10 TABLET ORAL at 08:05

## 2023-05-11 RX ADMIN — SODIUM BICARBONATE 650 MG: 650 TABLET ORAL at 08:05

## 2023-05-11 RX ADMIN — HEPARIN SODIUM 5000 UNITS: 5000 INJECTION INTRAVENOUS; SUBCUTANEOUS at 05:05

## 2023-05-11 RX ADMIN — HEPARIN SODIUM 5000 UNITS: 5000 INJECTION INTRAVENOUS; SUBCUTANEOUS at 09:05

## 2023-05-11 RX ADMIN — ASPIRIN 81 MG CHEWABLE TABLET 81 MG: 81 TABLET CHEWABLE at 08:05

## 2023-05-11 RX ADMIN — NIFEDIPINE 30 MG: 30 TABLET, FILM COATED, EXTENDED RELEASE ORAL at 08:05

## 2023-05-11 NOTE — PROGRESS NOTES
[facility] - [facility]  Vascular Surgery  Progress Note    Patient Name: Steph Monroe  MRN: [id number]  Admission Date: 5/6/2023  Primary Care Provider: Mane Carmona MD    Subjective:     Interval History: SUSAN ESCOBEDO.  Labs stable from yesterday.  Encourage patient OOB today.    Post-Op Info:  Procedure(s) (LRB):  ENDARTERECTOMY, FEMORAL (Right)  STENT, ILIAC ARTERY (Right)   2 Days Post-Op       Medications:  Continuous Infusions:  Scheduled Meds:   aspirin  81 mg Oral Daily    atorvastatin  80 mg Oral Daily    clopidogreL  75 mg Oral Daily    ezetimibe  10 mg Oral Daily    gabapentin  100 mg Oral BID    heparin (porcine)  5,000 Units Subcutaneous Q8H    mupirocin   Nasal BID    NIFEdipine  30 mg Oral Daily    polyethylene glycol  17 g Oral Daily    senna-docusate 8.6-50 mg  1 tablet Oral Daily    sodium bicarbonate  650 mg Oral TID     PRN Meds:acetaminophen, albuterol-ipratropium, dextrose 10%, dextrose 10%, dextrose, dextrose, glucagon (human recombinant), hydrALAZINE, HYDROmorphone, insulin aspart U-100, naloxone, ondansetron, oxyCODONE, oxyCODONE, sodium chloride 0.9%     Objective:     Vital Signs (Most Recent):  Temp: 98 °F (36.7 °C) (05/11/23 0749)  Pulse: 77 (05/11/23 0749)  Resp: 18 (05/11/23 0749)  BP: (!) 157/70 (05/11/23 0749)  SpO2: (!) 92 % (05/11/23 0749) Vital Signs (24h Range):  Temp:  [96.6 °F (35.9 °C)-99 °F (37.2 °C)] 98 °F (36.7 °C)  Pulse:  [67-83] 77  Resp:  [12-31] 18  SpO2:  [91 %-100 %] 92 %  BP: (134-166)/(59-72) 157/70  Arterial Line BP: (115-143)/(36-45) 134/44          Physical Exam  Constitutional:       General: She is not in acute distress.     Appearance: She is not ill-appearing.   HENT:      Head: Normocephalic and atraumatic.      Mouth/Throat:      Mouth: Mucous membranes are moist.   Cardiovascular:      Rate and Rhythm: Normal rate.      Comments: Biphasic PT right foot   No signal to DP  Pulmonary:      Effort: Pulmonary effort is normal. No respiratory distress.    Abdominal:      Palpations: Abdomen is soft.   Musculoskeletal:         General: Normal range of motion.      Cervical back: Normal range of motion.   Skin:     General: Skin is warm and dry.      Comments: Right foot skin mottling marginally improving   Neurological:      General: No focal deficit present.      Mental Status: She is alert.   Psychiatric:         Mood and Affect: Mood normal.         Behavior: Behavior normal.        Significant Labs:  CBC:   Recent Labs   Lab 05/11/23 0419   WBC 6.04   RBC 2.76*   HGB 7.7*   HCT 25.4*   *   MCV 92   MCH 27.9   MCHC 30.3*     CMP:   Recent Labs   Lab 05/11/23 0419     109   CALCIUM 8.3*  8.2*   ALBUMIN 2.4*  2.4*   PROT 5.8*     141   K 4.4  4.3   CO2 20*  21*   *  110   BUN 88*  88*   CREATININE 2.5*  2.4*   ALKPHOS 80   ALT <5*   AST 47*   BILITOT 0.3       Significant Diagnostics:  I have reviewed all pertinent imaging results/findings within the past 24 hours.    Assessment/Plan:     PAD (peripheral artery disease)  Steph Monroe is a 77yoF with a complex medical history including significant peripheral arterial disease with previous bilateral common iliac stent placement. She presented to an outside hospital with worsening right lower extremity claudication. Her right foot with evidence of ischemic changes. She was transferred for vascular surgery evaluation.         - angiogram demonstrated high grade stenosis of right common iliac stent, complete occlusion of right common femoral artery   Pre-op VISHNU:  right 0.15;  Left 0.58  -Needs vishnu/tbi today  -renal diet   - continue aspirin, plavix, high-intensity statin therapy  - PT/OT. OOB and ambulate today  - rest of care per primary team  - vascular to continue to follow        Tangela Lay NP  Vascular Surgery  [facility]

## 2023-05-11 NOTE — ASSESSMENT & PLAN NOTE
Steph Monroe is a 77yoF with a complex medical history including significant peripheral arterial disease with previous bilateral common iliac stent placement. She presented to an outside hospital with worsening right lower extremity claudication. Her right foot with evidence of ischemic changes. She was transferred for vascular surgery evaluation.         - angiogram demonstrated high grade stenosis of right common iliac stent, complete occlusion of right common femoral artery   Pre-op JOSE:  right 0.15;  Left 0.58  -Needs jose/tbi today  -renal diet   - continue aspirin, plavix, high-intensity statin therapy  - PT/OT. OOB and ambulate today  - rest of care per primary team  - vascular to continue to follow

## 2023-05-11 NOTE — NURSING
2000 pt arrived to unit via WC with transporter. Pt aaox4, no complaints of pain. Blood sugar WNL, tele monitor on can tele tech aware. Explained to patient the importance of repositioning and weight shifting. Doppler pulses to left foot heard at top of ankle and tibia. Doppler pulses heard at top of right foot and tibia. Notified gen sx MD on call of doppler findings. Explained poc and explained to call for assistance OOB. Bed alarm on, call light within reach, fall contract explained in detail and signed.     2300 Notifed medical team physician on call of bladder scan volume; he instructed to straight cath patient.     0305 bladder scanned pt for the second time, volume 352, Notified MD on call, see new orders         END OF SHIFT NOTE  Pt rested well throughout shift, no distress at this time, no complaints, VSS, encouraged frequent weight shifting. Neurovascular checks remain unchanged; right foot with slight mottling/purple color with doppler pulses present on tibia and top of ankle. Left foot with doppler pulses on tibia and top of ankle as well; pt denies numbness or tingling. Jane now in place due to repeated retention. bed in lowest position, side rails up x2. Bed alarm set, personal items within reach. Call light within reach.      Iris Munson, YANCYN RN

## 2023-05-11 NOTE — ASSESSMENT & PLAN NOTE
Hx of nephrectomy. Baseline Cr 1.8-2.2.  Cr 2.8 on arrival  Seen by urology and nephrology who believe etiology to be post-renal. Renal US shows mild hydronephrosis. Jane placed. Patient had angiogram that could have worsened renal function but per nephrology not exposed to contrast.   No indications for HD at this time    Plan:  - Nephrology consulted who recommend future HD need and line placement if does not improve.   - Avoid fluids  - Trend Cr  - Strict I&Os  - Avoid nephrotoxic agents  - Renally adjust medications  - Monitor for urinary retention  - If continues to worsen will consult nephrology

## 2023-05-11 NOTE — MEDICAL/APP STUDENT
[facility]  Progress Note    Patient Name: Steph Monroe  MRN: [id number]  Patient Class: IP- Inpatient   Admission Date: 5/6/2023  Length of Stay: 5 days  Attending Physician: Mariela Finnegan MD  Primary Care Provider: Mane Carmona MD    Subjective:     Principal Problem: Critical limb ischemia of right lower extremity    Chief Complaint: No chief complaint on file.      HPI: Steph Monroe is a 77 y.o. 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 She developed cirrhosis with ascites (unclear etiology from records) requiring paracentesis (last done 11/2022), PVD w/ iliac stents , aortic stenosis, pulm HTN, lung nodule, anemia, cataracts, OA, vitamin D deficiency, and she is a former smoker who quit >10 yrs ago. Pt presented at [facility]  on 5/2/2023 for evaluation as recommended by her podiatrist worsening tip LE pain, worse in the R leg and distant bilateral femoral pulses.    At [facility] pt's presenting symptoms were concerning for critical limb ischemia based on the tip LE arterial doppler results, vascular surgery and cardiology were consulted. Pt was started on IV fluids and heparin gtt and then on 5/3/2023 pt had peripheral angiogram of tip LE with CO2 not with contrast via the R radial artery to the L common femoral artery and per operative report the findings were:     high grade stenosis in right GUNNER.  of CFA collaterals from EIA and profunda artery which provides collaterals to chronically occluded SFA.  Initially 2V runoff with prox  of peroneal artery. Pt provides flow to foot, however has diffuse diease. Unable to adequately visualize right foot circulation.     Pt had a renal ultrasound on 5/5/2023 that showed R sided hydronephrosis,  "non-obstructing small intrarenal calculi on the right, CKD changes, and known absent L kidney. Nephrology was consulted for ALESIA originally thought to be d/t hydronephrosis, pt was started on [place] x10hrs. A spears catheter was placed and Urology was consulted. CT abd & pelvis was done and this did NOT show hydronephrosis, it did show some abdominopelvic ascites, calcifications, and tip common iliac stents. Pt also had an echo and this showed an EF of 65%, with mild MS, mild AS, mild to mod AR, CVP 3, PASP 35 mmHg    Pt was transferred to [facility] for higher level of care anticipating vascular surgery intervention     On arrival to [facility] pt presented with the following VS:   Initial Vitals   BP Pulse Resp Temp SpO2   05/06/23 1700 05/06/23 1700 05/06/23 1700 05/06/23 1700 05/06/23 1910   127/61 83 (!) 24 97.2 °F (36.2 °C) 95 %     Nephrology was consulted for: "Worsening ALESIA"  Baseline sCr: ~2.0 based on labs in Care Everywhere for the past year  Admission sCr: 1.8  Pt follows outpatient with Dr. Oakley for CKD IV, she was last seen in Jan 2023 and was started on lasix and spironolactone which she was taking prior to admission. 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   Pt denies:  foam in urine, hematuria, dysuria, urinary frequency or urgency, fever, chills, shortness of breath, cough, chest pain, palpitations, nausea, vomiting, diarrhea, abd pain,headaches, visual disturbances or weakness, or rash.    Creatinine   Date Value Ref Range Status   05/06/2023 2.8 (H) 0.5 - 1.4 mg/dL Final   05/05/2023 2.6 (H) 0.5 - 1.4 mg/dL Final   05/04/2023 2.3 (H) 0.5 - 1.4 mg/dL Final   05/04/2023 2.3 (H) 0.5 - 1.4 mg/dL Final   05/03/2023 2.1 (H) 0.5 - 1.4 mg/dL Final   05/02/2023 1.8 (H) 0.5 - 1.4 mg/dL Final   08/06/2020 2.2 (H) 0.5 - 1.4 mg/dL Final   07/16/2018 " 1.8 (H) 0.5 - 1.4 mg/dL Final   01/19/2018 2.2 (H) 0.5 - 1.4 mg/dL Final   01/16/2018 2.1 (H) 0.5 - 1.4 mg/dL Final     BUN   Date Value Ref Range Status   05/06/2023 86 (H) 8 - 23 mg/dL Final   05/05/2023 78 (H) 8 - 23 mg/dL Final   05/04/2023 74 (H) 8 - 23 mg/dL Final   05/04/2023 73 (H) 8 - 23 mg/dL Final   05/03/2023 60 (H) 8 - 23 mg/dL Final       Hospital Course: No notes on file    Interval History: Overnight, the patient didn't have any complaints. A urine bladder scan showed 352 mL of urinary retention and a Jane catheter was placed.    Past Medical History:   Diagnosis Date    Cancer     rectum    CKD (chronic kidney disease)     Diabetes mellitus, type 2     Hyperlipidemia     Hypertension     Neutropenic fever 5/27/2016    PVD (peripheral vascular disease)        Past Surgical History:   Procedure Laterality Date    ANGIOGRAM, LOWER ARTERIAL, UNILATERAL Right 5/5/2023    Procedure: Angiogram, Lower Arterial, Unilateral;  Surgeon: Remington Ribeiro MD;  Location: [facility] CATH LAB/EP;  Service: Cardiology;  Laterality: Right;    COLONOSCOPY N/A 3/28/2016    Procedure: COLONOSCOPY;  Surgeon: Mitul Buitrago Jr., MD;  Location: [facility] ENDO;  Service: Endoscopy;  Laterality: N/A;    ENDARTERECTOMY OF FEMORAL ARTERY Right 5/9/2023    Procedure: ENDARTERECTOMY, FEMORAL;  Surgeon: FERNNADO Cagle II, MD;  Location: [facility] OR [address] [place] [address];  Service: Vascular;  Laterality: Right;  contrast 12ml  Flouro time 11.1 min  mgy 217.83  Gycm2 26.3570    EYE SURGERY Left     cataract removal with lens implant.    FEMORAL ARTERY STENT  before 2010    PERIPHERAL ANGIOGRAPHY N/A 5/3/2023    Procedure: Peripheral angiography;  Surgeon: Rasheeda Ny MD;  Location: [facility] CATH LAB/EP;  Service: Cardiology;  Laterality: N/A;    RENAL ARTERY STENT  2010    STENT, ILIAC ARTERY Right 5/9/2023    Procedure: STENT, ILIAC ARTERY;  Surgeon: FERNANDO Cagle II, MD;  Location: [facility] OR [address] [place] [address];  Service: Vascular;  Laterality: Right;        Review of patient's allergies indicates:   Allergen Reactions    Chantix [varenicline]      Tongue swelling      Wellbutrin [bupropion hcl]      Tongue swelling         No current facility-administered medications on file prior to encounter.     Current Outpatient Medications on File Prior to Encounter   Medication Sig    aspirin 81 MG Chew Take 1 tablet (81 mg total) by mouth once daily.    atorvastatin (LIPITOR) 80 MG tablet Take 1 tablet (80 mg total) by mouth once daily.    carvediloL (COREG) 25 MG tablet Take 1 tablet by mouth 2 (two) times daily.    clopidogreL (PLAVIX) 75 mg tablet Take 75 mg by mouth once daily.    ezetimibe (ZETIA) 10 mg tablet Take 10 mg by mouth once daily.    furosemide (LASIX) 20 MG tablet Take 20 mg by mouth once daily.    gabapentin (NEURONTIN) 100 MG capsule Take 1 capsule (100 mg total) by mouth 2 (two) times daily.    hydrALAZINE (APRESOLINE) 25 MG tablet Take 2 tablets by mouth 2 (two) times daily.    NIFEdipine (PROCARDIA-XL) 60 MG (OSM) 24 hr tablet Take 1 tablet (60 mg total) by mouth once daily.    sodium bicarbonate 650 MG tablet Take 650 mg by mouth 2 (two) times daily.     Family History       Problem Relation (Age of Onset)    Diabetes Father    Heart attack Mother    Hypertension Sister          Tobacco Use    Smoking status: Former     Packs/day: 1.50     Years: 45.00     Pack years: 67.50     Types: Cigarettes     Quit date: 2013     Years since quittin.9     Passive exposure: Never    Smokeless tobacco: Never   Substance and Sexual Activity    Alcohol use: No     Alcohol/week: 0.0 standard drinks    Drug use: No    Sexual activity: Not Currently     Review of Systems As per interval history    Objective:     Vital Signs (Most Recent):  Temp: 97.4 °F (36.3 °C) (23 1222)  Pulse: 80 (23 1222)  Resp: 18 (23 1222)  BP: (!) 142/58 (23 1222)  SpO2: (!) 92 % (23 122) Vital Signs (24h Range):  Temp:  [97.2 °F (36.2 °C)-99 °F (37.2  °C)] 97.4 °F (36.3 °C)  Pulse:  [71-83] 80  Resp:  [12-31] 18  SpO2:  [91 %-99 %] 92 %  BP: (134-166)/(58-70) 142/58  Arterial Line BP: (115-134)/(36-44) 134/44     Weight: 58.1 kg (128 lb 1.4 oz)  Body mass index is 23.43 kg/m².    Intake/Output Summary (Last 24 hours) at 5/11/2023 1434  Last data filed at 5/11/2023 0958  Gross per 24 hour   Intake 400 ml   Output 1250 ml   Net -850 ml      Physical Exam  Cardiovascular:      Rate and Rhythm: Normal rate and regular rhythm.      Heart sounds: Normal heart sounds.      Comments: Pedal pulses are diminished on both feet.  Pulmonary:      Breath sounds: Normal breath sounds.   Abdominal:      General: Bowel sounds are normal.      Palpations: Abdomen is soft.   Musculoskeletal:         General: Swelling and tenderness present.      Comments: Mild edema and tenderness on both legs.   Skin:     Findings: Bruising present.      Comments: Multiple bruises over her arms.   Neurological:      Mental Status: She is alert and oriented to person, place, and time.       Significant Labs: All pertinent labs within the past 24 hours have been reviewed.  CBC:   Recent Labs   Lab 05/09/23  1953 05/10/23  0300 05/11/23  0419   WBC 7.14  7.14 3.40* 6.04   HGB 8.2*  8.2* 7.6* 7.7*   HCT 26.4*  26.4* 25.2* 25.4*     155 106* 143*     CMP:   Recent Labs   Lab 05/09/23  1953 05/10/23  0300 05/11/23  0419     140 142  142 141  141   K 4.9  4.9 4.7  4.7 4.4  4.3     110 111*  111* 111*  110   CO2 19*  19* 21*  21* 20*  21*   *  138* 142*  142* 108  109   BUN 83*  83* 89*  89* 88*  88*   CREATININE 2.3*  2.3* 2.4*  2.4* 2.5*  2.4*   CALCIUM 8.4*  8.4* 8.2*  8.2* 8.3*  8.2*   PROT  --  5.7* 5.8*   ALBUMIN  --  2.5*  2.5* 2.4*  2.4*   BILITOT  --  0.3 0.3   ALKPHOS  --  83 80   AST  --  36 47*   ALT  --  22 <5*   ANIONGAP 11  11 10  10 10  10       Significant Imaging: I have reviewed all pertinent imaging results/findings within the  past 24 hours.        Assessment/Plan:      This is a 77 years old female patient with a history of CKD stage IV, DM, HTN, cirrhosis and HFpEF with an ALESIA, probably ischemic ATN due to an abrupt drop on her blood pressure on admission. Her urine output and labs are improving. Today sCr is 2.5 and BUN is 88.    # ALESIA   - Fluid balance: not overloaded, avoid fluids for now.  - Electrolytes: Na, K, Ca (corrected by albumin) and Cl are on normal values.   K, goal >4, page nephrology if K >5.5, Mg, goal >2, Phos, goal >3 and <5  - Strict input/output and daily weights.   - Acid/base: NAGMA, Sodium bicarb tabs 650mg TID   - Maintain MAP >65 and systolic BP less than 130  - Renal diet/tube feedings if not NPO  - Anemia: Hgb 7.7, transfuse for Hgb <7.0    # CKD stage IV  Probably due to her medical history of DM, HTN, HFpEF and cirrhosis. She had an increased baseline Cr level (2 mg/dL) and was receiving spironolactone and lasik. We need to assess her renal fx after the ALESIA has resolved to recommend further therapy.     Active Diagnoses:    Diagnosis Date Noted POA    PRINCIPAL PROBLEM:  Critical limb ischemia of right lower extremity [I70.221] 05/02/2023 Yes    Metabolic acidosis [E87.20] 05/06/2023 Yes    Chronic heart failure with preserved ejection fraction [I50.32] 05/02/2023 Yes    Pulmonary emphysema [J43.9] 05/02/2023 Yes    alesia on ckd stage 4 [N17.9] 01/16/2018 Yes    History of rectal or anal cancer [Z85.048] 04/04/2016 Yes    Coronary artery disease involving native coronary artery of native heart without angina pectoris [I25.10] 03/26/2016 Yes     Chronic    PAD (peripheral artery disease) [I73.9] 03/26/2016 Yes    HLD (hyperlipidemia) [E78.5] 03/26/2016 Yes     Chronic    Essential hypertension [I10] 03/26/2016 Yes     Chronic    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 hypertension [E11.22, I12.9, N18.30] 03/26/2016 Yes     Chronic      Problems Resolved During this Admission:     VTE Risk Mitigation (From admission,  onward)           Ordered     heparin (porcine) injection 5,000 Units  Every 8 hours         05/09/23 2051     IP VTE HIGH RISK PATIENT  Once         05/06/23 1711     Place sequential compression device  Until discontinued         05/06/23 1711     Reason for No Pharmacological VTE Prophylaxis  Once        Question:  Reasons:  Answer:  IV Heparin w/in 24 hrs. Pre or Post-Op    05/06/23 1711                       Vicenta Lim y - GIS

## 2023-05-11 NOTE — ASSESSMENT & PLAN NOTE
Patient's FSGs are controlled on current medication regimen.  Last A1c reviewed-   Lab Results   Component Value Date    HGBA1C 5.0 05/04/2023     Most recent fingerstick glucose reviewed-   Recent Labs   Lab 05/10/23  1822 05/10/23  2236 05/11/23  1221   POCTGLUCOSE 123* 145* 124*     Current correctional scale  Low  Maintain anti-hyperglycemic dose as follows-   Antihyperglycemics (From admission, onward)    Start     Stop Route Frequency Ordered    05/10/23 1047  insulin aspart U-100 pen 0-5 Units         -- SubQ Before meals & nightly PRN 05/10/23 0949        Hold Oral hypoglycemics while patient is in the hospital.

## 2023-05-11 NOTE — PROGRESS NOTES
"[facility]  Progress Note    Patient Name: Steph Monroe  MRN: [id number]  Patient Class: IP- Inpatient   Admission Date: 5/6/2023  Length of Stay: 5 days  Attending Physician: Mariela Finnegan MD  Primary Care Provider: Mane Carmona MD        Subjective:     Principal Problem:Critical limb ischemia of right lower extremity        HPI:  Steph Monroe is a 76yo F with a PMH of HFpEF, PAD, HTN, HLD, DM2, CKD4, single solitary kidney (s/p nephrectomy), hx of HCC, and hx of rectal cancer currently admitted to the [facility] medicine service for RLE pain.The only home medications she is taking is lasix and na bicarbonate. She quit all of her other medications about a year ago because she "no longer felt like taking all kind of pills". She underwent angiogram with interventional cardiology on 5/5/23, which showed: "high grade stenosis in right GUNNER.  of CFA collaterals from EIA and profunda artery which provides collaterals to chronically occluded SFA.  Initially 2V runoff with prox  of peroneal artery. Pt provides flow to foot, however has diffuse diease. Unable to adequately visualize right foot circulation."     Of note, pt also with worsening ALESIA on CKD. Nephrology following. Urology evaluated pt and placed spears; believed this to be post-renal in origin, and signed off. IC recommended that pt would benefit from vascular surgery intervention. Dr. Ny with IC discussed case with Dr. Cagle from vascular, who recommended initiating heparin gtt and transferring to [facility] for femoral endardectomy; requested  admission.    On arrival, patient was afebrile and HDS on room air. Heparin gtt infusing. Denies fever, chest pain, SOB, abd pain. Endorses moderate pain to R foot only. R foot appears cool and discolored.          Overview/Hospital Course:  78 yo F transferred from [facility] for vascular surgery evaluation for critical limb ischemia of RLE found by IC angiography on 5/5. Will " continue patient on ASA, plavix, and heparin gtt. Vascular sx consulted who will obtain JOSE and toe pressures prior to surgical intervention. Will plan for femoral endarterectomy on 5/9. Nephrology consulted and recommend future need for HD and line placement for ALESIA. Plan to give IVF pre and post op to prevent worsening ALESIA. Sent to SICU for 5/10 and stepped back down.       Interval History: NAEON. Resting in bed eating breakfast. Reports R foot pain overnight that is intermittent. Pulses palpable on both extremities. Will reach out to [facility] sx and nephrology for next steps.     Review of Systems   Constitutional:  Positive for activity change and unexpected weight change. Negative for chills and fever.   HENT:  Negative for sore throat.    Respiratory:  Negative for cough and shortness of breath.    Cardiovascular:  Negative for chest pain and leg swelling.   Gastrointestinal:  Negative for abdominal distention, abdominal pain, diarrhea and vomiting.   Genitourinary:  Negative for dysuria.   Musculoskeletal:  Positive for arthralgias and gait problem.   Neurological:  Negative for headaches.   Hematological:  Bruises/bleeds easily.   Psychiatric/Behavioral:  Negative for confusion.    Objective:     Vital Signs (Most Recent):  Temp: 97.4 °F (36.3 °C) (05/11/23 1222)  Pulse: 80 (05/11/23 1222)  Resp: 18 (05/11/23 1222)  BP: (!) 142/58 (05/11/23 1222)  SpO2: (!) 92 % (05/11/23 1222) Vital Signs (24h Range):  Temp:  [96.6 °F (35.9 °C)-99 °F (37.2 °C)] 97.4 °F (36.3 °C)  Pulse:  [71-83] 80  Resp:  [12-31] 18  SpO2:  [91 %-99 %] 92 %  BP: (134-166)/(58-70) 142/58  Arterial Line BP: (115-134)/(36-44) 134/44     Weight: 58.1 kg (128 lb 1.4 oz)  Body mass index is 23.43 kg/m².    Intake/Output Summary (Last 24 hours) at 5/11/2023 1419  Last data filed at 5/11/2023 0958  Gross per 24 hour   Intake 400 ml   Output 1250 ml   Net -850 ml         Physical Exam  Vitals and nursing note reviewed.   Constitutional:       General:  She is not in acute distress.     Appearance: Normal appearance. She is not ill-appearing.   HENT:      Head: Normocephalic.   Eyes:      General:         Right eye: No discharge.         Left eye: No discharge.      Extraocular Movements: Extraocular movements intact.      Conjunctiva/sclera: Conjunctivae normal.   Cardiovascular:      Rate and Rhythm: Normal rate and regular rhythm.      Heart sounds: Normal heart sounds. No murmur heard.  Pulmonary:      Effort: Pulmonary effort is normal. No respiratory distress.      Breath sounds: Normal breath sounds. No wheezing.   Chest:      Chest wall: No tenderness.   Abdominal:      General: Abdomen is flat. There is no distension.      Palpations: Abdomen is soft.      Tenderness: There is no abdominal tenderness.   Musculoskeletal:         General: Tenderness present. No swelling. Normal range of motion.      Right lower leg: No edema.      Left lower leg: No edema.      Comments: RLE discolored, cool  Pulses palpable on DP,PT 1+  Moderate pain to palpation    LLE warm, pulses noted DP, PT   Skin:     General: Skin is warm.      Findings: Bruising (Noted on all extremities) present. No rash.   Neurological:      General: No focal deficit present.      Mental Status: She is alert and oriented to person, place, and time. Mental status is at baseline.      Cranial Nerves: No cranial nerve deficit.   Psychiatric:         Mood and Affect: Mood normal.         Behavior: Behavior normal.         Thought Content: Thought content normal.           Significant Labs: All pertinent labs within the past 24 hours have been reviewed.  CBC:   Recent Labs   Lab 05/09/23  1953 05/10/23  0300 05/11/23  0419   WBC 7.14  7.14 3.40* 6.04   HGB 8.2*  8.2* 7.6* 7.7*   HCT 26.4*  26.4* 25.2* 25.4*     155 106* 143*     CMP:   Recent Labs   Lab 05/09/23  1953 05/10/23  0300 05/11/23  0419     140 142  142 141  141   K 4.9  4.9 4.7  4.7 4.4  4.3     110 111*  111*  "111*  110   CO2 19*  19* 21*  21* 20*  21*   *  138* 142*  142* 108  109   BUN 83*  83* 89*  89* 88*  88*   CREATININE 2.3*  2.3* 2.4*  2.4* 2.5*  2.4*   CALCIUM 8.4*  8.4* 8.2*  8.2* 8.3*  8.2*   PROT  --  5.7* 5.8*   ALBUMIN  --  2.5*  2.5* 2.4*  2.4*   BILITOT  --  0.3 0.3   ALKPHOS  --  83 80   AST  --  36 47*   ALT  --  22 <5*   ANIONGAP 11  11 10  10 10  10       Significant Imaging: I have reviewed all pertinent imaging results/findings within the past 24 hours.  I have reviewed and interpreted all pertinent imaging results/findings within the past 24 hours.      Assessment/Plan:      * Critical limb ischemia of right lower extremity  76 yo F transferred from [facility] for vascular surgery evaluation and femoral endarterectomy. Patient presented to [place] with RLE pain that has hindered her walking. R foot with absent pedal pulse, pain, and discoloration. IC performed angiography on 5/5 that showed "high grade stenosis in right GUNNER.  of CFA collaterals from EIA and profunda artery which provides collaterals to chronically occluded SFA.  Initially 2V runoff with prox  of peroneal artery. Pt provides flow to foot, however has diffuse diease. Unable to adequately visualize right foot circulation." Transferred on heparin gtt.   No signs of infection noted on exam. No leukocytosis.    JOSE: Right 0.15;  Left 0.58    PLAN:  - Vascular surgery consulted for femoral endarterectomy, appreciate recs.   - Femoral endarterectomy on 5/9. Will follow up [facility] sx recs.  - Continue ASA, plavix, and statin therapy  - Continue Gabapentin for neuropathy pain  - PT/OT ordered  - Pain control w/ Tylenol, hydrocodone, and morphine  - CBC, CMP daily  - Daily coags      Metabolic acidosis  Continue home sodium bicarb      Pulmonary emphysema  PRN Duo nebs ordered      Chronic heart failure with preserved ejection fraction  Echo (05/03/2023) showed EF 65%  Home meds: None    Summary:   The left " ventricle is normal in size with concentric remodeling and normal systolic function.   The estimated ejection fraction is 65%.   Indeterminate left ventricular diastolic function.   There is mild mitral stenosis.   The mean diastolic gradient across the mitral valve is 5 mmHg at a heart rate of 66 bpm.   Normal right ventricular size with normal right ventricular systolic function.   There is mild aortic valve stenosis.   Aortic valve area is 1.28 cm2; peak velocity is 2.33 m/s; mean gradient is 10 mmHg.   Mild-to-moderate aortic regurgitation.   Normal central venous pressure (3 mmHg).   The estimated PA systolic pressure is 35 mmHg.      Plan:  - Holding Coreg  - Hold Lasix in setting of ALESIA  - Daily weights (standing if tolerated)  - Strict I/Os  - Fluid restriction at 1500mL  - Cardiac diet  -Telemetry      alesia on ckd stage 4  Hx of nephrectomy. Baseline Cr 1.8-2.2.  Cr 2.8 on arrival  Seen by urology and nephrology who believe etiology to be post-renal. Renal US shows mild hydronephrosis. Jane placed. Patient had angiogram that could have worsened renal function but per nephrology not exposed to contrast.   No indications for HD at this time    Plan:  - Nephrology consulted who recommend future HD need and line placement if does not improve.   - Avoid fluids  - Trend Cr  - Strict I&Os  - Avoid nephrotoxic agents  - Renally adjust medications  - Monitor for urinary retention  - If continues to worsen will consult nephrology      History of rectal or anal cancer  Hx of rectal cancer and treated with radiation and chemotherapy  No longer on chemotherapy      Coronary artery disease involving native coronary artery of native heart without angina pectoris  Continue DAPT and statin  Monitor for symptoms of angina          PAD (peripheral artery disease)  Hx of PAD w/ previous stents in renal, iliac, and femoral stents  Continue ASA, Plavix, and statin      HLD (hyperlipidemia)  Continue  statin      Essential hypertension  /61 on arrival. Patient has not taken BP meds for past year.     Continue PO hydralazine, Nifedipine  PRN Hydralazine for SBP >180    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 hypertension  Patient's FSGs are controlled on current medication regimen.  Last A1c reviewed-   Lab Results   Component Value Date    HGBA1C 5.0 05/04/2023     Most recent fingerstick glucose reviewed-   Recent Labs   Lab 05/10/23  1822 05/10/23  2236 05/11/23  1221   POCTGLUCOSE 123* 145* 124*     Current correctional scale  Low  Maintain anti-hyperglycemic dose as follows-   Antihyperglycemics (From admission, onward)    Start     Stop Route Frequency Ordered    05/10/23 1047  insulin aspart U-100 pen 0-5 Units         -- SubQ Before meals & nightly PRN 05/10/23 0949        Hold Oral hypoglycemics while patient is in the hospital.      VTE Risk Mitigation (From admission, onward)         Ordered     heparin (porcine) injection 5,000 Units  Every 8 hours         05/09/23 2051     IP VTE HIGH RISK PATIENT  Once         05/06/23 1711     Place sequential compression device  Until discontinued         05/06/23 1711     Reason for No Pharmacological VTE Prophylaxis  Once        Question:  Reasons:  Answer:  IV Heparin w/in 24 hrs. Pre or Post-Op    05/06/23 1711                Discharge Planning   PÉREZ: 5/17/2023     Code Status: Full Code   Is the patient medically ready for discharge?: No    Reason for patient still in hospital (select all that apply): Patient trending condition and Consult recommendations  Discharge Plan A: Home with family                  Jermey Martel DO  Department of Hospital Medicine   [facility]

## 2023-05-11 NOTE — OP NOTE
Vascular Surgery Op Note    Date of Operation/Procedure: 5/9/23    Pre-operative Diagnosis: Right lower extremity ischemic rest pain    Post-operative Diagnosis: same    Anesthesia: general    Operation/Procedure Performed:   Right common femoral endarterectomy with bovine patch angioplasty  Right common iliac artery stenting (7x39mm VBX)  Right external iliac artery stenting (7x80mm Lifestent)    Attending Surgeon: FERNANDO Cagle II, MD    Resident/Fellow: Demario Rushing MD PGY7; Lucrecia Tiwari MD PGY1    Indications: 78yo F with severe stenosis of right common and external iliac arteries and occlusion of right common femoral artery with rest pain and cyanosis of her right foot. Plan right femoral endarterectomy with retrograde iliac artery angiogram/stent.    Procedure in Detail:   Patient was brought to the OR in supine position. General anestheisa administered. Right groin, thigh, and lower abdomen was prepped and draped in sterile fashion. Pre-op antibiotics administered. Time out performed.    Longitudinal incision was made over right common femoral artery. Deepened with electrocautery, blunt, and sharp dissection to expose the common femoral artery. The inguinal ligament identified and lateral soft tissue attachments divided to allow cephalad mobilization of the ligament. The crossing circumflex iliac veins were divided over the proximal common femoral artery. We obtained cicumferential control around proximal common femoral artery. WE then dissected distally and exposed the SFA and profunda circumferentially. We then administered IV heparin for systemic anticoagulation. Additional heparin administered throughout the case to maintain goal ACT >250. We then applied clamps to the SFA, profunda, and proximal common femoral artery. Longitudinal arteriotomy was made and there was dense occlusive calcified plaque in the distal CFA and thrombus in the proximal CFA. We performed thromboendarterectomy of the common  femoral artery, proximal profunda and proximal SFA. The plaque was tacked down in the proximal SFA with multiple interupted 6-0 prolene sutures. We then used a 0.8 x 8cm bovine pericardial patch to close the arteriotomy with running 5-0 prolene sutures. All clamps were briefly released to ensure adequate bleeding and no thrombus and the lumen was flushed with heparinized saline prior to completion of the anastomosis. Once the anastomosis was completed the clamps were released first the profunda then the proximal CFA and finally the SFA. There was a weak palpable pulse in the common femoral artery.     We then obtained retrograde endovascular access to the patch using micropuncture access kit. The wire advanced easily to the proximal common iliac artery. We confirmed intraluminal access with angiogram through the microsheath. Angiography showed 80% stenosis in the right external iliac artery and 99% stenosis in the distal common iliac artery. There were previous kissing iliac stents in place in the common iliac arteries. We then were able to advance a .018 glidewire advantage across the areas of iliac stenosis into the abdominal aorta. We then advanced a .035 seeker catheter over the wire and exchanged for a .035 glide wire. WE then exchanged the catheter out over the wire and advanced a 7F sheath over the wire into the vessel. We performed angiography through the sheath which showed 99% stenosis in the common iliac. We then advanced a 4x80mm balloon over the wire and inflated it within the common iliac artery across the stenosis. We then were able to advance the sheath into the proximal right common iliac artery beyond the area of stenosis. We then advanced a 7x39mm VBX sheath within the sheath to the area of stenosis. WE then backed out the sheath over the stent. We then deployed the stent in the distal common iliac artery inflating to nominal pressure. We then backed out the sheath and performed angiography of the  external iliac artery which again showed severe stenosis. WE then deployed a 7x80mm Lifestent from the proximal to distal external iliac artery. We then post-dilated this with a 6x80mm balloon. We then advanced a glide catheter over the wire into the proximal right common iliac artery to perform completion angiography. Compltion angiogram showed brisk flow through the iliac arteries with a short area of 20% no residual stenosis in the proximal/mid external iliac artery due to dense ecentric calcified plaque. Satisfied with our result we then removed the sheath and closed the arteriotomy with a single 5-0 prolene U-stitch which was placed prior to access. We then reversed heparin with protamine. Thrombin gelfoam was added to wound to assist with hemostasis. The wound was thoroughly irrigated with saline. Hemostasis was excellent. There was a strong palpable pulse in the common, superficial, and deep femoral arteries. The femoral sheath was closed with running vicryl suture. The remaining deep tissue was closed in multiple layers with interupted 2-0 vicryl suture. The deep dermis reapproximated with 3-0 vicryl interupted sutures. The skin closed with interrupted vertical mattress 3-0 nylon sutures and staples. The incision was dressed with bacitracin ointment, 4x4 gauze, and medipore tape. The patient tolerated the procedure well and was extubated and transported to the ICU for recovery.    Estimated Blood loss: 200ml    Blood products: 1 unit PRBC, 1 FFP, 10 pack of platelets    Complications: none    FERNANDO Cagle II, MD, VI  Vascular Surgery  [facility]

## 2023-05-11 NOTE — ASSESSMENT & PLAN NOTE
"78 yo F transferred from [facility] for vascular surgery evaluation and femoral endarterectomy. Patient presented to [place] with RLE pain that has hindered her walking. R foot with absent pedal pulse, pain, and discoloration. IC performed angiography on 5/5 that showed "high grade stenosis in right GUNNER.  of CFA collaterals from EIA and profunda artery which provides collaterals to chronically occluded SFA.  Initially 2V runoff with prox  of peroneal artery. Pt provides flow to foot, however has diffuse diease. Unable to adequately visualize right foot circulation." Transferred on heparin gtt.   No signs of infection noted on exam. No leukocytosis.    JOSE: Right 0.15;  Left 0.58    PLAN:  - Vascular surgery consulted for femoral endarterectomy, appreciate recs.   - Femoral endarterectomy on 5/9. Will follow up [facility] sx recs.  - Continue ASA, plavix, and statin therapy  - Continue Gabapentin for neuropathy pain  - PT/OT ordered  - Pain control w/ Tylenol, hydrocodone, and morphine  - CBC, CMP daily  - Daily coags    "

## 2023-05-11 NOTE — SUBJECTIVE & OBJECTIVE
Interval History: NAEON. Resting in bed eating breakfast. Reports R foot pain overnight that is intermittent. Pulses palpable on both extremities. Will reach out to [facility] sx and nephrology for next steps.     Review of Systems   Constitutional:  Positive for activity change and unexpected weight change. Negative for chills and fever.   HENT:  Negative for sore throat.    Respiratory:  Negative for cough and shortness of breath.    Cardiovascular:  Negative for chest pain and leg swelling.   Gastrointestinal:  Negative for abdominal distention, abdominal pain, diarrhea and vomiting.   Genitourinary:  Negative for dysuria.   Musculoskeletal:  Positive for arthralgias and gait problem.   Neurological:  Negative for headaches.   Hematological:  Bruises/bleeds easily.   Psychiatric/Behavioral:  Negative for confusion.    Objective:     Vital Signs (Most Recent):  Temp: 97.4 °F (36.3 °C) (05/11/23 1222)  Pulse: 80 (05/11/23 1222)  Resp: 18 (05/11/23 1222)  BP: (!) 142/58 (05/11/23 1222)  SpO2: (!) 92 % (05/11/23 1222) Vital Signs (24h Range):  Temp:  [96.6 °F (35.9 °C)-99 °F (37.2 °C)] 97.4 °F (36.3 °C)  Pulse:  [71-83] 80  Resp:  [12-31] 18  SpO2:  [91 %-99 %] 92 %  BP: (134-166)/(58-70) 142/58  Arterial Line BP: (115-134)/(36-44) 134/44     Weight: 58.1 kg (128 lb 1.4 oz)  Body mass index is 23.43 kg/m².    Intake/Output Summary (Last 24 hours) at 5/11/2023 1419  Last data filed at 5/11/2023 0958  Gross per 24 hour   Intake 400 ml   Output 1250 ml   Net -850 ml         Physical Exam  Vitals and nursing note reviewed.   Constitutional:       General: She is not in acute distress.     Appearance: Normal appearance. She is not ill-appearing.   HENT:      Head: Normocephalic.   Eyes:      General:         Right eye: No discharge.         Left eye: No discharge.      Extraocular Movements: Extraocular movements intact.      Conjunctiva/sclera: Conjunctivae normal.   Cardiovascular:      Rate and Rhythm: Normal rate and  regular rhythm.      Heart sounds: Normal heart sounds. No murmur heard.  Pulmonary:      Effort: Pulmonary effort is normal. No respiratory distress.      Breath sounds: Normal breath sounds. No wheezing.   Chest:      Chest wall: No tenderness.   Abdominal:      General: Abdomen is flat. There is no distension.      Palpations: Abdomen is soft.      Tenderness: There is no abdominal tenderness.   Musculoskeletal:         General: Tenderness present. No swelling. Normal range of motion.      Right lower leg: No edema.      Left lower leg: No edema.      Comments: RLE discolored, cool  Pulses palpable on DP,PT 1+  Moderate pain to palpation    LLE warm, pulses noted DP, PT   Skin:     General: Skin is warm.      Findings: Bruising (Noted on all extremities) present. No rash.   Neurological:      General: No focal deficit present.      Mental Status: She is alert and oriented to person, place, and time. Mental status is at baseline.      Cranial Nerves: No cranial nerve deficit.   Psychiatric:         Mood and Affect: Mood normal.         Behavior: Behavior normal.         Thought Content: Thought content normal.           Significant Labs: All pertinent labs within the past 24 hours have been reviewed.  CBC:   Recent Labs   Lab 05/09/23  1953 05/10/23  0300 05/11/23  0419   WBC 7.14  7.14 3.40* 6.04   HGB 8.2*  8.2* 7.6* 7.7*   HCT 26.4*  26.4* 25.2* 25.4*     155 106* 143*     CMP:   Recent Labs   Lab 05/09/23  1953 05/10/23  0300 05/11/23  0419     140 142  142 141  141   K 4.9  4.9 4.7  4.7 4.4  4.3     110 111*  111* 111*  110   CO2 19*  19* 21*  21* 20*  21*   *  138* 142*  142* 108  109   BUN 83*  83* 89*  89* 88*  88*   CREATININE 2.3*  2.3* 2.4*  2.4* 2.5*  2.4*   CALCIUM 8.4*  8.4* 8.2*  8.2* 8.3*  8.2*   PROT  --  5.7* 5.8*   ALBUMIN  --  2.5*  2.5* 2.4*  2.4*   BILITOT  --  0.3 0.3   ALKPHOS  --  83 80   AST  --  36 47*   ALT  --  22 <5*   ANIONGAP  11  11 10  10 10  10       Significant Imaging: I have reviewed all pertinent imaging results/findings within the past 24 hours.  I have reviewed and interpreted all pertinent imaging results/findings within the past 24 hours.

## 2023-05-11 NOTE — ASSESSMENT & PLAN NOTE
Echo (05/03/2023) showed EF 65%  Home meds: None    Summary:   The left ventricle is normal in size with concentric remodeling and normal systolic function.   The estimated ejection fraction is 65%.   Indeterminate left ventricular diastolic function.   There is mild mitral stenosis.   The mean diastolic gradient across the mitral valve is 5 mmHg at a heart rate of 66 bpm.   Normal right ventricular size with normal right ventricular systolic function.   There is mild aortic valve stenosis.   Aortic valve area is 1.28 cm2; peak velocity is 2.33 m/s; mean gradient is 10 mmHg.   Mild-to-moderate aortic regurgitation.   Normal central venous pressure (3 mmHg).   The estimated PA systolic pressure is 35 mmHg.      Plan:  - Holding Coreg  - Hold Lasix in setting of ALESIA  - Daily weights (standing if tolerated)  - Strict I/Os  - Fluid restriction at 1500mL  - Cardiac diet  -Telemetry

## 2023-05-11 NOTE — MED STUDENT SUBJECTIVE & OBJECTIVE
Medical Student Subjective & Objective     Principal Problem: Critical limb ischemia of right lower extremity    Chief Complaint: No chief complaint on file.      HPI: Steph Monroe is a 77 y.o. 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 She developed cirrhosis with ascites (unclear etiology from records) requiring paracentesis (last done 11/2022), PVD w/ iliac stents , aortic stenosis, pulm HTN, lung nodule, anemia, cataracts, OA, vitamin D deficiency, and she is a former smoker who quit >10 yrs ago. Pt presented at [facility]  on 5/2/2023 for evaluation as recommended by her podiatrist worsening tip LE pain, worse in the R leg and distant bilateral femoral pulses.    At [facility] pt's presenting symptoms were concerning for critical limb ischemia based on the tip LE arterial doppler results, vascular surgery and cardiology were consulted. Pt was started on IV fluids and heparin gtt and then on 5/3/2023 pt had peripheral angiogram of tip LE with CO2 not with contrast via the R radial artery to the L common femoral artery and per operative report the findings were:     high grade stenosis in right GUNNER.  of CFA collaterals from EIA and profunda artery which provides collaterals to chronically occluded SFA.  Initially 2V runoff with prox  of peroneal artery. Pt provides flow to foot, however has diffuse diease. Unable to adequately visualize right foot circulation.     Pt had a renal ultrasound on 5/5/2023 that showed R sided hydronephrosis, non-obstructing small intrarenal calculi on the right, CKD changes, and known absent L kidney. Nephrology was consulted for ALESIA originally thought to be d/t hydronephrosis, pt was started on [place] x10hrs. A spears  "catheter was placed and Urology was consulted. CT abd & pelvis was done and this did NOT show hydronephrosis, it did show some abdominopelvic ascites, calcifications, and tip common iliac stents. Pt also had an echo and this showed an EF of 65%, with mild MS, mild AS, mild to mod AR, CVP 3, PASP 35 mmHg    [place] [facility] [place] anticipating vascular surgery intervention     On [place] to [facility] [place] presented with the following VS:   Initial Vitals   BP Pulse Resp Temp SpO2   05/06/23 1700 05/06/23 1700 05/06/23 1700 05/06/23 1700 05/06/23 1910   127/61 83 (!) 24 97.2 °F (36.2 °C) 95 %     Nephrology was consulted for: "Worsening ALESIA"  Baseline sCr: ~2.0 based on labs in Care Everywhere for the past year  Admission sCr: 1.8  Pt follows outpatient with Dr. Oakley for CKD IV, she was last seen in Jan 2023 and was started on lasix and spironolactone which she was taking prior to admission. 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   Pt denies:  foam in urine, hematuria, dysuria, urinary frequency or urgency, fever, chills, shortness of breath, cough, chest pain, palpitations, nausea, vomiting, diarrhea, abd pain,headaches, visual disturbances or weakness, or rash.    Creatinine   Date Value Ref Range Status   05/06/2023 2.8 (H) 0.5 - 1.4 mg/dL Final   05/05/2023 2.6 (H) 0.5 - 1.4 mg/dL Final   05/04/2023 2.3 (H) 0.5 - 1.4 mg/dL Final   05/04/2023 2.3 (H) 0.5 - 1.4 mg/dL Final   05/03/2023 2.1 (H) 0.5 - 1.4 mg/dL Final   05/02/2023 1.8 (H) 0.5 - 1.4 mg/dL Final   08/06/2020 2.2 (H) 0.5 - 1.4 mg/dL Final   07/16/2018 1.8 (H) 0.5 - 1.4 mg/dL Final   01/19/2018 2.2 (H) 0.5 - 1.4 mg/dL Final   01/16/2018 2.1 (H) 0.5 - 1.4 mg/dL Final     BUN   Date Value Ref Range Status   05/06/2023 86 (H) 8 - 23 mg/dL Final   05/05/2023 78 (H) 8 " - 23 mg/dL Final   05/04/2023 74 (H) 8 - 23 mg/dL Final   05/04/2023 73 (H) 8 - 23 mg/dL Final   05/03/2023 60 (H) 8 - 23 mg/dL Final       Hospital Course: No notes on file    Interval History: Overnight, the patient didn't have any complaints. A urine bladder scan showed 352 mL of urinary retention and a Jane catheter was placed.    Past Medical History:   Diagnosis Date    Cancer     rectum    CKD (chronic kidney disease)     Diabetes mellitus, type 2     Hyperlipidemia     Hypertension     Neutropenic fever 5/27/2016    PVD (peripheral vascular disease)        Past Surgical History:   Procedure Laterality Date    ANGIOGRAM, LOWER ARTERIAL, UNILATERAL Right 5/5/2023    Procedure: Angiogram, Lower Arterial, Unilateral;  Surgeon: Remington Ribeiro MD;  Location: [facility] CATH LAB/EP;  Service: Cardiology;  Laterality: Right;    COLONOSCOPY N/A 3/28/2016    Procedure: COLONOSCOPY;  Surgeon: Mitul Buitrago Jr., MD;  Location: [facility] ENDO;  Service: Endoscopy;  Laterality: N/A;    ENDARTERECTOMY OF FEMORAL ARTERY Right 5/9/2023    Procedure: ENDARTERECTOMY, FEMORAL;  Surgeon: FERNANDO Cagle II, MD;  Location: [facility] OR [address] [place] [address];  Service: Vascular;  Laterality: Right;  contrast 12ml  Flouro time 11.1 min  mgy 217.83  Gycm2 26.3570    EYE SURGERY Left     cataract removal with lens implant.    FEMORAL ARTERY STENT  before 2010    PERIPHERAL ANGIOGRAPHY N/A 5/3/2023    Procedure: Peripheral angiography;  Surgeon: Rasheeda Ny MD;  Location: [facility] CATH LAB/EP;  Service: Cardiology;  Laterality: N/A;    RENAL ARTERY STENT  2010    STENT, ILIAC ARTERY Right 5/9/2023    Procedure: STENT, ILIAC ARTERY;  Surgeon: FERNANDO Cagle II, MD;  [facility];  Service: Vascular;  Laterality: Right;       Review of patient's allergies indicates:   Allergen Reactions    Chantix [varenicline]      Tongue swelling      Wellbutrin [bupropion hcl]      Tongue swelling         No current facility-administered medications on  file prior to encounter.     Current Outpatient Medications on File Prior to Encounter   Medication Sig    aspirin 81 MG Chew Take 1 tablet (81 mg total) by mouth once daily.    atorvastatin (LIPITOR) 80 MG tablet Take 1 tablet (80 mg total) by mouth once daily.    carvediloL (COREG) 25 MG tablet Take 1 tablet by mouth 2 (two) times daily.    clopidogreL (PLAVIX) 75 mg tablet Take 75 mg by mouth once daily.    ezetimibe (ZETIA) 10 mg tablet Take 10 mg by mouth once daily.    furosemide (LASIX) 20 MG tablet Take 20 mg by mouth once daily.    gabapentin (NEURONTIN) 100 MG capsule Take 1 capsule (100 mg total) by mouth 2 (two) times daily.    hydrALAZINE (APRESOLINE) 25 MG tablet Take 2 tablets by mouth 2 (two) times daily.    NIFEdipine (PROCARDIA-XL) 60 MG (OSM) 24 hr tablet Take 1 tablet (60 mg total) by mouth once daily.    sodium bicarbonate 650 MG tablet Take 650 mg by mouth 2 (two) times daily.     Family History       Problem Relation (Age of Onset)    Diabetes Father    Heart attack Mother    Hypertension Sister          Tobacco Use    Smoking status: Former     Packs/day: 1.50     Years: 45.00     Pack years: 67.50     Types: Cigarettes     Quit date: 2013     Years since quittin.9     Passive exposure: Never    Smokeless tobacco: Never   Substance and Sexual Activity    Alcohol use: No     Alcohol/week: 0.0 standard drinks    Drug use: No    Sexual activity: Not Currently     Review of Systems As per interval history    Objective:     Vital Signs (Most Recent):  Temp: 97.4 °F (36.3 °C) (23 1222)  Pulse: 80 (23 1222)  Resp: 18 (23 1222)  BP: (!) 142/58 (23 1222)  SpO2: (!) 92 % (23 1222) Vital Signs (24h Range):  Temp:  [97.2 °F (36.2 °C)-99 °F (37.2 °C)] 97.4 °F (36.3 °C)  Pulse:  [71-83] 80  Resp:  [12-31] 18  SpO2:  [91 %-99 %] 92 %  BP: (134-166)/(58-70) 142/58  Arterial Line BP: (115-134)/(36-44) 134/44     Weight: 58.1 kg (128 lb 1.4 oz)  Body mass index is 23.43  kg/m².    Intake/Output Summary (Last 24 hours) at 5/11/2023 1434  Last data filed at 5/11/2023 0958  Gross per 24 hour   Intake 400 ml   Output 1250 ml   Net -850 ml      Physical Exam  Cardiovascular:      Rate and Rhythm: Normal rate and regular rhythm.      Heart sounds: Normal heart sounds.      Comments: Pedal pulses are diminished on both feet.  Pulmonary:      Breath sounds: Normal breath sounds.   Abdominal:      General: Bowel sounds are normal.      Palpations: Abdomen is soft.   Musculoskeletal:         General: Swelling and tenderness present.      Comments: Mild edema and tenderness on both legs.   Skin:     Findings: Bruising present.      Comments: Multiple bruises over her arms.   Neurological:      Mental Status: She is alert and oriented to person, place, and time.       Significant Labs: All pertinent labs within the past 24 hours have been reviewed.  CBC:   Recent Labs   Lab 05/09/23  1953 05/10/23  0300 05/11/23  0419   WBC 7.14  7.14 3.40* 6.04   HGB 8.2*  8.2* 7.6* 7.7*   HCT 26.4*  26.4* 25.2* 25.4*     155 106* 143*     CMP:   Recent Labs   Lab 05/09/23  1953 05/10/23  0300 05/11/23  0419     140 142  142 141  141   K 4.9  4.9 4.7  4.7 4.4  4.3     110 111*  111* 111*  110   CO2 19*  19* 21*  21* 20*  21*   *  138* 142*  142* 108  109   BUN 83*  83* 89*  89* 88*  88*   CREATININE 2.3*  2.3* 2.4*  2.4* 2.5*  2.4*   CALCIUM 8.4*  8.4* 8.2*  8.2* 8.3*  8.2*   PROT  --  5.7* 5.8*   ALBUMIN  --  2.5*  2.5* 2.4*  2.4*   BILITOT  --  0.3 0.3   ALKPHOS  --  83 80   AST  --  36 47*   ALT  --  22 <5*   ANIONGAP 11  11 10  10 10  10       Significant Imaging: I have reviewed all pertinent imaging results/findings within the past 24 hours.    Medical Student Subjective & Objective

## 2023-05-11 NOTE — SUBJECTIVE & OBJECTIVE
Medications:  Continuous Infusions:  Scheduled Meds:   aspirin  81 mg Oral Daily    atorvastatin  80 mg Oral Daily    clopidogreL  75 mg Oral Daily    ezetimibe  10 mg Oral Daily    gabapentin  100 mg Oral BID    heparin (porcine)  5,000 Units Subcutaneous Q8H    mupirocin   Nasal BID    NIFEdipine  30 mg Oral Daily    polyethylene glycol  17 g Oral Daily    senna-docusate 8.6-50 mg  1 tablet Oral Daily    sodium bicarbonate  650 mg Oral TID     PRN Meds:acetaminophen, albuterol-ipratropium, dextrose 10%, dextrose 10%, dextrose, dextrose, glucagon (human recombinant), hydrALAZINE, HYDROmorphone, insulin aspart U-100, naloxone, ondansetron, oxyCODONE, oxyCODONE, sodium chloride 0.9%     Objective:     Vital Signs (Most Recent):  Temp: 98 °F (36.7 °C) (05/11/23 0749)  Pulse: 77 (05/11/23 0749)  Resp: 18 (05/11/23 0749)  BP: (!) 157/70 (05/11/23 0749)  SpO2: (!) 92 % (05/11/23 0749) Vital Signs (24h Range):  Temp:  [96.6 °F (35.9 °C)-99 °F (37.2 °C)] 98 °F (36.7 °C)  Pulse:  [67-83] 77  Resp:  [12-31] 18  SpO2:  [91 %-100 %] 92 %  BP: (134-166)/(59-72) 157/70  Arterial Line BP: (115-143)/(36-45) 134/44          Physical Exam  Constitutional:       General: She is not in acute distress.     Appearance: She is not ill-appearing.   HENT:      Head: Normocephalic and atraumatic.      Mouth/Throat:      Mouth: Mucous membranes are moist.   Cardiovascular:      Rate and Rhythm: Normal rate.      Comments: Biphasic PT right foot   No signal to DP  Pulmonary:      Effort: Pulmonary effort is normal. No respiratory distress.   Abdominal:      Palpations: Abdomen is soft.   Musculoskeletal:         General: Normal range of motion.      Cervical back: Normal range of motion.   Skin:     General: Skin is warm and dry.      Comments: Right foot skin mottling marginally improving   Neurological:      General: No focal deficit present.      Mental Status: She is alert.   Psychiatric:         Mood and Affect: Mood normal.          Behavior: Behavior normal.        Significant Labs:  CBC:   Recent Labs   Lab 05/11/23 0419   WBC 6.04   RBC 2.76*   HGB 7.7*   HCT 25.4*   *   MCV 92   MCH 27.9   MCHC 30.3*     CMP:   Recent Labs   Lab 05/11/23 0419     109   CALCIUM 8.3*  8.2*   ALBUMIN 2.4*  2.4*   PROT 5.8*     141   K 4.4  4.3   CO2 20*  21*   *  110   BUN 88*  88*   CREATININE 2.5*  2.4*   ALKPHOS 80   ALT <5*   AST 47*   BILITOT 0.3       Significant Diagnostics:  I have reviewed all pertinent imaging results/findings within the past 24 hours.

## 2023-05-11 NOTE — NURSING TRANSFER
Nursing Transfer Note      5/10/2023     Reason patient is being transferred: recovery care complete    Transfer To: 1060     Transfer via wheelchair        Transported by pct    Medicines sent: n/a    Any special needs or follow-up needed: routine    Chart send with patient: Yes    Notified: daughter    Patient reassessed at: 05/10/23 1950

## 2023-05-11 NOTE — ASSESSMENT & PLAN NOTE
/61 on arrival. Patient has not taken BP meds for past year.     Continue PO hydralazine, Nifedipine  PRN Hydralazine for SBP >180

## 2023-05-12 ENCOUNTER — ANESTHESIA EVENT (OUTPATIENT)
Dept: SURGERY | Facility: HOSPITAL | Age: 78
DRG: 252 | End: 2023-05-12
Payer: MEDICARE

## 2023-05-12 PROBLEM — N13.9 ACUTE URINARY OBSTRUCTION: Status: ACTIVE | Noted: 2023-05-12

## 2023-05-12 LAB
ALBUMIN SERPL BCP-MCNC: 2.3 G/DL (ref 3.5–5.2)
ALBUMIN SERPL BCP-MCNC: 2.4 G/DL (ref 3.5–5.2)
ALP SERPL-CCNC: 92 U/L (ref 55–135)
ALT SERPL W/O P-5'-P-CCNC: <5 U/L (ref 10–44)
ANION GAP SERPL CALC-SCNC: 8 MMOL/L (ref 8–16)
ANION GAP SERPL CALC-SCNC: 9 MMOL/L (ref 8–16)
AST SERPL-CCNC: 45 U/L (ref 10–40)
BASOPHILS # BLD AUTO: 0 K/UL (ref 0–0.2)
BASOPHILS NFR BLD: 0 % (ref 0–1.9)
BILIRUB SERPL-MCNC: 0.4 MG/DL (ref 0.1–1)
BUN SERPL-MCNC: 76 MG/DL (ref 8–23)
BUN SERPL-MCNC: 77 MG/DL (ref 8–23)
CALCIUM SERPL-MCNC: 8.2 MG/DL (ref 8.7–10.5)
CALCIUM SERPL-MCNC: 8.3 MG/DL (ref 8.7–10.5)
CHLORIDE SERPL-SCNC: 111 MMOL/L (ref 95–110)
CHLORIDE SERPL-SCNC: 112 MMOL/L (ref 95–110)
CO2 SERPL-SCNC: 21 MMOL/L (ref 23–29)
CO2 SERPL-SCNC: 22 MMOL/L (ref 23–29)
CREAT SERPL-MCNC: 2.4 MG/DL (ref 0.5–1.4)
CREAT SERPL-MCNC: 2.4 MG/DL (ref 0.5–1.4)
DIFFERENTIAL METHOD: ABNORMAL
EOSINOPHIL # BLD AUTO: 0 K/UL (ref 0–0.5)
EOSINOPHIL NFR BLD: 0.2 % (ref 0–8)
ERYTHROCYTE [DISTWIDTH] IN BLOOD BY AUTOMATED COUNT: 19.3 % (ref 11.5–14.5)
EST. GFR  (NO RACE VARIABLE): 20.5 ML/MIN/1.73 M^2
EST. GFR  (NO RACE VARIABLE): 20.5 ML/MIN/1.73 M^2
GLUCOSE SERPL-MCNC: 118 MG/DL (ref 70–110)
GLUCOSE SERPL-MCNC: 119 MG/DL (ref 70–110)
HCT VFR BLD AUTO: 24.2 % (ref 37–48.5)
HGB BLD-MCNC: 7.3 G/DL (ref 12–16)
IMM GRANULOCYTES # BLD AUTO: 0.02 K/UL (ref 0–0.04)
IMM GRANULOCYTES NFR BLD AUTO: 0.4 % (ref 0–0.5)
LYMPHOCYTES # BLD AUTO: 0.8 K/UL (ref 1–4.8)
LYMPHOCYTES NFR BLD: 13.5 % (ref 18–48)
MAGNESIUM SERPL-MCNC: 2.2 MG/DL (ref 1.6–2.6)
MCH RBC QN AUTO: 27.5 PG (ref 27–31)
MCHC RBC AUTO-ENTMCNC: 30.2 G/DL (ref 32–36)
MCV RBC AUTO: 91 FL (ref 82–98)
MONOCYTES # BLD AUTO: 0.7 K/UL (ref 0.3–1)
MONOCYTES NFR BLD: 12.5 % (ref 4–15)
NEUTROPHILS # BLD AUTO: 4.1 K/UL (ref 1.8–7.7)
NEUTROPHILS NFR BLD: 73.4 % (ref 38–73)
NRBC BLD-RTO: 0 /100 WBC
PHOSPHATE SERPL-MCNC: 3.2 MG/DL (ref 2.7–4.5)
PHOSPHATE SERPL-MCNC: 3.2 MG/DL (ref 2.7–4.5)
PLATELET # BLD AUTO: 131 K/UL (ref 150–450)
PMV BLD AUTO: 10.5 FL (ref 9.2–12.9)
POCT GLUCOSE: 108 MG/DL (ref 70–110)
POCT GLUCOSE: 111 MG/DL (ref 70–110)
POCT GLUCOSE: 125 MG/DL (ref 70–110)
POCT GLUCOSE: 147 MG/DL (ref 70–110)
POTASSIUM SERPL-SCNC: 4.6 MMOL/L (ref 3.5–5.1)
POTASSIUM SERPL-SCNC: 4.6 MMOL/L (ref 3.5–5.1)
PROT SERPL-MCNC: 5.8 G/DL (ref 6–8.4)
RBC # BLD AUTO: 2.65 M/UL (ref 4–5.4)
SODIUM SERPL-SCNC: 141 MMOL/L (ref 136–145)
SODIUM SERPL-SCNC: 142 MMOL/L (ref 136–145)
WBC # BLD AUTO: 5.61 K/UL (ref 3.9–12.7)

## 2023-05-12 PROCEDURE — 25000003 PHARM REV CODE 250: Performed by: STUDENT IN AN ORGANIZED HEALTH CARE EDUCATION/TRAINING PROGRAM

## 2023-05-12 PROCEDURE — 80069 RENAL FUNCTION PANEL: CPT | Performed by: NURSE PRACTITIONER

## 2023-05-12 PROCEDURE — 99233 PR SUBSEQUENT HOSPITAL CARE,LEVL III: ICD-10-PCS | Mod: GC,,, | Performed by: INTERNAL MEDICINE

## 2023-05-12 PROCEDURE — 97535 SELF CARE MNGMENT TRAINING: CPT

## 2023-05-12 PROCEDURE — 83735 ASSAY OF MAGNESIUM: CPT

## 2023-05-12 PROCEDURE — 63600175 PHARM REV CODE 636 W HCPCS

## 2023-05-12 PROCEDURE — 85025 COMPLETE CBC W/AUTO DIFF WBC: CPT

## 2023-05-12 PROCEDURE — 84100 ASSAY OF PHOSPHORUS: CPT

## 2023-05-12 PROCEDURE — 20600001 HC STEP DOWN PRIVATE ROOM

## 2023-05-12 PROCEDURE — 25000003 PHARM REV CODE 250

## 2023-05-12 PROCEDURE — 99233 SBSQ HOSP IP/OBS HIGH 50: CPT | Mod: GC,,, | Performed by: INTERNAL MEDICINE

## 2023-05-12 PROCEDURE — 97168 OT RE-EVAL EST PLAN CARE: CPT

## 2023-05-12 PROCEDURE — 80053 COMPREHEN METABOLIC PANEL: CPT

## 2023-05-12 PROCEDURE — 25000003 PHARM REV CODE 250: Performed by: HOSPITALIST

## 2023-05-12 RX ADMIN — GABAPENTIN 100 MG: 100 CAPSULE ORAL at 09:05

## 2023-05-12 RX ADMIN — ATORVASTATIN CALCIUM 80 MG: 40 TABLET, FILM COATED ORAL at 09:05

## 2023-05-12 RX ADMIN — HEPARIN SODIUM 5000 UNITS: 5000 INJECTION INTRAVENOUS; SUBCUTANEOUS at 09:05

## 2023-05-12 RX ADMIN — NIFEDIPINE 30 MG: 30 TABLET, FILM COATED, EXTENDED RELEASE ORAL at 09:05

## 2023-05-12 RX ADMIN — ASPIRIN 81 MG CHEWABLE TABLET 81 MG: 81 TABLET CHEWABLE at 09:05

## 2023-05-12 RX ADMIN — SODIUM BICARBONATE 650 MG: 650 TABLET ORAL at 02:05

## 2023-05-12 RX ADMIN — MUPIROCIN: 20 OINTMENT TOPICAL at 09:05

## 2023-05-12 RX ADMIN — HEPARIN SODIUM 5000 UNITS: 5000 INJECTION INTRAVENOUS; SUBCUTANEOUS at 02:05

## 2023-05-12 RX ADMIN — SODIUM BICARBONATE 650 MG: 650 TABLET ORAL at 09:05

## 2023-05-12 RX ADMIN — OXYCODONE 5 MG: 5 TABLET ORAL at 01:05

## 2023-05-12 RX ADMIN — HEPARIN SODIUM 5000 UNITS: 5000 INJECTION INTRAVENOUS; SUBCUTANEOUS at 06:05

## 2023-05-12 RX ADMIN — SENNOSIDES AND DOCUSATE SODIUM 1 TABLET: 8.6; 5 TABLET ORAL at 09:05

## 2023-05-12 RX ADMIN — CLOPIDOGREL BISULFATE 75 MG: 75 TABLET ORAL at 09:05

## 2023-05-12 RX ADMIN — EZETIMIBE 10 MG: 10 TABLET ORAL at 09:05

## 2023-05-12 NOTE — PROGRESS NOTES
"[facility] Medicine  Progress Note    Patient Name: Steph Monroe  MRN: [id number]  Patient Class: IP- Inpatient   Admission Date: 5/6/2023  Length of Stay: 6 days  Attending Physician: Gladys Kaiser MD  Primary Care Provider: Mane Carmona MD        Subjective:     Principal Problem:Critical limb ischemia of right lower extremity        HPI:  Steph Monroe is a 76yo F with a PMH of HFpEF, PAD, HTN, HLD, DM2, CKD4, single solitary kidney (s/p nephrectomy), hx of HCC, and hx of rectal cancer currently admitted to the [facility] medicine service for RLE pain.The only home medications she is taking is lasix and na bicarbonate. She quit all of her other medications about a year ago because she "no longer felt like taking all kind of pills". She underwent angiogram with interventional cardiology on 5/5/23, which showed: "high grade stenosis in right GUNNER.  of CFA collaterals from EIA and profunda artery which provides collaterals to chronically occluded SFA.  Initially 2V runoff with prox  of peroneal artery. Pt provides flow to foot, however has diffuse diease. Unable to adequately visualize right foot circulation."     Of note, pt also with worsening ALESIA on CKD. Nephrology following. Urology evaluated pt and placed spears; believed this to be post-renal in origin, and signed off. IC recommended that pt would benefit from vascular surgery intervention. Dr. Ny with IC discussed case with Dr. Cagle from vascular, who recommended initiating heparin gtt and [place] [facility] [place]; requested  admission.    On arrival, patient was afebrile and HDS on room air. Heparin gtt infusing. Denies fever, chest pain, SOB, abd pain. Endorses moderate pain to R foot only. R foot appears cool and discolored.          Overview/Hospital Course:  76 yo F transferred from [facility] for vascular surgery evaluation for critical limb ischemia of RLE found by IC angiography on 5/5. " Will continue patient on ASA, plavix, and heparin gtt. Vascular sx consulted who will obtain JOSE and toe pressures prior to surgical intervention. Will plan for femoral endarterectomy on 5/9. Nephrology consulted and recommend future need for HD and line placement for ALESIA. Plan to give IVF pre and post op to prevent worsening ALESIA. Sent to SICU for 5/10 and stepped back down. Repeat ABIs shows severe arterial occlusion. Angiogram on 5/12.      Interval History: NAEON. Patient resting in bed comfortably with intermittent R foot pain. JOSE yesterday show R and L severe arterial occlusion. Will go for angiogram today.     Review of Systems   Constitutional:  Positive for activity change and unexpected weight change. Negative for chills and fever.   HENT:  Negative for sore throat.    Respiratory:  Negative for cough and shortness of breath.    Cardiovascular:  Negative for chest pain and leg swelling.   Gastrointestinal:  Negative for abdominal distention, abdominal pain, diarrhea and vomiting.   Genitourinary:  Negative for dysuria.   Musculoskeletal:  Positive for arthralgias and gait problem.   Neurological:  Negative for headaches.   Hematological:  Bruises/bleeds easily.   Psychiatric/Behavioral:  Negative for confusion.    Objective:     Vital Signs (Most Recent):  Temp: 97.9 °F (36.6 °C) (05/12/23 1110)  Pulse: 72 (05/12/23 1127)  Resp: 18 (05/12/23 1110)  BP: (!) 147/67 (05/12/23 1110)  SpO2: 98 % (05/12/23 1110) Vital Signs (24h Range):  Temp:  [97.4 °F (36.3 °C)-98.5 °F (36.9 °C)] 97.9 °F (36.6 °C)  Pulse:  [72-88] 72  Resp:  [15-18] 18  SpO2:  [84 %-99 %] 98 %  BP: (142-159)/(58-76) 147/67     Weight: 58.1 kg (128 lb 1.4 oz)  Body mass index is 23.43 kg/m².    Intake/Output Summary (Last 24 hours) at 5/12/2023 1218  Last data filed at 5/12/2023 0830  Gross per 24 hour   Intake --   Output 875 ml   Net -875 ml         Physical Exam  Vitals and nursing note reviewed.   Constitutional:       General: She is not  in acute distress.     Appearance: Normal appearance. She is not ill-appearing.   HENT:      Head: Normocephalic.   Eyes:      General:         Right eye: No discharge.         Left eye: No discharge.      Extraocular Movements: Extraocular movements intact.      Conjunctiva/sclera: Conjunctivae normal.   Cardiovascular:      Rate and Rhythm: Normal rate and regular rhythm.      Heart sounds: Normal heart sounds. No murmur heard.  Pulmonary:      Effort: Pulmonary effort is normal. No respiratory distress.      Breath sounds: Normal breath sounds. No wheezing.   Chest:      Chest wall: No tenderness.   Abdominal:      General: Abdomen is flat. There is no distension.      Palpations: Abdomen is soft.      Tenderness: There is no abdominal tenderness.   Musculoskeletal:         General: Tenderness present. No swelling. Normal range of motion.      Right lower leg: No edema.      Left lower leg: No edema.      Comments: RLE discolored, warm  Pulses palpable on DP,PT 1+  Moderate pain to palpation    LLE warm, pulses noted DP, PT   Skin:     General: Skin is warm.      Findings: Bruising (Noted on all extremities) present. No rash.   Neurological:      General: No focal deficit present.      Mental Status: She is alert and oriented to person, place, and time. Mental status is at baseline.      Cranial Nerves: No cranial nerve deficit.   Psychiatric:         Mood and Affect: Mood normal.         Behavior: Behavior normal.         Thought Content: Thought content normal.           Significant Labs: All pertinent labs within the past 24 hours have been reviewed.  CBC:   Recent Labs   Lab 05/11/23  0419 05/12/23  0540   WBC 6.04 5.61   HGB 7.7* 7.3*   HCT 25.4* 24.2*   * 131*     CMP:   Recent Labs   Lab 05/11/23  0419 05/12/23  0540     141 142  141   K 4.4  4.3 4.6  4.6   *  110 112*  111*   CO2 20*  21* 22*  21*     109 119*  118*   BUN 88*  88* 77*  76*   CREATININE 2.5*  2.4*  "2.4*  2.4*   CALCIUM 8.3*  8.2* 8.3*  8.2*   PROT 5.8* 5.8*   ALBUMIN 2.4*  2.4* 2.4*  2.3*   BILITOT 0.3 0.4   ALKPHOS 80 92   AST 47* 45*   ALT <5* <5*   ANIONGAP 10  10 8  9       Significant Imaging: I have reviewed all pertinent imaging results/findings within the past 24 hours.  I have reviewed and interpreted all pertinent imaging results/findings within the past 24 hours.      Assessment/Plan:      * Critical limb ischemia of right lower extremity  76 yo F transferred from [facility] for vascular surgery evaluation and femoral endarterectomy. Patient presented to [facility] with RLE pain that has hindered her walking. R foot with absent pedal pulse, pain, and discoloration. IC performed angiography on 5/5 that showed "high grade stenosis in right GUNNER.  of CFA collaterals from EIA and profunda artery which provides collaterals to chronically occluded SFA.  Initially 2V runoff with prox  of peroneal artery. Pt provides flow to foot, however has diffuse diease. Unable to adequately visualize right foot circulation." Transferred on heparin gtt.   No signs of infection noted on exam. No leukocytosis.    JOSE: Right 0.15;  Left 0.58  5/11 JOSE: Right 0.29; Left 0.36    PLAN:  - Vascular surgery consulted for femoral endarterectomy, appreciate recs.   - Femoral endarterectomy on 5/9. Will follow up [facility] sx recs. Will go for angiogram today or monday.   - Continue ASA, plavix, and statin therapy  - Continue Gabapentin for neuropathy pain  - PT/OT ordered  - Pain control w/ Tylenol, hydrocodone, and morphine  - CBC, CMP daily  - Daily coags      Acute urinary obstruction  Hx of urinary obstruction. Urology and nephrology following patient at [facility]  Kidney US shows mild hydronephrosis w/ non obstructing renal calculi    -Nephrology following  -Strict I/Os  -Daily CMP      Metabolic acidosis  Continue home sodium bicarb      Pulmonary emphysema  PRN Duo nebs ordered      Chronic heart failure with " preserved ejection fraction  Echo (05/03/2023) showed EF 65%  Home meds: None    Summary:  The left ventricle is normal in size with concentric remodeling and normal systolic function.  The estimated ejection fraction is 65%.  Indeterminate left ventricular diastolic function.  There is mild mitral stenosis.  The mean diastolic gradient across the mitral valve is 5 mmHg at a heart rate of 66 bpm.  Normal right ventricular size with normal right ventricular systolic function.  There is mild aortic valve stenosis.  Aortic valve area is 1.28 cm2; peak velocity is 2.33 m/s; mean gradient is 10 mmHg.  Mild-to-moderate aortic regurgitation.  Normal central venous pressure (3 mmHg).  The estimated PA systolic pressure is 35 mmHg.      Plan:  - Holding Coreg  - Hold Lasix in setting of ALESIA  - Daily weights (standing if tolerated)  - Strict I/Os  - Fluid restriction at 1500mL  - Cardiac diet  -Telemetry      Acute renal failure superimposed on stage 4 chronic kidney disease  Hx of nephrectomy. Baseline Cr 1.8-2.2.  Cr 2.8 on arrival  Seen by urology and nephrology who believe etiology to be post-renal. Renal US shows mild hydronephrosis. Jane placed. Patient had angiogram that could have worsened renal function but per nephrology not exposed to contrast.   No indications for HD at this time    Plan:  - Nephrology consulted who recommend future HD need and line placement if does not improve.   - Avoid fluids  - Trend Cr  - Strict I&Os  - Avoid nephrotoxic agents  - Renally adjust medications  - Monitor for urinary retention  - If continues to worsen will consult nephrology      History of rectal or anal cancer  Hx of rectal cancer and treated with radiation and chemotherapy  No longer on chemotherapy      Coronary artery disease involving native coronary artery of native heart without angina pectoris  Continue DAPT and statin  Monitor for symptoms of angina          PAD (peripheral artery disease)  Hx of PAD w/ previous  stents in renal, iliac, and femoral stents  Continue ASA, Plavix, and statin      HLD (hyperlipidemia)  Continue statin      Essential hypertension  /61 on arrival. Patient has not taken BP meds for past year.     Continue PO hydralazine, Nifedipine  PRN Hydralazine for SBP >180    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 hypertension  Patient's FSGs are controlled on current medication regimen.  Last A1c reviewed-   Lab Results   Component Value Date    HGBA1C 5.0 05/04/2023     Most recent fingerstick glucose reviewed-   Recent Labs   Lab 05/11/23 2103 05/12/23  1202   POCTGLUCOSE 135* 108     Current correctional scale  Low  Maintain anti-hyperglycemic dose as follows-   Antihyperglycemics (From admission, onward)      Start     Stop Route Frequency Ordered    05/10/23 1047  insulin aspart U-100 pen 0-5 Units         -- SubQ Before meals & nightly PRN 05/10/23 0949          Hold Oral hypoglycemics while patient is in the hospital.      VTE Risk Mitigation (From admission, onward)           Ordered     heparin (porcine) injection 5,000 Units  Every 8 hours         05/09/23 2051     IP VTE HIGH RISK PATIENT  Once         05/06/23 1711     Place sequential compression device  Until discontinued         05/06/23 1711     Reason for No Pharmacological VTE Prophylaxis  Once        Question:  Reasons:  Answer:  IV Heparin w/in 24 hrs. Pre or Post-Op    05/06/23 1711                    Discharge Planning   PÉREZ: 5/17/2023     Code Status: Full Code   Is the patient medically ready for discharge?: No    Reason for patient still in hospital (select all that apply): Patient trending condition and Consult recommendations  Discharge Plan A: Home with family                  Jeremy Martel DO  Department of Hospital Medicine   [facility]

## 2023-05-12 NOTE — PT/OT/SLP RE-EVAL
Occupational Therapy   Re-evaluation/Treatment    Name: Steph Monroe  MRN: [id number]  Admitting Diagnosis:  Critical limb ischemia of right lower extremity  Recent Surgery: Procedure(s) (LRB):  ENDARTERECTOMY, FEMORAL (Right)  STENT, ILIAC ARTERY (Right) 3 Days Post-Op    Recommendations:     Discharge Recommendations: other (see comments)  Discharge Equipment Recommendations: 3-in-1 commode, walker, rolling  Barriers to discharge:  None    Assessment:     Steph Monroe is a 77 y.o. female with a medical diagnosis of Critical limb ischemia of right lower extremity. Performance deficits affecting function are weakness, impaired endurance, impaired self care skills, impaired functional mobility, gait instability, impaired balance, decreased lower extremity function, impaired skin. Patient limited by having a BM and needing to be cleaned on this date, but patient agreed to therapy. Patient would benefit from continued skilled acute OT 3x/wk to improve functional mobility, increase independence with ADLs, and address established goals. Recommending post acute therapy once medically appropriate for discharge to increase maximal independence, reduce burden of care, and ensure safety.     Rehab Prognosis:  good; patient would benefit from acute skilled OT services to address these deficits and reach maximum level of function.       Plan:     Patient to be seen 3 x/week to address the above listed problems via self-care/home management, therapeutic activities, therapeutic exercises, neuromuscular re-education  Plan of Care Expires: 06/12/23  Plan of Care Reviewed with: patient    Subjective     Chief Complaint: none noted  Patient/Family stated goals: patient agreed to therapy  Communicated with: NSG prior to session.  Pain/Comfort:  Pain Rating 1:  (patient did not rate)  Location - Side 1: Right  Location - Orientation 1: generalized  Location 1: foot  Pain Addressed 1: Reposition, Distraction  Pain Rating  Post-Intervention 1:  (patient did not rate)    Objective:     Communicated with: NSG prior to session.  Patient found HOB elevated with: peripheral IV, telemetry, spears catheter, pulse ox (continuous) upon OT entry to room.    General Precautions: Standard, fall, hearing impaired  Orthopedic Precautions: N/A  Braces: N/A    Occupational Performance:    Bed Mobility:    Patient completed Rolling/Turning to Left with  minimum assistance  Patient completed Rolling/Turning to Right with minimum assistance  Patient completed Scooting/Bridging with total assistance and 2 persons to HOB lying supine via drawsheet  Patient completed Supine to Sit with minimum assistance  Patient completed Sit to Supine with minimum assistance    Activities of Daily Living:  Upper Body Dressing: contact guard assistance [place] and donning front gown  Lower Body Dressing: total assistance Donning and doffing socks  Toileting: total assistance Patient was soiled and needed to be cleaned. Bed linens needed to be changed also    Cognitive/Visual Perceptual:  Cognitive/Psychosocial Skills:     -       Oriented to: Person, Place, Time, and Situation   -       Follows Commands/attention:Follows multistep  commands  -       Communication: clear/fluent  -       Memory: No Deficits noted  -       Safety awareness/insight to disability: intact   -       Mood/Affect/Coping skills/emotional control: Appropriate to situation  Visual/Perceptual:      -Intact      Bruising to R UE noted with a small skin tear noted MD and nurse aware. Gown needed to be changed.     [facility] 6 Click:  AMPA Total Score: 16    Treatment & Education:  Role of OT and POC  ADL retraining  Functional mobility training  Safety  Discharge planning  Importance EOB/OOB activity    Patient left HOB elevated with all lines intact, call button in reach, and all needs met. Bed alarm on.     GOALS:   Multidisciplinary Problems       Occupational Therapy Goals          Problem: Occupational  Therapy    Goal Priority Disciplines Outcome Interventions   Occupational Therapy Goal     OT, PT/OT Ongoing, Progressing    Description: Goals to be met by: 06-2-23     Patient will increase functional independence with ADLs by performing:    UE Dressing with Supervision.  LE Dressing with Supervision.  Grooming while standing at sink with Supervision.  Toileting from toilet with Supervision for hygiene and clothing management.   Stand pivot transfers with Supervision with RW  Toilet transfer to toilet with Supervision.                         History:     Past Medical History:   Diagnosis Date    Cancer     rectum    CKD (chronic kidney disease)     Diabetes mellitus, type 2     Hyperlipidemia     Hypertension     Neutropenic fever 5/27/2016    PVD (peripheral vascular disease)          Past Surgical History:   Procedure Laterality Date    ANGIOGRAM, LOWER ARTERIAL, UNILATERAL Right 5/5/2023    Procedure: Angiogram, Lower Arterial, Unilateral;  Surgeon: Remington Ribeiro MD;  Location: [facility] CATH LAB/EP;  Service: Cardiology;  Laterality: Right;    COLONOSCOPY N/A 3/28/2016    Procedure: COLONOSCOPY;  Surgeon: Mitul Buitrago Jr., MD;  Location: [facility] ENDO;  Service: Endoscopy;  Laterality: N/A;    ENDARTERECTOMY OF FEMORAL ARTERY Right 5/9/2023    Procedure: ENDARTERECTOMY, FEMORAL;  Surgeon: FERNANDO Cagle II, MD;  Location: [facility] OR [address] [place] [address];  Service: Vascular;  Laterality: Right;  contrast 12ml  Flouro time 11.1 min  mgy 217.83  Gycm2 26.3570    EYE SURGERY Left     cataract removal with lens implant.    FEMORAL ARTERY STENT  before 2010    PERIPHERAL ANGIOGRAPHY N/A 5/3/2023    Procedure: Peripheral angiography;  Surgeon: Rasheeda Ny MD;  Location: [facility] CATH LAB/EP;  Service: Cardiology;  Laterality: N/A;    RENAL ARTERY STENT  2010    STENT, ILIAC ARTERY Right 5/9/2023    Procedure: STENT, ILIAC ARTERY;  Surgeon: FERNANDO Cagle II, MD;  Location: [facility] OR [address] [place] [address];  Service: Vascular;  Laterality:  Right;       Time Tracking:     OT Date of Treatment: 05/12/23  OT Start Time: 1143  OT Stop Time: 1212  OT Total Time (min): 29 min    Billable Minutes:Re-eval 10  Self Care/Home Management 19 5/12/2023

## 2023-05-12 NOTE — SUBJECTIVE & OBJECTIVE
Medications:  Continuous Infusions:  Scheduled Meds:   aspirin  81 mg Oral Daily    atorvastatin  80 mg Oral Daily    clopidogreL  75 mg Oral Daily    ezetimibe  10 mg Oral Daily    gabapentin  100 mg Oral BID    heparin (porcine)  5,000 Units Subcutaneous Q8H    mupirocin   Nasal BID    NIFEdipine  30 mg Oral Daily    polyethylene glycol  17 g Oral Daily    senna-docusate 8.6-50 mg  1 tablet Oral Daily    sodium bicarbonate  650 mg Oral TID     PRN Meds:acetaminophen, albuterol-ipratropium, dextrose 10%, dextrose 10%, dextrose, dextrose, glucagon (human recombinant), hydrALAZINE, HYDROmorphone, insulin aspart U-100, naloxone, ondansetron, oxyCODONE, sodium chloride 0.9%     Objective:     Vital Signs (Most Recent):  Temp: 98.1 °F (36.7 °C) (05/12/23 0718)  Pulse: 81 (05/12/23 0718)  Resp: 18 (05/12/23 0718)  BP: (!) 159/74 (05/12/23 0718)  SpO2: (!) 94 % (05/12/23 0718) Vital Signs (24h Range):  Temp:  [97.4 °F (36.3 °C)-98.5 °F (36.9 °C)] 98.1 °F (36.7 °C)  Pulse:  [74-88] 81  Resp:  [15-18] 18  SpO2:  [84 %-99 %] 94 %  BP: (142-159)/(58-76) 159/74          Physical Exam  Constitutional:       General: She is not in acute distress.     Appearance: She is not ill-appearing.   HENT:      Head: Normocephalic and atraumatic.      Mouth/Throat:      Mouth: Mucous membranes are moist.   Cardiovascular:      Rate and Rhythm: Normal rate.      Comments: R mono DP  R triphasic PT  Pulmonary:      Effort: Pulmonary effort is normal. No respiratory distress.   Abdominal:      Palpations: Abdomen is soft.   Musculoskeletal:         General: Normal range of motion.      Cervical back: Normal range of motion.   Skin:     General: Skin is warm and dry.      Comments: Redness to dorsum of foot    Neurological:      General: No focal deficit present.      Mental Status: She is alert.   Psychiatric:         Mood and Affect: Mood normal.         Behavior: Behavior normal.        Significant Labs:  CBC:   Recent Labs   Lab  05/12/23  0540   WBC 5.61   RBC 2.65*   HGB 7.3*   HCT 24.2*   *   MCV 91   MCH 27.5   MCHC 30.2*     CMP:   Recent Labs   Lab 05/12/23  0540   *  118*   CALCIUM 8.3*  8.2*   ALBUMIN 2.4*  2.3*   PROT 5.8*     141   K 4.6  4.6   CO2 22*  21*   *  111*   BUN 77*  76*   CREATININE 2.4*  2.4*   ALKPHOS 92   ALT <5*   AST 45*   BILITOT 0.4       Significant Diagnostics:  I have reviewed all pertinent imaging results/findings within the past 24 hours.

## 2023-05-12 NOTE — ANESTHESIA PREPROCEDURE EVALUATION
[facility]  Anesthesia Pre-Operative Evaluation         Patient Name: Steph Monroe  YOB: 1945  MRN: [id number]    SUBJECTIVE:     Pre-operative evaluation for Procedure(s) (LRB):  Angiogram Extremity Unilateral (N/A)     05/12/2023    Steph Monroe is a 77 y.o. 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 Admitted with RLE critical ischemia, angiogram with high grade stenosis of R common iliac stent and complete occlusion of R common femoral artery. S/p fem endarterectomy 5/9/23. She now presents for the above procedure.      TTE (5/3/23):   The left ventricle is normal in size with concentric remodeling and normal systolic function.   The estimated ejection fraction is 65%.   Indeterminate left ventricular diastolic function.   There is mild mitral stenosis.   The mean diastolic gradient across the mitral valve is 5 mmHg at a heart rate of 66 bpm.   Normal right ventricular size with normal right ventricular systolic function.   There is mild aortic valve stenosis.   Aortic valve area is 1.28 cm2; peak velocity is 2.33 m/s; mean gradient is 10 mmHg.   Mild-to-moderate aortic regurgitation.   Normal central venous pressure (3 mmHg).  The estimated PA systolic pressure is 35 mmHg.    Prev airway:   Placement Date: 05/09/23; Placement Time: 1416 (created via procedure documentation); Method of Intubation: Direct laryngoscopy; Mask Ventilation: Easy; Intubated: Postinduction; Blade: Gibbons #2; Airway Device Size: 7.0; Cuff Inflation: Minimal occlusive pressure; Placement Verified By: Capnometry; Complicating Factors: None; Intubation Findings: Atraumatic/Condition of teeth unchanged; Securment: Lips; Complications: None      Patient Active Problem List   Diagnosis    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 hypertension    Essential  hypertension    HLD (hyperlipidemia)    PAD (peripheral artery disease)    Coronary artery disease involving native coronary artery of native heart without angina pectoris    History of rectal or anal cancer    Arm swelling    Swelling of limb    Swelling    Weakness    Decubitus ulcer of sacral region, stage 2    Debility    Acute renal failure superimposed on stage 4 chronic kidney disease    Critical limb ischemia of right lower extremity    Chronic heart failure with preserved ejection fraction    Pulmonary emphysema    Cirrhosis    Metabolic acidosis    Aortic stenosis    Arthritis    Lung nodule    Pulmonary hypertension    Renal artery stenosis    Vitamin D deficiency    Acute urinary obstruction       Review of patient's allergies indicates:   Allergen Reactions    Chantix [varenicline]      Tongue swelling      Wellbutrin [bupropion hcl]      Tongue swelling         Current Inpatient Medications:   aspirin  81 mg Oral Daily    atorvastatin  80 mg Oral Daily    clopidogreL  75 mg Oral Daily    ezetimibe  10 mg Oral Daily    gabapentin  100 mg Oral BID    heparin (porcine)  5,000 Units Subcutaneous Q8H    mupirocin   Nasal BID    NIFEdipine  30 mg Oral Daily    polyethylene glycol  17 g Oral Daily    senna-docusate 8.6-50 mg  1 tablet Oral Daily    sodium bicarbonate  650 mg Oral TID       No current facility-administered medications on file prior to encounter.     Current Outpatient Medications on File Prior to Encounter   Medication Sig Dispense Refill    aspirin 81 MG Chew Take 1 tablet (81 mg total) by mouth once daily. 30 tablet 5    atorvastatin (LIPITOR) 80 MG tablet Take 1 tablet (80 mg total) by mouth once daily. 90 tablet 3    carvediloL (COREG) 25 MG tablet Take 1 tablet by mouth 2 (two) times daily.      clopidogreL (PLAVIX) 75 mg tablet Take 75 mg by mouth once daily.      ezetimibe (ZETIA) 10 mg tablet Take 10 mg by mouth once daily.      furosemide  (LASIX) 20 MG tablet Take 20 mg by mouth once daily.      gabapentin (NEURONTIN) 100 MG capsule Take 1 capsule (100 mg total) by mouth 2 (two) times daily. 60 capsule 11    hydrALAZINE (APRESOLINE) 25 MG tablet Take 2 tablets by mouth 2 (two) times daily.      NIFEdipine (PROCARDIA-XL) 60 MG (OSM) 24 hr tablet Take 1 tablet (60 mg total) by mouth once daily. 30 tablet 11    sodium bicarbonate 650 MG tablet Take 650 mg by mouth 2 (two) times daily.         Past Surgical History:   Procedure Laterality Date    ANGIOGRAM, LOWER ARTERIAL, UNILATERAL Right 5/5/2023    Procedure: Angiogram, Lower Arterial, Unilateral;  Surgeon: Remington Ribeiro MD;  Location: [facility] CATH LAB/EP;  Service: Cardiology;  Laterality: Right;    COLONOSCOPY N/A 3/28/2016    Procedure: COLONOSCOPY;  Surgeon: Mitul Buitrago Jr., MD;  Location: [facility] ENDO;  Service: Endoscopy;  Laterality: N/A;    ENDARTERECTOMY OF FEMORAL ARTERY Right 5/9/2023    Procedure: ENDARTERECTOMY, FEMORAL;  Surgeon: FERNANDO Cagle II, MD;  Location: [facility] OR [address] [place] [address];  Service: Vascular;  Laterality: Right;  contrast 12ml  Flouro time 11.1 min  mgy 217.83  Gycm2 26.3570    EYE SURGERY Left     cataract removal with lens implant.    FEMORAL ARTERY STENT  before 2010    PERIPHERAL ANGIOGRAPHY N/A 5/3/2023    Procedure: Peripheral angiography;  Surgeon: Rasheeda Ny MD;  Location: [facility] CATH LAB/EP;  Service: Cardiology;  Laterality: N/A;    RENAL ARTERY STENT  2010    STENT, ILIAC ARTERY Right 5/9/2023    Procedure: STENT, ILIAC ARTERY;  Surgeon: FERNANDO Cagle II, MD;  Location: [facility] OR [address] [place] [address];  Service: Vascular;  Laterality: Right;       Social History     Socioeconomic History    Marital status:     Number of children: 2   Occupational History    Occupation: Retired   Tobacco Use    Smoking status: Former     Packs/day: 1.50     Years: 45.00     Pack years: 67.50     Types: Cigarettes     Quit date: 6/13/2013     Years since quitting:  9.9     Passive exposure: Never    Smokeless tobacco: Never   Substance and Sexual Activity    Alcohol use: No     Alcohol/week: 0.0 standard drinks    Drug use: No    Sexual activity: Not Currently     Social Determinants of Health     Financial Resource Strain: Low Risk     Difficulty of Paying Living Expenses: Not hard at all   Food Insecurity: No Food Insecurity    Worried About Running Out of Food in the Last Year: Never true    Ran Out of Food in the Last Year: Never true   Transportation Needs: No Transportation Needs    Lack of Transportation (Medical): No    Lack of Transportation (Non-Medical): No   Physical Activity: Sufficiently Active    Days of Exercise per Week: 5 days    Minutes of Exercise per Session: 30 min   Stress: Stress Concern Present    Feeling of Stress : To some extent   Social Connections: Moderately Isolated    Frequency of Communication with Friends and Family: Once a week    Frequency of Social Gatherings with Friends and Family: Once a week    Attends Oriental orthodox Services: 1 to 4 times per year    Active Member of Clubs or Organizations: No    Marital Status:    Housing Stability: Unknown    Unable to Pay for Housing in the Last Year: No    Unstable Housing in the Last Year: No       OBJECTIVE:     Vital Signs Range (Last 24H):  Temp:  [36.4 °C (97.6 °F)-36.9 °C (98.5 °F)]   Pulse:  [72-88]   Resp:  [15-19]   BP: (143-159)/(67-76)   SpO2:  [84 %-99 %]       CBC:   Recent Labs     05/11/23  0419 05/12/23  0540   WBC 6.04 5.61   RBC 2.76* 2.65*   HGB 7.7* 7.3*   HCT 25.4* 24.2*   * 131*   MCV 92 91   MCH 27.9 27.5   MCHC 30.3* 30.2*       CMP:   Recent Labs     05/11/23 0419 05/12/23  0540     141 142  141   K 4.4  4.3 4.6  4.6   *  110 112*  111*   CO2 20*  21* 22*  21*   BUN 88*  88* 77*  76*   CREATININE 2.5*  2.4* 2.4*  2.4*     109 119*  118*   MG 2.2 2.2   PHOS 4.0  4.1 3.2  3.2   CALCIUM 8.3*  8.2* 8.3*  8.2*    ALBUMIN 2.4*  2.4* 2.4*  2.3*   PROT 5.8* 5.8*   ALKPHOS 80 92   ALT <5* <5*   AST 47* 45*   BILITOT 0.3 0.4       INR:  No results for input(s): PT, INR, PROTIME, APTT in the last 72 hours.    Diagnostic Studies: No relevant studies.    EKG:   Results for orders placed or performed during the hospital encounter of 05/25/16   EKG 12-lead    Collection Time: 05/25/16 11:56 PM    Narrative    Test Reason : R07.9  Blood Pressure : mmHG  Vent. Rate : 103 BPM     Atrial Rate : 103 BPM     P-R Int : 138 ms          QRS Dur : 066 ms      QT Int : 368 ms       P-R-T Axes : 036 056 047 degrees     QTc Int : 482 ms    Sinus tachycardia  Otherwise normal ECG  When compared with ECG of 28-MAR-2016 13:20,  No significant change was found  Confirmed by DENI RUSH MD ([id number]) on 5/26/2016 11:45:43 AM    Referred By: SELF REFERRAL           Confirmed By:DENI RUSH MD        2D ECHO:   Results for orders placed during the hospital encounter of 05/02/23    Echo    Interpretation Summary  · The left ventricle is normal in size with concentric remodeling and normal systolic function.  · The estimated ejection fraction is 65%.  · Indeterminate left ventricular diastolic function.  · There is mild mitral stenosis.  · The mean diastolic gradient across the mitral valve is 5 mmHg at a heart rate of 66 bpm.  · Normal right ventricular size with normal right ventricular systolic function.  · There is mild aortic valve stenosis.  · Aortic valve area is 1.28 cm2; peak velocity is 2.33 m/s; mean gradient is 10 mmHg.  · Mild-to-moderate aortic regurgitation.  · Normal central venous pressure (3 mmHg).  · The estimated PA systolic pressure is 35 mmHg.         ASSESSMENT/PLAN:         Pre-op Assessment    I have reviewed the Patient Summary Reports.     I have reviewed the Nursing Notes. I have reviewed the NPO Status.   I have reviewed the Medications.     Review of Systems  Anesthesia Hx:  No problems with previous Anesthesia  History of  prior surgery of interest to airway management or planning: Denies Family Hx of Anesthesia complications.   Denies Personal Hx of Anesthesia complications.   Social:  Non-Smoker, No Alcohol Use    Hematology/Oncology:         -- Denies Anemia: Denies Current/Recent Cancer --  Cancer in past history:    Cardiovascular:   Denies Hypertension. CAD    Denies CABG/stent.  Denies Dysrhythmias.   Denies CHF. PVD hyperlipidemia    Pulmonary:   Denies COPD.  Denies Asthma.  Denies Sleep Apnea.    Renal/:   Denies Chronic Renal Disease.     Hepatic/GI:   Denies GERD. Denies Liver Disease.    Musculoskeletal:   Denies Arthritis.     Neurological:   Denies CVA. Denies Neuromuscular Disease.  Denies Seizures.   Denies Chronic Pain Syndrome Denies Dementia    Endocrine:   Diabetes Denies Hyperthyroidism.  Denies Obesity / BMI > 30  Psych:   Denies Psychiatric History. denies anxiety denies depression          Physical Exam  General: Well nourished, Cooperative, Alert and Oriented    Airway:  Mallampati: II   Mouth Opening: Normal  TM Distance: Normal  Tongue: Normal  Neck ROM: Normal ROM    Dental:  Periodontal disease  Dentures up top      Anesthesia Plan  Type of Anesthesia, risks & benefits discussed:    Anesthesia Type: MAC, Gen Natural Airway  Intra-op Monitoring Plan: Standard ASA Monitors  Post Op Pain Control Plan: multimodal analgesia and IV/PO Opioids PRN  Induction:  IV  Informed Consent: Informed consent signed with the Patient and all parties understand the risks and agree with anesthesia plan.  All questions answered.   ASA Score: 4  Day of Surgery Review of History & Physical: H&P Update referred to the surgeon/provider.    Ready For Surgery From Anesthesia Perspective.     .

## 2023-05-12 NOTE — ASSESSMENT & PLAN NOTE
Hx of urinary obstruction. Urology and nephrology following patient at [facility]  Kidney US shows mild hydronephrosis w/ non obstructing renal calculi    -Nephrology following  -Strict I/Os  -Daily CMP

## 2023-05-12 NOTE — PLAN OF CARE
Problem: Occupational Therapy  Goal: Occupational Therapy Goal  Description: Goals to be met by: 06-2-23     Patient will increase functional independence with ADLs by performing:    UE Dressing with Supervision.  LE Dressing with Supervision.  Grooming while standing at sink with Supervision.  Toileting from toilet with Supervision for hygiene and clothing management.   Stand pivot transfers with Supervision with RW  Toilet transfer to toilet with Supervision.    Outcome: Ongoing, Progressing   Patient's goals are set.

## 2023-05-12 NOTE — SUBJECTIVE & OBJECTIVE
Interval History: NAEON. Patient resting in bed comfortably with intermittent R foot pain. JOSE yesterday show R and L severe arterial occlusion. Will go for angiogram today.     Review of Systems   Constitutional:  Positive for activity change and unexpected weight change. Negative for chills and fever.   HENT:  Negative for sore throat.    Respiratory:  Negative for cough and shortness of breath.    Cardiovascular:  Negative for chest pain and leg swelling.   Gastrointestinal:  Negative for abdominal distention, abdominal pain, diarrhea and vomiting.   Genitourinary:  Negative for dysuria.   Musculoskeletal:  Positive for arthralgias and gait problem.   Neurological:  Negative for headaches.   Hematological:  Bruises/bleeds easily.   Psychiatric/Behavioral:  Negative for confusion.    Objective:     Vital Signs (Most Recent):  Temp: 97.9 °F (36.6 °C) (05/12/23 1110)  Pulse: 72 (05/12/23 1127)  Resp: 18 (05/12/23 1110)  BP: (!) 147/67 (05/12/23 1110)  SpO2: 98 % (05/12/23 1110) Vital Signs (24h Range):  Temp:  [97.4 °F (36.3 °C)-98.5 °F (36.9 °C)] 97.9 °F (36.6 °C)  Pulse:  [72-88] 72  Resp:  [15-18] 18  SpO2:  [84 %-99 %] 98 %  BP: (142-159)/(58-76) 147/67     Weight: 58.1 kg (128 lb 1.4 oz)  Body mass index is 23.43 kg/m².    Intake/Output Summary (Last 24 hours) at 5/12/2023 1218  Last data filed at 5/12/2023 0830  Gross per 24 hour   Intake --   Output 875 ml   Net -875 ml         Physical Exam  Vitals and nursing note reviewed.   Constitutional:       General: She is not in acute distress.     Appearance: Normal appearance. She is not ill-appearing.   HENT:      Head: Normocephalic.   Eyes:      General:         Right eye: No discharge.         Left eye: No discharge.      Extraocular Movements: Extraocular movements intact.      Conjunctiva/sclera: Conjunctivae normal.   Cardiovascular:      Rate and Rhythm: Normal rate and regular rhythm.      Heart sounds: Normal heart sounds. No murmur heard.  Pulmonary:       Effort: Pulmonary effort is normal. No respiratory distress.      Breath sounds: Normal breath sounds. No wheezing.   Chest:      Chest wall: No tenderness.   Abdominal:      General: Abdomen is flat. There is no distension.      Palpations: Abdomen is soft.      Tenderness: There is no abdominal tenderness.   Musculoskeletal:         General: Tenderness present. No swelling. Normal range of motion.      Right lower leg: No edema.      Left lower leg: No edema.      Comments: RLE discolored, warm  Pulses palpable on DP,PT 1+  Moderate pain to palpation    LLE warm, pulses noted DP, PT   Skin:     General: Skin is warm.      Findings: Bruising (Noted on all extremities) present. No rash.   Neurological:      General: No focal deficit present.      Mental Status: She is alert and oriented to person, place, and time. Mental status is at baseline.      Cranial Nerves: No cranial nerve deficit.   Psychiatric:         Mood and Affect: Mood normal.         Behavior: Behavior normal.         Thought Content: Thought content normal.           Significant Labs: All pertinent labs within the past 24 hours have been reviewed.  CBC:   Recent Labs   Lab 05/11/23  0419 05/12/23  0540   WBC 6.04 5.61   HGB 7.7* 7.3*   HCT 25.4* 24.2*   * 131*     CMP:   Recent Labs   Lab 05/11/23  0419 05/12/23  0540     141 142  141   K 4.4  4.3 4.6  4.6   *  110 112*  111*   CO2 20*  21* 22*  21*     109 119*  118*   BUN 88*  88* 77*  76*   CREATININE 2.5*  2.4* 2.4*  2.4*   CALCIUM 8.3*  8.2* 8.3*  8.2*   PROT 5.8* 5.8*   ALBUMIN 2.4*  2.4* 2.4*  2.3*   BILITOT 0.3 0.4   ALKPHOS 80 92   AST 47* 45*   ALT <5* <5*   ANIONGAP 10  10 8  9       Significant Imaging: I have reviewed all pertinent imaging results/findings within the past 24 hours.  I have reviewed and interpreted all pertinent imaging results/findings within the past 24 hours.

## 2023-05-12 NOTE — PLAN OF CARE
Attempted to call both daughter's Lety and Deana for update on patient's hospitalization and treatment plan with no answer. Will attempt to call again tomorrow to give updates.    Jeremy Martel   PGY 1 Resident  5/12/2023

## 2023-05-12 NOTE — NURSING
[facility] Plan of Care Note  Dx: Critical Limb Ischemia     Shift Events: None     Goals of Care: Pain control, spears Care     Neuro: intact     Vital Signs: WDL     Respiratory: 1 L NC     Diet: Renal     Is patient tolerating current diet? Yes     GTTS: NA     Urine Output/Bowel Movement: see flow sheets     Drains/Tubes/Tube Feeds (include total output/shift): see flow sheets     Lines: 16 G R FA     Accuchecks: ACHS     Skin: scattered ecchymosis, R upper extremity severely ecchymotic.      Fall Risk Score: 8    Activity level? Primarily in bed, Rolling.     Any scheduled procedures? NA      Any safety concerns? NA    Other: No events during shift, will continue to monitor patient.

## 2023-05-12 NOTE — ASSESSMENT & PLAN NOTE
"76 yo F transferred from [facility] for vascular surgery evaluation and femoral endarterectomy. Patient presented to [facility] with RLE pain that has hindered her walking. R foot with absent pedal pulse, pain, and discoloration. IC performed angiography on 5/5 that showed "high grade stenosis in right GUNNER.  of CFA collaterals from EIA and profunda artery which provides collaterals to chronically occluded SFA.  Initially 2V runoff with prox  of peroneal artery. Pt provides flow to foot, however has diffuse diease. Unable to adequately visualize right foot circulation." Transferred on heparin gtt.   No signs of infection noted on exam. No leukocytosis.    JOSE: Right 0.15;  Left 0.58  5/11 JOSE: Right 0.29; Left 0.36    PLAN:  - Vascular surgery consulted for femoral endarterectomy, appreciate recs.   - Femoral endarterectomy on 5/9. Will follow up [facility] sx recs. Will go for angiogram today.   - Continue ASA, plavix, and statin therapy  - Continue Gabapentin for neuropathy pain  - PT/OT ordered  - Pain control w/ Tylenol, hydrocodone, and morphine  - CBC, CMP daily  - Daily coags    "

## 2023-05-12 NOTE — ASSESSMENT & PLAN NOTE
Patient's FSGs are controlled on current medication regimen.  Last A1c reviewed-   Lab Results   Component Value Date    HGBA1C 5.0 05/04/2023     Most recent fingerstick glucose reviewed-   Recent Labs   Lab 05/11/23 2103 05/12/23  1202   POCTGLUCOSE 135* 108     Current correctional scale  Low  Maintain anti-hyperglycemic dose as follows-   Antihyperglycemics (From admission, onward)    Start     Stop Route Frequency Ordered    05/10/23 1047  insulin aspart U-100 pen 0-5 Units         -- SubQ Before meals & nightly PRN 05/10/23 0949        Hold Oral hypoglycemics while patient is in the hospital.

## 2023-05-12 NOTE — CARE UPDATE
Scr stable down to 2.4 from peak of 4. Her baseline cr is 1.9-2.2   Patient already has an established nephrologist and should follow up with him as OPT   Nephrology will sign off recall if needed

## 2023-05-12 NOTE — ASSESSMENT & PLAN NOTE
Steph Monroe is a 77yoF with a complex medical history including significant peripheral arterial disease with previous bilateral common iliac stent placement. She presented to an outside hospital with worsening right lower extremity claudication. Her right foot with evidence of ischemic changes. She was transferred for vascular surgery evaluation.         - angiogram demonstrated high grade stenosis of right common iliac stent, complete occlusion of right common femoral artery   Pre-op JOSE:  right 0.15;  Left 0.58  -JOSE/TBI improved minimally, will plan to take patient back to OR for angio, possibly monday  -renal diet   - continue aspirin, plavix, high-intensity statin therapy  - PT/OT. OOB and ambulate today  - rest of care per primary team  - vascular to continue to follow

## 2023-05-12 NOTE — PROGRESS NOTES
[facility]  Vascular Surgery  Progress Note    Patient Name: Steph Monroe  MRN: [id number]  Admission Date: 5/6/2023  Primary Care Provider: Mane Carmona MD    Subjective:     Interval History: FANNY, VSS- BP 150s/90s, HR 81.  H&H 7.3& 24, will monitor closely, no transfusion at this time.  POC discussed, angio likely Monday.    Post-Op Info:  Procedure(s) (LRB):  ENDARTERECTOMY, FEMORAL (Right)  STENT, ILIAC ARTERY (Right)   3 Days Post-Op       Medications:  Continuous Infusions:  Scheduled Meds:   aspirin  81 mg Oral Daily    atorvastatin  80 mg Oral Daily    clopidogreL  75 mg Oral Daily    ezetimibe  10 mg Oral Daily    gabapentin  100 mg Oral BID    heparin (porcine)  5,000 Units Subcutaneous Q8H    mupirocin   Nasal BID    NIFEdipine  30 mg Oral Daily    polyethylene glycol  17 g Oral Daily    senna-docusate 8.6-50 mg  1 tablet Oral Daily    sodium bicarbonate  650 mg Oral TID     PRN Meds:acetaminophen, albuterol-ipratropium, dextrose 10%, dextrose 10%, dextrose, dextrose, glucagon (human recombinant), hydrALAZINE, HYDROmorphone, insulin aspart U-100, naloxone, ondansetron, oxyCODONE, sodium chloride 0.9%     Objective:     Vital Signs (Most Recent):  Temp: 98.1 °F (36.7 °C) (05/12/23 0718)  Pulse: 81 (05/12/23 0718)  Resp: 18 (05/12/23 0718)  BP: (!) 159/74 (05/12/23 0718)  SpO2: (!) 94 % (05/12/23 0718) Vital Signs (24h Range):  Temp:  [97.4 °F (36.3 °C)-98.5 °F (36.9 °C)] 98.1 °F (36.7 °C)  Pulse:  [74-88] 81  Resp:  [15-18] 18  SpO2:  [84 %-99 %] 94 %  BP: (142-159)/(58-76) 159/74          Physical Exam  Constitutional:       General: She is not in acute distress.     Appearance: She is not ill-appearing.   HENT:      Head: Normocephalic and atraumatic.      Mouth/Throat:      Mouth: Mucous membranes are moist.   Cardiovascular:      Rate and Rhythm: Normal rate.      Comments: R mono DP  R triphasic PT  Pulmonary:      Effort: Pulmonary effort is normal. No respiratory distress.    Abdominal:      Palpations: Abdomen is soft.   Musculoskeletal:         General: Normal range of motion.      Cervical back: Normal range of motion.   Skin:     General: Skin is warm and dry.      Comments: Redness to dorsum of foot    Neurological:      General: No focal deficit present.      Mental Status: She is alert.   Psychiatric:         Mood and Affect: Mood normal.         Behavior: Behavior normal.        Significant Labs:  CBC:   Recent Labs   Lab 05/12/23  0540   WBC 5.61   RBC 2.65*   HGB 7.3*   HCT 24.2*   *   MCV 91   MCH 27.5   MCHC 30.2*     CMP:   Recent Labs   Lab 05/12/23  0540   *  118*   CALCIUM 8.3*  8.2*   ALBUMIN 2.4*  2.3*   PROT 5.8*     141   K 4.6  4.6   CO2 22*  21*   *  111*   BUN 77*  76*   CREATININE 2.4*  2.4*   ALKPHOS 92   ALT <5*   AST 45*   BILITOT 0.4       Significant Diagnostics:  I have reviewed all pertinent imaging results/findings within the past 24 hours.    Assessment/Plan:     PAD (peripheral artery disease)  Steph Monroe is a 77yoF with a complex medical history including significant peripheral arterial disease with previous bilateral common iliac stent placement. She presented to an outside hospital with worsening right lower extremity claudication. Her right foot with evidence of ischemic changes. She was transferred for vascular surgery evaluation.         - angiogram demonstrated high grade stenosis of right common iliac stent, complete occlusion of right common femoral artery   Pre-op JOSE:  right 0.15;  Left 0.58  -JOSE/TBI improved minimally, will plan to take patient back to OR for angio, possibly monday  -renal diet   - continue aspirin, plavix, high-intensity statin therapy  - PT/OT. OOB and ambulate today  - rest of care per primary team  - vascular to continue to follow        Tangela Lay NP  Vascular Surgery  [facility]

## 2023-05-13 LAB
ALBUMIN SERPL BCP-MCNC: 2.2 G/DL (ref 3.5–5.2)
ALBUMIN SERPL BCP-MCNC: 2.2 G/DL (ref 3.5–5.2)
ALP SERPL-CCNC: 99 U/L (ref 55–135)
ALT SERPL W/O P-5'-P-CCNC: <5 U/L (ref 10–44)
ANION GAP SERPL CALC-SCNC: 8 MMOL/L (ref 8–16)
ANION GAP SERPL CALC-SCNC: 9 MMOL/L (ref 8–16)
AST SERPL-CCNC: 42 U/L (ref 10–40)
BASOPHILS # BLD AUTO: 0.01 K/UL (ref 0–0.2)
BASOPHILS NFR BLD: 0.1 % (ref 0–1.9)
BILIRUB SERPL-MCNC: 0.6 MG/DL (ref 0.1–1)
BLD PROD TYP BPU: NORMAL
BLOOD UNIT EXPIRATION DATE: NORMAL
BLOOD UNIT TYPE CODE: 6200
BLOOD UNIT TYPE: NORMAL
BUN SERPL-MCNC: 64 MG/DL (ref 8–23)
BUN SERPL-MCNC: 64 MG/DL (ref 8–23)
CALCIUM SERPL-MCNC: 8.4 MG/DL (ref 8.7–10.5)
CALCIUM SERPL-MCNC: 8.4 MG/DL (ref 8.7–10.5)
CHLORIDE SERPL-SCNC: 111 MMOL/L (ref 95–110)
CHLORIDE SERPL-SCNC: 111 MMOL/L (ref 95–110)
CO2 SERPL-SCNC: 21 MMOL/L (ref 23–29)
CO2 SERPL-SCNC: 22 MMOL/L (ref 23–29)
CODING SYSTEM: NORMAL
CREAT SERPL-MCNC: 1.9 MG/DL (ref 0.5–1.4)
CREAT SERPL-MCNC: 1.9 MG/DL (ref 0.5–1.4)
CROSSMATCH INTERPRETATION: NORMAL
DIFFERENTIAL METHOD: ABNORMAL
DISPENSE STATUS: NORMAL
EOSINOPHIL # BLD AUTO: 0 K/UL (ref 0–0.5)
EOSINOPHIL NFR BLD: 0.1 % (ref 0–8)
ERYTHROCYTE [DISTWIDTH] IN BLOOD BY AUTOMATED COUNT: 19.4 % (ref 11.5–14.5)
EST. GFR  (NO RACE VARIABLE): 27.2 ML/MIN/1.73 M^2
EST. GFR  (NO RACE VARIABLE): 27.2 ML/MIN/1.73 M^2
GLUCOSE SERPL-MCNC: 101 MG/DL (ref 70–110)
GLUCOSE SERPL-MCNC: 99 MG/DL (ref 70–110)
HCT VFR BLD AUTO: 24.1 % (ref 37–48.5)
HGB BLD-MCNC: 7.2 G/DL (ref 12–16)
IMM GRANULOCYTES # BLD AUTO: 0.02 K/UL (ref 0–0.04)
IMM GRANULOCYTES NFR BLD AUTO: 0.3 % (ref 0–0.5)
LYMPHOCYTES # BLD AUTO: 0.8 K/UL (ref 1–4.8)
LYMPHOCYTES NFR BLD: 10.2 % (ref 18–48)
MAGNESIUM SERPL-MCNC: 2.3 MG/DL (ref 1.6–2.6)
MCH RBC QN AUTO: 27.8 PG (ref 27–31)
MCHC RBC AUTO-ENTMCNC: 29.9 G/DL (ref 32–36)
MCV RBC AUTO: 93 FL (ref 82–98)
MONOCYTES # BLD AUTO: 0.9 K/UL (ref 0.3–1)
MONOCYTES NFR BLD: 11.6 % (ref 4–15)
NEUTROPHILS # BLD AUTO: 6.1 K/UL (ref 1.8–7.7)
NEUTROPHILS NFR BLD: 77.7 % (ref 38–73)
NRBC BLD-RTO: 0 /100 WBC
NUM UNITS TRANS PACKED RBC: NORMAL
PHOSPHATE SERPL-MCNC: 3.1 MG/DL (ref 2.7–4.5)
PHOSPHATE SERPL-MCNC: 3.1 MG/DL (ref 2.7–4.5)
PLATELET # BLD AUTO: 146 K/UL (ref 150–450)
PMV BLD AUTO: 10.1 FL (ref 9.2–12.9)
POCT GLUCOSE: 107 MG/DL (ref 70–110)
POCT GLUCOSE: 108 MG/DL (ref 70–110)
POCT GLUCOSE: 114 MG/DL (ref 70–110)
POCT GLUCOSE: 126 MG/DL (ref 70–110)
POTASSIUM SERPL-SCNC: 4.6 MMOL/L (ref 3.5–5.1)
POTASSIUM SERPL-SCNC: 4.6 MMOL/L (ref 3.5–5.1)
PROT SERPL-MCNC: 5.7 G/DL (ref 6–8.4)
RBC # BLD AUTO: 2.59 M/UL (ref 4–5.4)
SODIUM SERPL-SCNC: 141 MMOL/L (ref 136–145)
SODIUM SERPL-SCNC: 141 MMOL/L (ref 136–145)
WBC # BLD AUTO: 7.87 K/UL (ref 3.9–12.7)

## 2023-05-13 PROCEDURE — 99233 SBSQ HOSP IP/OBS HIGH 50: CPT | Mod: GC,,, | Performed by: INTERNAL MEDICINE

## 2023-05-13 PROCEDURE — 36415 COLL VENOUS BLD VENIPUNCTURE: CPT | Performed by: NURSE PRACTITIONER

## 2023-05-13 PROCEDURE — 99233 PR SUBSEQUENT HOSPITAL CARE,LEVL III: ICD-10-PCS | Mod: GC,,, | Performed by: INTERNAL MEDICINE

## 2023-05-13 PROCEDURE — 25000003 PHARM REV CODE 250: Performed by: STUDENT IN AN ORGANIZED HEALTH CARE EDUCATION/TRAINING PROGRAM

## 2023-05-13 PROCEDURE — 97164 PT RE-EVAL EST PLAN CARE: CPT

## 2023-05-13 PROCEDURE — 97116 GAIT TRAINING THERAPY: CPT

## 2023-05-13 PROCEDURE — 80069 RENAL FUNCTION PANEL: CPT | Performed by: NURSE PRACTITIONER

## 2023-05-13 PROCEDURE — 83735 ASSAY OF MAGNESIUM: CPT

## 2023-05-13 PROCEDURE — 84100 ASSAY OF PHOSPHORUS: CPT

## 2023-05-13 PROCEDURE — 25000003 PHARM REV CODE 250

## 2023-05-13 PROCEDURE — 85025 COMPLETE CBC W/AUTO DIFF WBC: CPT

## 2023-05-13 PROCEDURE — 80053 COMPREHEN METABOLIC PANEL: CPT

## 2023-05-13 PROCEDURE — 20600001 HC STEP DOWN PRIVATE ROOM

## 2023-05-13 PROCEDURE — 63600175 PHARM REV CODE 636 W HCPCS

## 2023-05-13 RX ADMIN — HEPARIN SODIUM 5000 UNITS: 5000 INJECTION INTRAVENOUS; SUBCUTANEOUS at 06:05

## 2023-05-13 RX ADMIN — SODIUM BICARBONATE 650 MG: 650 TABLET ORAL at 03:05

## 2023-05-13 RX ADMIN — HEPARIN SODIUM 5000 UNITS: 5000 INJECTION INTRAVENOUS; SUBCUTANEOUS at 09:05

## 2023-05-13 RX ADMIN — SODIUM BICARBONATE 650 MG: 650 TABLET ORAL at 08:05

## 2023-05-13 RX ADMIN — CLOPIDOGREL BISULFATE 75 MG: 75 TABLET ORAL at 08:05

## 2023-05-13 RX ADMIN — NIFEDIPINE 30 MG: 30 TABLET, FILM COATED, EXTENDED RELEASE ORAL at 08:05

## 2023-05-13 RX ADMIN — HEPARIN SODIUM 5000 UNITS: 5000 INJECTION INTRAVENOUS; SUBCUTANEOUS at 01:05

## 2023-05-13 RX ADMIN — POLYETHYLENE GLYCOL 3350 17 G: 17 POWDER, FOR SOLUTION ORAL at 08:05

## 2023-05-13 RX ADMIN — MUPIROCIN: 20 OINTMENT TOPICAL at 08:05

## 2023-05-13 RX ADMIN — GABAPENTIN 100 MG: 100 CAPSULE ORAL at 08:05

## 2023-05-13 RX ADMIN — ATORVASTATIN CALCIUM 80 MG: 40 TABLET, FILM COATED ORAL at 08:05

## 2023-05-13 RX ADMIN — EZETIMIBE 10 MG: 10 TABLET ORAL at 08:05

## 2023-05-13 RX ADMIN — SENNOSIDES AND DOCUSATE SODIUM 1 TABLET: 8.6; 5 TABLET ORAL at 08:05

## 2023-05-13 RX ADMIN — ASPIRIN 81 MG CHEWABLE TABLET 81 MG: 81 TABLET CHEWABLE at 08:05

## 2023-05-13 NOTE — PT/OT/SLP RE-EVAL
Physical Therapy  Re-Evaluation and Treatment    Steph Monroe   [id number]    Time Tracking:     PT Received On: 05/13/23   PT Start Time: 0901   PT Stop Time: 0921   PT Total Time (min): 20 min    Billable Minutes: Re-eval 10 and Gait Training 10 minutes       Recommendations:     Discharge recommendations:  Other (would benefit from post-acute PT upon D/C)     Equipment recommendations: None (has wheelchair, rolling walker, bath chair, grab bars available at home)    Barriers to Discharge: None    Patient Information:     Recent Surgery: Procedure(s) (LRB):  ENDARTERECTOMY, FEMORAL (Right)  STENT, ILIAC ARTERY (Right) 4 Days Post-Op    Diagnosis: Critical limb ischemia of right lower extremity    Length of Stay: 7 days    General Precautions: Standard, fall, hearing impaired, diabetic  Orthopedic Precautions: None  Brace: None    Assessment:     Steph Monroe is a 77 y.o. female admitted to [facility] on 5/6/2023 for Critical limb ischemia of right lower extremity. Patient previously being seen by PT but orders discontinued secondary to R femoral endarterectomy performed on 5/9/23. Steph Monroe tolerated re-evaluation well today. She is very pleasant and agreeable to re-evaluation, hard of hearing, reports improving RLE pain (4-5/10 throughout session). She was able to ambulate 25 ft on room air with the rolling walker and stand-by assistance, patient utilizing a 3-pt gait sequence to offload painful R foot with walker as needed. Distance ultimately limited by R foot discomfort with walking. Up into chair, comfortable with pillow under RLE at end of session. Discussed PT role, POC, and goals with patient; verbalized understanding. Steph Monroe would benefit from acute PT services to promote mobility during this admission and improve return to PLOF.    Problem List: weakness, decreased endurance, impaired self-care skills, impaired mobility, decreased sitting or standing balance, gait instability, impaired  "cardiopulmonary response to activity, and pain    Rehab Prognosis: Good; patient would benefit from acute skilled PT services to address these deficits and reach maximum level of function.    Plan:     Patient to be seen 3 x/week to address the above listed problems via gait training, therapeutic activities, therapeutic exercises, neuromuscular re-education    Plan of Care Expires: 06/11/23  Plan of Care reviewed with: patient    Subjective:     Communicated with RN prior to re-evaluation, appropriate to see for re-evaluation.    Pt found supine in bed (HOB elevated) upon PT entry to room, agreeable to re-evaluation.    Patient commenting: "I have to have another procedure on Monday."    Does this patient have any cultural, spiritual, Orthodox conflicts given the current situation? Patient has no barriers to learning. Patient verbalizes understanding of his/her program and goals and demonstrates them correctly. No cultural, spiritual, or educational needs identified.    Past Medical History:   Diagnosis Date    Cancer     rectum    CKD (chronic kidney disease)     Diabetes mellitus, type 2     Hyperlipidemia     Hypertension     Neutropenic fever 5/27/2016    PVD (peripheral vascular disease)       Past Surgical History:   Procedure Laterality Date    ANGIOGRAM, LOWER ARTERIAL, UNILATERAL Right 5/5/2023    Procedure: Angiogram, Lower Arterial, Unilateral;  Surgeon: Remington Ribeiro MD;  Location: [facility] CATH LAB/EP;  Service: Cardiology;  Laterality: Right;    COLONOSCOPY N/A 3/28/2016    Procedure: COLONOSCOPY;  Surgeon: Mitul Buitrago Jr., MD;  Location: [facility] ENDO;  Service: Endoscopy;  Laterality: N/A;    ENDARTERECTOMY OF FEMORAL ARTERY Right 5/9/2023    Procedure: ENDARTERECTOMY, FEMORAL;  Surgeon: FERNANDO Cagle II, MD;  Location[facility] OR [facility] [place];  Service: Vascular;  Laterality: Right;  contrast 12ml  Flouro time 11.1 min  mgy 217.83  Gycm2 26.3570    EYE SURGERY Left     cataract removal with lens " implant.    FEMORAL ARTERY STENT  before 2010    PERIPHERAL ANGIOGRAPHY N/A 5/3/2023    Procedure: Peripheral angiography;  Surgeon: Rasheeda Ny MD;  Location: [facility] CATH LAB/EP;  Service: Cardiology;  Laterality: N/A;    RENAL ARTERY STENT  2010    STENT, ILIAC ARTERY Right 5/9/2023    Procedure: STENT, ILIAC ARTERY;  Surgeon: FERNANDO Cagle II, MD;  Location: [facility] OR [address] [place] [address];  Service: Vascular;  Laterality: Right;       Objective:     Patient found with: bed alarm, telemetry, oxygen, spears catheter    Pain:  Pain Rating 1: 4/10 at generalized R foot  Pain Rating Post-Intervention 1: 5/10 (same, see above) during and after activity    Cognitive Exam:  Patient is oriented to Person, Place, Time, and Situation.  Patient follows 100% of single-step commands.    Sensation:   Intact to light touch at BLE but hypersensitive (pain) at R foot    Lower Extremity Range of Motion:  Right Lower Extremity: WFL actively except ankle limited due to pain (limited DF)  Left Lower Extremity: WFL actively    Lower Extremity Strength:  Right Lower Extremity: grossly 3+/5 via MMT  Left Lower Extremity: grossly 4/5 via MMT    Functional Mobility:    Bed Mobility:  Supine to Sitting: min (A)  Scooting towards EOB in sitting: stand-by assistance    Transfers:  Sit to Stand: stand-by assistance from EOB with RW x 1 trial(s)    Gait:  25 feet on room air with the rolling walker and stand-by assistance, patient utilizing a 3-pt gait sequence to offload painful R foot with walker as needed.  Distance ultimately limited by R foot discomfort with walking    Assist level: Stand-By Assist  Device: Rolling walker    Balance:  Static Sit: Supervision at EOB    Static Stand: Stand-By Assist with Rolling walker    AM-PAC 6 CLICK MOBILITY  Turning over in bed (including adjusting bedclothes, sheets and blankets)?: 3  Sitting down on and standing up from a chair with arms (e.g., wheelchair, bedside commode, etc.): 4  Moving from lying on back  "to sitting on the side of the bed?: 3  Moving to and from a bed to a chair (including a wheelchair)?: 3  Need to walk in hospital room?: 3  Climbing 3-5 steps with a railing?: 3  Basic Mobility Total Score: 19    Patient was left reclined in bedside chair with all lines intact, call button in reach, and PCT present.    GOALS:   Multidisciplinary Problems       Physical Therapy Goals          Problem: Physical Therapy    Goal Priority Disciplines Outcome Goal Variances Interventions   Physical Therapy Goal     PT, PT/OT Ongoing, Progressing     Description: Goals to be met by: 23     Patient will increase functional independence with mobility by performin. Supine to sit with Modified [place] - Not met  2. Sit to stand transfer with Supervision with RW - Not met  3. Gait x 150 feet with SBA using Rolling Walker - Not met  4. Ascend/descend 6" curb step with RW and CGA - Not met                     Joseph Calhoun, PT  2023  "

## 2023-05-13 NOTE — SUBJECTIVE & OBJECTIVE
Medications:  Continuous Infusions:  Scheduled Meds:   aspirin  81 mg Oral Daily    atorvastatin  80 mg Oral Daily    clopidogreL  75 mg Oral Daily    ezetimibe  10 mg Oral Daily    gabapentin  100 mg Oral BID    heparin (porcine)  5,000 Units Subcutaneous Q8H    mupirocin   Nasal BID    NIFEdipine  30 mg Oral Daily    polyethylene glycol  17 g Oral Daily    senna-docusate 8.6-50 mg  1 tablet Oral Daily    sodium bicarbonate  650 mg Oral TID     PRN Meds:acetaminophen, albuterol-ipratropium, dextrose 10%, dextrose 10%, dextrose, dextrose, glucagon (human recombinant), hydrALAZINE, HYDROmorphone, insulin aspart U-100, naloxone, ondansetron, oxyCODONE, sodium chloride 0.9%     Objective:     Vital Signs (Most Recent):  Temp: 97.2 °F (36.2 °C) (05/13/23 0730)  Pulse: 77 (05/13/23 0730)  Resp: 16 (05/13/23 0730)  BP: 138/62 (05/13/23 0730)  SpO2: 98 % (05/13/23 0730) Vital Signs (24h Range):  Temp:  [97.2 °F (36.2 °C)-98.8 °F (37.1 °C)] 97.2 °F (36.2 °C)  Pulse:  [72-91] 77  Resp:  [16-19] 16  SpO2:  [92 %-98 %] 98 %  BP: (135-155)/(60-67) 138/62     Date 05/13/23 0700 - 05/14/23 0659   Shift 9719-9180 8788-6383 9820-8470 24 Hour Total   INTAKE   Shift Total(mL/kg)       OUTPUT   Urine(mL/kg/hr) 600   600   Shift Total(mL/kg) 600(10.3)   600(10.3)   Weight (kg) 58.1 58.1 58.1 58.1        Physical Exam  Constitutional:       General: She is not in acute distress.     Appearance: She is not ill-appearing.   HENT:      Head: Normocephalic and atraumatic.      Mouth/Throat:      Mouth: Mucous membranes are moist.   Cardiovascular:      Rate and Rhythm: Normal rate.      Comments: R mono DP  R triphasic PT  Pulmonary:      Effort: Pulmonary effort is normal. No respiratory distress.   Abdominal:      Palpations: Abdomen is soft.   Musculoskeletal:         General: Normal range of motion.      Cervical back: Normal range of motion.   Skin:     General: Skin is warm and dry.      Comments: Redness to dorsum of foot     Neurological:      General: No focal deficit present.      Mental Status: She is alert.   Psychiatric:         Mood and Affect: Mood normal.         Behavior: Behavior normal.        Significant Labs:  CBC:   Recent Labs   Lab 05/13/23 0434   WBC 7.87   RBC 2.59*   HGB 7.2*   HCT 24.1*   *   MCV 93   MCH 27.8   MCHC 29.9*       CMP:   Recent Labs   Lab 05/13/23 0434   GLU 99  101   CALCIUM 8.4*  8.4*   ALBUMIN 2.2*  2.2*   PROT 5.7*     141   K 4.6  4.6   CO2 21*  22*   *  111*   BUN 64*  64*   CREATININE 1.9*  1.9*   ALKPHOS 99   ALT <5*   AST 42*   BILITOT 0.6         Significant Diagnostics:  I have reviewed all pertinent imaging results/findings within the past 24 hours.

## 2023-05-13 NOTE — PROGRESS NOTES
"[facility] Medicine  Progress Note    Patient Name: Steph Monroe  MRN: [id number]  Patient Class: IP- Inpatient   Admission Date: 5/6/2023  Length of Stay: 7 days  Attending Physician: Gladys Kaiser MD  Primary Care Provider: Mane Carmona MD        Subjective:     Principal Problem:Critical limb ischemia of right lower extremity        HPI:  Steph Monroe is a 76yo F with a PMH of HFpEF, PAD, HTN, HLD, DM2, CKD4, single solitary kidney (s/p nephrectomy), hx of HCC, and hx of rectal cancer currently admitted to the [facility] medicine service for RLE pain.The only home medications she is taking is lasix and na bicarbonate. She quit all of her other medications about a year ago because she "no longer felt like taking all kind of pills". She underwent angiogram with interventional cardiology on 5/5/23, which showed: "high grade stenosis in right GUNNER.  of CFA collaterals from EIA and profunda artery which provides collaterals to chronically occluded SFA.  Initially 2V runoff with prox  of peroneal artery. Pt provides flow to foot, however has diffuse diease. Unable to adequately visualize right foot circulation."     Of note, pt also with worsening ALESIA on CKD. Nephrology following. Urology evaluated pt and placed spears; believed this to be post-renal in origin, and signed off. IC recommended that pt would benefit from vascular surgery intervention. Dr. Ny with IC discussed case with Dr. Cagle from vascular, who recommended initiating heparin gtt and transferring to [facility] for femoral endardectomy; requested  admission.    On arrival, patient was afebrile and HDS on room air. Heparin gtt infusing. Denies fever, chest pain, SOB, abd pain. Endorses moderate pain to R foot only. R foot appears cool and discolored.          Overview/Hospital Course:  78 yo F transferred from [facility] for vascular surgery evaluation for critical limb ischemia of RLE found by IC angiography on 5/5. " Will continue patient on ASA, plavix, and heparin gtt. Vascular sx consulted who will obtain JOSE and toe pressures prior to surgical intervention. Will plan for femoral endarterectomy on 5/9. Nephrology consulted and recommend future need for HD and line placement for ALESIA. Plan to give IVF pre and post op to prevent worsening ALESIA. Sent to SICU for 5/10 and stepped back down. Repeat ABIs shows severe arterial occlusion. Angiogram on 5/15.      Interval History: NAEON. AF w/ vitals stable. Resting in bed comfortably. Having intermittent R foot pain improved with pain meds. Denies other pain areas or SOB. Will plan for angiogram Monday.     Review of Systems   Constitutional:  Positive for activity change and unexpected weight change. Negative for chills and fever.   HENT:  Negative for sore throat.    Respiratory:  Negative for cough and shortness of breath.    Cardiovascular:  Negative for chest pain and leg swelling.   Gastrointestinal:  Negative for abdominal distention, abdominal pain, diarrhea and vomiting.   Genitourinary:  Negative for dysuria.   Musculoskeletal:  Positive for arthralgias and gait problem.   Neurological:  Negative for headaches.   Hematological:  Bruises/bleeds easily.   Psychiatric/Behavioral:  Negative for confusion.    Objective:     Vital Signs (Most Recent):  Temp: 97.6 °F (36.4 °C) (05/13/23 1142)  Pulse: 77 (05/13/23 1142)  Resp: 18 (05/13/23 1142)  BP: 137/61 (05/13/23 1142)  SpO2: 98 % (05/13/23 1142) Vital Signs (24h Range):  Temp:  [97.2 °F (36.2 °C)-98.8 °F (37.1 °C)] 97.6 °F (36.4 °C)  Pulse:  [74-92] 77  Resp:  [16-19] 18  SpO2:  [92 %-98 %] 98 %  BP: (135-155)/(60-65) 137/61     Weight: 58.1 kg (128 lb 1.4 oz)  Body mass index is 23.43 kg/m².    Intake/Output Summary (Last 24 hours) at 5/13/2023 1343  Last data filed at 5/13/2023 0702  Gross per 24 hour   Intake --   Output 900 ml   Net -900 ml         Physical Exam  Vitals and nursing note reviewed.   Constitutional:        General: She is not in acute distress.     Appearance: Normal appearance. She is not ill-appearing.   HENT:      Head: Normocephalic.   Eyes:      General:         Right eye: No discharge.         Left eye: No discharge.      Extraocular Movements: Extraocular movements intact.      Conjunctiva/sclera: Conjunctivae normal.   Cardiovascular:      Rate and Rhythm: Normal rate and regular rhythm.      Heart sounds: Normal heart sounds. No murmur heard.  Pulmonary:      Effort: Pulmonary effort is normal. No respiratory distress.      Breath sounds: Normal breath sounds. No wheezing.   Chest:      Chest wall: No tenderness.   Abdominal:      General: Abdomen is flat. There is no distension.      Palpations: Abdomen is soft.      Tenderness: There is no abdominal tenderness.   Musculoskeletal:         General: Tenderness present. No swelling. Normal range of motion.      Right lower leg: No edema.      Left lower leg: No edema.      Comments: RLE discolored, warm  Pulses palpable on DP,PT 1+  Moderate pain to palpation    LLE warm, pulses noted DP, PT   Skin:     General: Skin is warm.      Findings: Bruising (Noted on all extremities) present. No rash.   Neurological:      General: No focal deficit present.      Mental Status: She is alert and oriented to person, place, and time. Mental status is at baseline.      Cranial Nerves: No cranial nerve deficit.   Psychiatric:         Mood and Affect: Mood normal.         Behavior: Behavior normal.         Thought Content: Thought content normal.           Significant Labs: All pertinent labs within the past 24 hours have been reviewed.  CBC:   Recent Labs   Lab 05/12/23  0540 05/13/23  0434   WBC 5.61 7.87   HGB 7.3* 7.2*   HCT 24.2* 24.1*   * 146*     CMP:   Recent Labs   Lab 05/12/23  0540 05/13/23  0434     141 141  141   K 4.6  4.6 4.6  4.6   *  111* 111*  111*   CO2 22*  21* 21*  22*   *  118* 99  101   BUN 77*  76* 64*  64*  "  CREATININE 2.4*  2.4* 1.9*  1.9*   CALCIUM 8.3*  8.2* 8.4*  8.4*   PROT 5.8* 5.7*   ALBUMIN 2.4*  2.3* 2.2*  2.2*   BILITOT 0.4 0.6   ALKPHOS 92 99   AST 45* 42*   ALT <5* <5*   ANIONGAP 8  9 9  8       Significant Imaging: I have reviewed all pertinent imaging results/findings within the past 24 hours.  I have reviewed and interpreted all pertinent imaging results/findings within the past 24 hours.      Assessment/Plan:      * Critical limb ischemia of right lower extremity  78 yo F transferred from [facility] for vascular surgery evaluation and femoral endarterectomy. Patient presented to [place] with RLE pain that has hindered her walking. R foot with absent pedal pulse, pain, and discoloration. IC performed angiography on 5/5 that showed "high grade stenosis in right GUNNER.  of CFA collaterals from EIA and profunda artery which provides collaterals to chronically occluded SFA.  Initially 2V runoff with prox  of peroneal artery. Pt provides flow to foot, however has diffuse diease. Unable to adequately visualize right foot circulation." Transferred on heparin gtt.   No signs of infection noted on exam. No leukocytosis.    JOSE: Right 0.15;  Left 0.58  5/11 JOSE: Right 0.29; Left 0.36    PLAN:  - Vascular surgery consulted for femoral endarterectomy, appreciate recs.   - Femoral endarterectomy on 5/9. Will follow up [facility] sx recs. Will go for angiogram today.   - Continue ASA, plavix, and statin therapy   - Continue Gabapentin for neuropathy pain  - PT/OT ordered  - Pain control w/ Tylenol, hydrocodone, and morphine  - CBC, CMP daily  - Daily coags      Acute urinary obstruction  Hx of urinary obstruction. Urology and nephrology following patient at [facility]  Kidney US shows mild hydronephrosis w/ non obstructing renal calculi    -Nephrology signed off  -Strict I/Os  -Daily CMP      Metabolic acidosis  Continue home sodium bicarb      Pulmonary emphysema  PRN Duo nebs ordered      Chronic heart " failure with preserved ejection fraction  Echo (05/03/2023) showed EF 65%  Home meds: None    Summary:   The left ventricle is normal in size with concentric remodeling and normal systolic function.   The estimated ejection fraction is 65%.   Indeterminate left ventricular diastolic function.   There is mild mitral stenosis.   The mean diastolic gradient across the mitral valve is 5 mmHg at a heart rate of 66 bpm.   Normal right ventricular size with normal right ventricular systolic function.   There is mild aortic valve stenosis.   Aortic valve area is 1.28 cm2; peak velocity is 2.33 m/s; mean gradient is 10 mmHg.   Mild-to-moderate aortic regurgitation.   Normal central venous pressure (3 mmHg).   The estimated PA systolic pressure is 35 mmHg.      Plan:  - Holding Coreg  - Hold Lasix in setting of ALESIA  - Daily weights (standing if tolerated)  - Strict I/Os  - Fluid restriction at 1500mL  - Cardiac diet  -Telemetry      Acute renal failure superimposed on stage 4 chronic kidney disease  Hx of nephrectomy. Baseline Cr 1.8-2.2.  Cr 2.8 on arrival  Seen by urology and nephrology who believe etiology to be post-renal. Renal US shows mild hydronephrosis. Jane placed. Patient had angiogram that could have worsened renal function but per nephrology not exposed to contrast.   No indications for HD at this time    Plan:  - Nephrology consulted who recommend future HD need and line placement if does not improve.   - Avoid fluids  - Trend Cr  - Strict I&Os  - Avoid nephrotoxic agents  - Renally adjust medications  - Monitor for urinary retention  - If continues to worsen will consult nephrology      History of rectal or anal cancer  Hx of rectal cancer and treated with radiation and chemotherapy  No longer on chemotherapy      Coronary artery disease involving native coronary artery of native heart without angina pectoris  Continue DAPT and statin  Monitor for symptoms of angina          PAD (peripheral artery  disease)  Hx of PAD w/ previous stents in renal, iliac, and femoral stents  Continue ASA, Plavix, and statin      HLD (hyperlipidemia)  Continue statin      Essential hypertension  /61 on arrival. Patient has not taken BP meds for past year.     Continue PO hydralazine, Nifedipine  PRN Hydralazine for SBP >180    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 hypertension  Patient's FSGs are controlled on current medication regimen.  Last A1c reviewed-   Lab Results   Component Value Date    HGBA1C 5.0 05/04/2023     Most recent fingerstick glucose reviewed-   Recent Labs   Lab 05/12/23  1643 05/12/23  2114 05/13/23  0731 05/13/23  0925   POCTGLUCOSE 125* 147* 107 126*     Current correctional scale  Low  Maintain anti-hyperglycemic dose as follows-   Antihyperglycemics (From admission, onward)    Start     Stop Route Frequency Ordered    05/10/23 1047  insulin aspart U-100 pen 0-5 Units         -- SubQ Before meals & nightly PRN 05/10/23 0949        Hold Oral hypoglycemics while patient is in the hospital.      VTE Risk Mitigation (From admission, onward)         Ordered     heparin (porcine) injection 5,000 Units  Every 8 hours         05/09/23 2051     IP VTE HIGH RISK PATIENT  Once         05/06/23 1711     Place sequential compression device  Until discontinued         05/06/23 1711     Reason for No Pharmacological VTE Prophylaxis  Once        Question:  Reasons:  Answer:  IV Heparin w/in 24 hrs. Pre or Post-Op    05/06/23 1711                Discharge Planning   PÉREZ: 5/17/2023     Code Status: Full Code   Is the patient medically ready for discharge?: No    Reason for patient still in hospital (select all that apply): Patient trending condition and Consult recommendations  Discharge Plan A: Home with family                  Jeremy Martel DO  Department of Hospital Medicine   [facility]

## 2023-05-13 NOTE — NURSING
[facility] Plan of Care Note  Dx: Critical Limb Ischemia     Shift Events: None     Goals of Care: Pain control, spears Care     Neuro: intact     Vital Signs: WDL     Respiratory: 1 L NC     Diet: Diabetic     Is patient tolerating current diet? Yes     GTTS: NA     Urine Output/Bowel Movement: last bm today     Drains/Tubes/Tube Feeds (include total output/shift): NA     Lines: 16 G R FA     Accuchecks: ACHS     Skin: scattered ecchymosis, R upper extremity severely ecchymotic.      Fall Risk Score: 8     Activity level? Primarily in bed, Rolling.     Any scheduled procedures? NA      Any safety concerns? NA     Other: No events during shift, pt sitting up in bed wheels locked, call light within reach. No distress. Will continue to monitor pt.

## 2023-05-13 NOTE — SUBJECTIVE & OBJECTIVE
Interval History: NAEON. AF w/ vitals stable. Resting in bed comfortably. Having intermittent R foot pain improved with pain meds. Denies other pain areas or SOB. Will plan for angiogram Monday.     Review of Systems   Constitutional:  Positive for activity change and unexpected weight change. Negative for chills and fever.   HENT:  Negative for sore throat.    Respiratory:  Negative for cough and shortness of breath.    Cardiovascular:  Negative for chest pain and leg swelling.   Gastrointestinal:  Negative for abdominal distention, abdominal pain, diarrhea and vomiting.   Genitourinary:  Negative for dysuria.   Musculoskeletal:  Positive for arthralgias and gait problem.   Neurological:  Negative for headaches.   Hematological:  Bruises/bleeds easily.   Psychiatric/Behavioral:  Negative for confusion.    Objective:     Vital Signs (Most Recent):  Temp: 97.6 °F (36.4 °C) (05/13/23 1142)  Pulse: 77 (05/13/23 1142)  Resp: 18 (05/13/23 1142)  BP: 137/61 (05/13/23 1142)  SpO2: 98 % (05/13/23 1142) Vital Signs (24h Range):  Temp:  [97.2 °F (36.2 °C)-98.8 °F (37.1 °C)] 97.6 °F (36.4 °C)  Pulse:  [74-92] 77  Resp:  [16-19] 18  SpO2:  [92 %-98 %] 98 %  BP: (135-155)/(60-65) 137/61     Weight: 58.1 kg (128 lb 1.4 oz)  Body mass index is 23.43 kg/m².    Intake/Output Summary (Last 24 hours) at 5/13/2023 1343  Last data filed at 5/13/2023 0702  Gross per 24 hour   Intake --   Output 900 ml   Net -900 ml         Physical Exam  Vitals and nursing note reviewed.   Constitutional:       General: She is not in acute distress.     Appearance: Normal appearance. She is not ill-appearing.   HENT:      Head: Normocephalic.   Eyes:      General:         Right eye: No discharge.         Left eye: No discharge.      Extraocular Movements: Extraocular movements intact.      Conjunctiva/sclera: Conjunctivae normal.   Cardiovascular:      Rate and Rhythm: Normal rate and regular rhythm.      Heart sounds: Normal heart sounds. No murmur  heard.  Pulmonary:      Effort: Pulmonary effort is normal. No respiratory distress.      Breath sounds: Normal breath sounds. No wheezing.   Chest:      Chest wall: No tenderness.   Abdominal:      General: Abdomen is flat. There is no distension.      Palpations: Abdomen is soft.      Tenderness: There is no abdominal tenderness.   Musculoskeletal:         General: Tenderness present. No swelling. Normal range of motion.      Right lower leg: No edema.      Left lower leg: No edema.      Comments: RLE discolored, warm  Pulses palpable on DP,PT 1+  Moderate pain to palpation    LLE warm, pulses noted DP, PT   Skin:     General: Skin is warm.      Findings: Bruising (Noted on all extremities) present. No rash.   Neurological:      General: No focal deficit present.      Mental Status: She is alert and oriented to person, place, and time. Mental status is at baseline.      Cranial Nerves: No cranial nerve deficit.   Psychiatric:         Mood and Affect: Mood normal.         Behavior: Behavior normal.         Thought Content: Thought content normal.           Significant Labs: All pertinent labs within the past 24 hours have been reviewed.  CBC:   Recent Labs   Lab 05/12/23  0540 05/13/23  0434   WBC 5.61 7.87   HGB 7.3* 7.2*   HCT 24.2* 24.1*   * 146*     CMP:   Recent Labs   Lab 05/12/23  0540 05/13/23  0434     141 141  141   K 4.6  4.6 4.6  4.6   *  111* 111*  111*   CO2 22*  21* 21*  22*   *  118* 99  101   BUN 77*  76* 64*  64*   CREATININE 2.4*  2.4* 1.9*  1.9*   CALCIUM 8.3*  8.2* 8.4*  8.4*   PROT 5.8* 5.7*   ALBUMIN 2.4*  2.3* 2.2*  2.2*   BILITOT 0.4 0.6   ALKPHOS 92 99   AST 45* 42*   ALT <5* <5*   ANIONGAP 8  9 9  8       Significant Imaging: I have reviewed all pertinent imaging results/findings within the past 24 hours.  I have reviewed and interpreted all pertinent imaging results/findings within the past 24 hours.

## 2023-05-13 NOTE — ASSESSMENT & PLAN NOTE
"78 yo F transferred from [facility] for vascular surgery evaluation and femoral endarterectomy. Patient presented to [place] with RLE pain that has hindered her walking. R foot with absent pedal pulse, pain, and discoloration. IC performed angiography on 5/5 that showed "high grade stenosis in right GUNNER.  of CFA collaterals from EIA and profunda artery which provides collaterals to chronically occluded SFA.  Initially 2V runoff with prox  of peroneal artery. Pt provides flow to foot, however has diffuse diease. Unable to adequately visualize right foot circulation." Transferred on heparin gtt.   No signs of infection noted on exam. No leukocytosis.    JOSE: Right 0.15;  Left 0.58  5/11 JOSE: Right 0.29; Left 0.36    PLAN:  - Vascular surgery consulted for femoral endarterectomy, appreciate recs.   - Femoral endarterectomy on 5/9. Will follow up [facility] sx recs. Will go for angiogram today.   - Continue ASA, plavix, and statin therapy   - Continue Gabapentin for neuropathy pain  - PT/OT ordered  - Pain control w/ Tylenol, hydrocodone, and morphine  - CBC, CMP daily  - Daily coags    "

## 2023-05-13 NOTE — ASSESSMENT & PLAN NOTE
Patient's FSGs are controlled on current medication regimen.  Last A1c reviewed-   Lab Results   Component Value Date    HGBA1C 5.0 05/04/2023     Most recent fingerstick glucose reviewed-   Recent Labs   Lab 05/12/23  1643 05/12/23  2114 05/13/23  0731 05/13/23  0925   POCTGLUCOSE 125* 147* 107 126*     Current correctional scale  Low  Maintain anti-hyperglycemic dose as follows-   Antihyperglycemics (From admission, onward)    Start     Stop Route Frequency Ordered    05/10/23 1047  insulin aspart U-100 pen 0-5 Units         -- SubQ Before meals & nightly PRN 05/10/23 0949        Hold Oral hypoglycemics while patient is in the hospital.

## 2023-05-13 NOTE — ASSESSMENT & PLAN NOTE
Steph Monroe is a 77yoF with a complex medical history including significant peripheral arterial disease with previous bilateral common iliac stent placement. She presented to an outside hospital with worsening right lower extremity claudication. Her right foot with evidence of ischemic changes. She was transferred for vascular surgery evaluation.         - angiogram demonstrated high grade stenosis of right common iliac stent, complete occlusion of right common femoral artery   Pre-op JOSE:  right 0.15;  Left 0.58  -JOSE/TBI improved minimally, will plan to take patient back to OR for angio, possibly monday  - renal diet   - continue aspirin, plavix, high-intensity statin therapy  - PT/OT. OOB and ambulate today  - rest of care per primary team  - vascular to continue to follow

## 2023-05-13 NOTE — PROGRESS NOTES
[facility]  Vascular Surgery  Progress Note    Patient Name: Steph Monroe  MRN: [id number]  Admission Date: 5/6/2023  Primary Care Provider: Mane Carmona MD    Subjective:     Interval History: NAEO. Resting comfortably. Will plan for RLE angio likely Monday     Post-Op Info:  Procedure(s) (LRB):  ENDARTERECTOMY, FEMORAL (Right)  STENT, ILIAC ARTERY (Right)   4 Days Post-Op       Medications:  Continuous Infusions:  Scheduled Meds:   aspirin  81 mg Oral Daily    atorvastatin  80 mg Oral Daily    clopidogreL  75 mg Oral Daily    ezetimibe  10 mg Oral Daily    gabapentin  100 mg Oral BID    heparin (porcine)  5,000 Units Subcutaneous Q8H    mupirocin   Nasal BID    NIFEdipine  30 mg Oral Daily    polyethylene glycol  17 g Oral Daily    senna-docusate 8.6-50 mg  1 tablet Oral Daily    sodium bicarbonate  650 mg Oral TID     PRN Meds:acetaminophen, albuterol-ipratropium, dextrose 10%, dextrose 10%, dextrose, dextrose, glucagon (human recombinant), hydrALAZINE, HYDROmorphone, insulin aspart U-100, naloxone, ondansetron, oxyCODONE, sodium chloride 0.9%     Objective:     Vital Signs (Most Recent):  Temp: 97.2 °F (36.2 °C) (05/13/23 0730)  Pulse: 77 (05/13/23 0730)  Resp: 16 (05/13/23 0730)  BP: 138/62 (05/13/23 0730)  SpO2: 98 % (05/13/23 0730) Vital Signs (24h Range):  Temp:  [97.2 °F (36.2 °C)-98.8 °F (37.1 °C)] 97.2 °F (36.2 °C)  Pulse:  [72-91] 77  Resp:  [16-19] 16  SpO2:  [92 %-98 %] 98 %  BP: (135-155)/(60-67) 138/62     Date 05/13/23 0700 - 05/14/23 0659   Shift 8343-7651 9471-5056 9940-8815 24 Hour Total   INTAKE   Shift Total(mL/kg)       OUTPUT   Urine(mL/kg/hr) 600   600   Shift Total(mL/kg) 600(10.3)   600(10.3)   Weight (kg) 58.1 58.1 58.1 58.1       Physical Exam  Constitutional:       General: She is not in acute distress.     Appearance: She is not ill-appearing.   HENT:      Head: Normocephalic and atraumatic.      Mouth/Throat:      Mouth: Mucous membranes are moist.   Cardiovascular:       Rate and Rhythm: Normal rate.      Comments: R mono DP  R triphasic PT  Pulmonary:      Effort: Pulmonary effort is normal. No respiratory distress.   Abdominal:      Palpations: Abdomen is soft.   Musculoskeletal:         General: Normal range of motion.      Cervical back: Normal range of motion.   Skin:     General: Skin is warm and dry.      Comments: Redness to dorsum of foot    Neurological:      General: No focal deficit present.      Mental Status: She is alert.   Psychiatric:         Mood and Affect: Mood normal.         Behavior: Behavior normal.        Significant Labs:  CBC:   Recent Labs   Lab 05/13/23  0434   WBC 7.87   RBC 2.59*   HGB 7.2*   HCT 24.1*   *   MCV 93   MCH 27.8   MCHC 29.9*       CMP:   Recent Labs   Lab 05/13/23 0434   GLU 99  101   CALCIUM 8.4*  8.4*   ALBUMIN 2.2*  2.2*   PROT 5.7*     141   K 4.6  4.6   CO2 21*  22*   *  111*   BUN 64*  64*   CREATININE 1.9*  1.9*   ALKPHOS 99   ALT <5*   AST 42*   BILITOT 0.6         Significant Diagnostics:  I have reviewed all pertinent imaging results/findings within the past 24 hours.    Assessment/Plan:     PAD (peripheral artery disease)  Steph Monroe is a 77yoF with a complex medical history including significant peripheral arterial disease with previous bilateral common iliac stent placement. She presented to an outside hospital with worsening right lower extremity claudication. Her right foot with evidence of ischemic changes. She was transferred for vascular surgery evaluation.         - angiogram demonstrated high grade stenosis of right common iliac stent, complete occlusion of right common femoral artery   Pre-op JOSE:  right 0.15;  Left 0.58  -JOSE/TBI improved minimally, will plan to take patient back to OR for angio, possibly monday  - renal diet   - continue aspirin, plavix, high-intensity statin therapy  - PT/OT. OOB and ambulate today  - rest of care per primary team  - vascular to continue to  follow        Lucrecia Tiwari MD  Vascular Surgery  [facility]

## 2023-05-13 NOTE — ASSESSMENT & PLAN NOTE
Hx of urinary obstruction. Urology and nephrology following patient at [facility]  Kidney US shows mild hydronephrosis w/ non obstructing renal calculi    -Nephrology signed off  -Strict I/Os  -Daily CMP

## 2023-05-13 NOTE — NURSING
[facility]  Dx: Critical Limb Ischemia     Shift Events: None     Goals of Care: Pain control, spears Care     Neuro: intact     Vital Signs: WDL     Respiratory: 1 L NC     Diet: Diabetic     Is patient tolerating current diet? Yes     GTTS: NA     Urine Output/Bowel Movement: last bm today     Drains/Tubes/Tube Feeds (include total output/shift): NA     Lines: 16 G R FA     Accuchecks: ACHS     Skin: scattered ecchymosis, R upper extremity severely ecchymotic.      Fall Risk Score: 8     Activity level? Primarily in bed, Rolling.     Any scheduled procedures? NA      Any safety concerns? NA     Other: No events during shift, pt sitting up in bed wheels locked, call light within reach. No distress. Will continue to monitor pt.

## 2023-05-14 LAB
ALBUMIN SERPL BCP-MCNC: 2.2 G/DL (ref 3.5–5.2)
ALP SERPL-CCNC: 107 U/L (ref 55–135)
ALT SERPL W/O P-5'-P-CCNC: 6 U/L (ref 10–44)
ANION GAP SERPL CALC-SCNC: 6 MMOL/L (ref 8–16)
AST SERPL-CCNC: 52 U/L (ref 10–40)
BASOPHILS # BLD AUTO: 0.01 K/UL (ref 0–0.2)
BASOPHILS NFR BLD: 0.1 % (ref 0–1.9)
BILIRUB SERPL-MCNC: 0.6 MG/DL (ref 0.1–1)
BUN SERPL-MCNC: 54 MG/DL (ref 8–23)
CALCIUM SERPL-MCNC: 8.5 MG/DL (ref 8.7–10.5)
CHLORIDE SERPL-SCNC: 111 MMOL/L (ref 95–110)
CO2 SERPL-SCNC: 20 MMOL/L (ref 23–29)
CREAT SERPL-MCNC: 1.6 MG/DL (ref 0.5–1.4)
DIFFERENTIAL METHOD: ABNORMAL
EOSINOPHIL # BLD AUTO: 0 K/UL (ref 0–0.5)
EOSINOPHIL NFR BLD: 0.4 % (ref 0–8)
ERYTHROCYTE [DISTWIDTH] IN BLOOD BY AUTOMATED COUNT: 18.8 % (ref 11.5–14.5)
EST. GFR  (NO RACE VARIABLE): 33.4 ML/MIN/1.73 M^2
GLUCOSE SERPL-MCNC: 110 MG/DL (ref 70–110)
HCT VFR BLD AUTO: 26.5 % (ref 37–48.5)
HGB BLD-MCNC: 8.4 G/DL (ref 12–16)
IMM GRANULOCYTES # BLD AUTO: 0.02 K/UL (ref 0–0.04)
IMM GRANULOCYTES NFR BLD AUTO: 0.3 % (ref 0–0.5)
LYMPHOCYTES # BLD AUTO: 0.7 K/UL (ref 1–4.8)
LYMPHOCYTES NFR BLD: 8.4 % (ref 18–48)
MCH RBC QN AUTO: 28.7 PG (ref 27–31)
MCHC RBC AUTO-ENTMCNC: 31.7 G/DL (ref 32–36)
MCV RBC AUTO: 90 FL (ref 82–98)
MONOCYTES # BLD AUTO: 0.9 K/UL (ref 0.3–1)
MONOCYTES NFR BLD: 11.4 % (ref 4–15)
NEUTROPHILS # BLD AUTO: 6.2 K/UL (ref 1.8–7.7)
NEUTROPHILS NFR BLD: 79.4 % (ref 38–73)
NRBC BLD-RTO: 0 /100 WBC
PLATELET # BLD AUTO: 168 K/UL (ref 150–450)
PMV BLD AUTO: 10 FL (ref 9.2–12.9)
POCT GLUCOSE: 100 MG/DL (ref 70–110)
POCT GLUCOSE: 107 MG/DL (ref 70–110)
POCT GLUCOSE: 118 MG/DL (ref 70–110)
POCT GLUCOSE: 130 MG/DL (ref 70–110)
POCT GLUCOSE: 181 MG/DL (ref 70–110)
POCT GLUCOSE: 77 MG/DL (ref 70–110)
POTASSIUM SERPL-SCNC: 4.8 MMOL/L (ref 3.5–5.1)
PROT SERPL-MCNC: 5.9 G/DL (ref 6–8.4)
RBC # BLD AUTO: 2.93 M/UL (ref 4–5.4)
SODIUM SERPL-SCNC: 137 MMOL/L (ref 136–145)
WBC # BLD AUTO: 7.75 K/UL (ref 3.9–12.7)

## 2023-05-14 PROCEDURE — 27000221 HC OXYGEN, UP TO 24 HOURS

## 2023-05-14 PROCEDURE — 25000003 PHARM REV CODE 250

## 2023-05-14 PROCEDURE — 20600001 HC STEP DOWN PRIVATE ROOM

## 2023-05-14 PROCEDURE — 85025 COMPLETE CBC W/AUTO DIFF WBC: CPT | Performed by: STUDENT IN AN ORGANIZED HEALTH CARE EDUCATION/TRAINING PROGRAM

## 2023-05-14 PROCEDURE — 99900031 HC PATIENT EDUCATION (STAT)

## 2023-05-14 PROCEDURE — 25000003 PHARM REV CODE 250: Performed by: STUDENT IN AN ORGANIZED HEALTH CARE EDUCATION/TRAINING PROGRAM

## 2023-05-14 PROCEDURE — 80053 COMPREHEN METABOLIC PANEL: CPT | Performed by: STUDENT IN AN ORGANIZED HEALTH CARE EDUCATION/TRAINING PROGRAM

## 2023-05-14 PROCEDURE — 99900035 HC TECH TIME PER 15 MIN (STAT)

## 2023-05-14 PROCEDURE — 36415 COLL VENOUS BLD VENIPUNCTURE: CPT | Performed by: STUDENT IN AN ORGANIZED HEALTH CARE EDUCATION/TRAINING PROGRAM

## 2023-05-14 PROCEDURE — 99232 SBSQ HOSP IP/OBS MODERATE 35: CPT | Mod: GC,,, | Performed by: INTERNAL MEDICINE

## 2023-05-14 PROCEDURE — 63600175 PHARM REV CODE 636 W HCPCS

## 2023-05-14 PROCEDURE — 99232 PR SUBSEQUENT HOSPITAL CARE,LEVL II: ICD-10-PCS | Mod: GC,,, | Performed by: INTERNAL MEDICINE

## 2023-05-14 PROCEDURE — 94761 N-INVAS EAR/PLS OXIMETRY MLT: CPT

## 2023-05-14 RX ORDER — SODIUM CHLORIDE 9 MG/ML
INJECTION, SOLUTION INTRAVENOUS CONTINUOUS
Status: DISCONTINUED | OUTPATIENT
Start: 2023-05-14 | End: 2023-05-16 | Stop reason: HOSPADM

## 2023-05-14 RX ADMIN — MUPIROCIN: 20 OINTMENT TOPICAL at 08:05

## 2023-05-14 RX ADMIN — GABAPENTIN 100 MG: 100 CAPSULE ORAL at 08:05

## 2023-05-14 RX ADMIN — POLYETHYLENE GLYCOL 3350 17 G: 17 POWDER, FOR SOLUTION ORAL at 08:05

## 2023-05-14 RX ADMIN — SODIUM BICARBONATE 650 MG: 650 TABLET ORAL at 02:05

## 2023-05-14 RX ADMIN — HEPARIN SODIUM 5000 UNITS: 5000 INJECTION INTRAVENOUS; SUBCUTANEOUS at 02:05

## 2023-05-14 RX ADMIN — SENNOSIDES AND DOCUSATE SODIUM 1 TABLET: 8.6; 5 TABLET ORAL at 08:05

## 2023-05-14 RX ADMIN — NIFEDIPINE 30 MG: 30 TABLET, FILM COATED, EXTENDED RELEASE ORAL at 08:05

## 2023-05-14 RX ADMIN — HEPARIN SODIUM 5000 UNITS: 5000 INJECTION INTRAVENOUS; SUBCUTANEOUS at 05:05

## 2023-05-14 RX ADMIN — SODIUM BICARBONATE 650 MG: 650 TABLET ORAL at 08:05

## 2023-05-14 RX ADMIN — CLOPIDOGREL BISULFATE 75 MG: 75 TABLET ORAL at 08:05

## 2023-05-14 RX ADMIN — ASPIRIN 81 MG CHEWABLE TABLET 81 MG: 81 TABLET CHEWABLE at 08:05

## 2023-05-14 RX ADMIN — HEPARIN SODIUM 5000 UNITS: 5000 INJECTION INTRAVENOUS; SUBCUTANEOUS at 09:05

## 2023-05-14 RX ADMIN — EZETIMIBE 10 MG: 10 TABLET ORAL at 08:05

## 2023-05-14 RX ADMIN — ATORVASTATIN CALCIUM 80 MG: 40 TABLET, FILM COATED ORAL at 08:05

## 2023-05-14 NOTE — PROGRESS NOTES
"[facility] Medicine  Progress Note    Patient Name: Steph Monroe  MRN: [id number]  Patient Class: IP- Inpatient   Admission Date: 5/6/2023  Length of Stay: 8 days  Attending Physician: Gladys Kaiser MD  Primary Care Provider: Mane Carmona MD        Subjective:     Principal Problem:Critical limb ischemia of right lower extremity        HPI:  Steph Monroe is a 76yo F with a PMH of HFpEF, PAD, HTN, HLD, DM2, CKD4, single solitary kidney (s/p nephrectomy), hx of HCC, and hx of rectal cancer currently admitted to the [facility] medicine service for RLE pain.The only home medications she is taking is lasix and na bicarbonate. She quit all of her other medications about a year ago because she "no longer felt like taking all kind of pills". She underwent angiogram with interventional cardiology on 5/5/23, which showed: "high grade stenosis in right GUNNER.  of CFA collaterals from EIA and profunda artery which provides collaterals to chronically occluded SFA.  Initially 2V runoff with prox  of peroneal artery. Pt provides flow to foot, however has diffuse diease. Unable to adequately visualize right foot circulation."     Of note, pt also with worsening ALESIA on CKD. Nephrology following. Urology evaluated pt and placed spears; believed this to be post-renal in origin, and signed off. IC recommended that pt would benefit from vascular surgery intervention. Dr. Ny with IC discussed case with Dr. Cagle from vascular, who recommended initiating heparin gtt and transferring to [facility] for femoral endardectomy; requested  admission.    On arrival, patient was afebrile and HDS on room air. Heparin gtt infusing. Denies fever, chest pain, SOB, abd pain. Endorses moderate pain to R foot only. R foot appears cool and discolored.          Overview/Hospital Course:  78 yo F transferred from [facility] for vascular surgery evaluation for critical limb ischemia of RLE found by IC angiography on 5/5. " Will continue patient on ASA, plavix, and heparin gtt. Vascular sx consulted who will obtain JOSE and toe pressures prior to surgical intervention. Will plan for femoral endarterectomy on 5/9. Nephrology consulted and recommend future need for HD and line placement for ALESIA. Plan to give IVF pre and post op to prevent worsening ALESIA. Sent to SICU for 5/10 and stepped back down. Repeat ABIs shows severe arterial occlusion. Angiogram on 5/15.      Interval History: No events overnight. Patient reports intermittent pain    Review of Systems   Constitutional:  Positive for activity change. Negative for chills and fever.   HENT:  Negative for sore throat.    Respiratory:  Negative for cough and shortness of breath.    Cardiovascular:  Negative for chest pain and leg swelling.   Gastrointestinal:  Negative for abdominal distention, abdominal pain, diarrhea and vomiting.   Genitourinary:  Negative for dysuria.   Musculoskeletal:  Positive for arthralgias and gait problem.   Neurological:  Negative for headaches.   Hematological:  Bruises/bleeds easily.   Psychiatric/Behavioral:  Negative for confusion.    Objective:     Vital Signs (Most Recent):  Temp: 97.1 °F (36.2 °C) (05/14/23 1152)  Pulse: 81 (05/14/23 1201)  Resp: (!) 22 (05/14/23 1152)  BP: (!) 144/57 (05/14/23 1152)  SpO2: 98 % (05/14/23 1152) Vital Signs (24h Range):  Temp:  [96.4 °F (35.8 °C)-99 °F (37.2 °C)] 97.1 °F (36.2 °C)  Pulse:  [73-87] 81  Resp:  [16-22] 22  SpO2:  [93 %-99 %] 98 %  BP: (134-156)/(57-70) 144/57     Weight: 58.1 kg (128 lb 1.4 oz)  Body mass index is 23.43 kg/m².    Intake/Output Summary (Last 24 hours) at 5/14/2023 1335  Last data filed at 5/14/2023 0546  Gross per 24 hour   Intake --   Output 1100 ml   Net -1100 ml         Physical Exam  Vitals and nursing note reviewed.   Constitutional:       General: She is not in acute distress.     Appearance: Normal appearance. She is not ill-appearing.   HENT:      Head: Normocephalic.  "  Cardiovascular:      Rate and Rhythm: Normal rate and regular rhythm.      Pulses: Normal pulses.      Heart sounds: Normal heart sounds. No murmur heard.  Pulmonary:      Effort: Pulmonary effort is normal. No respiratory distress.      Breath sounds: Normal breath sounds. No wheezing.   Chest:      Chest wall: No tenderness.   Abdominal:      General: Abdomen is flat. There is no distension.      Palpations: Abdomen is soft.      Tenderness: There is no abdominal tenderness.   Musculoskeletal:         General: Tenderness present. No swelling. Normal range of motion.      Right lower leg: No edema.      Left lower leg: No edema.   Skin:     General: Skin is warm.      Findings: Bruising (Noted on all extremities) and erythema (BL toes/forefoot) present. No rash.      Comments: Right first toe with small dark discoloration.  Mild tenderness on palpation   Neurological:      Mental Status: She is alert and oriented to person, place, and time. Mental status is at baseline.           Significant Labs: All pertinent labs within the past 24 hours have been reviewed.  CBC:   Recent Labs   Lab 05/13/23  0434   WBC 7.87   HGB 7.2*   HCT 24.1*   *     CMP:   Recent Labs   Lab 05/13/23  0434     141   K 4.6  4.6   *  111*   CO2 21*  22*   GLU 99  101   BUN 64*  64*   CREATININE 1.9*  1.9*   CALCIUM 8.4*  8.4*   PROT 5.7*   ALBUMIN 2.2*  2.2*   BILITOT 0.6   ALKPHOS 99   AST 42*   ALT <5*   ANIONGAP 9  8       Significant Imaging: I have reviewed all pertinent imaging results/findings within the past 24 hours.      Assessment/Plan:      * Critical limb ischemia of right lower extremity  78 yo F transferred from [facility] for vascular surgery evaluation and femoral endarterectomy. Patient presented to [place] with RLE pain that has hindered her walking. R foot with absent pedal pulse, pain, and discoloration. IC performed angiography on 5/5 that showed "high grade stenosis in right GUNNER.  of " "CFA collaterals from EIA and profunda artery which provides collaterals to chronically occluded SFA.  Initially 2V runoff with prox  of peroneal artery. Pt provides flow to foot, however has diffuse diease. Unable to adequately visualize right foot circulation." Transferred on heparin gtt.   No signs of infection noted on exam. No leukocytosis.    JOSE: Right 0.15;  Left 0.58  5/11 JOSE: Right 0.29; Left 0.36    PLAN:  - Vascular surgery consulted for femoral endarterectomy, appreciate recs.   - Femoral endarterectomy on 5/9. Will follow up [facility] sx recs. Plan for angiogram 5/15.   - Continue ASA, plavix, and statin therapy   - Continue Gabapentin for neuropathy pain  - PT/OT ordered  - Pain control w/ Tylenol, hydrocodone, and morphine  - CBC, CMP daily  - Daily coags      Acute urinary obstruction  Hx of urinary obstruction. Urology and nephrology following patient at [facility]  Kidney US shows mild hydronephrosis w/ non obstructing renal calculi    -Nephrology signed off  -Strict I/Os  -Daily CMP      Metabolic acidosis  Continue home sodium bicarb      Pulmonary emphysema  PRN Duo nebs ordered      Chronic heart failure with preserved ejection fraction  Echo (05/03/2023) showed EF 65%  Home meds: None    Summary:   The left ventricle is normal in size with concentric remodeling and normal systolic function.   The estimated ejection fraction is 65%.   Indeterminate left ventricular diastolic function.   There is mild mitral stenosis.   The mean diastolic gradient across the mitral valve is 5 mmHg at a heart rate of 66 bpm.   Normal right ventricular size with normal right ventricular systolic function.   There is mild aortic valve stenosis.   Aortic valve area is 1.28 cm2; peak velocity is 2.33 m/s; mean gradient is 10 mmHg.   Mild-to-moderate aortic regurgitation.   Normal central venous pressure (3 mmHg).   The estimated PA systolic pressure is 35 mmHg.      Plan:  - Holding Coreg  - Hold Lasix " in setting of ALESIA  - Daily weights (standing if tolerated)  - Strict I/Os  - Fluid restriction at 1500mL  - Cardiac diet  -Telemetry      Acute renal failure superimposed on stage 4 chronic kidney disease  Hx of nephrectomy. Baseline Cr 1.8-2.2.  Cr 2.8 on arrival  Seen by urology and nephrology who believe etiology to be post-renal. Renal US shows mild hydronephrosis. Jane placed. Patient had angiogram that could have worsened renal function but per nephrology not exposed to contrast.   No indications for HD at this time    Plan:  - Cr. at baseline now, Nephrology signed off. Will need follow up outpatient  - Avoid fluids  - Trend Cr  - Strict I&Os  - Avoid nephrotoxic agents  - Renally adjust medications  - Monitor for urinary retention  - If continues to worsen will consult nephrology      History of rectal or anal cancer  Hx of rectal cancer and treated with radiation and chemotherapy  No longer on chemotherapy      Coronary artery disease involving native coronary artery of native heart without angina pectoris  Continue DAPT and statin  Monitor for symptoms of angina          PAD (peripheral artery disease)  Hx of PAD w/ previous stents in renal, iliac, and femoral stents  Continue ASA, Plavix, and statin      HLD (hyperlipidemia)  Continue statin      Essential hypertension  /61 on arrival. Patient has not taken BP meds for past year.     Continue PO hydralazine, Nifedipine  PRN Hydralazine for SBP >180    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 hypertension  Patient's FSGs are controlled on current medication regimen.  Last A1c reviewed-   Lab Results   Component Value Date    HGBA1C 5.0 05/04/2023     Most recent fingerstick glucose reviewed-   Recent Labs   Lab 05/13/23 2021 05/14/23  0552 05/14/23  0821 05/14/23  1151   POCTGLUCOSE 108 100 77 181*     Current correctional scale  Low  Maintain anti-hyperglycemic dose as follows-   Antihyperglycemics (From admission, onward)    Start     Stop Route Frequency  Ordered    05/10/23 1047  insulin aspart U-100 pen 0-5 Units         -- SubQ Before meals & nightly PRN 05/10/23 0949        Hold Oral hypoglycemics while patient is in the hospital.      VTE Risk Mitigation (From admission, onward)         Ordered     heparin (porcine) injection 5,000 Units  Every 8 hours         05/09/23 2051     IP VTE HIGH RISK PATIENT  Once         05/06/23 1711     Place sequential compression device  Until discontinued         05/06/23 1711     Reason for No Pharmacological VTE Prophylaxis  Once        Question:  Reasons:  Answer:  IV Heparin w/in 24 hrs. Pre or Post-Op    05/06/23 1711                Discharge Planning   PÉREZ: 5/17/2023     Code Status: Full Code   Is the patient medically ready for discharge?: No    Reason for patient still in hospital (select all that apply): Treatment and Consult recommendations  Discharge Plan A: Home with family                  Ligia Cantu DO  Department of Hospital Medicine   [facility]

## 2023-05-14 NOTE — ASSESSMENT & PLAN NOTE
Steph Monroe is a 77yoF with a complex medical history including significant peripheral arterial disease with previous bilateral common iliac stent placement. She presented to an outside hospital with worsening right lower extremity claudication. Her right foot with evidence of ischemic changes. She was transferred for vascular surgery evaluation.       - angiogram demonstrated high grade stenosis of right common iliac stent, complete occlusion of right common femoral artery   Pre-op JOSE:  right 0.15;  Left 0.58   Post-op JOSE/ToeP: R 0.29, 0.0;  L 0.36, 0.0  - JOSE/TBI improved minimally, will plan to take patient back to OR for RLE angio Monday 5/15   - consented, marked   - NPO midnight tonight  - continue aspirin, plavix, high-intensity statin therapy  - PT/OT. OOB and ambulate today  - rest of care per primary team  - vascular to continue to follow

## 2023-05-14 NOTE — CONSULTS
RD consulted for wounds. Telol added for optimization of protein/calorie intake. Full assessment will be provided 5/15. Recommendations provided below for best optimization of nutrition status.     Recommendations:  Continue Renal diet  Continue Boost Glucose Control/Tello for extra protein and promotion of wound healing   RD following

## 2023-05-14 NOTE — PROGRESS NOTES
[facility]  Vascular Surgery  Progress Note    Patient Name: Steph Monroe  MRN: [id number]  Admission Date: 5/6/2023  Primary Care Provider: Mane Carmona MD    Subjective:     Interval History: NAEO. Continues to have waxing and waning right foot pain. OR tomorrow for RLE angiogram     Post-Op Info:  Procedure(s) (LRB):  ENDARTERECTOMY, FEMORAL (Right)  STENT, ILIAC ARTERY (Right)   5 Days Post-Op       Medications:  Continuous Infusions:   sodium chloride 0.9%       Scheduled Meds:   aspirin  81 mg Oral Daily    atorvastatin  80 mg Oral Daily    clopidogreL  75 mg Oral Daily    ezetimibe  10 mg Oral Daily    gabapentin  100 mg Oral BID    heparin (porcine)  5,000 Units Subcutaneous Q8H    NIFEdipine  30 mg Oral Daily    polyethylene glycol  17 g Oral Daily    senna-docusate 8.6-50 mg  1 tablet Oral Daily    sodium bicarbonate  650 mg Oral TID     PRN Meds:acetaminophen, albuterol-ipratropium, dextrose 10%, dextrose 10%, dextrose, dextrose, glucagon (human recombinant), hydrALAZINE, HYDROmorphone, insulin aspart U-100, naloxone, ondansetron, oxyCODONE, sodium chloride 0.9%     Objective:     Vital Signs (Most Recent):  Temp: 97.2 °F (36.2 °C) (05/14/23 0812)  Pulse: 85 (05/14/23 0812)  Resp: 20 (05/14/23 0812)  BP: (!) 156/67 (05/14/23 0812)  SpO2: 98 % (05/14/23 0812) Vital Signs (24h Range):  Temp:  [96.4 °F (35.8 °C)-99 °F (37.2 °C)] 97.2 °F (36.2 °C)  Pulse:  [73-85] 85  Resp:  [16-20] 20  SpO2:  [93 %-99 %] 98 %  BP: (134-156)/(61-70) 156/67           Physical Exam  Constitutional:       General: She is not in acute distress.     Appearance: She is not ill-appearing.   HENT:      Head: Normocephalic and atraumatic.      Mouth/Throat:      Mouth: Mucous membranes are moist.   Cardiovascular:      Rate and Rhythm: Normal rate.      Comments: R mono DP  R mono PT  Pulmonary:      Effort: Pulmonary effort is normal. No respiratory distress.   Abdominal:      Palpations: Abdomen is soft.   Musculoskeletal:          General: Normal range of motion.      Cervical back: Normal range of motion.   Skin:     General: Skin is warm and dry.      Comments: Redness to dorsum of foot    Neurological:      General: No focal deficit present.      Mental Status: She is alert.   Psychiatric:         Mood and Affect: Mood normal.         Behavior: Behavior normal.        Significant Labs:  CBC:   Recent Labs   Lab 05/13/23  0434   WBC 7.87   RBC 2.59*   HGB 7.2*   HCT 24.1*   *   MCV 93   MCH 27.8   MCHC 29.9*       CMP:   Recent Labs   Lab 05/13/23  0434   GLU 99  101   CALCIUM 8.4*  8.4*   ALBUMIN 2.2*  2.2*   PROT 5.7*     141   K 4.6  4.6   CO2 21*  22*   *  111*   BUN 64*  64*   CREATININE 1.9*  1.9*   ALKPHOS 99   ALT <5*   AST 42*   BILITOT 0.6         Significant Diagnostics:  I have reviewed all pertinent imaging results/findings within the past 24 hours.    Assessment/Plan:     PAD (peripheral artery disease)  Steph Monroe is a 77yoF with a complex medical history including significant peripheral arterial disease with previous bilateral common iliac stent placement. She presented to an outside hospital with worsening right lower extremity claudication. Her right foot with evidence of ischemic changes. She was transferred for vascular surgery evaluation.       - angiogram demonstrated high grade stenosis of right common iliac stent, complete occlusion of right common femoral artery   Pre-op JOSE:  right 0.15;  Left 0.58   Post-op JOSE/ToeP: R 0.29, 0.0;  L 0.36, 0.0  - JOSE/TBI improved minimally, will plan to take patient back to OR for RLE angio Monday 5/15   - consented, marked   - NPO midnight tonight  - continue aspirin, plavix, high-intensity statin therapy  - PT/OT. OOB and ambulate today  - rest of care per primary team  - vascular to continue to follow        Lucrecia Tiwari MD  Vascular Surgery  [facility]

## 2023-05-14 NOTE — SUBJECTIVE & OBJECTIVE
Interval History: No events overnight. Patient reports intermittent pain    Review of Systems   Constitutional:  Positive for activity change. Negative for chills and fever.   HENT:  Negative for sore throat.    Respiratory:  Negative for cough and shortness of breath.    Cardiovascular:  Negative for chest pain and leg swelling.   Gastrointestinal:  Negative for abdominal distention, abdominal pain, diarrhea and vomiting.   Genitourinary:  Negative for dysuria.   Musculoskeletal:  Positive for arthralgias and gait problem.   Neurological:  Negative for headaches.   Hematological:  Bruises/bleeds easily.   Psychiatric/Behavioral:  Negative for confusion.    Objective:     Vital Signs (Most Recent):  Temp: 97.1 °F (36.2 °C) (05/14/23 1152)  Pulse: 81 (05/14/23 1201)  Resp: (!) 22 (05/14/23 1152)  BP: (!) 144/57 (05/14/23 1152)  SpO2: 98 % (05/14/23 1152) Vital Signs (24h Range):  Temp:  [96.4 °F (35.8 °C)-99 °F (37.2 °C)] 97.1 °F (36.2 °C)  Pulse:  [73-87] 81  Resp:  [16-22] 22  SpO2:  [93 %-99 %] 98 %  BP: (134-156)/(57-70) 144/57     Weight: 58.1 kg (128 lb 1.4 oz)  Body mass index is 23.43 kg/m².    Intake/Output Summary (Last 24 hours) at 5/14/2023 1335  Last data filed at 5/14/2023 0546  Gross per 24 hour   Intake --   Output 1100 ml   Net -1100 ml         Physical Exam  Vitals and nursing note reviewed.   Constitutional:       General: She is not in acute distress.     Appearance: Normal appearance. She is not ill-appearing.   HENT:      Head: Normocephalic.   Cardiovascular:      Rate and Rhythm: Normal rate and regular rhythm.      Pulses: Normal pulses.      Heart sounds: Normal heart sounds. No murmur heard.  Pulmonary:      Effort: Pulmonary effort is normal. No respiratory distress.      Breath sounds: Normal breath sounds. No wheezing.   Chest:      Chest wall: No tenderness.   Abdominal:      General: Abdomen is flat. There is no distension.      Palpations: Abdomen is soft.      Tenderness: There is no  abdominal tenderness.   Musculoskeletal:         General: Tenderness present. No swelling. Normal range of motion.      Right lower leg: No edema.      Left lower leg: No edema.   Skin:     General: Skin is warm.      Findings: Bruising (Noted on all extremities) and erythema (BL toes/forefoot) present. No rash.      Comments: Right first toe with small dark discoloration.  Mild tenderness on palpation   Neurological:      Mental Status: She is alert and oriented to person, place, and time. Mental status is at baseline.           Significant Labs: All pertinent labs within the past 24 hours have been reviewed.  CBC:   Recent Labs   Lab 05/13/23  0434   WBC 7.87   HGB 7.2*   HCT 24.1*   *     CMP:   Recent Labs   Lab 05/13/23 0434     141   K 4.6  4.6   *  111*   CO2 21*  22*   GLU 99  101   BUN 64*  64*   CREATININE 1.9*  1.9*   CALCIUM 8.4*  8.4*   PROT 5.7*   ALBUMIN 2.2*  2.2*   BILITOT 0.6   ALKPHOS 99   AST 42*   ALT <5*   ANIONGAP 9  8       Significant Imaging: I have reviewed all pertinent imaging results/findings within the past 24 hours.

## 2023-05-14 NOTE — ASSESSMENT & PLAN NOTE
Hx of nephrectomy. Baseline Cr 1.8-2.2.  Cr 2.8 on arrival  Seen by urology and nephrology who believe etiology to be post-renal. Renal US shows mild hydronephrosis. Jane placed. Patient had angiogram that could have worsened renal function but per nephrology not exposed to contrast.   No indications for HD at this time    Plan:  - Cr. at baseline now, Nephrology signed off. Will need follow up outpatient  - Avoid fluids  - Trend Cr  - Strict I&Os  - Avoid nephrotoxic agents  - Renally adjust medications  - Monitor for urinary retention  - If continues to worsen will consult nephrology

## 2023-05-14 NOTE — PLAN OF CARE
[facility] Plan of Care    Dx: Critical Limb Ischemia of Right Lower Extremity     Shift Events: Neurovascular checks every 2 hours, Telemetry, Safety     Goals of Care: Pain control, spears Care, Telemetry     Neuro: AAOX4     Vital Signs: WNL     Respiratory: 1L/NC     Diet: NPO      Is patient tolerating current diet? Yes     GTTS: NA     Urine Output/Bowel Movement: Spears Catheter/ Last Bowel Movement 5/13/23     Drains/Tubes/Tube Feeds (include total output/shift):   N/A     Lines: 16g R FA     Accuchecks: ACHS     Skin: scattered ecchymosis, R upper extremity severely ecchymotic.      Fall Risk Score: 8     Activity level? Primarily in bed, Rolling.     Any scheduled procedures? Possible angiogram on Monday 5/15     Any safety concerns? NA     Other: none

## 2023-05-14 NOTE — ASSESSMENT & PLAN NOTE
Patient's FSGs are controlled on current medication regimen.  Last A1c reviewed-   Lab Results   Component Value Date    HGBA1C 5.0 05/04/2023     Most recent fingerstick glucose reviewed-   Recent Labs   Lab 05/13/23 2021 05/14/23  0552 05/14/23  0821 05/14/23  1151   POCTGLUCOSE 108 100 77 181*     Current correctional scale  Low  Maintain anti-hyperglycemic dose as follows-   Antihyperglycemics (From admission, onward)    Start     Stop Route Frequency Ordered    05/10/23 1047  insulin aspart U-100 pen 0-5 Units         -- SubQ Before meals & nightly PRN 05/10/23 0949        Hold Oral hypoglycemics while patient is in the hospital.   [FreeTextEntry1] : ucg negative\par urged 100% condom use\par advised hormonal bcm\par counseled on other progesterone only bcm: depo and iud- how they work and side effects\par pt not ready at this time; will consider and rtc if interested in starting bcm

## 2023-05-14 NOTE — ASSESSMENT & PLAN NOTE
"78 yo F transferred from [facility] for vascular surgery evaluation and femoral endarterectomy. Patient presented to [place] with RLE pain that has hindered her walking. R foot with absent pedal pulse, pain, and discoloration. IC performed angiography on 5/5 that showed "high grade stenosis in right GUNNER.  of CFA collaterals from EIA and profunda artery which provides collaterals to chronically occluded SFA.  Initially 2V runoff with prox  of peroneal artery. Pt provides flow to foot, however has diffuse diease. Unable to adequately visualize right foot circulation." Transferred on heparin gtt.   No signs of infection noted on exam. No leukocytosis.    JOSE: Right 0.15;  Left 0.58  5/11 JOSE: Right 0.29; Left 0.36    PLAN:  - Vascular surgery consulted for femoral endarterectomy, appreciate recs.   - Femoral endarterectomy on 5/9. Will follow up [facility] sx recs. Plan for angiogram 5/15.   - Continue ASA, plavix, and statin therapy   - Continue Gabapentin for neuropathy pain  - PT/OT ordered  - Pain control w/ Tylenol, hydrocodone, and morphine  - CBC, CMP daily  - Daily coags    "

## 2023-05-15 ENCOUNTER — ANESTHESIA (OUTPATIENT)
Dept: SURGERY | Facility: HOSPITAL | Age: 78
DRG: 252 | End: 2023-05-15
Payer: MEDICARE

## 2023-05-15 LAB
ALBUMIN SERPL BCP-MCNC: 2.1 G/DL (ref 3.5–5.2)
ANION GAP SERPL CALC-SCNC: 10 MMOL/L (ref 8–16)
BASOPHILS # BLD AUTO: 0.01 K/UL (ref 0–0.2)
BASOPHILS NFR BLD: 0.2 % (ref 0–1.9)
BUN SERPL-MCNC: 49 MG/DL (ref 8–23)
CALCIUM SERPL-MCNC: 8.2 MG/DL (ref 8.7–10.5)
CHLORIDE SERPL-SCNC: 111 MMOL/L (ref 95–110)
CO2 SERPL-SCNC: 23 MMOL/L (ref 23–29)
CREAT SERPL-MCNC: 1.5 MG/DL (ref 0.5–1.4)
DIFFERENTIAL METHOD: ABNORMAL
EOSINOPHIL # BLD AUTO: 0.1 K/UL (ref 0–0.5)
EOSINOPHIL NFR BLD: 1.1 % (ref 0–8)
ERYTHROCYTE [DISTWIDTH] IN BLOOD BY AUTOMATED COUNT: 18.7 % (ref 11.5–14.5)
EST. GFR  (NO RACE VARIABLE): 36.1 ML/MIN/1.73 M^2
GLUCOSE SERPL-MCNC: 86 MG/DL (ref 70–110)
HCT VFR BLD AUTO: 24.2 % (ref 37–48.5)
HGB BLD-MCNC: 7.1 G/DL (ref 12–16)
IMM GRANULOCYTES # BLD AUTO: 0.03 K/UL (ref 0–0.04)
IMM GRANULOCYTES NFR BLD AUTO: 0.5 % (ref 0–0.5)
LYMPHOCYTES # BLD AUTO: 0.6 K/UL (ref 1–4.8)
LYMPHOCYTES NFR BLD: 11.5 % (ref 18–48)
MAGNESIUM SERPL-MCNC: 2.3 MG/DL (ref 1.6–2.6)
MCH RBC QN AUTO: 27.5 PG (ref 27–31)
MCHC RBC AUTO-ENTMCNC: 29.3 G/DL (ref 32–36)
MCV RBC AUTO: 94 FL (ref 82–98)
MONOCYTES # BLD AUTO: 0.7 K/UL (ref 0.3–1)
MONOCYTES NFR BLD: 12.5 % (ref 4–15)
NEUTROPHILS # BLD AUTO: 4.1 K/UL (ref 1.8–7.7)
NEUTROPHILS NFR BLD: 74.2 % (ref 38–73)
NRBC BLD-RTO: 0 /100 WBC
PHOSPHATE SERPL-MCNC: 3.1 MG/DL (ref 2.7–4.5)
PHOSPHATE SERPL-MCNC: 3.1 MG/DL (ref 2.7–4.5)
PLATELET # BLD AUTO: 152 K/UL (ref 150–450)
PMV BLD AUTO: 9.9 FL (ref 9.2–12.9)
POCT GLUCOSE: 92 MG/DL (ref 70–110)
POCT GLUCOSE: 94 MG/DL (ref 70–110)
POCT GLUCOSE: 96 MG/DL (ref 70–110)
POCT GLUCOSE: 98 MG/DL (ref 70–110)
POTASSIUM SERPL-SCNC: 4.6 MMOL/L (ref 3.5–5.1)
RBC # BLD AUTO: 2.58 M/UL (ref 4–5.4)
SODIUM SERPL-SCNC: 144 MMOL/L (ref 136–145)
WBC # BLD AUTO: 5.46 K/UL (ref 3.9–12.7)

## 2023-05-15 PROCEDURE — 82962 GLUCOSE BLOOD TEST: CPT | Performed by: SURGERY

## 2023-05-15 PROCEDURE — 25000003 PHARM REV CODE 250: Performed by: NURSE ANESTHETIST, CERTIFIED REGISTERED

## 2023-05-15 PROCEDURE — 63600175 PHARM REV CODE 636 W HCPCS: Performed by: NURSE ANESTHETIST, CERTIFIED REGISTERED

## 2023-05-15 PROCEDURE — 25000003 PHARM REV CODE 250

## 2023-05-15 PROCEDURE — 37000009 HC ANESTHESIA EA ADD 15 MINS: Performed by: SURGERY

## 2023-05-15 PROCEDURE — 25000003 PHARM REV CODE 250: Performed by: STUDENT IN AN ORGANIZED HEALTH CARE EDUCATION/TRAINING PROGRAM

## 2023-05-15 PROCEDURE — 85025 COMPLETE CBC W/AUTO DIFF WBC: CPT

## 2023-05-15 PROCEDURE — 37000008 HC ANESTHESIA 1ST 15 MINUTES: Performed by: SURGERY

## 2023-05-15 PROCEDURE — 71000033 HC RECOVERY, INTIAL HOUR: Performed by: SURGERY

## 2023-05-15 PROCEDURE — 37224 PR FEM/POPL REVAS W/TLA: CPT | Mod: 78,RT,, | Performed by: SURGERY

## 2023-05-15 PROCEDURE — 27000221 HC OXYGEN, UP TO 24 HOURS

## 2023-05-15 PROCEDURE — 20600001 HC STEP DOWN PRIVATE ROOM

## 2023-05-15 PROCEDURE — D9220A PRA ANESTHESIA: ICD-10-PCS | Mod: CRNA,,, | Performed by: NURSE ANESTHETIST, CERTIFIED REGISTERED

## 2023-05-15 PROCEDURE — D9220A PRA ANESTHESIA: ICD-10-PCS | Mod: ANES,,, | Performed by: ANESTHESIOLOGY

## 2023-05-15 PROCEDURE — C1769 GUIDE WIRE: HCPCS | Performed by: SURGERY

## 2023-05-15 PROCEDURE — 63600175 PHARM REV CODE 636 W HCPCS

## 2023-05-15 PROCEDURE — 97535 SELF CARE MNGMENT TRAINING: CPT

## 2023-05-15 PROCEDURE — 36415 COLL VENOUS BLD VENIPUNCTURE: CPT

## 2023-05-15 PROCEDURE — 99232 PR SUBSEQUENT HOSPITAL CARE,LEVL II: ICD-10-PCS | Mod: GC,,, | Performed by: INTERNAL MEDICINE

## 2023-05-15 PROCEDURE — 99232 SBSQ HOSP IP/OBS MODERATE 35: CPT | Mod: GC,,, | Performed by: INTERNAL MEDICINE

## 2023-05-15 PROCEDURE — D9220A PRA ANESTHESIA: Mod: CRNA,,, | Performed by: NURSE ANESTHETIST, CERTIFIED REGISTERED

## 2023-05-15 PROCEDURE — 71000016 HC POSTOP RECOV ADDL HR: Performed by: SURGERY

## 2023-05-15 PROCEDURE — 36000707: Performed by: SURGERY

## 2023-05-15 PROCEDURE — 63600175 PHARM REV CODE 636 W HCPCS: Performed by: SURGERY

## 2023-05-15 PROCEDURE — 37224 PR FEM/POPL REVAS W/TLA: ICD-10-PCS | Mod: 78,RT,, | Performed by: SURGERY

## 2023-05-15 PROCEDURE — 27201423 OPTIME MED/SURG SUP & DEVICES STERILE SUPPLY: Performed by: SURGERY

## 2023-05-15 PROCEDURE — 83735 ASSAY OF MAGNESIUM: CPT

## 2023-05-15 PROCEDURE — 71000015 HC POSTOP RECOV 1ST HR: Performed by: SURGERY

## 2023-05-15 PROCEDURE — D9220A PRA ANESTHESIA: Mod: ANES,,, | Performed by: ANESTHESIOLOGY

## 2023-05-15 PROCEDURE — C1894 INTRO/SHEATH, NON-LASER: HCPCS | Performed by: SURGERY

## 2023-05-15 PROCEDURE — 94761 N-INVAS EAR/PLS OXIMETRY MLT: CPT

## 2023-05-15 PROCEDURE — 36000706: Performed by: SURGERY

## 2023-05-15 PROCEDURE — 80069 RENAL FUNCTION PANEL: CPT

## 2023-05-15 PROCEDURE — C1725 CATH, TRANSLUMIN NON-LASER: HCPCS | Performed by: SURGERY

## 2023-05-15 PROCEDURE — 25000003 PHARM REV CODE 250: Performed by: SURGERY

## 2023-05-15 PROCEDURE — C1887 CATHETER, GUIDING: HCPCS | Performed by: SURGERY

## 2023-05-15 RX ORDER — NITROGLYCERIN 5 MG/ML
INJECTION, SOLUTION INTRAVENOUS
Status: DISCONTINUED | OUTPATIENT
Start: 2023-05-15 | End: 2023-05-15 | Stop reason: HOSPADM

## 2023-05-15 RX ORDER — VERAPAMIL HYDROCHLORIDE 2.5 MG/ML
INJECTION, SOLUTION INTRAVENOUS
Status: DISCONTINUED | OUTPATIENT
Start: 2023-05-15 | End: 2023-05-15 | Stop reason: HOSPADM

## 2023-05-15 RX ORDER — SODIUM CHLORIDE 0.9 % (FLUSH) 0.9 %
10 SYRINGE (ML) INJECTION
Status: DISCONTINUED | OUTPATIENT
Start: 2023-05-15 | End: 2023-05-16 | Stop reason: HOSPADM

## 2023-05-15 RX ORDER — HEPARIN SODIUM 1000 [USP'U]/ML
INJECTION, SOLUTION INTRAVENOUS; SUBCUTANEOUS
Status: DISCONTINUED | OUTPATIENT
Start: 2023-05-15 | End: 2023-05-15 | Stop reason: HOSPADM

## 2023-05-15 RX ORDER — DEXMEDETOMIDINE HYDROCHLORIDE 100 UG/ML
INJECTION, SOLUTION INTRAVENOUS
Status: DISCONTINUED | OUTPATIENT
Start: 2023-05-15 | End: 2023-05-15

## 2023-05-15 RX ORDER — FENTANYL CITRATE 50 UG/ML
25 INJECTION, SOLUTION INTRAMUSCULAR; INTRAVENOUS EVERY 5 MIN PRN
Status: DISCONTINUED | OUTPATIENT
Start: 2023-05-15 | End: 2023-05-15 | Stop reason: HOSPADM

## 2023-05-15 RX ORDER — ONDANSETRON 2 MG/ML
INJECTION INTRAMUSCULAR; INTRAVENOUS
Status: DISCONTINUED | OUTPATIENT
Start: 2023-05-15 | End: 2023-05-15

## 2023-05-15 RX ORDER — MIDAZOLAM HYDROCHLORIDE 1 MG/ML
INJECTION, SOLUTION INTRAMUSCULAR; INTRAVENOUS
Status: DISCONTINUED | OUTPATIENT
Start: 2023-05-15 | End: 2023-05-15

## 2023-05-15 RX ORDER — HEPARIN SODIUM 1000 [USP'U]/ML
INJECTION, SOLUTION INTRAVENOUS; SUBCUTANEOUS
Status: DISCONTINUED | OUTPATIENT
Start: 2023-05-15 | End: 2023-05-15

## 2023-05-15 RX ORDER — FENTANYL CITRATE 50 UG/ML
INJECTION, SOLUTION INTRAMUSCULAR; INTRAVENOUS
Status: DISCONTINUED | OUTPATIENT
Start: 2023-05-15 | End: 2023-05-15

## 2023-05-15 RX ADMIN — SODIUM CHLORIDE: 9 INJECTION, SOLUTION INTRAVENOUS at 11:05

## 2023-05-15 RX ADMIN — CLOPIDOGREL BISULFATE 75 MG: 75 TABLET ORAL at 11:05

## 2023-05-15 RX ADMIN — SODIUM CHLORIDE: 9 INJECTION, SOLUTION INTRAVENOUS at 09:05

## 2023-05-15 RX ADMIN — MIDAZOLAM HYDROCHLORIDE 0.5 MG: 1 INJECTION, SOLUTION INTRAMUSCULAR; INTRAVENOUS at 04:05

## 2023-05-15 RX ADMIN — ATORVASTATIN CALCIUM 80 MG: 40 TABLET, FILM COATED ORAL at 11:05

## 2023-05-15 RX ADMIN — GABAPENTIN 100 MG: 100 CAPSULE ORAL at 11:05

## 2023-05-15 RX ADMIN — FENTANYL CITRATE 25 MCG: 50 INJECTION, SOLUTION INTRAMUSCULAR; INTRAVENOUS at 04:05

## 2023-05-15 RX ADMIN — HEPARIN SODIUM 5000 UNITS: 5000 INJECTION INTRAVENOUS; SUBCUTANEOUS at 05:05

## 2023-05-15 RX ADMIN — HEPARIN SODIUM 5000 UNITS: 1000 INJECTION, SOLUTION INTRAVENOUS; SUBCUTANEOUS at 04:05

## 2023-05-15 RX ADMIN — FENTANYL CITRATE 25 MCG: 50 INJECTION, SOLUTION INTRAMUSCULAR; INTRAVENOUS at 03:05

## 2023-05-15 RX ADMIN — MIDAZOLAM HYDROCHLORIDE 0.5 MG: 1 INJECTION, SOLUTION INTRAMUSCULAR; INTRAVENOUS at 03:05

## 2023-05-15 RX ADMIN — SODIUM CHLORIDE, SODIUM GLUCONATE, SODIUM ACETATE, POTASSIUM CHLORIDE, MAGNESIUM CHLORIDE, SODIUM PHOSPHATE, DIBASIC, AND POTASSIUM PHOSPHATE: .53; .5; .37; .037; .03; .012; .00082 INJECTION, SOLUTION INTRAVENOUS at 03:05

## 2023-05-15 RX ADMIN — SENNOSIDES AND DOCUSATE SODIUM 1 TABLET: 8.6; 5 TABLET ORAL at 11:05

## 2023-05-15 RX ADMIN — SODIUM BICARBONATE 650 MG: 650 TABLET ORAL at 10:05

## 2023-05-15 RX ADMIN — SODIUM CHLORIDE: 9 INJECTION, SOLUTION INTRAVENOUS at 12:05

## 2023-05-15 RX ADMIN — HEPARIN SODIUM 1000 UNITS: 1000 INJECTION, SOLUTION INTRAVENOUS; SUBCUTANEOUS at 05:05

## 2023-05-15 RX ADMIN — ONDANSETRON 4 MG: 2 INJECTION INTRAMUSCULAR; INTRAVENOUS at 03:05

## 2023-05-15 RX ADMIN — GABAPENTIN 100 MG: 100 CAPSULE ORAL at 09:05

## 2023-05-15 RX ADMIN — DEXMEDETOMIDINE HYDROCHLORIDE 8 MCG: 100 INJECTION, SOLUTION INTRAVENOUS at 04:05

## 2023-05-15 RX ADMIN — ASPIRIN 81 MG CHEWABLE TABLET 81 MG: 81 TABLET CHEWABLE at 11:05

## 2023-05-15 RX ADMIN — NIFEDIPINE 30 MG: 30 TABLET, FILM COATED, EXTENDED RELEASE ORAL at 11:05

## 2023-05-15 RX ADMIN — MIDAZOLAM HYDROCHLORIDE 0.5 MG: 1 INJECTION, SOLUTION INTRAMUSCULAR; INTRAVENOUS at 05:05

## 2023-05-15 RX ADMIN — SODIUM BICARBONATE 650 MG: 650 TABLET ORAL at 11:05

## 2023-05-15 RX ADMIN — DEXMEDETOMIDINE HYDROCHLORIDE 4 MCG: 100 INJECTION, SOLUTION INTRAVENOUS at 04:05

## 2023-05-15 RX ADMIN — EZETIMIBE 10 MG: 10 TABLET ORAL at 11:05

## 2023-05-15 RX ADMIN — FENTANYL CITRATE 50 MCG: 50 INJECTION, SOLUTION INTRAMUSCULAR; INTRAVENOUS at 05:05

## 2023-05-15 RX ADMIN — DEXMEDETOMIDINE HYDROCHLORIDE 4 MCG: 100 INJECTION, SOLUTION INTRAVENOUS at 03:05

## 2023-05-15 RX ADMIN — HEPARIN SODIUM 2000 UNITS: 1000 INJECTION, SOLUTION INTRAVENOUS; SUBCUTANEOUS at 04:05

## 2023-05-15 NOTE — ASSESSMENT & PLAN NOTE
Steph Monroe is a 77yoF with a complex medical history including significant peripheral arterial disease with previous bilateral common iliac stent placement. She presented to an outside hospital with worsening right lower extremity claudication. Her right foot with evidence of ischemic changes. She was transferred for vascular surgery evaluation.     - JOSE/TBI improved minimally, OR for RLE angio today   - consented, marked; LUE marked   - NPO midnight   - angiogram demonstrated high grade stenosis of right common iliac stent, complete occlusion of right common femoral artery   Pre-op JOSE:  right 0.15;  Left 0.58   Post-op JOSE/ToeP: R 0.29, 0.0;  L 0.36, 0.0  - continue aspirin, plavix, high-intensity statin therapy  - PT/OT. OOB and ambulate today  - rest of care per primary team  - vascular to continue to follow

## 2023-05-15 NOTE — ASSESSMENT & PLAN NOTE
"76 yo F transferred from [facility] for vascular surgery evaluation and femoral endarterectomy. Patient presented to [facility] with RLE pain that has hindered her walking. R foot with absent pedal pulse, pain, and discoloration. IC performed angiography on 5/5 that showed "high grade stenosis in right GUN[place].  of CFA collaterals from EIA and profunda artery which provides collaterals to chronically occluded SFA.  Initially 2V runoff with prox  of peroneal artery. Pt provides flow to foot, however has diffuse diease. Unable to adequately visualize right foot circulation." Transferred on heparin gtt.   No signs of infection noted on exam. No leukocytosis.    JOSE: Right 0.15;  Left 0.58  5/11 JOSE: Right 0.29; Left 0.36    PLAN:  - Vascular surgery consulted for femoral endarterectomy, appreciate recs.   - Femoral endarterectomy on 5/9. Will follow up [facility] sx recs. Plan for angiogram 5/15.   - Continue ASA, plavix, and statin therapy   - Continue Gabapentin for neuropathy pain  - PT/OT ordered  - Pain control w/ Tylenol, hydrocodone, and morphine  - CBC, CMP daily  - Daily coags    "

## 2023-05-15 NOTE — SUBJECTIVE & OBJECTIVE
Medications:  Continuous Infusions:   sodium chloride 0.9% 100 mL/hr at 05/15/23 0038     Scheduled Meds:   aspirin  81 mg Oral Daily    atorvastatin  80 mg Oral Daily    clopidogreL  75 mg Oral Daily    ezetimibe  10 mg Oral Daily    gabapentin  100 mg Oral BID    heparin (porcine)  5,000 Units Subcutaneous Q8H    NIFEdipine  30 mg Oral Daily    polyethylene glycol  17 g Oral Daily    senna-docusate 8.6-50 mg  1 tablet Oral Daily    sodium bicarbonate  650 mg Oral TID     PRN Meds:acetaminophen, albuterol-ipratropium, dextrose 10%, dextrose 10%, dextrose, dextrose, glucagon (human recombinant), hydrALAZINE, HYDROmorphone, insulin aspart U-100, naloxone, ondansetron, oxyCODONE, sodium chloride 0.9%     Objective:     Vital Signs (Most Recent):  Temp: 97.9 °F (36.6 °C) (05/15/23 0454)  Pulse: 69 (05/15/23 0659)  Resp: 18 (05/15/23 0454)  BP: 111/64 (05/15/23 0454)  SpO2: (!) 91 % (05/15/23 0454) Vital Signs (24h Range):  Temp:  [97.1 °F (36.2 °C)-99 °F (37.2 °C)] 97.9 °F (36.6 °C)  Pulse:  [68-87] 69  Resp:  [18-22] 18  SpO2:  [91 %-98 %] 91 %  BP: (111-164)/(57-72) 111/64            Physical Exam  Constitutional:       General: She is not in acute distress.     Appearance: She is not ill-appearing.   HENT:      Head: Normocephalic and atraumatic.      Mouth/Throat:      Mouth: Mucous membranes are moist.   Cardiovascular:      Rate and Rhythm: Normal rate.      Comments: R mono DP  R triphasic PT  Pulmonary:      Effort: Pulmonary effort is normal. No respiratory distress.   Abdominal:      Palpations: Abdomen is soft.   Musculoskeletal:         General: Normal range of motion.      Cervical back: Normal range of motion.   Skin:     General: Skin is warm and dry.      Comments: Redness to dorsum of foot; foot warm   Neurological:      General: No focal deficit present.      Mental Status: She is alert.   Psychiatric:         Mood and Affect: Mood normal.         Behavior: Behavior normal.        Significant  Labs:  CBC:   Recent Labs   Lab 05/15/23  0450   WBC 5.46   RBC 2.58*   HGB 7.1*   HCT 24.2*      MCV 94   MCH 27.5   MCHC 29.3*       CMP:   Recent Labs   Lab 05/14/23  1551 05/15/23  0450    86   CALCIUM 8.5* 8.2*   ALBUMIN 2.2* 2.1*   PROT 5.9*  --     144   K 4.8 4.6   CO2 20* 23   * 111*   BUN 54* 49*   CREATININE 1.6* 1.5*   ALKPHOS 107  --    ALT 6*  --    AST 52*  --    BILITOT 0.6  --          Significant Diagnostics:  I have reviewed all pertinent imaging results/findings within the past 24 hours.

## 2023-05-15 NOTE — ASSESSMENT & PLAN NOTE
Patient's FSGs are controlled on current medication regimen.  Last A1c reviewed-   Lab Results   Component Value Date    HGBA1C 5.0 05/04/2023     Most recent fingerstick glucose reviewed-   Recent Labs   Lab 05/14/23  1552 05/14/23 2052   POCTGLUCOSE 118* 107     Current correctional scale  Low  Maintain anti-hyperglycemic dose as follows-   Antihyperglycemics (From admission, onward)    Start     Stop Route Frequency Ordered    05/10/23 1047  insulin aspart U-100 pen 0-5 Units         -- SubQ Before meals & nightly PRN 05/10/23 0949        Hold Oral hypoglycemics while patient is in the hospital.

## 2023-05-15 NOTE — PT/OT/SLP PROGRESS
Occupational Therapy   Treatment    Name: Steph Monroe  MRN: [id number]  Admitting Diagnosis:  PAD (peripheral artery disease)  Day of Surgery    Recommendations:     Discharge Recommendations: other (see comments)  Discharge Equipment Recommendations:  none  Barriers to discharge:  None    Assessment:     Steph Monroe is a 77 y.o. female with a medical diagnosis of PAD (peripheral artery disease). Performance deficits affecting function are weakness, impaired endurance, impaired self care skills, impaired functional mobility, gait instability, impaired balance, pain, decreased lower extremity function, impaired cardiopulmonary response to activity, decreased ROM, impaired skin. Patient would benefit from continued skilled acute OT 3x/wk to improve functional mobility, increase independence with ADLs, and address established goals. Recommending post acute therapy once medically appropriate for discharge to increase maximal independence, reduce burden of care, and ensure safety.     Rehab Prognosis:  Good; patient would benefit from acute skilled OT services to address these deficits and reach maximum level of function.       Plan:     Patient to be seen 3 x/week to address the above listed problems via self-care/home management, therapeutic activities, therapeutic exercises, neuromuscular re-education  Plan of Care Expires: 06/12/23  Plan of Care Reviewed with: patient    Subjective     Chief Complaint: fatigue  Patient/Family Comments/goals: patient agreed to therapy  Pain/Comfort:  Pain Rating 1:  (patient did not rate)  Location - Side 1: Right  Location - Orientation 1: generalized  Location 1: foot  Pain Addressed 1: Reposition, Distraction  Pain Rating Post-Intervention 1:  (patient did not rate)    Objective:     Communicated with: NSG prior to session.  Patient found HOB elevated with peripheral IV, telemetry, spears catheter, pulse ox (continuous) upon OT entry to room.    General Precautions:  Standard, fall, hearing impaired, diabetic    Orthopedic Precautions:N/A  Braces: N/A  Respiratory Status: Room air     Occupational Performance:     Bed Mobility:    Patient completed Supine to Sit with stand by assistance  Patient completed Sit to Supine with stand by assistance     Functional Mobility/Transfers:  Patient completed Sit <> Stand Transfer with contact guard assistance  with  hand-held assist   Functional Mobility:  Patient sidestepped to HOB with HHA and CGA. Due to R foot pain     Activities of Daily Living:  Grooming: stand by assistance oral care and washing face sitting EOB  Lower Body Dressing: moderate assistance Donning and doffing socks       AMPA 6 Click ADL: 16    Treatment & Education:  Role of OT and POC  ADL retraining  Functional mobility training  Safety  Importance EOB/OOB activity    Patient left HOB elevated with all lines intact, call button in reach, and all needs met.     GOALS:   Multidisciplinary Problems       Occupational Therapy Goals          Problem: Occupational Therapy    Goal Priority Disciplines Outcome Interventions   Occupational Therapy Goal     OT, PT/OT Ongoing, Progressing    Description: Goals to be met by: 06-2-23     Patient will increase functional independence with ADLs by performing:    UE Dressing with Supervision.  LE Dressing with Supervision.  Grooming while standing at sink with Supervision.  Toileting from toilet with Supervision for hygiene and clothing management.   Stand pivot transfers with Supervision with RW  Toilet transfer to toilet with Supervision.                         Time Tracking:     OT Date of Treatment: 05/15/23  OT Start Time: 0920  OT Stop Time: 0935  OT Total Time (min): 15 min    Billable Minutes:Self Care/Home Management 15               5/15/2023

## 2023-05-15 NOTE — BRIEF OP NOTE
[facility] ([facility])  Brief Operative Note    SUMMARY     Surgery Date: 5/15/2023     Surgeon(s) and Role:     * FERNANDO Cagle II, MD - Primary     * Demario Rushing MD - Fellow    Assisting Surgeon: None    Pre-op Diagnosis:  Critical limb ischemia of right lower extremity [I70.221]    Post-op Diagnosis:  Post-Op Diagnosis Codes:     * Critical limb ischemia of right lower extremity [I70.221]    Procedure(s) (LRB):  Transradial Angiogram right lower Extremity Unilateral (N/A)  PTA of SFA 4 x 150 mm georgina balloon      Anesthesia: Local MAC    Operative Findings: Very diseased right SFA PTA with good result. 2 vessel AT and Peroneal runoff to the foot    Estimated Blood Loss: * No values recorded between 5/15/2023  3:44 PM and 5/15/2023  6:02 PM *    Estimated Blood Loss has been documented.         Specimens:   Specimen (24h ago, onward)      None            [id number]    Demario Rushing MD PGY7  Vascular Surgery Fellow  [phone]

## 2023-05-15 NOTE — PROGRESS NOTES
[facility]  Vascular Surgery  Progress Note    Patient Name: Steph Monroe  MRN: [id number]  Admission Date: 5/6/2023  Primary Care Provider: Mane Carmona MD    Subjective:     Interval History: NAEON. Continuing to have right foot pain. OR today for RLE angiogram. Marked, consented.    Post-Op Info:  Procedure(s) (LRB):  ENDARTERECTOMY, FEMORAL (Right)  STENT, ILIAC ARTERY (Right)   6 Days Post-Op       Medications:  Continuous Infusions:   sodium chloride 0.9% 100 mL/hr at 05/15/23 0038     Scheduled Meds:   aspirin  81 mg Oral Daily    atorvastatin  80 mg Oral Daily    clopidogreL  75 mg Oral Daily    ezetimibe  10 mg Oral Daily    gabapentin  100 mg Oral BID    heparin (porcine)  5,000 Units Subcutaneous Q8H    NIFEdipine  30 mg Oral Daily    polyethylene glycol  17 g Oral Daily    senna-docusate 8.6-50 mg  1 tablet Oral Daily    sodium bicarbonate  650 mg Oral TID     PRN Meds:acetaminophen, albuterol-ipratropium, dextrose 10%, dextrose 10%, dextrose, dextrose, glucagon (human recombinant), hydrALAZINE, HYDROmorphone, insulin aspart U-100, naloxone, ondansetron, oxyCODONE, sodium chloride 0.9%     Objective:     Vital Signs (Most Recent):  Temp: 97.9 °F (36.6 °C) (05/15/23 0454)  Pulse: 69 (05/15/23 0659)  Resp: 18 (05/15/23 0454)  BP: 111/64 (05/15/23 0454)  SpO2: (!) 91 % (05/15/23 0454) Vital Signs (24h Range):  Temp:  [97.1 °F (36.2 °C)-99 °F (37.2 °C)] 97.9 °F (36.6 °C)  Pulse:  [68-87] 69  Resp:  [18-22] 18  SpO2:  [91 %-98 %] 91 %  BP: (111-164)/(57-72) 111/64           Physical Exam  Constitutional:       General: She is not in acute distress.     Appearance: She is not ill-appearing.   HENT:      Head: Normocephalic and atraumatic.      Mouth/Throat:      Mouth: Mucous membranes are moist.   Cardiovascular:      Rate and Rhythm: Normal rate.      Comments: R mono DP  R triphasic PT  Pulmonary:      Effort: Pulmonary effort is normal. No respiratory distress.   Abdominal:       Palpations: Abdomen is soft.   Musculoskeletal:         General: Normal range of motion.      Cervical back: Normal range of motion.   Skin:     General: Skin is warm and dry.      Comments: Redness to dorsum of foot; foot warm   Neurological:      General: No focal deficit present.      Mental Status: She is alert.   Psychiatric:         Mood and Affect: Mood normal.         Behavior: Behavior normal.        Significant Labs:  CBC:   Recent Labs   Lab 05/15/23  0450   WBC 5.46   RBC 2.58*   HGB 7.1*   HCT 24.2*      MCV 94   MCH 27.5   MCHC 29.3*       CMP:   Recent Labs   Lab 05/14/23  1551 05/15/23  0450    86   CALCIUM 8.5* 8.2*   ALBUMIN 2.2* 2.1*   PROT 5.9*  --     144   K 4.8 4.6   CO2 20* 23   * 111*   BUN 54* 49*   CREATININE 1.6* 1.5*   ALKPHOS 107  --    ALT 6*  --    AST 52*  --    BILITOT 0.6  --          Significant Diagnostics:  I have reviewed all pertinent imaging results/findings within the past 24 hours.    Assessment/Plan:     * PAD (peripheral artery disease)  Steph Monroe is a 77yoF with a complex medical history including significant peripheral arterial disease with previous bilateral common iliac stent placement. She presented to an outside hospital with worsening right lower extremity claudication. Her right foot with evidence of ischemic changes. She was transferred for vascular surgery evaluation.     - JOSE/TBI improved minimally, OR for RLE angio today   - consented, marked; LUE marked   - NPO midnight   - angiogram demonstrated high grade stenosis of right common iliac stent, complete occlusion of right common femoral artery   Pre-op JOSE:  right 0.15;  Left 0.58   Post-op JOSE/ToeP: R 0.29, 0.0;  L 0.36, 0.0  - continue aspirin, plavix, high-intensity statin therapy  - PT/OT. OOB and ambulate today  - rest of care per primary team  - vascular to continue to follow        Hari Jackson MD  Vascular Surgery  [facility]

## 2023-05-15 NOTE — TRANSFER OF CARE
"Anesthesia Transfer of Care Note    Patient: Steph Monroe    Procedure(s) Performed: Procedure(s) (LRB):  Angiogram Extremity Unilateral (N/A)  PTA, SUPERFICIAL FEMORAL ARTERY (Right)    Patient location: PACU    Anesthesia Type: MAC    Transport from OR: Transported from OR on 2-3 L/min O2 by NC with adequate spontaneous ventilation    Post pain: adequate analgesia    Post assessment: no apparent anesthetic complications and tolerated procedure well    Post vital signs: stable    Level of consciousness: awake, alert and oriented    Nausea/Vomiting: no nausea/vomiting    Complications: none    Transfer of care protocol was followed      Last vitals:   Visit Vitals  BP (!) 182/77 (BP Location: Left arm, Patient Position: Lying)   Pulse 78   Temp 36.7 °C (98.1 °F) (Temporal)   Resp 20   Ht 5' 2" (1.575 m)   Wt 58.1 kg (128 lb 1.4 oz)   SpO2 (!) 94%   Breastfeeding No   BMI 23.43 kg/m²     "

## 2023-05-15 NOTE — PLAN OF CARE
[facility] Plan of Care    Dx: Critical Limb Ischemia of Right Lower Extremity     Shift Events: Neurovascular checks every 2 hours, Telemetry, Safety     Goals of Care: Pain control, spears Care, Telemetry     Neuro: AAOX4     Vital Signs: WNL     Respiratory: 1L/NC     Diet: NPO      Is patient tolerating current diet? Yes     GTTS: NA     Urine Output/Bowel Movement: Spears Catheter/ Last Bowel Movement 5/14/23     Drains/Tubes/Tube Feeds (include total output/shift):   N/A     Lines: 16g R FA     Accuchecks: ACHS     Skin: scattered ecchymosis, R upper extremity severely ecchymotic.      Fall Risk Score: 8     Activity level? Primarily in bed, Rolling.     Any scheduled procedures? angiogram on Monday 5/15     Any safety concerns? NA     Other: none

## 2023-05-15 NOTE — SUBJECTIVE & OBJECTIVE
Interval History: NAEON. AF and vitals stable. Having intermittent pain in R foot. Hgb 7.1 will repeat CBC at PM. Will go for angiogram today by vascular surgery.    Review of Systems   Constitutional:  Positive for activity change. Negative for chills and fever.   HENT:  Negative for sore throat.    Respiratory:  Negative for cough and shortness of breath.    Cardiovascular:  Negative for chest pain and leg swelling.   Gastrointestinal:  Negative for abdominal distention, abdominal pain, diarrhea and vomiting.   Genitourinary:  Negative for dysuria.   Musculoskeletal:  Positive for arthralgias and gait problem.   Neurological:  Negative for headaches.   Hematological:  Bruises/bleeds easily.   Psychiatric/Behavioral:  Negative for confusion.    Objective:     Vital Signs (Most Recent):  Temp: 97.9 °F (36.6 °C) (05/15/23 1157)  Pulse: 78 (05/15/23 1157)  Resp: 20 (05/15/23 1157)  BP: (!) 184/77 (05/15/23 1157)  SpO2: 99 % (05/15/23 1157) Vital Signs (24h Range):  Temp:  [97.5 °F (36.4 °C)-99 °F (37.2 °C)] 97.9 °F (36.6 °C)  Pulse:  [68-87] 78  Resp:  [18-20] 20  SpO2:  [91 %-99 %] 99 %  BP: (111-190)/(60-78) 184/77     Weight: 58.1 kg (128 lb 1.4 oz)  Body mass index is 23.43 kg/m².    Intake/Output Summary (Last 24 hours) at 5/15/2023 1235  Last data filed at 5/14/2023 2300  Gross per 24 hour   Intake 240 ml   Output 750 ml   Net -510 ml         Physical Exam  Vitals and nursing note reviewed.   Constitutional:       General: She is not in acute distress.     Appearance: Normal appearance. She is not ill-appearing.   HENT:      Head: Normocephalic.   Eyes:      General:         Right eye: No discharge.         Left eye: No discharge.      Extraocular Movements: Extraocular movements intact.      Conjunctiva/sclera: Conjunctivae normal.   Cardiovascular:      Rate and Rhythm: Normal rate and regular rhythm.      Pulses: Normal pulses.      Heart sounds: Normal heart sounds. No murmur heard.  Pulmonary:      Effort:  Pulmonary effort is normal. No respiratory distress.      Breath sounds: Normal breath sounds. No wheezing.   Chest:      Chest wall: No tenderness.   Abdominal:      General: Abdomen is flat. There is no distension.      Palpations: Abdomen is soft.      Tenderness: There is no abdominal tenderness.   Musculoskeletal:         General: Tenderness present. No swelling. Normal range of motion.      Right lower leg: No edema.      Left lower leg: No edema.   Skin:     General: Skin is warm.      Findings: Bruising (Noted on all extremities) and erythema (BL toes/forefoot) present. No rash.      Comments: Right first toe with small dark discoloration.  Mild tenderness on palpation   Neurological:      Mental Status: She is alert and oriented to person, place, and time. Mental status is at baseline.           Significant Labs: All pertinent labs within the past 24 hours have been reviewed.  CBC:   Recent Labs   Lab 05/14/23  1551 05/15/23  0450   WBC 7.75 5.46   HGB 8.4* 7.1*   HCT 26.5* 24.2*    152     CMP:   Recent Labs   Lab 05/14/23  1551 05/15/23  0450    144   K 4.8 4.6   * 111*   CO2 20* 23    86   BUN 54* 49*   CREATININE 1.6* 1.5*   CALCIUM 8.5* 8.2*   PROT 5.9*  --    ALBUMIN 2.2* 2.1*   BILITOT 0.6  --    ALKPHOS 107  --    AST 52*  --    ALT 6*  --    ANIONGAP 6* 10       Significant Imaging: I have reviewed all pertinent imaging results/findings within the past 24 hours.  I have reviewed and interpreted all pertinent imaging results/findings within the past 24 hours.

## 2023-05-15 NOTE — PROGRESS NOTES
"[facility] Medicine  Progress Note    Patient Name: Steph Monroe  MRN: [id number]  Patient Class: IP- Inpatient   Admission Date: 5/6/2023  Length of Stay: 9 days  Attending Physician: Gladys Kaiser MD  Primary Care Provider: Mane Carmona MD        Subjective:     Principal Problem:PAD (peripheral artery disease)        HPI:  Steph Monroe is a 76yo F with a PMH of HFpEF, PAD, HTN, HLD, DM2, CKD4, single solitary kidney (s/p nephrectomy), hx of HCC, and hx of rectal cancer currently admitted to the [facility] medicine service for RLE pain.The only home medications she is taking is lasix and na bicarbonate. She quit all of her other medications about a year ago because she "no longer felt like taking all kind of pills". She underwent angiogram with interventional cardiology on 5/5/23, which showed: "high grade stenosis in right GUNNER.  of CFA collaterals from EIA and profunda artery which provides collaterals to chronically occluded SFA.  Initially 2V runoff with prox  of peroneal artery. Pt provides flow to foot, however has diffuse diease. Unable to adequately visualize right foot circulation."     Of note, pt also with worsening ALESIA on CKD. Nephrology following. Urology evaluated pt and placed spears; believed this to be post-renal in origin, and signed off. IC recommended that pt would benefit from vascular surgery intervention. Dr. Ny with IC discussed case with Dr. Cagle from vascular, who recommended initiating heparin gtt and transferring to [facility] for femoral endardectomy; requested  admission.    On arrival, patient was afebrile and HDS on room air. Heparin gtt infusing. Denies fever, chest pain, SOB, abd pain. Endorses moderate pain to R foot only. R foot appears cool and discolored.          Overview/Hospital Course:  76 yo F transferred from [facility] for vascular surgery evaluation for critical limb ischemia of RLE found by IC angiography on 5/5. Will continue " patient on ASA, plavix, and heparin gtt. Vascular sx consulted who will obtain JOSE and toe pressures prior to surgical intervention. Will plan for femoral endarterectomy on 5/9. Nephrology consulted and recommend future need for HD and line placement for ALESIA. Plan to give IVF pre and post op to prevent worsening ALESIA. Sent to SICU for 5/10 and stepped back down. Repeat ABIs shows severe arterial occlusion. Angiogram on 5/15.      Interval History: NAEON. AF and vitals stable. Having intermittent pain in R foot. Hgb 7.1 will repeat CBC at PM. Will go for angiogram today by vascular surgery.    Review of Systems   Constitutional:  Positive for activity change. Negative for chills and fever.   HENT:  Negative for sore throat.    Respiratory:  Negative for cough and shortness of breath.    Cardiovascular:  Negative for chest pain and leg swelling.   Gastrointestinal:  Negative for abdominal distention, abdominal pain, diarrhea and vomiting.   Genitourinary:  Negative for dysuria.   Musculoskeletal:  Positive for arthralgias and gait problem.   Neurological:  Negative for headaches.   Hematological:  Bruises/bleeds easily.   Psychiatric/Behavioral:  Negative for confusion.    Objective:     Vital Signs (Most Recent):  Temp: 97.9 °F (36.6 °C) (05/15/23 1157)  Pulse: 78 (05/15/23 1157)  Resp: 20 (05/15/23 1157)  BP: (!) 184/77 (05/15/23 1157)  SpO2: 99 % (05/15/23 1157) Vital Signs (24h Range):  Temp:  [97.5 °F (36.4 °C)-99 °F (37.2 °C)] 97.9 °F (36.6 °C)  Pulse:  [68-87] 78  Resp:  [18-20] 20  SpO2:  [91 %-99 %] 99 %  BP: (111-190)/(60-78) 184/77     Weight: 58.1 kg (128 lb 1.4 oz)  Body mass index is 23.43 kg/m².    Intake/Output Summary (Last 24 hours) at 5/15/2023 1235  Last data filed at 5/14/2023 2300  Gross per 24 hour   Intake 240 ml   Output 750 ml   Net -510 ml         Physical Exam  Vitals and nursing note reviewed.   Constitutional:       General: She is not in acute distress.     Appearance: Normal appearance.  She is not ill-appearing.   HENT:      Head: Normocephalic.   Eyes:      General:         Right eye: No discharge.         Left eye: No discharge.      Extraocular Movements: Extraocular movements intact.      Conjunctiva/sclera: Conjunctivae normal.   Cardiovascular:      Rate and Rhythm: Normal rate and regular rhythm.      Pulses: Normal pulses.      Heart sounds: Normal heart sounds. No murmur heard.  Pulmonary:      Effort: Pulmonary effort is normal. No respiratory distress.      Breath sounds: Normal breath sounds. No wheezing.   Chest:      Chest wall: No tenderness.   Abdominal:      General: Abdomen is flat. There is no distension.      Palpations: Abdomen is soft.      Tenderness: There is no abdominal tenderness.   Musculoskeletal:         General: Tenderness present. No swelling. Normal range of motion.      Right lower leg: No edema.      Left lower leg: No edema.   Skin:     General: Skin is warm.      Findings: Bruising (Noted on all extremities) and erythema (BL toes/forefoot) present. No rash.      Comments: Right first toe with small dark discoloration.  Mild tenderness on palpation   Neurological:      Mental Status: She is alert and oriented to person, place, and time. Mental status is at baseline.           Significant Labs: All pertinent labs within the past 24 hours have been reviewed.  CBC:   Recent Labs   Lab 05/14/23  1551 05/15/23  0450   WBC 7.75 5.46   HGB 8.4* 7.1*   HCT 26.5* 24.2*    152     CMP:   Recent Labs   Lab 05/14/23  1551 05/15/23  0450    144   K 4.8 4.6   * 111*   CO2 20* 23    86   BUN 54* 49*   CREATININE 1.6* 1.5*   CALCIUM 8.5* 8.2*   PROT 5.9*  --    ALBUMIN 2.2* 2.1*   BILITOT 0.6  --    ALKPHOS 107  --    AST 52*  --    ALT 6*  --    ANIONGAP 6* 10       Significant Imaging: I have reviewed all pertinent imaging results/findings within the past 24 hours.  I have reviewed and interpreted all pertinent imaging results/findings within the past  "24 hours.      Assessment/Plan:      * PAD (peripheral artery disease)  Hx of PAD w/ previous stents in renal, iliac, and femoral stents  Continue ASA, Plavix, and statin      Acute urinary obstruction  Hx of urinary obstruction. Urology and nephrology following patient at [facility]  Kidney US shows mild hydronephrosis w/ non obstructing renal calculi    -Nephrology signed off  -Strict I/Os  -Daily CMP      Metabolic acidosis  Continue home sodium bicarb      Pulmonary emphysema  PRN Duo nebs ordered      Chronic heart failure with preserved ejection fraction  Echo (05/03/2023) showed EF 65%  Home meds: None    Summary:   The left ventricle is normal in size with concentric remodeling and normal systolic function.   The estimated ejection fraction is 65%.   Indeterminate left ventricular diastolic function.   There is mild mitral stenosis.   The mean diastolic gradient across the mitral valve is 5 mmHg at a heart rate of 66 bpm.   Normal right ventricular size with normal right ventricular systolic function.   There is mild aortic valve stenosis.   Aortic valve area is 1.28 cm2; peak velocity is 2.33 m/s; mean gradient is 10 mmHg.   Mild-to-moderate aortic regurgitation.   Normal central venous pressure (3 mmHg).   The estimated PA systolic pressure is 35 mmHg.      Plan:  - Holding Coreg  - Hold Lasix in setting of ALESIA  - Daily weights (standing if tolerated)  - Strict I/Os  - Fluid restriction at 1500mL  - Cardiac diet  -Telemetry      Critical limb ischemia of right lower extremity  76 yo F transferred from [facility] for vascular surgery evaluation and femoral endarterectomy. Patient presented to [place] with RLE pain that has hindered her walking. R foot with absent pedal pulse, pain, and discoloration. IC performed angiography on 5/5 that showed "high grade stenosis in right GUNNER.  of CFA collaterals from EIA and profunda artery which provides collaterals to chronically occluded SFA.  " "Initially 2V runoff with prox  of peroneal artery. Pt provides flow to foot, however has diffuse diease. Unable to adequately visualize right foot circulation." Transferred on heparin gtt.   No signs of infection noted on exam. No leukocytosis.    JOSE: Right 0.15;  Left 0.58  5/11 JOSE: Right 0.29; Left 0.36    PLAN:  - Vascular surgery consulted for femoral endarterectomy, appreciate recs.   - Femoral endarterectomy on 5/9. Will follow up [facility] sx recs. Plan for angiogram 5/15.   - Continue ASA, plavix, and statin therapy   - Continue Gabapentin for neuropathy pain  - PT/OT ordered  - Pain control w/ Tylenol, hydrocodone, and morphine  - CBC, CMP daily  - Daily coags      Acute renal failure superimposed on stage 4 chronic kidney disease  Hx of nephrectomy. Baseline Cr 1.8-2.2.  Cr 2.8 on arrival  Seen by urology and nephrology who believe etiology to be post-renal. Renal US shows mild hydronephrosis. Jane placed. Patient had angiogram that could have worsened renal function but per nephrology not exposed to contrast.   No indications for HD at this time    Plan:  - Cr. at baseline now, Nephrology signed off. Will need follow up outpatient  - Avoid fluids  - Trend Cr  - Strict I&Os  - Avoid nephrotoxic agents  - Renally adjust medications  - Monitor for urinary retention  - If continues to worsen will consult nephrology      History of rectal or anal cancer  Hx of rectal cancer and treated with radiation and chemotherapy  No longer on chemotherapy      Coronary artery disease involving native coronary artery of native heart without angina pectoris  Continue DAPT and statin  Monitor for symptoms of angina          HLD (hyperlipidemia)  Continue statin      Essential hypertension  /61 on arrival. Patient has not taken BP meds for past year.     Continue PO hydralazine, Nifedipine  PRN Hydralazine for SBP >180    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 hypertension  Patient's FSGs are controlled on current medication " regimen.  Last A1c reviewed-   Lab Results   Component Value Date    HGBA1C 5.0 05/04/2023     Most recent fingerstick glucose reviewed-   Recent Labs   Lab 05/14/23  1552 05/14/23 2052   POCTGLUCOSE 118* 107     Current correctional scale  Low  Maintain anti-hyperglycemic dose as follows-   Antihyperglycemics (From admission, onward)    Start     Stop Route Frequency Ordered    05/10/23 1047  insulin aspart U-100 pen 0-5 Units         -- SubQ Before meals & nightly PRN 05/10/23 0949        Hold Oral hypoglycemics while patient is in the hospital.      VTE Risk Mitigation (From admission, onward)         Ordered     heparin (porcine) injection 5,000 Units  Every 8 hours         05/09/23 2051     IP VTE HIGH RISK PATIENT  Once         05/06/23 1711     Place sequential compression device  Until discontinued         05/06/23 1711     Reason for No Pharmacological VTE Prophylaxis  Once        Question:  Reasons:  Answer:  IV Heparin w/in 24 hrs. Pre or Post-Op    05/06/23 1711                Discharge Planning   PÉREZ: 5/17/2023     Code Status: Full Code   Is the patient medically ready for discharge?: No    Reason for patient still in hospital (select all that apply): Patient trending condition and Consult recommendations  Discharge Plan A: Home with family                  Jeremy Martel DO  Department of Hospital Medicine   [facility]

## 2023-05-15 NOTE — PLAN OF CARE
Problem: Adult Inpatient Plan of Care  Goal: Plan of Care Review  Outcome: Ongoing, Progressing     Problem: Adult Inpatient Plan of Care  Goal: Patient-Specific Goal (Individualized)  Outcome: Ongoing, Progressing     Problem: Adult Inpatient Plan of Care  Goal: Absence of Hospital-Acquired Illness or Injury  Outcome: Ongoing, Progressing     Problem: Adult Inpatient Plan of Care  Goal: Optimal Comfort and Wellbeing  Outcome: Ongoing, Progressing     Problem: Adult Inpatient Plan of Care  Goal: Readiness for Transition of Care  Outcome: Ongoing, Progressing     Problem: Diabetes Comorbidity  Goal: Blood Glucose Level Within Targeted Range  Outcome: Ongoing, Progressing     Problem: Infection  Goal: Absence of Infection Signs and Symptoms  Outcome: Ongoing, Progressing     Problem: Fall Injury Risk  Goal: Absence of Fall and Fall-Related Injury  Outcome: Ongoing, Progressing     Problem: Skin Injury Risk Increased  Goal: Skin Health and Integrity  Outcome: Ongoing, Progressing     Problem: Fluid and Electrolyte Imbalance (Acute Kidney Injury/Impairment)  Goal: Fluid and Electrolyte Balance  Outcome: Ongoing, Progressing     Problem: Oral Intake Inadequate (Acute Kidney Injury/Impairment)  Goal: Optimal Nutrition Intake  Outcome: Ongoing, Progressing     Problem: Renal Function Impairment (Acute Kidney Injury/Impairment)  Goal: Effective Renal Function  Outcome: Ongoing, Progressing     Problem: Impaired Wound Healing  Goal: Optimal Wound Healing  Outcome: Ongoing, Progressing

## 2023-05-16 VITALS
BODY MASS INDEX: 23.57 KG/M2 | OXYGEN SATURATION: 90 % | HEIGHT: 62 IN | TEMPERATURE: 98 F | DIASTOLIC BLOOD PRESSURE: 66 MMHG | SYSTOLIC BLOOD PRESSURE: 145 MMHG | WEIGHT: 128.06 LBS | HEART RATE: 83 BPM | RESPIRATION RATE: 18 BRPM

## 2023-05-16 PROBLEM — D62 ACUTE BLOOD LOSS ANEMIA: Status: ACTIVE | Noted: 2023-05-16

## 2023-05-16 LAB
ABO + RH BLD: NORMAL
ALBUMIN SERPL BCP-MCNC: 1.9 G/DL (ref 3.5–5.2)
ANION GAP SERPL CALC-SCNC: 8 MMOL/L (ref 8–16)
ANISOCYTOSIS BLD QL SMEAR: SLIGHT
BASOPHILS # BLD AUTO: 0.01 K/UL (ref 0–0.2)
BASOPHILS NFR BLD: 0.2 % (ref 0–1.9)
BLD GP AB SCN CELLS X3 SERPL QL: NORMAL
BLD PROD TYP BPU: NORMAL
BLOOD UNIT EXPIRATION DATE: NORMAL
BLOOD UNIT TYPE CODE: 6200
BLOOD UNIT TYPE: NORMAL
BUN SERPL-MCNC: 41 MG/DL (ref 8–23)
CALCIUM SERPL-MCNC: 7.5 MG/DL (ref 8.7–10.5)
CHLORIDE SERPL-SCNC: 116 MMOL/L (ref 95–110)
CO2 SERPL-SCNC: 18 MMOL/L (ref 23–29)
CODING SYSTEM: NORMAL
CREAT SERPL-MCNC: 1.4 MG/DL (ref 0.5–1.4)
CROSSMATCH INTERPRETATION: NORMAL
DIFFERENTIAL METHOD: ABNORMAL
DISPENSE STATUS: NORMAL
EOSINOPHIL # BLD AUTO: 0 K/UL (ref 0–0.5)
EOSINOPHIL NFR BLD: 0.4 % (ref 0–8)
ERYTHROCYTE [DISTWIDTH] IN BLOOD BY AUTOMATED COUNT: 18.5 % (ref 11.5–14.5)
EST. GFR  (NO RACE VARIABLE): 39.2 ML/MIN/1.73 M^2
GLUCOSE SERPL-MCNC: 67 MG/DL (ref 70–110)
HCT VFR BLD AUTO: 20.7 % (ref 37–48.5)
HGB BLD-MCNC: 6 G/DL (ref 12–16)
HYPOCHROMIA BLD QL SMEAR: ABNORMAL
IMM GRANULOCYTES # BLD AUTO: 0.01 K/UL (ref 0–0.04)
IMM GRANULOCYTES NFR BLD AUTO: 0.2 % (ref 0–0.5)
LYMPHOCYTES # BLD AUTO: 0.5 K/UL (ref 1–4.8)
LYMPHOCYTES NFR BLD: 9.2 % (ref 18–48)
MAGNESIUM SERPL-MCNC: 2.1 MG/DL (ref 1.6–2.6)
MCH RBC QN AUTO: 28 PG (ref 27–31)
MCHC RBC AUTO-ENTMCNC: 29 G/DL (ref 32–36)
MCV RBC AUTO: 97 FL (ref 82–98)
MONOCYTES # BLD AUTO: 0.7 K/UL (ref 0.3–1)
MONOCYTES NFR BLD: 12.7 % (ref 4–15)
NEUTROPHILS # BLD AUTO: 4 K/UL (ref 1.8–7.7)
NEUTROPHILS NFR BLD: 77.3 % (ref 38–73)
NRBC BLD-RTO: 0 /100 WBC
NUM UNITS TRANS PACKED RBC: NORMAL
OVALOCYTES BLD QL SMEAR: ABNORMAL
PHOSPHATE SERPL-MCNC: 3.8 MG/DL (ref 2.7–4.5)
PHOSPHATE SERPL-MCNC: 3.8 MG/DL (ref 2.7–4.5)
PLATELET # BLD AUTO: 136 K/UL (ref 150–450)
PMV BLD AUTO: 10.5 FL (ref 9.2–12.9)
POIKILOCYTOSIS BLD QL SMEAR: SLIGHT
POLYCHROMASIA BLD QL SMEAR: ABNORMAL
POTASSIUM SERPL-SCNC: 4.8 MMOL/L (ref 3.5–5.1)
RBC # BLD AUTO: 2.14 M/UL (ref 4–5.4)
SODIUM SERPL-SCNC: 142 MMOL/L (ref 136–145)
SPECIMEN OUTDATE: NORMAL
WBC # BLD AUTO: 5.21 K/UL (ref 3.9–12.7)

## 2023-05-16 PROCEDURE — 99239 PR HOSPITAL DISCHARGE DAY,>30 MIN: ICD-10-PCS | Mod: GC,,, | Performed by: INTERNAL MEDICINE

## 2023-05-16 PROCEDURE — 85025 COMPLETE CBC W/AUTO DIFF WBC: CPT

## 2023-05-16 PROCEDURE — 86920 COMPATIBILITY TEST SPIN: CPT

## 2023-05-16 PROCEDURE — 99239 HOSP IP/OBS DSCHRG MGMT >30: CPT | Mod: GC,,, | Performed by: INTERNAL MEDICINE

## 2023-05-16 PROCEDURE — 36415 COLL VENOUS BLD VENIPUNCTURE: CPT

## 2023-05-16 PROCEDURE — 25000003 PHARM REV CODE 250: Performed by: STUDENT IN AN ORGANIZED HEALTH CARE EDUCATION/TRAINING PROGRAM

## 2023-05-16 PROCEDURE — 36430 TRANSFUSION BLD/BLD COMPNT: CPT

## 2023-05-16 PROCEDURE — 94761 N-INVAS EAR/PLS OXIMETRY MLT: CPT

## 2023-05-16 PROCEDURE — 83735 ASSAY OF MAGNESIUM: CPT

## 2023-05-16 PROCEDURE — 1111F DSCHRG MED/CURRENT MED MERGE: CPT | Mod: CPTII,GC,, | Performed by: INTERNAL MEDICINE

## 2023-05-16 PROCEDURE — 25000003 PHARM REV CODE 250

## 2023-05-16 PROCEDURE — 86900 BLOOD TYPING SEROLOGIC ABO: CPT

## 2023-05-16 PROCEDURE — 63600175 PHARM REV CODE 636 W HCPCS

## 2023-05-16 PROCEDURE — 93922 UPR/L XTREMITY ART 2 LEVELS: CPT | Performed by: SURGERY

## 2023-05-16 PROCEDURE — 1111F PR DISCHARGE MEDS RECONCILED W/ CURRENT OUTPATIENT MED LIST: ICD-10-PCS | Mod: CPTII,GC,, | Performed by: INTERNAL MEDICINE

## 2023-05-16 PROCEDURE — P9040 RBC LEUKOREDUCED IRRADIATED: HCPCS

## 2023-05-16 PROCEDURE — 80069 RENAL FUNCTION PANEL: CPT

## 2023-05-16 RX ORDER — HYDROCODONE BITARTRATE AND ACETAMINOPHEN 500; 5 MG/1; MG/1
TABLET ORAL
Status: DISCONTINUED | OUTPATIENT
Start: 2023-05-16 | End: 2023-05-16 | Stop reason: HOSPADM

## 2023-05-16 RX ORDER — NIFEDIPINE 30 MG/1
30 TABLET, EXTENDED RELEASE ORAL DAILY
Qty: 30 TABLET | Refills: 11 | Status: SHIPPED | OUTPATIENT
Start: 2023-05-17 | End: 2024-05-16

## 2023-05-16 RX ORDER — OXYCODONE HYDROCHLORIDE 5 MG/1
5 TABLET ORAL EVERY 6 HOURS PRN
Qty: 20 TABLET | Refills: 0 | Status: SHIPPED | OUTPATIENT
Start: 2023-05-16 | End: 2023-05-21

## 2023-05-16 RX ORDER — SODIUM BICARBONATE 650 MG/1
650 TABLET ORAL 3 TIMES DAILY
Qty: 90 TABLET | Refills: 11 | Status: SHIPPED | OUTPATIENT
Start: 2023-05-16 | End: 2024-05-15

## 2023-05-16 RX ADMIN — GABAPENTIN 100 MG: 100 CAPSULE ORAL at 08:05

## 2023-05-16 RX ADMIN — HEPARIN SODIUM 5000 UNITS: 5000 INJECTION INTRAVENOUS; SUBCUTANEOUS at 05:05

## 2023-05-16 RX ADMIN — NIFEDIPINE 30 MG: 30 TABLET, FILM COATED, EXTENDED RELEASE ORAL at 08:05

## 2023-05-16 RX ADMIN — SENNOSIDES AND DOCUSATE SODIUM 1 TABLET: 8.6; 5 TABLET ORAL at 08:05

## 2023-05-16 RX ADMIN — SODIUM BICARBONATE 650 MG: 650 TABLET ORAL at 08:05

## 2023-05-16 RX ADMIN — ATORVASTATIN CALCIUM 80 MG: 40 TABLET, FILM COATED ORAL at 08:05

## 2023-05-16 RX ADMIN — ASPIRIN 81 MG CHEWABLE TABLET 81 MG: 81 TABLET CHEWABLE at 08:05

## 2023-05-16 RX ADMIN — CLOPIDOGREL BISULFATE 75 MG: 75 TABLET ORAL at 08:05

## 2023-05-16 RX ADMIN — EZETIMIBE 10 MG: 10 TABLET ORAL at 08:05

## 2023-05-16 NOTE — PT/OT/SLP PROGRESS
Physical Therapy      Patient Name:  Steph Monroe   MRN:  [id number]    Patient not seen today secondary to  (New orders needed and requested. New orders placed in PM. Pt hemoglobin of 6.0 in PM. Will f/u tomorrow as appropriate.).

## 2023-05-16 NOTE — PLAN OF CARE
Recommendations     1.) Recommend continuing with Cardiac diet as tolerated- add DM modifiers if needed.      2.) Recommend continuing with Boost Glucose Control TID and Tello BID to help meet needs.      3.) RD to monitor wt, PO intake, skin, labs.        Goals: to meet % of EEN/EPN by RD f/u  Nutrition Goal Status: progressing towards goal  Communication of RD Recs:  (POC)

## 2023-05-16 NOTE — PROGRESS NOTES
Hemostasis not achieved with TR band, band not maintaining reinflation. Removed band, pressure held per RN.

## 2023-05-16 NOTE — PLAN OF CARE
CHW met with patient/family at bedside. Patient experience rounding completed and reviewed the following.     Do you know your discharge plan? Yes or No,    If yes, what is the plan? (Home, Home Health, Rehab, SNF, LTAC, or Other)   Home/HH     If you are discharging home, do you have help at home? Yes or No      Yes    Do you think you will need help at home at discharge? Yes or No       Yes    Have you discussed your needs and preferences with your SW/CM? Yes or No           Yes    Assigned SW/CM notified of any patient/family needs or concerns.        Kiki Christine CHW  Case Management   [phone]

## 2023-05-16 NOTE — DISCHARGE INSTRUCTIONS
Please continue to take aspirin, plavix and atorvastatin daily.   Follow up with vascular surgery in clinic in 2 weeks.

## 2023-05-16 NOTE — ASSESSMENT & PLAN NOTE
Patient's FSGs are controlled on current medication regimen.  Last A1c reviewed-   Lab Results   Component Value Date    HGBA1C 5.0 05/04/2023     Most recent fingerstick glucose reviewed-   Recent Labs   Lab 05/15/23  1821   POCTGLUCOSE 96     Current correctional scale  Low  Maintain anti-hyperglycemic dose as follows-   Antihyperglycemics (From admission, onward)    Start     Stop Route Frequency Ordered    05/10/23 1047  insulin aspart U-100 pen 0-5 Units         -- SubQ Before meals & nightly PRN 05/10/23 0949        Hold Oral hypoglycemics while patient is in the hospital.

## 2023-05-16 NOTE — PLAN OF CARE
05/16/23 1429   Final Note   Assessment Type Final Discharge Note   Anticipated Discharge Disposition [facility] Resources/Appts/Education Provided Provided patient/caregiver with written discharge plan information   Post-Acute Status   Post-Acute Authorization Home Health   Home Health Status Set-up Complete/Auth obtained   Coverage [employer]   Patient choice form signed by patient/caregiver List with quality metrics by geographic area provided   Discharge Delays None known at this time       [facility]. [place] to being services on 5/22. [facility] at [facility], ready responders and OPCM referrals sent    Kiki Hardy LCSW  Case Management/[facility]  [phone]     3:02 PM  [facility] Care [facility] to admit pt on 5/19.    Kiki Hardy LCSW  Case Management/[facility]  [phone]

## 2023-05-16 NOTE — NURSING
[facility] Plan Of Care Note    Dx: PAD     Shift Events:  Receiving 1 U PRBC    Goals of Care: Shift weight while in bed    Neuro:  A&O x4    Vital Signs: WNL     Respiratory: 1L NC     Diet: Cardiac     Is patient tolerating current diet? Yes     GTTS: DC'd     Urine Output/Bowel Movement: Voiding through catheter, no BM.     Drains/Tubes/Tube Feeds (include total output/shift): Urethral catheter; output documented on flowsheets.     Lines: R FA 18     Accuchecks: NA     Skin: Pressure sore on sacruml; unstageable     Fall Risk Score: 10     Activity level? Bedbound     Any scheduled procedures? NA     Any safety concerns? Fall risk

## 2023-05-16 NOTE — PROGRESS NOTES
"[facility]  Adult Nutrition  Progress Note    SUMMARY       Recommendations    1.) Recommend continuing with Cardiac diet as tolerated- add DM modifiers if needed.     2.) Recommend continuing with Boost Glucose Control TID and Tello BID to help meet needs.     3.) RD to monitor wt, PO intake, skin, labs.      Goals: to meet % of EEN/EPN by RD f/u  Nutrition Goal Status: progressing towards goal  Communication of RD Recs:  (POC)    Assessment and Plan    Nutrition Problem  Increased PRO/energy needs    Related to (etiology):   Increased physiological needs    Signs and Symptoms (as evidenced by):   Skin wounds, PAD     Interventions/Recommendations (treatment strategy):  Collaboration in nutritional care with other providers.  ONS    Nutrition Diagnosis Status:   New    Reason for Assessment    Reason For Assessment: RD follow-up  Diagnosis: other (see comments) (Critical limb ischemia of right lower extremity)  Relevant Medical History: HTN, DM2, CAD, HLD, rectal CA  Interdisciplinary Rounds: did not attend  General Information Comments: Pt seen by RD. Pt denies n/v/d/c. Pt endorses good appetite and had snacks present at bedside. Pt stated that they drink the Boost Glucose Control (chocolate) as ordered but did not drink the Tello because they did not know what it is. RD explained to pt to mix packet of Tello with 8-10oz of water and to drink. Pt stated that they understood.  Nutrition Discharge Planning: Diabetic diet    Nutrition Risk Screen    Nutrition Risk Screen: no indicators present    Nutrition/Diet History    Spiritual, Cultural Beliefs, Nondenominational Practices, Values that Affect Care: no    Anthropometrics    Temp: 98.4 °F (36.9 °C)  Height Method: Stated  Height: 5' 2" (157.5 cm)  Height (inches): 62 in  Weight Method: Stated  Weight: 58.1 kg (128 lb 1.4 oz)  Weight (lb): 128.09 lb  Ideal Body Weight (IBW), Female: 110 lb  % Ideal Body Weight, Female (lb): 116.45 %  BMI (Calculated): 23.4   "   Lab/Procedures/Meds    Pertinent Labs Reviewed: reviewed  Pertinent Labs Comments: GFR: 39.2, gluc: 5.9, alb: 1.9  Pertinent Medications Reviewed: reviewed  Pertinent Medications Comments: atorvastatin, heparin, polyethylene glycol, nifedipine, senna- docusate, Na Bicarb, NaCl    Estimated/Assessed Needs    Weight Used For Calorie Calculations: 58.1 kg (128 lb 1.4 oz)  Energy Calorie Requirements (kcal): 1743 kcal  Energy Need Method: Kcal/kg (30kcal/kg)  Protein Requirements: 70- 82g (1.2-1.4g/kg)  Weight Used For Protein Calculations: 58.1 kg (128 lb 1.4 oz)  Fluid Requirements (mL): 1ml/1kcal or per MD  Estimated Fluid Requirement Method: RDA Method  RDA Method (mL): 1743  CHO Requirement: 218g    Nutrition Prescription Ordered    Current Diet Order: Cardiac  Oral Nutrition Supplement: Tello BID and Boost Glucose Control TID    Evaluation of Received Nutrient/Fluid Intake    I/O: -4.7L since 5/11  Energy Calories Required: meeting needs  Protein Required: meeting needs  Fluid Required:  (as per MD)  Comments: LBM 5/15  Tolerance: tolerating  % Intake of Estimated Energy Needs: 75 - 100 %  % Meal Intake: 75 - 100 %    Nutrition Risk    Level of Risk/Frequency of Follow-up:  (RD to f/u x1/week)     Monitor and Evaluation    Food and Nutrient Intake: energy intake, food and beverage intake  Food and Nutrient Adminstration: diet order  Physical Activity and Function: nutrition-related ADLs and IADLs  Anthropometric Measurements: weight, weight change, body mass index  Biochemical Data, Medical Tests and Procedures: electrolyte and renal panel, gastrointestinal profile, glucose/endocrine profile, inflammatory profile, lipid profile  Nutrition-Focused Physical Findings: overall appearance, skin     Nutrition Follow-Up    RD Follow-up?: Yes

## 2023-05-16 NOTE — NURSING TRANSFER
Nursing Transfer Note      5/15/2023     Reason patient is being transferred: recovery care complete    Transfer To: 1060    Transfer via bed    Transfer with cardiac monitoring    Transported by RN X 2     Telemetry: Box Number [id number]    Medicines sent: ns @ 10ml /hr    Any special needs or follow-up needed: routine    Chart send with patient: Yes    Notified: nurse    Patient reassessed at: 05/15/23 4560 (date, time)  1

## 2023-05-16 NOTE — PLAN OF CARE
"  Subjective:       Angi Garrett is a 72 y.o. female who is a new patient who was self-referred for evaluation of hematuria, frequency.      She was seen in 6/14 for microhematuria. UA micro at that time showed no significant microhematuria (1 RBC). She was recommended to f/u in 6 weeks but failed to follow up. CT done ([facility]) showed endometrial thickening at that time - recommended to see gyn.     Returns now with gross hematuria two days ago - occurring intermittently. She is unsure if this is vaginal.     Also reports frequency, urgency, and UUI - started recently. +MARTINEZ with recent cold.     PVR (bladder scan) today - 0cc      The following portions of the patient's history were reviewed and updated as appropriate: allergies, current medications, past family history, past medical history, past social history, past surgical history and problem list.    Review of Systems  Constitutional: no fever or chills  ENT: no nasal congestion or sore throat  Respiratory: no cough or shortness of breath  Cardiovascular: no chest pain or palpitations  Gastrointestinal: no nausea or vomiting, tolerating diet  Genitourinary: as per HPI  Hematologic/Lymphatic: no easy bruising or lymphadenopathy  Musculoskeletal: no arthralgias or myalgias  Skin: no rashes or lesions  Neurological: no seizures or tremors  Behavioral/Psych: no auditory or visual hallucinations        Objective:    Vitals: Ht 5' 7" (1.702 m)   Wt 94.8 kg (209 lb)   BMI 32.73 kg/m²     Physical Exam   General: well developed, well nourished in no acute distress  Head: normocephalic, atraumatic  Neck: supple, trachea midline, no obvious enlargement of thyroid  HEENT: EOMI, mucus membranes moist, sclera anicteric, no hearing impairment  Lungs: symmetric expansion, non-labored breathing  Cardiovascular: regular rate and rhythm, normal pulses  Abdomen: soft, non tender, non distended, no palpable masses, no hepatosplenomegaly, no hernias, no CVA " [facility]      HOME HEALTH ORDERS  FACE TO FACE ENCOUNTER    Patient Name: Steph Monroe  YOB: 1945    PCP: Mane Carmona MD   PCP Address: [address] [place] [address]  PCP Phone Number: [phone]  PCP Fax: [phone]    Encounter Date: 5/6/23    Admit to Home Health    Diagnoses:  Active Hospital Problems    Diagnosis  POA    *PAD (peripheral artery disease) [I73.9]  Yes    Anemia [D62]  Unknown    Acute urinary obstruction [N13.9]  Yes    Metabolic acidosis [E87.20]  Yes    Critical limb ischemia of right lower extremity [I70.221]  Yes    Chronic heart failure with preserved ejection fraction [I50.32]  Yes    Pulmonary emphysema [J43.9]  Yes    Acute renal failure superimposed on stage 4 chronic kidney disease [N17.9, N18.4]  Yes    History of rectal or anal cancer [Z85.048]  Yes    Coronary artery disease involving native coronary artery of native heart without angina pectoris [I25.10]  Yes     Chronic    HLD (hyperlipidemia) [E78.5]  Yes     Chronic    Essential hypertension [I10]  Yes     Chronic    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 hypertension [E11.22, I12.9, N18.30]  Yes     Chronic      Resolved Hospital Problems   No resolved problems to display.       Follow Up Appointments:  Future Appointments   Date Time Provider Department Center   5/22/2023 10:40 AM Tacos Lorenzo MD [facility]       Allergies:  Review of patient's allergies indicates:   Allergen Reactions    Chantix [varenicline]      Tongue swelling      Wellbutrin [bupropion hcl]      Tongue swelling         Medications: Review discharge medications with patient and family and provide education.    Current Facility-Administered Medications   Medication Dose Route Frequency Provider Last Rate Last Admin    0.9%  NaCl infusion (for blood administration)   Intravenous Q24H PRN Jeremy Martel,         0.9%  NaCl infusion   Intravenous Continuous Demario Rushing  mL/hr at 05/15/23 4957 New  "tenderness  Musculoskeletal: no peripheral edema, normal ROM in bilateral upper and lower extremities  Lymphatics: no cervical or inguinal lymphadenopathy  Skin: no rashes or lesions  Neuro: alert and oriented x 3, no gross deficits  Psych: normal judgment and insight, normal mood/affect and non-anxious  Genitourinary:   patient declined exam      Lab Review   Urine analysis today in clinic shows positive for ++leukocytes, red blood cells 250    No results found for: WBC, HGB, HCT, MCV, PLT  Lab Results   Component Value Date    CREATININE 0.9 01/15/2020         Imaging  Images and reports were personally reviewed by me and discussed with patient       Assessment/Plan:      1. Hematuria, gross    - Discussed etiology and workup of hematuria   - UCx   - Cytology - not indicated   - CT urogram   - Office cystoscopy     2. OAB (overactive bladder)    - New onset   - Seems to be related to hematuria   - Possible UTI related. UCx today.   - Will monitor     3. Urge incontinence    - If persistent, will consider treatment         Follow up in 2-3 weeks for cysto      Cystoscopy  Date/Time: 1/23/2020 11:00 AM  Performed by: Rosie Gillespie MD  Authorized by: Rosie Gillespie MD     Consent Done?:  Yes (Written)  Time out: Immediately prior to procedure a "time out" was called to verify the correct patient, procedure, equipment, support staff and site/side marked as required.    Indications: hematuria    Position:  Dorsal lithotomy  Anesthesia:  Lidocaine jelly  Patient sedated?: No    Preparation: Patient was prepped and draped in usual sterile fashion      Scope type:  Flexible cystoscope  Stent inserted: No    Stent removed: No    External exam normal: Yes    Digital exam performed: No    Urethra normal: Yes  Bladder neck normal: Bladder neck normal   Bladder normal: No (1cm flat papillary tumor just lateral to the R UO)         Number of tumors:  1       Tumor 1:          Size (mm):  10         Anatomy:  " Bag at 05/15/23 2154    acetaminophen tablet 650 mg  650 mg Oral Q8H PRN Aspen Pagan NP   650 mg at 05/11/23 0801    albuterol-ipratropium 2.5 mg-0.5 mg/3 mL nebulizer solution 3 mL  3 mL Nebulization Q6H PRN Aspen Pagan NP        aspirin chewable tablet 81 mg  81 mg Oral Daily Sina Cummings MD   81 mg at 05/16/23 0825    atorvastatin tablet 80 mg  80 mg Oral Daily Sina Cummings MD   80 mg at 05/16/23 0825    clopidogreL tablet 75 mg  75 mg Oral Daily Demario Rushing MD   75 mg at 05/16/23 0825    dextrose 10% bolus 125 mL 125 mL  12.5 g Intravenous PRN Fabiano Rowland MD        dextrose 10% bolus 250 mL 250 mL  25 g Intravenous PRN Fabiano Rowland MD        dextrose 40 % gel 15,000 mg  15 g Oral PRN Fabiano Rowland MD        dextrose 40 % gel 30,000 mg  30 g Oral PRN Fabiano Rowland MD        ezetimibe tablet 10 mg  10 mg Oral Daily Sina Cummings MD   10 mg at 05/16/23 0825    gabapentin capsule 100 mg  100 mg Oral BID Sina Cummings MD   100 mg at 05/16/23 0825    glucagon (human recombinant) injection 1 mg  1 mg Intramuscular PRN Fabiano Rowland MD        heparin (porcine) injection 5,000 Units  5,000 Units Subcutaneous Q8H Se Carolina MD   5,000 Units at 05/16/23 0557    hydrALAZINE injection 10 mg  10 mg Intravenous Q8H PRN Aspen Pagan NP        HYDROmorphone injection 0.2 mg  0.2 mg Intravenous Q6H PRN Mariela Finnegan MD        insulin aspart U-100 pen 0-5 Units  0-5 Units Subcutaneous QID (AC + HS) PRN Fabiano Rowland MD        naloxone 0.4 mg/mL injection 0.02 mg  0.02 mg Intravenous PRN Aspen Pagan NP        NIFEdipine 24 hr tablet 30 mg  30 mg Oral Daily Ariel Mccall MD   30 mg at 05/16/23 0825    ondansetron injection 4 mg  4 mg Intravenous Q8H PRN Se Carolina MD        oxyCODONE immediate release tablet 5 mg  5 mg Oral Q6H PRN Mariela Finnegan MD   5 mg at 05/12/23 1346    polyethylene glycol packet 17 g  17 g Oral Daily Ariel Mccall,  MD   17 g at 05/14/23 0815    senna-docusate 8.6-50 mg per tablet 1 tablet  1 tablet Oral Daily Ariel Mccall MD   1 tablet at 05/16/23 0825    sodium bicarbonate tablet 650 mg  650 mg Oral TID Sina Cummings MD   650 mg at 05/16/23 0825    sodium chloride 0.9% flush 10 mL  10 mL Intravenous PRN Se Carolina MD        sodium chloride 0.9% flush 10 mL  10 mL Intravenous PRN Morteza Villalobos DO         Current Discharge Medication List        START taking these medications    Details   oxyCODONE (ROXICODONE) 5 MG immediate release tablet Take 1 tablet (5 mg total) by mouth every 6 (six) hours as needed for Pain.  Qty: 20 tablet, Refills: 0    Comments: Quantity prescribed more than 7 day supply? No           CONTINUE these medications which have CHANGED    Details   NIFEdipine (PROCARDIA-XL) 30 MG (OSM) 24 hr tablet Take 1 tablet (30 mg total) by mouth once daily.  Qty: 30 tablet, Refills: 11    Comments: .      sodium bicarbonate 650 MG tablet Take 1 tablet (650 mg total) by mouth 3 (three) times daily.  Qty: 90 tablet, Refills: 11           CONTINUE these medications which have NOT CHANGED    Details   aspirin 81 MG Chew Take 1 tablet (81 mg total) by mouth once daily.  Qty: 30 tablet, Refills: 5      atorvastatin (LIPITOR) 80 MG tablet Take 1 tablet (80 mg total) by mouth once daily.  Qty: 90 tablet, Refills: 3      clopidogreL (PLAVIX) 75 mg tablet Take 75 mg by mouth once daily.      ezetimibe (ZETIA) 10 mg tablet Take 10 mg by mouth once daily.      furosemide (LASIX) 20 MG tablet Take 20 mg by mouth once daily.      gabapentin (NEURONTIN) 100 MG capsule Take 1 capsule (100 mg total) by mouth 2 (two) times daily.  Qty: 60 capsule, Refills: 11      carvediloL (COREG) 25 MG tablet Take 1 tablet by mouth 2 (two) times daily.           STOP taking these medications       hydrALAZINE (APRESOLINE) 25 MG tablet Comments:   Reason for Stopping:         traMADoL (ULTRAM) 50 mg tablet Comments:   Reason for Stopping:      Pedunculated         Location:  R Lateral Wall    Patient tolerance:  Patient tolerated the procedure well with no immediate complications     CT uro - negative. 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 Discussed proximity to R UO may require stent placement for several weeks post-op. OR 1/31/20.                I have seen and examined this patient within the last 30 days. My clinical findings that support the need for the home health skilled services and home bound status are the following:no   Weakness/numbness causing balance and gait disturbance due to Weakness/Debility making it taxing to leave home.     Diet:   cardiac diet    Labs:  Report Lab results to PCP.    Referrals/ Consults  Physical Therapy to evaluate and treat. Evaluate for home safety and equipment needs; Establish/upgrade home exercise program. Perform / instruct on therapeutic exercises, gait training, transfer training, and Range of Motion.  Occupational Therapy to evaluate and treat. Evaluate home environment for safety and equipment needs. Perform/Instruct on transfers, ADL training, ROM, and therapeutic exercises.    Activities:   activity as tolerated    Nursing:   Agency to admit patient within 24 hours of hospital discharge unless specified on physician order or at patient request    SN to complete comprehensive assessment including routine vital signs. Instruct on disease process and s/s of complications to report to MD. Review/verify medication list sent home with the patient at time of discharge  and instruct patient/caregiver as needed. Frequency may be adjusted depending on start of care date.     Skilled nurse to perform up to 3 visits PRN for symptoms related to diagnosis    Notify MD if SBP > 160 or < 90; DBP > 90 or < 50; HR > 120 or < 50; Temp > 101; O2 < 88%    Ok to schedule additional visits based on staff availability and patient request on consecutive days within the home health episode.    When multiple disciplines ordered:    Start of Care occurs on Sunday - Wednesday schedule remaining discipline evaluations as ordered on separate consecutive days following the start of care.    Thursday SOC -schedule subsequent evaluations Friday and Monday the following week.     Friday - Saturday SOC - schedule subsequent  discipline evaluations on consecutive days starting Monday of the following week.    For all post-discharge communication and subsequent orders please contact patient's primary care physician.     Home Health Aide:  Physical Therapy Three times weekly and Occupational Therapy Three times weekly    Wound Care Orders  no    I certify that this patient is confined to her home and needs physical therapy and occupational therapy.

## 2023-05-16 NOTE — ANESTHESIA POSTPROCEDURE EVALUATION
Anesthesia Post Evaluation    Patient: Steph Monroe    Procedure(s) Performed: Procedure(s) (LRB):  Angiogram Extremity Unilateral (N/A)  PTA, SUPERFICIAL FEMORAL ARTERY (Right)    Final Anesthesia Type: MAC      Patient location during evaluation: PACU  Patient participation: Yes- Able to Participate  Level of consciousness: awake and alert  Post-procedure vital signs: reviewed and stable  Pain management: adequate  Airway patency: patent    PONV status at discharge: No PONV  Anesthetic complications: no      Cardiovascular status: hemodynamically stable  Respiratory status: unassisted  Hydration status: euvolemic  Follow-up not needed.          Vitals Value Taken Time   /57 05/15/23 2321   Temp 36.3 °C (97.3 °F) 05/15/23 2321   Pulse 76 05/16/23 0600   Resp 16 05/15/23 2321   SpO2 95 % 05/16/23 0500         Event Time   Out of Recovery 05/15/2023 18:10:00         Pain/Nohelia Score: No data recorded

## 2023-05-16 NOTE — NURSING
Patient arrived back to unit in stable condition via bed accompanied by staff x 2. Vital signs stable,call light within reach, bed locked, and side rails up X 2.

## 2023-05-16 NOTE — SUBJECTIVE & OBJECTIVE
Medications:  Continuous Infusions:   sodium chloride 0.9% 100 mL/hr at 05/15/23 2154     Scheduled Meds:   aspirin  81 mg Oral Daily    atorvastatin  80 mg Oral Daily    clopidogreL  75 mg Oral Daily    ezetimibe  10 mg Oral Daily    gabapentin  100 mg Oral BID    heparin (porcine)  5,000 Units Subcutaneous Q8H    NIFEdipine  30 mg Oral Daily    polyethylene glycol  17 g Oral Daily    senna-docusate 8.6-50 mg  1 tablet Oral Daily    sodium bicarbonate  650 mg Oral TID     PRN Meds:sodium chloride, acetaminophen, albuterol-ipratropium, dextrose 10%, dextrose 10%, dextrose, dextrose, glucagon (human recombinant), hydrALAZINE, HYDROmorphone, insulin aspart U-100, naloxone, ondansetron, oxyCODONE, sodium chloride 0.9%, sodium chloride 0.9%     Objective:     Vital Signs (Most Recent):  Temp: 97.3 °F (36.3 °C) (05/15/23 2321)  Pulse: 76 (05/16/23 0600)  Resp: 16 (05/15/23 2321)  BP: (!) 121/57 (05/15/23 2321)  SpO2: 95 % (05/16/23 0500) Vital Signs (24h Range):  Temp:  [97.3 °F (36.3 °C)-98.1 °F (36.7 °C)] 97.3 °F (36.3 °C)  Pulse:  [59-80] 76  Resp:  [10-23] 16  SpO2:  [93 %-99 %] 95 %  BP: (109-191)/(53-78) 121/57            Physical Exam  Constitutional:       General: She is not in acute distress.     Appearance: She is not ill-appearing.   HENT:      Head: Normocephalic and atraumatic.      Mouth/Throat:      Mouth: Mucous membranes are moist.   Cardiovascular:      Rate and Rhythm: Normal rate.      Comments: R triphasic DP Dopplerable    Pulmonary:      Effort: Pulmonary effort is normal. No respiratory distress.   Abdominal:      Palpations: Abdomen is soft.   Musculoskeletal:         General: Normal range of motion.      Cervical back: Normal range of motion.   Skin:     General: Skin is warm and dry.      Comments: Redness to dorsum of foot; foot warm   Neurological:      General: No focal deficit present.      Mental Status: She is alert.   Psychiatric:         Mood and Affect: Mood normal.          Behavior: Behavior normal.        Significant Labs:  CBC:   Recent Labs   Lab 05/16/23  0412   WBC 5.21   RBC 2.14*   HGB 6.0*   HCT 20.7*   *   MCV 97   MCH 28.0   MCHC 29.0*       CMP:   Recent Labs   Lab 05/14/23  1551 05/15/23  0450 05/16/23 0412      < > 67*   CALCIUM 8.5*   < > 7.5*   ALBUMIN 2.2*   < > 1.9*   PROT 5.9*  --   --       < > 142   K 4.8   < > 4.8   CO2 20*   < > 18*   *   < > 116*   BUN 54*   < > 41*   CREATININE 1.6*   < > 1.4   ALKPHOS 107  --   --    ALT 6*  --   --    AST 52*  --   --    BILITOT 0.6  --   --     < > = values in this interval not displayed.         Significant Diagnostics:  I have reviewed all pertinent imaging results/findings within the past 24 hours.

## 2023-05-16 NOTE — SUBJECTIVE & OBJECTIVE
Interval History: NAEON. Vitals stable and afebrile. Having intermittent R foot pain. Hgb 6 this morning. Will order 1u RBC and go for JOSE today. Can likely discharge this evening if givne okay by vascular sx.     Review of Systems   Constitutional:  Positive for activity change. Negative for chills and fever.   HENT:  Negative for sore throat.    Respiratory:  Negative for cough and shortness of breath.    Cardiovascular:  Negative for chest pain and leg swelling.   Gastrointestinal:  Negative for abdominal distention, abdominal pain, diarrhea and vomiting.   Genitourinary:  Negative for dysuria.   Musculoskeletal:  Positive for arthralgias and gait problem.   Neurological:  Negative for headaches.   Hematological:  Bruises/bleeds easily.   Psychiatric/Behavioral:  Negative for confusion.    Objective:     Vital Signs (Most Recent):  Temp: 97.5 °F (36.4 °C) (05/16/23 1123)  Pulse: 80 (05/16/23 1446)  Resp: 18 (05/16/23 1123)  BP: 139/67 (05/16/23 1123)  SpO2: (!) 93 % (05/16/23 1434) Vital Signs (24h Range):  Temp:  [97.3 °F (36.3 °C)-98.1 °F (36.7 °C)] 97.5 °F (36.4 °C)  Pulse:  [59-89] 80  Resp:  [10-23] 18  SpO2:  [90 %-99 %] 93 %  BP: (109-163)/(53-67) 139/67     Weight: 58.1 kg (128 lb 1.4 oz)  Body mass index is 23.43 kg/m².    Intake/Output Summary (Last 24 hours) at 5/16/2023 1447  Last data filed at 5/16/2023 0800  Gross per 24 hour   Intake 240 ml   Output 595 ml   Net -355 ml         Physical Exam        Significant Labs: All pertinent labs within the past 24 hours have been reviewed.  CBC:   Recent Labs   Lab 05/14/23  1551 05/15/23  0450 05/16/23  0412   WBC 7.75 5.46 5.21   HGB 8.4* 7.1* 6.0*   HCT 26.5* 24.2* 20.7*    152 136*     CMP:   Recent Labs   Lab 05/14/23  1551 05/15/23  0450 05/16/23  0412    144 142   K 4.8 4.6 4.8   * 111* 116*   CO2 20* 23 18*    86 67*   BUN 54* 49* 41*   CREATININE 1.6* 1.5* 1.4   CALCIUM 8.5* 8.2* 7.5*   PROT 5.9*  --   --    ALBUMIN 2.2*  2.1* 1.9*   BILITOT 0.6  --   --    ALKPHOS 107  --   --    AST 52*  --   --    ALT 6*  --   --    ANIONGAP 6* 10 8       Significant Imaging: I have reviewed all pertinent imaging results/findings within the past 24 hours.  I have reviewed and interpreted all pertinent imaging results/findings within the past 24 hours.

## 2023-05-16 NOTE — CONSULTS
[facility]  Wound Care    Patient Name:  Steph Monroe   MRN:  [id number]  Date: 2023  Diagnosis: PAD (peripheral artery disease)    History:     Past Medical History:   Diagnosis Date    Cancer     rectum    CKD (chronic kidney disease)     Diabetes mellitus, type 2     Hyperlipidemia     Hypertension     Neutropenic fever 2016    PVD (peripheral vascular disease)        Social History     Socioeconomic History    Marital status:     Number of children: 2   Occupational History    Occupation: Retired   Tobacco Use    Smoking status: Former     Packs/day: 1.50     Years: 45.00     Pack years: 67.50     Types: Cigarettes     Quit date: 2013     Years since quittin.9     Passive exposure: Never    Smokeless tobacco: Never   Substance and Sexual Activity    Alcohol use: No     Alcohol/week: 0.0 standard drinks    Drug use: No    Sexual activity: Not Currently     Social Determinants of Health     Financial Resource Strain: Low Risk     Difficulty of Paying Living Expenses: Not hard at all   Food Insecurity: No Food Insecurity    Worried About Running Out of Food in the Last Year: Never true    Ran Out of Food in the Last Year: Never true   Transportation Needs: No Transportation Needs    Lack of Transportation (Medical): No    Lack of Transportation (Non-Medical): No   Physical Activity: Sufficiently Active    Days of Exercise per Week: 5 days    Minutes of Exercise per Session: 30 min   Stress: Stress Concern Present    Feeling of Stress : To some extent   Social Connections: Moderately Isolated    Frequency of Communication with Friends and Family: Once a week    Frequency of Social Gatherings with Friends and Family: Once a week    Attends Sikh Services: 1 to 4 times per year    Active Member of Clubs or Organizations: No    Marital Status:    Housing Stability: Unknown    Unable to Pay for Housing in the Last Year: No    Unstable Housing in the Last Year: No        Precautions:     Allergies as of 05/06/2023 - Reviewed 05/06/2023   Allergen Reaction Noted    Chantix [varenicline]  03/02/2016    Wellbutrin [bupropion hcl]  03/02/2016       WO Assessment Details/Treatment     Patient seen for wound care consultation for sacrum.   Reviewed chart for this encounter.   See Flow Sheet for findings.    Pt found lying in bed, agreeable to care at this time. Pt states she has had wounds to her backside and upper thigh after x2 falls at home several years ago. Pt states, 'I've had bumps on my backside since the falls and I don't know why.' Wound documented on 5/7/23, present on arrival. Pt assisted into lateral position for assessment. To R of sacrum, pt has a nodular wound noted - partial thickness tissue loss in center of wound w/ dark purple and maroon skin, soft to the touch. Applied Triad at this time and educated pt on importance of frequent turns in bed. Waffle mattress and chair cushion ordered.       RECOMMENDATIONS:  BID/PRN - Triad to sacrum, keep pt clean and dry, no diapers.  Bedside RN to apply waffle mattress, chair cushion upon arrival to floor.  Q2hr turns  ANNETTE Orquidea Choe, NP notified.    Discussed POC with patient and primary RN.   See EMR for orders & patient education.      Nursing to continue care.  Nursing to maintain pressure injury prevention interventions.         05/16/23 1018   WOCN Assessment   WOCN Total Time (mins) 25   Visit Date 05/16/23   Visit Time 1018   Consult Type New   Intervention assessed;changed;applied;chart review;coordination of care;orders        Altered Skin Integrity 05/07/23 1800 Sacral spine #1 Non pressure chronic ulcer Intact skin with non-blanchable redness of localized area   Date First Assessed/Time First Assessed: 05/07/23 1800   Altered Skin Integrity Present on Admission - Did Patient arrive to the hospital with altered skin?: yes  Location: Sacral spine  Wound Number: #1  Primary Wound Type: Non pressure chronic  ulcer...   Wound Image    Dressing Appearance Dry;Intact;Clean   Drainage Amount None   Drainage Characteristics/Odor No odor   Appearance Purple;Maroon;Red;Pink;Dry   Tissue loss description Not applicable   Periwound Area Intact;Dry   Care Cleansed with:;Soap and water;Applied:;Skin Barrier

## 2023-05-16 NOTE — PROGRESS NOTES
[facility]  Vascular Surgery  Progress Note    Patient Name: Steph Monroe  MRN: [id number]  Admission Date: 5/6/2023  Primary Care Provider: Mane Carmona MD    Subjective:     Interval History: RLE angio yesterday went well. Afebrile, HDS.    Post-Op Info:  Procedure(s) (LRB):  Angiogram Extremity Unilateral (N/A)  PTA, SUPERFICIAL FEMORAL ARTERY (Right)   1 Day Post-Op       Medications:  Continuous Infusions:   sodium chloride 0.9% 100 mL/hr at 05/15/23 2154     Scheduled Meds:   aspirin  81 mg Oral Daily    atorvastatin  80 mg Oral Daily    clopidogreL  75 mg Oral Daily    ezetimibe  10 mg Oral Daily    gabapentin  100 mg Oral BID    heparin (porcine)  5,000 Units Subcutaneous Q8H    NIFEdipine  30 mg Oral Daily    polyethylene glycol  17 g Oral Daily    senna-docusate 8.6-50 mg  1 tablet Oral Daily    sodium bicarbonate  650 mg Oral TID     PRN Meds:sodium chloride, acetaminophen, albuterol-ipratropium, dextrose 10%, dextrose 10%, dextrose, dextrose, glucagon (human recombinant), hydrALAZINE, HYDROmorphone, insulin aspart U-100, naloxone, ondansetron, oxyCODONE, sodium chloride 0.9%, sodium chloride 0.9%     Objective:     Vital Signs (Most Recent):  Temp: 97.3 °F (36.3 °C) (05/15/23 2321)  Pulse: 76 (05/16/23 0600)  Resp: 16 (05/15/23 2321)  BP: (!) 121/57 (05/15/23 2321)  SpO2: 95 % (05/16/23 0500) Vital Signs (24h Range):  Temp:  [97.3 °F (36.3 °C)-98.1 °F (36.7 °C)] 97.3 °F (36.3 °C)  Pulse:  [59-80] 76  Resp:  [10-23] 16  SpO2:  [93 %-99 %] 95 %  BP: (109-191)/(53-78) 121/57           Physical Exam  Constitutional:       General: She is not in acute distress.     Appearance: She is not ill-appearing.   HENT:      Head: Normocephalic and atraumatic.      Mouth/Throat:      Mouth: Mucous membranes are moist.   Cardiovascular:      Rate and Rhythm: Normal rate.      Comments: R triphasic DP Dopplerable    Pulmonary:      Effort: Pulmonary effort is normal. No respiratory distress.    Abdominal:      Palpations: Abdomen is soft.   Musculoskeletal:         General: Normal range of motion.      Cervical back: Normal range of motion.   Skin:     General: Skin is warm and dry.      Comments: Redness to dorsum of foot; foot warm   Neurological:      General: No focal deficit present.      Mental Status: She is alert.   Psychiatric:         Mood and Affect: Mood normal.         Behavior: Behavior normal.        Significant Labs:  CBC:   Recent Labs   Lab 05/16/23  0412   WBC 5.21   RBC 2.14*   HGB 6.0*   HCT 20.7*   *   MCV 97   MCH 28.0   MCHC 29.0*       CMP:   Recent Labs   Lab 05/14/23  1551 05/15/23  0450 05/16/23  0412      < > 67*   CALCIUM 8.5*   < > 7.5*   ALBUMIN 2.2*   < > 1.9*   PROT 5.9*  --   --       < > 142   K 4.8   < > 4.8   CO2 20*   < > 18*   *   < > 116*   BUN 54*   < > 41*   CREATININE 1.6*   < > 1.4   ALKPHOS 107  --   --    ALT 6*  --   --    AST 52*  --   --    BILITOT 0.6  --   --     < > = values in this interval not displayed.         Significant Diagnostics:  I have reviewed all pertinent imaging results/findings within the past 24 hours.    Assessment/Plan:     * PAD (peripheral artery disease)  Steph Monroe is a 77yoF with a complex medical history including significant peripheral arterial disease with previous bilateral common iliac stent placement. She presented to an outside hospital with worsening right lower extremity claudication. Her right foot with evidence of ischemic changes. She was transferred for vascular surgery evaluation.     - Obtain JOSE, TBI post op  - pending Cr, GFR  - angiogram demonstrated high grade stenosis of right common iliac stent, complete occlusion of right common femoral artery   Pre-op JOSE:  right 0.15;  Left 0.58   Post-op JOSE/ToeP: R 0.29, 0.0;  L 0.36, 0.0  - continue aspirin, plavix, high-intensity statin therapy  - PT/OT. OOB and ambulate today  - rest of care per primary team  - vascular to continue to  follow        Hari Jackson MD  Vascular Surgery  [facility]

## 2023-05-16 NOTE — ASSESSMENT & PLAN NOTE
5/16-Hgb 6.0. No signs of acute blood loss on exam. Consent obtained on 5/8 in patient chart.     -Will transfuse 1u RBC   -Repeat CBC pm  -CBC daily

## 2023-05-16 NOTE — PROGRESS NOTES
"[facility] Medicine  Progress Note    Patient Name: Steph Monroe  MRN: [id number]  Patient Class: IP- Inpatient   Admission Date: 5/6/2023  Length of Stay: 10 days  Attending Physician: Gladys Kaiser MD  Primary Care Provider: Mane Carmona MD        Subjective:     Principal Problem:PAD (peripheral artery disease)        HPI:  Steph Monroe is a 76yo F with a PMH of HFpEF, PAD, HTN, HLD, DM2, CKD4, single solitary kidney (s/p nephrectomy), hx of HCC, and hx of rectal cancer currently admitted to the hospitals medicine service for RLE pain.The only home medications she is taking is lasix and na bicarbonate. She quit all of her other medications about a year ago because she "no longer felt like taking all kind of pills". She underwent angiogram with interventional cardiology on 5/5/23, which showed: "high grade stenosis in right GUNNER.  of CFA collaterals from EIA and profunda artery which provides collaterals to chronically occluded SFA.  Initially 2V runoff with prox  of peroneal artery. Pt provides flow to foot, however has diffuse diease. Unable to adequately visualize right foot circulation."     Of note, pt also with worsening ALESIA on CKD. Nephrology following. Urology evaluated pt and placed spears; believed this to be post-renal in origin, and signed off. IC recommended that pt would benefit from vascular surgery intervention. Dr. Ny with IC discussed case with Dr. Cagle from vascular, who recommended initiating heparin gtt and [place] [facility] [place]; requested  admission.    On arrival, patient was afebrile and HDS on room air. Heparin gtt infusing. Denies fever, chest pain, SOB, abd pain. Endorses moderate pain to R foot only. R foot appears cool and discolored.          Overview/Hospital Course:  78 yo F transferred from [facility] for vascular surgery evaluation for critical limb ischemia of RLE found by IC angiography on 5/5. Will continue " patient on ASA, plavix, and heparin gtt. Vascular sx consulted who will obtain JOSE and toe pressures prior to surgical intervention. Will plan for femoral endarterectomy on 5/9. Nephrology consulted and recommend future need for HD and line placement for ALESIA. Plan to give IVF pre and post op to prevent worsening ALESIA. Sent to SICU for 5/10 and stepped back down. Repeat ABIs shows severe arterial occlusion. Angiogram on 5/15 with 2 vessel run off to R foot. Repeat JOSE shows improvement in RLE. Hgb 6.0 will transfuse 1u RBC. Medically stable for discharge. Will send referral to  for PT/OT. Will need follow up with vascular sx, nephrology, and PCP. Will need to continue DAPT and statin therapy.       Interval History: NAEON. Vitals stable and afebrile. Having intermittent R foot pain. Hgb 6 this morning. Will order 1u RBC and go for JOSE today. Can likely discharge this evening if givne okay by vascular sx.     Review of Systems   Constitutional:  Positive for activity change. Negative for chills and fever.   HENT:  Negative for sore throat.    Respiratory:  Negative for cough and shortness of breath.    Cardiovascular:  Negative for chest pain and leg swelling.   Gastrointestinal:  Negative for abdominal distention, abdominal pain, diarrhea and vomiting.   Genitourinary:  Negative for dysuria.   Musculoskeletal:  Positive for arthralgias and gait problem.   Neurological:  Negative for headaches.   Hematological:  Bruises/bleeds easily.   Psychiatric/Behavioral:  Negative for confusion.    Objective:     Vital Signs (Most Recent):  Temp: 97.5 °F (36.4 °C) (05/16/23 1123)  Pulse: 80 (05/16/23 1446)  Resp: 18 (05/16/23 1123)  BP: 139/67 (05/16/23 1123)  SpO2: (!) 93 % (05/16/23 1434) Vital Signs (24h Range):  Temp:  [97.3 °F (36.3 °C)-98.1 °F (36.7 °C)] 97.5 °F (36.4 °C)  Pulse:  [59-89] 80  Resp:  [10-23] 18  SpO2:  [90 %-99 %] 93 %  BP: (109-163)/(53-67) 139/67     Weight: 58.1 kg (128 lb 1.4 oz)  Body mass index is  23.43 kg/m².    Intake/Output Summary (Last 24 hours) at 5/16/2023 1447  Last data filed at 5/16/2023 0800  Gross per 24 hour   Intake 240 ml   Output 595 ml   Net -355 ml         Physical Exam        Significant Labs: All pertinent labs within the past 24 hours have been reviewed.  CBC:   Recent Labs   Lab 05/14/23  1551 05/15/23  0450 05/16/23  0412   WBC 7.75 5.46 5.21   HGB 8.4* 7.1* 6.0*   HCT 26.5* 24.2* 20.7*    152 136*     CMP:   Recent Labs   Lab 05/14/23  1551 05/15/23  0450 05/16/23  0412    144 142   K 4.8 4.6 4.8   * 111* 116*   CO2 20* 23 18*    86 67*   BUN 54* 49* 41*   CREATININE 1.6* 1.5* 1.4   CALCIUM 8.5* 8.2* 7.5*   PROT 5.9*  --   --    ALBUMIN 2.2* 2.1* 1.9*   BILITOT 0.6  --   --    ALKPHOS 107  --   --    AST 52*  --   --    ALT 6*  --   --    ANIONGAP 6* 10 8       Significant Imaging: I have reviewed all pertinent imaging results/findings within the past 24 hours.  I have reviewed and interpreted all pertinent imaging results/findings within the past 24 hours.      Assessment/Plan:      * PAD (peripheral artery disease)  Hx of PAD w/ previous stents in renal, iliac, and femoral stents  Continue ASA, Plavix, and statin      Anemia  5/16-Hgb 6.0. No signs of acute blood loss on exam. Consent obtained on 5/8 in patient chart.     -Will transfuse 1u RBC   -Repeat CBC pm  -CBC daily      Acute urinary obstruction  Hx of urinary obstruction. Urology and nephrology following patient at [facility]  Kidney US shows mild hydronephrosis w/ non obstructing renal calculi    -Nephrology signed off  -Strict I/Os  -Daily CMP      Metabolic acidosis  Continue home sodium bicarb      Pulmonary emphysema  PRN Duo nebs ordered      Chronic heart failure with preserved ejection fraction  Echo (05/03/2023) showed EF 65%  Home meds: None    Summary:   The left ventricle is normal in size with concentric remodeling and normal systolic function.   The estimated ejection fraction is  "65%.   Indeterminate left ventricular diastolic function.   There is mild mitral stenosis.   The mean diastolic gradient across the mitral valve is 5 mmHg at a heart rate of 66 bpm.   Normal right ventricular size with normal right ventricular systolic function.   There is mild aortic valve stenosis.   Aortic valve area is 1.28 cm2; peak velocity is 2.33 m/s; mean gradient is 10 mmHg.   Mild-to-moderate aortic regurgitation.   Normal central venous pressure (3 mmHg).   The estimated PA systolic pressure is 35 mmHg.      Plan:  - Holding Coreg  - Hold Lasix in setting of ALESIA  - Daily weights (standing if tolerated)  - Strict I/Os  - Fluid restriction at 1500mL  - Cardiac diet  -Telemetry      Critical limb ischemia of right lower extremity  76 yo F transferred from [facility] for vascular surgery evaluation and femoral endarterectomy. Patient presented to [place] with RLE pain that has hindered her walking. R foot with absent pedal pulse, pain, and discoloration. IC performed angiography on 5/5 that showed "high grade stenosis in right GUNNER.  of CFA collaterals from EIA and profunda artery which provides collaterals to chronically occluded SFA.  Initially 2V runoff with prox  of peroneal artery. Pt provides flow to foot, however has diffuse diease. Unable to adequately visualize right foot circulation." Transferred on heparin gtt.   No signs of infection noted on exam. No leukocytosis.    JOSE: Right 0.15;  Left 0.58  5/11 JOSE: Right 0.29; Left 0.36    PLAN:  - Vascular surgery consulted for femoral endarterectomy, appreciate recs.   - Femoral endarterectomy on 5/9. Will follow up [facility] sx recs. Angiogram with 2 vessel run off to R foot.   - Repeat JOSE show improvement in RLE.   - Continue ASA, plavix, and statin therapy   - Continue Gabapentin for neuropathy pain  - PT/OT ordered  - Pain control w/ Tylenol, hydrocodone, and morphine  - CBC, CMP daily  - Daily coags  - Will set up  w/ PT/OT for " discharge.      Acute renal failure superimposed on stage 4 chronic kidney disease  Hx of nephrectomy. Baseline Cr 1.8-2.2.  Cr 2.8 on arrival  Seen by urology and nephrology who believe etiology to be post-renal. Renal US shows mild hydronephrosis. Jane placed. Patient had angiogram that could have worsened renal function but per nephrology not exposed to contrast.   No indications for HD at this time    Plan:  - Cr. at baseline now, Nephrology signed off. Will need follow up outpatient  - Avoid fluids  - Trend Cr  - Strict I&Os  - Avoid nephrotoxic agents  - Renally adjust medications  - Monitor for urinary retention  - If continues to worsen will consult nephrology      History of rectal or anal cancer  Hx of rectal cancer and treated with radiation and chemotherapy  No longer on chemotherapy      Coronary artery disease involving native coronary artery of native heart without angina pectoris  Continue DAPT and statin  Monitor for symptoms of angina          HLD (hyperlipidemia)  Continue statin      Essential hypertension  /61 on arrival. Patient has not taken BP meds for past year.     Continue PO hydralazine, Nifedipine  PRN Hydralazine for SBP >180    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 hypertension  Patient's FSGs are controlled on current medication regimen.  Last A1c reviewed-   Lab Results   Component Value Date    HGBA1C 5.0 05/04/2023     Most recent fingerstick glucose reviewed-   Recent Labs   Lab 05/15/23  1821   POCTGLUCOSE 96     Current correctional scale  Low  Maintain anti-hyperglycemic dose as follows-   Antihyperglycemics (From admission, onward)    Start     Stop Route Frequency Ordered    05/10/23 1047  insulin aspart U-100 pen 0-5 Units         -- SubQ Before meals & nightly PRN 05/10/23 0949        Hold Oral hypoglycemics while patient is in the hospital.      VTE Risk Mitigation (From admission, onward)         Ordered     heparin (porcine) injection 5,000 Units  Every 8 hours          05/09/23 2051     IP VTE HIGH RISK PATIENT  Once         05/06/23 1711     Place sequential compression device  Until discontinued         05/06/23 1711     Reason for No Pharmacological VTE Prophylaxis  Once        Question:  Reasons:  Answer:  IV Heparin w/in 24 hrs. Pre or Post-Op    05/06/23 1711                Discharge Planning   PÉREZ: 5/16/2023     Code Status: Full Code   Is the patient medically ready for discharge?: Yes    Reason for patient still in hospital (select all that apply): Pending disposition  Discharge Plan A: Home with family   Discharge Delays: None known at this time              Jeremy Martel DO  Department of Hospital Medicine   [facility]

## 2023-05-16 NOTE — PLAN OF CARE
[facility] Plan of Care    Dx: Critical Limb Ischemia of Right Lower Extremity     Shift Events: Neurovascular checks every 2 hours, Telemetry, Safety     Goals of Care: Pain control, spears Care, Telemetry     Neuro: AAOX4     Vital Signs: WNL     Respiratory: 1L/NC     Diet: Cardiac     Is patient tolerating current diet? Yes     GTTS: NS @100 ml/hr     Urine Output/Bowel Movement: Spears Catheter/ Last Bowel Movement 5/15/23     Drains/Tubes/Tube Feeds (include total output/shift):   N/A     Lines: 16g R FA     Accuchecks: ACHS     Skin: scattered ecchymosis, R upper extremity severely ecchymotic.      Fall Risk Score: 8     Activity level? Primarily in bed, Rolling.     Any scheduled procedures?    Any safety concerns? NA     Other: none

## 2023-05-16 NOTE — ASSESSMENT & PLAN NOTE
Steph Monroe is a 77yoF with a complex medical history including significant peripheral arterial disease with previous bilateral common iliac stent placement. She presented to an outside hospital with worsening right lower extremity claudication. Her right foot with evidence of ischemic changes. She was transferred for vascular surgery evaluation.     - Obtain JOSE, TBI post op  - pending Cr, GFR  - angiogram demonstrated high grade stenosis of right common iliac stent, complete occlusion of right common femoral artery   Pre-op JOSE:  right 0.15;  Left 0.58   Post-op JOSE/ToeP: R 0.29, 0.0;  L 0.36, 0.0  - continue aspirin, plavix, high-intensity statin therapy  - PT/OT. OOB and ambulate today  - rest of care per primary team  - vascular to continue to follow

## 2023-05-16 NOTE — PT/OT/SLP DISCHARGE
"Physical Therapy Discharge Summary    Name: Steph Monroe  MRN: [id number]   Principal Problem: PAD (peripheral artery disease)     Patient Discharged from acute Physical Therapy on 2023.  Please refer to prior PT noted date on 2023 for functional status.     Assessment:      Pt s/p angiogram on LE. New orders needed post-op.    Objective:     GOALS:   Multidisciplinary Problems       Physical Therapy Goals          Problem: Physical Therapy    Goal Priority Disciplines Outcome Goal Variances Interventions   Physical Therapy Goal     PT, PT/OT Ongoing, Progressing     Description: Goals to be met by: 23     Patient will increase functional independence with mobility by performin. Supine to sit with Modified [place] - Not met  2. Sit to stand transfer with Supervision with RW - Not met  3. Gait x 150 feet with SBA using Rolling Walker - Not met  4. Ascend/descend 6" curb step with RW and CGA - Not met                       Reasons for Discontinuation of Therapy Services  New orders needed post-op.       Plan:     Patient Discharged to:  in house .      2023    "

## 2023-05-16 NOTE — ASSESSMENT & PLAN NOTE
"78 yo F transferred from [facility] for vascular surgery evaluation and femoral endarterectomy. Patient presented to [place] with RLE pain that has hindered her walking. R foot with absent pedal pulse, pain, and discoloration. IC performed angiography on 5/5 that showed "high grade stenosis in right GUNNER.  of CFA collaterals from EIA and profunda artery which provides collaterals to chronically occluded SFA.  Initially 2V runoff with prox  of peroneal artery. Pt provides flow to foot, however has diffuse diease. Unable to adequately visualize right foot circulation." Transferred on heparin gtt.   No signs of infection noted on exam. No leukocytosis.    JOSE: Right 0.15;  Left 0.58  5/11 JOSE: Right 0.29; Left 0.36    PLAN:  - Vascular surgery consulted for femoral endarterectomy, appreciate recs.   - Femoral endarterectomy on 5/9. Will follow up [facility] sx recs. Angiogram with 2 vessel run off to R foot.   - Repeat JOSE show improvement in RLE.   - Continue ASA, plavix, and statin therapy   - Continue Gabapentin for neuropathy pain  - PT/OT ordered  - Pain control w/ Tylenol, hydrocodone, and morphine  - CBC, CMP daily  - Daily coags  - Will set up  w/ PT/OT for discharge.    "

## 2023-05-16 NOTE — DISCHARGE SUMMARY
"[facility] Medicine  Discharge Summary      Patient Name: Steph Monroe  MRN: [id number]  ALVARADO: [id number]  Patient Class: IP- Inpatient  Admission Date: 5/6/2023  Hospital Length of Stay: 10 days  Discharge Date and Time:  05/16/2023 3:06 PM  Attending Physician: Gladys Kaiser MD   Discharging Provider: Jeremy Martel DO  Primary Care Provider: Mane Carmona MD  [facility] Medicine Team: [facility] 2 Jeremy Martel DO  Primary Care Team: [facility] 2    HPI:   Steph Monroe is a 78yo F with a PMH of HFpEF, PAD, HTN, HLD, DM2, CKD4, single solitary kidney (s/p nephrectomy), hx of HCC, and hx of rectal cancer currently admitted to the [facility] medicine service for RLE pain.The only home medications she is taking is lasix and na bicarbonate. She quit all of her other medications about a year ago because she "no longer felt like taking all kind of pills". She underwent angiogram with interventional cardiology on 5/5/23, which showed: "high grade stenosis in right GUNNER.  of CFA collaterals from EIA and profunda artery which provides collaterals to chronically occluded SFA.  Initially 2V runoff with prox  of peroneal artery. Pt provides flow to foot, however has diffuse diease. Unable to adequately visualize right foot circulation."     Of note, pt also with worsening ALESIA on CKD. Nephrology following. Urology evaluated pt and placed spears; believed this to be post-renal in origin, and signed off. IC recommended that pt would benefit from vascular surgery intervention. Dr. Ny with IC discussed case with Dr. Cagle from vascular, who recommended initiating heparin gtt and transferring [place] [facility] [place]ard[place]; requested  admission.    On arrival, patient was afebrile and HDS on room air. Heparin gtt infusing. Denies fever, chest pain, SOB, abd pain. Endorses moderate pain to R foot only. R foot appears cool and discolored.          Procedure(s) (LRB):  Angiogram Extremity " Unilateral (N/A)  PTA, SUPERFICIAL FEMORAL ARTERY (Right)      Hospital Course:   78 yo F transferred from [facility] for vascular surgery evaluation for critical limb ischemia of RLE found by IC angiography on 5/5. Will continue patient on ASA, plavix, and heparin gtt. Vascular sx consulted who will obtain JOSE and toe pressures prior to surgical intervention. Will plan for femoral endarterectomy on 5/9. Nephrology consulted and recommend future need for HD and line placement for ALESIA. Plan to give IVF pre and post op to prevent worsening ALESIA. Sent to SICU for 5/10 and stepped back down. Repeat ABIs shows severe arterial occlusion. Angiogram on 5/15 with 2 vessel run off to R foot. Repeat JOSE shows improvement in RLE. Hgb 6.0 will transfuse 1u RBC. Medically stable for discharge. Will send referral to  for PT/OT. Will need follow up with vascular sx, nephrology, and PCP. Will need to continue DAPT and statin therapy.        Goals of Care Treatment Preferences:  Code Status: Full Code      Consults:   Consults (From admission, onward)        Status Ordering Provider     Inpatient consult to Registered Dietitian/Nutritionist  Once        Provider:  (Not yet assigned)    Completed PINKY MONROE     Inpatient consult to Nephrology  Once        Provider:  (Not yet assigned)    Completed YAYO ARIAS     Inpatient consult to Vascular Surgery  Once        Provider:  (Not yet assigned)    Completed YAYO ARIAS          No new Assessment & Plan notes have been filed under this hospital service since the last note was generated.  Service: Hospital Medicine    Final Active Diagnoses:    Diagnosis Date Noted POA    PRINCIPAL PROBLEM:  PAD (peripheral artery disease) [I73.9] 03/26/2016 Yes    Anemia [D62] 05/16/2023 Unknown    Acute urinary obstruction [N13.9] 05/12/2023 Yes    Metabolic acidosis [E87.20] 05/06/2023 Yes    Critical limb ischemia of right lower extremity [I70.221] 05/02/2023 Yes    Chronic heart  failure with preserved ejection fraction [I50.32] 05/02/2023 Yes    Pulmonary emphysema [J43.9] 05/02/2023 Yes    Acute renal failure superimposed on stage 4 chronic kidney disease [N17.9, N18.4] 01/16/2018 Yes    History of rectal or anal cancer [Z85.048] 04/04/2016 Yes    Coronary artery disease involving native coronary artery of native heart without angina pectoris [I25.10] 03/26/2016 Yes     Chronic    HLD (hyperlipidemia) [E78.5] 03/26/2016 Yes     Chronic    Essential hypertension [I10] 03/26/2016 Yes     Chronic    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 hypertension [E11.22, I12.9, N18.30] 03/26/2016 Yes     Chronic      Problems Resolved During this Admission:       Discharged Condition: good    Disposition: [facility]Health Care [facility]    Follow Up:   Follow-up Information     Mane Carmona MD Follow up.    Specialty: Family Medicine  Contact information:  [address] [place] [address] [id number] [phone]                       Patient Instructions:      Ambulatory referral/consult to [facility]   Standing Status: Future   Referral Priority: Routine Referral Type: Consultation   Referral Reason: Specialty Services Required   Number of Visits Requested: 1     Ambulatory referral/consult to [facility]   Standing Status: Future   Referral Priority: Urgent Referral Type: Consultation   Referral Reason: Specialty Services Required   Number of Visits Requested: 1     Ambulatory referral/consult to Outpatient Case Management   Referral Priority: Urgent Referral Type: Consultation   Referral Reason: Specialty Services Required   Number of Visits Requested: 1     Ambulatory referral/consult to Vascular Surgery   Standing Status: Future   Referral Priority: Routine Referral Type: Consultation   Referral Reason: Specialty Services Required   Requested Specialty: Vascular Surgery   Number of Visits Requested: 1       Significant Diagnostic Studies: Labs:   CMP   Recent  Labs   Lab 05/14/23  1551 05/15/23  0450 05/16/23 0412    144 142   K 4.8 4.6 4.8   * 111* 116*   CO2 20* 23 18*    86 67*   BUN 54* 49* 41*   CREATININE 1.6* 1.5* 1.4   CALCIUM 8.5* 8.2* 7.5*   PROT 5.9*  --   --    ALBUMIN 2.2* 2.1* 1.9*   BILITOT 0.6  --   --    ALKPHOS 107  --   --    AST 52*  --   --    ALT 6*  --   --    ANIONGAP 6* 10 8    and CBC   Recent Labs   Lab 05/14/23  1551 05/15/23  0450 05/16/23 0412   WBC 7.75 5.46 5.21   HGB 8.4* 7.1* 6.0*   HCT 26.5* 24.2* 20.7*    152 136*       Pending Diagnostic Studies:     Procedure Component Value Units Date/Time    CBC Without Differential [[id number]] Collected: 05/15/23 2201    Order Status: Sent Lab Status: In process Updated: 05/15/23 2201    Specimen: Blood          Medications:  Reconciled Home Medications:      Medication List      START taking these medications    oxyCODONE 5 MG immediate release tablet  Commonly known as: ROXICODONE  Take 1 tablet (5 mg total) by mouth every 6 (six) hours as needed for Pain.        CHANGE how you take these medications    NIFEdipine 30 MG (OSM) 24 hr tablet  Commonly known as: PROCARDIA-XL  Take 1 tablet (30 mg total) by mouth once daily.  Start taking on: May 17, 2023  What changed:   · medication strength  · how much to take     sodium bicarbonate 650 MG tablet  Take 1 tablet (650 mg total) by mouth 3 (three) times daily.  What changed: when to take this        CONTINUE taking these medications    atorvastatin 80 MG tablet  Commonly known as: LIPITOR  Take 1 tablet (80 mg total) by mouth once daily.     NANDA CHEWABLE ASPIRIN 81 MG Chew  Generic drug: aspirin  Take 1 tablet (81 mg total) by mouth once daily.     carvediloL 25 MG tablet  Commonly known as: COREG  Take 1 tablet by mouth 2 (two) times daily.     clopidogreL 75 mg tablet  Commonly known as: PLAVIX  Take 75 mg by mouth once daily.     ezetimibe 10 mg tablet  Commonly known as: ZETIA  Take 10 mg by mouth once daily.      furosemide 20 MG tablet  Commonly known as: LASIX  Take 20 mg by mouth once daily.     gabapentin 100 MG capsule  Commonly known as: NEURONTIN  Take 1 capsule (100 mg total) by mouth 2 (two) times daily.        STOP taking these medications    hydrALAZINE 25 MG tablet  Commonly known as: APRESOLINE     traMADoL 50 mg tablet  Commonly known as: ULTRAM            Indwelling Lines/Drains at time of discharge:   Lines/Drains/Airways     Central Venous Catheter Line  Duration                Port A Cath Single Lumen 06/09/16 right subclavian 2532 days          Drain  Duration                Urethral Catheter 05/11/23 0310 5 days                Time spent on the discharge of patient: 35 minutes         Jeremy Martel DO  Department of Hospital Medicine  [facility]

## 2023-05-17 ENCOUNTER — PES CALL (OUTPATIENT)
Dept: ADMINISTRATIVE | Facility: CLINIC | Age: 78
End: 2023-05-17
Payer: MEDICARE

## 2023-05-17 LAB
POC ACTIVATED CLOTTING TIME K: 233 SEC (ref 74–137)
POC ACTIVATED CLOTTING TIME K: 293 SEC (ref 74–137)
SAMPLE: ABNORMAL
SAMPLE: ABNORMAL

## 2023-05-17 NOTE — OP NOTE
Vascular Surgery Op Note    Date of Operation/Procedure: 5/15/23    Pre-operative Diagnosis:  Atherosclerosis of native arteries of right lower extremity with ischemic rest pain    Post-operative Diagnosis: same    Anesthesia: local/MAC    Operation/Procedure Performed:   Right lower extremity angiogram  PTA of right superficial femoral artery (4x220 mm balloon)    Attending Surgeon: FERNANDO Cagle II, MD    Resident/Fellow: Demario Rushing MD PGY7    Indications: 76yo F with chronic RLE ischemia with rest pain after right iliac artery angioplasty and stenting and right common femoral endarterectomy.  Suspect that she likely has residual SFA/popliteal infrainguinal disease.  We will plan for right lower extremity angiogram with possible endovascular intervention to treat this.    Procedure in Detail:   The patient was brought to the operating room supine position.  Appropriate sedation was administered by the anesthesia team.  The left arm was prepped and draped in sterile fashion.  A time-out was performed to confirm appropriate patient identification, procedure, laterality.      We began by obtaining ultrasound-guided percutaneous access of the left radial artery using a micropuncture access kit.  We then upsized this to a 5/6 slender sheath.  We then administered IV heparin for systemic anticoagulation.  The sheath was flushed with radial flush solution.  We then advanced a stiff angled Glidewire under direct fluoroscopic guidance up the radial and subsequently the brachial artery into the aortic arch.  We were able to direct the wire into the descending aorta and into the abdominal aorta.  We then tried to advance a 5 Bermudian sheath over the wire however there was stenosis in the proximal radial artery preventing us from advancing the sheath.  We then exchanged for the short sheath again and advanced a 3 x 60 mm balloon over the wire and performed balloon angioplasty of the proximal radial artery.  We then  re-attempted to advance the 5 Anguillan sheath were unable to advance beyond the proximal radial artery.  We then exchanged out for a 110 cm 4 Anguillan sheath which was able to be advanced over the wire and into the distal abdominal aorta and finally into the right common femoral artery.  The dilator of the sheath was removed over the wire and the side port of the sheath withdrew easily with blood and was flushed with heparinized saline.  Through the sheath we then performed right lower extremity angiogram.  Right lower extremity angiography showed a widely patent right common femoral artery and right deep femoral artery. The SFA was diminutive in caliber. The origin of the SFA was patent and was free of disease since we had performed endarterectomy here. The proximal and mid and distal superficial femoral artery was also diffusely diseased with calcified atherosclerotic plaque.  There was an area of chronic total occlusion in the mid superficial femoral artery approximately 4 cm long.  We then advanced a 4 Anguillan glide catheter over the wire and then were able to advance the wire down the SFA into the distal SFA.  We advanced the catheter over the wire and performed angiography through the catheter which confirmed that we had crossed intraluminally.  We then advanced a .018 wire through the catheter and exchanged out the catheter and advanced a 4 x 220 mm georgina balloon over the wire and performed balloon angioplasty of the mid to distal SFA with a 3 minute inflation time.  The balloon was then slowly deflated and then backed out into the proximal and mid SFA to treat the remaining segment of diseased superficial femoral artery.  The balloon was inflated for 3 minutes in this position as well.  We then slowly deflated the balloon and removed over the wire.  We performed completion angiography through the sheath which showed a patent SFA albeit with significant residual calcified disease in the mid SFA; however, this  did not appear to be flow-limiting as there was brisk flow down artery. The popliteal artery was widely patent. There was two-vessel runoff to the foot via the anterior tibial and peroneal arteries.  Satisfied that we had restored inline flow to the foot we then removed the sheath over the wire.  The sheath was flushed with radial flush solution once it was backed out into the distal radial artery.  The wire was removed a TR band was applied over the access site and the sheath was removed and the access site was hemostatic.  The patient tolerated the procedure well was transported to the postoperative area for recovery.      Estimated Blood loss: 20ml    Contrast: 25ml    Complications: none    FERNANDO aCgle II, MD, VI  Vascular Surgery  [facility]

## 2023-05-18 ENCOUNTER — PES CALL (OUTPATIENT)
Dept: ADMINISTRATIVE | Facility: CLINIC | Age: 78
End: 2023-05-18
Payer: MEDICARE

## 2023-05-18 NOTE — PHYSICIAN QUERY
PT Name: Steph Monroe  MR #: [id number]     DOCUMENTATION CLARIFICATION     CDS/: PRIMITIVO Patterson, RN, CCDS              Contact information: [email or web address]  This form is a permanent document in the medical record.     Query Date: May 18, 2023    By submitting this query, we are merely seeking further clarification of documentation.  Please utilize your independent clinical judgment when addressing the question(s) below.    The Medical Record contains the following:   Indicators   Supporting Clinical Findings Location in Medical Record    Non-blanchable erythema/redness     X Ulcer/Injury/Skin Breakdown she has had wounds to her backside and upper thigh after x2 falls at home several years ago.     Pt states, 'I've had bumps on my backside since the falls and I don't know why.' Wound documented on 5/7/23, present on arrival    To R of sacrum, pt has a nodular wound noted - partial thickness tissue loss in center of wound w/ dark purple and maroon skin, soft to the touch   Wound care: AR Miller RN 5/16    Deep Tissue Injury     X Wound care consult   Altered Skin Integrity 05/07/23 1800 Sacral spine #1 Non pressure chronic ulcer Intact skin with non-blanchable redness of localized area   Date First Assessed/Time First Assessed: 05/07/23 1800   Altered Skin Integrity Present on Admission - Did Patient arrive to the hospital with altered skin?: yes  Location: Sacral spine  Wound Number: #1  Primary Wound Type: Non pressure chronic ulcer...   Wound Image     Wound care: AR Miller RN 5/16    Acute/Chronic Illness      Medication/Treatment      Other       The clinical guidelines noted are only a system guideline. It does not replace the providers clinical judgment.    Per the National Pressure Injury Advisory Panel:   A pressure injury is localized damage to the skin and underlying soft tissue usually over a bony prominence or related to a medical or other device. The injury can present as intact skin  or an open ulcer and may be painful. The injury occurs as a result of intense and/or prolonged pressure or pressure in combination with shear. The tolerance of soft tissue for pressure and shear may also be affected by microclimate, nutrition, perfusion, co-morbidities and condition of the soft tissue.       Stage 1 Pressure Injury:  Intact skin with a localized area of non-blanchable erythema, which may appear differently in darkly pigmented skin. Color changes do not include purple or maroon discoloration; these may indicate deep tissue pressure injury.    Stage 2 Pressure Injury:  Partial-thickness loss of skin with exposed dermis. The wound bed is viable, pink or red, moist, and may also present as an intact or ruptured serum-filled blister.    Stage 3 Pressure Injury:  Full-thickness loss of skin, in which adipose (fat) is visible in the ulcer and granulation tissue and epibole (rolled wound edges) are often present. Slough and/or eschar may be visible. Undermining and tunneling may occur.    Stage 4 Pressure Injury:  Full-thickness skin and tissue loss with exposed or directly palpable fascia, muscle, tendon, ligament, cartilage or bone in the ulcer. Slough and/or eschar may be visible. Epibole (rolled edges), undermining and/or tunneling often occur.    Unstageable Pressure Injury:  Full-thickness skin and tissue loss in which the extent of tissue damage within the ulcer cannot be confirmed because it is obscured by slough or eschar. If slough or eschar is removed, a Stage 3 or Stage 4 pressure injury will be revealed.    Deep Tissue Pressure Injury:  Intact or non-intact skin with localized area of persistent non-blanchable deep red, maroon, purple discoloration or epidermal separation revealing a dark wound bed or blood filled blister. This injury results from intense and/or prolonged pressure and shear forces at the bone-muscle interface. The wound may evolve rapidly to reveal the actual extent of tissue  injury, or may resolve without tissue loss. If necrotic tissue, subcutaneous tissue, granulation tissue, fascia, muscle or other underlying structures are visible, this indicates a full thickness pressure injury (Unstageable, Stage 3 or Stage 4). Do not use DTPI to describe vascular, traumatic, neuropathic, or dermatologic conditions.   Medical Device Related Pressure Injury: This describes an etiology. Medical device related pressure injuries result from the use of devices designed and applied for diagnostic or therapeutic purposes. The resultant pressure injury generally conforms to the pattern or shape of the device. The injury should be staged using the staging system.    Mucosal Membrane Pressure Injury: Mucosal membrane pressure injury is found on mucous membranes with a history of a medical device in use at the location of the injury. Due to the anatomy of the tissue these ulcers cannot be staged.       Provider, please provide the integumentary diagnosis related to the documentation of (sacral spine):     [   ] Pressure Injury/Decubitus Ulcer, Stage 2   [   ] Pressure Injury/Decubitus Ulcer, Stage 3   [   ] Pressure Injury/Decubitus Ulcer, Unstageable   [   ] Deep Tissue Pressure Injury   [   ] Deep Tissue Pressure Injury that evolved into a Pressure Injury/Decubitus Ulcer (please specify stage evolved to): _____   [  x ] Other Integumentary Diagnosis (please specify):__chronic ulcer not related to pressure____________     Please document in your progress notes daily for the duration of treatment until resolved and include in your discharge summary.    Reference:    LAYLA Marcial., AR Luque., Goldberg, M., TARA Magaña., TARA Saha., & DESIREE Lal. (2016). Revised National Pressure Ulcer Advisory Panel Pressure Injury Staging System: Revised Pressure Injury Staging System. J Wound Ostomy Continence Nurs, 43(6), 585-597. doi:10.1097/won.4758905224846825    Form No.[id number]

## 2023-05-18 NOTE — PHYSICIAN QUERY
PT Name: Steph Monroe  MR #: [id number]     DOCUMENTATION CLARIFICATION      CDS/: PRIMITIVO Patterson, RN, CCDS               Contact information: [email or web address]    This form is a permanent document in the medical record.     Query Date: May 18, 2023    Dear Provider,  By submitting this query, we are merely seeking further clarification of documentation.  Please utilize your independent clinical judgment when addressing the question(s) below.     The Medical Record contains the following:    Supporting Clinical Findings Location in Medical Record   ALESIA on CKD stage IV, solitary kidney  Urology believed to be post renal as US has mild hydro.  Additionally could be contrast nephropathy   H & P: Dr. Finnegan 5/6   had a renal ultrasound on 5/5/2023 that showed R sided hydronephrosis, non-obstructing small intrarenal calculi on the right, CKD changes, and known absent L kidney.     Nephrology was consulted for ALESIA originally thought to be d/t hydronephrosis, pt was started on [place] x10hrs.     A spears catheter was placed and Urology was consulted.     CT abd & pelvis was done and this did NOT show hydronephrosis, it did show some abdominopelvic ascites, calcifications, and tip common iliac stents.    ALESIA on CKD 4  likely ischemic ATN due to an abrupt drop of her blood pressure (BP at admission was 201/82 and the next day it dropped to 101/52).    Nephro: Dr. Martínez 5/8   ALESIA on CKD 4  likely ischemic ATN due to an abrupt drop of her blood pressure (BP at admission was 201/82 and the next day it dropped to 101/52).   Scr stable 2.7 monitor stable compared to yesterday   No indication for RRT at this time    Nephro: Dr. Martínez 5/9   Of note, pt also with worsening ALESIA on CKD. Nephrology following. Urology evaluated pt and placed spears; believed this to be post-renal in origin, and signed off    Acute renal failure superimposed on stage 4 chronic kidney disease   DCS: Dr. Kaiser 5/16     Please clarify if  the ________ATN___________________ diagnosis has been:    [  ] Ruled In   [x  ] Ruled In, Now Resolved   [  ] Ruled Out   [  ] Other/Clarification of findings (please specify): _______________     Please document in your progress notes daily for the duration of treatment, until resolved, and include in your discharge summary.    Form No. [id number]

## 2023-05-22 ENCOUNTER — OFFICE VISIT (OUTPATIENT)
Dept: HOME HEALTH SERVICES | Facility: CLINIC | Age: 78
End: 2023-05-22
Payer: MEDICARE

## 2023-05-22 VITALS
SYSTOLIC BLOOD PRESSURE: 158 MMHG | HEART RATE: 72 BPM | RESPIRATION RATE: 16 BRPM | OXYGEN SATURATION: 97 % | TEMPERATURE: 64 F | DIASTOLIC BLOOD PRESSURE: 88 MMHG

## 2023-05-22 DIAGNOSIS — I10 ESSENTIAL HYPERTENSION: Chronic | ICD-10-CM

## 2023-05-22 DIAGNOSIS — D69.6 THROMBOCYTOPENIA: Primary | ICD-10-CM

## 2023-05-22 DIAGNOSIS — I73.9 PAD (PERIPHERAL ARTERY DISEASE): ICD-10-CM

## 2023-05-22 PROCEDURE — 3077F SYST BP >= 140 MM HG: CPT | Mod: CPTII,S$GLB,, | Performed by: NURSE PRACTITIONER

## 2023-05-22 PROCEDURE — 3077F PR MOST RECENT SYSTOLIC BLOOD PRESSURE >= 140 MM HG: ICD-10-PCS | Mod: CPTII,S$GLB,, | Performed by: NURSE PRACTITIONER

## 2023-05-22 PROCEDURE — 1111F PR DISCHARGE MEDS RECONCILED W/ CURRENT OUTPATIENT MED LIST: ICD-10-PCS | Mod: CPTII,S$GLB,, | Performed by: NURSE PRACTITIONER

## 2023-05-22 PROCEDURE — 1159F MED LIST DOCD IN RCRD: CPT | Mod: CPTII,S$GLB,, | Performed by: NURSE PRACTITIONER

## 2023-05-22 PROCEDURE — 3079F DIAST BP 80-89 MM HG: CPT | Mod: CPTII,S$GLB,, | Performed by: NURSE PRACTITIONER

## 2023-05-22 PROCEDURE — 3044F PR MOST RECENT HEMOGLOBIN A1C LEVEL <7.0%: ICD-10-PCS | Mod: CPTII,S$GLB,, | Performed by: NURSE PRACTITIONER

## 2023-05-22 PROCEDURE — 99350 PR HOME VISIT,ESTAB PATIENT,LEVEL IV: ICD-10-PCS | Mod: S$GLB,,, | Performed by: NURSE PRACTITIONER

## 2023-05-22 PROCEDURE — 3044F HG A1C LEVEL LT 7.0%: CPT | Mod: CPTII,S$GLB,, | Performed by: NURSE PRACTITIONER

## 2023-05-22 PROCEDURE — 99350 HOME/RES VST EST HIGH MDM 60: CPT | Mod: S$GLB,,, | Performed by: NURSE PRACTITIONER

## 2023-05-22 PROCEDURE — 1159F PR MEDICATION LIST DOCUMENTED IN MEDICAL RECORD: ICD-10-PCS | Mod: CPTII,S$GLB,, | Performed by: NURSE PRACTITIONER

## 2023-05-22 PROCEDURE — 1111F DSCHRG MED/CURRENT MED MERGE: CPT | Mod: CPTII,S$GLB,, | Performed by: NURSE PRACTITIONER

## 2023-05-22 PROCEDURE — 1160F RVW MEDS BY RX/DR IN RCRD: CPT | Mod: CPTII,S$GLB,, | Performed by: NURSE PRACTITIONER

## 2023-05-22 PROCEDURE — 1160F PR REVIEW ALL MEDS BY PRESCRIBER/CLIN PHARMACIST DOCUMENTED: ICD-10-PCS | Mod: CPTII,S$GLB,, | Performed by: NURSE PRACTITIONER

## 2023-05-22 PROCEDURE — 3079F PR MOST RECENT DIASTOLIC BLOOD PRESSURE 80-89 MM HG: ICD-10-PCS | Mod: CPTII,S$GLB,, | Performed by: NURSE PRACTITIONER

## 2023-05-22 NOTE — ASSESSMENT & PLAN NOTE
Chronic condition, previous stents  Former smoker  S/p angio and stents on recent admit  ASA, plavix in place  Instructed to resume statin  Follow up with vasc sx

## 2023-05-22 NOTE — ASSESSMENT & PLAN NOTE
B/P elevated today, pt with some pain to foot  Pt not taking any htn meds, reports saw cardiology 2 weeks ago and discussed with cardiology that she feels better without meds  She does not want to resume HTN meds at this time.  Instructed to monitor b/p daily and log

## 2023-05-22 NOTE — PROGRESS NOTES
[facility]  Transition of Care Home Visit    Visit Date: 5/22/2023  Encounter Provider: Shazia Ambrosio   PCP:  Mane Carmona MD    PRESENTING HISTORY      Patient ID: Steph Monroe is a 77 y.o. female.    Consult Requested By:  Dr. Gladys Kaiser  Reason for Consult:  Hospital Follow Up.    Steph is being seen at home due to being seen at home due to physical debility that presents a taxing effort to leave the home, to mitigate high risk of hospital readmission and/or due to the limited availability of reliable or safe options for transportation to the point of access to the provider. Prior to treatment on this visit the chart was reviewed and patient verbal consent was obtained.      Chief Complaint: Transitional Care        History of Present Illness: Ms. Steph Monroe is a 77 y.o. female who was recently admitted to the hospital.    Admit: 5/6/23  Discharge: 5/16/23  Hospital Course:   76 yo F transferred from [facility] for vascular surgery evaluation for critical limb ischemia of RLE found by IC angiography on 5/5. Will continue patient on ASA, plavix, and heparin gtt. Vascular sx consulted who will obtain JOSE and toe pressures prior to surgical intervention. Will plan for femoral endarterectomy on 5/9. Nephrology consulted and recommend future need for HD and line placement for ALESIA. Plan to give IVF pre and post op to prevent worsening ALESIA. Sent to SICU for 5/10 and stepped back down. Repeat ABIs shows severe arterial occlusion. Angiogram on 5/15 with 2 vessel run off to R foot. Repeat JOSE shows improvement in RLE. Hgb 6.0 will transfuse 1u RBC. Medically stable for discharge. Will send referral to  for PT/OT. Will need follow up with vascular sx, nephrology, and PCP. Will need to continue DAPT and statin therapy.   ___________________________________________________________________    Today:    HPI:  Pt is being seen today for hospital follow up. See hospital course.    Ms Monroe is  found in her home with her daughter present. She reports she continues to have some pain to her right foot but the color and pain to her leg is improving since her procedure to improve circulation. She reports she has some stitches from her procedure and is not sure when those need to come out. She has oxygen in the home, but is not currently using as her RA sat is 97 at this time, reports she uses when she is SOB.  Pt has ascites to her abd, non edema to her legs, she reports the color to her legs is improving.  In med rec, she is currently taking ASA, plavix, Sodium bicarb, opioid pain med.  Pt is not taking statin or HTN meds. Reports she discussed with both cardiology and nephrology with recent visits that she feels better without b/p and heart meds. She has fatigue and diarrhea when she takes this meds and feels her ascites is worse with meds.  Reviewed importance of taking ASA, plavix and to resume statin to improve circulation and avoid further blockages. Pt states understanding and will resume statin.  B/p elevated, pt does not want to resume HTN meds.         Review of Systems   Constitutional:  Negative for activity change, fatigue and fever.   HENT: Negative.     Eyes: Negative.    Respiratory:  Positive for shortness of breath. Negative for chest tightness.    Cardiovascular: Negative.  Negative for leg swelling.   Gastrointestinal:  Positive for abdominal distention.   Endocrine: Negative.    Genitourinary: Negative.    Musculoskeletal: Negative.    Skin:  Positive for color change (right leg/foot).   Allergic/Immunologic: Negative.    Neurological: Negative.    Hematological:  Bruises/bleeds easily.   Psychiatric/Behavioral: Negative.  Negative for agitation.    All other systems reviewed and are negative.    Assessments:  Environmental: single family home, adequate temp and light  Functional Status: independent  Safety: fall risk  Nutritional: adequate available  Home Health/DME/Supplies: oxygen,  walker    PAST HISTORY:     Past Medical History:   Diagnosis Date    Cancer     rectum    CKD (chronic kidney disease)     Diabetes mellitus, type 2     Hyperlipidemia     Hypertension     Neutropenic fever 5/27/2016    PVD (peripheral vascular disease)        Past Surgical History:   Procedure Laterality Date    ANGIOGRAM, LOWER ARTERIAL, UNILATERAL Right 5/5/2023    Procedure: Angiogram, Lower Arterial, Unilateral;  Surgeon: Remington Ribeiro MD;  Location: [facility] CATH LAB/EP;  Service: Cardiology;  Laterality: Right;    ANGIOGRAPHY OF LOWER EXTREMITY N/A 5/15/2023    Procedure: Angiogram Extremity Unilateral;  Surgeon: FERNANDO Cagle II, MD;  Location: [facility] OR [address] [place] [address];  Service: Vascular;  Laterality: N/A;  22.4 min  183.31 mGy  23.5353 Gy.cm  17ml Dye     COLONOSCOPY N/A 3/28/2016    Procedure: COLONOSCOPY;  Surgeon: Mitul Buitrago Jr., MD;  Location: [facility] ENDO;  Service: Endoscopy;  Laterality: N/A;    ENDARTERECTOMY OF FEMORAL ARTERY Right 5/9/2023    Procedure: ENDARTERECTOMY, FEMORAL;  Surgeon: FERNANDO Cagle II, MD;  Location: [facility] OR [address] [place] [address];  Service: Vascular;  Laterality: Right;  contrast 12ml  Flouro time 11.1 min  mgy 217.83  Gycm2 26.3570    EYE SURGERY Left     cataract removal with lens implant.    FEMORAL ARTERY STENT  before 2010    PERIPHERAL ANGIOGRAPHY N/A 5/3/2023    Procedure: Peripheral angiography;  Surgeon: Rasheeda Ny MD;  Location: [facility] CATH LAB/EP;  Service: Cardiology;  Laterality: N/A;    PTA, SUPERFICIAL FEMORAL ARTERY Right 5/15/2023    Procedure: PTA, SUPERFICIAL FEMORAL ARTERY;  Surgeon: FERNANDO Cagle II, MD;  Location: [facility] OR [address] [place] [address];  Service: Vascular;  Laterality: Right;    RENAL ARTERY STENT  2010    STENT, ILIAC ARTERY Right 5/9/2023    Procedure: STENT, ILIAC ARTERY;  Surgeon: FERNANDO Cagle II, MD;  Location: [facility] OR [address] [place] [address];  Service: Vascular;  Laterality: Right;       Family History   Problem Relation Age of Onset    Diabetes Father     Heart  attack Mother     Hypertension Sister        Social History     Socioeconomic History    Marital status:     Number of children: 2   Occupational History    Occupation: Retired   Tobacco Use    Smoking status: Former     Packs/day: 1.50     Years: 45.00     Pack years: 67.50     Types: Cigarettes     Quit date: 2013     Years since quittin.9     Passive exposure: Never    Smokeless tobacco: Never   Substance and Sexual Activity    Alcohol use: No     Alcohol/week: 0.0 standard drinks    Drug use: No    Sexual activity: Not Currently     Social Determinants of Health     Financial Resource Strain: Low Risk     Difficulty of Paying Living Expenses: Not hard at all   Food Insecurity: No Food Insecurity    Worried About Running Out of Food in the Last Year: Never true    Ran Out of Food in the Last Year: Never true   Transportation Needs: No Transportation Needs    Lack of Transportation (Medical): No    Lack of Transportation (Non-Medical): No   Physical Activity: Sufficiently Active    Days of Exercise per Week: 5 days    Minutes of Exercise per Session: 30 min   Stress: Stress Concern Present    Feeling of Stress : To some extent   Social Connections: Moderately Isolated    Frequency of Communication with Friends and Family: Once a week    Frequency of Social Gatherings with Friends and Family: Once a week    Attends Bahai Services: 1 to 4 times per year    Active Member of Clubs or Organizations: No    Marital Status:    Housing Stability: Unknown    Unable to Pay for Housing in the Last Year: No    Unstable Housing in the Last Year: No       MEDICATIONS & ALLERGIES:     Current Outpatient Medications on File Prior to Visit   Medication Sig Dispense Refill    aspirin 81 MG Chew Take 1 tablet (81 mg total) by mouth once daily. 30 tablet 5    clopidogreL (PLAVIX) 75 mg tablet Take 75 mg by mouth once daily.      sodium bicarbonate 650 MG tablet Take 1 tablet (650 mg total) by mouth 3 (three)  times daily. 90 tablet 11    atorvastatin (LIPITOR) 80 MG tablet Take 1 tablet (80 mg total) by mouth once daily. (Patient not taking: Reported on 2023) 90 tablet 3    carvediloL (COREG) 25 MG tablet Take 1 tablet by mouth 2 (two) times daily.      ezetimibe (ZETIA) 10 mg tablet Take 10 mg by mouth once daily.      furosemide (LASIX) 20 MG tablet Take 20 mg by mouth once daily.      NIFEdipine (PROCARDIA-XL) 30 MG (OSM) 24 hr tablet Take 1 tablet (30 mg total) by mouth once daily. (Patient not taking: Reported on 2023) 30 tablet 11    [] oxyCODONE (ROXICODONE) 5 MG immediate release tablet Take 1 tablet (5 mg total) by mouth every 6 (six) hours as needed for Pain. 20 tablet 0    [DISCONTINUED] gabapentin (NEURONTIN) 100 MG capsule Take 1 capsule (100 mg total) by mouth 2 (two) times daily. (Patient not taking: Reported on 2023) 60 capsule 11     No current facility-administered medications on file prior to visit.        Review of patient's allergies indicates:   Allergen Reactions    Chantix [varenicline]      Tongue swelling      Wellbutrin [bupropion hcl]      Tongue swelling         OBJECTIVE:     Vital Signs:  Vitals:    23 1100   BP: (!) 158/88   Pulse: 72   Resp: 16   Temp: (!) 64.4 °F (18 °C)     There is no height or weight on file to calculate BMI.     Physical Exam:  Physical Exam  Vitals reviewed.   Constitutional:       General: She is not in acute distress.     Appearance: She is well-developed.   HENT:      Head: Normocephalic and atraumatic.      Nose: Nose normal.      Mouth/Throat:      Mouth: Mucous membranes are dry.   Eyes:      Pupils: Pupils are equal, round, and reactive to light.   Cardiovascular:      Rate and Rhythm: Normal rate and regular rhythm.      Heart sounds: Normal heart sounds.   Pulmonary:      Effort: Pulmonary effort is normal.      Breath sounds: Normal breath sounds.   Abdominal:      General: Bowel sounds are normal.      Palpations: Abdomen is  soft.      Comments: Ascites present   Musculoskeletal:         General: Normal range of motion.      Cervical back: Normal range of motion and neck supple.   Skin:     General: Skin is warm and dry.      Comments: Redness to right foot   Neurological:      Mental Status: She is alert and oriented to person, place, and time.      Cranial Nerves: No cranial nerve deficit.   Psychiatric:         Behavior: Behavior normal.         Thought Content: Thought content normal.         Judgment: Judgment normal.       Laboratory  Lab Results   Component Value Date    WBC 5.21 05/16/2023    HGB 6.0 (L) 05/16/2023    HCT 20.7 (L) 05/16/2023    MCV 97 05/16/2023     (L) 05/16/2023     Lab Results   Component Value Date    INR 1.1 05/04/2023    INR 1.2 05/03/2023    INR 1.1 05/02/2023     Lab Results   Component Value Date    HGBA1C 5.0 05/04/2023     No results for input(s): POCTGLUCOSE in the last 72 hours.    Diagnostic Results:      TRANSITION OF CARE:     [facility] Contact Note: 5/17/23    Family and/or Caretaker present at visit?  Yes.  Diagnostic tests reviewed/disposition: No diagnosic tests pending after this hospitalization.  Disease/illness education:   Home health/community services discussion/referrals: Patient has home health established at [facility], They are to begin service today .   Establishment or re-establishment of referral orders for community resources: No other necessary community resources.   Discussion with other health care providers: No discussion with other health care providers necessary.     Transition of Care Visit:  I have reviewed and updated the history and problem list.  I have reconciled the medication list.  I have discussed the hospitalization and current medical issues, prognosis and plans with the patient/family.  I  spent more than 50% of time discussing the care with the patient/family.  Total Face-to-Face Encounter: 60 minutes.    Medications Reconciliation:   I have reconciled  the patient's home medications and discharge medications with the patient/family. I have updated all changes.  Refer to After-Visit Medication List.    ASSESSMENT & PLAN:     HIGH RISK CONDITION(S):      Problem List Items Addressed This Visit          Cardiac/Vascular    Essential hypertension (Chronic)    Current Assessment & Plan     B/P elevated today, pt with some pain to foot  Pt not taking any htn meds, reports saw cardiology 2 weeks ago and discussed with cardiology that she feels better without meds  She does not want to resume HTN meds at this time.  Instructed to monitor b/p daily and log           PAD (peripheral artery disease)    Current Assessment & Plan     Chronic condition, previous stents  Former smoker  S/p angio and stents on recent admit  ASA, plavix in place  Instructed to resume statin  Follow up with [facility] s              Hematology    Thrombocytopenia - Primary    Current Assessment & Plan     Chronic reduced platelets  Related to comorbid conditions  Monitor             Message sent to [facility] sx office to assist with FU appt  Were controlled substances prescribed?  No    Instructions for the patient:  - Continue all medications, treatments and therapies as ordered.   - Follow all instructions, recommendations as discussed.  - Maintain Safety Precautions at all times.  - Attend all medical appointments as scheduled.  - For worsening symptoms: call Primary Care Physician or Nurse Practitioner.  - For emergencies, call 911 or immediately report to the nearest emergency room.  - Limit Risks of environmental exposure to coronavirus/COVID-19 as discussed including: social distancing, hand hygiene, and limiting departures from the home for necessities only.     Questions elicited and answered.  Contact information provided for any changes in condition or concerns    Scheduled Follow-up :  No future appointments.    After Visit Medication List :     Medication List            Accurate as of May 22,  2023  1:46 PM. If you have any questions, ask your nurse or doctor.                CONTINUE taking these medications      atorvastatin 80 MG tablet  Commonly known as: LIPITOR  Take 1 tablet (80 mg total) by mouth once daily.     NANDA CHEWABLE ASPIRIN 81 MG Chew  Generic drug: aspirin  Take 1 tablet (81 mg total) by mouth once daily.     carvediloL 25 MG tablet  Commonly known as: COREG     clopidogreL 75 mg tablet  Commonly known as: PLAVIX     ezetimibe 10 mg tablet  Commonly known as: ZETIA     furosemide 20 MG tablet  Commonly known as: LASIX     NIFEdipine 30 MG (OSM) 24 hr tablet  Commonly known as: PROCARDIA-XL  Take 1 tablet (30 mg total) by mouth once daily.     sodium bicarbonate 650 MG tablet  Take 1 tablet (650 mg total) by mouth 3 (three) times daily.              Signature: Shazia Ambrosio NP

## 2023-06-01 ENCOUNTER — TELEPHONE (OUTPATIENT)
Dept: VASCULAR SURGERY | Facility: CLINIC | Age: 78
End: 2023-06-01
Payer: MEDICARE

## 2023-06-01 NOTE — TELEPHONE ENCOUNTER
----- Message from FERNANDO Cagle II, MD sent at 5/30/2023  4:44 PM CDT -----  Regarding: clinic appt  Hi Lisa,    This patient needs to see me in clinic this Friday for post-op visit. Thanks.    -Vern

## 2023-06-01 NOTE — TELEPHONE ENCOUNTER
"Called and spoke with Ms Monroe to schedule post op appointment for Friday 6-2-2023 with Dr CHARLES Cagle. Ms Monroe states," I can't come to the appointment on tomorrow because the person who brings me to my appointments is out of town.Also where are you located?" I replied,  [facility]. Also, would you call me when the person is back in town to schedule your appointment phone number given [phone]. Ms Monroe states," yes I will call you but I hate coming there."  "

## 2023-06-12 ENCOUNTER — OFFICE VISIT (OUTPATIENT)
Dept: VASCULAR SURGERY | Facility: CLINIC | Age: 78
End: 2023-06-12
Attending: SURGERY
Payer: MEDICARE

## 2023-06-12 VITALS
TEMPERATURE: 98 F | BODY MASS INDEX: 25.15 KG/M2 | WEIGHT: 136.69 LBS | SYSTOLIC BLOOD PRESSURE: 151 MMHG | HEART RATE: 89 BPM | DIASTOLIC BLOOD PRESSURE: 67 MMHG | HEIGHT: 62 IN

## 2023-06-12 DIAGNOSIS — I73.9 PAD (PERIPHERAL ARTERY DISEASE): Primary | ICD-10-CM

## 2023-06-12 PROCEDURE — 99024 PR POST-OP FOLLOW-UP VISIT: ICD-10-PCS | Mod: S$GLB,,, | Performed by: SURGERY

## 2023-06-12 PROCEDURE — 99999 PR PBB SHADOW E&M-EST. PATIENT-LVL III: ICD-10-PCS | Mod: PBBFAC,,, | Performed by: SURGERY

## 2023-06-12 PROCEDURE — 3077F PR MOST RECENT SYSTOLIC BLOOD PRESSURE >= 140 MM HG: ICD-10-PCS | Mod: CPTII,S$GLB,, | Performed by: SURGERY

## 2023-06-12 PROCEDURE — 1126F PR PAIN SEVERITY QUANTIFIED, NO PAIN PRESENT: ICD-10-PCS | Mod: CPTII,S$GLB,, | Performed by: SURGERY

## 2023-06-12 PROCEDURE — 3078F DIAST BP <80 MM HG: CPT | Mod: CPTII,S$GLB,, | Performed by: SURGERY

## 2023-06-12 PROCEDURE — 1126F AMNT PAIN NOTED NONE PRSNT: CPT | Mod: CPTII,S$GLB,, | Performed by: SURGERY

## 2023-06-12 PROCEDURE — 1159F PR MEDICATION LIST DOCUMENTED IN MEDICAL RECORD: ICD-10-PCS | Mod: CPTII,S$GLB,, | Performed by: SURGERY

## 2023-06-12 PROCEDURE — 1101F PT FALLS ASSESS-DOCD LE1/YR: CPT | Mod: CPTII,S$GLB,, | Performed by: SURGERY

## 2023-06-12 PROCEDURE — 3077F SYST BP >= 140 MM HG: CPT | Mod: CPTII,S$GLB,, | Performed by: SURGERY

## 2023-06-12 PROCEDURE — 3078F PR MOST RECENT DIASTOLIC BLOOD PRESSURE < 80 MM HG: ICD-10-PCS | Mod: CPTII,S$GLB,, | Performed by: SURGERY

## 2023-06-12 PROCEDURE — 3044F PR MOST RECENT HEMOGLOBIN A1C LEVEL <7.0%: ICD-10-PCS | Mod: CPTII,S$GLB,, | Performed by: SURGERY

## 2023-06-12 PROCEDURE — 3288F PR FALLS RISK ASSESSMENT DOCUMENTED: ICD-10-PCS | Mod: CPTII,S$GLB,, | Performed by: SURGERY

## 2023-06-12 PROCEDURE — 1159F MED LIST DOCD IN RCRD: CPT | Mod: CPTII,S$GLB,, | Performed by: SURGERY

## 2023-06-12 PROCEDURE — 1101F PR PT FALLS ASSESS DOC 0-1 FALLS W/OUT INJ PAST YR: ICD-10-PCS | Mod: CPTII,S$GLB,, | Performed by: SURGERY

## 2023-06-12 PROCEDURE — 3044F HG A1C LEVEL LT 7.0%: CPT | Mod: CPTII,S$GLB,, | Performed by: SURGERY

## 2023-06-12 PROCEDURE — 99999 PR PBB SHADOW E&M-EST. PATIENT-LVL III: CPT | Mod: PBBFAC,,, | Performed by: SURGERY

## 2023-06-12 PROCEDURE — 3288F FALL RISK ASSESSMENT DOCD: CPT | Mod: CPTII,S$GLB,, | Performed by: SURGERY

## 2023-06-12 PROCEDURE — 99024 POSTOP FOLLOW-UP VISIT: CPT | Mod: S$GLB,,, | Performed by: SURGERY

## 2023-06-12 NOTE — PROGRESS NOTES
Vascular Surgery Clinic Note    78yo F s/p PMHx of HTN, T2DM, HLD, CKD stage 4 s/p the followin/9/23-- R CFA endarterectomy and Right iliac artery stenting  (7x39 VBX, 7x80mm Lifestent)  5/15/23-- trans-radial RLE angiogram, PTA of SFA     Since discharge from the hospital, she has been doing well. Denies fever, chills, N/V. Denies claudication or rest pain. Reports some right sided lower leg edema, denies pain. She currently is back to her baseline, able to ambulate without pain using a walker. Prior to surgery she was having right foot pain that limited walking and endorsed intermittent rest pain. She no longer is having foot or leg pain at rest or with walking.    PHYSICAL EXAM:  Vitals:    23 1027   BP: (!) 151/67   Pulse: 89   Temp: 98.4 °F (36.9 °C)       Constitutional:       General: Not in acute distress     Appearance: Normal appearance.   Cardiovascular:      Rate and Rhythm: Normal rate and regular rhythm.   2+ femoral pulses bilat, biphasic   Pulmonary:      Effort: Pulmonary effort is normal. No respiratory distress.   Abdominal:      General: Abdomen is flat. There is no distension. Non tender  Musculoskeletal:         General: No deformity or signs of injury. Normal range of motion.   Skin:     General: Skin is warm and dry.                 A/P:  Julisa Monroe is a 77 year old female with PMHx of HTN, T2DM, HLD, CKD stage 4 here for post op visit s/p 23-- R CFA endarterectomy and Right iliac artery stenting & 5/15/23-- RLE angiogram, PTA of SFA. She is doing well with toes healing and pink and well-perfused.    - RTC 2 months for surveillance JOSE and aorto-iliac duplex ultrasound  -continue ASA and plavix  -Continue statin    FERNANDO Cagle II, MD, VI  Vascular Surgery  [facility]

## 2023-06-29 ENCOUNTER — HOSPITAL ENCOUNTER (INPATIENT)
Facility: HOSPITAL | Age: 78
LOS: 1 days | Discharge: HOSPICE | DRG: 593 | End: 2023-07-03
Attending: EMERGENCY MEDICINE | Admitting: STUDENT IN AN ORGANIZED HEALTH CARE EDUCATION/TRAINING PROGRAM
Payer: MEDICARE

## 2023-06-29 DIAGNOSIS — N17.9 AKI (ACUTE KIDNEY INJURY): ICD-10-CM

## 2023-06-29 DIAGNOSIS — L98.492 INFECTED ULCER OF SKIN, WITH FAT LAYER EXPOSED: Primary | ICD-10-CM

## 2023-06-29 DIAGNOSIS — L08.9 INFECTED ULCER OF SKIN, WITH FAT LAYER EXPOSED: Primary | ICD-10-CM

## 2023-06-29 DIAGNOSIS — T79.6XXA TRAUMATIC RHABDOMYOLYSIS, INITIAL ENCOUNTER: ICD-10-CM

## 2023-06-29 DIAGNOSIS — R53.1 WEAKNESS: ICD-10-CM

## 2023-06-29 DIAGNOSIS — L98.422 SACRAL ULCER, WITH FAT LAYER EXPOSED: ICD-10-CM

## 2023-06-29 DIAGNOSIS — L89.314 STAGE IV PRESSURE ULCER OF RIGHT BUTTOCK: ICD-10-CM
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
## 2023-06-29 DIAGNOSIS — R07.9 CHEST PAIN: ICD-10-CM

## 2023-06-29 DIAGNOSIS — I96 DRY GANGRENE: ICD-10-CM

## 2023-06-29 LAB
ALBUMIN SERPL BCP-MCNC: 2.1 G/DL (ref 3.5–5.2)
ALP SERPL-CCNC: 169 U/L (ref 55–135)
ALT SERPL W/O P-5'-P-CCNC: 25 U/L (ref 10–44)
ANION GAP SERPL CALC-SCNC: 10 MMOL/L (ref 8–16)
AST SERPL-CCNC: 66 U/L (ref 10–40)
BASOPHILS # BLD AUTO: 0.01 K/UL (ref 0–0.2)
BASOPHILS NFR BLD: 0.1 % (ref 0–1.9)
BILIRUB SERPL-MCNC: 0.6 MG/DL (ref 0.1–1)
BUN SERPL-MCNC: 32 MG/DL (ref 8–23)
CALCIUM SERPL-MCNC: 8.6 MG/DL (ref 8.7–10.5)
CHLORIDE SERPL-SCNC: 107 MMOL/L (ref 95–110)
CK SERPL-CCNC: 2298 U/L (ref 20–180)
CO2 SERPL-SCNC: 20 MMOL/L (ref 23–29)
CREAT SERPL-MCNC: 1.5 MG/DL (ref 0.5–1.4)
CRP SERPL-MCNC: 194.2 MG/L (ref 0–8.2)
DIFFERENTIAL METHOD: ABNORMAL
EOSINOPHIL # BLD AUTO: 0 K/UL (ref 0–0.5)
EOSINOPHIL NFR BLD: 0.1 % (ref 0–8)
ERYTHROCYTE [DISTWIDTH] IN BLOOD BY AUTOMATED COUNT: 15.9 % (ref 11.5–14.5)
EST. GFR  (NO RACE VARIABLE): 36 ML/MIN/1.73 M^2
GLUCOSE SERPL-MCNC: 137 MG/DL (ref 70–110)
HCT VFR BLD AUTO: 26 % (ref 37–48.5)
HGB BLD-MCNC: 8.1 G/DL (ref 12–16)
IMM GRANULOCYTES # BLD AUTO: 0.06 K/UL (ref 0–0.04)
IMM GRANULOCYTES NFR BLD AUTO: 0.5 % (ref 0–0.5)
INR PPP: 1.2 (ref 0.8–1.2)
LACTATE SERPL-SCNC: 2 MMOL/L (ref 0.5–2.2)
LYMPHOCYTES # BLD AUTO: 0.5 K/UL (ref 1–4.8)
LYMPHOCYTES NFR BLD: 4 % (ref 18–48)
MCH RBC QN AUTO: 26.3 PG (ref 27–31)
MCHC RBC AUTO-ENTMCNC: 31.2 G/DL (ref 32–36)
MCV RBC AUTO: 84 FL (ref 82–98)
MONOCYTES # BLD AUTO: 0.8 K/UL (ref 0.3–1)
MONOCYTES NFR BLD: 7.4 % (ref 4–15)
NEUTROPHILS # BLD AUTO: 9.9 K/UL (ref 1.8–7.7)
NEUTROPHILS NFR BLD: 87.9 % (ref 38–73)
NRBC BLD-RTO: 0 /100 WBC
PLATELET # BLD AUTO: 247 K/UL (ref 150–450)
PMV BLD AUTO: 9.4 FL (ref 9.2–12.9)
POTASSIUM SERPL-SCNC: 4.4 MMOL/L (ref 3.5–5.1)
PROT SERPL-MCNC: 6.5 G/DL (ref 6–8.4)
PROTHROMBIN TIME: 13 SEC (ref 9–12.5)
RBC # BLD AUTO: 3.08 M/UL (ref 4–5.4)
SODIUM SERPL-SCNC: 137 MMOL/L (ref 136–145)
WBC # BLD AUTO: 11.31 K/UL (ref 3.9–12.7)

## 2023-06-29 PROCEDURE — 80053 COMPREHEN METABOLIC PANEL: CPT | Performed by: EMERGENCY MEDICINE

## 2023-06-29 PROCEDURE — 25000003 PHARM REV CODE 250: Performed by: EMERGENCY MEDICINE

## 2023-06-29 PROCEDURE — 63600175 PHARM REV CODE 636 W HCPCS: Performed by: EMERGENCY MEDICINE

## 2023-06-29 PROCEDURE — 93010 EKG 12-LEAD: ICD-10-PCS | Mod: ,,, | Performed by: INTERNAL MEDICINE

## 2023-06-29 PROCEDURE — 82550 ASSAY OF CK (CPK): CPT | Performed by: EMERGENCY MEDICINE

## 2023-06-29 PROCEDURE — 93005 ELECTROCARDIOGRAM TRACING: CPT

## 2023-06-29 PROCEDURE — 86140 C-REACTIVE PROTEIN: CPT | Performed by: EMERGENCY MEDICINE

## 2023-06-29 PROCEDURE — 85610 PROTHROMBIN TIME: CPT | Performed by: EMERGENCY MEDICINE

## 2023-06-29 PROCEDURE — 96365 THER/PROPH/DIAG IV INF INIT: CPT

## 2023-06-29 PROCEDURE — 93010 ELECTROCARDIOGRAM REPORT: CPT | Mod: ,,, | Performed by: INTERNAL MEDICINE

## 2023-06-29 PROCEDURE — 99285 EMERGENCY DEPT VISIT HI MDM: CPT | Mod: 25

## 2023-06-29 PROCEDURE — G0378 HOSPITAL OBSERVATION PER HR: HCPCS

## 2023-06-29 PROCEDURE — 85025 COMPLETE CBC W/AUTO DIFF WBC: CPT | Performed by: EMERGENCY MEDICINE

## 2023-06-29 PROCEDURE — 96367 TX/PROPH/DG ADDL SEQ IV INF: CPT

## 2023-06-29 PROCEDURE — 25500020 PHARM REV CODE 255: Performed by: EMERGENCY MEDICINE

## 2023-06-29 PROCEDURE — 83605 ASSAY OF LACTIC ACID: CPT | Performed by: EMERGENCY MEDICINE

## 2023-06-29 RX ORDER — SIMETHICONE 80 MG
1 TABLET,CHEWABLE ORAL 4 TIMES DAILY PRN
Status: DISCONTINUED | OUTPATIENT
Start: 2023-06-29 | End: 2023-07-03 | Stop reason: HOSPADM

## 2023-06-29 RX ORDER — IBUPROFEN 200 MG
16 TABLET ORAL
Status: DISCONTINUED | OUTPATIENT
Start: 2023-06-29 | End: 2023-06-29

## 2023-06-29 RX ORDER — DEXTROSE 40 %
30 GEL (GRAM) ORAL
Status: DISCONTINUED | OUTPATIENT
Start: 2023-06-30 | End: 2023-07-03 | Stop reason: HOSPADM

## 2023-06-29 RX ORDER — GLUCAGON 1 MG
1 KIT INJECTION
Status: DISCONTINUED | OUTPATIENT
Start: 2023-06-29 | End: 2023-07-03 | Stop reason: HOSPADM

## 2023-06-29 RX ORDER — POLYETHYLENE GLYCOL 3350 17 G/17G
17 POWDER, FOR SOLUTION ORAL DAILY
Status: DISCONTINUED | OUTPATIENT
Start: 2023-06-30 | End: 2023-07-01

## 2023-06-29 RX ORDER — SODIUM CHLORIDE 0.9 % (FLUSH) 0.9 %
10 SYRINGE (ML) INJECTION EVERY 12 HOURS PRN
Status: DISCONTINUED | OUTPATIENT
Start: 2023-06-29 | End: 2023-07-03 | Stop reason: HOSPADM

## 2023-06-29 RX ORDER — NALOXONE HCL 0.4 MG/ML
0.02 VIAL (ML) INJECTION
Status: DISCONTINUED | OUTPATIENT
Start: 2023-06-29 | End: 2023-07-03 | Stop reason: HOSPADM

## 2023-06-29 RX ORDER — DEXTROSE 40 %
15 GEL (GRAM) ORAL
Status: DISCONTINUED | OUTPATIENT
Start: 2023-06-30 | End: 2023-07-03 | Stop reason: HOSPADM

## 2023-06-29 RX ORDER — IBUPROFEN 200 MG
24 TABLET ORAL
Status: DISCONTINUED | OUTPATIENT
Start: 2023-06-29 | End: 2023-06-29

## 2023-06-29 RX ORDER — ENOXAPARIN SODIUM 100 MG/ML
30 INJECTION SUBCUTANEOUS EVERY 24 HOURS
Status: DISCONTINUED | OUTPATIENT
Start: 2023-06-30 | End: 2023-06-30

## 2023-06-29 RX ORDER — ACETAMINOPHEN 325 MG/1
650 TABLET ORAL EVERY 4 HOURS PRN
Status: DISCONTINUED | OUTPATIENT
Start: 2023-06-29 | End: 2023-07-03 | Stop reason: HOSPADM

## 2023-06-29 RX ORDER — PROCHLORPERAZINE EDISYLATE 5 MG/ML
5 INJECTION INTRAMUSCULAR; INTRAVENOUS EVERY 6 HOURS PRN
Status: DISCONTINUED | OUTPATIENT
Start: 2023-06-29 | End: 2023-07-03 | Stop reason: HOSPADM

## 2023-06-29 RX ORDER — TALC
6 POWDER (GRAM) TOPICAL NIGHTLY PRN
Status: DISCONTINUED | OUTPATIENT
Start: 2023-06-29 | End: 2023-07-03 | Stop reason: HOSPADM

## 2023-06-29 RX ORDER — ONDANSETRON 2 MG/ML
4 INJECTION INTRAMUSCULAR; INTRAVENOUS EVERY 8 HOURS PRN
Status: DISCONTINUED | OUTPATIENT
Start: 2023-06-29 | End: 2023-07-03 | Stop reason: HOSPADM

## 2023-06-29 RX ADMIN — PIPERACILLIN AND TAZOBACTAM 4.5 G: 4; .5 INJECTION, POWDER, LYOPHILIZED, FOR SOLUTION INTRAVENOUS; PARENTERAL at 07:06

## 2023-06-29 RX ADMIN — IOHEXOL 75 ML: 350 INJECTION, SOLUTION INTRAVENOUS at 08:06

## 2023-06-29 RX ADMIN — VANCOMYCIN HYDROCHLORIDE 1500 MG: 1.5 INJECTION, POWDER, LYOPHILIZED, FOR SOLUTION INTRAVENOUS at 08:06

## 2023-06-29 NOTE — ED PROVIDER NOTES
Emergency Department Encounter  Provider Note    Steph Monroe  [id number]  6/29/2023    Evaluation:    History:     Chief Complaint   Patient presents with    Fall     Pt has been having frequent falls and causing skin tears all over.  Pt has a decubitus to her lower back with a pungent odor.  Pt also has black toes on her right foot.        History of Present Illness:  Steph Monroe is a 77 y.o. female who has a past medical history of Cancer, CKD (chronic kidney disease), Diabetes mellitus, type 2, Hyperlipidemia, Hypertension, Neutropenic fever (5/27/2016), and PVD (peripheral vascular disease).    The patient presents to the ED due to multiple concerns.    She is accompanied by family, who provide majority of history.  They state patient was recently admitted for vascular surgery procedure to her R foot. She was doing well after discharge, but family noticed the toes on her R foot have been getting darker.      Family has also noticed she has been getting weak and having frequent falls over the last week. She fell on Monday and again today. They also noticed today that she has a wound on her sacrum that has a foul odor to it. They are not sure how long it has been there.     On arrival, patient denies any pain. She denies any headache but does not remember falling.   No other complaints or concerns.     Past Medical History:   Diagnosis Date    Cancer     rectum    CKD (chronic kidney disease)     Diabetes mellitus, type 2     Hyperlipidemia     Hypertension     Neutropenic fever 5/27/2016    PVD (peripheral vascular disease)      Past Surgical History:   Procedure Laterality Date    ANGIOGRAM, LOWER ARTERIAL, UNILATERAL Right 5/5/2023    Procedure: Angiogram, Lower Arterial, Unilateral;  Surgeon: Remington Ribeiro MD;  Location: [facility] CATH LAB/EP;  Service: Cardiology;  Laterality: Right;    ANGIOGRAPHY OF LOWER EXTREMITY N/A 5/15/2023    Procedure: Angiogram Extremity Unilateral;  Surgeon: FERNANDO Porras  Gurjit SAAB MD;  Location: [facility] OR [facility] FLR;  Service: Vascular;  Laterality: N/A;  22.4 min  183.31 mGy  23.5353 Gy.cm  17ml Dye     COLONOSCOPY N/A 3/28/2016    Procedure: COLONOSCOPY;  Surgeon: Mitul Buitrago Jr., MD;  Location: [facility] ENDO;  Service: Endoscopy;  Laterality: N/A;    ENDARTERECTOMY OF FEMORAL ARTERY Right 5/9/2023    Procedure: ENDARTERECTOMY, FEMORAL;  Surgeon: FERNANDO Cagle II, MD;  [facility];  Service: Vascular;  Laterality: Right;  contrast 12ml  Flouro time 11.1 min  mgy 217.83  Gycm2 26.3570    EYE SURGERY Left     cataract removal with lens implant.    FEMORAL ARTERY STENT  before 2010    PERIPHERAL ANGIOGRAPHY N/A 5/3/2023    Procedure: Peripheral angiography;  Surgeon: Rasheeda Ny MD;  Location: [facility] CATH LAB/EP;  Service: Cardiology;  Laterality: N/A;    PTA, SUPERFICIAL FEMORAL ARTERY Right 5/15/2023    Procedure: PTA, SUPERFICIAL FEMORAL ARTERY;  Surgeon: FERNANDO Cagle II, MD;  [facility];  Service: Vascular;  Laterality: Right;    RENAL ARTERY STENT  2010    STENT, ILIAC ARTERY Right 5/9/2023    Procedure: STENT, ILIAC ARTERY;  Surgeon: FERNANDO Cagle II, MD;  [facility];  Service: Vascular;  Laterality: Right;     Family History   Problem Relation Age of Onset    Diabetes Father     Heart attack Mother     Hypertension Sister      Social History     Socioeconomic History    Marital status:     Number of children: 2   Occupational History    Occupation: Retired   Tobacco Use    Smoking status: Former     Packs/day: 1.50     Years: 45.00     Pack years: 67.50     Types: Cigarettes     Quit date: 6/13/2013     Years since quitting: 10.0     Passive exposure: Never    Smokeless tobacco: Never   Substance and Sexual Activity    Alcohol use: No     Alcohol/week: 0.0 standard drinks    Drug use: No    Sexual activity: Not Currently     Social Determinants of Health     Financial Resource Strain: Low Risk     Difficulty  of Paying Living Expenses: Not hard at all   Food Insecurity: No Food Insecurity    Worried About Running Out of Food in the Last Year: Never true    Ran Out of Food in the Last Year: Never true   Transportation Needs: No Transportation Needs    Lack of Transportation (Medical): No    Lack of Transportation (Non-Medical): No   Physical Activity: Sufficiently Active    Days of Exercise per Week: 5 days    Minutes of Exercise per Session: 30 min   Stress: Stress Concern Present    Feeling of Stress : To some extent   Social Connections: Moderately Isolated    Frequency of Communication with Friends and Family: Once a week    Frequency of Social Gatherings with Friends and Family: Once a week    Attends Baptism Services: 1 to 4 times per year    Active Member of Clubs or Organizations: No    Marital Status:    Housing Stability: Unknown    Unable to Pay for Housing in the Last Year: No    Unstable Housing in the Last Year: No     Review of patient's allergies indicates:   Allergen Reactions    Chantix [varenicline]      Tongue swelling      Wellbutrin [bupropion hcl]      Tongue swelling         Review of Systems   Constitutional:  Positive for fatigue.   Skin:  Positive for wound.   Neurological:  Positive for weakness.     Physical Exam:     Initial Vitals [06/29/23 1741]   BP Pulse Resp Temp SpO2   (!) 109/51 107 14 98.8 °F (37.1 °C) 99 %      MAP       --         Physical Exam    Nursing note and vitals reviewed.  Constitutional: She appears well-developed and well-nourished. She is not diaphoretic. No distress.   Appears elderly, in no distress.    HENT:   Head: Normocephalic and atraumatic.   Mouth/Throat: Oropharynx is clear and moist.   Eyes: EOM are normal. Pupils are equal, round, and reactive to light.   Neck: No tracheal deviation present.   Cardiovascular:  Normal rate, regular rhythm, normal heart sounds and intact distal pulses.     Exam reveals decreased pulses.       R DP and PT biphasic  doppler signals present.    Pulmonary/Chest: Breath sounds normal. No stridor. No respiratory distress. She has no wheezes.   Abdominal: Abdomen is soft. Bowel sounds are normal. She exhibits no distension and no mass. There is no abdominal tenderness.   Musculoskeletal:         General: No edema. Normal range of motion.        Back:       Comments: Approximately 5 x 5 cm sacral decubitus ulcer involving skin and muscle, with brown drainage noted.     Distal tips of multiple toes of R foot with darkened discoloration.      Neurological: She is alert and oriented to person, place, and time. She has normal strength. She is not disoriented. No cranial nerve deficit or sensory deficit. She exhibits normal muscle tone. GCS eye subscore is 4. GCS verbal subscore is 5. GCS motor subscore is 6.   Skin: Skin is warm and dry. Capillary refill takes less than 2 seconds. No pallor.   Psychiatric: She has a normal mood and affect. Her behavior is normal. Thought content normal.       ED Course:   Procedures    Medical Decision Making:    History Acquisition:   Additional historians utilized:  Family at bedside, see HPI    Prior medical records were reviewed:   Patient admitted 5/6 to 5/16 for critical limb ischemia, required PTA of superficial femoral artery, also required transfusion for anemia.  Discharged with home health and PT/OT.  Seen by vascular surgery 6/12 and doing well at that time, plan for ultrasound and JOSE in 2 months, continue ASA, Plavix, statin.      The patient's list of active medical problems, social history, medications, and allergies as documented has been reviewed.     Differential Diagnoses:   Based on available information and initial assessment, Differential Diagnosis includes, but is not limited to:  CVA/TIA, vertigo, anemia/blood loss, cardiogenic shock, arrhythmia, orthostatic hypotension, dehydration, medication side effect, vitamin deficiency, liver disease, hypothyroidism, drug  intoxication/withdrawal, metabolic derangement.        EKG:   EKG interpretation by ED attending physician:  Artifact present, inhibiting interpretation.   Appears to be underlying NSR, without acute ischemia.  Compared with prior EKG dated 05/2016, rate has increased.      Labs:     Labs Reviewed   CBC W/ AUTO DIFFERENTIAL - Abnormal; Notable for the following components:       Result Value    RBC 3.08 (*)     Hemoglobin 8.1 (*)     Hematocrit 26.0 (*)     MCH 26.3 (*)     MCHC 31.2 (*)     RDW 15.9 (*)     Gran # (ANC) 9.9 (*)     Immature Grans (Abs) 0.06 (*)     Lymph # 0.5 (*)     Gran % 87.9 (*)     Lymph % 4.0 (*)     All other components within normal limits   COMPREHENSIVE METABOLIC PANEL - Abnormal; Notable for the following components:    CO2 20 (*)     Glucose 137 (*)     BUN 32 (*)     Creatinine 1.5 (*)     Calcium 8.6 (*)     Albumin 2.1 (*)     Alkaline Phosphatase 169 (*)     AST 66 (*)     eGFR 36 (*)     All other components within normal limits   C-REACTIVE PROTEIN - Abnormal; Notable for the following components:    .2 (*)     All other components within normal limits   CK - Abnormal; Notable for the following components:    CPK 2298 (*)     All other components within normal limits   PROTIME-INR - Abnormal; Notable for the following components:    Prothrombin Time 13.0 (*)     All other components within normal limits   LACTIC ACID, PLASMA   URINALYSIS, REFLEX TO URINE CULTURE     Independent review of the labs ordered include:   See ED course    Imaging:     Imaging Results              CT Abdomen Pelvis With Contrast (Final result)  Result time 06/29/23 20:43:54      Final result by Jose Manuel Singer DO (06/29/23 20:43:54)                   Impression:      1. Sacral decubitus ulcer with underlying soft tissue gas and an intramuscular abscess in the medial aspect of the right gluteus joni muscle.  No CT evidence of acute osteomyelitis.  2. Soft tissue thickening and dystrophic  calcification overlying the left greater trochanter.  3. Hepatic cirrhosis with moderate abdominopelvic ascites which can be seen with portal hypertension.  4. Dilated intrahepatic biliary ducts in the left hepatic lobe.  5. Severe atherosclerosis with stenosis of the infrarenal abdominal aorta.  Aorto bi-iliac stent in place.  6. Solitary right kidney.      Electronically signed by: Jose Manuel Singer  Date:    06/29/2023  Time:    20:43               Narrative:    EXAMINATION:  CT ABDOMEN PELVIS WITH CONTRAST    CLINICAL HISTORY:  Abdominal abscess/infection suspected;    TECHNIQUE:  Axial CT images with sagittal and coronal reformats were obtained of the abdomen and pelvis from the hemidiaphragms through the symphysis pubis after the administration of 75mL Omnipaque 350.    COMPARISON:  CT of the abdomen and pelvis from 05/05/2023.    FINDINGS:  Lung Bases: Fibrotic changes or atelectasis noted in the left lower lobe.  The lung bases are otherwise clear.    Heart: Heart size is normal.  No pericardial effusion.    Liver: The liver demonstrates a nodular contour with dilation of the caudate lobe and left lobe, compatible with morphologic changes of hepatic cirrhosis.  The portal vasculature is patent.    Biliary tract: There are dilated intrahepatic biliary ducts in the left hepatic lobe.  Remaining bile ducts are not dilated.    Gallbladder: No radiodense gallstone. No wall thickening or pericholecystic fluid.    Pancreas: Normal. No pancreatic ductal dilatation.    Spleen: Normal size without focal lesion.    Adrenals: Unremarkable.    Kidneys and urinary collecting systems: The left kidney is surgically absent.  The right kidney demonstrates multiple vascular calcifications in the renal hilum.  No definite nephrolithiasis.  No hydronephrosis.  There is a too small to characterize hypodensity in the right kidney lower pole.    Lymph nodes: None enlarged.    Stomach and bowel: The stomach is normal.  Loops of small  and large bowel are normal in caliber without evidence for inflammation or obstruction.    Peritoneum and mesentery: There is moderate abdominopelvic ascites.  No free air.    Vasculature: There is severe atherosclerosis.  There is stenosis of the infrarenal abdominal aorta (series 2, image 72).  There is an aorto bi-iliac stent in place.    Urinary bladder: No wall thickening.    Reproductive organs: The uterus is unremarkable.    Body wall: There is a sacral/coccygeal decubitus ulcer with soft tissue thickening, calcifications, and soft tissue gas and a hypoattenuating focus within the medial aspect the right gluteal joni muscle, compatible with an intramuscular abscess.  This measures approximately 3.8 x 1.8 cm (series 2, image 133).  There is soft tissue thickening overlying the left greater trochanter with dystrophic calcification.  Mild body wall anasarca.    Musculoskeletal: No aggressive osseous lesion.  There is no CT evidence acute osteomyelitis.  There is a subacute or remote fracture the 1st coccygeal segment, stable in appearance.                                       CT Head Without Contrast (Final result)  Result time 06/29/23 20:29:32      Final result by Jose Manuel Singer DO (06/29/23 20:29:32)                   Impression:      No acute intracranial abnormality.    Chronic microvascular ischemic changes.    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      Electronically signed by: Jose Manuel Singer  Date:    06/29/2023  Time:    20:29               Narrative:    EXAMINATION:  CT HEAD WITHOUT CONTRAST    CLINICAL HISTORY:  Head trauma, minor (Age >= 65y);    TECHNIQUE:  Low dose axial CT images obtained throughout the head without intravenous contrast. Sagittal and coronal reconstructions were performed.    COMPARISON:  CT head from 05/29/2016.    FINDINGS:  Ventricles and sulci are normal in size for age without evidence of hydrocephalus. No extra-axial blood or fluid collections.  There is  hypoattenuation the supratentorial white matter compatible with chronic microvascular ischemic changes.  There is a small hypodensity in the right caudate compatible with a remote lacunar infarction.  There are several punctate calcifications in the right cerebral hemisphere which may be related to a remote infectious or inflammatory etiology.  No parenchymal mass, hemorrhage, edema or major vascular distribution infarct.    No calvarial fracture.  The scalp is unremarkable.  Bilateral paranasal sinuses and mastoid air cells are clear.                                       X-Ray Forearm Right (Final result)  Result time 06/29/23 20:12:51      Final result by Khanh Butcher MD (06/29/23 20:12:51)                   Impression:      No acute radiographic abnormality.      Electronically signed by: Khanh Butcher  Date:    06/29/2023  Time:    20:12               Narrative:    EXAMINATION:  XR FOREARM RIGHT    CLINICAL HISTORY:  Unspecified fall, initial encounter    TECHNIQUE:  AP and lateral views of the right forearm were performed.    COMPARISON:  None    FINDINGS:  No acute fracture, subluxation or dislocation.  No mass or foreign body.  No significant arthropathy.  Vascular calcifications.                                       X-Ray Elbow Complete Right (Final result)  Result time 06/29/23 20:15:18      Final result by Khanh Butcher MD (06/29/23 20:15:18)                   Impression:      No acute radiographic abnormality.      Electronically signed by: Khanh Butcher  Date:    06/29/2023  Time:    20:15               Narrative:    EXAMINATION:  XR ELBOW COMPLETE 3 VIEW RIGHT    CLINICAL HISTORY:  . Unspecified fall, initial encounter    TECHNIQUE:  AP, lateral, and oblique views of the right elbow were performed.    COMPARISON:  None    FINDINGS:  No acute fracture, subluxation or dislocation.  No mass or foreign body.  No significant joint effusion or hemarthrosis.  No osseous destruction.                                     X-Rays:   Independently Interpreted Readings:   Head CT: CT head independently interpreted: no intracranial hemorrhage, mass effect, or midline shift.  Agree with radiologist interpretation.      Abdomen: CT A/P independently interpreted: no high-grade bowel obstruction or free air. Sacral wound with intramuscular abscess.   Agree with radiologist interpretation.      Other Readings:  X-ray elbow and forearm independently interpreted: no fracture or dislocation.  Agree with radiologist interpretation.       Additional Consideration:   Additional testing considered during clinical course: none    Social determinants of health considered during development of treatment plan include: poor access to care    Current co-morbidities considered which impacted clinical decision making: HTN, HLD, CAD, DM, CLI, cirrhosis    Case discussed with additional provider: [facility] contacted for admission and further management of infected sacral ulcer    Medications   sodium chloride 0.9% flush 10 mL (has no administration in time range)   naloxone 0.4 mg/mL injection 0.02 mg (has no administration in time range)   glucagon (human recombinant) injection 1 mg (has no administration in time range)   acetaminophen tablet 650 mg (has no administration in time range)   polyethylene glycol packet 17 g (17 g Oral Given 6/30/23 1025)   ondansetron injection 4 mg (has no administration in time range)   prochlorperazine injection Soln 5 mg (has no administration in time range)   melatonin tablet 6 mg (has no administration in time range)   simethicone chewable tablet 80 mg (has no administration in time range)   dextrose 40 % gel 15,000 mg (has no administration in time range)   dextrose 40 % gel 30,000 mg (has no administration in time range)   dextrose 10% bolus 125 mL 125 mL (has no administration in time range)   dextrose 10% bolus 250 mL 250 mL (has no administration in time range)   aspirin chewable tablet 81 mg (81 mg  Oral Given 6/30/23 1024)   atorvastatin tablet 80 mg (80 mg Oral Given 6/30/23 1024)   carvediloL tablet 25 mg (25 mg Oral Given 6/30/23 1024)   clopidogreL tablet 75 mg (75 mg Oral Given 6/30/23 1024)   ezetimibe tablet 10 mg (10 mg Oral Given 6/30/23 1024)   furosemide tablet 40 mg (40 mg Oral Given 6/30/23 1024)   NIFEdipine 24 hr tablet 30 mg (30 mg Oral Given 6/30/23 1024)   enoxaparin injection 40 mg (40 mg Subcutaneous Given 6/30/23 1025)   vancomycin - pharmacy to dose (has no administration in time range)   piperacillin-tazobactam (ZOSYN) 4.5 g in dextrose 5 % in water (D5W) 5 % 100 mL IVPB (MB+) (has no administration in time range)   vancomycin 1,500 mg in dextrose 5 % (D5W) 250 mL IVPB (Vial-Mate) (0 mg Intravenous Stopped 6/29/23 2130)   piperacillin-tazobactam (ZOSYN) 4.5 g in dextrose 5 % in water (D5W) 5 % 100 mL IVPB (MB+) (0 g Intravenous Stopped 6/29/23 2020)   iohexoL (OMNIPAQUE 350) injection 75 mL (75 mLs Intravenous Given 6/29/23 2013)   LIDOcaine HCL 10 mg/ml (1%) injection (5 mLs Other Given 6/30/23 0939)        ED Course as of 06/30/23 1058   Thu Jun 29, 2023 2121 SpO2: 99 % [SS]   2121 Resp: 14 [SS]   2121 Pulse: 107 [SS]   2121 Temp: 98.8 °F (37.1 °C) [SS]   2121 BP(!): 109/51  Mildly tachycardic and hypotensive on arrival.  [SS]   2121 Lactic Acid, Plasma  WNL [SS]   2121 CBC auto differential(!)  Chronic anemia, at baseline [SS]   2122 Comprehensive metabolic panel(!)  Cr stable  [SS]   2122 C-reactive protein(!)  Elevated [SS]   2122 CK(!)  Elevated  [SS]   2122 Protime-INR(!)  Unremarkable  [SS]      ED Course User Index  [SS] Rafael Whiteside MD                     Clinical Impression:       ICD-10-CM ICD-9-CM   1. Infected ulcer of skin, with fat layer exposed  L98.492 707.9    L08.9    2. Weakness  R53.1 780.79   3. Fall  W19.XXXA E888.9   4. Chest pain  R07.9 786.50   5. Dry gangrene  I96 785.4   6. Sacral ulcer, with fat layer exposed  L98.422 707.8   7. Traumatic  rhabdomyolysis, initial encounter  T79.6XXA 958.6         Follow-up Information    None          ED Disposition Condition    Observation Stable               Rfaael Whiteside MD  06/30/23 105

## 2023-06-29 NOTE — Clinical Note
Diagnosis: Infected ulcer of skin, with fat layer exposed [[id number]]   Future Attending Provider: DUSTIN NIX [[id number]]   Admitting Provider:: DUSTIN NIX [[id number]]

## 2023-06-30 PROBLEM — N17.9 ACUTE RENAL FAILURE SUPERIMPOSED ON STAGE 4 CHRONIC KIDNEY DISEASE: Status: RESOLVED | Noted: 2018-01-16 | Resolved: 2023-06-30

## 2023-06-30 PROBLEM — N13.9 ACUTE URINARY OBSTRUCTION: Status: RESOLVED | Noted: 2023-05-12 | Resolved: 2023-06-30

## 2023-06-30 PROBLEM — N18.4 ACUTE RENAL FAILURE SUPERIMPOSED ON STAGE 4 CHRONIC KIDNEY DISEASE: Status: RESOLVED | Noted: 2018-01-16 | Resolved: 2023-06-30

## 2023-06-30 PROBLEM — Z71.89 ACP (ADVANCE CARE PLANNING): Status: ACTIVE | Noted: 2023-06-30

## 2023-06-30 PROBLEM — L89.314 STAGE IV PRESSURE ULCER OF RIGHT BUTTOCK: Status: ACTIVE | Noted: 2023-06-30

## 2023-06-30 PROBLEM — N18.32 STAGE 3B CHRONIC KIDNEY DISEASE: Status: ACTIVE | Noted: 2023-06-30

## 2023-06-30 LAB
ALBUMIN SERPL BCP-MCNC: 2 G/DL (ref 3.5–5.2)
ALP SERPL-CCNC: 148 U/L (ref 55–135)
ALT SERPL W/O P-5'-P-CCNC: 22 U/L (ref 10–44)
ANION GAP SERPL CALC-SCNC: 11 MMOL/L (ref 8–16)
AST SERPL-CCNC: 56 U/L (ref 10–40)
BASOPHILS # BLD AUTO: 0 K/UL (ref 0–0.2)
BASOPHILS NFR BLD: 0 % (ref 0–1.9)
BILIRUB SERPL-MCNC: 0.6 MG/DL (ref 0.1–1)
BUN SERPL-MCNC: 28 MG/DL (ref 8–23)
CALCIUM SERPL-MCNC: 8.3 MG/DL (ref 8.7–10.5)
CHLORIDE SERPL-SCNC: 108 MMOL/L (ref 95–110)
CO2 SERPL-SCNC: 15 MMOL/L (ref 23–29)
CREAT SERPL-MCNC: 1.3 MG/DL (ref 0.5–1.4)
DIFFERENTIAL METHOD: ABNORMAL
EOSINOPHIL # BLD AUTO: 0 K/UL (ref 0–0.5)
EOSINOPHIL NFR BLD: 0.1 % (ref 0–8)
ERYTHROCYTE [DISTWIDTH] IN BLOOD BY AUTOMATED COUNT: 16.1 % (ref 11.5–14.5)
EST. GFR  (NO RACE VARIABLE): 43 ML/MIN/1.73 M^2
GLUCOSE SERPL-MCNC: 117 MG/DL (ref 70–110)
HCT VFR BLD AUTO: 26.2 % (ref 37–48.5)
HGB BLD-MCNC: 8 G/DL (ref 12–16)
IMM GRANULOCYTES # BLD AUTO: 0.08 K/UL (ref 0–0.04)
IMM GRANULOCYTES NFR BLD AUTO: 0.7 % (ref 0–0.5)
LYMPHOCYTES # BLD AUTO: 0.5 K/UL (ref 1–4.8)
LYMPHOCYTES NFR BLD: 4.4 % (ref 18–48)
MCH RBC QN AUTO: 25.8 PG (ref 27–31)
MCHC RBC AUTO-ENTMCNC: 30.5 G/DL (ref 32–36)
MCV RBC AUTO: 85 FL (ref 82–98)
MONOCYTES # BLD AUTO: 0.9 K/UL (ref 0.3–1)
MONOCYTES NFR BLD: 7.4 % (ref 4–15)
NEUTROPHILS # BLD AUTO: 10.3 K/UL (ref 1.8–7.7)
NEUTROPHILS NFR BLD: 87.4 % (ref 38–73)
NRBC BLD-RTO: 0 /100 WBC
PLATELET # BLD AUTO: 244 K/UL (ref 150–450)
PMV BLD AUTO: 9.7 FL (ref 9.2–12.9)
POTASSIUM SERPL-SCNC: 4.1 MMOL/L (ref 3.5–5.1)
PROT SERPL-MCNC: 6 G/DL (ref 6–8.4)
RBC # BLD AUTO: 3.1 M/UL (ref 4–5.4)
SODIUM SERPL-SCNC: 134 MMOL/L (ref 136–145)
VANCOMYCIN SERPL-MCNC: 12.7 UG/ML
WBC # BLD AUTO: 11.81 K/UL (ref 3.9–12.7)

## 2023-06-30 PROCEDURE — 80053 COMPREHEN METABOLIC PANEL: CPT | Performed by: STUDENT IN AN ORGANIZED HEALTH CARE EDUCATION/TRAINING PROGRAM

## 2023-06-30 PROCEDURE — 36415 COLL VENOUS BLD VENIPUNCTURE: CPT | Performed by: STUDENT IN AN ORGANIZED HEALTH CARE EDUCATION/TRAINING PROGRAM

## 2023-06-30 PROCEDURE — 99222 PR INITIAL HOSPITAL CARE,LEVL II: ICD-10-PCS | Mod: ,,, | Performed by: STUDENT IN AN ORGANIZED HEALTH CARE EDUCATION/TRAINING PROGRAM

## 2023-06-30 PROCEDURE — 25000003 PHARM REV CODE 250: Performed by: STUDENT IN AN ORGANIZED HEALTH CARE EDUCATION/TRAINING PROGRAM

## 2023-06-30 PROCEDURE — G0378 HOSPITAL OBSERVATION PER HR: HCPCS

## 2023-06-30 PROCEDURE — 25000003 PHARM REV CODE 250: Performed by: INTERNAL MEDICINE

## 2023-06-30 PROCEDURE — 85025 COMPLETE CBC W/AUTO DIFF WBC: CPT | Performed by: STUDENT IN AN ORGANIZED HEALTH CARE EDUCATION/TRAINING PROGRAM

## 2023-06-30 PROCEDURE — 25000003 PHARM REV CODE 250: Performed by: NURSE PRACTITIONER

## 2023-06-30 PROCEDURE — 96372 THER/PROPH/DIAG INJ SC/IM: CPT | Performed by: STUDENT IN AN ORGANIZED HEALTH CARE EDUCATION/TRAINING PROGRAM

## 2023-06-30 PROCEDURE — 80202 ASSAY OF VANCOMYCIN: CPT | Performed by: STUDENT IN AN ORGANIZED HEALTH CARE EDUCATION/TRAINING PROGRAM

## 2023-06-30 PROCEDURE — 63600175 PHARM REV CODE 636 W HCPCS: Performed by: NURSE PRACTITIONER

## 2023-06-30 PROCEDURE — 96366 THER/PROPH/DIAG IV INF ADDON: CPT

## 2023-06-30 PROCEDURE — 99222 1ST HOSP IP/OBS MODERATE 55: CPT | Mod: ,,, | Performed by: STUDENT IN AN ORGANIZED HEALTH CARE EDUCATION/TRAINING PROGRAM

## 2023-06-30 PROCEDURE — 63600175 PHARM REV CODE 636 W HCPCS: Performed by: STUDENT IN AN ORGANIZED HEALTH CARE EDUCATION/TRAINING PROGRAM

## 2023-06-30 RX ORDER — ATORVASTATIN CALCIUM 40 MG/1
80 TABLET, FILM COATED ORAL DAILY
Status: DISCONTINUED | OUTPATIENT
Start: 2023-06-30 | End: 2023-07-03 | Stop reason: HOSPADM

## 2023-06-30 RX ORDER — SODIUM CHLORIDE 9 MG/ML
INJECTION, SOLUTION INTRAVENOUS CONTINUOUS
Status: DISCONTINUED | OUTPATIENT
Start: 2023-06-30 | End: 2023-06-30

## 2023-06-30 RX ORDER — CLOPIDOGREL BISULFATE 75 MG/1
75 TABLET ORAL DAILY
Status: DISCONTINUED | OUTPATIENT
Start: 2023-06-30 | End: 2023-06-30

## 2023-06-30 RX ORDER — ENOXAPARIN SODIUM 100 MG/ML
40 INJECTION SUBCUTANEOUS DAILY
Status: DISCONTINUED | OUTPATIENT
Start: 2023-06-30 | End: 2023-07-01

## 2023-06-30 RX ORDER — SODIUM BICARBONATE 650 MG/1
650 TABLET ORAL 3 TIMES DAILY
Status: DISCONTINUED | OUTPATIENT
Start: 2023-06-30 | End: 2023-07-03 | Stop reason: HOSPADM

## 2023-06-30 RX ORDER — CLOPIDOGREL BISULFATE 75 MG/1
75 TABLET ORAL DAILY
Status: DISCONTINUED | OUTPATIENT
Start: 2023-07-01 | End: 2023-07-03 | Stop reason: HOSPADM

## 2023-06-30 RX ORDER — NAPROXEN SODIUM 220 MG/1
81 TABLET, FILM COATED ORAL DAILY
Status: DISCONTINUED | OUTPATIENT
Start: 2023-06-30 | End: 2023-06-30

## 2023-06-30 RX ORDER — CARVEDILOL 25 MG/1
25 TABLET ORAL 2 TIMES DAILY
Status: DISCONTINUED | OUTPATIENT
Start: 2023-06-30 | End: 2023-07-03 | Stop reason: HOSPADM

## 2023-06-30 RX ORDER — LIDOCAINE HYDROCHLORIDE 10 MG/ML
INJECTION INFILTRATION; PERINEURAL
Status: COMPLETED | OUTPATIENT
Start: 2023-06-30 | End: 2023-06-30

## 2023-06-30 RX ORDER — NAPROXEN SODIUM 220 MG/1
81 TABLET, FILM COATED ORAL DAILY
Status: DISCONTINUED | OUTPATIENT
Start: 2023-07-01 | End: 2023-07-03 | Stop reason: HOSPADM

## 2023-06-30 RX ORDER — EZETIMIBE 10 MG/1
10 TABLET ORAL DAILY
Status: DISCONTINUED | OUTPATIENT
Start: 2023-06-30 | End: 2023-07-03 | Stop reason: HOSPADM

## 2023-06-30 RX ORDER — NIFEDIPINE 30 MG/1
30 TABLET, EXTENDED RELEASE ORAL DAILY
Status: DISCONTINUED | OUTPATIENT
Start: 2023-06-30 | End: 2023-07-03 | Stop reason: HOSPADM

## 2023-06-30 RX ORDER — FUROSEMIDE 40 MG/1
40 TABLET ORAL DAILY
Status: DISCONTINUED | OUTPATIENT
Start: 2023-06-30 | End: 2023-07-01

## 2023-06-30 RX ADMIN — CARVEDILOL 25 MG: 25 TABLET, FILM COATED ORAL at 08:06

## 2023-06-30 RX ADMIN — EZETIMIBE 10 MG: 10 TABLET ORAL at 10:06

## 2023-06-30 RX ADMIN — LIDOCAINE HYDROCHLORIDE 5 ML: 10 INJECTION, SOLUTION INFILTRATION; PERINEURAL at 09:06

## 2023-06-30 RX ADMIN — SODIUM BICARBONATE 650 MG TABLET 650 MG: at 04:06

## 2023-06-30 RX ADMIN — POLYETHYLENE GLYCOL 3350 17 G: 17 POWDER, FOR SOLUTION ORAL at 10:06

## 2023-06-30 RX ADMIN — ASPIRIN 81 MG CHEWABLE TABLET 81 MG: 81 TABLET CHEWABLE at 10:06

## 2023-06-30 RX ADMIN — PIPERACILLIN AND TAZOBACTAM 4.5 G: 4; .5 INJECTION, POWDER, LYOPHILIZED, FOR SOLUTION INTRAVENOUS; PARENTERAL at 08:06

## 2023-06-30 RX ADMIN — SODIUM BICARBONATE 650 MG TABLET 650 MG: at 08:06

## 2023-06-30 RX ADMIN — ENOXAPARIN SODIUM 40 MG: 40 INJECTION SUBCUTANEOUS at 10:06

## 2023-06-30 RX ADMIN — CARVEDILOL 25 MG: 25 TABLET, FILM COATED ORAL at 10:06

## 2023-06-30 RX ADMIN — NIFEDIPINE 30 MG: 30 TABLET, FILM COATED, EXTENDED RELEASE ORAL at 10:06

## 2023-06-30 RX ADMIN — ATORVASTATIN CALCIUM 80 MG: 40 TABLET, FILM COATED ORAL at 10:06

## 2023-06-30 RX ADMIN — FUROSEMIDE 40 MG: 40 TABLET ORAL at 10:06

## 2023-06-30 RX ADMIN — CLOPIDOGREL BISULFATE 75 MG: 75 TABLET ORAL at 10:06

## 2023-06-30 NOTE — ASSESSMENT & PLAN NOTE
- ACP time: 15 minutes  - DNR per patient. Says she has lived a long time and tired of illness and hospitals  - Would be OK with reversal if needed for procedure

## 2023-06-30 NOTE — BRIEF OP NOTE
Radiology Post-Procedure Note    Pre Op Diagnosis: fluid collection    Post Op Diagnosis: same    Procedure: attempted fluid collection aspiration  Procedure performed by: Svetlana Weldon MD    Written Informed Consent Obtained: Yes    Specimen Removed: NO    Estimated Blood Loss: Minimal    Findings:   Using US  guidance a 19 g sheath needle was placed into the area of concern, no fluid was able to be aspirated.     Patient tolerated procedure well.    Svetlana Weldon MD  Interventional Radiology

## 2023-06-30 NOTE — ASSESSMENT & PLAN NOTE
- Consult wound care  - Will consult IR about possible drainage of gluteal abscess  - Will hold on abx as no signs of sepsis or infection; would be best to start after possible abscess drainage

## 2023-06-30 NOTE — PROGRESS NOTES
Pharmacokinetic Initial Assessment: IV Vancomycin    Assessment/Plan:    Initiate intravenous vancomycin with loading dose of 1500 mg once with subsequent doses when random concentrations are less than 20 mcg/mL  Desired empiric serum trough concentration is 10 to 15 mcg/mL  Draw vancomycin random level on 06/30/23 at 2000.  Pharmacy will continue to follow and monitor vancomycin.      Please contact pharmacy at extension [phone] with any questions regarding this assessment.     Thank you for the consult,   Evelia Rosenthal       Patient brief summary:  Steph Monroe is a 77 y.o. female initiated on antimicrobial therapy with IV Vancomycin for treatment of suspected skin & soft tissue infection    Drug Allergies:   Review of patient's allergies indicates:   Allergen Reactions    Chantix [varenicline]      Tongue swelling      Wellbutrin [bupropion hcl]      Tongue swelling         Actual Body Weight:   61.7 kg    Renal Function:   Estimated Creatinine Clearance: 32.3 mL/min (based on SCr of 1.3 mg/dL).,     Dialysis Method (if applicable):  N/A    CBC (last 72 hours):  Recent Labs   Lab Result Units 06/29/23 1838 06/30/23  0555   WBC K/uL 11.31 11.81   Hemoglobin g/dL 8.1* 8.0*   Hematocrit % 26.0* 26.2*   Platelets K/uL 247 244   Gran % % 87.9* 87.4*   Lymph % % 4.0* 4.4*   Mono % % 7.4 7.4   Eosinophil % % 0.1 0.1   Basophil % % 0.1 0.0   Differential Method  Automated Automated       Metabolic Panel (last 72 hours):  Recent Labs   Lab Result Units 06/29/23 1838 06/30/23  0555   Sodium mmol/L 137 134*   Potassium mmol/L 4.4 4.1   Chloride mmol/L 107 108   CO2 mmol/L 20* 15*   Glucose mg/dL 137* 117*   BUN mg/dL 32* 28*   Creatinine mg/dL 1.5* 1.3   Albumin g/dL 2.1* 2.0*   Total Bilirubin mg/dL 0.6 0.6   Alkaline Phosphatase U/L 169* 148*   AST U/L 66* 56*   ALT U/L 25 22       Drug levels (last 3 results):  No results for input(s): VANCOMYCINRA, VANCORANDOM, VANCOMYCINPE, VANCOPEAK, VANCOMYCINTR, VANCOTROUGH  in the last 72 hours.    Microbiologic Results:  Microbiology Results (last 7 days)       Procedure Component Value Units Date/Time    Culture, Anaerobe [[id number]]     Order Status: No result Specimen: Abscess from Buttocks, Left     Aerobic culture [[id number]]     Order Status: No result Specimen: Abscess from Buttocks, Left     Gram stain [[id number]]     Order Status: No result Specimen: Abscess from Buttocks, Left

## 2023-06-30 NOTE — ASSESSMENT & PLAN NOTE
- No signs of pulm edema or overt volume overload  - Does have some peripheral edema  - Continue PO lasix  - Continue HTN control

## 2023-06-30 NOTE — PROGRESS NOTES
[facility]           Pharmacy        Current Drug Shortage     Due to national backorder and [facility] is critically low on inventory of Dextrose 50% (D50) Syringes and Vials, pharmacy has automatically switched from D50% to D10% IVPB at the equivalent dose until resolution of the shortage per P&T approved protocol.               Zurdo Collins, PharmD  [phone]

## 2023-06-30 NOTE — HPI
76 yo female with a Pmhx of CKD Stage 4, HTN, HLD, PAD s/p 5/9/23-- R CFA endarterectomy and Right iliac artery stenting  (7x39 VBX, 7x80mm Lifestent) here for weakness and falls.    Patient states she has not had any claudication symptoms since her procedure as above.  However, she has been feeling weak in the legs and has fallen a couple times.  Most recently she fell yesterday and could not get up.  This prompted EMS to arrive and take her to the emergency department.  At this time she was found to have a significant, albeit healing, sacral decubitus ulcer.  And she also seemed very deconditioned.  Denies any chest pain, shortness a breath, syncope, dizziness, claudication symptoms, leg numbness or tingling.    CT imaging showed no osteomyelitis but concerning gluteal abscess. Admitted for observation for wound care consult and possible surgery evaluation vs IR of the wound if needed.

## 2023-06-30 NOTE — PLAN OF CARE
Problem: Adult Inpatient Plan of Care  Goal: Plan of Care Review  Outcome: Ongoing, Progressing       VN: Vital signs, labs, progress notes and care plan reviewed.

## 2023-06-30 NOTE — H&P
[facility]  History & Physical    Patient Name: Steph Monroe  MRN: [id number]  Patient Class: OP- Observation  Admission Date: 6/29/2023  Attending Physician: Saige Arce MD   Primary Care Provider: Mane Carmona MD         Patient information was obtained from patient, past medical records and ER records.     Subjective:     Principal Problem:<principal problem not specified>    Chief Complaint:   Chief Complaint   Patient presents with    Fall     Pt has been having frequent falls and causing skin tears all over.  Pt has a decubitus to her lower back with a pungent odor.  Pt also has black toes on her right foot.         HPI: 76 yo female with a Pmhx of CKD Stage 4, HTN, HLD, PAD s/p 5/9/23-- R CFA endarterectomy and Right iliac artery stenting  (7x39 VBX, 7x80mm Lifestent) here for weakness and falls.    Patient states she has not had any claudication symptoms since her procedure as above.  However, she has been feeling weak in the legs and has fallen a couple times.  Most recently she fell yesterday and could not get up.  This prompted EMS to arrive and take her to the emergency department.  At this time she was found to have a significant, albeit healing, sacral decubitus ulcer.  And she also seemed very deconditioned.  Denies any chest pain, shortness a breath, syncope, dizziness, claudication symptoms, leg numbness or tingling.    CT imaging showed no osteomyelitis but concerning gluteal abscess. Admitted for observation for wound care consult and possible surgery evaluation vs IR of the wound if needed.      Past Medical History:   Diagnosis Date    Cancer     rectum    CKD (chronic kidney disease)     Diabetes mellitus, type 2     Hyperlipidemia     Hypertension     Neutropenic fever 5/27/2016    PVD (peripheral vascular disease)        Past Surgical History:   Procedure Laterality Date    ANGIOGRAM, LOWER ARTERIAL, UNILATERAL Right 5/5/2023    Procedure: Angiogram, Lower  Arterial, Unilateral;  Surgeon: Remington Ribeiro MD;  Location: [facility] CATH LAB/EP;  Service: Cardiology;  Laterality: Right;    ANGIOGRAPHY OF LOWER EXTREMITY N/A 5/15/2023    Procedure: Angiogram Extremity Unilateral;  Surgeon: FERNANDO Cagle II, MD;  Location: [address];  Service: Vascular;  Laterality: N/A;  22.4 min  183.31 mGy  23.5353 Gy.cm  17ml Dye     COLONOSCOPY N/A 3/28/2016    Procedure: COLONOSCOPY;  Surgeon: Mitul Buitrago Jr., MD;  Location: [facility] ENDO;  Service: Endoscopy;  Laterality: N/A;    ENDARTERECTOMY OF FEMORAL ARTERY Right 5/9/2023    Procedure: ENDARTERECTOMY, FEMORAL;  Surgeon: FERNANDO Cagle II, MD;  Location: [address];  Service: Vascular;  Laterality: Right;  contrast 12ml  Flouro time 11.1 min  mgy 217.83  Gycm2 26.3570    EYE SURGERY Left     cataract removal with lens implant.    FEMORAL ARTERY STENT  before 2010    PERIPHERAL ANGIOGRAPHY N/A 5/3/2023    Procedure: Peripheral angiography;  Surgeon: Rasheeda Ny MD;  Location: [facility] CATH LAB/EP;  Service: Cardiology;  Laterality: N/A;    PTA, SUPERFICIAL FEMORAL ARTERY Right 5/15/2023    Procedure: PTA, SUPERFICIAL FEMORAL ARTERY;  Surgeon: FERNANDO Cagle II, MD;  Location: [address];  Service: Vascular;  Laterality: Right;    RENAL ARTERY STENT  2010    STENT, ILIAC ARTERY Right 5/9/2023    Procedure: STENT, ILIAC ARTERY;  Surgeon: FERNANDO Cagle II, MD;  Location: [address];  Service: Vascular;  Laterality: Right;       Review of patient's allergies indicates:   Allergen Reactions    Chantix [varenicline]      Tongue swelling      Wellbutrin [bupropion hcl]      Tongue swelling         No current facility-administered medications on file prior to encounter.     Current Outpatient Medications on File Prior to Encounter   Medication Sig    aspirin 81 MG Chew Take 1 tablet (81 mg total) by mouth once daily.    atorvastatin (LIPITOR) 80 MG tablet Take 1 tablet (80 mg total) by mouth once daily.     carvediloL (COREG) 25 MG tablet Take 1 tablet by mouth 2 (two) times daily.    clopidogreL (PLAVIX) 75 mg tablet Take 75 mg by mouth once daily.    sodium bicarbonate 650 MG tablet Take 1 tablet (650 mg total) by mouth 3 (three) times daily.    ezetimibe (ZETIA) 10 mg tablet Take 10 mg by mouth once daily.    furosemide (LASIX) 20 MG tablet Take 20 mg by mouth once daily.    NIFEdipine (PROCARDIA-XL) 30 MG (OSM) 24 hr tablet Take 1 tablet (30 mg total) by mouth once daily.     Family History       Problem Relation (Age of Onset)    Diabetes Father    Heart attack Mother    Hypertension Sister          Tobacco Use    Smoking status: Former     Packs/day: 1.50     Years: 45.00     Pack years: 67.50     Types: Cigarettes     Quit date: 6/13/2013     Years since quitting: 10.0     Passive exposure: Never    Smokeless tobacco: Never   Substance and Sexual Activity    Alcohol use: No     Alcohol/week: 0.0 standard drinks    Drug use: No    Sexual activity: Not Currently     Review of Systems   All other systems reviewed and are negative.  Objective:     Vital Signs (Most Recent):  Temp: 96.5 °F (35.8 °C) (06/30/23 0600)  Pulse: 96 (06/30/23 0600)  Resp: 18 (06/30/23 0600)  BP: (!) 177/70 (06/30/23 0600)  SpO2: 98 % (06/30/23 0600) Vital Signs (24h Range):  Temp:  [96.5 °F (35.8 °C)-98.8 °F (37.1 °C)] 96.5 °F (35.8 °C)  Pulse:  [] 96  Resp:  [14-18] 18  SpO2:  [96 %-100 %] 98 %  BP: (109-177)/(51-76) 177/70     Weight: 61.7 kg (136 lb 0.4 oz)  Body mass index is 24.88 kg/m².     Physical Exam  Vitals and nursing note reviewed.   Constitutional:       General: She is not in acute distress.     Appearance: She is normal weight.   HENT:      Head: Normocephalic and atraumatic.      Mouth/Throat:      Mouth: Mucous membranes are moist.   Eyes:      Extraocular Movements: Extraocular movements intact.      Pupils: Pupils are equal, round, and reactive to light.   Cardiovascular:      Rate and Rhythm: Normal rate and  regular rhythm.      Pulses:           Femoral pulses are 2+ on the right side and 2+ on the left side.       Dorsalis pedis pulses are 1+ on the right side and 1+ on the left side.        Posterior tibial pulses are detected w/ Doppler on the right side and 1+ on the left side.      Heart sounds: Murmur heard.   Pulmonary:      Effort: Pulmonary effort is normal.      Breath sounds: Normal breath sounds. No wheezing or rales.   Abdominal:      General: Abdomen is flat. Bowel sounds are normal. There is no distension.      Palpations: Abdomen is soft.      Tenderness: There is no abdominal tenderness.   Musculoskeletal:      Right lower leg: Edema present.      Left lower leg: Edema present.   Skin:     General: Skin is warm.      Capillary Refill: Capillary refill takes less than 2 seconds.   Neurological:      General: No focal deficit present.      Mental Status: She is alert and oriented to person, place, and time. Mental status is at baseline.                CRANIAL NERVES     CN III, IV, VI   Pupils are equal, round, and reactive to light.     Significant Labs: All pertinent labs within the past 24 hours have been reviewed.    Significant Imaging: I have reviewed all pertinent imaging results/findings within the past 24 hours.    Assessment/Plan:     Decubitus ulcer of sacral region, unstageable  - Consult wound care  - Will consult IR about possible drainage of gluteal abscess  - Will hold on abx as no signs of sepsis or infection; would be best to start after possible abscess drainage      ACP (advance care planning)  - ACP time: 15 minutes  - DNR per patient. Says she has lived a long time and tired of illness and hospitals  - Would be OK with reversal if needed for procedure      Stage 3b chronic kidney disease  - Avoid nsaids or nephrotoxic drugs  - Continue PO bicarb (bicarb < 18)  - Monitor UOP      Anemia of chronic disease  - Hgb around baseline of 8  - Asymptomaitc  - Likely 2/2 CKD  -  Monitor      Chronic heart failure with preserved ejection fraction  - No signs of pulm edema or overt volume overload  - Does have some peripheral edema  - Continue PO lasix  - Continue HTN control      Coronary artery disease involving native coronary artery of native heart without angina pectoris  - Continue ASA, statin  - On BB  - Monitor      PAD (peripheral artery disease)  - Plavix, statin  - S/p 5/9/23-- R CFA endarterectomy and Right iliac artery stenting  (7x39 VBX, 7x80mm Lifestent)      HLD (hyperlipidemia)  - Continue lipitor      Essential hypertension  - Continue nifedipine and coreg        VTE Risk Mitigation (From admission, onward)           Ordered     enoxaparin injection 30 mg  Daily         06/29/23 2256     IP VTE HIGH RISK PATIENT  Once         06/29/23 2256     Place sequential compression device  Until discontinued         06/29/23 2256                       On 06/30/2023, patient should be placed in hospital observation services under my care.        Remington Norwood MD  Department of Hospital Medicine  [facility] - Telemetry

## 2023-06-30 NOTE — ASSESSMENT & PLAN NOTE
- Plavix, statin  - S/p 5/9/23-- R CFA endarterectomy and Right iliac artery stenting  (7x39 VBX, 7x80mm Lifestent)

## 2023-06-30 NOTE — CARE UPDATE
Unstagable Pressure Ulcer   Present on admission   IR unable to drain any fluid collection  General surgery consulted for wound debridement   -start IV vancomycin and zosyn       Elevated CK - likely from muscle breakdown of immobility and pressure ulcer   Renal function improving   Caution with IVF due to BLE edema       R foot noted ischemic toes- s/t peripheral intervention 5/2023. Follows vascular surgery - wound care on board     Bony abnormality noted upon palpation of L hip - will GABRIELLA Garcia-BC  Department of Hospital Medicine

## 2023-06-30 NOTE — CONSULTS
Patient ID: Steph Monroe is a 77 y.o. female.    Chief Complaint: Fall (Pt has been having frequent falls and causing skin tears all over.  Pt has a decubitus to her lower back with a pungent odor.  Pt also has black toes on her right foot. )      HPI:  HPI  77F admitted for weakness, falls recently. Has wound right lower sacral area for some time, unsure. Minimal pain. Eating ok, kristopher red diet.   Hx of anal SCC treated with jermaine protocol in 2016. Last seen by colorectal 6 months ago. Exam normal per them at that time.   No fevers.   Attempted IR drainage without any fluid obtained.     Review of Systems   Constitutional:  Negative for fever.   HENT:  Negative for trouble swallowing.    Respiratory:  Negative for shortness of breath.    Cardiovascular:  Negative for chest pain.   Gastrointestinal:  Negative for abdominal pain, blood in stool, nausea and vomiting.   Genitourinary:  Negative for dysuria.   Musculoskeletal:  Positive for gait problem.   Skin:  Positive for wound. Negative for rash.   Allergic/Immunologic: Negative for immunocompromised state.   Neurological:  Positive for weakness.   Hematological:  Does not bruise/bleed easily.   Psychiatric/Behavioral:  Negative for agitation.      Current Facility-Administered Medications   Medication Dose Route Frequency Provider Last Rate Last Admin    acetaminophen tablet 650 mg  650 mg Oral Q4H PRN Ruy Skinner MD        [START ON 7/1/2023] aspirin chewable tablet 81 mg  81 mg Oral Daily Aspen Pagan NP        atorvastatin tablet 80 mg  80 mg Oral Daily Remington Norwood MD   80 mg at 06/30/23 1024    carvediloL tablet 25 mg  25 mg Oral BID Remington Norwood MD   25 mg at 06/30/23 1024    [START ON 7/1/2023] clopidogreL tablet 75 mg  75 mg Oral Daily Aspen Pagan NP        dextrose 10% bolus 125 mL 125 mL  12.5 g Intravenous PRN Saige Arce MD        dextrose 10% bolus 250 mL 250 mL  25 g Intravenous PRN Saige Arce MD         dextrose 40 % gel 15,000 mg  15 g Oral PRN Saige Arce MD        dextrose 40 % gel 30,000 mg  30 g Oral PRN Saige Arce MD        enoxaparin injection 40 mg  40 mg Subcutaneous Daily Saige Arce MD   40 mg at 06/30/23 1025    ezetimibe tablet 10 mg  10 mg Oral Daily Remington Norwood MD   10 mg at 06/30/23 1024    furosemide tablet 40 mg  40 mg Oral Daily Remington Norwood MD   40 mg at 06/30/23 1024    glucagon (human recombinant) injection 1 mg  1 mg Intramuscular PRN Ruy Skinner MD        melatonin tablet 6 mg  6 mg Oral Nightly PRN Ruy Skinner MD        naloxone 0.4 mg/mL injection 0.02 mg  0.02 mg Intravenous PRN Ruy Skinner MD        NIFEdipine 24 hr tablet 30 mg  30 mg Oral Daily Remington Norwood MD   30 mg at 06/30/23 1024    ondansetron injection 4 mg  4 mg Intravenous Q8H PRN Ruy Skinner MD        piperacillin-tazobactam (ZOSYN) 4.5 g in dextrose 5 % in water (D5W) 5 % 100 mL IVPB (MB+)  4.5 g Intravenous Q8H Aspen Pagan NP   Held at 06/30/23 1200    polyethylene glycol packet 17 g  17 g Oral Daily Ruy Skinner MD   17 g at 06/30/23 1025    prochlorperazine injection Soln 5 mg  5 mg Intravenous Q6H PRN Ruy Skinner MD        simethicone chewable tablet 80 mg  1 tablet Oral QID PRN Ruy Skinner MD        sodium bicarbonate tablet 650 mg  650 mg Oral TID Aspen Pagan NP   650 mg at 06/30/23 1600    sodium chloride 0.9% flush 10 mL  10 mL Intravenous Q12H PRN Ruy Skinner MD        vancomycin - pharmacy to dose   Intravenous pharmacy to manage frequency Aspen Pagan NP           Review of patient's allergies indicates:   Allergen Reactions    Chantix [varenicline]      Tongue swelling      Wellbutrin [bupropion hcl]      Tongue swelling         Past Medical History:   Diagnosis Date    Cancer     rectum    CKD (chronic kidney disease)     Diabetes mellitus, type 2     Hyperlipidemia     Hypertension      Neutropenic fever 5/27/2016    PVD (peripheral vascular disease)        Past Surgical History:   Procedure Laterality Date    ANGIOGRAM, LOWER ARTERIAL, UNILATERAL Right 5/5/2023    Procedure: Angiogram, Lower Arterial, Unilateral;  Surgeon: Remington Ribeiro MD;  Location: [facility] CATH LAB/EP;  Service: Cardiology;  Laterality: Right;    ANGIOGRAPHY OF LOWER EXTREMITY N/A 5/15/2023    Procedure: Angiogram Extremity Unilateral;  Surgeon: FERNANDO Cagle II, MD;  [facility];  Service: Vascular;  Laterality: N/A;  22.4 min  183.31 mGy  23.5353 Gy.cm  17ml Dye     COLONOSCOPY N/A 3/28/2016    Procedure: COLONOSCOPY;  Surgeon: Mitul Buitrago Jr., MD;  Location: [facility] ENDO;  Service: Endoscopy;  Laterality: N/A;    ENDARTERECTOMY OF FEMORAL ARTERY Right 5/9/2023    Procedure: ENDARTERECTOMY, FEMORAL;  Surgeon: FERNANDO Cagle II, MD;  Location: [facility] OR [address] [place] [address];  Service: Vascular;  Laterality: Right;  contrast 12ml  Flouro time 11.1 min  mgy 217.83  Gycm2 26.3570    EYE SURGERY Left     cataract removal with lens implant.    FEMORAL ARTERY STENT  before 2010    PERIPHERAL ANGIOGRAPHY N/A 5/3/2023    Procedure: Peripheral angiography;  Surgeon: Rasheeda Ny MD;  Location: [facility] CATH LAB/EP;  Service: Cardiology;  Laterality: N/A;    PTA, SUPERFICIAL FEMORAL ARTERY Right 5/15/2023    Procedure: PTA, SUPERFICIAL FEMORAL ARTERY;  Surgeon: FERNANDO Cagle II, MD;  Location: [address];  Service: Vascular;  Laterality: Right;    RENAL ARTERY STENT  2010    STENT, ILIAC ARTERY Right 5/9/2023    Procedure: STENT, ILIAC ARTERY;  Surgeon: FERNANDO Cagle II, MD;  Location: [facility] OR [address] [place] [address];  Service: Vascular;  Laterality: Right;       Family History   Problem Relation Age of Onset    Diabetes Father     Heart attack Mother     Hypertension Sister        Social History     Socioeconomic History    Marital status:     Number of children: 2   Occupational History    Occupation: Retired   Tobacco  Use    Smoking status: Former     Packs/day: 1.50     Years: 45.00     Pack years: 67.50     Types: Cigarettes     Quit date: 6/13/2013     Years since quitting: 10.0     Passive exposure: Never    Smokeless tobacco: Never   Substance and Sexual Activity    Alcohol use: No     Alcohol/week: 0.0 standard drinks    Drug use: No    Sexual activity: Not Currently     Social Determinants of Health     Financial Resource Strain: Low Risk     Difficulty of Paying Living Expenses: Not hard at all   Food Insecurity: No Food Insecurity    Worried About Running Out of Food in the Last Year: Never true    Ran Out of Food in the Last Year: Never true   Transportation Needs: No Transportation Needs    Lack of Transportation (Medical): No    Lack of Transportation (Non-Medical): No   Physical Activity: Sufficiently Active    Days of Exercise per Week: 5 days    Minutes of Exercise per Session: 30 min   Stress: Stress Concern Present    Feeling of Stress : To some extent   Social Connections: Moderately Isolated    Frequency of Communication with Friends and Family: Once a week    Frequency of Social Gatherings with Friends and Family: Once a week    Attends [employer] Services: 1 to 4 times per year    Active Member of Clubs or Organizations: No    Marital Status:    Housing Stability: Unknown    Unable to Pay for Housing in the Last Year: No    Unstable Housing in the Last Year: No       Vitals:    06/30/23 1602   BP: (!) 127/58   Pulse: 78   Resp: 18   Temp: 96.3 °F (35.7 °C)       Physical Exam  Constitutional:       General: She is not in acute distress.     Appearance: She is well-developed.   HENT:      Head: Normocephalic and atraumatic.   Eyes:      General: No scleral icterus.  Cardiovascular:      Rate and Rhythm: Normal rate.   Pulmonary:      Effort: Pulmonary effort is normal.      Breath sounds: No stridor.   Abdominal:      General: There is no distension.      Palpations: Abdomen is soft.      Tenderness:  There is no abdominal tenderness.   Lymphadenopathy:      Cervical: No cervical adenopathy.   Skin:     General: Skin is warm.      Findings: No erythema.          Neurological:      Mental Status: She is alert and oriented to person, place, and time.   Psychiatric:         Behavior: Behavior normal.   Body mass index is 24.88 kg/m².  WBC normal  CT shows calcified tissue in right sacral wound.     Assessment & Plan:  77F with stage IV pressure wound  No signs of infection  No indication for emergent surgery

## 2023-06-30 NOTE — H&P
Interventional Radiology Consult/Pre-Procedure Note      Chief Complaint/Reason for Consult: fluid collection    History of Present Illness:  Steph Monroe is a 77 y.o. female with the PMH listed below who presents for right gluteal fluid collection aspiration.    Admission H&P reviewed.    Past Medical History:   Diagnosis Date    Cancer     rectum    CKD (chronic kidney disease)     Diabetes mellitus, type 2     Hyperlipidemia     Hypertension     Neutropenic fever 5/27/2016    PVD (peripheral vascular disease)      Past Surgical History:   Procedure Laterality Date    ANGIOGRAM, LOWER ARTERIAL, UNILATERAL Right 5/5/2023    Procedure: Angiogram, Lower Arterial, Unilateral;  Surgeon: Remington Ribeiro MD;  Location: [facility] CATH LAB/EP;  Service: Cardiology;  Laterality: Right;    ANGIOGRAPHY OF LOWER EXTREMITY N/A 5/15/2023    Procedure: Angiogram Extremity Unilateral;  Surgeon: FERNANDO Cagle II, MD;  Location: [address];  Service: Vascular;  Laterality: N/A;  22.4 min  183.31 mGy  23.5353 Gy.cm  17ml Dye     COLONOSCOPY N/A 3/28/2016    Procedure: COLONOSCOPY;  Surgeon: Mitul Buitrago Jr., MD;  Location: [facility] ENDO;  Service: Endoscopy;  Laterality: N/A;    ENDARTERECTOMY OF FEMORAL ARTERY Right 5/9/2023    Procedure: ENDARTERECTOMY, FEMORAL;  Surgeon: FERNANDO Cagle II, MD;  Location: [address];  Service: Vascular;  Laterality: Right;  contrast 12ml  Flouro time 11.1 min  mgy 217.83  Gycm2 26.3570    EYE SURGERY Left     cataract removal with lens implant.    FEMORAL ARTERY STENT  before 2010    PERIPHERAL ANGIOGRAPHY N/A 5/3/2023    Procedure: Peripheral angiography;  Surgeon: Rasheeda Ny MD;  Location: [facility] CATH LAB/EP;  Service: Cardiology;  Laterality: N/A;    PTA, SUPERFICIAL FEMORAL ARTERY Right 5/15/2023    Procedure: PTA, SUPERFICIAL FEMORAL ARTERY;  Surgeon: FERNANDO Cagle II, MD;  Location: [address];  Service: Vascular;  Laterality: Right;    RENAL ARTERY STENT   2010    STENT, ILIAC ARTERY Right 5/9/2023    Procedure: STENT, ILIAC ARTERY;  Surgeon: FERNANDO Cagle II, MD;  Location: [facility] OR [address] [place] [address];  Service: Vascular;  Laterality: Right;       Allergies:   Review of patient's allergies indicates:   Allergen Reactions    Chantix [varenicline]      Tongue swelling      Wellbutrin [bupropion hcl]      Tongue swelling         Scheduled Meds:    aspirin  81 mg Oral Daily    atorvastatin  80 mg Oral Daily    carvediloL  25 mg Oral BID    clopidogreL  75 mg Oral Daily    enoxparin  40 mg Subcutaneous Daily    ezetimibe  10 mg Oral Daily    furosemide  40 mg Oral Daily    NIFEdipine  30 mg Oral Daily    polyethylene glycol  17 g Oral Daily     Continuous Infusions:   PRN Meds:acetaminophen, dextrose 10%, dextrose 10%, dextrose, dextrose, glucagon (human recombinant), melatonin, naloxone, ondansetron, prochlorperazine, simethicone, sodium chloride 0.9%    Anticoagulation/Antiplatelet Meds: aspirin and plavix    Review of Systems:   As documented in admission H&P.    Physical Exam:  Temp: 96.8 °F (36 °C) (06/30/23 0732)  Pulse: 94 (06/30/23 0923)  Resp: 18 (06/30/23 0923)  BP: (!) 160/67 (06/30/23 0923)  SpO2: 97 % (06/30/23 0923)     General: WNWD, NAD  HEENT: Normocephalic, sclera anicteric,   Heart: RRR by pulse  Lungs: no increased work of breathing  Abd: NTND, soft  Extremities: bialteral lowe extremity edema  Neuro: Gross nonfocal    Labs:  Recent Labs   Lab 06/29/23  1939   INR 1.2       Recent Labs   Lab 06/30/23  0555   WBC 11.81   HGB 8.0*   HCT 26.2*   MCV 85         Recent Labs   Lab 06/30/23  0555   *   *   K 4.1      CO2 15*   BUN 28*   CREATININE 1.3   CALCIUM 8.3*   ALT 22   AST 56*   ALBUMIN 2.0*   BILITOT 0.6       Imaging:  CT was reviewed.    Assessment/Plan:   Steph Monroe is a 77 y.o. female with the PMH listed below who presents for right gluteal fluid collection aspiration.    Sedation:  local    Risks (including, but not  limited to, pain, bleeding, infection, damage to nearby structures, treatment failure/recurrence, and the need for additional procedures), potential benefits, and alternatives were discussed with the patient. All questions were answered to the best of my abilities. The patient wishes to proceed. Written informed consent was obtained.      Brianna Oliver MD [facility]

## 2023-06-30 NOTE — SEDATION DOCUMENTATION
IR procedure - Abscess Drainage - is completed. Patient tolerated fair. She was very tender at the site of wound and did not care for repositioning. She is awake, alert, and oriented. Vital signs are stable. Per Dr. Weldon there was no collectable fluid. Labs will remain uncollected in Epic at this time. Verbal report is given to Lashae, the nurse assigned to the patient's care in room 485. Pertinent details are provided, along with the last set to vitals. The opportunity to ask questions is provided. Transport is requested to return the patient  to the floor.

## 2023-06-30 NOTE — SUBJECTIVE & OBJECTIVE
Past Medical History:   Diagnosis Date    Cancer     rectum    CKD (chronic kidney disease)     Diabetes mellitus, type 2     Hyperlipidemia     Hypertension     Neutropenic fever 5/27/2016    PVD (peripheral vascular disease)        Past Surgical History:   Procedure Laterality Date    ANGIOGRAM, LOWER ARTERIAL, UNILATERAL Right 5/5/2023    Procedure: Angiogram, Lower Arterial, Unilateral;  Surgeon: Remington Ribeiro MD;  Location: [facility] CATH LAB/EP;  Service: Cardiology;  Laterality: Right;    ANGIOGRAPHY OF LOWER EXTREMITY N/A 5/15/2023    Procedure: Angiogram Extremity Unilateral;  Surgeon: FERNANDO Cagle II, MD;  Location: [facility] OR [address] [place] [address];  Service: Vascular;  Laterality: N/A;  22.4 min  183.31 mGy  23.5353 Gy.cm  17ml Dye     COLONOSCOPY N/A 3/28/2016    Procedure: COLONOSCOPY;  Surgeon: Mitul Buitrago Jr., MD;  Location: [facility] ENDO;  Service: Endoscopy;  Laterality: N/A;    ENDARTERECTOMY OF FEMORAL ARTERY Right 5/9/2023    Procedure: ENDARTERECTOMY, FEMORAL;  Surgeon: FERNANDO Cagle II, MD;  Location: [facility] OR [address] [place] [address];  Service: Vascular;  Laterality: Right;  contrast 12ml  Flouro time 11.1 min  mgy 217.83  Gycm2 26.3570    EYE SURGERY Left     cataract removal with lens implant.    FEMORAL ARTERY STENT  before 2010    PERIPHERAL ANGIOGRAPHY N/A 5/3/2023    Procedure: Peripheral angiography;  Surgeon: Rasheeda Ny MD;  Location: [facility] CATH LAB/EP;  Service: Cardiology;  Laterality: N/A;    PTA, SUPERFICIAL FEMORAL ARTERY Right 5/15/2023    Procedure: PTA, SUPERFICIAL FEMORAL ARTERY;  Surgeon: FERNANDO Cagle II, MD;  Location: [facility] OR [address] [place] [address];  Service: Vascular;  Laterality: Right;    RENAL ARTERY STENT  2010    STENT, ILIAC ARTERY Right 5/9/2023    Procedure: STENT, ILIAC ARTERY;  Surgeon: FERNANDO Cagle II, MD;  Location: [facility] OR [address] [place] [address];  Service: Vascular;  Laterality: Right;       Review of patient's allergies indicates:   Allergen Reactions    Chantix [varenicline]      Tongue  swelling      Wellbutrin [bupropion hcl]      Tongue swelling         No current facility-administered medications on file prior to encounter.     Current Outpatient Medications on File Prior to Encounter   Medication Sig    aspirin 81 MG Chew Take 1 tablet (81 mg total) by mouth once daily.    atorvastatin (LIPITOR) 80 MG tablet Take 1 tablet (80 mg total) by mouth once daily.    carvediloL (COREG) 25 MG tablet Take 1 tablet by mouth 2 (two) times daily.    clopidogreL (PLAVIX) 75 mg tablet Take 75 mg by mouth once daily.    sodium bicarbonate 650 MG tablet Take 1 tablet (650 mg total) by mouth 3 (three) times daily.    ezetimibe (ZETIA) 10 mg tablet Take 10 mg by mouth once daily.    furosemide (LASIX) 20 MG tablet Take 20 mg by mouth once daily.    NIFEdipine (PROCARDIA-XL) 30 MG (OSM) 24 hr tablet Take 1 tablet (30 mg total) by mouth once daily.     Family History       Problem Relation (Age of Onset)    Diabetes Father    Heart attack Mother    Hypertension Sister          Tobacco Use    Smoking status: Former     Packs/day: 1.50     Years: 45.00     Pack years: 67.50     Types: Cigarettes     Quit date: 6/13/2013     Years since quitting: 10.0     Passive exposure: Never    Smokeless tobacco: Never   Substance and Sexual Activity    Alcohol use: No     Alcohol/week: 0.0 standard drinks    Drug use: No    Sexual activity: Not Currently     Review of Systems   All other systems reviewed and are negative.  Objective:     Vital Signs (Most Recent):  Temp: 96.5 °F (35.8 °C) (06/30/23 0600)  Pulse: 96 (06/30/23 0600)  Resp: 18 (06/30/23 0600)  BP: (!) 177/70 (06/30/23 0600)  SpO2: 98 % (06/30/23 0600) Vital Signs (24h Range):  Temp:  [96.5 °F (35.8 °C)-98.8 °F (37.1 °C)] 96.5 °F (35.8 °C)  Pulse:  [] 96  Resp:  [14-18] 18  SpO2:  [96 %-100 %] 98 %  BP: (109-177)/(51-76) 177/70     Weight: 61.7 kg (136 lb 0.4 oz)  Body mass index is 24.88 kg/m².     Physical Exam  Vitals and nursing note reviewed.    Constitutional:       General: She is not in acute distress.     Appearance: She is normal weight.   HENT:      Head: Normocephalic and atraumatic.      Mouth/Throat:      Mouth: Mucous membranes are moist.   Eyes:      Extraocular Movements: Extraocular movements intact.      Pupils: Pupils are equal, round, and reactive to light.   Cardiovascular:      Rate and Rhythm: Normal rate and regular rhythm.      Pulses:           Femoral pulses are 2+ on the right side and 2+ on the left side.       Dorsalis pedis pulses are 1+ on the right side and 1+ on the left side.        Posterior tibial pulses are detected w/ Doppler on the right side and 1+ on the left side.      Heart sounds: Murmur heard.   Pulmonary:      Effort: Pulmonary effort is normal.      Breath sounds: Normal breath sounds. No wheezing or rales.   Abdominal:      General: Abdomen is flat. Bowel sounds are normal. There is no distension.      Palpations: Abdomen is soft.      Tenderness: There is no abdominal tenderness.   Musculoskeletal:      Right lower leg: Edema present.      Left lower leg: Edema present.   Skin:     General: Skin is warm.      Capillary Refill: Capillary refill takes less than 2 seconds.   Neurological:      General: No focal deficit present.      Mental Status: She is alert and oriented to person, place, and time. Mental status is at baseline.                CRANIAL NERVES     CN III, IV, VI   Pupils are equal, round, and reactive to light.     Significant Labs: All pertinent labs within the past 24 hours have been reviewed.    Significant Imaging: I have reviewed all pertinent imaging results/findings within the past 24 hours.

## 2023-06-30 NOTE — PLAN OF CARE
Problem: Adult Inpatient Plan of Care  Goal: Plan of Care Review  Outcome: Ongoing, Progressing     Problem: Impaired Wound Healing  Goal: Optimal Wound Healing  Outcome: Ongoing, Progressing     Problem: Skin Injury Risk Increased  Goal: Skin Health and Integrity  Outcome: Ongoing, Progressing     Problem: Fall Injury Risk  Goal: Absence of Fall and Fall-Related Injury  Outcome: Ongoing, Progressing     Problem: Pain Acute  Goal: Acceptable Pain Control and Functional Ability  Outcome: Ongoing, Progressing     Problem: Fatigue  Goal: Improved Activity Tolerance  Outcome: Ongoing, Progressing

## 2023-06-30 NOTE — PROGRESS NOTES
Pharmacist Renal Dose Adjustment Note    Steph Monroe is a 77 y.o. female being treated with the medication enoxaparin    Patient Data:    Vital Signs (Most Recent):  Temp: 96.8 °F (36 °C) (06/30/23 0732)  Pulse: 92 (06/30/23 0732)  Resp: 18 (06/30/23 0732)  BP: (!) 183/67 (06/30/23 0732)  SpO2: 98 % (06/30/23 0732) Vital Signs (72h Range):  Temp:  [96.5 °F (35.8 °C)-98.8 °F (37.1 °C)]   Pulse:  []   Resp:  [14-18]   BP: (109-183)/(51-76)   SpO2:  [96 %-100 %]      Recent Labs   Lab 06/29/23  1838 06/30/23  0555   CREATININE 1.5* 1.3     Serum creatinine: 1.3 mg/dL 06/30/23 0555  Estimated creatinine clearance: 32.3 mL/min    Medication:enoxaparin dose: 30mg frequency q24h will be changed to medication:enoxaparin dose:40mg frequency:q24h    Pharmacist's Name: Anthony Mccain  Pharmacist's Extension: [phone]

## 2023-06-30 NOTE — PLAN OF CARE
CM met with pt - she is awake, alert - pt is hard of hearing   CM called and spoke with daughter Deana Reddy  [phone]  -- pt lives with Ms. Leblanc;  daughter Lety  [phone]  lives near pt.       Per Ms. Leblanc - pt has become progressively weaker over the last week.  Pt reports she slides out of her power chair and out of bed due to her slippery bed pads    Pt has a rw, home O2 and a power chair      Per Ms. Leblanc - pt had HH at d/c from hospital in May - they only visited pt one time.  Per notes - pt was assigned to [facility].      PCP  -- Dalila Stanton at [facility]      dx:  Sacral wound; falls, weakness.      Per jayda Leblanc - in the event pt needs placement - she is open to placement at [facility] - CM will send initial info to [facility] and [facility] per [facility]     --Pt info sent to [facility] per [facility] -orders pending      06/30/23 3536   Discharge Planning   Assessment Type Discharge Planning Brief Assessment   Resource/Environmental Concerns   (limited caregiver support)   Support Systems Children  (Lives with daughter Deana Reddy  [phone]; jayda Davidson lives nearby  [phone])   Equipment Currently Used at Home walker, rolling;oxygen   Current Living Arrangements home   Patient/Family Anticipates Transition to home;home with family   Patient/Family Anticipated Services at Transition home health care   DME Needed Upon Discharge    (tbd)   Discharge Plan A Home with family;Home Health;Home   Discharge Plan B Skilled Nursing Facility

## 2023-06-30 NOTE — PROGRESS NOTES
Pharmacist Renal Dose Adjustment Note    Steph Monroe is a 77 y.o. female being treated with the medication Lovenox    Patient Data:    Vital Signs (Most Recent):  Temp: 98.8 °F (37.1 °C) (06/29/23 1741)  Pulse: 103 (06/29/23 1917)  Resp: 18 (06/29/23 1917)  BP: (!) 155/76 (06/29/23 1917)  SpO2: 100 % (06/29/23 1917) Vital Signs (72h Range):  Temp:  [98.8 °F (37.1 °C)]   Pulse:  [103-107]   Resp:  [14-18]   BP: (109-155)/(51-76)   SpO2:  [99 %-100 %]      Recent Labs   Lab 06/29/23 1838   CREATININE 1.5*     Serum creatinine: 1.5 mg/dL (H) 06/29/23 1838  Estimated creatinine clearance: 29.8 mL/min (A)    Medication:Lovenox dose: 40 mg frequency daily will be changed to medication:Lovenox dose:30 mg frequency:daily    Pharmacist's Name: Zurdo Collins  Pharmacist's Extension: [phone]

## 2023-06-30 NOTE — CONSULTS
[facility] - Telemetry  Wound Care    Patient Name:  Steph Monroe   MRN:  [id number]  Date: 6/30/2023  Diagnosis: <principal problem not specified>    History:     Past Medical History:   Diagnosis Date    Cancer     rectum    CKD (chronic kidney disease)     Diabetes mellitus, type 2     Hyperlipidemia     Hypertension     Neutropenic fever 5/27/2016    PVD (peripheral vascular disease)        Social History     Socioeconomic History    Marital status:     Number of children: 2   Occupational History    Occupation: Retired   Tobacco Use    Smoking status: Former     Packs/day: 1.50     Years: 45.00     Pack years: 67.50     Types: Cigarettes     Quit date: 6/13/2013     Years since quitting: 10.0     Passive exposure: Never    Smokeless tobacco: Never   Substance and Sexual Activity    Alcohol use: No     Alcohol/week: 0.0 standard drinks    Drug use: No    Sexual activity: Not Currently     Social Determinants of Health     Financial Resource Strain: Low Risk     Difficulty of Paying Living Expenses: Not hard at all   Food Insecurity: No Food Insecurity    Worried About Running Out of Food in the Last Year: Never true    Ran Out of Food in the Last Year: Never true   Transportation Needs: No Transportation Needs    Lack of Transportation (Medical): No    Lack of Transportation (Non-Medical): No   Physical Activity: Sufficiently Active    Days of Exercise per Week: 5 days    Minutes of Exercise per Session: 30 min   Stress: Stress Concern Present    Feeling of Stress : To some extent   Social Connections: Moderately Isolated    Frequency of Communication with Friends and Family: Once a week    Frequency of Social Gatherings with Friends and Family: Once a week    Attends Holiness Services: 1 to 4 times per year    Active Member of Clubs or Organizations: No    Marital Status:    Housing Stability: Unknown    Unable to Pay for Housing in the Last Year: No    Unstable Housing in the Last Year: No        Precautions:     Allergies as of 06/29/2023 - Reviewed 06/29/2023   Allergen Reaction Noted    Chantix [varenicline]  03/02/2016    Wellbutrin [bupropion hcl]  03/02/2016       [employer] Assessment Details/Treatment     R buttock- present on admit - unstageable - 100% necrotic- moderate tan drainage- atypical appearance of necrotic tissue- pt reports history of anal cancer- pressure injury vs malignancy?      L hip- hard under skin lesion- redness adjacent- assessed with SEYMOUR Pagan NP        R back- discolored skin- healing bruise?        R arm- skin tears        R toes- dark discolored areas- appears as dried blood blisters and dry peeling skin- no open wound        L 5th toe nail- dried blood         Bilateral heels- intact with no redness      Recommendations discussed with pt, nurse and Kristopher NP:  - Nursing to apply waffle overlay to bed surface and heel boots to BLE to maintain pressure injury prevention interventions.   - Dakins gauze dressing to R buttock wound BID  - General Surgery consult for debridement of R buttock  - xray of L hip and monitor  - Monitor toes      06/30/2023

## 2023-07-01 LAB
ALBUMIN SERPL BCP-MCNC: 1.8 G/DL (ref 3.5–5.2)
ALP SERPL-CCNC: 128 U/L (ref 55–135)
ALT SERPL W/O P-5'-P-CCNC: 17 U/L (ref 10–44)
ANION GAP SERPL CALC-SCNC: 13 MMOL/L (ref 8–16)
ANION GAP SERPL CALC-SCNC: 9 MMOL/L (ref 8–16)
AST SERPL-CCNC: 45 U/L (ref 10–40)
BASOPHILS # BLD AUTO: 0.02 K/UL (ref 0–0.2)
BASOPHILS # BLD AUTO: 0.02 K/UL (ref 0–0.2)
BASOPHILS NFR BLD: 0.2 % (ref 0–1.9)
BASOPHILS NFR BLD: 0.2 % (ref 0–1.9)
BILIRUB SERPL-MCNC: 0.6 MG/DL (ref 0.1–1)
BUN SERPL-MCNC: 32 MG/DL (ref 8–23)
BUN SERPL-MCNC: 33 MG/DL (ref 8–23)
CALCIUM SERPL-MCNC: 7.8 MG/DL (ref 8.7–10.5)
CALCIUM SERPL-MCNC: 8 MG/DL (ref 8.7–10.5)
CHLORIDE SERPL-SCNC: 107 MMOL/L (ref 95–110)
CHLORIDE SERPL-SCNC: 108 MMOL/L (ref 95–110)
CK SERPL-CCNC: 365 U/L (ref 20–180)
CO2 SERPL-SCNC: 17 MMOL/L (ref 23–29)
CO2 SERPL-SCNC: 18 MMOL/L (ref 23–29)
CREAT SERPL-MCNC: 1.8 MG/DL (ref 0.5–1.4)
CREAT SERPL-MCNC: 2.1 MG/DL (ref 0.5–1.4)
DIFFERENTIAL METHOD: ABNORMAL
DIFFERENTIAL METHOD: ABNORMAL
EOSINOPHIL # BLD AUTO: 0 K/UL (ref 0–0.5)
EOSINOPHIL # BLD AUTO: 0 K/UL (ref 0–0.5)
EOSINOPHIL NFR BLD: 0.1 % (ref 0–8)
EOSINOPHIL NFR BLD: 0.3 % (ref 0–8)
ERYTHROCYTE [DISTWIDTH] IN BLOOD BY AUTOMATED COUNT: 16 % (ref 11.5–14.5)
ERYTHROCYTE [DISTWIDTH] IN BLOOD BY AUTOMATED COUNT: 16.1 % (ref 11.5–14.5)
EST. GFR  (NO RACE VARIABLE): 24 ML/MIN/1.73 M^2
EST. GFR  (NO RACE VARIABLE): 29 ML/MIN/1.73 M^2
FERRITIN SERPL-MCNC: 221 NG/ML (ref 20–300)
GLUCOSE SERPL-MCNC: 138 MG/DL (ref 70–110)
GLUCOSE SERPL-MCNC: 163 MG/DL (ref 70–110)
HCT VFR BLD AUTO: 22.8 % (ref 37–48.5)
HCT VFR BLD AUTO: 25.4 % (ref 37–48.5)
HGB BLD-MCNC: 7 G/DL (ref 12–16)
HGB BLD-MCNC: 7.8 G/DL (ref 12–16)
IMM GRANULOCYTES # BLD AUTO: 0.08 K/UL (ref 0–0.04)
IMM GRANULOCYTES # BLD AUTO: 0.08 K/UL (ref 0–0.04)
IMM GRANULOCYTES NFR BLD AUTO: 0.7 % (ref 0–0.5)
IMM GRANULOCYTES NFR BLD AUTO: 0.7 % (ref 0–0.5)
LYMPHOCYTES # BLD AUTO: 0.4 K/UL (ref 1–4.8)
LYMPHOCYTES # BLD AUTO: 0.5 K/UL (ref 1–4.8)
LYMPHOCYTES NFR BLD: 3.8 % (ref 18–48)
LYMPHOCYTES NFR BLD: 3.8 % (ref 18–48)
MCH RBC QN AUTO: 25.8 PG (ref 27–31)
MCH RBC QN AUTO: 26 PG (ref 27–31)
MCHC RBC AUTO-ENTMCNC: 30.7 G/DL (ref 32–36)
MCHC RBC AUTO-ENTMCNC: 30.7 G/DL (ref 32–36)
MCV RBC AUTO: 84 FL (ref 82–98)
MCV RBC AUTO: 85 FL (ref 82–98)
MONOCYTES # BLD AUTO: 0.5 K/UL (ref 0.3–1)
MONOCYTES # BLD AUTO: 0.8 K/UL (ref 0.3–1)
MONOCYTES NFR BLD: 4.7 % (ref 4–15)
MONOCYTES NFR BLD: 6.6 % (ref 4–15)
NEUTROPHILS # BLD AUTO: 10.1 K/UL (ref 1.8–7.7)
NEUTROPHILS # BLD AUTO: 10.6 K/UL (ref 1.8–7.7)
NEUTROPHILS NFR BLD: 88.6 % (ref 38–73)
NEUTROPHILS NFR BLD: 90.3 % (ref 38–73)
NRBC BLD-RTO: 0 /100 WBC
NRBC BLD-RTO: 0 /100 WBC
PLATELET # BLD AUTO: 214 K/UL (ref 150–450)
PLATELET # BLD AUTO: 225 K/UL (ref 150–450)
PMV BLD AUTO: 10.1 FL (ref 9.2–12.9)
PMV BLD AUTO: 9.3 FL (ref 9.2–12.9)
POTASSIUM SERPL-SCNC: 4.2 MMOL/L (ref 3.5–5.1)
POTASSIUM SERPL-SCNC: 4.2 MMOL/L (ref 3.5–5.1)
PROT SERPL-MCNC: 5.6 G/DL (ref 6–8.4)
RBC # BLD AUTO: 2.71 M/UL (ref 4–5.4)
RBC # BLD AUTO: 3 M/UL (ref 4–5.4)
RETICS/RBC NFR AUTO: 0.8 % (ref 0.5–2.5)
SODIUM SERPL-SCNC: 135 MMOL/L (ref 136–145)
SODIUM SERPL-SCNC: 137 MMOL/L (ref 136–145)
WBC # BLD AUTO: 11.14 K/UL (ref 3.9–12.7)
WBC # BLD AUTO: 11.97 K/UL (ref 3.9–12.7)

## 2023-07-01 PROCEDURE — 25000003 PHARM REV CODE 250: Performed by: NURSE PRACTITIONER

## 2023-07-01 PROCEDURE — 25000003 PHARM REV CODE 250: Performed by: STUDENT IN AN ORGANIZED HEALTH CARE EDUCATION/TRAINING PROGRAM

## 2023-07-01 PROCEDURE — 80048 BASIC METABOLIC PNL TOTAL CA: CPT | Mod: XB | Performed by: NURSE PRACTITIONER

## 2023-07-01 PROCEDURE — 85045 AUTOMATED RETICULOCYTE COUNT: CPT | Performed by: NURSE PRACTITIONER

## 2023-07-01 PROCEDURE — 36415 COLL VENOUS BLD VENIPUNCTURE: CPT | Performed by: STUDENT IN AN ORGANIZED HEALTH CARE EDUCATION/TRAINING PROGRAM

## 2023-07-01 PROCEDURE — 25000003 PHARM REV CODE 250: Performed by: INTERNAL MEDICINE

## 2023-07-01 PROCEDURE — 96366 THER/PROPH/DIAG IV INF ADDON: CPT

## 2023-07-01 PROCEDURE — 85025 COMPLETE CBC W/AUTO DIFF WBC: CPT | Mod: 91 | Performed by: NURSE PRACTITIONER

## 2023-07-01 PROCEDURE — 82746 ASSAY OF FOLIC ACID SERUM: CPT | Performed by: NURSE PRACTITIONER

## 2023-07-01 PROCEDURE — 63600175 PHARM REV CODE 636 W HCPCS: Performed by: NURSE PRACTITIONER

## 2023-07-01 PROCEDURE — 85025 COMPLETE CBC W/AUTO DIFF WBC: CPT | Performed by: STUDENT IN AN ORGANIZED HEALTH CARE EDUCATION/TRAINING PROGRAM

## 2023-07-01 PROCEDURE — 82728 ASSAY OF FERRITIN: CPT | Performed by: NURSE PRACTITIONER

## 2023-07-01 PROCEDURE — 82550 ASSAY OF CK (CPK): CPT | Performed by: NURSE PRACTITIONER

## 2023-07-01 PROCEDURE — 80053 COMPREHEN METABOLIC PANEL: CPT | Performed by: STUDENT IN AN ORGANIZED HEALTH CARE EDUCATION/TRAINING PROGRAM

## 2023-07-01 PROCEDURE — 82607 VITAMIN B-12: CPT | Performed by: NURSE PRACTITIONER

## 2023-07-01 PROCEDURE — G0378 HOSPITAL OBSERVATION PER HR: HCPCS

## 2023-07-01 PROCEDURE — 63600175 PHARM REV CODE 636 W HCPCS: Performed by: STUDENT IN AN ORGANIZED HEALTH CARE EDUCATION/TRAINING PROGRAM

## 2023-07-01 PROCEDURE — 36415 COLL VENOUS BLD VENIPUNCTURE: CPT | Performed by: NURSE PRACTITIONER

## 2023-07-01 PROCEDURE — 84466 ASSAY OF TRANSFERRIN: CPT | Performed by: NURSE PRACTITIONER

## 2023-07-01 RX ORDER — DOCUSATE SODIUM 100 MG/1
100 CAPSULE, LIQUID FILLED ORAL 2 TIMES DAILY
Qty: 60 CAPSULE | Refills: 1 | Status: SHIPPED | OUTPATIENT
Start: 2023-07-01

## 2023-07-01 RX ORDER — DOCUSATE SODIUM 100 MG/1
100 CAPSULE, LIQUID FILLED ORAL 2 TIMES DAILY
Status: DISCONTINUED | OUTPATIENT
Start: 2023-07-01 | End: 2023-07-03 | Stop reason: HOSPADM

## 2023-07-01 RX ORDER — SODIUM CHLORIDE 9 MG/ML
INJECTION, SOLUTION INTRAVENOUS CONTINUOUS
Status: ACTIVE | OUTPATIENT
Start: 2023-07-01 | End: 2023-07-01

## 2023-07-01 RX ORDER — ENOXAPARIN SODIUM 100 MG/ML
30 INJECTION SUBCUTANEOUS DAILY
Status: DISCONTINUED | OUTPATIENT
Start: 2023-07-02 | End: 2023-07-03 | Stop reason: HOSPADM

## 2023-07-01 RX ORDER — METRONIDAZOLE 500 MG/100ML
500 INJECTION, SOLUTION INTRAVENOUS
Status: DISCONTINUED | OUTPATIENT
Start: 2023-07-01 | End: 2023-07-01

## 2023-07-01 RX ADMIN — SODIUM CHLORIDE: 9 INJECTION, SOLUTION INTRAVENOUS at 10:07

## 2023-07-01 RX ADMIN — VANCOMYCIN HYDROCHLORIDE 1000 MG: 1 INJECTION, POWDER, LYOPHILIZED, FOR SOLUTION INTRAVENOUS at 12:07

## 2023-07-01 RX ADMIN — SODIUM BICARBONATE 650 MG TABLET 650 MG: at 08:07

## 2023-07-01 RX ADMIN — CARVEDILOL 25 MG: 25 TABLET, FILM COATED ORAL at 08:07

## 2023-07-01 RX ADMIN — DOCUSATE SODIUM 100 MG: 100 CAPSULE, LIQUID FILLED ORAL at 08:07

## 2023-07-01 RX ADMIN — SODIUM BICARBONATE 650 MG TABLET 650 MG: at 02:07

## 2023-07-01 RX ADMIN — PIPERACILLIN AND TAZOBACTAM 4.5 G: 4; .5 INJECTION, POWDER, LYOPHILIZED, FOR SOLUTION INTRAVENOUS; PARENTERAL at 03:07

## 2023-07-01 NOTE — ACP (ADVANCE CARE PLANNING)
"Advance Care Planning     Date: 07/01/2023    Today a voluntary meeting took place: bedside    Patient Participation: Patient is able to participate     Attendees (Name and  Relationship to patient):     Staff attendees (Name and  Role):     ACP Conversation (General): Understanding of current condition. I explained that her blood count was low and she had a decline in renal function which would require further blood work, IVF,  and urine samples. Understanding that she has chronic health conditions which she ultimately will succumb to at some point; she decided to forego treating acute exacerbations, stating "I am tired of being poked and being in the hospital. Blood sticks are painful." She does not want to be repositioned against her liking. She wishes to go on living a peaceful life. She does "not even want to ever be stuck again for an accu check." IV came out, and she does not want another one. She did not like participating in PT/OT in the past, therefore SNF would not be a good option now she agrees.     Experience with serious illness She spoke of an extended hospital stay which caused her much suffering and she does not want to go through this again.   'Living well': What does living well mean to you? "I want to be at peace and do more things. I know I may not get my mobility back. I want to be at home at my own house."      Code Status: DNR; status confirmed/order placed in chart    ACP Documents:    Goals of care: The patient endorses that what is most important right now is to focus on spending time at home, avoiding the hospital, remaining as independent as possible, symptom/pain control, quality of life, even if it means sacrificing a little time, and comfort and QOL     Accordingly, we have decided that the best plan to meet the patient's goals includes no further escalation in treatment, enrolling in hospice care, and pivot to comfort-focused care    She wants to speak to both daughters before " official hospice arrangements.   Falguni Davidson is on board with hospice.   Daughter Deana is currently out of town.       Recommendations/  Follow-up tasks: The patient and health care agent were provided the following recommendations Enroll in home hospice.        Length of ACP   conversation in minutes: 60 minutes

## 2023-07-01 NOTE — PROGRESS NOTES
Millie asked the sw to fax the pt's info to their office at [phone] b/c the weekend staff doesn't have access to [facility]. The sw faxed the info requested along with the home hospice orders. The sw spoke to Lety again and informed her of this info. The sw informed her Ladan([facility])nurse will call her to establish a time to come meet with her and the pt to get consents signed. The sw also informed her they will probably have to order dme. She thanked the sw for all her assistance and she will watch for Ladan's call. Lety states she will arrive to the hospital within the next 45 minutes to an hour. The sw informed Millie of this info as well.     3:27pm The sw met with the pt in her room and she states she's confused now b/c Lety called Deana. She states Deana is not ok with her going home with hospice. The pt informed the sw that Lety's  a nurse and understands but Deana's her caregiver and doesn't. The sw attempted to call Deana again with no luck while in the pt's room to do a conference call. The sw met with Ladan with [facility] outside of the pt's room prior to her going into the pt's room. Ladan state she spoke to Lety in route to the hospital. The sw left Ladan in the pt's room speaking about her concerns and how she doesn't want any chaos. She states she wants to be at peace and wants everybody on board with her decision. The sw acknowledged the pt's feelings and offered active listening skills. The pt thanked the sw for coming to speak with her and she states it's very overwhelming for her. The sw again encouraged the pt to let Deana know what her wishes are and she states she will.The sw informed the pt's nurse and [facility] of this info mentioned above.

## 2023-07-01 NOTE — PROGRESS NOTES
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    Patient states she has not had any claudication symptoms since her procedure as above.  However, she has been feeling weak in the legs and has fallen a couple times.  Most recently she fell yesterday and could not get up.  This prompted EMS to arrive and take her to the emergency department.  At this time she was found to have a significant, albeit healing, sacral decubitus ulcer.  And she also seemed very deconditioned.  Denies any chest pain, shortness a breath, syncope, dizziness, claudication symptoms, leg numbness or tingling.    CT imaging showed no osteomyelitis but concerning gluteal abscess. Admitted for observation for wound care consult and possible surgery evaluation vs IR of the wound if needed.      Overview/Hospital Course:  No notes on file    Interval hx: This am patient is refusing care. Patient presented with falls- no new injury. Noted to have pressure ulcer to sacrum- no signs of infection- per general surgery no indication for surgery.   -will continue wound care and stop abx.   -hgb 7 - plan to obtain anemia panel, work up   -ALESIA- plan to work up   -patient refusing care. Is DNR. Does not want to be turned by nurses. I made multiple attempts to call family- no answer.   Palliative care consulted to help meet patient needs. Patient would be a good candidate for hospice services.     Objective:     Vital Signs (Most Recent):  Temp: 96.9 °F (36.1 °C) (07/01/23  0753)  Pulse: 78 (07/01/23 1140)  Resp: 18 (07/01/23 0753)  BP: 129/60 (07/01/23 0753)  SpO2: 95 % (07/01/23 0753) Vital Signs (24h Range):  Temp:  [96.3 °F (35.7 °C)-99.6 °F (37.6 °C)] 96.9 °F (36.1 °C)  Pulse:  [74-92] 78  Resp:  [18] 18  SpO2:  [89 %-95 %] 95 %  BP: (124-129)/(56-61) 129/60     Weight: 61.7 kg (136 lb 0.4 oz)  Body mass index is 24.88 kg/m².     Physical Exam  Vitals and nursing note reviewed.   Constitutional:       General: She is not in acute distress.     Appearance: She is normal weight.   HENT:      Head: Normocephalic and atraumatic.      Mouth/Throat:      Mouth: Mucous membranes are moist.   Eyes:      Extraocular Movements: Extraocular movements intact.      Pupils: Pupils are equal, round, and reactive to light.   Cardiovascular:      Rate and Rhythm: Normal rate and regular rhythm.      Pulses:           Femoral pulses are 2+ on the right side and 2+ on the left side.       Dorsalis pedis pulses are 1+ on the right side and 1+ on the left side.        Posterior tibial pulses are detected w/ Doppler on the right side and 1+ on the left side.      Heart sounds: Murmur heard.   Pulmonary:      Effort: Pulmonary effort is normal.      Breath sounds: Normal breath sounds. No wheezing or rales.   Abdominal:      General: Abdomen is flat. Bowel sounds are normal. There is no distension.      Palpations: Abdomen is soft.      Tenderness: There is no abdominal tenderness.   Musculoskeletal:      Right lower leg: Edema present.      Left lower leg: Edema present.   Skin:     General: Skin is warm.      Capillary Refill: Capillary refill takes less than 2 seconds.   Neurological:      General: No focal deficit present.      Mental Status: She is alert and oriented to person, place, and time. Mental status is at baseline.                CRANIAL NERVES     CN III, IV, VI   Pupils are equal, round, and reactive to light.     Significant Labs: All pertinent labs within the past 24 hours have been  reviewed.    Significant Imaging: I have reviewed all pertinent imaging results/findings within the past 24 hours.      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 Says she has lived a long time and tired of illness and hospitals  - Would be OK with reversal if needed for procedure      7/1  Patient refusing care, turning, medications, telling nurses to leave her room.   Tried to call family- no answer   Palliative and  consulted to honor patient's wishes  Will discuss hospice care going further   Will continue to try to contact family     Social has been working on NH placement     Stage 3b chronic kidney disease  - Avoid nsaids or nephrotoxic drugs  - Continue PO bicarb (bicarb < 18)  - Monitor UOP        ALESIA on CKD   Cr 1.3-->1.8  PC ratio  FeNa -  UA   Repeat CK- likely elevated from falls, immobility, and pressure ulcer   IVF     Anemia of chronic disease  - Hgb around baseline of 8  - Asymptomaitc  - Likely 2/2 CKD  - Monitor      hgb 7 7/1  Anemia panel   UA   Stool for OCB   Low threshold for transfusion     Cirrhosis    S/t LEWIS   On PO lasix - held with ALESIA     Pulmonary emphysema    Lungs clear   May use PRN nebs     Chronic heart failure with preserved ejection fraction  - No signs of pulm edema or overt volume overload  - Does have some peripheral edema  - Continue PO lasix - hold today 7/1 with ALESIA   - Continue HTN control        History of rectal or anal  cancer    Treated with chemo and radiation    Coronary artery disease involving native coronary artery of native heart without angina pectoris  - Continue ASA, statin  - On BB  - Monitor      PAD (peripheral artery disease)  - Plavix, statin  - S/p 5/9/23-- R CFA endarterectomy and Right iliac artery stenting  (7x39 VBX, 7x80mm Lifestent)      HLD (hyperlipidemia)  - Continue lipitor      Essential hypertension  - Continue nifedipine and coreg      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 hypertension  a1c 5   Diet controlled         VTE Risk Mitigation (From admission, onward)           Ordered     enoxaparin injection 30 mg  Daily         07/01/23 0934     IP VTE HIGH RISK PATIENT  Once         06/29/23 2256     Place sequential compression device  Until discontinued         06/29/23 2256                    Discharge Planning   PÉREZ:      Code Status: DNR   Is the patient medically ready for discharge?:     Reason for patient still in hospital (select all that apply): Patient trending condition  Discharge Plan A: Home with family, Home Health, Home                  Aspen Pagan NP  Department of Hospital Medicine   [place] - Telemetry

## 2023-07-01 NOTE — PROGRESS NOTES
The sw received a call from Ladan([facility])after her leaving the room. She states Lety did come and they have decided against hospice because they want to pursue snf. She states that was an option given to them yesterday. They are aware the pt's in OBS status b/c this sw informed Lety earlier. The pt informed her,she will start participating with therapy so she can go to snf. The sw informed the pt's nurse of this info.

## 2023-07-01 NOTE — PLAN OF CARE
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 [L89.314]  Yes    Stage 3b chronic kidney disease [N18.32]  Yes    ACP (advance care planning) [Z71.89]  Not Applicable    Anemia of chronic disease [D62]  Yes    Chronic heart failure with preserved ejection fraction [I50.32]  Yes    Cirrhosis [K74.60]  Yes    Pulmonary emphysema [J43.9]  Yes    History of rectal or anal cancer [Z85.048]  Yes    Coronary artery disease involving native coronary artery of native heart without angina pectoris [I25.10]  Yes     Chronic    Essential hypertension [I10]  Yes     Chronic    HLD (hyperlipidemia) [E78.5]  Yes     Chronic    PAD (peripheral artery disease) [I73.9]  Yes    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 hypertension [E11.22, I12.9, N18.30]  Yes     Chronic      Resolved Hospital Problems   No resolved problems to display.       Hospice Qualifying Diagnoses: failure to thrive, debility, stage IV pressure ulcer        Patient has a life expectancy < 6 months due to:  Primary Hospice Diagnosis:  failure to thrive  Comorbid Conditions Contributing to Decline:  stage IV pressure ulcer, CKD, AOCD, CHF, cirrhosis, pulmonary emphysema, CAD, PAD, DM   Vital Signs: Routine per Hospice Protocol.    Code Status: DNR    Allergies:   Review of patient's allergies indicates:   Allergen Reactions    Chantix [varenicline]      Tongue swelling      Wellbutrin [bupropion hcl]      Tongue swelling         Diet:  Cardiac, renal   Activities: As tolerated    Goals of Care Treatment Preferences:  Code Status: DNR      Nursing: Per Hospice Routine.    Routine Skin for Bedridden Patients: Apply moisture barrier cream to all skin folds and   wet areas in perineal area  daily and after baths and all bowel movements.        Other Miscellaneous Care:   Wound care BID    Nursing to cleanse R buttock wound with quarter-strength Dakin's solution .  Moisten gauze with Dakin's solution and apply to wound. Cover with abd pad and secure with cloth tape. Change BID and prn incontinent episode.            Medications:        Medication List        START taking these medications      docusate sodium 100 MG capsule  Commonly known as: COLACE  Take 1 capsule (100 mg total) by mouth 2 (two) times daily.     lactulose 20 gram/30 mL Soln  Commonly known as: CHRONULAC  Take 30 mLs (20 g total) by mouth once daily. for 7 days            CONTINUE taking these medications      atorvastatin 80 MG tablet  Commonly known as: LIPITOR  Take 1 tablet (80 mg total) by mouth once daily.     xzoops CHEWABLE ASPIRIN 81 MG Chew  Generic drug: aspirin  Take 1 tablet (81 mg total) by mouth once daily.     carvediloL 25 MG tablet  Commonly known as: COREG  Take 1 tablet by mouth 2 (two) times daily.     clopidogreL 75 mg tablet  Commonly known as: PLAVIX  Take 75 mg by mouth once daily.     ezetimibe 10 mg tablet  Commonly known as: ZETIA  Take 10 mg by mouth once daily.     furosemide 20 MG tablet  Commonly known as: LASIX  Take 20 mg by mouth once daily.     NIFEdipine 30 MG (OSM) 24 hr tablet  Commonly known as: PROCARDIA-XL  Take 1 tablet (30 mg total) by mouth once daily.     sodium bicarbonate 650 MG tablet  Take 1 tablet (650 mg total) by mouth 3 (three) times daily.                    Future Orders:  Hospice Medical Director may dictate new orders for comfortable care measures & sign death certificate.        _________________________________  Aspen Pagan NP  07/01/2023

## 2023-07-01 NOTE — ASSESSMENT & PLAN NOTE
Present on admission   - Consult wound care  - Will consult IR about possible drainage of gluteal abscess  - Will hold on abx as no signs of sepsis or infection; would be best to start after possible abscess drainage      IR - unable to obtain drainage   Per general surgery- no indication for surgery and does not appear infected   Wound care consulted- will continue wound care   Was started on vancomycin, zosyn- will stop

## 2023-07-01 NOTE — ASSESSMENT & PLAN NOTE
- Hgb around baseline of 8  - Asymptomaitc  - Likely 2/2 CKD  - Monitor      hgb 7 7/1  Anemia panel   UA   Stool for OCB   Low threshold for transfusion

## 2023-07-01 NOTE — PLAN OF CARE
The sw spoke to the pt via her cellphone and she states she does want to look into hospice. She states she doesn't have a preference on the agency. She asked the sw to wait before calling any agencies b/c she wants to speak with her dtr Deana [phone] who she lives with currently. The sw spoke to Lety [phone] another dtr who states she spoke to [facility] [place] and understands if the pt wants hospice.Lety states she reached out to Deana and she understands also the pt has been ill for quite some time now. They respect the pt's decision for wanting hospice. She states her sister Deana is out of town but she has no issues with coming to transport the pt home today. The sw reached out to Millie with [facility] and informed her of this info mentioned above. Millie states she will pass this info over to Ladan who will f/u with this sw and will reach out to the pt and Lety to arrange a time for her to come meet with them to discuss home hospice with their agency. The sw asked [facility] to write the home hospice orders. The sw informed Millie they were written.        07/01/23 1444   Post-Acute Status   Post-Acute Authorization Hospice   Hospice Status Referrals Sent   Discharge Plan   Discharge Plan A Hospice/home

## 2023-07-01 NOTE — ASSESSMENT & PLAN NOTE
- No signs of pulm edema or overt volume overload  - Does have some peripheral edema  - Continue PO lasix - hold today 7/1 with ALESIA   - Continue HTN control

## 2023-07-01 NOTE — PLAN OF CARE
Problem: Adult Inpatient Plan of Care  Goal: Plan of Care Review  Outcome: Ongoing, Progressing  Goal: Absence of Hospital-Acquired Illness or Injury  Outcome: Ongoing, Progressing  Goal: Optimal Comfort and Wellbeing  Outcome: Ongoing, Progressing     Problem: Fluid and Electrolyte Imbalance (Acute Kidney Injury/Impairment)  Goal: Fluid and Electrolyte Balance  Outcome: Ongoing, Progressing     Problem: Oral Intake Inadequate (Acute Kidney Injury/Impairment)  Goal: Optimal Nutrition Intake  Outcome: Ongoing, Progressing    Frequent rounds and weight shifting completed throughout shift. VSS with no acute distress noted. Safety precautions maintained.

## 2023-07-01 NOTE — SUBJECTIVE & OBJECTIVE
Interval hx: This am patient is refusing care. Patient presented with falls- no new injury. Noted to have pressure ulcer to sacrum- no signs of infection- per general surgery no indication for surgery.   -will continue wound care and stop abx.   -hgb 7 - plan to obtain anemia panel, work up   -ALESIA- plan to work up   -patient refusing care. Is DNR. Does not want to be turned by nurses. I made multiple attempts to call family- no answer.   Palliative care consulted to help meet patient needs. Patient would be a good candidate for hospice services.     Objective:     Vital Signs (Most Recent):  Temp: 96.9 °F (36.1 °C) (07/01/23 0753)  Pulse: 78 (07/01/23 1140)  Resp: 18 (07/01/23 0753)  BP: 129/60 (07/01/23 0753)  SpO2: 95 % (07/01/23 0753) Vital Signs (24h Range):  Temp:  [96.3 °F (35.7 °C)-99.6 °F (37.6 °C)] 96.9 °F (36.1 °C)  Pulse:  [74-92] 78  Resp:  [18] 18  SpO2:  [89 %-95 %] 95 %  BP: (124-129)/(56-61) 129/60     Weight: 61.7 kg (136 lb 0.4 oz)  Body mass index is 24.88 kg/m².     Physical Exam  Vitals and nursing note reviewed.   Constitutional:       General: She is not in acute distress.     Appearance: She is normal weight.   HENT:      Head: Normocephalic and atraumatic.      Mouth/Throat:      Mouth: Mucous membranes are moist.   Eyes:      Extraocular Movements: Extraocular movements intact.      Pupils: Pupils are equal, round, and reactive to light.   Cardiovascular:      Rate and Rhythm: Normal rate and regular rhythm.      Pulses:           Femoral pulses are 2+ on the right side and 2+ on the left side.       Dorsalis pedis pulses are 1+ on the right side and 1+ on the left side.        Posterior tibial pulses are detected w/ Doppler on the right side and 1+ on the left side.      Heart sounds: Murmur heard.   Pulmonary:      Effort: Pulmonary effort is normal.      Breath sounds: Normal breath sounds. No wheezing or rales.   Abdominal:      General: Abdomen is flat. Bowel sounds are normal. There  is no distension.      Palpations: Abdomen is soft.      Tenderness: There is no abdominal tenderness.   Musculoskeletal:      Right lower leg: Edema present.      Left lower leg: Edema present.   Skin:     General: Skin is warm.      Capillary Refill: Capillary refill takes less than 2 seconds.   Neurological:      General: No focal deficit present.      Mental Status: She is alert and oriented to person, place, and time. Mental status is at baseline.                CRANIAL NERVES     CN III, IV, VI   Pupils are equal, round, and reactive to light.     Significant Labs: All pertinent labs within the past 24 hours have been reviewed.    Significant Imaging: I have reviewed all pertinent imaging results/findings within the past 24 hours.

## 2023-07-01 NOTE — CARE UPDATE
Patient refusing care. Attempted to call both daughters Deana and Lety to give updates and seek social assistance. No answer.   Palliative care also consulted to assist meeting patient's needs.

## 2023-07-01 NOTE — ASSESSMENT & PLAN NOTE
Patient:   HARMEET MCGOWAN            MRN: [id number]            FIN: [id number]              Age:   74 years     Sex:  MALE     :  50   Associated Diagnoses:   None   Author:   ADELE TRUJILLO A     Chief Complaint   Cardiac management     History of Present Illness   The patient is a 74 y.o. man, who follows with Dr. Matamoros, with medical history of HTN, HLD, arthritis, BPH, permanent atrial fibrillation on anticoagulation with Coumadin, arterosclerotic vascular disease with bilateral carotid stenosis s/p CEA, CAD with MI s/p CABG and  stent placement, and DM, who presented to [facility] with sever anemia due to GI bleed with melena and syncope with fall, and was consequtively transferred to [facility]. CT head was negative for acute abnormality. GI bleeding scan showed active low volume intraluminal hemorrhage in jejunum.     Review of Systems   Constitutional:  Negative except as documented in history of present illness.   Eye:  Negative except as documented in history of present illness.   Ear/Nose/Mouth/Throat:  Negative except as documented in history of present illness.   Cardiovascular:  Negative except as documented in history of present illness.   Respiratory:  Negative except as documented in history of present illness.   Gastrointestinal:  Negative except as documented in history of present illness.   Genitourinary:  Negative except as documented in history of present illness.   Musculoskeletal:  Negative except as documented in history of present illness.   Integumentary:  Negative except as documented in history of present illness,  .   Hematology/Lymphatics:  Negative except as documented in history of present illness.   Neurologic:  Negative except as documented in history of present illness.   Endocrine:  Negative except as documented in history of present illness.   Allergy/Immunologic:     Allergies (1) Active Reaction  NKA None Documented  .    Psychiatric:  Negative  - Avoid nsaids or nephrotoxic drugs  - Continue PO bicarb (bicarb < 18)  - Monitor UOP      Cr 1.3-->1.8  PC ratio  FeNa -  UA   IVF    except as documented in history of present illness.      Histories   Past Med History: Past Medical History   Atrial fibrillation  H/O: blood transfusion  Hypertension    Family History:    No family history items have been selected or recorded., No family history recorded.  , Father  young of unknown reason  Sister also  young abroad of unknown reason   Procedure History:    Stent placement ([id number])., No qualifying data available.   , CABG in   shoulder surgery  colonoscopy   Social History       Alcohol  Details: Alcohol Abuse in Household: No.  Use: None.  Exercise  Details: Exercise: Regularly.  Tobacco  Details: Smoker in [place]: No.  Smoked/Smokeless Tobacco Last 30 Days: No.  Smoking Tobacco Use: Former smoker. Quit 25 years ago. Smokeless Tobacco Use Never.  Type: Cigarettes.  .       Health Status   Allergies:    Allergic Reactions (All)  NKA, Allergies (ST)   Allergies (1) Active Reaction  NKA None Documented    Current medications:    Medications (12) Active  Scheduled: (8)  AmLODIPine 5 mg tab  5 mg 1 tab, Oral, Daily  Atorvastatin 80 mg tab  80 mg 1 tab, Oral, Daily  Fenofibrate micronized 134 mg cap  134 mg 1 cap, Oral, Daily  Insulin human lispro 1 unit/0.01 mL inj  1-6 units, Subcutaneous, Q6H  Metoprolol tartrate 25 mg tab  25 mg 1 tab, Oral, Daily  NF  Medication  1 tablet, Oral, BID  Pantoprazole 40 mg DR tab  40 mg 1 tab, Oral, Daily  Tamsulosin 0.4 mg cap  0.4 mg 1 cap, Oral, Daily  Continuous: (1)  Lactated Ringers 1,000 mL  1,000 mL, IV, 80 mL/hr  PRN: (3)  Dextrose (glucose) 40% 15 gm/37.5 gm oral gel UD  15 gm, Oral, As Directed PRN  Dextrose (glucose) 50% 25 gm/50 mL syringe  12.5 gm 25 mL, IV Push, As Directed PRN  Glucagon 1 mg/1 mL emergency kit SDV  1 mg 1 mL, IM, As Directed PRN      Physical Examination   VS/Measurements     Vitals between:   28-SEP-2020 11:39:11   TO   29-SEP-2020 11:39:11                   LAST RESULT MINIMUM MAXIMUM  Temperature 36.6 36.6  36.8  Heart Rate 82 57 82  NISBP           146 107 146  NIDBP           69 62 70  SpO2                    98 96 98    General:  Alert and oriented, No acute distress.    Eye:  Pupils are equal, round and reactive to light.    Neck:  Supple, Non-tender, No carotid bruit, No jugular venous distention, No lymphadenopathy, No thyromegaly, CEA scars on both sides of the neck.   Respiratory:  Lungs are clear to auscultation.    Cardiovascular:  Normal rate, No JVD, a fib, irregularily irregular.   Gastrointestinal:  Soft, Non-tender, Normal bowel sounds, dark stools.   Genitourinary:  No costovertebral angle tenderness.    Lymphatics:  No lymphadenopathy neck, axilla, groin.    Musculoskeletal:  Normal range of motion.    Integumentary:  Warm, Pink, Moist, No rash, No edema, No DVT, No clubbing.   Neurologic:  Alert, Oriented.    Cognition and Speech:  Oriented.    Psychiatric:  Cooperative, Appropriate mood & affect, Normal judgment, No neuro deficits.      Review / Management   Laboratory results:     Labs between:  28-SEP-2020 11:39 to 29-SEP-2020 11:39  CBC:                 WBC  HgB  Hct  Plt  MCV  RDW   29-SEP-2020 4.7  (L) 8.6  (L) 27.8  253  92.7  (H) 16.4   BMP:                 Na  Cl  BUN  Glu   29-SEP-2020 145  (H) 115  16  (H) 118                              K  CO2  Cr  Ca                              3.9  25  0.99  8.6   POC GLU:                 Latest Result  Latest Date  Minimum  Min Date  Maximum  Max Date                             (H) 134  28-SEP-2020 (H) 134  28-SEP-2020 (H) 134              COAG:                 INR  PT  PTT  Ddimer  Fibrinogen    29-SEP-2020 1.3  (H) 13.1                        , Coag  INR (Lab): 1.3 (09/29/20 06:26:00)  Protime: 13.1 seconds High (09/29/20 06:26:00),   Cardiovascular Labs   No cardiovascular lab results found over the previous 48 hours.,   Respiratory Labs    No Resp results found over the defined time period.    Radiology results       Lines and Tubes:     LINES  Peripheral Intravenous Forearm Right   Gauge: 20   Charted: 09/28/20 23:40  Inserted: 09/27/20   Days Since Insertion: 1 days   Pre Hospital: Yes  Indication of Use: Saline Lock  Peripheral Intravenous Antecubital Left   Gauge: 20   Charted: 09/28/20 23:40  Inserted: 09/28/20   Days Since Insertion:  Pre Hospital: Yes  Indication of Use: Saline Lock   ,   I & O between:  28-SEP-2020 11:39 TO 29-SEP-2020 11:39  Med Dosing Weight:  0.0                  24 Hour Intake:   0.00                24 Hour Output:   250.00           24 Hour Urine/Stool Output:   0.0  24 Hour Balance:   -250.00           24 Hour Urine Output:   250.00                .      Impression and Plan   Syncope, s/p fall  -most likely vasovagal response to severe acute blood loss and anemia  -s/p blood transfusions at [facility]  -keep Hgb<7, transfuse as need  -I will check orthostatic vitals if possible now and q shift  -encourage PO hydration  -continue IVF  -I ordered echo, baseline EKG and TSH.  Permanent atrial fibrillation  -baseline EKG ordered  -telemetry reviewed by me showed atrial fibrillation with controlled VR, PVCs and short runs of NSVT.  -keep K~4 and Mg ~2.  -continue metoprolol for HR control  -patient was on antiicoagulation with coumadin; however, it was stopped due to GI bleed and anemia. He will need anticoagulation restarted whenever ok with GI service.  -may be a candidate for Watchman device.  Acute GI bleed with severe anemia  -noted to have supratherapeutic INR at [facility]  -s/p blood transfusions   -GI bleeding scan showed active low volume intraluminal hemorrhage in jejunum.  -s/p colonoscopy at [facility] which showed internal hemorrhoids.  -patient was transfered to [facility] for video capsule endoscopy.  -monitor H&H.  -hold asa, coumadin  History of coronary artery disease  -reported history of CABG and stent  -on beta blocker and statin  -asa on hold  -I ordered echo and EKG. EKG reviewed by me showed atrial  fibrillation with controlled ventricular response and nonspecific T wave abnormality.  Hypertension  -well controlled BP  -on norvasc and metoprolol  Dyslipidemia  -on lipitor and fenofibrate  -very low lipid profile values from OSH noted   -decrease lipitor to 10mg QHs  -discontinue fenofibrate at this time  -outpatient follow-up.  Diabetes mellitus  -management per primary service.  Thank you for the consult. We will follow.

## 2023-07-01 NOTE — PROGRESS NOTES
Pharmacokinetic Assessment Follow Up: IV Vancomycin    Vancomycin serum concentration assessment(s):    The random level was drawn correctly and can be used to guide therapy at this time. The measurement is within the desired definitive target range of 10 to 15 mcg/mL.    Vancomycin Regimen Plan:    Re-dose when the random level is less than 15 mcg/mL, next level to be drawn at 0000 on 7/2    Drug levels (last 3 results):  Recent Labs   Lab Result Units 06/30/23 2014   Vancomycin, Random ug/mL 12.7       Pharmacy will continue to follow and monitor vancomycin.    Please contact pharmacy at extension [phone] for questions regarding this assessment.    Thank you for the consult,   Tangela Smith       Patient brief summary:  Steph Monroe is a 77 y.o. female initiated on antimicrobial therapy with IV Vancomycin for treatment of skin & soft tissue infection    The patient's current regimen is pulse dosing    Drug Allergies:   Review of patient's allergies indicates:   Allergen Reactions    Chantix [varenicline]      Tongue swelling      Wellbutrin [bupropion hcl]      Tongue swelling         Actual Body Weight:   61.7 kg    Renal Function:   Estimated Creatinine Clearance: 32.3 mL/min (based on SCr of 1.3 mg/dL).,     Dialysis Method (if applicable):  N/A    CBC (last 72 hours):  Recent Labs   Lab Result Units 06/29/23 1838 06/30/23  0555   WBC K/uL 11.31 11.81   Hemoglobin g/dL 8.1* 8.0*   Hematocrit % 26.0* 26.2*   Platelets K/uL 247 244   Gran % % 87.9* 87.4*   Lymph % % 4.0* 4.4*   Mono % % 7.4 7.4   Eosinophil % % 0.1 0.1   Basophil % % 0.1 0.0   Differential Method  Automated Automated       Metabolic Panel (last 72 hours):  Recent Labs   Lab Result Units 06/29/23 1838 06/30/23  0555   Sodium mmol/L 137 134*   Potassium mmol/L 4.4 4.1   Chloride mmol/L 107 108   CO2 mmol/L 20* 15*   Glucose mg/dL 137* 117*   BUN mg/dL 32* 28*   Creatinine mg/dL 1.5* 1.3   Albumin g/dL 2.1* 2.0*   Total Bilirubin mg/dL 0.6  0.6   Alkaline Phosphatase U/L 169* 148*   AST U/L 66* 56*   ALT U/L 25 22       Vancomycin Administrations:  vancomycin given in the last 96 hours                     vancomycin 1,500 mg in dextrose 5 % (D5W) 250 mL IVPB (Vial-Mate) (mg) 1,500 mg New Bag 06/29/23 2000                    Microbiologic Results:  Microbiology Results (last 7 days)       Procedure Component Value Units Date/Time    Culture, Anaerobe [[id number]]     Order Status: No result Specimen: Abscess from Buttocks, Left     Aerobic culture [[id number]]     Order Status: No result Specimen: Abscess from Buttocks, Left     Gram stain [[id number]]     Order Status: No result Specimen: Abscess from Buttocks, Left

## 2023-07-01 NOTE — NURSING
VS taken Turned pt, performed wound care and kimberlee care, sat pt up for dinner, heel boots on. Bed in lowest position, call bell within reach.

## 2023-07-01 NOTE — ASSESSMENT & PLAN NOTE
- ACP time: 15 minutes  - DNR per patient. Says she has lived a long time and tired of illness and hospitals  - Would be OK with reversal if needed for procedure      7/1  Patient refusing care, turning, medications, telling nurses to leave her room.   Tried to call family- no answer   Palliative and  consulted to honor patient's wishes  Will discuss hospice care going further   Will continue to try to contact family     Social has been working on NH placement

## 2023-07-01 NOTE — ASSESSMENT & PLAN NOTE
- Consult wound care  - Will consult IR about possible drainage of gluteal abscess  - Will hold on abx as no signs of sepsis or infection; would be best to start after possible abscess drainage      IR - unable to obtain drainage   Per general surgery- no indication for surgery and does not appear infected   Wound care consulted- will continue wound care   Was started on vancomycin, zosyn- will stop

## 2023-07-01 NOTE — NURSING
"Pt is adamantly refusing labs, medication and all care.  I tried educating her on the importance of letting us turn her and performing wound care. Pt continued to say,  "get out and leave me alone I know my rights."  MD notified.   "

## 2023-07-02 LAB
ALBUMIN SERPL BCP-MCNC: 1.9 G/DL (ref 3.5–5.2)
ALP SERPL-CCNC: 126 U/L (ref 55–135)
ALT SERPL W/O P-5'-P-CCNC: 15 U/L (ref 10–44)
ANION GAP SERPL CALC-SCNC: 10 MMOL/L (ref 8–16)
ANION GAP SERPL CALC-SCNC: 11 MMOL/L (ref 8–16)
AST SERPL-CCNC: 32 U/L (ref 10–40)
BACTERIA #/AREA URNS HPF: ABNORMAL /HPF
BASOPHILS # BLD AUTO: 0.01 K/UL (ref 0–0.2)
BASOPHILS NFR BLD: 0.1 % (ref 0–1.9)
BILIRUB SERPL-MCNC: 0.5 MG/DL (ref 0.1–1)
BILIRUB UR QL STRIP: NEGATIVE
BUN SERPL-MCNC: 37 MG/DL (ref 8–23)
BUN SERPL-MCNC: 38 MG/DL (ref 8–23)
CALCIUM SERPL-MCNC: 7.7 MG/DL (ref 8.7–10.5)
CALCIUM SERPL-MCNC: 7.9 MG/DL (ref 8.7–10.5)
CHLORIDE SERPL-SCNC: 106 MMOL/L (ref 95–110)
CHLORIDE SERPL-SCNC: 108 MMOL/L (ref 95–110)
CLARITY UR: ABNORMAL
CO2 SERPL-SCNC: 18 MMOL/L (ref 23–29)
CO2 SERPL-SCNC: 19 MMOL/L (ref 23–29)
COLOR UR: YELLOW
CREAT SERPL-MCNC: 2.1 MG/DL (ref 0.5–1.4)
CREAT SERPL-MCNC: 2.1 MG/DL (ref 0.5–1.4)
DIFFERENTIAL METHOD: ABNORMAL
EOSINOPHIL # BLD AUTO: 0.1 K/UL (ref 0–0.5)
EOSINOPHIL NFR BLD: 0.6 % (ref 0–8)
ERYTHROCYTE [DISTWIDTH] IN BLOOD BY AUTOMATED COUNT: 16.2 % (ref 11.5–14.5)
EST. GFR  (NO RACE VARIABLE): 24 ML/MIN/1.73 M^2
EST. GFR  (NO RACE VARIABLE): 24 ML/MIN/1.73 M^2
FOLATE SERPL-MCNC: 6.1 NG/ML (ref 4–24)
GLUCOSE SERPL-MCNC: 123 MG/DL (ref 70–110)
GLUCOSE SERPL-MCNC: 133 MG/DL (ref 70–110)
GLUCOSE UR QL STRIP: NEGATIVE
HCT VFR BLD AUTO: 23.4 % (ref 37–48.5)
HGB BLD-MCNC: 7.4 G/DL (ref 12–16)
HGB UR QL STRIP: NEGATIVE
HYALINE CASTS #/AREA URNS LPF: 0 /LPF
IMM GRANULOCYTES # BLD AUTO: 0.05 K/UL (ref 0–0.04)
IMM GRANULOCYTES NFR BLD AUTO: 0.6 % (ref 0–0.5)
IRON SERPL-MCNC: 12 UG/DL (ref 30–160)
KETONES UR QL STRIP: ABNORMAL
LEUKOCYTE ESTERASE UR QL STRIP: ABNORMAL
LYMPHOCYTES # BLD AUTO: 0.4 K/UL (ref 1–4.8)
LYMPHOCYTES NFR BLD: 5 % (ref 18–48)
MCH RBC QN AUTO: 26.8 PG (ref 27–31)
MCHC RBC AUTO-ENTMCNC: 31.6 G/DL (ref 32–36)
MCV RBC AUTO: 85 FL (ref 82–98)
MICROSCOPIC COMMENT: ABNORMAL
MONOCYTES # BLD AUTO: 0.6 K/UL (ref 0.3–1)
MONOCYTES NFR BLD: 6.7 % (ref 4–15)
NEUTROPHILS # BLD AUTO: 7.2 K/UL (ref 1.8–7.7)
NEUTROPHILS NFR BLD: 87 % (ref 38–73)
NITRITE UR QL STRIP: NEGATIVE
NON-SQ EPI CELLS #/AREA URNS HPF: 1 /HPF
NRBC BLD-RTO: 0 /100 WBC
PH UR STRIP: 6 [PH] (ref 5–8)
PLATELET # BLD AUTO: 207 K/UL (ref 150–450)
PMV BLD AUTO: 9.4 FL (ref 9.2–12.9)
POTASSIUM SERPL-SCNC: 4 MMOL/L (ref 3.5–5.1)
POTASSIUM SERPL-SCNC: 4.2 MMOL/L (ref 3.5–5.1)
PROT SERPL-MCNC: 5.9 G/DL (ref 6–8.4)
PROT UR QL STRIP: ABNORMAL
RBC # BLD AUTO: 2.76 M/UL (ref 4–5.4)
RBC #/AREA URNS HPF: 0 /HPF (ref 0–4)
SATURATED IRON: 7 % (ref 20–50)
SODIUM SERPL-SCNC: 135 MMOL/L (ref 136–145)
SODIUM SERPL-SCNC: 137 MMOL/L (ref 136–145)
SP GR UR STRIP: >1.03 (ref 1–1.03)
SQUAMOUS #/AREA URNS HPF: 3 /HPF
TOTAL IRON BINDING CAPACITY: 178 UG/DL (ref 250–450)
TRANSFERRIN SERPL-MCNC: 120 MG/DL (ref 200–375)
URN SPEC COLLECT METH UR: ABNORMAL
UROBILINOGEN UR STRIP-ACNC: NEGATIVE EU/DL
VIT B12 SERPL-MCNC: 560 PG/ML (ref 210–950)
WBC # BLD AUTO: 8.25 K/UL (ref 3.9–12.7)
WBC #/AREA URNS HPF: 10 /HPF (ref 0–5)

## 2023-07-02 PROCEDURE — 36415 COLL VENOUS BLD VENIPUNCTURE: CPT | Performed by: STUDENT IN AN ORGANIZED HEALTH CARE EDUCATION/TRAINING PROGRAM

## 2023-07-02 PROCEDURE — 97166 OT EVAL MOD COMPLEX 45 MIN: CPT

## 2023-07-02 PROCEDURE — 81000 URINALYSIS NONAUTO W/SCOPE: CPT | Performed by: NURSE PRACTITIONER

## 2023-07-02 PROCEDURE — 36415 COLL VENOUS BLD VENIPUNCTURE: CPT | Performed by: NURSE PRACTITIONER

## 2023-07-02 PROCEDURE — 80053 COMPREHEN METABOLIC PANEL: CPT | Performed by: STUDENT IN AN ORGANIZED HEALTH CARE EDUCATION/TRAINING PROGRAM

## 2023-07-02 PROCEDURE — 96372 THER/PROPH/DIAG INJ SC/IM: CPT | Performed by: NURSE PRACTITIONER

## 2023-07-02 PROCEDURE — 51798 US URINE CAPACITY MEASURE: CPT

## 2023-07-02 PROCEDURE — 97530 THERAPEUTIC ACTIVITIES: CPT

## 2023-07-02 PROCEDURE — 11000001 HC ACUTE MED/SURG PRIVATE ROOM

## 2023-07-02 PROCEDURE — 25000003 PHARM REV CODE 250: Performed by: NURSE PRACTITIONER

## 2023-07-02 PROCEDURE — 97116 GAIT TRAINING THERAPY: CPT

## 2023-07-02 PROCEDURE — 85025 COMPLETE CBC W/AUTO DIFF WBC: CPT | Performed by: STUDENT IN AN ORGANIZED HEALTH CARE EDUCATION/TRAINING PROGRAM

## 2023-07-02 PROCEDURE — 25000003 PHARM REV CODE 250: Performed by: INTERNAL MEDICINE

## 2023-07-02 PROCEDURE — 80048 BASIC METABOLIC PNL TOTAL CA: CPT | Mod: XB | Performed by: NURSE PRACTITIONER

## 2023-07-02 PROCEDURE — 63600175 PHARM REV CODE 636 W HCPCS: Performed by: NURSE PRACTITIONER

## 2023-07-02 PROCEDURE — 97162 PT EVAL MOD COMPLEX 30 MIN: CPT

## 2023-07-02 RX ORDER — SODIUM CHLORIDE 9 MG/ML
INJECTION, SOLUTION INTRAVENOUS CONTINUOUS
Status: DISCONTINUED | OUTPATIENT
Start: 2023-07-02 | End: 2023-07-02

## 2023-07-02 RX ORDER — SODIUM CHLORIDE 9 MG/ML
INJECTION, SOLUTION INTRAVENOUS CONTINUOUS
Status: ACTIVE | OUTPATIENT
Start: 2023-07-02 | End: 2023-07-03

## 2023-07-02 RX ADMIN — SODIUM BICARBONATE 650 MG TABLET 650 MG: at 08:07

## 2023-07-02 RX ADMIN — DOCUSATE SODIUM 100 MG: 100 CAPSULE, LIQUID FILLED ORAL at 09:07

## 2023-07-02 RX ADMIN — SODIUM BICARBONATE 650 MG TABLET 650 MG: at 09:07

## 2023-07-02 RX ADMIN — SODIUM CHLORIDE: 9 INJECTION, SOLUTION INTRAVENOUS at 05:07

## 2023-07-02 RX ADMIN — CLOPIDOGREL BISULFATE 75 MG: 75 TABLET ORAL at 09:07

## 2023-07-02 RX ADMIN — SODIUM CHLORIDE: 9 INJECTION, SOLUTION INTRAVENOUS at 09:07

## 2023-07-02 RX ADMIN — NIFEDIPINE 30 MG: 30 TABLET, FILM COATED, EXTENDED RELEASE ORAL at 09:07

## 2023-07-02 RX ADMIN — CARVEDILOL 25 MG: 25 TABLET, FILM COATED ORAL at 08:07

## 2023-07-02 RX ADMIN — CARVEDILOL 25 MG: 25 TABLET, FILM COATED ORAL at 09:07

## 2023-07-02 RX ADMIN — ATORVASTATIN CALCIUM 80 MG: 40 TABLET, FILM COATED ORAL at 09:07

## 2023-07-02 RX ADMIN — EZETIMIBE 10 MG: 10 TABLET ORAL at 09:07

## 2023-07-02 RX ADMIN — ENOXAPARIN SODIUM 30 MG: 30 INJECTION SUBCUTANEOUS at 09:07

## 2023-07-02 RX ADMIN — SODIUM BICARBONATE 650 MG TABLET 650 MG: at 02:07

## 2023-07-02 RX ADMIN — ASPIRIN 81 MG CHEWABLE TABLET 81 MG: 81 TABLET CHEWABLE at 09:07

## 2023-07-02 RX ADMIN — DOCUSATE SODIUM 100 MG: 100 CAPSULE, LIQUID FILLED ORAL at 08:07

## 2023-07-02 NOTE — ASSESSMENT & PLAN NOTE
- Avoid nsaids or nephrotoxic drugs  - Continue PO bicarb (bicarb < 18)  - Monitor UOP      Cr 1.3-->1.8 --> 2.1  PC ratio  FeNa -  UA   IVF -- continue  Patient appears with prerenal IVVD   Patient agrees to IV and IVF today - will hydrate IV and PO

## 2023-07-02 NOTE — PROGRESS NOTES
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    Patient states she has not had any claudication symptoms since her procedure as above.  However, she has been feeling weak in the legs and has fallen a couple times.  Most recently she fell yesterday and could not get up.  This prompted EMS to arrive and take her to the emergency department.  At this time she was found to have a significant, albeit healing, sacral decubitus ulcer.  And she also seemed very deconditioned.  Denies any chest pain, shortness a breath, syncope, dizziness, claudication symptoms, leg numbness or tingling.    CT imaging showed no osteomyelitis but concerning gluteal abscess. Admitted for observation for wound care consult and possible surgery evaluation vs IR of the wound if needed.      Overview/Hospital Course:  No notes on file    Interval hx: patient denies new complaints.   I&o likely inaccurate.   After discussion patient had with her daughter Deana, patient wants to seek discharge to a SNF facility.   I explained to the patient she appears very dehydrated. She agrees to IV and IVF today. Encouraged PO intake.       Objective:     Vital Signs (Most Recent):  Temp: 96.1 °F (35.6 °C) (07/02/23 0728)  Pulse: 63 (07/02/23 0728)  Resp: 18 (07/02/23 0728)  BP: 127/60 (07/02/23 0728)  SpO2: 96 % (07/02/23 0728) Vital Signs (24h Range):  Temp:  [96 °F (35.6 °C)-97.4 °F (36.3 °C)] 96.1 °F (35.6 °C)  Pulse:  [63-78] 63  Resp:  [16-18] 18  SpO2:  [92 %-96 %] 96  %  BP: (115-127)/(54-60) 127/60     Weight: 61.7 kg (136 lb 0.4 oz)  Body mass index is 24.88 kg/m².     Physical Exam  Vitals and nursing note reviewed.   Constitutional:       General: She is not in acute distress.     Appearance: She is normal weight.   HENT:      Head: Normocephalic and atraumatic.      Mouth/Throat:      Mouth: Mucous membranes are moist.   Eyes:      Extraocular Movements: Extraocular movements intact.      Pupils: Pupils are equal, round, and reactive to light.   Cardiovascular:      Rate and Rhythm: Normal rate and regular rhythm.      Pulses:           Femoral pulses are 2+ on the right side and 2+ on the left side.       Dorsalis pedis pulses are 1+ on the right side and 1+ on the left side.        Posterior tibial pulses are detected w/ Doppler on the right side and 1+ on the left side.      Heart sounds: Murmur heard.   Pulmonary:      Effort: Pulmonary effort is normal.      Breath sounds: Normal breath sounds. No wheezing or rales.   Abdominal:      General: Abdomen is flat. Bowel sounds are normal. There is no distension.      Palpations: Abdomen is soft.      Tenderness: There is no abdominal tenderness.   Musculoskeletal:      Right lower leg: Edema present.      Left lower leg: Edema present.   Skin:     General: Skin is warm.      Capillary Refill: Capillary refill takes less than 2 seconds.      Comments: R foot toes with ischemia wounds. Without erythema, edema, or drainage.    Neurological:      General: No focal deficit present.      Mental Status: She is alert and oriented to person, place, and time. Mental status is at baseline.                CRANIAL NERVES     CN III, IV, VI   Pupils are equal, round, and reactive to light.     Significant Labs: All pertinent labs within the past 24 hours have been reviewed.    Significant Imaging: I have reviewed all pertinent imaging results/findings within the past 24 hours.  Review of Systems      Assessment/Plan:      * Stage IV  pressure ulcer of right buttock  Present on admission   - Consult wound care  - Will consult IR about possible drainage of gluteal abscess  - Will hold on abx as no signs of sepsis or infection; would be best to start after possible abscess drainage       IR - unable to obtain drainage   Per general surgery- no indication for surgery and does not appear infected   Wound care consulted- will continue wound care   Was started on vancomycin, zosyn- will stop       ACP (advance care planning)  - ACP time: 15 minutes  - DNR per patient. Says she has lived a long time and tired of illness and hospitals  - Would be OK with reversal if needed for procedure      7/1  Patient refusing care, turning, medications, telling nurses to leave her room.   Tried to call family- no answer   Palliative and  consulted to honor patient's wishes  Will discuss hospice care going further   Will continue to try to contact family     Social has been working on NH placement       7/2  After patient's discussion with her daughter Deana, she now wants to forego hospice and wants to discharge to a SNF facility  will consult PT/OT    Stage 3b chronic kidney disease  - Avoid nsaids or nephrotoxic drugs  - Continue PO bicarb (bicarb < 18)  - Monitor UOP        ALESIA on CKD   Cr 1.3-->1.8 --> 2.1  PC ratio  FeNa -  UA   IVF -- continue  Patient appears with prerenal IVVD   CK trended down.   Patient agrees to IV and IVF today - will hydrate IV and PO     Anemia of chronic disease  - Hgb around baseline of 8  - Asymptomaitc  - Likely 2/2 CKD  - Monitor      hgb 7 7/1  Anemia panel   UA - without blood   Stool for OCB   Low threshold for transfusion     7/2  Hgb 7.4  Lab Results   Component Value Date    IRON 12 (L) 07/01/2023    TRANSFERRIN 120 (L) 07/01/2023    TIBC 178 (L) 07/01/2023    FESATURATED 7 (L) 07/01/2023      Will transfuse if Hgb < 7      Cirrhosis    S/t LEWIS   On PO lasix - held with ALESIA     Pulmonary emphysema    Lungs  clear   May use PRN nebs     Chronic heart failure with preserved ejection fraction  - No signs of pulm edema or overt volume overload  - Does have some peripheral edema  - Continue PO lasix - hold today 7/1 with ALESIA   - Continue HTN control    5/2/2023    The left ventricle is normal in size with concentric remodeling and normal systolic function.   The estimated ejection fraction is 65%.   Indeterminate left ventricular diastolic function.   There is mild mitral stenosis.   The mean diastolic gradient across the mitral valve is 5 mmHg at a heart rate of 66 bpm.   Normal right ventricular size with normal right ventricular systolic function.   There is mild aortic valve stenosis.   Aortic valve area is 1.28 cm2; peak velocity is 2.33 m/s; mean gradient is 10 mmHg.   Mild-to-moderate aortic regurgitation.   Normal central venous pressure (3 mmHg).   The estimated PA systolic pressure is 35 mmHg        Decubitus ulcer of sacral region, stage 2  - Consult wound care  - Will consult IR about possible drainage of gluteal abscess  - Will hold on abx as no signs of sepsis or infection; would be best to start after possible abscess drainage      History of rectal or anal cancer    Treated with chemo and radiation    Coronary artery disease involving native coronary artery of native heart without angina pectoris  - Continue ASA, statin  - On BB  - Monitor      PAD (peripheral artery disease)  - Plavix, statin  - S/p 5/9/23-- R CFA endarterectomy and Right iliac artery stenting  (7x39 VBX, 7x80mm Lifestent)      HLD (hyperlipidemia)  - Continue lipitor      Essential hypertension  - Continue nifedipine and coreg      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 hypertension  a1c 5   Diet controlled         VTE Risk Mitigation (From admission, onward)         Ordered     enoxaparin injection 30 mg  Daily         07/01/23 0942     IP VTE HIGH RISK PATIENT  Once         06/29/23 1140     Place sequential compression device  Until  discontinued         06/29/23 2256                Discharge Planning   PÉREZ:      Code Status: DNR   Is the patient medically ready for discharge?:     Reason for patient still in hospital (select all that apply): Patient trending condition  Discharge Plan A: Hospice/home                  Aspen Pagan NP  [facility] of [facility]

## 2023-07-02 NOTE — NURSING
Bladder scan volume showed 769, In and Out was ordered 125ml was removed. Re-scanned volume 513 remained. CT of Abdomin and Renal scan done three days ago showed moderate ascites. NP notified.

## 2023-07-02 NOTE — ASSESSMENT & PLAN NOTE
- ACP time: 15 minutes  - DNR per patient. Says she has lived a long time and tired of illness and hospitals  - Would be OK with reversal if needed for procedure      7/1  Patient refusing care, turning, medications, telling nurses to leave her room.   Tried to call family- no answer   Palliative and  consulted to honor patient's wishes  Will discuss hospice care going further   Will continue to try to contact family     Social has been working on NH placement       7/2  After patient's discussion with her daughter Deana, she now wants to forego hospice and wants to discharge to a SNF facility  will consult PT/OT

## 2023-07-02 NOTE — ASSESSMENT & PLAN NOTE
- Hgb around baseline of 8  - Asymptomaitc  - Likely 2/2 CKD  - Monitor      hgb 7 7/1  Anemia panel   UA - without blood   Stool for OCB   Low threshold for transfusion     7/2  Hgb 7.4  Lab Results   Component Value Date    IRON 12 (L) 07/01/2023    TRANSFERRIN 120 (L) 07/01/2023    TIBC 178 (L) 07/01/2023    FESATURATED 7 (L) 07/01/2023      Will transfuse if Hgb < 7

## 2023-07-02 NOTE — ASSESSMENT & PLAN NOTE
- No signs of pulm edema or overt volume overload  - Does have some peripheral edema  - Continue PO lasix - hold today 7/1 with ALESIA   - Continue HTN control    5/2/2023    The left ventricle is normal in size with concentric remodeling and normal systolic function.   The estimated ejection fraction is 65%.   Indeterminate left ventricular diastolic function.   There is mild mitral stenosis.   The mean diastolic gradient across the mitral valve is 5 mmHg at a heart rate of 66 bpm.   Normal right ventricular size with normal right ventricular systolic function.   There is mild aortic valve stenosis.   Aortic valve area is 1.28 cm2; peak velocity is 2.33 m/s; mean gradient is 10 mmHg.   Mild-to-moderate aortic regurgitation.   Normal central venous pressure (3 mmHg).   The estimated PA systolic pressure is 35 mmHg

## 2023-07-02 NOTE — PLAN OF CARE
Problem: Occupational Therapy  Goal: Occupational Therapy Goal  Description: Goals to be met by: 7/30/2023     Patient will increase functional independence with ADLs by performing:    UE Dressing with Modified [place].  LE Dressing with Modified [place] and Assistive Devices as needed.  Grooming while standing at sink with Modified [place].  Toileting from toilet with Modified [place] for hygiene and clothing management.   Toilet transfer to toilet with Modified [place].  Increased functional strength to WFL for ADLS.  Upper extremity exercise program x10 reps per handout, with independence.    Outcome: Ongoing, Progressing

## 2023-07-02 NOTE — PT/OT/SLP EVAL
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    Recommendations:     Discharge Recommendations:  (moderate intensity therapy)  Discharge Equipment Recommendations:  none  Barriers to discharge:  Decreased caregiver support    Assessment:     Steph Monroe is a 77 y.o. 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  She presents with  Performance deficits affecting function: weakness, impaired endurance, impaired self care skills, impaired functional mobility, gait instability, impaired balance, decreased coordination, decreased lower extremity function, decreased upper extremity function, decreased safety awareness, pain, impaired skin, decreased ROM, edema.      Pt AOx4, followed all commands, guarded with increased anxiety with transitional movement in bed and OOB mobility due to pain in R gluteal stage IV sacral ulcer. Pt performed bed mobility with Max Assist supine>sit, sit<>stand CGA with RW, Bed>chair stand step t/f with CGA with RW. Pt ambulated CGA with RW for short distance in room. Pt performed LE dressing TA for socks and Toileting TA with PW. She reports Indep feeding and g/hygiene seated. Pt. lives with her daughter however left alone sometimes on weekends. Pt has hx fall at home. She would benefit from Moderate Intensity therapy to return to prior to level of [place] and management of wound. Continue with OT POC.     Rehab Prognosis: Good; patient would benefit from acute skilled OT services to address  these deficits and reach maximum level of function.       Plan:     Patient to be seen 3 x/week to address the above listed problems via self-care/home management, therapeutic activities, therapeutic exercises  Plan of Care Expires: 08/02/23  Plan of Care Reviewed with:      Subjective     Chief Complaint: R gluteal pain from wound   Patient/Family Comments/goals: Agreeable to sit UI.    Occupational Profile:  Living Environment: pt lives with daughter 2SH  with the via garage; pt's bedroom and full bath on first floor with WIS with BIS; pt does not access upstairs.   Previous level of function: Indep-Mod I with RW for ambulation and ADLS; light housekeeping  Roles and Routines: homemaker  Equipment Used at Home: walker, rolling, oxygen, grab bar, bedside commode, cane, quad, raised toilet, wheelchair (walk n shower with BIS, toilet and shower grab bars, wc is transport wc)  Assistance upon Discharge: daughter    Pain/Comfort:  Pain Rating 1: 2/10  Location - Side 1: Right  Location 1: gluteal  Pain Addressed 1: Reposition, Distraction  Pain Rating Post-Intervention 1: 4/10    Patients cultural, spiritual, Baptism conflicts given the current situation: no    Objective:     Communicated with: nurse prior to session.  Patient found HOB elevated with telemetry, PureWick, peripheral IV, pressure relief boots (AVASys) upon OT entry to room.    General Precautions: Standard, fall, diabetic (stage 4 pressure ulcer  R gluteal)  Orthopedic Precautions: N/A  Braces: N/A  Respiratory Status: Room air    Occupational Performance:    Bed Mobility:    Patient completed Rolling/Turning to Right with moderate assistance and side rail, extra time, guarded, pain  Patient completed Scooting/Bridging with moderate assistance  Patient completed Supine to Sit with maximal assistance    Functional Mobility/Transfers:  Patient completed Sit <> Stand Transfer with contact guard assistance  with  rolling walker   Patient completed Bed <>  Chair Transfer using Step Transfer technique with contact guard assistance with rolling walker  Functional Mobility: ambulated CGA with RW short distance in room, slow, guarded, pain R gluteal, mild unsteadiness.    Activities of Daily Living:  Feeding:  independence .  Grooming: modified independence .  Lower Body Dressing: total assistance socks   Toileting: total assistance PW    Cognitive/Visual Perceptual:  Cognitive/Psychosocial Skills:     -       Oriented to: Person, Place, Time, and Situation   -       Follows Commands/attention:Follows one-step commands  -       Communication: clear/fluent  -       Memory: No Deficits noted  -       Safety awareness/insight to disability: impaired   -       Mood/Affect/Coping skills/emotional control: Cooperative, anxious due to pain  Visual/Perceptual:      -Intact grossly    Physical Exam:  Balance:    -       sitting: fair , arms supporting her , leaning to right to offset pain   dynamic: fair -     standing; fair+   dynamic; fair  Postural examination/scapula alignment:    -       Rounded shoulders  -       Forward head  -       Lateral weight shift of hips  Skin integrity: Wound R gluteal stage 4 ulcer, toes black on R foot  Edema:  Moderate lower extremity on R  Dominant hand:    -       right  Upper Extremity Range of Motion:     -       Right Upper Extremity: WFL  -       Left Upper Extremity: WFL  Upper Extremity Strength:    -       Right Upper Extremity: Deficits: 4-/5  -       Left Upper Extremity: Deficits: 4-/5   Strength:    -       Right Upper Extremity: WFL  -       Left Upper Extremity: WFL    AMPAC 6 Click ADL:  AMPAC Total Score: 17    Treatment & Education:  Purpose of OT and POC  Sit<>stand 2 trials CGA with RW  Bed>chair CGA with RW, vc to step back to make contact with chair behind legs  All questions. Concerns addressed within scope  Impt of sitting [facility] daily  Call staff for BTB assist for fall prevention  Call bell issued and instructed in  use    Patient left up in chair with all lines intact, call button in reach, chair alarm on, nusre notified, and PT present    GOALS:   Multidisciplinary Problems       Occupational Therapy Goals          Problem: Occupational Therapy    Goal Priority Disciplines Outcome Interventions   Occupational Therapy Goal     OT, PT/OT Ongoing, Progressing    Description: Goals to be met by: 7/30/2023     Patient will increase functional independence with ADLs by performing:    UE Dressing with Modified [place].  LE Dressing with Modified [place] and Assistive Devices as needed.  Grooming while standing at sink with Modified [place].  Toileting from toilet with Modified [place] for hygiene and clothing management.   Toilet transfer to toilet with Modified [place].  Increased functional strength to WFL for ADLS.  Upper extremity exercise program x10 reps per handout, with independence.                         History:     Past Medical History:   Diagnosis Date    Cancer     rectum    CKD (chronic kidney disease)     Diabetes mellitus, type 2     Hyperlipidemia     Hypertension     Neutropenic fever 5/27/2016    PVD (peripheral vascular disease)          Past Surgical History:   Procedure Laterality Date    ANGIOGRAM, LOWER ARTERIAL, UNILATERAL Right 5/5/2023    Procedure: Angiogram, Lower Arterial, Unilateral;  Surgeon: Remington Ribeiro MD;  Location: [facility] CATH LAB/EP;  Service: Cardiology;  Laterality: Right;    ANGIOGRAPHY OF LOWER EXTREMITY N/A 5/15/2023    Procedure: Angiogram Extremity Unilateral;  Surgeon: FERNANDO Cagle II, MD;  Location: [facility] OR [address] [place] [address];  Service: Vascular;  Laterality: N/A;  22.4 min  183.31 mGy  23.5353 Gy.cm  17ml Dye     COLONOSCOPY N/A 3/28/2016    Procedure: COLONOSCOPY;  Surgeon: Mitul Buitrago Jr., MD;  Location: [facility] ENDO;  Service: Endoscopy;  Laterality: N/A;    ENDARTERECTOMY OF FEMORAL ARTERY Right 5/9/2023    Procedure: ENDARTERECTOMY, FEMORAL;  Surgeon:  FERNANDO Cagle II, MD;  Location: [address];  Service: Vascular;  Laterality: Right;  contrast 12ml  Flouro time 11.1 min  mgy 217.83  Gycm2 26.3570    EYE SURGERY Left     cataract removal with lens implant.    FEMORAL ARTERY STENT  before 2010    PERIPHERAL ANGIOGRAPHY N/A 5/3/2023    Procedure: Peripheral angiography;  Surgeon: Rasheeda Ny MD;  Location: [facility] CATH LAB/EP;  Service: Cardiology;  Laterality: N/A;    PTA, SUPERFICIAL FEMORAL ARTERY Right 5/15/2023    Procedure: PTA, SUPERFICIAL FEMORAL ARTERY;  Surgeon: FERNANDO Cagle II, MD;  Location: [address];  Service: Vascular;  Laterality: Right;    RENAL ARTERY STENT  2010    STENT, ILIAC ARTERY Right 5/9/2023    Procedure: STENT, ILIAC ARTERY;  Surgeon: FERNANDO Cagle II, MD;  Location: [address];  Service: Vascular;  Laterality: Right;       Time Tracking:     OT Date of Treatment: 07/02/23  OT Start Time: 1109  OT Stop Time: 1134  OT Total Time (min): 25 min    Billable Minutes:Evaluation 10  Therapeutic Activity 15  Total Time 25    7/2/2023

## 2023-07-02 NOTE — PLAN OF CARE
Problem: Physical Therapy  Goal: Physical Therapy Goal  Description: Goals to be met by: 23     Patient will increase functional independence with mobility by performin. Supine to sit with MInimal Assistance  2. Sit to stand transfer with Supervision with RW.   3. Gait  x 100 feet with Contact Guard Assistance using Rolling Walker.   4. Lower extremity exercise program x15 reps per handout, with assistance as needed to increase tolerance to activities.     Outcome: Ongoing, Progressing   Evaluation completed and goals appropriate. 2023

## 2023-07-02 NOTE — PLAN OF CARE
Problem: Adult Inpatient Plan of Care  Goal: Plan of Care Review  Outcome: Ongoing, Progressing  Goal: Patient-Specific Goal (Individualized)  Outcome: Ongoing, Progressing  Goal: Absence of Hospital-Acquired Illness or Injury  Outcome: Ongoing, Progressing  Goal: Optimal Comfort and Wellbeing  Outcome: Ongoing, Progressing  Goal: Readiness for Transition of Care  Outcome: Ongoing, Progressing     Problem: Diabetes Comorbidity  Goal: Blood Glucose Level Within Targeted Range  Outcome: Ongoing, Progressing     Problem: Fluid and Electrolyte Imbalance (Acute Kidney Injury/Impairment)  Goal: Fluid and Electrolyte Balance  Outcome: Ongoing, Progressing     Problem: Oral Intake Inadequate (Acute Kidney Injury/Impairment)  Goal: Optimal Nutrition Intake  Outcome: Ongoing, Progressing     Problem: Renal Function Impairment (Acute Kidney Injury/Impairment)  Goal: Effective Renal Function  Outcome: Ongoing, Progressing     Problem: Infection  Goal: Absence of Infection Signs and Symptoms  Outcome: Ongoing, Progressing     Problem: Impaired Wound Healing  Goal: Optimal Wound Healing  Outcome: Ongoing, Progressing     Problem: Skin Injury Risk Increased  Goal: Skin Health and Integrity  Outcome: Ongoing, Progressing     Problem: Fall Injury Risk  Goal: Absence of Fall and Fall-Related Injury  Outcome: Ongoing, Progressing     Problem: Pain Acute  Goal: Acceptable Pain Control and Functional Ability  Outcome: Ongoing, Progressing     Problem: Fatigue  Goal: Improved Activity Tolerance  Outcome: Ongoing, Progressing     Problem: COPD (Chronic Obstructive Pulmonary Disease) Comorbidity  Goal: Maintenance of COPD Symptom Control  Outcome: Ongoing, Progressing     Problem: Heart Failure Comorbidity  Goal: Maintenance of Heart Failure Symptom Control  Outcome: Ongoing, Progressing     Problem: Hypertension Comorbidity  Goal: Blood Pressure in Desired Range  Outcome: Ongoing, Progressing     Problem: Coping Ineffective  Goal:  Effective Coping  Outcome: Ongoing, Progressing

## 2023-07-02 NOTE — SUBJECTIVE & OBJECTIVE
Interval hx: patient denies new complaints.   I&o likely inaccurate.   After discussion patient had with her daughter Deana, patient wants to seek discharge to a SNF facility.   I explained to the patient she appears very dehydrated. She agrees to IV and IVF today. Encouraged PO intake.       Objective:     Vital Signs (Most Recent):  Temp: 96.1 °F (35.6 °C) (07/02/23 0728)  Pulse: 63 (07/02/23 0728)  Resp: 18 (07/02/23 0728)  BP: 127/60 (07/02/23 0728)  SpO2: 96 % (07/02/23 0728) Vital Signs (24h Range):  Temp:  [96 °F (35.6 °C)-97.4 °F (36.3 °C)] 96.1 °F (35.6 °C)  Pulse:  [63-78] 63  Resp:  [16-18] 18  SpO2:  [92 %-96 %] 96 %  BP: (115-127)/(54-60) 127/60     Weight: 61.7 kg (136 lb 0.4 oz)  Body mass index is 24.88 kg/m².     Physical Exam  Vitals and nursing note reviewed.   Constitutional:       General: She is not in acute distress.     Appearance: She is normal weight.   HENT:      Head: Normocephalic and atraumatic.      Mouth/Throat:      Mouth: Mucous membranes are moist.   Eyes:      Extraocular Movements: Extraocular movements intact.      Pupils: Pupils are equal, round, and reactive to light.   Cardiovascular:      Rate and Rhythm: Normal rate and regular rhythm.      Pulses:           Femoral pulses are 2+ on the right side and 2+ on the left side.       Dorsalis pedis pulses are 1+ on the right side and 1+ on the left side.        Posterior tibial pulses are detected w/ Doppler on the right side and 1+ on the left side.      Heart sounds: Murmur heard.   Pulmonary:      Effort: Pulmonary effort is normal.      Breath sounds: Normal breath sounds. No wheezing or rales.   Abdominal:      General: Abdomen is flat. Bowel sounds are normal. There is no distension.      Palpations: Abdomen is soft.      Tenderness: There is no abdominal tenderness.   Musculoskeletal:      Right lower leg: Edema present.      Left lower leg: Edema present.   Skin:     General: Skin is warm.      Capillary Refill: Capillary  refill takes less than 2 seconds.      Comments: R foot toes with ischemia wounds. Without erythema, edema, or drainage.    Neurological:      General: No focal deficit present.      Mental Status: She is alert and oriented to person, place, and time. Mental status is at baseline.                CRANIAL NERVES     CN III, IV, VI   Pupils are equal, round, and reactive to light.     Significant Labs: All pertinent labs within the past 24 hours have been reviewed.    Significant Imaging: I have reviewed all pertinent imaging results/findings within the past 24 hours.  Review of Systems

## 2023-07-02 NOTE — PT/OT/SLP EVAL
Physical Therapy  Co-Evaluation and treatment with OT    Patient Name:  Steph Monroe   MRN:  [id number]    Recommendations:     Discharge Recommendations: other (see comments)   Discharge Equipment Recommendations: none   Barriers to discharge: Decreased caregiver support pt is home alone for periods of time.     Assessment:     Steph Monroe is a 77 y.o. 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  She presents with the following impairments/functional limitations: impaired balance, weakness, decreased safety awareness, impaired endurance, impaired functional mobility, gait instability, decreased coordination pt tolerated treatment well and will benefit from skilled PT 5x/wk to progress physically.  Pt is significantly below previous functional level, increased risk of falls and increased burden of care currently. Pt is alone for periods of time at home and will need to be modified independent to discharge safely. Pt will benefit from cont inpt therapy when medically stable to maximize rehab potential. Pt came to ED after fall.       Rehab Prognosis: Good; patient would benefit from acute skilled PT services to address these deficits and reach maximum level of function.    Recent Surgery: * No surgery found *      Plan:     During this hospitalization, patient to be seen 5 x/week to address the identified rehab impairments via gait training, therapeutic activities, therapeutic exercises and progress toward the following goals:    Plan of Care Expires:  08/01/23    Subjective     Chief Complaint: pt c/o feeling stiff and pain in her buttocks.   Patient/Family Comments/goals: to get better and go home.   Pain/Comfort:  Pain Rating 1: 2/10 (buttocks)  Pain Addressed 1: Reposition, Distraction  Pain Rating Post-Intervention 1: 4/10 (buttocks)    Patients cultural, spiritual, Shinto conflicts given the current situation: no    Living Environment:  Pt lives with her  daughter in 2 story with B&B downstairs and slab entrance.   Prior to admission, patients level of function was modified Independent with RW.  Equipment used at home: walker, rolling, oxygen, grab bar, bedside commode, cane, quad, raised toilet (walk in shower with built in bench, grab bars by toilet and shower. transport chair.).  DME owned (not currently used): none.  Upon discharge, patient will have assistance from daughter.  Pt daughter is occasionally  not home on weekends.    Objective:     Communicated with nurse  prior to session.  Patient found supine with PureWick, telemetry, peripheral IV (avasys camer, B James Nicolas boots)  upon PT entry to room.    General Precautions: Standard, fall  Orthopedic Precautions:    Braces:    Respiratory Status: Room air    Exams:  Cognitive Exam:  Patient is oriented to Person, Place, Time, and Situation  RLE ROM: WFL  RLE Strength: WFL  LLE ROM: WFL  LLE Strength: WFL    Functional Mobility:  Bed Mobility:   pt needed verbal cues for hand placement and sequencing and increased time to perform supine to sit.   Rolling Right: moderate assistance  Supine to Sit: maximal assistance    Transfers:     Sit to Stand:  contact guard assistance with rolling walker. Pt stood x 2 reps from bed.     Gait: pt received gait trainign ~ 16 ft with RW and CGA. Pt had stooped posture with gait training.     Balance: pt sat on EOB with SBA.     PT concentrated on BLE MMT, ROM and gait training with evaluation and treatment      AM-PAC 6 CLICK MOBILITY  Total Score:14       Treatment & Education:  Pt received verbal instructions in role of PT and POC. Pt verbally expressed understanding of such.     Patient left up in chair with all lines intact, call button in reach, and RN  notified. Pt had chair alarm on and [place] camera in place.     GOALS:   Multidisciplinary Problems       Physical Therapy Goals          Problem: Physical Therapy    Goal Priority Disciplines Outcome Goal Variances  Interventions   Physical Therapy Goal     PT, PT/OT Ongoing, Progressing     Description: Goals to be met by: 23     Patient will increase functional independence with mobility by performin. Supine to sit with MInimal Assistance  2. Sit to stand transfer with Supervision with RW.   3. Gait  x 100 feet with Contact Guard Assistance using Rolling Walker.   4. Lower extremity exercise program x15 reps per handout, with assistance as needed to increase tolerance to activities.                          History:     Past Medical History:   Diagnosis Date    Cancer     rectum    CKD (chronic kidney disease)     Diabetes mellitus, type 2     Hyperlipidemia     Hypertension     Neutropenic fever 2016    PVD (peripheral vascular disease)        Past Surgical History:   Procedure Laterality Date    ANGIOGRAM, LOWER ARTERIAL, UNILATERAL Right 2023    Procedure: Angiogram, Lower Arterial, Unilateral;  Surgeon: Remington Ribeiro MD;  Location: [facility] CATH LAB/EP;  Service: Cardiology;  Laterality: Right;    ANGIOGRAPHY OF LOWER EXTREMITY N/A 5/15/2023    Procedure: Angiogram Extremity Unilateral;  Surgeon: FERNANDO Cagle II, MD;  Location: [facility] OR [address] [place] [address];  Service: Vascular;  Laterality: N/A;  22.4 min  183.31 mGy  23.5353 Gy.cm  17ml Dye     COLONOSCOPY N/A 3/28/2016    Procedure: COLONOSCOPY;  Surgeon: Mitul Buitrago Jr., MD;  Location: [facility] ENDO;  Service: Endoscopy;  Laterality: N/A;    ENDARTERECTOMY OF FEMORAL ARTERY Right 2023    Procedure: ENDARTERECTOMY, FEMORAL;  Surgeon: FERNANDO Cagle II, MD;  Location: [facility] OR [address] [place] [address];  Service: Vascular;  Laterality: Right;  contrast 12ml  Flouro time 11.1 min  mgy 217.83  Gycm2 26.3570    EYE SURGERY Left     cataract removal with lens implant.    FEMORAL ARTERY STENT  before     PERIPHERAL ANGIOGRAPHY N/A 5/3/2023    Procedure: Peripheral angiography;  Surgeon: Rasheeda Ny MD;  Location: [facility] CATH LAB/EP;  Service: Cardiology;   Laterality: N/A;    PTA, SUPERFICIAL FEMORAL ARTERY Right 5/15/2023    Procedure: PTA, SUPERFICIAL FEMORAL ARTERY;  Surgeon: FERNANDO Cagle II, MD;  [facility];  Service: Vascular;  Laterality: Right;    RENAL ARTERY STENT  2010    STENT, ILIAC ARTERY Right 5/9/2023    Procedure: STENT, ILIAC ARTERY;  Surgeon: FERNANDO Cagle II, MD;  Location: [facility] OR [address] [place] [address];  Service: Vascular;  Laterality: Right;       Time Tracking:     PT Received On: 07/02/23  PT Start Time: 1109     PT Stop Time: 1134  PT Total Time (min): 25 min     Billable Minutes: Evaluation 10 min  and Gait Training 15 min       07/02/2023

## 2023-07-03 VITALS
RESPIRATION RATE: 18 BRPM | HEIGHT: 62 IN | TEMPERATURE: 97 F | DIASTOLIC BLOOD PRESSURE: 65 MMHG | OXYGEN SATURATION: 92 % | BODY MASS INDEX: 25.03 KG/M2 | WEIGHT: 136 LBS | HEART RATE: 80 BPM | SYSTOLIC BLOOD PRESSURE: 147 MMHG

## 2023-07-03 PROBLEM — Z51.5 PALLIATIVE CARE ENCOUNTER: Status: ACTIVE | Noted: 2023-07-03

## 2023-07-03 LAB
ABO + RH BLD: NORMAL
ANION GAP SERPL CALC-SCNC: 16 MMOL/L (ref 8–16)
BASOPHILS # BLD AUTO: 0.01 K/UL (ref 0–0.2)
BASOPHILS NFR BLD: 0.1 % (ref 0–1.9)
BLD GP AB SCN CELLS X3 SERPL QL: NORMAL
BLD PROD TYP BPU: NORMAL
BLOOD UNIT EXPIRATION DATE: NORMAL
BLOOD UNIT TYPE CODE: 6200
BLOOD UNIT TYPE: NORMAL
BUN SERPL-MCNC: 41 MG/DL (ref 8–23)
CALCIUM SERPL-MCNC: 7.4 MG/DL (ref 8.7–10.5)
CHLORIDE SERPL-SCNC: 108 MMOL/L (ref 95–110)
CO2 SERPL-SCNC: 11 MMOL/L (ref 23–29)
CODING SYSTEM: NORMAL
CREAT SERPL-MCNC: 2.1 MG/DL (ref 0.5–1.4)
CROSSMATCH INTERPRETATION: NORMAL
DIFFERENTIAL METHOD: ABNORMAL
DISPENSE STATUS: NORMAL
EOSINOPHIL # BLD AUTO: 0 K/UL (ref 0–0.5)
EOSINOPHIL NFR BLD: 0.3 % (ref 0–8)
ERYTHROCYTE [DISTWIDTH] IN BLOOD BY AUTOMATED COUNT: 16.1 % (ref 11.5–14.5)
EST. GFR  (NO RACE VARIABLE): 24 ML/MIN/1.73 M^2
GLUCOSE SERPL-MCNC: 113 MG/DL (ref 70–110)
HCT VFR BLD AUTO: 22.6 % (ref 37–48.5)
HGB BLD-MCNC: 6.8 G/DL (ref 12–16)
IMM GRANULOCYTES # BLD AUTO: 0.03 K/UL (ref 0–0.04)
IMM GRANULOCYTES NFR BLD AUTO: 0.4 % (ref 0–0.5)
LYMPHOCYTES # BLD AUTO: 0.4 K/UL (ref 1–4.8)
LYMPHOCYTES NFR BLD: 5.7 % (ref 18–48)
MCH RBC QN AUTO: 25.6 PG (ref 27–31)
MCHC RBC AUTO-ENTMCNC: 30.1 G/DL (ref 32–36)
MCV RBC AUTO: 85 FL (ref 82–98)
MONOCYTES # BLD AUTO: 0.5 K/UL (ref 0.3–1)
MONOCYTES NFR BLD: 7.8 % (ref 4–15)
NEUTROPHILS # BLD AUTO: 5.8 K/UL (ref 1.8–7.7)
NEUTROPHILS NFR BLD: 85.7 % (ref 38–73)
NRBC BLD-RTO: 0 /100 WBC
NUM UNITS TRANS PACKED RBC: NORMAL
PLATELET # BLD AUTO: 206 K/UL (ref 150–450)
PMV BLD AUTO: 9.6 FL (ref 9.2–12.9)
POTASSIUM SERPL-SCNC: 3.8 MMOL/L (ref 3.5–5.1)
RBC # BLD AUTO: 2.66 M/UL (ref 4–5.4)
SODIUM SERPL-SCNC: 135 MMOL/L (ref 136–145)
SPECIMEN OUTDATE: NORMAL
WBC # BLD AUTO: 6.82 K/UL (ref 3.9–12.7)

## 2023-07-03 PROCEDURE — 99497 ADVNCD CARE PLAN 30 MIN: CPT | Mod: 25,,,

## 2023-07-03 PROCEDURE — 36430 TRANSFUSION BLD/BLD COMPNT: CPT

## 2023-07-03 PROCEDURE — P9040 RBC LEUKOREDUCED IRRADIATED: HCPCS | Performed by: NURSE PRACTITIONER

## 2023-07-03 PROCEDURE — 25000003 PHARM REV CODE 250: Performed by: STUDENT IN AN ORGANIZED HEALTH CARE EDUCATION/TRAINING PROGRAM

## 2023-07-03 PROCEDURE — 36415 COLL VENOUS BLD VENIPUNCTURE: CPT | Performed by: NURSE PRACTITIONER

## 2023-07-03 PROCEDURE — 99223 PR INITIAL HOSPITAL CARE,LEVL III: ICD-10-PCS | Mod: 25,,,

## 2023-07-03 PROCEDURE — 51798 US URINE CAPACITY MEASURE: CPT

## 2023-07-03 PROCEDURE — 80048 BASIC METABOLIC PNL TOTAL CA: CPT | Performed by: NURSE PRACTITIONER

## 2023-07-03 PROCEDURE — 99223 1ST HOSP IP/OBS HIGH 75: CPT | Mod: 25,,,

## 2023-07-03 PROCEDURE — 25000003 PHARM REV CODE 250: Performed by: INTERNAL MEDICINE

## 2023-07-03 PROCEDURE — 86900 BLOOD TYPING SEROLOGIC ABO: CPT | Performed by: NURSE PRACTITIONER

## 2023-07-03 PROCEDURE — 63600175 PHARM REV CODE 636 W HCPCS: Performed by: NURSE PRACTITIONER

## 2023-07-03 PROCEDURE — 85025 COMPLETE CBC W/AUTO DIFF WBC: CPT | Performed by: NURSE PRACTITIONER

## 2023-07-03 PROCEDURE — 25000003 PHARM REV CODE 250: Performed by: NURSE PRACTITIONER

## 2023-07-03 PROCEDURE — 99497 PR ADVNCD CARE PLAN 30 MIN: ICD-10-PCS | Mod: 25,,,

## 2023-07-03 PROCEDURE — 86920 COMPATIBILITY TEST SPIN: CPT | Performed by: NURSE PRACTITIONER

## 2023-07-03 RX ORDER — LACTULOSE 10 G/15ML
20 SOLUTION ORAL; RECTAL DAILY
Qty: 210 ML | Refills: 0 | Status: SHIPPED | OUTPATIENT
Start: 2023-07-03 | End: 2023-07-10

## 2023-07-03 RX ORDER — MUPIROCIN 20 MG/G
OINTMENT TOPICAL 2 TIMES DAILY
Status: DISCONTINUED | OUTPATIENT
Start: 2023-07-03 | End: 2023-07-03 | Stop reason: HOSPADM

## 2023-07-03 RX ORDER — HYDROCODONE BITARTRATE AND ACETAMINOPHEN 500; 5 MG/1; MG/1
TABLET ORAL
Status: DISCONTINUED | OUTPATIENT
Start: 2023-07-03 | End: 2023-07-03 | Stop reason: HOSPADM

## 2023-07-03 RX ORDER — LACTULOSE 10 G/15ML
20 SOLUTION ORAL DAILY
Status: DISCONTINUED | OUTPATIENT
Start: 2023-07-03 | End: 2023-07-03 | Stop reason: HOSPADM

## 2023-07-03 RX ADMIN — DOCUSATE SODIUM 100 MG: 100 CAPSULE, LIQUID FILLED ORAL at 09:07

## 2023-07-03 RX ADMIN — ENOXAPARIN SODIUM 30 MG: 30 INJECTION SUBCUTANEOUS at 09:07

## 2023-07-03 RX ADMIN — CLOPIDOGREL BISULFATE 75 MG: 75 TABLET ORAL at 09:07

## 2023-07-03 RX ADMIN — MUPIROCIN: 20 OINTMENT TOPICAL at 09:07

## 2023-07-03 RX ADMIN — EZETIMIBE 10 MG: 10 TABLET ORAL at 09:07

## 2023-07-03 RX ADMIN — SODIUM BICARBONATE 650 MG TABLET 650 MG: at 09:07

## 2023-07-03 RX ADMIN — ASPIRIN 81 MG CHEWABLE TABLET 81 MG: 81 TABLET CHEWABLE at 09:07

## 2023-07-03 RX ADMIN — ATORVASTATIN CALCIUM 80 MG: 40 TABLET, FILM COATED ORAL at 09:07

## 2023-07-03 RX ADMIN — CARVEDILOL 25 MG: 25 TABLET, FILM COATED ORAL at 09:07

## 2023-07-03 RX ADMIN — NIFEDIPINE 30 MG: 30 TABLET, FILM COATED, EXTENDED RELEASE ORAL at 09:07

## 2023-07-03 NOTE — ASSESSMENT & PLAN NOTE
Present on admission   - Consult wound care  - Will consult IR about possible drainage of gluteal abscess  - Will hold on abx as no signs of sepsis or infection; would be best to start after possible abscess drainage       IR - unable to obtain drainage   Per general surgery- no indication for surgery and does not appear infected   Wound care consulted- will continue wound care   Was started on vancomycin, zosyn- will stop       7/3  Not infected   Continue wound care

## 2023-07-03 NOTE — PROGRESS NOTES
[facility]  Progress Note    Patient Name: Steph Monroe  MRN: [id number]  Patient Class: IP- Inpatient   Admission Date: 6/29/2023  Length of Stay: 1 days  Attending Physician: Saige Arce MD  Primary Care Provider: Mane Carmona MD        Subjective:     Principal Problem:Stage IV pressure ulcer of right buttock        HPI:  78 yo female with a Pmhx of CKD Stage 4, HTN, HLD, PAD s/p 5/9/23-- R CFA endarterectomy and Right iliac artery stenting  (7x39 VBX, 7x80mm Lifestent) here for weakness and falls.    Patient states she has not had any claudication symptoms since her procedure as above.  However, she has been feeling weak in the legs and has fallen a couple times.  Most recently she fell yesterday and could not get up.  This prompted EMS to arrive and take her to the emergency department.  At this time she was found to have a significant, albeit healing, sacral decubitus ulcer.  And she also seemed very deconditioned.  Denies any chest pain, shortness a breath, syncope, dizziness, claudication symptoms, leg numbness or tingling.    CT imaging showed no osteomyelitis but concerning gluteal abscess. Admitted for observation for wound care consult and possible surgery evaluation vs IR of the wound if needed.      Overview/Hospital Course:  No notes on file    Interval hx: patient denies new complaints.   I&o likely inaccurate.   Patient wants hospice, however daughter Deana who would be patient's primary caregiver is not in agreement- further discussions between patient, daughter, and palliative care to be had.   1 unit PRBC  today   Nephrology consult     Objective:     Vital Signs (Most Recent):  Temp: 96.4 °F (35.8 °C) (07/03/23 0545)  Pulse: 64 (07/03/23 0545)  Resp: 17 (07/03/23 0545)  BP: (!) 147/66 (07/03/23 0545)  SpO2: (!) 93 % (07/03/23 0545) Vital Signs (24h Range):  Temp:  [95.8 °F (35.4 °C)-96.6 °F (35.9 °C)] 96.4 °F (35.8 °C)  Pulse:  [61-67] 64  Resp:  [17-20]  17  SpO2:  [92 %-95 %] 93 %  BP: (115-147)/(55-66) 147/66     Weight: 61.7 kg (136 lb 0.4 oz)  Body mass index is 24.88 kg/m².     Physical Exam  Vitals and nursing note reviewed.   Constitutional:       General: She is not in acute distress.     Appearance: She is normal weight. She is ill-appearing.   HENT:      Head: Normocephalic and atraumatic.      Mouth/Throat:      Mouth: Mucous membranes are moist.   Eyes:      Extraocular Movements: Extraocular movements intact.      Pupils: Pupils are equal, round, and reactive to light.   Cardiovascular:      Rate and Rhythm: Normal rate and regular rhythm.      Pulses:           Femoral pulses are 2+ on the right side and 2+ on the left side.       Dorsalis pedis pulses are 1+ on the right side and 1+ on the left side.        Posterior tibial pulses are detected w/ Doppler on the right side and 1+ on the left side.      Heart sounds: Murmur heard.   Pulmonary:      Effort: Pulmonary effort is normal.      Breath sounds: Normal breath sounds. No wheezing or rales.   Abdominal:      General: Abdomen is flat. Bowel sounds are normal. There is no distension.      Palpations: Abdomen is soft.      Tenderness: There is no abdominal tenderness.   Musculoskeletal:      Right lower leg: Edema present.      Left lower leg: Edema present.   Skin:     General: Skin is warm.      Capillary Refill: Capillary refill takes less than 2 seconds.      Comments: R foot toes with ischemia wounds. Without erythema, edema, or drainage.    Neurological:      General: No focal deficit present.      Mental Status: She is alert and oriented to person, place, and time. Mental status is at baseline.                CRANIAL NERVES     CN III, IV, VI   Pupils are equal, round, and reactive to light.     Significant Labs: All pertinent labs within the past 24 hours have been reviewed.    Significant Imaging: I have reviewed all pertinent imaging results/findings within the past 24 hours.  Review of  Systems      Assessment/Plan:      * Stage IV pressure ulcer of right buttock  Present on admission   - Consult wound care  - Will consult IR about possible drainage of gluteal abscess  - Will hold on abx as no signs of sepsis or infection; would be best to start after possible abscess drainage       IR - unable to obtain drainage   Per general surgery- no indication for surgery and does not appear infected   Wound care consulted- will continue wound care   Was started on vancomycin, zosyn- will stop       7/3  Not infected   Continue wound care     ACP (advance care planning)  - ACP time: 15 minutes  - DNR per patient. Says she has lived a long time and tired of illness and hospitals  - Would be OK with reversal if needed for procedure      7/1  Patient refusing care, turning, medications, telling nurses to leave her room.   Tried to call family- no answer   Palliative and  consulted to honor patient's wishes  Will discuss hospice care going further   Will continue to try to contact family     Social has been working on NH placement       7/2  After patient's discussion with her daughter Deana, she now wants to forego hospice and wants to discharge to a SNF facility  will consult PT/OT    7/3  Patient wants home with hospice, however daughter Deana is not in agreement. Deana would be her primary caregiver and lives at home with her. Further discussions with patient, Deana, and palliative care today     Stage 3b chronic kidney disease  - Avoid nsaids or nephrotoxic drugs  - Continue PO bicarb (bicarb < 18)  - Monitor UOP      Cr 1.3-->1.8 --> 2.1  PC ratio  FeNa -  UA   IVF -- continue  Patient appears with prerenal IVVD   Patient agrees to IV and IVF today - will hydrate IV and PO     7/3  Renal function not improving with IVF   Urine studies---  Renal US   Nephrology consult   Bladder scan not accurate due to ascites  I&O cath had 125 ml 7/2      Anemia of chronic disease  - Hgb around baseline of  8  - Asymptomaitc  - Likely 2/2 CKD  - Monitor      hgb 7 7/1  Anemia panel   UA - without blood   Stool for OCB   Low threshold for transfusion     7/2  Hgb 7.4  Lab Results   Component Value Date    IRON 12 (L) 07/01/2023    TRANSFERRIN 120 (L) 07/01/2023    TIBC 178 (L) 07/01/2023    FESATURATED 7 (L) 07/01/2023      Will transfuse if Hgb < 7      7/3  Lab Results   Component Value Date    WBC 6.82 07/03/2023    HGB 6.8 (L) 07/03/2023    HCT 22.6 (L) 07/03/2023    MCV 85 07/03/2023     07/03/2023       Consent obtained   Type and screen   Transfuse 1 unit PRBC     Cirrhosis    S/t LEWIS   On PO lasix - held with ALESIA     Pulmonary emphysema    Lungs clear   May use PRN nebs     Chronic heart failure with preserved ejection fraction  - No signs of pulm edema or overt volume overload  - Does have some peripheral edema  - Continue PO lasix - hold today 7/1 with ALESIA   - Continue HTN control    5/2/2023    The left ventricle is normal in size with concentric remodeling and normal systolic function.   The estimated ejection fraction is 65%.   Indeterminate left ventricular diastolic function.   There is mild mitral stenosis.   The mean diastolic gradient across the mitral valve is 5 mmHg at a heart rate of 66 bpm.   Normal right ventricular size with normal right ventricular systolic function.   There is mild aortic valve stenosis.   Aortic valve area is 1.28 cm2; peak velocity is 2.33 m/s; mean gradient is 10 mmHg.   Mild-to-moderate aortic regurgitation.   Normal central venous pressure (3 mmHg).   The estimated PA systolic pressure is 35 mmHg        Decubitus ulcer of sacral region, stage 2  - Consult wound care  - Will consult IR about possible drainage of gluteal abscess  - Will hold on abx as no signs of sepsis or infection; would be best to start after possible abscess drainage      History of rectal or anal cancer    Treated with chemo and radiation    Coronary artery disease involving native  coronary artery of native heart without angina pectoris  - Continue ASA, statin  - On BB  - Monitor      PAD (peripheral artery disease)  - Plavix, statin  - S/p 5/9/23-- R CFA endarterectomy and Right iliac artery stenting  (7x39 VBX, 7x80mm Lifestent)      HLD (hyperlipidemia)  - Continue lipitor      Essential hypertension  - Continue nifedipine and coreg      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 hypertension  a1c 5   Diet controlled         VTE Risk Mitigation (From admission, onward)         Ordered     enoxaparin injection 30 mg  Daily         07/01/23 0934     IP VTE HIGH RISK PATIENT  Once         06/29/23 2256     Place sequential compression device  Until discontinued         06/29/23 2256                Discharge Planning   PÉREZ:      Code Status: DNR   Is the patient medically ready for discharge?:     Reason for patient still in hospital (select all that apply): Patient trending condition  Discharge Plan A: Hospice/home                  Aspen Pagan NP  Department of Hospital Medicine   [facility] [facility]

## 2023-07-03 NOTE — SUBJECTIVE & OBJECTIVE
Interval hx: patient denies new complaints.   I&o likely inaccurate.   Patient wants hospice, however daughter Deana who would be patient's primary caregiver is not in agreement- further discussions between patient, daughter, and palliative care to be had.   1 unit PRBC  today   Nephrology consult     Objective:     Vital Signs (Most Recent):  Temp: 96.4 °F (35.8 °C) (07/03/23 0545)  Pulse: 64 (07/03/23 0545)  Resp: 17 (07/03/23 0545)  BP: (!) 147/66 (07/03/23 0545)  SpO2: (!) 93 % (07/03/23 0545) Vital Signs (24h Range):  Temp:  [95.8 °F (35.4 °C)-96.6 °F (35.9 °C)] 96.4 °F (35.8 °C)  Pulse:  [61-67] 64  Resp:  [17-20] 17  SpO2:  [92 %-95 %] 93 %  BP: (115-147)/(55-66) 147/66     Weight: 61.7 kg (136 lb 0.4 oz)  Body mass index is 24.88 kg/m².     Physical Exam  Vitals and nursing note reviewed.   Constitutional:       General: She is not in acute distress.     Appearance: She is normal weight. She is ill-appearing.   HENT:      Head: Normocephalic and atraumatic.      Mouth/Throat:      Mouth: Mucous membranes are moist.   Eyes:      Extraocular Movements: Extraocular movements intact.      Pupils: Pupils are equal, round, and reactive to light.   Cardiovascular:      Rate and Rhythm: Normal rate and regular rhythm.      Pulses:           Femoral pulses are 2+ on the right side and 2+ on the left side.       Dorsalis pedis pulses are 1+ on the right side and 1+ on the left side.        Posterior tibial pulses are detected w/ Doppler on the right side and 1+ on the left side.      Heart sounds: Murmur heard.   Pulmonary:      Effort: Pulmonary effort is normal.      Breath sounds: Normal breath sounds. No wheezing or rales.   Abdominal:      General: Abdomen is flat. Bowel sounds are normal. There is no distension.      Palpations: Abdomen is soft.      Tenderness: There is no abdominal tenderness.   Musculoskeletal:      Right lower leg: Edema present.      Left lower leg: Edema present.   Skin:     General: Skin  is warm.      Capillary Refill: Capillary refill takes less than 2 seconds.      Comments: R foot toes with ischemia wounds. Without erythema, edema, or drainage.    Neurological:      General: No focal deficit present.      Mental Status: She is alert and oriented to person, place, and time. Mental status is at baseline.                CRANIAL NERVES     CN III, IV, VI   Pupils are equal, round, and reactive to light.     Significant Labs: All pertinent labs within the past 24 hours have been reviewed.    Significant Imaging: I have reviewed all pertinent imaging results/findings within the past 24 hours.  Review of Systems

## 2023-07-03 NOTE — PT/OT/SLP PROGRESS
Physical Therapy      Patient Name:  Steph Monroe   MRN:  [id number]    Patient not seen today secondary to Blood transfusion. Will follow-up as able.  7/3/2023

## 2023-07-03 NOTE — ASSESSMENT & PLAN NOTE
- Avoid nsaids or nephrotoxic drugs  - Continue PO bicarb (bicarb < 18)  - Monitor UOP      Cr 1.3-->1.8 --> 2.1  PC ratio  FeNa -  UA   IVF -- continue  Patient appears with prerenal IVVD   Patient agrees to IV and IVF today - will hydrate IV and PO     7/3  Renal function not improving with IVF   Urine studies---  Renal US   Nephrology consult   Bladder scan not accurate due to ascites  I&O cath had 125 ml 7/2  Check lactic   Nephrology started IV bicarb

## 2023-07-03 NOTE — CONSULTS
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      Inpatient consult to Palliative Care  Consult performed by: Aundrea Ward NP  Consult ordered by: Aspen Pagan NP  Reason for consult: goals of care, hospcie discussion        Assessment/Plan:     Palliative Care  Palliative care encounter  Mrs Monroe is a 78 y/o F with a Pmhx of CKD Stage 4, HTN, HLD, PAD s/p 5/9/23-- R CFA endarterectomy and Right iliac artery stenting  (7x39 VBX, 7x80mm Lifestent) here for weakness and frequent falls. Most recently she fell yesterday and could not get up.  This prompted EMS to arrive and take her to the emergency department.  At this time she was found to have a significant, albeit healing, sacral decubitus ulcer.  And she also seemed very deconditioned.  Palliative was consulted due to refusal of care and end of life planning including hospice discussion.     -Prior to palliative consult was placed, pt made her wishes for comfort and dignity known.  Pt was agreeable to home hospice and met with home hospice company representative.  Pt lives at home with her daughter,  Deana, who was not on board with home hospice, and thus pt elected not to enroll into hospice services at that time and stated she would attempt SNF to please her daughter.     -Upon initial encounter with pt today, pt reports that she is "tired of being here, tired of medical interventions, and just wants to go home and be peaceful".  We further discussed pts goals of care.  Pt reports that comfort and dignity in her own home, at this phase " "of her life, are her highest priority.  Pt has unquestioned capacity and has expressed clear and consistent goals of care throughout this admission per chat review.   -Pt denies any pain or discomfort. Reports being comfortable, just tired.  Also expresses gratitude and appreciation toward all providers here, however expresses this is not the type of care that aligns with her wishes.    -Pt lives at home with her daughter Deana.  She has a daughter Lety, who is a RN and is supportive of the pts wishes.  Both daughters participate in the pts care. Pt reports that she is able to ambulate very short distances around the house with her walker.  However, she does endorse frequent falls lately.    -Pt reports that she had a conversation with both daughters this morning, and that Deana is now agreeable to home hospice services.  Pt reports that her daughter Lety lives near by and her  can come home from their camp to assist with her care when needed.   -Pt expresses that family is in agreement for the pt to return home under the care of home hospice.  Per CM, hospice arrangements are currenlty being finalized.   -Pt remains a DNR.       Subjective:     HPI:   Per  chart, "76 yo female with a Pmhx of CKD Stage 4, HTN, HLD, PAD s/p 5/9/23-- R CFA endarterectomy and Right iliac artery stenting  (7x39 VBX, 7x80mm Lifestent) here for weakness and falls.     Patient states she has not had any claudication symptoms since her procedure as above.  However, she has been feeling weak in the legs and has fallen a couple times.  Most recently she fell yesterday and could not get up.  This prompted EMS to arrive and take her to the emergency department.  At this time she was found to have a significant, albeit healing, sacral decubitus ulcer.  And she also seemed very deconditioned.  Denies any chest pain, shortness a breath, syncope, dizziness, claudication symptoms, leg numbness or tingling.     CT imaging showed no " "osteomyelitis but concerning gluteal abscess. Admitted for observation for wound care consult and possible surgery evaluation vs IR of the wound if needed."    Interval History: Pt received 1 unit PRBCs,  no acute event reported overnight.  Pt declining PT/OT services.  Pt denies any pain or discomfort.     Past Medical History:   Diagnosis Date    Cancer     rectum    CKD (chronic kidney disease)     Diabetes mellitus, type 2     Hyperlipidemia     Hypertension     Neutropenic fever 5/27/2016    PVD (peripheral vascular disease)        Past Surgical History:   Procedure Laterality Date    ANGIOGRAM, LOWER ARTERIAL, UNILATERAL Right 5/5/2023    Procedure: Angiogram, Lower Arterial, Unilateral;  Surgeon: Remington Ribeiro MD;  Location: [facility] CATH LAB/EP;  Service: Cardiology;  Laterality: Right;    ANGIOGRAPHY OF LOWER EXTREMITY N/A 5/15/2023    Procedure: Angiogram Extremity Unilateral;  Surgeon: FERNANDO Cagle II, MD;  Location: [facility] OR [address] [place] [address];  Service: Vascular;  Laterality: N/A;  22.4 min  183.31 mGy  23.5353 Gy.cm  17ml Dye     COLONOSCOPY N/A 3/28/2016    Procedure: COLONOSCOPY;  Surgeon: Mitul Buitrago Jr., MD;  Location: [facility] ENDO;  Service: Endoscopy;  Laterality: N/A;    ENDARTERECTOMY OF FEMORAL ARTERY Right 5/9/2023    Procedure: ENDARTERECTOMY, FEMORAL;  Surgeon: FERNANDO Cagle II, MD;  Location: [address];  Service: Vascular;  Laterality: Right;  contrast 12ml  Flouro time 11.1 min  mgy 217.83  Gycm2 26.3570    EYE SURGERY Left     cataract removal with lens implant.    FEMORAL ARTERY STENT  before 2010    PERIPHERAL ANGIOGRAPHY N/A 5/3/2023    Procedure: Peripheral angiography;  Surgeon: Rasheeda Ny MD;  Location: [facility] CATH LAB/EP;  Service: Cardiology;  Laterality: N/A;    PTA, SUPERFICIAL FEMORAL ARTERY Right 5/15/2023    Procedure: PTA, SUPERFICIAL FEMORAL ARTERY;  Surgeon: FERNANDO Calge II, MD;  Location: [address];  Service: Vascular;  Laterality: " Right;    RENAL ARTERY STENT  2010    STENT, ILIAC ARTERY Right 5/9/2023    Procedure: STENT, ILIAC ARTERY;  Surgeon: FERNANDO Cagle II, MD;  Location: [facility] OR [address] [place] [address];  Service: Vascular;  Laterality: Right;       Review of patient's allergies indicates:   Allergen Reactions    Chantix [varenicline]      Tongue swelling      Wellbutrin [bupropion hcl]      Tongue swelling         Medications:  Continuous Infusions:   sodium bicarbonate drip       Scheduled Meds:   aspirin  81 mg Oral Daily    atorvastatin  80 mg Oral Daily    carvediloL  25 mg Oral BID    clopidogreL  75 mg Oral Daily    docusate sodium  100 mg Oral BID    enoxparin  30 mg Subcutaneous Daily    ezetimibe  10 mg Oral Daily    lactulose  20 g Oral Daily    mupirocin   Nasal BID    NIFEdipine  30 mg Oral Daily    sodium bicarbonate  650 mg Oral TID     PRN Meds:sodium chloride, acetaminophen, dextrose 10%, dextrose 10%, dextrose, dextrose, glucagon (human recombinant), melatonin, naloxone, ondansetron, prochlorperazine, simethicone, sodium chloride 0.9%    Family History       Problem Relation (Age of Onset)    Diabetes Father    Heart attack Mother    Hypertension Sister          Tobacco Use    Smoking status: Former     Packs/day: 1.50     Years: 45.00     Pack years: 67.50     Types: Cigarettes     Quit date: 6/13/2013     Years since quitting: 10.0     Passive exposure: Never    Smokeless tobacco: Never   Substance and Sexual Activity    Alcohol use: No     Alcohol/week: 0.0 standard drinks    Drug use: No    Sexual activity: Not Currently       Review of Systems   Constitutional:  Positive for activity change and fatigue. Negative for chills, diaphoresis and fever.   Respiratory: Negative.     Cardiovascular:  Positive for leg swelling.   Skin:  Positive for color change and wound.   Neurological:  Positive for weakness.   Psychiatric/Behavioral: Negative.     Objective:     Vital Signs (Most Recent):  Temp: 97 °F (36.1  °C) (07/03/23 1128)  Pulse: 80 (07/03/23 1212)  Resp: 18 (07/03/23 1128)  BP: (!) 147/65 (07/03/23 1128)  SpO2: (!) 92 % (07/03/23 1128) Vital Signs (24h Range):  Temp:  [95.8 °F (35.4 °C)-98 °F (36.7 °C)] 97 °F (36.1 °C)  Pulse:  [61-80] 80  Resp:  [16-22] 18  SpO2:  [92 %-95 %] 92 %  BP: (136-156)/(60-71) 147/65     Weight: 61.7 kg (136 lb 0.4 oz)  Body mass index is 24.88 kg/m².       Physical Exam  Vitals and nursing note reviewed.   Constitutional:       General: She is awake.      Appearance: She is ill-appearing (chronically). She is not toxic-appearing.   HENT:      Nose: Nose normal.      Mouth/Throat:      Mouth: Mucous membranes are moist.   Cardiovascular:      Rate and Rhythm: Normal rate.      Heart sounds: Murmur heard.   Pulmonary:      Effort: Pulmonary effort is normal.   Abdominal:      General: Abdomen is flat.      Palpations: Abdomen is soft.   Musculoskeletal:      Right lower leg: Edema (R>L) present.      Left lower leg: Edema present.   Skin:     General: Skin is warm and dry.      Coloration: Skin is pale.      Findings: Bruising, ecchymosis and lesion present.      Comments: See wound care photos    Neurological:      General: No focal deficit present.      Mental Status: She is alert and oriented to person, place, and time.      Motor: Weakness present.   Psychiatric:         Mood and Affect: Mood normal.         Behavior: Behavior normal. Behavior is cooperative.         Thought Content: Thought content normal.         Judgment: Judgment normal.          Review of Symptoms      Symptom Assessment (ESAS 0-10 Scale)  Pain:  0  Dyspnea:  0  Anxiety:  0  Nausea:  0  Depression:  0  Anorexia:  0  Fatigue:  5  Insomnia:  0  Restlessness:  0  Agitation:  0         Performance Status:  40    Living Arrangements:  Lives with family and Lives in home    Psychosocial/Cultural:   See Palliative Psychosocial Note: No  Social Issues Identified: Coping deficit pt/family  Bereavement Risk:  No  Caregiver Needs Discussed. Caregiver Distress: Yes: Intensity of family caregiving and Caregiver Burnout Risk  Cultural: none identified   **Primary  to Follow**  Palliative Care  Consult: No    Spiritual:  F - Brooke and Belief:  Jain   C - Community:  Family support   A - Address in Care:  Pastoral visits prn      Time-Based Charting:  Yes  Chart Review: 22 minutes  Face to Face: 17 minutes  Symptom Assessment: 9 minutes  Coordination of Care: 15 minutes  Discharge Planning: 15 minutes  Advance Care Planning: 10 minutes  Goals of Care: 10 minutes    Total Time Spent: 98 minutes      Advance Care Planning   Advance Directives:   Living Will: No    LaPOST: No    Do Not Resuscitate Status: Yes      Decision Making:  Patient answered questions  Goals of Care: The patient endorses that what is most important right now is to focus on spending time at home, avoiding the hospital, remaining as independent as possible, symptom/pain control, quality of life, even if it means sacrificing a little time, improvement in condition but with limits to invasive therapies, and comfort and QOL     Accordingly, we have decided that the best plan to meet the patient's goals includes enrolling in hospice care         Significant Labs: All pertinent labs within the past 24 hours have been reviewed.  CBC:   Recent Labs   Lab 07/03/23 0339   WBC 6.82   HGB 6.8*   HCT 22.6*   MCV 85        BMP:  Recent Labs   Lab 07/03/23 0339   *   *   K 3.8      CO2 11*   BUN 41*   CREATININE 2.1*   CALCIUM 7.4*     LFT:  Lab Results   Component Value Date    AST 32 07/02/2023    ALKPHOS 126 07/02/2023    BILITOT 0.5 07/02/2023     Albumin:   Albumin   Date Value Ref Range Status   07/02/2023 1.9 (L) 3.5 - 5.2 g/dL Final     Protein:   Total Protein   Date Value Ref Range Status   07/02/2023 5.9 (L) 6.0 - 8.4 g/dL Final     Lactic acid:   Lab Results   Component Value Date    LACTATE 2.0  06/29/2023    LACTATE 1.0 05/26/2016       Significant Imaging: I have reviewed all pertinent imaging results/findings within the past 24 hours.      Aundrea Ward NP  Palliative Medicine  [facility]    Advance Care Planning     Date: 07/03/2023    [facility]  I engaged the patient in a voluntary conversation about advance care planning and we specifically addressed what the goals of care would be moving forward, in light of the patient's change in clinical status, specifically debility, PAD, CHF,CAD, sacral wound.  We did specifically address the patient's likely prognosis, which is poor.  We explored the patient's values and preferences for future care.  The patient endorses that what is most important right now is to focus on spending time at home, avoiding the hospital, remaining as independent as possible, symptom/pain control, quality of life, even if it means sacrificing a little time, improvement in condition but with limits to invasive therapies and comfort and QOL     Accordingly, we have decided that the best plan to meet the patient's goals includes enrolling in hospice care    I did explain the role for hospice care at this stage of the patient's illness, including its ability to help the patient live with the best quality of life possible.  We will be making a hospice referral.    I spent a total of 20 minutes engaging the patient in this advance care planning discussion.

## 2023-07-03 NOTE — PLAN OF CARE
YAIR  rec'd a call from pt's daughter Lety  -- both daughters are now on board with home hospice as well as the pt. YAIR spoke with the hospice rep for [facility]  [phone]  (she contacted them Sat) - and she will work on finalizing things with family. YAIR told Lety that pt could likely leave today.  MAGGIE Louise updated.       Pt for d/c to home today with [facility]   Daughter Lety updated - permission given    Stretcher set up for 3:45 pm         07/03/23 1641   Final Note   Assessment Type Final Discharge Note   Anticipated Discharge Disposition Home   What phone number can be called within the next 1-3 days to see how you are doing after discharge? [phone]   Hospital Resources/Appts/Education Provided Post-Acute resouces added to AVS   Post-Acute Status   Post-Acute Authorization Hospice   Hospice Status Set-up Complete/Auth obtained   Patient choice form signed by patient/caregiver   (pt choice form done)   Discharge Delays None known at this time

## 2023-07-03 NOTE — SUBJECTIVE & OBJECTIVE
Interval History: Pt received 1 unit PRBCs,  no acute event reported overnight.  Pt declining PT/OT services.  Pt denies any pain or discomfort.     Past Medical History:   Diagnosis Date    Cancer     rectum    CKD (chronic kidney disease)     Diabetes mellitus, type 2     Hyperlipidemia     Hypertension     Neutropenic fever 5/27/2016    PVD (peripheral vascular disease)        Past Surgical History:   Procedure Laterality Date    ANGIOGRAM, LOWER ARTERIAL, UNILATERAL Right 5/5/2023    Procedure: Angiogram, Lower Arterial, Unilateral;  Surgeon: Remington Ribeiro MD;  Location: [facility] CATH LAB/EP;  Service: Cardiology;  Laterality: Right;    ANGIOGRAPHY OF LOWER EXTREMITY N/A 5/15/2023    Procedure: Angiogram Extremity Unilateral;  Surgeon: FERNANDO Cagle II, MD;  Location: [facility] OR [address] [place] [address];  Service: Vascular;  Laterality: N/A;  22.4 min  183.31 mGy  23.5353 Gy.cm  17ml Dye     COLONOSCOPY N/A 3/28/2016    Procedure: COLONOSCOPY;  Surgeon: Mitul Buitrago Jr., MD;  Location: [facility] ENDO;  Service: Endoscopy;  Laterality: N/A;    ENDARTERECTOMY OF FEMORAL ARTERY Right 5/9/2023    Procedure: ENDARTERECTOMY, FEMORAL;  Surgeon: FERNANDO Cagle II, MD;  Location: [facility] OR [address] [place] [address];  Service: Vascular;  Laterality: Right;  contrast 12ml  Flouro time 11.1 min  mgy 217.83  Gycm2 26.3570    EYE SURGERY Left     cataract removal with lens implant.    FEMORAL ARTERY STENT  before 2010    PERIPHERAL ANGIOGRAPHY N/A 5/3/2023    Procedure: Peripheral angiography;  Surgeon: Rasheeda Ny MD;  Location: [facility] CATH LAB/EP;  Service: Cardiology;  Laterality: N/A;    PTA, SUPERFICIAL FEMORAL ARTERY Right 5/15/2023    Procedure: PTA, SUPERFICIAL FEMORAL ARTERY;  Surgeon: FERNANDO Cagle II, MD;  [facility];  Service: Vascular;  Laterality: Right;    RENAL ARTERY STENT  2010    STENT, ILIAC ARTERY Right 5/9/2023    Procedure: STENT, ILIAC ARTERY;  Surgeon: FERNANDO Cagle II, MD;  Location: [facility] OR [address] [place] [address];   Service: Vascular;  Laterality: Right;       Review of patient's allergies indicates:   Allergen Reactions    Chantix [varenicline]      Tongue swelling      Wellbutrin [bupropion hcl]      Tongue swelling         Medications:  Continuous Infusions:   sodium bicarbonate drip       Scheduled Meds:   aspirin  81 mg Oral Daily    atorvastatin  80 mg Oral Daily    carvediloL  25 mg Oral BID    clopidogreL  75 mg Oral Daily    docusate sodium  100 mg Oral BID    enoxparin  30 mg Subcutaneous Daily    ezetimibe  10 mg Oral Daily    lactulose  20 g Oral Daily    mupirocin   Nasal BID    NIFEdipine  30 mg Oral Daily    sodium bicarbonate  650 mg Oral TID     PRN Meds:sodium chloride, acetaminophen, dextrose 10%, dextrose 10%, dextrose, dextrose, glucagon (human recombinant), melatonin, naloxone, ondansetron, prochlorperazine, simethicone, sodium chloride 0.9%    Family History       Problem Relation (Age of Onset)    Diabetes Father    Heart attack Mother    Hypertension Sister          Tobacco Use    Smoking status: Former     Packs/day: 1.50     Years: 45.00     Pack years: 67.50     Types: Cigarettes     Quit date: 6/13/2013     Years since quitting: 10.0     Passive exposure: Never    Smokeless tobacco: Never   Substance and Sexual Activity    Alcohol use: No     Alcohol/week: 0.0 standard drinks    Drug use: No    Sexual activity: Not Currently       Review of Systems   Constitutional:  Positive for activity change and fatigue. Negative for chills, diaphoresis and fever.   Respiratory: Negative.     Cardiovascular:  Positive for leg swelling.   Skin:  Positive for color change and wound.   Neurological:  Positive for weakness.   Psychiatric/Behavioral: Negative.     Objective:     Vital Signs (Most Recent):  Temp: 97 °F (36.1 °C) (07/03/23 1128)  Pulse: 80 (07/03/23 1212)  Resp: 18 (07/03/23 1128)  BP: (!) 147/65 (07/03/23 1128)  SpO2: (!) 92 % (07/03/23 1128) Vital Signs (24h Range):  Temp:  [95.8 °F (35.4 °C)-98 °F  (36.7 °C)] 97 °F (36.1 °C)  Pulse:  [61-80] 80  Resp:  [16-22] 18  SpO2:  [92 %-95 %] 92 %  BP: (136-156)/(60-71) 147/65     Weight: 61.7 kg (136 lb 0.4 oz)  Body mass index is 24.88 kg/m².       Physical Exam  Vitals and nursing note reviewed.   Constitutional:       General: She is awake.      Appearance: She is ill-appearing (chronically). She is not toxic-appearing.   HENT:      Nose: Nose normal.      Mouth/Throat:      Mouth: Mucous membranes are moist.   Cardiovascular:      Rate and Rhythm: Normal rate.      Heart sounds: Murmur heard.   Pulmonary:      Effort: Pulmonary effort is normal.   Abdominal:      General: Abdomen is flat.      Palpations: Abdomen is soft.   Musculoskeletal:      Right lower leg: Edema (R>L) present.      Left lower leg: Edema present.   Skin:     General: Skin is warm and dry.      Coloration: Skin is pale.      Findings: Bruising, ecchymosis and lesion present.      Comments: See wound care photos    Neurological:      General: No focal deficit present.      Mental Status: She is alert and oriented to person, place, and time.      Motor: Weakness present.   Psychiatric:         Mood and Affect: Mood normal.         Behavior: Behavior normal. Behavior is cooperative.         Thought Content: Thought content normal.         Judgment: Judgment normal.          Review of Symptoms      Symptom Assessment (ESAS 0-10 Scale)  Pain:  0  Dyspnea:  0  Anxiety:  0  Nausea:  0  Depression:  0  Anorexia:  0  Fatigue:  5  Insomnia:  0  Restlessness:  0  Agitation:  0         Performance Status:  40    Living Arrangements:  Lives with family and Lives in home    Psychosocial/Cultural:   See Palliative Psychosocial Note: No  Social Issues Identified: Coping deficit pt/family  Bereavement Risk: No  Caregiver Needs Discussed. Caregiver Distress: Yes: Intensity of family caregiving and Caregiver Burnout Risk  Cultural: none identified   **Primary  to Follow**  Palliative Care Social  Worker Consult: No    Spiritual:  F - Brooke and Belief:  Scientology   C - Community:  Family support   A - Address in Care:  Pastoral visits prn      Time-Based Charting:  Yes  Chart Review: 22 minutes  Face to Face: 17 minutes  Symptom Assessment: 9 minutes  Coordination of Care: 15 minutes  Discharge Planning: 15 minutes  Advance Care Planning: 10 minutes  Goals of Care: 10 minutes    Total Time Spent: 98 minutes      Advance Care Planning   Advance Directives:   Living Will: No    LaPOST: No    Do Not Resuscitate Status: Yes      Decision Making:  Patient answered questions  Goals of Care: The patient endorses that what is most important right now is to focus on spending time at home, avoiding the hospital, remaining as independent as possible, symptom/pain control, quality of life, even if it means sacrificing a little time, improvement in condition but with limits to invasive therapies, and comfort and QOL     Accordingly, we have decided that the best plan to meet the patient's goals includes enrolling in hospice care         Significant Labs: All pertinent labs within the past 24 hours have been reviewed.  CBC:   Recent Labs   Lab 07/03/23 0339   WBC 6.82   HGB 6.8*   HCT 22.6*   MCV 85        BMP:  Recent Labs   Lab 07/03/23 0339   *   *   K 3.8      CO2 11*   BUN 41*   CREATININE 2.1*   CALCIUM 7.4*     LFT:  Lab Results   Component Value Date    AST 32 07/02/2023    ALKPHOS 126 07/02/2023    BILITOT 0.5 07/02/2023     Albumin:   Albumin   Date Value Ref Range Status   07/02/2023 1.9 (L) 3.5 - 5.2 g/dL Final     Protein:   Total Protein   Date Value Ref Range Status   07/02/2023 5.9 (L) 6.0 - 8.4 g/dL Final     Lactic acid:   Lab Results   Component Value Date    LACTATE 2.0 06/29/2023    LACTATE 1.0 05/26/2016       Significant Imaging: I have reviewed all pertinent imaging results/findings within the past 24 hours.

## 2023-07-03 NOTE — ASSESSMENT & PLAN NOTE
- Avoid nsaids or nephrotoxic drugs  - Continue PO bicarb (bicarb < 18)  - Monitor UOP      Cr 1.3-->1.8 --> 2.1  PC ratio  FeNa -  UA   IVF -- continue  Patient appears with prerenal IVVD   Patient agrees to IV and IVF today - will hydrate IV and PO     7/3  Renal function not improving with IVF   Urine studies---  Renal US   Nephrology consult   Bladder scan not accurate due to ascites  I&O cath had 125 ml 7/2

## 2023-07-03 NOTE — PROGRESS NOTES
YAIR  rec'd a call from pt's daughter Lety  -- both daughters are now on board with home hospice as well as the pt. YAIR [facility]  [phone]  (she contacted them Sat) - and she will work on finalizing things with family. YAIR told Lety that pt could likely leave today.  MAGGIE Louise updated.

## 2023-07-03 NOTE — ASSESSMENT & PLAN NOTE
"Mrs Monroe is a 76 y/o F with a Pmhx of CKD Stage 4, HTN, HLD, PAD s/p 5/9/23-- R CFA endarterectomy and Right iliac artery stenting  (7x39 VBX, 7x80mm Lifestent) here for weakness and frequent falls. Most recently she fell yesterday and could not get up.  This prompted EMS to arrive and take her to the emergency department.  At this time she was found to have a significant, albeit healing, sacral decubitus ulcer.  And she also seemed very deconditioned.  Palliative was consulted due to refusal of care and end of life planning including hospice discussion.     -Prior to palliative consult was placed, pt made her wishes for comfort and dignity known.  Pt was agreeable to home hospice and met with home hospice company representative.  Pt lives at home with her daughter,  Deana, who was not on board with home hospice, and thus pt elected not to enroll into hospice services at that time and stated she would attempt SNF to please her daughter.     -Upon initial encounter with pt today, pt reports that she is "tired of being here, tired of medical interventions, and just wants to go home and be peaceful".  We further discussed pts goals of care.  Pt reports that comfort and dignity in her own home, at this phase of her life, are her highest priority.  Pt has unquestioned capacity and has expressed clear and consistent goals of care throughout this admission per chat review.   -Pt denies any pain or discomfort. Reports being comfortable, just tired.  Also expresses gratitude and appreciation toward all providers here, however expresses this is not the type of care that aligns with her wishes.    -Pt lives at home with her daughter Deana.  She has a daughter Lety, who is a RN and is supportive of the pts wishes.  Both daughters participate in the pts care. Pt reports that she is able to ambulate very short distances around the house with her walker.  However, she does endorse frequent falls lately.    -Pt reports that she " had a conversation with both daughters this morning, and that Deana is now agreeable to home hospice services.  Pt reports that her daughter Lety lives near by and her  can come home from their camp to assist with her care when needed.   -Pt expresses that family is in agreement for the pt to return home under the care of home hospice.  Per CM, finalizing hospice arrangements are being finalized.   -Pt remains a DNR.

## 2023-07-03 NOTE — PT/OT/SLP PROGRESS
Occupational Therapy  Visit Attempt     Patient Name:  Steph Monroe   MRN:  [id number]    Patient not seen today secondary to Blood transfusion. Will follow-up as available.    7/3/2023

## 2023-07-03 NOTE — PLAN OF CARE
Problem: Adult Inpatient Plan of Care  Goal: Plan of Care Review  Outcome: Met  Goal: Patient-Specific Goal (Individualized)  Outcome: Met  Goal: Absence of Hospital-Acquired Illness or Injury  Outcome: Met  Goal: Optimal Comfort and Wellbeing  Outcome: Met  Goal: Readiness for Transition of Care  Outcome: Met     Problem: Diabetes Comorbidity  Goal: Blood Glucose Level Within Targeted Range  Outcome: Met     Problem: Fluid and Electrolyte Imbalance (Acute Kidney Injury/Impairment)  Goal: Fluid and Electrolyte Balance  Outcome: Met     Problem: Oral Intake Inadequate (Acute Kidney Injury/Impairment)  Goal: Optimal Nutrition Intake  Outcome: Met     Problem: Renal Function Impairment (Acute Kidney Injury/Impairment)  Goal: Effective Renal Function  Outcome: Met     Problem: Infection  Goal: Absence of Infection Signs and Symptoms  Outcome: Met     Problem: Impaired Wound Healing  Goal: Optimal Wound Healing  Outcome: Met     Problem: Skin Injury Risk Increased  Goal: Skin Health and Integrity  Outcome: Met     Problem: Fall Injury Risk  Goal: Absence of Fall and Fall-Related Injury  Outcome: Met     Problem: Pain Acute  Goal: Acceptable Pain Control and Functional Ability  Outcome: Met     Problem: Fatigue  Goal: Improved Activity Tolerance  Outcome: Met     Problem: COPD (Chronic Obstructive Pulmonary Disease) Comorbidity  Goal: Maintenance of COPD Symptom Control  Outcome: Met     Problem: Heart Failure Comorbidity  Goal: Maintenance of Heart Failure Symptom Control  Outcome: Met     Problem: Hypertension Comorbidity  Goal: Blood Pressure in Desired Range  Outcome: Met     Problem: Coping Ineffective  Goal: Effective Coping  Outcome: Met

## 2023-07-03 NOTE — NURSING
Pt did not void throughout shift. Pt denies urge to void. Bladder scan done >80ml noted per bladder scan. md Dennis notified. Per md continue to monitor pt. No new orders noted at this time. NAD noted. Call bell in reach.

## 2023-07-03 NOTE — ASSESSMENT & PLAN NOTE
- Hgb around baseline of 8  - Asymptomaitc  - Likely 2/2 CKD  - Monitor      hgb 7 7/1  Anemia panel   UA - without blood   Stool for OCB   Low threshold for transfusion     7/2  Hgb 7.4  Lab Results   Component Value Date    IRON 12 (L) 07/01/2023    TRANSFERRIN 120 (L) 07/01/2023    TIBC 178 (L) 07/01/2023    FESATURATED 7 (L) 07/01/2023      Will transfuse if Hgb < 7      7/3  Lab Results   Component Value Date    WBC 6.82 07/03/2023    HGB 6.8 (L) 07/03/2023    HCT 22.6 (L) 07/03/2023    MCV 85 07/03/2023     07/03/2023       Consent obtained   Type and screen   Transfuse 1 unit PRBC

## 2023-07-03 NOTE — PT/OT/SLP PROGRESS
Occupational Therapy  Visit Attempt    Patient Name:  Steph Monroe   MRN:  [id number]    Patient not seen today secondary to Other (Comment). Pt declining participation at this time. Reports she has decided to go home with hospice and is awaiting transport home at this time. Will follow-up as available.    7/3/2023

## 2023-07-03 NOTE — ASSESSMENT & PLAN NOTE
- ACP time: 15 minutes  - DNR per patient. Says she has lived a long time and tired of illness and hospitals  - Would be OK with reversal if needed for procedure      7/1  Patient refusing care, turning, medications, telling nurses to leave her room.   Tried to call family- no answer   Palliative and  consulted to honor patient's wishes  Will discuss hospice care going further   Will continue to try to contact family     Social has been working on NH placement       7/2  After patient's discussion with her daughter Deana, she now wants to forego hospice and wants to discharge to a SNF facility  will consult PT/OT    7/3  Patient wants home with hospice, however daughter Deana is not in agreement. Deana would be her primary caregiver and lives at home with her. Further discussions with patient, Deana, and palliative care today

## 2023-07-03 NOTE — ASSESSMENT & PLAN NOTE
- ACP time: 15 minutes  - DNR per patient. Says she has lived a long time and tired of illness and hospitals  - Would be OK with reversal if needed for procedure      7/1  Patient refusing care, turning, medications, telling nurses to leave her room.   Tried to call family- no answer   Palliative and  consulted to honor patient's wishes  Will discuss hospice care going further   Will continue to try to contact family     Social has been working on NH placement       7/2  After patient's discussion with her daughter Deana, she now wants to forego hospice and wants to discharge to a SNF facility  will consult PT/OT    7/3  Patient wants home with hospice, however daughter Deana is not in agreement. Deana would be her primary caregiver and lives at home with her. Further discussions with patient, Deana, and palliative care today     Update patient and both daughters Lety and Deana are all in agreement for patient to discharge to home on hospice. Hospice orders placed. Patient may discharge when arrangements made. Palliative and CM on board.

## 2023-07-03 NOTE — CONSULTS
Nephrology Consult  H&P      Consult Requested By: Saige Arce MD  Reason for Consult: ALESIA on CKD3      SUBJECTIVE:     History of Present Illness:  Steph Monroe is a 77 y.o.   female who  has a past medical history of Cancer, CKD (chronic kidney disease), Diabetes mellitus, type 2, Hyperlipidemia, Hypertension, Neutropenic fever (5/27/2016), and PVD (peripheral vascular disease).. The patient presented to the [facility] on 6/29/2023  for IV pressure ulcer evaluation, she was not feeling well weakness. Nephrology consulted for ALESIA.   ?    Review of Systems   Constitutional:  Positive for malaise/fatigue. Negative for chills and fever.   HENT:  Negative for congestion and sore throat.    Eyes:  Negative for blurred vision, double vision and photophobia.   Respiratory:  Negative for cough and shortness of breath.    Cardiovascular:  Negative for chest pain, palpitations and leg swelling.   Gastrointestinal:  Negative for abdominal pain, diarrhea, nausea and vomiting.   Genitourinary:  Negative for dysuria and urgency.   Musculoskeletal:  Positive for myalgias (Leg pain). Negative for joint pain.   Skin:  Negative for itching and rash.   Neurological:  Positive for weakness. Negative for dizziness, sensory change and headaches.   Endo/Heme/Allergies:  Negative for polydipsia. Does not bruise/bleed easily.   Psychiatric/Behavioral:  Negative for depression.      Past Medical History:   Diagnosis Date    Cancer     rectum    CKD (chronic kidney disease)     Diabetes mellitus, type 2     Hyperlipidemia     Hypertension     Neutropenic fever 5/27/2016    PVD (peripheral vascular disease)      Past Surgical History:   Procedure Laterality Date    ANGIOGRAM, LOWER ARTERIAL, UNILATERAL Right 5/5/2023    Procedure: Angiogram, Lower Arterial, Unilateral;  Surgeon: Remington Ribeiro MD;  Location: [facility] CATH LAB/EP;  Service: Cardiology;  Laterality: Right;    ANGIOGRAPHY OF LOWER EXTREMITY N/A 5/15/2023     Procedure: Angiogram Extremity Unilateral;  Surgeon: FERNANDO Cagle II, MD;  [facility];  Service: Vascular;  Laterality: N/A;  22.4 min  183.31 mGy  23.5353 Gy.cm  17ml Dye     COLONOSCOPY N/A 3/28/2016    Procedure: COLONOSCOPY;  Surgeon: Mitul Buitrago Jr., MD;  Location: [facility] ENDO;  Service: Endoscopy;  Laterality: N/A;    ENDARTERECTOMY OF FEMORAL ARTERY Right 5/9/2023    Procedure: ENDARTERECTOMY, FEMORAL;  Surgeon: FERNANDO Cagle II, MD;  [facility];  Service: Vascular;  Laterality: Right;  contrast 12ml  Flouro time 11.1 min  mgy 217.83  Gycm2 26.3570    EYE SURGERY Left     cataract removal with lens implant.    FEMORAL ARTERY STENT  before 2010    PERIPHERAL ANGIOGRAPHY N/A 5/3/2023    Procedure: Peripheral angiography;  Surgeon: Rasheeda Ny MD;  Location: [facility] CATH LAB/EP;  Service: Cardiology;  Laterality: N/A;    PTA, SUPERFICIAL FEMORAL ARTERY Right 5/15/2023    Procedure: PTA, SUPERFICIAL FEMORAL ARTERY;  Surgeon: FERNANDO Cagle II, MD;  Location: [address];  Service: Vascular;  Laterality: Right;    RENAL ARTERY STENT  2010    STENT, ILIAC ARTERY Right 5/9/2023    Procedure: STENT, ILIAC ARTERY;  Surgeon: FERNANDO Cagle II, MD;  [facility];  Service: Vascular;  Laterality: Right;     Family History   Problem Relation Age of Onset    Diabetes Father     Heart attack Mother     Hypertension Sister      Social History     Tobacco Use    Smoking status: Former     Packs/day: 1.50     Years: 45.00     Pack years: 67.50     Types: Cigarettes     Quit date: 6/13/2013     Years since quitting: 10.0     Passive exposure: Never    Smokeless tobacco: Never   Substance Use Topics    Alcohol use: No     Alcohol/week: 0.0 standard drinks    Drug use: No       Review of patient's allergies indicates:   Allergen Reactions    Chantix [varenicline]      Tongue swelling      Wellbutrin [bupropion hcl]      Tongue swelling              OBJECTIVE:      Vital Signs (Most Recent)  Vitals:    07/03/23 0957 07/03/23 1015 07/03/23 1128 07/03/23 1212   BP: (!) 149/67 139/71 (!) 147/65    BP Location:   Left arm    Patient Position:   Lying    Pulse: 79 70 65 80   Resp: 18 16 18    Temp: 96.4 °F (35.8 °C) 97.6 °F (36.4 °C) 97 °F (36.1 °C)    TempSrc:  Oral Oral    SpO2: (!) 93% (!) 93% (!) 92%    Weight:       Height:             Date 07/03/23 0700 - 07/04/23 0659   Shift 0531-0626 7391-3172 0927-0965 24 Hour Total   INTAKE   Blood 302.5   302.5   Shift Total(mL/kg) 302.5(4.9)   302.5(4.9)   OUTPUT   Shift Total(mL/kg)       Weight (kg) 61.7 61.7 61.7 61.7           Medications:   aspirin  81 mg Oral Daily    atorvastatin  80 mg Oral Daily    carvediloL  25 mg Oral BID    clopidogreL  75 mg Oral Daily    docusate sodium  100 mg Oral BID    enoxparin  30 mg Subcutaneous Daily    ezetimibe  10 mg Oral Daily    lactulose  20 g Oral Daily    mupirocin   Nasal BID    NIFEdipine  30 mg Oral Daily    sodium bicarbonate  650 mg Oral TID           Physical Exam  Vitals and nursing note reviewed.   Constitutional:       General: She is not in acute distress.     Appearance: She is ill-appearing. She is not diaphoretic.   HENT:      Head: Normocephalic and atraumatic.      Mouth/Throat:      Pharynx: No oropharyngeal exudate.   Eyes:      General: No scleral icterus.     Conjunctiva/sclera: Conjunctivae normal.      Pupils: Pupils are equal, round, and reactive to light.   Cardiovascular:      Rate and Rhythm: Normal rate and regular rhythm.      Heart sounds: Normal heart sounds. No murmur heard.  Pulmonary:      Effort: Pulmonary effort is normal. No respiratory distress.      Breath sounds: Normal breath sounds.   Abdominal:      General: Bowel sounds are normal. There is distension.      Palpations: Abdomen is soft.      Tenderness: There is no abdominal tenderness.   Musculoskeletal:         General: Swelling present. Normal range of motion.      Cervical back: Normal  range of motion and neck supple.      Right lower leg: Edema present.      Comments: Wound IV ulcer    Skin:     General: Skin is warm and dry.      Findings: No erythema.   Neurological:      Mental Status: She is alert and oriented to person, place, and time.      Cranial Nerves: No cranial nerve deficit.   Psychiatric:         Mood and Affect: Affect normal.         Cognition and Memory: Memory normal.         Judgment: Judgment normal.       Laboratory:  Recent Labs   Lab 07/01/23  1753 07/02/23  0444 07/03/23  0339   WBC 11.14 8.25 6.82   HGB 7.8* 7.4* 6.8*   HCT 25.4* 23.4* 22.6*    207 206   MONO 4.7  0.5 6.7  0.6 7.8  0.5       Recent Labs   Lab 07/02/23  0444 07/02/23  1444 07/03/23  0339    135* 135*   K 4.0 4.2 3.8    106 108   CO2 19* 18* 11*   BUN 37* 38* 41*   CREATININE 2.1* 2.1* 2.1*   CALCIUM 7.9* 7.7* 7.4*         Diagnostic Results:  X-Ray: Reviewed  US: Reviewed  1. Mild right-sided hydronephrosis.  2. Nonobstructing small intrarenal calculi on the right.  3. Elevated right-sided resistive index would be in keeping with medical renal disease.  4. Status post left nephrectomy.  Echo: Reviewed  ASSESSMENT/PLAN:     1. ALESIA -  on CKD3  single kidney    -- Cr baseline ~ 1.5 - 1.9  now ALESIA 2.1  most likely  Due to ? Ischemic ATN  in the setting of low Hg and Deubitous ulcer ? Sepsis also priro multiple AKIs due to obstruction  Mild Hydronephrosis - no No Hydro this time, UA hazy 1030 ? Dry    Also Acidosis bicarb 11 - check LA - start IV bicarbonate + PO   Consider her debility and multiple comorbidities - make her poor HD candidate in case she needs it.   -- Daily Renal Function Panel  -- Avoid Hypotension.  -- Renally dose all meds  -- Please avoid nephrotoxins, including NSAIDs, aminoglycosides, IV contrast (unless absolutely necessary), gadolinium, fleets and other phosphorous-based laxatives. Caution with antibiotics.    2. HTN (I10) - controlled   3. Anemia of chronic kidney  disease treated        Recent Labs   Lab 07/01/23  1753 07/02/23  0444 07/03/23  0339   HGB 7.8* 7.4* 6.8*   HCT 25.4* 23.4* 22.6*    207 206         Iron   Lab Results   Component Value Date    IRON 12 (L) 07/01/2023    TIBC 178 (L) 07/01/2023    FERRITIN 221 07/01/2023       4. MBD (E88.9 M90.80) -  Recent Labs   Lab 07/03/23  0339   CALCIUM 7.4*       No results for input(s): MG in the last 168 hours.      Lab Results   Component Value Date    CALCIUM 7.4 (L) 07/03/2023    PHOS 3.8 05/16/2023    PHOS 3.8 05/16/2023     No results found for: [id number]  Acidosis- start IV bicarbonate + PO   -- check LA acid   Lab Results   Component Value Date    CO2 11 (L) 07/03/2023       5. Nutrition/Hypoalbuminemia    Recent Labs   Lab 06/29/23  1939 06/30/23  0555 07/01/23  0313 07/02/23  0444   LABPROT 13.0*  --   --   --    ALBUMIN  --    < > 1.8* 1.9*    < > = values in this interval not displayed.       Nepro with meals TID.       Thank you for allowing me to participate in care of your patient  With any question please call   Lacey Valera MD     [facility]  JED Garza MD,   MD JOSE RAMON Patten MD E. V. Harmon, NP  [address]   [place], [address] [phone]  After hours answering service: [phone]

## 2023-07-03 NOTE — DISCHARGE SUMMARY
[facility] Medicine  Discharge Summary      Patient Name: Steph Monroe  MRN: [id number]  ALVARADO: [id number]  Patient Class: IP- Inpatient  Admission Date: 6/29/2023  Hospital Length of Stay: 1 days  Discharge Date and Time:  07/03/2023 3:50 PM  Attending Physician: Saige Arce MD   Discharging Provider: Aspen Pagan NP  Primary Care Provider: Mane Carmona MD    Primary Care Team: Networked reference to record PCT     HPI:   76 yo female with a Pmhx of CKD Stage 4, HTN, HLD, PAD s/p 5/9/23-- R CFA endarterectomy and Right iliac artery stenting  (7x39 VBX, 7x80mm Lifestent) here for weakness and falls.    Patient states she has not had any claudication symptoms since her procedure as above.  However, she has been feeling weak in the legs and has fallen a couple times.  Most recently she fell yesterday and could not get up.  This prompted EMS to arrive and take her to the emergency department.  At this time she was found to have a significant, albeit healing, sacral decubitus ulcer.  And she also seemed very deconditioned.  Denies any chest pain, shortness a breath, syncope, dizziness, claudication symptoms, leg numbness or tingling.    CT imaging showed no osteomyelitis but concerning gluteal abscess. Admitted for observation for wound care consult and possible surgery evaluation vs IR of the wound if needed.      * No surgery found *      Hospital Course:   No notes on file     Goals of Care Treatment Preferences:  Code Status: DNR          What is most important right now is to focus on spending time at home, avoiding the hospital, remaining as independent as possible, symptom/pain control, quality of life, even if it means sacrificing a little time, comfort and QOL .  Accordingly, we have decided that the best plan to meet the patient's goals includes no further escalation in treatment, enrolling in hospice care, pivot to comfort-focused care.      Consults:   Consults (From admission,  onward)        Status Ordering Provider     Inpatient consult to [facility] (Greg Fernandez, Domingo)  Once        Provider:  (Not yet assigned)    Acknowledged JACINDA LOMELI     Inpatient consult to Palliative Care  Once        Provider:  (Not yet assigned)    Acknowledged JACINDA LOMELI     Inpatient consult to General Surgery  Once        Provider:  (Not yet assigned)    Completed JACINDA LOMELI     Inpatient consult to Interventional Radiology  Once        Provider:  (Not yet assigned)    Completed LAKESHIA SAMUEL     IP consult to case management  Once        Provider:  (Not yet assigned)    Acknowledged DUSTIN NIX          Pulmonary  Pulmonary emphysema    Lungs clear   May use PRN nebs     Cardiac/Vascular  Chronic heart failure with preserved ejection fraction  - No signs of pulm edema or overt volume overload  - Does have some peripheral edema  - Continue PO lasix - hold today 7/1 with ALESIA   - Continue HTN control    5/2/2023    The left ventricle is normal in size with concentric remodeling and normal systolic function.   The estimated ejection fraction is 65%.   Indeterminate left ventricular diastolic function.   There is mild mitral stenosis.   The mean diastolic gradient across the mitral valve is 5 mmHg at a heart rate of 66 bpm.   Normal right ventricular size with normal right ventricular systolic function.   There is mild aortic valve stenosis.   Aortic valve area is 1.28 cm2; peak velocity is 2.33 m/s; mean gradient is 10 mmHg.   Mild-to-moderate aortic regurgitation.   Normal central venous pressure (3 mmHg).   The estimated PA systolic pressure is 35 mmHg        Coronary artery disease involving native coronary artery of native heart without angina pectoris  - Continue ASA, statin  - On BB  - Monitor      PAD (peripheral artery disease)  - Plavix, statin  - S/p 5/9/23-- R CFA endarterectomy and Right iliac artery stenting  (7x39 VBX, 7x80mm  Lifestent)      HLD (hyperlipidemia)  - Continue lipitor      Essential hypertension  - Continue nifedipine and coreg      Renal/  Stage 3b chronic kidney disease  - Avoid nsaids or nephrotoxic drugs  - Continue PO bicarb (bicarb < 18)  - Monitor UOP      Cr 1.3-->1.8 --> 2.1  PC ratio  FeNa -  UA   IVF -- continue  Patient appears with prerenal IVVD   Patient agrees to IV and IVF today - will hydrate IV and PO     7/3  Renal function not improving with IVF   Urine studies---  Renal US   Nephrology consult   Bladder scan not accurate due to ascites  I&O cath had 125 ml 7/2  Check lactic   Nephrology started IV bicarb     Oncology  Anemia of chronic disease  - Hgb around baseline of 8  - Asymptomaitc  - Likely 2/2 CKD  - Monitor      hgb 7 7/1  Anemia panel   UA - without blood   Stool for OCB   Low threshold for transfusion     7/2  Hgb 7.4  Lab Results   Component Value Date    IRON 12 (L) 07/01/2023    TRANSFERRIN 120 (L) 07/01/2023    TIBC 178 (L) 07/01/2023    FESATURATED 7 (L) 07/01/2023      Will transfuse if Hgb < 7      7/3  Lab Results   Component Value Date    WBC 6.82 07/03/2023    HGB 6.8 (L) 07/03/2023    HCT 22.6 (L) 07/03/2023    MCV 85 07/03/2023     07/03/2023       Consent obtained   Type and screen   Transfuse 1 unit PRBC     History of rectal or anal cancer    Treated with chemo and radiation    Endocrine  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 hypertension  a1c 5   Diet controlled       GI  Cirrhosis    S/t LEWIS   On PO lasix - held with ALESIA     Orthopedic  * 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 Says she has lived a long time and tired of illness and hospitals  - Would be OK with reversal if needed for procedure      7/1  Patient refusing care, turning, medications, telling nurses to leave her room.   Tried to call family- no answer   Palliative and  consulted to honor patient's wishes  Will discuss hospice care going further   Will continue to try to contact family     Social has been working on NH placement       7/2  After patient's discussion with her daughter Deana, she now wants to forego hospice and wants to discharge to a SNF facility  will consult PT/OT    7/3  Patient wants home with hospice, however daughter Deana is not in agreement. Deana would be her primary caregiver and lives at home with her. Further discussions with patient, Deana, and palliative care today     Update patient and both daughters Lety and Deana are all in agreement for patient to discharge to home on hospice. Hospice orders placed. Patient may discharge when arrangements made. Palliative and CM on board.       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 [L89.314] 06/30/2023 Yes    Palliative care encounter [Z51.5] 07/03/2023 Not Applicable    Stage 3b chronic kidney disease [N18.32] 06/30/2023 Yes    ACP (advance care planning) [Z71.89] 06/30/2023 Not Applicable    Anemia of chronic disease [D62] 05/16/2023 Yes    Chronic heart failure with preserved ejection fraction [I50.32] 05/02/2023 Yes    Cirrhosis [K74.60] 05/02/2023 Yes    Pulmonary emphysema [J43.9] 05/02/2023 Yes    History of rectal or anal cancer [Z85.048] 04/04/2016 Yes    Coronary artery disease involving native coronary artery of native heart without angina pectoris [I25.10] 03/26/2016 Yes     Chronic     Essential hypertension [I10] 03/26/2016 Yes     Chronic    HLD (hyperlipidemia) [E78.5] 03/26/2016 Yes     Chronic    PAD (peripheral artery disease) [I73.9] 03/26/2016 Yes    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 hypertension [E11.22, I12.9, N18.30] 03/26/2016 Yes     Chronic      Problems Resolved During this Admission:       Discharged Condition: fair    Disposition: Hospice/Home    Follow Up:    Patient Instructions:      Diet Cardiac     Diet diabetic     Diet renal     Notify your health care provider if you experience any of the following:  temperature >100.4     Notify your health care provider if you experience any of the following:  persistent nausea and vomiting or diarrhea     Notify your health care provider if you experience any of the following:  severe uncontrolled pain     Notify your health care provider if you experience any of the following:  redness, tenderness, or signs of infection (pain, swelling, redness, odor or green/yellow discharge around incision site)     Notify your health care provider if you experience any of the following:  difficulty breathing or increased cough     Notify your health care provider if you experience any of the following:  severe persistent headache     Notify your health care provider if you experience any of the following:  worsening rash     Notify your health care provider if you experience any of the following:  persistent dizziness, light-headedness, or visual disturbances     Notify your health care provider if you experience any of the following:  increased confusion or weakness     Activity as tolerated       Significant Diagnostic Studies: Labs:   CMP   Recent Labs   Lab 07/02/23  0444 07/02/23  1444 07/03/23  0339    135* 135*   K 4.0 4.2 3.8    106 108   CO2 19* 18* 11*   * 123* 113*   BUN 37* 38* 41*   CREATININE 2.1* 2.1* 2.1*   CALCIUM 7.9* 7.7* 7.4*   PROT 5.9*  --   --    ALBUMIN 1.9*  --   --    BILITOT 0.5  --   --    ALKPHOS 126   --   --    AST 32  --   --    ALT 15  --   --    ANIONGAP 10 11 16    and CBC   Recent Labs   Lab 07/01/23  1753 07/02/23  0444 07/03/23  0339   WBC 11.14 8.25 6.82   HGB 7.8* 7.4* 6.8*   HCT 25.4* 23.4* 22.6*    207 206       Pending Diagnostic Studies:     Procedure Component Value Units Date/Time    Lactic Acid, Plasma [[id number]]     Order Status: Sent Lab Status: No result     Specimen: Blood          Medications:  Reconciled Home Medications:      Medication List      START taking these medications    docusate sodium 100 MG capsule  Commonly known as: COLACE  Take 1 capsule (100 mg total) by mouth 2 (two) times daily.     lactulose 10 gram/15 mL solution  Commonly known as: CHRONULAC  Take 30 mLs (20 g total) by mouth once daily. for 7 days        CONTINUE taking these medications    atorvastatin 80 MG tablet  Commonly known as: LIPITOR  Take 1 tablet (80 mg total) by mouth once daily.     [employer] CHEWABLE ASPIRIN 81 MG Chew  Generic drug: aspirin  Take 1 tablet (81 mg total) by mouth once daily.     carvediloL 25 MG tablet  Commonly known as: COREG  Take 1 tablet by mouth 2 (two) times daily.     clopidogreL 75 mg tablet  Commonly known as: PLAVIX  Take 75 mg by mouth once daily.     ezetimibe 10 mg tablet  Commonly known as: ZETIA  Take 10 mg by mouth once daily.     furosemide 20 MG tablet  Commonly known as: LASIX  Take 20 mg by mouth once daily.     NIFEdipine 30 MG (OSM) 24 hr tablet  Commonly known as: PROCARDIA-XL  Take 1 tablet (30 mg total) by mouth once daily.     sodium bicarbonate 650 MG tablet  Take 1 tablet (650 mg total) by mouth 3 (three) times daily.            Indwelling Lines/Drains at time of discharge:   Lines/Drains/Airways     Central Venous Catheter Line  Duration                Port A Cath Single Lumen 06/09/16 right subclavian 2580 days                Time spent on the discharge of patient: 60 minutes         Aspen Pagan NP  Department of Hospital Medicine  [facility]  Telemetry

## 2023-07-03 NOTE — HPI
"Per  chart, "78 yo female with a Pmhx of CKD Stage 4, HTN, HLD, PAD s/p 5/9/23-- R CFA endarterectomy and Right iliac artery stenting  (7x39 VBX, 7x80mm Lifestent) here for weakness and falls.     Patient states she has not had any claudication symptoms since her procedure as above.  However, she has been feeling weak in the legs and has fallen a couple times.  Most recently she fell yesterday and could not get up.  This prompted EMS to arrive and take her to the emergency department.  At this time she was found to have a significant, albeit healing, sacral decubitus ulcer.  And she also seemed very deconditioned.  Denies any chest pain, shortness a breath, syncope, dizziness, claudication symptoms, leg numbness or tingling.     CT imaging showed no osteomyelitis but concerning gluteal abscess. Admitted for observation for wound care consult and possible surgery evaluation vs IR of the wound if needed."  "

## 2023-09-26 NOTE — ED NOTES
Pt in room 5 via  EMS from home. Pt c/o pain all over especially right arm and buttocks. Pt reports a fall last night and slept on the floor. Pt lives with her daughter. Pt awake and alert and answers questions appropriately. Plan of care reviewed, call bell within reach.    Crescentic Advancement Flap Text: The defect edges were debeveled with a #15 scalpel blade.  Given the location of the defect and the proximity to free margins a crescentic advancement flap was deemed most appropriate.  Using a sterile surgical marker, the appropriate advancement flap was drawn incorporating the defect and placing the expected incisions within the relaxed skin tension lines where possible.    The area thus outlined was incised deep to adipose tissue with a #15 scalpel blade.  The skin margins were undermined to an appropriate distance in all directions utilizing iris scissors.

## 2024-12-18 ENCOUNTER — TELEPHONE (OUTPATIENT)
Dept: OPHTHALMOLOGY | Facility: CLINIC | Age: 79
End: 2024-12-18
Payer: MEDICARE

## 2024-12-18 ENCOUNTER — OFFICE VISIT (OUTPATIENT)
Dept: OPTOMETRY | Facility: CLINIC | Age: 79
End: 2024-12-18
Payer: MEDICARE

## 2024-12-18 DIAGNOSIS — H34.8120 CENTRAL RETINAL VEIN OCCLUSION WITH MACULAR EDEMA OF LEFT EYE: ICD-10-CM

## 2024-12-18 DIAGNOSIS — H25.041 POSTERIOR SUBCAPSULAR POLAR AGE-RELATED CATARACT OF RIGHT EYE: ICD-10-CM

## 2024-12-18 DIAGNOSIS — H52.4 BILATERAL PRESBYOPIA: ICD-10-CM

## 2024-12-18 DIAGNOSIS — E11.22 TYPE 2 DM WITH CKD STAGE 3 AND HYPERTENSION: Primary | Chronic | ICD-10-CM

## 2024-12-18 DIAGNOSIS — I12.9 TYPE 2 DM WITH CKD STAGE 3 AND HYPERTENSION: Primary | Chronic | ICD-10-CM

## 2024-12-18 DIAGNOSIS — Z96.1 PSEUDOPHAKIA: ICD-10-CM

## 2024-12-18 DIAGNOSIS — N18.30 TYPE 2 DM WITH CKD STAGE 3 AND HYPERTENSION: Primary | Chronic | ICD-10-CM

## 2024-12-18 DIAGNOSIS — H25.11 NUCLEAR SCLEROSIS OF RIGHT EYE: ICD-10-CM

## 2024-12-18 PROCEDURE — 1126F AMNT PAIN NOTED NONE PRSNT: CPT | Mod: CPTII,S$GLB,, | Performed by: OPTOMETRIST

## 2024-12-18 PROCEDURE — 92015 DETERMINE REFRACTIVE STATE: CPT | Mod: S$GLB,,, | Performed by: OPTOMETRIST

## 2024-12-18 PROCEDURE — 3288F FALL RISK ASSESSMENT DOCD: CPT | Mod: CPTII,S$GLB,, | Performed by: OPTOMETRIST

## 2024-12-18 PROCEDURE — 1159F MED LIST DOCD IN RCRD: CPT | Mod: CPTII,S$GLB,, | Performed by: OPTOMETRIST

## 2024-12-18 PROCEDURE — 1100F PTFALLS ASSESS-DOCD GE2>/YR: CPT | Mod: CPTII,S$GLB,, | Performed by: OPTOMETRIST

## 2024-12-18 PROCEDURE — 92004 COMPRE OPH EXAM NEW PT 1/>: CPT | Mod: S$GLB,,, | Performed by: OPTOMETRIST

## 2024-12-18 PROCEDURE — 99999 PR PBB SHADOW E&M-EST. PATIENT-LVL III: CPT | Mod: PBBFAC,,, | Performed by: OPTOMETRIST

## 2024-12-18 NOTE — PROGRESS NOTES
HPI    AMANDA: unknown  Chief complaint (CC): routine eye exam with C/o pt say's OD vision is   foggy and blurry  Glasses? -    Contacts? -  H/o eye surgery, injections or laser: Cat Sx OS   H/o eye injury: -  Known eye conditions? Cataracts   Family h/o eye conditions? -  Eye gtts? -      (-) Flashes (-)  Floaters (-) Mucous   (+)  Tearing (-) Itching (-) Burning   (-) Headaches (-) Eye Pain/discomfort (-) Irritation   (-)  Redness (-) Double vision (+) Blurry vision    Diabetic? +  A1c? Hemoglobin A1C       Date                     Value               Ref Range             Status                05/04/2023               5.0                 4.0 - 5.6 %           Final              Comment:    ADA Screening Guidelines:  5.7-6.4%  Consistent with   prediabetes  >or=6.5%  Consistent with diabetes    High levels of fetal   hemoglobin interfere with the HbA1C  assay. Heterozygous hemoglobin   variants (HbS, HgC, etc)do  not significantly interfere with this assay.     However, presence of multiple variants may affect accuracy.         09/07/2022               5.1                 4.5 - 5.7 %           Final                 03/22/2022               4.9                 4.5 - 5.7 %           Final                 05/26/2016               6.3 (H)             4.5 - 6.2 %           Final            ----------         Last edited by Ainsley Recinos MA on 12/18/2024 10:38 AM.            Assessment /Plan     For exam results, see Encounter Report.    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 Good BSL and pt reports she is no longer on meds. Pt reports gradual decline in VA. Visually significant cataract OD doesn't allow for posterior pole views. Pt aware that retina involvement may cause more  blur OD as well. No SRx released today due to no improvement. Pt advised that issues are not a vision problem but due to cataracts and retinal disease. Educated on potential for cataract surgery OD. Referral to Dr Huang and Dr Huynh.

## 2024-12-18 NOTE — TELEPHONE ENCOUNTER
----- Message from Alex Schmitz sent at 12/18/2024  1:59 PM CST -----  Pt needs appt for PDR OS W/DME with Dr. Huynh

## 2024-12-18 NOTE — TELEPHONE ENCOUNTER
Called 2 times, but no answer or ability to leave VM ----- Message from Med Assistant Schmitz sent at 12/18/2024  2:00 PM CST -----  Pt needs Cataract eval with Dr. Huang per Dr. Aguillon

## 2025-01-27 ENCOUNTER — PATIENT MESSAGE (OUTPATIENT)
Dept: OPTOMETRY | Facility: CLINIC | Age: 80
End: 2025-01-27
Payer: MEDICARE

## 2025-02-27 ENCOUNTER — OFFICE VISIT (OUTPATIENT)
Dept: OPHTHALMOLOGY | Facility: CLINIC | Age: 80
End: 2025-02-27
Payer: MEDICARE

## 2025-02-27 DIAGNOSIS — H34.8120 CENTRAL RETINAL VEIN OCCLUSION, LEFT EYE, WITH MACULAR EDEMA: Primary | ICD-10-CM

## 2025-02-27 DIAGNOSIS — H34.8120 CENTRAL RETINAL VEIN OCCLUSION WITH MACULAR EDEMA OF LEFT EYE: ICD-10-CM

## 2025-02-27 DIAGNOSIS — H25.13 NS (NUCLEAR SCLEROSIS), BILATERAL: ICD-10-CM

## 2025-02-27 DIAGNOSIS — H35.033 HYPERTENSIVE RETINOPATHY, BILATERAL: ICD-10-CM

## 2025-02-27 PROCEDURE — 67028 INJECTION EYE DRUG: CPT | Mod: LT,S$GLB,, | Performed by: OPHTHALMOLOGY

## 2025-02-27 PROCEDURE — 1159F MED LIST DOCD IN RCRD: CPT | Mod: CPTII,S$GLB,, | Performed by: OPHTHALMOLOGY

## 2025-02-27 PROCEDURE — 1101F PT FALLS ASSESS-DOCD LE1/YR: CPT | Mod: CPTII,S$GLB,, | Performed by: OPHTHALMOLOGY

## 2025-02-27 PROCEDURE — 1160F RVW MEDS BY RX/DR IN RCRD: CPT | Mod: CPTII,S$GLB,, | Performed by: OPHTHALMOLOGY

## 2025-02-27 PROCEDURE — 92134 CPTRZ OPH DX IMG PST SGM RTA: CPT | Mod: S$GLB,,, | Performed by: OPHTHALMOLOGY

## 2025-02-27 PROCEDURE — 1126F AMNT PAIN NOTED NONE PRSNT: CPT | Mod: CPTII,S$GLB,, | Performed by: OPHTHALMOLOGY

## 2025-02-27 PROCEDURE — 3288F FALL RISK ASSESSMENT DOCD: CPT | Mod: CPTII,S$GLB,, | Performed by: OPHTHALMOLOGY

## 2025-02-27 PROCEDURE — 92004 COMPRE OPH EXAM NEW PT 1/>: CPT | Mod: 25,S$GLB,, | Performed by: OPHTHALMOLOGY

## 2025-02-27 PROCEDURE — 99999 PR PBB SHADOW E&M-EST. PATIENT-LVL III: CPT | Mod: PBBFAC,,, | Performed by: OPHTHALMOLOGY

## 2025-02-27 RX ORDER — FLUORESCEIN 500 MG/ML
5 INJECTION INTRAVENOUS ONCE
Status: SHIPPED | OUTPATIENT
Start: 2025-02-27

## 2025-02-27 RX ADMIN — Medication 1.25 MG: at 02:02

## 2025-02-27 NOTE — PROGRESS NOTES
HPI     Diabetic Eye Exam     Additional comments: Cons per Dr. Aguillon           Comments    Patient here today with c/o decreasing VA at both distance and near OD >   OS, no pain ou, occasional floaters without flashes. Patient c/o seeing   white spot in VA OS comes and goes.    No gtts          Last edited by Laurie Ibanez on 2/27/2025  1:17 PM.          OCT - OD some  drusen changes  OS - diffuse edema and HE      A/P    CRVO with ME OS  Looks longstanding - pt unsure when she had problems  Family noticed vision struggles in 8/24 - also possible when cataract OD worsening - so could be more longstanding    Will try to dry macula once to assess potential    Avastin OS today    Get approval for Ozurdex    2. DM T2 - controlled without insulin  No sig DR REECE FA as NS OD difficult     3. NS OD  PCIOL OS    4. HTN Ret OU  BS/BP/chol control      1 month OCT/FA OS    Risks, benefits, and alternatives to treatment discussed in detail with the patient.  The patient voiced understanding and wished to proceed with the procedure    Injection Procedure Note:  Diagnosis: CRVO with ME OS    Patient Identified and Time Out complete  Pt marked  Topical Proparacaine and Betadine.  Inject Avastin OS at 6:00 @ 3.5-4mm posterior to limbus  Post Operative Dx: Same  Complications: None  Follow up as above.

## 2025-03-27 ENCOUNTER — PROCEDURE VISIT (OUTPATIENT)
Dept: OPHTHALMOLOGY | Facility: CLINIC | Age: 80
End: 2025-03-27
Payer: MEDICARE

## 2025-03-27 DIAGNOSIS — H35.033 HYPERTENSIVE RETINOPATHY, BILATERAL: ICD-10-CM

## 2025-03-27 DIAGNOSIS — H34.8120 CENTRAL RETINAL VEIN OCCLUSION, LEFT EYE, WITH MACULAR EDEMA: Primary | ICD-10-CM

## 2025-03-27 DIAGNOSIS — H25.13 NS (NUCLEAR SCLEROSIS), BILATERAL: ICD-10-CM

## 2025-03-27 PROCEDURE — 92235 FLUORESCEIN ANGRPH MLTIFRAME: CPT | Mod: S$GLB,,, | Performed by: OPHTHALMOLOGY

## 2025-03-27 PROCEDURE — 92134 CPTRZ OPH DX IMG PST SGM RTA: CPT | Mod: S$GLB,,, | Performed by: OPHTHALMOLOGY

## 2025-03-27 PROCEDURE — 67028 INJECTION EYE DRUG: CPT | Mod: LT,S$GLB,, | Performed by: OPHTHALMOLOGY

## 2025-03-27 PROCEDURE — 92014 COMPRE OPH EXAM EST PT 1/>: CPT | Mod: 25,S$GLB,, | Performed by: OPHTHALMOLOGY

## 2025-03-27 RX ADMIN — Medication 1.25 MG: at 04:03

## 2025-03-27 RX ADMIN — FLUORESCEIN 500 MG: 500 INJECTION INTRAVENOUS at 03:03

## 2025-03-27 NOTE — PROGRESS NOTES
HPI     avastin   os     fa   oct        CRVO       Additional comments: AVASTIN  OS   Blurry va     Oct   Fa  os   Last bs   unsure  Last A1c     unsure  Me os   Hazy va ou  Dls   02/27/25          Last edited by Sita Hoover on 3/27/2025  2:53 PM.          OCT - OD some  drusen changes  OS - diffuse edema and HE - improving    WFFA 3/25 - some areas of macular non perfusion OS.  No sig peripheral NP        A/P    CRVO with ME OS  S/p Avastin OS x 1  Looks longstanding - pt unsure when she had problems  Family noticed vision struggles in 8/24 - also possible when cataract OD worsening - so could be more longstanding    Will try to dry macula once to assess potential    Avastin OS today    Get approval for Ozurdex    2. DM T2 - controlled without insulin  No sig DR      3. NS OD  PCIOL OS    4. HTN Ret OU  BS/BP/chol control      1 month OCT no dilate    Risks, benefits, and alternatives to treatment discussed in detail with the patient.  The patient voiced understanding and wished to proceed with the procedure    Injection Procedure Note:  Diagnosis: CRVO with ME OS    Patient Identified and Time Out complete  Pt marked  Topical Proparacaine and Betadine.  Inject Avastin OS at 6:00 @ 3.5-4mm posterior to limbus  Post Operative Dx: Same  Complications: None  Follow up as above.

## 2025-05-08 ENCOUNTER — TELEPHONE (OUTPATIENT)
Dept: OPHTHALMOLOGY | Facility: CLINIC | Age: 80
End: 2025-05-08
Payer: MEDICARE

## 2025-05-08 ENCOUNTER — PROCEDURE VISIT (OUTPATIENT)
Dept: OPHTHALMOLOGY | Facility: CLINIC | Age: 80
End: 2025-05-08
Payer: MEDICARE

## 2025-05-08 DIAGNOSIS — H35.033 HYPERTENSIVE RETINOPATHY, BILATERAL: Primary | ICD-10-CM

## 2025-05-08 DIAGNOSIS — H34.8120 CENTRAL RETINAL VEIN OCCLUSION, LEFT EYE, WITH MACULAR EDEMA: ICD-10-CM

## 2025-05-08 PROCEDURE — 92012 INTRM OPH EXAM EST PATIENT: CPT | Mod: S$GLB,,, | Performed by: OPHTHALMOLOGY

## 2025-05-08 PROCEDURE — 92134 CPTRZ OPH DX IMG PST SGM RTA: CPT | Mod: S$GLB,,, | Performed by: OPHTHALMOLOGY

## 2025-05-08 NOTE — PROGRESS NOTES
HPI     Follow-up     Additional comments: 1 mo Avastin OS           Comments    Patient here today with c/o blurry VA OD > OS, no pain ou and denies   floaters or flashes. Patient would like to consider CE OD.          Last edited by Laurie Ibanez on 5/8/2025  3:15 PM.        HPI     avastin   os     fa   oct        CRVO       Additional comments: AVASTIN  OS   Blurry va     Oct   Fa  os   Last bs   unsure  Last A1c     unsure  Me os   Hazy va ou  Dls   02/27/25          Last edited by Sita Hoover on 3/27/2025  2:53 PM.          OCT - OD some  drusen changes  OS - diffuse edema and HE - improving    WFFA 3/25 - some areas of macular non perfusion OS.  No sig peripheral NP        A/P    CRVO with ME OS  S/p Avastin OS x 2  Looks longstanding - pt unsure when she had problems  Family noticed vision struggles in 8/24 - also possible when cataract OD worsening - so could be more longstanding    Will try to dry macula once to assess potential  Pt not interested in further injection therapy OS    Can return if she wants additional monitoring      2. DM T2 - controlled without insulin  No sig DR      3. NS OD - pt wants CE OD more than any additional tx OS - CE eval  PCIOL OS    4. HTN Ret OU  BS/BP/chol control      FU with cataract surgeon    Me PRN

## 2025-05-08 NOTE — TELEPHONE ENCOUNTER
Called to schedule pt for CE per Dr. Huynh.    ----- Message from Tech Lelo sent at 5/8/2025  4:05 PM CDT -----  Please call pt for CE  Thanks

## 2025-06-24 ENCOUNTER — HOSPITAL ENCOUNTER (INPATIENT)
Facility: HOSPITAL | Age: 80
LOS: 3 days | Discharge: HOME | DRG: 853 | End: 2025-06-27
Attending: STUDENT IN AN ORGANIZED HEALTH CARE EDUCATION/TRAINING PROGRAM | Admitting: INTERNAL MEDICINE
Payer: MEDICARE

## 2025-06-24 DIAGNOSIS — R07.9 CHEST PAIN: ICD-10-CM

## 2025-06-24 DIAGNOSIS — N18.4 STAGE 4 CHRONIC KIDNEY DISEASE: ICD-10-CM

## 2025-06-24 DIAGNOSIS — I49.9 ARRHYTHMIA: ICD-10-CM

## 2025-06-24 DIAGNOSIS — I10 ESSENTIAL HYPERTENSION: Chronic | ICD-10-CM

## 2025-06-24 DIAGNOSIS — A41.9 SEPSIS, DUE TO UNSPECIFIED ORGANISM, UNSPECIFIED WHETHER ACUTE ORGAN DYSFUNCTION PRESENT: ICD-10-CM

## 2025-06-24 DIAGNOSIS — D72.829 LEUKOCYTOSIS, UNSPECIFIED TYPE: Primary | ICD-10-CM

## 2025-06-24 DIAGNOSIS — K92.2 GI BLEED: ICD-10-CM

## 2025-06-24 DIAGNOSIS — S49.90XA ARM INJURY: ICD-10-CM

## 2025-06-24 DIAGNOSIS — R18.8 OTHER ASCITES: ICD-10-CM

## 2025-06-24 PROBLEM — N18.9 CKD (CHRONIC KIDNEY DISEASE): Status: ACTIVE | Noted: 2025-06-24

## 2025-06-24 PROBLEM — R41.82 ALTERED MENTAL STATUS: Status: ACTIVE | Noted: 2025-06-24

## 2025-06-24 LAB
ABSOLUTE EOSINOPHIL (OHS): 0 K/UL
ABSOLUTE MONOCYTE (OHS): 0.76 K/UL (ref 0.3–1)
ABSOLUTE NEUTROPHIL COUNT (OHS): 11.95 K/UL (ref 1.8–7.7)
ALBUMIN SERPL BCP-MCNC: 2.8 G/DL (ref 3.5–5.2)
ALP SERPL-CCNC: 78 UNIT/L (ref 40–150)
ALT SERPL W/O P-5'-P-CCNC: 14 UNIT/L (ref 10–44)
AMMONIA PLAS-SCNC: 29 UMOL/L (ref 10–50)
ANION GAP (OHS): 12 MMOL/L (ref 8–16)
AST SERPL-CCNC: 35 UNIT/L (ref 11–45)
BACTERIA #/AREA URNS AUTO: ABNORMAL /HPF
BASOPHILS # BLD AUTO: 0.02 K/UL
BASOPHILS NFR BLD AUTO: 0.2 %
BILIRUB DIRECT SERPL-MCNC: 0.6 MG/DL (ref 0.1–0.3)
BILIRUB SERPL-MCNC: 2.2 MG/DL (ref 0.1–1)
BILIRUB UR QL STRIP.AUTO: NEGATIVE
BUN SERPL-MCNC: 44 MG/DL (ref 8–23)
CALCIUM SERPL-MCNC: 9.2 MG/DL (ref 8.7–10.5)
CHLORIDE SERPL-SCNC: 113 MMOL/L (ref 95–110)
CLARITY UR: ABNORMAL
CO2 SERPL-SCNC: 15 MMOL/L (ref 23–29)
COLOR UR AUTO: YELLOW
CREAT SERPL-MCNC: 2.7 MG/DL (ref 0.5–1.4)
CREAT UR-MCNC: 157 MG/DL (ref 15–325)
ERYTHROCYTE [DISTWIDTH] IN BLOOD BY AUTOMATED COUNT: 17.1 % (ref 11.5–14.5)
GFR SERPLBLD CREATININE-BSD FMLA CKD-EPI: 18 ML/MIN/1.73/M2
GLUCOSE SERPL-MCNC: 159 MG/DL (ref 70–110)
GLUCOSE UR QL STRIP: NEGATIVE
HCT VFR BLD AUTO: 38 % (ref 37–48.5)
HGB BLD-MCNC: 12.1 GM/DL (ref 12–16)
HGB UR QL STRIP: ABNORMAL
HYALINE CASTS UR QL AUTO: 0 /LPF (ref 0–1)
IMM GRANULOCYTES # BLD AUTO: 0.09 K/UL (ref 0–0.04)
IMM GRANULOCYTES NFR BLD AUTO: 0.7 % (ref 0–0.5)
INDIRECT COOMBS: NORMAL
INR PPP: 1.5 (ref 0.8–1.2)
KETONES UR QL STRIP: NEGATIVE
LACTATE SERPL-SCNC: 2.6 MMOL/L (ref 0.5–2.2)
LACTATE SERPL-SCNC: 3.8 MMOL/L (ref 0.5–2.2)
LACTATE SERPL-SCNC: 4.3 MMOL/L (ref 0.5–2.2)
LEUKOCYTE ESTERASE UR QL STRIP: ABNORMAL
LIPASE SERPL-CCNC: 21 U/L (ref 4–60)
LYMPHOCYTES # BLD AUTO: 0.39 K/UL (ref 1–4.8)
MCH RBC QN AUTO: 30.4 PG (ref 27–31)
MCHC RBC AUTO-ENTMCNC: 31.8 G/DL (ref 32–36)
MCV RBC AUTO: 96 FL (ref 82–98)
MICROSCOPIC COMMENT: ABNORMAL
NITRITE UR QL STRIP: NEGATIVE
NUCLEATED RBC (/100WBC) (OHS): 0 /100 WBC
OB PNL STL: POSITIVE
OHS QRS DURATION: 72 MS
OHS QTC CALCULATION: 420 MS
PH UR STRIP: 6 [PH]
PLATELET # BLD AUTO: 84 K/UL (ref 150–450)
PLATELET BLD QL SMEAR: ABNORMAL
PMV BLD AUTO: 11.6 FL (ref 9.2–12.9)
POTASSIUM SERPL-SCNC: 5 MMOL/L (ref 3.5–5.1)
PROT SERPL-MCNC: 6.8 GM/DL (ref 6–8.4)
PROT UR QL STRIP: ABNORMAL
PROTHROMBIN TIME: 17.1 SECONDS (ref 9–12.5)
RBC # BLD AUTO: 3.98 M/UL (ref 4–5.4)
RBC #/AREA URNS AUTO: 3 /HPF (ref 0–4)
RELATIVE EOSINOPHIL (OHS): 0 %
RELATIVE LYMPHOCYTE (OHS): 3 % (ref 18–48)
RELATIVE MONOCYTE (OHS): 5.8 % (ref 4–15)
RELATIVE NEUTROPHIL (OHS): 90.3 % (ref 38–73)
RETICS/RBC NFR AUTO: 2.3 % (ref 0.5–2.5)
RH BLD: NORMAL
SODIUM SERPL-SCNC: 140 MMOL/L (ref 136–145)
SODIUM UR-SCNC: <20 MMOL/L (ref 20–250)
SP GR UR STRIP: 1.02
SPECIMEN OUTDATE: NORMAL
SQUAMOUS #/AREA URNS AUTO: 1 /HPF
TROPONIN I SERPL DL<=0.01 NG/ML-MCNC: 0.15 NG/ML
TROPONIN I SERPL DL<=0.01 NG/ML-MCNC: 0.16 NG/ML
TSH SERPL-ACNC: 1.93 UIU/ML (ref 0.4–4)
UROBILINOGEN UR STRIP-ACNC: NEGATIVE EU/DL
UUN UR-MCNC: 721 MG/DL (ref 140–1050)
WBC # BLD AUTO: 13.21 K/UL (ref 3.9–12.7)
WBC #/AREA URNS AUTO: 35 /HPF (ref 0–5)
WBC CLUMPS UR QL AUTO: ABNORMAL

## 2025-06-24 PROCEDURE — 83690 ASSAY OF LIPASE: CPT | Performed by: EMERGENCY MEDICINE

## 2025-06-24 PROCEDURE — 83605 ASSAY OF LACTIC ACID: CPT

## 2025-06-24 PROCEDURE — 93005 ELECTROCARDIOGRAM TRACING: CPT

## 2025-06-24 PROCEDURE — 96365 THER/PROPH/DIAG IV INF INIT: CPT

## 2025-06-24 PROCEDURE — 84443 ASSAY THYROID STIM HORMONE: CPT

## 2025-06-24 PROCEDURE — 84484 ASSAY OF TROPONIN QUANT: CPT

## 2025-06-24 PROCEDURE — 82248 BILIRUBIN DIRECT: CPT

## 2025-06-24 PROCEDURE — 0JQG0ZZ REPAIR RIGHT LOWER ARM SUBCUTANEOUS TISSUE AND FASCIA, OPEN APPROACH: ICD-10-PCS | Performed by: STUDENT IN AN ORGANIZED HEALTH CARE EDUCATION/TRAINING PROGRAM

## 2025-06-24 PROCEDURE — 87154 CUL TYP ID BLD PTHGN 6+ TRGT: CPT

## 2025-06-24 PROCEDURE — 25000003 PHARM REV CODE 250: Performed by: INTERNAL MEDICINE

## 2025-06-24 PROCEDURE — 84540 ASSAY OF URINE/UREA-N: CPT

## 2025-06-24 PROCEDURE — 85045 AUTOMATED RETICULOCYTE COUNT: CPT

## 2025-06-24 PROCEDURE — 93010 ELECTROCARDIOGRAM REPORT: CPT | Mod: ,,, | Performed by: INTERNAL MEDICINE

## 2025-06-24 PROCEDURE — 84300 ASSAY OF URINE SODIUM: CPT

## 2025-06-24 PROCEDURE — 96366 THER/PROPH/DIAG IV INF ADDON: CPT

## 2025-06-24 PROCEDURE — 82570 ASSAY OF URINE CREATININE: CPT

## 2025-06-24 PROCEDURE — 82040 ASSAY OF SERUM ALBUMIN: CPT | Performed by: EMERGENCY MEDICINE

## 2025-06-24 PROCEDURE — 96361 HYDRATE IV INFUSION ADD-ON: CPT

## 2025-06-24 PROCEDURE — 87641 MR-STAPH DNA AMP PROBE: CPT

## 2025-06-24 PROCEDURE — 25000003 PHARM REV CODE 250

## 2025-06-24 PROCEDURE — 63600175 PHARM REV CODE 636 W HCPCS

## 2025-06-24 PROCEDURE — 85025 COMPLETE CBC W/AUTO DIFF WBC: CPT | Performed by: EMERGENCY MEDICINE

## 2025-06-24 PROCEDURE — 84484 ASSAY OF TROPONIN QUANT: CPT | Performed by: INTERNAL MEDICINE

## 2025-06-24 PROCEDURE — 11000001 HC ACUTE MED/SURG PRIVATE ROOM

## 2025-06-24 PROCEDURE — 81001 URINALYSIS AUTO W/SCOPE: CPT | Performed by: STUDENT IN AN ORGANIZED HEALTH CARE EDUCATION/TRAINING PROGRAM

## 2025-06-24 PROCEDURE — 86901 BLOOD TYPING SEROLOGIC RH(D): CPT | Performed by: EMERGENCY MEDICINE

## 2025-06-24 PROCEDURE — 82140 ASSAY OF AMMONIA: CPT | Performed by: STUDENT IN AN ORGANIZED HEALTH CARE EDUCATION/TRAINING PROGRAM

## 2025-06-24 PROCEDURE — 63600175 PHARM REV CODE 636 W HCPCS: Performed by: INTERNAL MEDICINE

## 2025-06-24 PROCEDURE — 85610 PROTHROMBIN TIME: CPT | Performed by: EMERGENCY MEDICINE

## 2025-06-24 PROCEDURE — 83605 ASSAY OF LACTIC ACID: CPT | Performed by: EMERGENCY MEDICINE

## 2025-06-24 PROCEDURE — 87086 URINE CULTURE/COLONY COUNT: CPT | Performed by: STUDENT IN AN ORGANIZED HEALTH CARE EDUCATION/TRAINING PROGRAM

## 2025-06-24 PROCEDURE — 87040 BLOOD CULTURE FOR BACTERIA: CPT

## 2025-06-24 PROCEDURE — 99291 CRITICAL CARE FIRST HOUR: CPT

## 2025-06-24 PROCEDURE — 82272 OCCULT BLD FECES 1-3 TESTS: CPT

## 2025-06-24 RX ORDER — IBUPROFEN 200 MG
24 TABLET ORAL
Status: DISCONTINUED | OUTPATIENT
Start: 2025-06-24 | End: 2025-06-27 | Stop reason: HOSPADM

## 2025-06-24 RX ORDER — ENOXAPARIN SODIUM 100 MG/ML
30 INJECTION SUBCUTANEOUS EVERY 24 HOURS
Status: DISCONTINUED | OUTPATIENT
Start: 2025-06-24 | End: 2025-06-27 | Stop reason: HOSPADM

## 2025-06-24 RX ORDER — NALOXONE HCL 0.4 MG/ML
0.02 VIAL (ML) INJECTION
Status: DISCONTINUED | OUTPATIENT
Start: 2025-06-24 | End: 2025-06-27 | Stop reason: HOSPADM

## 2025-06-24 RX ORDER — SODIUM CHLORIDE, SODIUM LACTATE, POTASSIUM CHLORIDE, CALCIUM CHLORIDE 600; 310; 30; 20 MG/100ML; MG/100ML; MG/100ML; MG/100ML
INJECTION, SOLUTION INTRAVENOUS CONTINUOUS
Status: ACTIVE | OUTPATIENT
Start: 2025-06-24 | End: 2025-06-25

## 2025-06-24 RX ORDER — SODIUM CHLORIDE 0.9 % (FLUSH) 0.9 %
10 SYRINGE (ML) INJECTION EVERY 12 HOURS PRN
Status: DISCONTINUED | OUTPATIENT
Start: 2025-06-24 | End: 2025-06-27 | Stop reason: HOSPADM

## 2025-06-24 RX ORDER — IBUPROFEN 200 MG
16 TABLET ORAL
Status: DISCONTINUED | OUTPATIENT
Start: 2025-06-24 | End: 2025-06-27 | Stop reason: HOSPADM

## 2025-06-24 RX ORDER — CARVEDILOL 3.12 MG/1
3.12 TABLET ORAL 2 TIMES DAILY
Status: DISCONTINUED | OUTPATIENT
Start: 2025-06-24 | End: 2025-06-27 | Stop reason: HOSPADM

## 2025-06-24 RX ORDER — ENOXAPARIN SODIUM 100 MG/ML
40 INJECTION SUBCUTANEOUS EVERY 24 HOURS
Status: DISCONTINUED | OUTPATIENT
Start: 2025-06-24 | End: 2025-06-24

## 2025-06-24 RX ORDER — SODIUM BICARBONATE 650 MG/1
650 TABLET ORAL 3 TIMES DAILY
Status: DISCONTINUED | OUTPATIENT
Start: 2025-06-24 | End: 2025-06-27 | Stop reason: HOSPADM

## 2025-06-24 RX ORDER — MUPIROCIN 20 MG/G
OINTMENT TOPICAL 2 TIMES DAILY
Status: DISCONTINUED | OUTPATIENT
Start: 2025-06-24 | End: 2025-06-27 | Stop reason: HOSPADM

## 2025-06-24 RX ORDER — VANCOMYCIN 1.75 GRAM/500 ML IN 0.9 % SODIUM CHLORIDE INTRAVENOUS
20 ONCE
Status: COMPLETED | OUTPATIENT
Start: 2025-06-24 | End: 2025-06-24

## 2025-06-24 RX ORDER — GLUCAGON 1 MG
1 KIT INJECTION
Status: DISCONTINUED | OUTPATIENT
Start: 2025-06-24 | End: 2025-06-27 | Stop reason: HOSPADM

## 2025-06-24 RX ADMIN — VANCOMYCIN HYDROCHLORIDE 1750 MG: 10 INJECTION, POWDER, LYOPHILIZED, FOR SOLUTION INTRAVENOUS at 02:06

## 2025-06-24 RX ADMIN — SODIUM BICARBONATE 650 MG TABLET 650 MG: at 09:06

## 2025-06-24 RX ADMIN — SODIUM CHLORIDE, POTASSIUM CHLORIDE, SODIUM LACTATE AND CALCIUM CHLORIDE 1503 ML: 600; 310; 30; 20 INJECTION, SOLUTION INTRAVENOUS at 12:06

## 2025-06-24 RX ADMIN — MUPIROCIN: 20 OINTMENT TOPICAL at 09:06

## 2025-06-24 RX ADMIN — PIPERACILLIN SODIUM AND TAZOBACTAM SODIUM 4.5 G: 4; .5 INJECTION, POWDER, LYOPHILIZED, FOR SOLUTION INTRAVENOUS at 09:06

## 2025-06-24 RX ADMIN — CARVEDILOL 3.12 MG: 3.12 TABLET, FILM COATED ORAL at 09:06

## 2025-06-24 RX ADMIN — ENOXAPARIN SODIUM 30 MG: 30 INJECTION SUBCUTANEOUS at 06:06

## 2025-06-24 RX ADMIN — PIPERACILLIN SODIUM AND TAZOBACTAM SODIUM 4.5 G: 4; .5 INJECTION, POWDER, LYOPHILIZED, FOR SOLUTION INTRAVENOUS at 01:06

## 2025-06-24 RX ADMIN — SODIUM CHLORIDE, POTASSIUM CHLORIDE, SODIUM LACTATE AND CALCIUM CHLORIDE: 600; 310; 30; 20 INJECTION, SOLUTION INTRAVENOUS at 06:06

## 2025-06-24 NOTE — PROGRESS NOTES
"Pharmacokinetic Initial Assessment: IV Vancomycin    Assessment/Plan:    Initiate intravenous vancomycin with loading dose of 1750 mg once with subsequent doses when random concentrations are less than 20 mcg/mL  Desired empiric serum trough concentration is 15 to 20 mcg/mL  Draw vancomycin random level on 6-25 at 14:00.  Pharmacy will continue to follow and monitor vancomycin.      Please contact pharmacy at extension [phone] with any questions regarding this assessment.     Thank you for the consult,   Reese Hope       Patient brief summary:  Steph Monroe is a 79 y.o. female initiated on antimicrobial therapy with IV Vancomycin for treatment of suspected bacteremia    Drug Allergies:   Review of patient's allergies indicates:   Allergen Reactions    Chantix [varenicline]      Tongue swelling      Wellbutrin [bupropion hcl]      Tongue swelling         Actual Body Weight:   81.6 kg    Renal Function:   Estimated Creatinine Clearance: 17.3 mL/min (A) (based on SCr of 2.7 mg/dL (H)).,     Dialysis Method (if applicable):  N/A    CBC (last 72 hours):  Recent Labs   Lab Result Units 06/24/25  1137   WBC K/uL 13.21*   HGB gm/dL 12.1   HCT % 38.0   Platelet Count K/uL 84*   Lymph % % 3.0*   Mono % % 5.8   Eos % % 0.0   Basophil % % 0.2       Metabolic Panel (last 72 hours):  Recent Labs   Lab Result Units 06/24/25  1137 06/24/25  1603   Sodium mmol/L 140  --    Potassium mmol/L 5.0  --    Chloride mmol/L 113*  --    CO2 mmol/L 15*  --    Glucose mg/dL 159*  --    Glucose, UA   --  Negative   BUN mg/dL 44*  --    Creatinine mg/dL 2.7*  --    Albumin g/dL 2.8*  --    Bilirubin Total mg/dL 2.2*  --    ALP unit/L 78  --    AST unit/L 35  --    ALT unit/L 14  --        Drug levels (last 3 results):  No results for input(s): "VANCOMYCINRA", "VANCORANDOM", "VANCOMYCINPE", "VANCOPEAK", "VANCOMYCINTR", "VANCOTROUGH" in the last 72 hours.    Microbiologic Results:  Microbiology Results (last 7 days)       Procedure " Component Value Units Date/Time    Urine culture [[id number]] Collected: 06/24/25 1603    Order Status: Sent Specimen: Urine Updated: 06/24/25 1620    Blood Culture #1 **CANNOT BE ORDERED STAT** [[id number]] Collected: 06/24/25 1327    Order Status: Sent Specimen: Blood from Peripheral, Antecubital, Right Updated: 06/24/25 1336    Blood Culture #2 **CANNOT BE ORDERED STAT** [[id number]] Collected: 06/24/25 1327    Order Status: Sent Specimen: Blood from Peripheral, Antecubital, Left Updated: 06/24/25 1336

## 2025-06-24 NOTE — ED NOTES
Pt bib EMS from home s/p fall and possible GI bleed. Daughter noticed red streaks on bath towel, but no blood in stool. Pt oriented to self only. Per daughters, pt has had decrease in appetite, AMS, and stomach distension that started approximately 2 weeks ago. Pt changed into gown, placed on cardiac monitoring, pulse ox and automatic BP. Skin tear cleaned with vashe and dressed with non-adherent gauze and silk tape. Daughters at bedside, call light within reach.

## 2025-06-24 NOTE — H&P
[facility] Medicine H&P Note     Admitting Team: [facility] Hospitalist Team B  Attending Physician: Dr. Pleitez  Resident: Marquis  Intern: Max     Date of Admit: 6/24/2025    Chief Complaint     AMS    Subjective:      History of Present Illness:  Steph Monroe is a 79 y.o.female with a PMHx of CKD 4, HTN, HLD, PAD s/p R CFA endarterectomy and right iliac artery stenting, T2DM, and Anal cancer who presented for AMS for roughly 2 weeks.     The patient was in their usual state of health until two weeks ago, when her daughter, who she lives with, noticed that she would be more confused on the time of day and more fatigued than usual. At baseline, patient is able to perform all ADLs with a walker for assistance. Additionally, her daughter reports that she had been having more bowel movements than usual, up to 5-6 times per day and noted to be diarrhea. She has felt more weak and fatigued than her baseline. She also has noticed that her abdomen has slowly become more distended. Otherwise, patient denies headache, changes in vision, chest pain, SOB, abdominal pain, nausea, vomiting, constipation, or syncope.     Of note, patient was admitted in 2023 for sepsis and a sacral wound in addition to her cirrhosis. Following this admission, she was placed on hospice, and all medications were discontinued. She managed to do well, and graduated from hospice after 10 months.     Past Medical History:  Past Medical History:   Diagnosis Date    Cancer     rectum    CKD (chronic kidney disease)     Diabetes mellitus, type 2     Hyperlipidemia     Hypertension     Neutropenic fever 5/27/2016    PVD (peripheral vascular disease)        Past Surgical History:  Past Surgical History:   Procedure Laterality Date    ANGIOGRAM, LOWER ARTERIAL, UNILATERAL Right 5/5/2023    Procedure: Angiogram, Lower Arterial, Unilateral;  Surgeon: Remington Ribeiro MD;  Location: [facility] CATH LAB/EP;  Service: Cardiology;  Laterality: Right;    ANGIOGRAPHY  OF LOWER EXTREMITY N/A 5/15/2023    Procedure: Angiogram Extremity Unilateral;  Surgeon: FERNANDO Cagle II, MD;  Location: [facility] OR [facility] FLR;  Service: Vascular;  Laterality: N/A;  22.4 min  183.31 mGy  23.5353 Gy.cm  17ml Dye     COLONOSCOPY N/A 3/28/2016    Procedure: COLONOSCOPY;  Surgeon: Mtiul Buitrago Jr., MD;  Location: [facility] ENDO;  Service: Endoscopy;  Laterality: N/A;    ENDARTERECTOMY OF FEMORAL ARTERY Right 5/9/2023    Procedure: ENDARTERECTOMY, FEMORAL;  Surgeon: FERNANDO Cagle II, MD;  [facility];  Service: Vascular;  Laterality: Right;  contrast 12ml  Flouro time 11.1 min  mgy 217.83  Gycm2 26.3570    EYE SURGERY Left     cataract removal with lens implant.    FEMORAL ARTERY STENT  before 2010    PERIPHERAL ANGIOGRAPHY N/A 5/3/2023    Procedure: Peripheral angiography;  Surgeon: Rasheeda Ny MD;  Location: [facility] CATH LAB/EP;  Service: Cardiology;  Laterality: N/A;    PTA, SUPERFICIAL FEMORAL ARTERY Right 5/15/2023    Procedure: PTA, SUPERFICIAL FEMORAL ARTERY;  Surgeon: FERNANDO Cagle II, MD;  [facility];  Service: Vascular;  Laterality: Right;    RENAL ARTERY STENT  2010    STENT, ILIAC ARTERY Right 5/9/2023    Procedure: STENT, ILIAC ARTERY;  Surgeon: FERNANDO Cagel II, MD;  [facility];  Service: Vascular;  Laterality: Right;       Allergies:  Review of patient's allergies indicates:   Allergen Reactions    Chantix [varenicline]      Tongue swelling      Wellbutrin [bupropion hcl]      Tongue swelling         Home Medications:  No current medications    Family History:  Family History   Problem Relation Name Age of Onset    Diabetes Father      Heart attack Mother      Hypertension Sister         Social History:  Social History[1]    Review of Systems:  All other systems are reviewed and are negative.    Health Maintaince :   Primary Care Physician: Mane Carmona MD    Objective:   Last 24 Hour Vital Signs:  BP  Min: 122/75  Max: 194/87  Temp  " Av.7 °F (37.1 °C)  Min: 98.6 °F (37 °C)  Max: 98.7 °F (37.1 °C)  Pulse  Av.3  Min: 91  Max: 99  Resp  Av.7  Min: 20  Max: 29  SpO2  Av %  Min: 95 %  Max: 95 %  Height  Av' 2" (157.5 cm)  Min: 5' 2" (157.5 cm)  Max: 5' 2" (157.5 cm)  Weight  Av.6 kg (180 lb)  Min: 81.6 kg (180 lb)  Max: 81.6 kg (180 lb)  Body mass index is 32.92 kg/m².  I/O last 3 completed shifts:  In: 500.8 [IV Piggyback:500.8]  Out: -     Physical Examination:  Physical Exam  Constitutional:       Appearance: Normal appearance.   HENT:      Head: Normocephalic and atraumatic.   Eyes:      General: No scleral icterus.     Extraocular Movements: Extraocular movements intact.      Pupils: Pupils are equal, round, and reactive to light.   Cardiovascular:      Rate and Rhythm: Normal rate and regular rhythm.      Pulses: Normal pulses.      Heart sounds: Normal heart sounds. No murmur heard.     No friction rub. No gallop.   Pulmonary:      Effort: Pulmonary effort is normal. No respiratory distress.      Breath sounds: Normal breath sounds. No wheezing.   Abdominal:      General: Bowel sounds are normal. There is distension.      Palpations: Abdomen is soft.      Comments: Abdomen slightly distended, no tenderness to palpation, no guarding, no rebound   Musculoskeletal:         General: Normal range of motion.      Right lower leg: No edema.      Left lower leg: No edema.   Skin:     General: Skin is warm and dry.      Comments: Abrasion to right forearm, superficial and wrapped   Neurological:      Mental Status: She is alert and oriented to person, place, and time.   Psychiatric:         Mood and Affect: Mood normal.            Laboratory:  Most Recent Data:  CBC:   Lab Results   Component Value Date    WBC 13.21 (H) 2025    HGB 12.1 2025    HCT 38.0 2025    PLT 84 (L) 2025    MCV 96 2025    RDW 17.1 (H) 2025     BMP:   Lab Results   Component Value Date     2025    K 5.0 " 06/24/2025     (H) 06/24/2025    CO2 15 (L) 06/24/2025    BUN 44 (H) 06/24/2025    CREATININE 2.7 (H) 06/24/2025     (H) 06/24/2025    CALCIUM 9.2 06/24/2025    MG 2.1 05/16/2023    PHOS 3.8 05/16/2023    PHOS 3.8 05/16/2023     LFTs:   Lab Results   Component Value Date    PROT 6.8 06/24/2025    ALBUMIN 2.8 (L) 06/24/2025    BILITOT 2.2 (H) 06/24/2025    AST 35 06/24/2025    ALKPHOS 78 06/24/2025    ALT 14 06/24/2025     Coags:   Lab Results   Component Value Date    INR 1.5 (H) 06/24/2025    PROTIME 17.1 (H) 06/24/2025     FLP:   Lab Results   Component Value Date    CHOL 154 05/02/2023    HDL 50 05/02/2023    LDLCALC 94.4 05/02/2023    TRIG 48 05/02/2023    CHOLHDL 32.5 05/02/2023     DM:   Lab Results   Component Value Date    HGBA1C 5.0 05/04/2023    HGBA1C 5.1 09/07/2022    HGBA1C 4.9 03/22/2022    LDLCALC 94.4 05/02/2023    CREATININE 2.7 (H) 06/24/2025     Thyroid:   Lab Results   Component Value Date    TSH 1.930 06/24/2025     Anemia:   Lab Results   Component Value Date    IRON 12 (L) 07/01/2023    TIBC 178 (L) 07/01/2023    FERRITIN 221 07/01/2023    CLLLKNJQ07 560 07/01/2023    FOLATE 6.1 07/01/2023     Urinalysis:   Lab Results   Component Value Date    LABURIN ESCHERICHIA COLI  10,000 - 49,999 cfu/ml   05/26/2016    COLORU Yellow 06/24/2025    SPECGRAV 1.020 06/24/2025    NITRITE Negative 06/24/2025    KETONESU Trace (A) 07/02/2023    UROBILINOGEN Negative 06/24/2025    WBCUA 35 (H) 06/24/2025       Trended Lab Data:  Recent Labs   Lab 06/24/25  1137   WBC 13.21*   HGB 12.1   HCT 38.0   PLT 84*   MCV 96   RDW 17.1*      K 5.0   *   CO2 15*   BUN 44*   CREATININE 2.7*   *   PROT 6.8   ALBUMIN 2.8*   BILITOT 2.2*   AST 35   ALKPHOS 78   ALT 14       Trended Cardiac Data:  Trop 0.158 - > 0.154    Microbiology Data:  Blood cultures pending  Urine cultures pending    Other Results:  EKG (my interpretation): TWI in leads II, II, aVF; resolved on repeat    Radiology:  Imaging  Results              US Abdomen Limited (Final result)  Result time 06/24/25 17:23:08      Final result by Jose Manuel Singer DO (06/24/25 17:23:08)                   Impression:      Mild ascites.    Hepatic steatosis.      Electronically signed by: Jose Manuel Singer  Date:    06/24/2025  Time:    17:23               Narrative:    EXAMINATION:  US ABDOMEN LIMITED    CLINICAL HISTORY:  cirrhosis;    TECHNIQUE:  Limited ultrasound of the right upper quadrant of the abdomen (including pancreas, liver, gallbladder, common bile duct) was performed.    COMPARISON:  CT abdomen and pelvis from earlier the same date.    FINDINGS:  Limited examination due to patient's body habitus and refusal to complete the examination.    Liver: Qualitatively normal in size.  Liver demonstrates diffuse echogenicity, compatible with hepatic steatosis.  No focal hepatic lesions.    Gallbladder: No calculi, wall thickening, or pericholecystic fluid.  No sonographic Gtz's sign.    Biliary system: The common duct is not dilated, measuring 2 mm.  No intrahepatic ductal dilatation.    Spleen: Not evaluated.    Pancreas: Obscured by bowel gas and not well evaluated.    IVC: Unremarkable.    Miscellaneous: There is abdominal ascites.  There is a right pleural effusion.                                       CT Head Without Contrast (Final result)  Result time 06/24/25 14:19:11      Final result by Chris Downing MD (06/24/25 14:19:11)                   Impression:      *No acute intracranial findings.  *No evidence of detrimental change compared to prior head CT performed 06/29/2023.      Electronically signed by: Chris Downing  Date:    06/24/2025  Time:    14:19               Narrative:    EXAMINATION:  CT HEAD WITHOUT CONTRAST    CLINICAL HISTORY:  Head trauma, minor (Age >= 65y);    TECHNIQUE:  Low dose axial images were obtained through the head.  Coronal and sagittal reformations were also performed. Contrast was not  administered.    COMPARISON:  CT head performed 06/29/2023.    FINDINGS:  Blood: No acute intracranial hemorrhage.    Parenchyma: No definite loss of gray-white differentiation to suggest acute or subacute transcortical infarct. Parenchymal volume loss.  Nonspecific areas of white matter hypoattenuation may reflect sequela of chronic small vessel ischemic disease.  Suspect remote lacunar type infarct right internal capsule.    Ventricles/Extra-axial spaces: No abnormal extra-axial fluid collection. Basal cisterns are patent.    Vessels: Mild calcifications.  Foci of increased density/calcification within lateral right cerebral sulci again noted, may reflect chronic thromboembolic material.    Orbits: Status post left lens replacement.    Scalp: Unremarkable.    Skull: There are no depressed skull fractures or destructive bone lesions.    Sinuses and mastoids: Essentially clear.    Other findings:                                       CT Abdomen Pelvis  Without Contrast (Final result)  Result time 06/24/25 14:24:48      Final result by Ac Zhu III, MD (06/24/25 14:24:48)                   Impression:      Coronary calcifications and cardiomegaly.    Small pleural effusions with compressive areas of adjacent atelectasis.    Stable extrahepatic bile duct dilatation seen.    Left intrahepatic bile duct dilatation but not right.  ERCP may be warranted.    Ascites.    Absence of the left kidney.    Severe plaque of the aorta and its branches.  There is severe stenosis of the infrarenal severe stenosis of the infrarenal abdominal aorta.    The previously seen sacral decubitus ulcer is no longer seen and the right gluteal muscle abscess has resolved.      Electronically signed by: Ac Zhu MD  Date:    06/24/2025  Time:    14:24               Narrative:    EXAMINATION:  CT ABDOMEN PELVIS WITHOUT CONTRAST    CLINICAL HISTORY:  Abdominal pain, acute, nonlocalized;    FINDINGS:  Comparison is  06/29/2023.    There are coronary artery calcifications.  There is cardiomegaly.  There are small pleural effusions with adjacent areas of compressive atelectasis.  There is ascites.  There is left intrahepatic bile duct dilatation but not right.  Gallbladder demonstrates nothing unusual.  No focal liver lesion seen.  The spleen, stomach, and pancreas demonstrate nothing unusual.  Duodenum is unremarkable.  The adrenal glands are not enlarged.  The left kidney is not seen.  The right kidney is unremarkable.  No adenopathy seen.  There is severe plaque of all the aorta and its branches.  There is severe stenosis of the infrarenal abdominal aorta.  No obstruction, ileus, or perforation seen.  The previously seen right sacral decubitus ulcer and underline gluteal muscle abscess collection has resolved.                                       X-Ray Forearm Right (Final result)  Result time 06/24/25 12:02:28      Final result by Ac Zhu III, MD (06/24/25 12:02:28)                   Narrative:    EXAMINATION:  XR FOREARM RIGHT    CLINICAL HISTORY:  Unspecified injury of shoulder and upper arm, unspecified arm, initial encounter    FINDINGS:  Forearm two views right.    No fracture, dislocation, or bone destruction seen.      Electronically signed by: Ac Zhu MD  Date:    06/24/2025  Time:    12:02                                       Assessment:     Steph Monroe is a 79 y.o. female with PMHx of CKD 4, HTN, HLD, PAD s/p R CFA endarterectomy and right iliac artery stenting, T2DM, and Anal cancer who presented for 2 weeks of AMS. Found to have UTI. Admitted to Medicine for IV antibiotics and infectious work up     Plan:     Acute Encephalopathy  Likely secondary to UTI  Leukocytosis  - AMS for 2 weeks, confusion with time of day/year and unable to perform baseline activities due to weakness  - WBC 13.21, UA showing 2+ LE, 35 WBC, Moderate Bacteria  - Trop 0.158 and EKG showing TWI in anterior leads   -  TWI resolved on repeat and with administration of fluids  - Ammonia 29, no evidence of asterxis; AxOx4  - Given Vanc and Zosyn in ED with quick recovery of mentation and overall appearance per daughter  Plan:  - Continue vanc and zosyn until cultures return  - MRSA nares ordered  - Monitor on telemetry  - Nutrition consult    Decompensated Cirrhosis  Thrombocytopenia  - 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, no history of Hep C, HIV, or Alcohol use  - Previously had taps done in the past, but had stopped prior to hospice  - Ascites on imaging with elevated PT/INR  - Ammonia 29, unlikely hepatic encephalopathy  - MELD 3.0 - 26  - Nutrition consulted  - Will initiate spironolactone/lasix vs IR to tap following fluid resuscitation  - Zosyn as above, would also cover bleeding given reports blood in stool    NAGMA  Lactic acidosis   - Possibly due to recent episodes of diarrhea  - LA 4.3 and downtrending  - Will continue to re-assess with IVF infusion    CAD  PHTN  - Previously on ASA and Plavix   - Holding at this time given low PLT count and elevated INR in setting of cirrhosis  - Denied SOB, CP  - EKG with TWI in II, II, aVF and trop elevated although possible elevation in the setting of CKD  - Repeat EKG with resolution following IVF  - Will continue to monitor    T2DM  - No current medications  - SSI and POCT glucose while inaptient  - A1c ordered    CKD 3  - Previously noted  - Cr 2.7, baseline ~1.9  - Possibly progression of CKD  - Will re-assess following fluids administration    Normocytic Anemia    HTN  - No outpatient medications (previously nifedipine and coreg)  - Given infection will continually re-assess  - BP now wnl      Dispo: pending improvement in mentation and infectious work up  Diet: cardiac   DVT Ppx: lovenox  Code: DNR      Laci Cho MD  [facility] HO-II    [facility] Medicine Hospitalist Pager numbers:   [facility] Hospitalist Medicine Team A (Librado/Ej): [phone]  [facility] Hospitalist Medicine Team B  (Marilia/Brisa):  [phone]             [1]   Social History  Tobacco Use    Smoking status: Former     Current packs/day: 0.00     Average packs/day: 1.5 packs/day for 45.0 years (67.5 ttl pk-yrs)     Types: Cigarettes     Start date: 1968     Quit date: 2013     Years since quittin.0     Passive exposure: Never    Smokeless tobacco: Never   Substance Use Topics    Alcohol use: No     Alcohol/week: 0.0 standard drinks of alcohol    Drug use: No

## 2025-06-24 NOTE — CLINICAL REVIEW
Sepsis Screen (most recent)       Sepsis Screen () - 06/24/25 1812       Is the patient's history or complaint suggestive of a possible infection? Yes  -    Are there at least two of the following signs and symptoms present? Yes  -    Sepsis signs/symptoms - Tachycardia Tachycardia     >90  -    Sepsis signs/symptoms - Tachypnea Tachypnea     >20  -    Sepsis signs/symptoms - WBC WBC < 4,000 or WBC > 12,000  -    Sepsis signs/symptoms - Altered Mental Status Altered Mental Status  -    Are any of the following organ dysfunction criteria present and not considered to be due to a chronic condition? Yes  -    Organ Dysfunction Criteria Creatinine > 2.0  -    Organ Dysfunction Criteria Total Bilirubin > 2.0 Platelet count < 100,000  -    Organ Dysfunction Criteria Lactate > 2.0  -    Organ Dysfunction Criteria - O2 O2 Saturation < 95% on room air  -    Initiate Sepsis Protocol No  -    Reason sepsis not considered Pt. receiving appropriate management   cxs in process, on abx -              User Key  (r) = Recorded By, (t) = Taken By, (c) = Cosigned By      Initials Name    Laurie Cherry RN

## 2025-06-24 NOTE — Clinical Note
Diagnosis: Leukocytosis, unspecified type [[id number]]   Future Attending Provider: TAYE HOOPER [[id number]]   Is the patient being sent to ED Observation?: No

## 2025-06-24 NOTE — PROGRESS NOTES
Pharmacist Renal Dose Adjustment Note    Steph Monroe is a 79 y.o. female being treated with the medication lovenox    Patient Data:    Vital Signs (Most Recent):  Temp: 98.7 °F (37.1 °C) (06/24/25 1055)  Pulse: 96 (06/24/25 1131)  Resp: (!) 29 (06/24/25 1131)  BP: (!) 194/87 (06/24/25 1131)  SpO2: 95 % (06/24/25 1131) Vital Signs (72h Range):  Temp:  [98.7 °F (37.1 °C)]   Pulse:  [96-99]   Resp:  [22-29]   BP: (168-194)/(87-90)   SpO2:  [95 %]      Recent Labs   Lab 06/24/25  1137   CREATININE 2.7*     Serum creatinine: 2.7 mg/dL (H) 06/24/25 1137  Estimated creatinine clearance: 17.3 mL/min (A)    Medication:lovenox dose: 40mg frequency 24 hrs will be changed to medication:lovenox dose:30 mg frequency:24 hrs    Pharmacist's Name: Reese Hope  Pharmacist's Extension: [phone]

## 2025-06-24 NOTE — ED PROVIDER NOTES
"Encounter Date: 6/24/2025       History     Chief Complaint   Patient presents with    GI Bleeding     The pt presents to the ED from home with a complaint of GI bleeding.  The pts family was attempting to get the pt to the car when the pt experienced a fall onto her bottom - head strike.  The pt has a PMH of rectal cancer.  Skin tear noted to R forearm, bleeding controlled by EMS     Patient is a 79-year-old female with a past medical history of cancer, CKD, diabetes mellitus type 2, hyperlipidemia, hypertension, and neutropenic fever, and PVD who presents to emergency room for generalized weakness, increased confusion, and possible rectal bleeding.  Daughter at bedside states that she currently lives with patient.  She states over the past week she has "declining" in health.  States that she has been more confused than usual, "taking showers at night stating she is getting ready for the day." They also state that she has not been eating or drinking much.  She has been eating "lots of ice." Today, her daughter went downstairs to check on her and found her "naked in her feces." She stated that there was some blood on the towel on the floor, but she is unsure where this came from.  She called her sister and they were attempting to place patient in the car when she fell.  Daughter states that she "laid her to the ground." No head injury.  Denies loss of consciousness.  Patient confused during my examination.  History limited due to such.  Of note, patient has a history of rectal cancer.    The history is provided by the EMS personnel and a relative.     Review of patient's allergies indicates:   Allergen Reactions    Chantix [varenicline]      Tongue swelling      Wellbutrin [bupropion hcl]      Tongue swelling       Past Medical History:   Diagnosis Date    Cancer     rectum    CKD (chronic kidney disease)     Diabetes mellitus, type 2     Hyperlipidemia     Hypertension     Neutropenic fever 5/27/2016    PVD " (peripheral vascular disease)      Past Surgical History:   Procedure Laterality Date    ANGIOGRAM, LOWER ARTERIAL, UNILATERAL Right 5/5/2023    Procedure: Angiogram, Lower Arterial, Unilateral;  Surgeon: Remington Ribeiro MD;  Location: [facility] CATH LAB/EP;  Service: Cardiology;  Laterality: Right;    ANGIOGRAPHY OF LOWER EXTREMITY N/A 5/15/2023    Procedure: Angiogram Extremity Unilateral;  Surgeon: FERNANDO Cagle II, MD;  Location: [facility] OR [address] [place] [address];  Service: Vascular;  Laterality: N/A;  22.4 min  183.31 mGy  23.5353 Gy.cm  17ml Dye     COLONOSCOPY N/A 3/28/2016    Procedure: COLONOSCOPY;  Surgeon: Mitul Buitrago Jr., MD;  Location: [facility] ENDO;  Service: Endoscopy;  Laterality: N/A;    ENDARTERECTOMY OF FEMORAL ARTERY Right 5/9/2023    Procedure: ENDARTERECTOMY, FEMORAL;  Surgeon: FERNANDO Cagle II, MD;  Location: [facility] OR [address] [place] [address];  Service: Vascular;  Laterality: Right;  contrast 12ml  Flouro time 11.1 min  mgy 217.83  Gycm2 26.3570    EYE SURGERY Left     cataract removal with lens implant.    FEMORAL ARTERY STENT  before 2010    PERIPHERAL ANGIOGRAPHY N/A 5/3/2023    Procedure: Peripheral angiography;  Surgeon: Rasheeda Ny MD;  Location: [facility] CATH LAB/EP;  Service: Cardiology;  Laterality: N/A;    PTA, SUPERFICIAL FEMORAL ARTERY Right 5/15/2023    Procedure: PTA, SUPERFICIAL FEMORAL ARTERY;  Surgeon: FERNANDO Cagle II, MD;  Location: [facility] OR [address] [place] [address];  Service: Vascular;  Laterality: Right;    RENAL ARTERY STENT  2010    STENT, ILIAC ARTERY Right 5/9/2023    Procedure: STENT, ILIAC ARTERY;  Surgeon: FERNANDO Cagle II, MD;  Location: [address];  Service: Vascular;  Laterality: Right;     Family History   Problem Relation Name Age of Onset    Diabetes Father      Heart attack Mother      Hypertension Sister       Social History[1]  Review of Systems   Unable to perform ROS: Mental status change       Physical Exam     Initial Vitals [06/24/25 1055]   BP Pulse Resp Temp SpO2   (!) 168/90 99  (!) 22 98.7 °F (37.1 °C) 95 %      MAP       --         Physical Exam    Nursing note and vitals reviewed.  Constitutional: She is not diaphoretic. No distress.   Patient lying in bed, ill-appearing.  Awake and alert.  Hard of hearing.  Oriented to self.  Nasal cannula in place.   HENT:   Right Ear: External ear normal.   Left Ear: External ear normal.   Eyes: Conjunctivae are normal.   Neck: Neck supple.   Normal range of motion.  Cardiovascular:  Normal rate and regular rhythm.           Murmur heard.  Pulmonary/Chest: Breath sounds normal. No respiratory distress. She has no wheezes. She has no rhonchi. She has no rales.   Abdominal: Abdomen is soft. She exhibits distension. There is abdominal tenderness (diffuse left-sided). There is no rebound and no guarding.   Genitourinary:    Genitourinary Comments: Rectal exam done with nursing staff in the room, Angely.  Decreased rectal tone.  No bright red blood or melena noted.  Patient does have several external hemorrhoids.     Musculoskeletal:         General: No tenderness or edema.      Cervical back: Normal range of motion and neck supple.     Neurological: She is alert. No cranial nerve deficit. GCS eye subscore is 4. GCS verbal subscore is 4. GCS motor subscore is 6.   Skin: Skin is warm. Laceration noted. There is pallor.              ED Course   Critical Care    Date/Time: 6/24/2025 4:11 PM    Performed by: Trish Stout PA-C  Authorized by: Melissa Gipson DO  Direct patient critical care time: 25 minutes  Additional history critical care time: 10 minutes  Ordering / reviewing critical care time: 10 minutes  Documentation critical care time: 10 minutes  Consulting other physicians critical care time: 5 minutes  Total critical care time (exclusive of procedural time) : 60 minutes  Critical care time was exclusive of separately billable procedures and treating other patients and teaching time.  Critical care was necessary to treat or prevent imminent or  life-threatening deterioration of the following conditions: sepsis.  Critical care was time spent personally by me on the following activities: blood draw for specimens, development of treatment plan with patient or surrogate, discussions with consultants, interpretation of cardiac output measurements, evaluation of patient's response to treatment, examination of patient, obtaining history from patient or surrogate, ordering and performing treatments and interventions, ordering and review of laboratory studies, ordering and review of radiographic studies, pulse oximetry, re-evaluation of patient's condition, review of old charts and transcutaneous pacing.      Lac Repair    Date/Time: 6/24/2025 4:11 PM    Performed by: Trish Stout PA-C  Authorized by: Melissa Gipson DO    Consent:     Consent obtained:  Emergent situation  Universal protocol:     Imaging studies available: yes      Patient identity confirmed:  Arm band  Anesthesia:     Anesthesia method:  None  Laceration details:     Location:  Shoulder/arm    Shoulder/arm location:  R lower arm    Length (cm):  7  Exploration:     Hemostasis achieved with:  Direct pressure    Imaging obtained: x-ray      Imaging outcome: foreign body not noted      Wound exploration: wound explored through full range of motion and entire depth of wound visualized    Treatment:     Area cleansed with:  Sarah-Varun    Amount of cleaning:  Standard  Skin repair:     Repair method:  Steri-Strips    Number of Steri-Strips:  9  Approximation:     Approximation:  Close  Repair type:     Repair type:  Simple  Post-procedure details:     Dressing:  Open (no dressing)    Procedure completion:  Tolerated well, no immediate complications    Labs Reviewed   COMPREHENSIVE METABOLIC PANEL - Abnormal       Result Value    Sodium 140      Potassium 5.0      Chloride 113 (*)     CO2 15 (*)     Glucose 159 (*)     BUN 44 (*)     Creatinine 2.7 (*)     Calcium 9.2      Protein Total 6.8       Albumin 2.8 (*)     Bilirubin Total 2.2 (*)     ALP 78      AST 35      ALT 14      Anion Gap 12      eGFR 18 (*)    LACTIC ACID, PLASMA - Abnormal    Lactic Acid Level 4.3 (*)     Narrative:     Falsely low lactic acid results can be found in samples containing >=13.0 mg/dL total bilirubin and/or >=3.5 mg/dL direct bilirubin.    PROTIME-INR - Abnormal    PT 17.1 (*)     INR 1.5 (*)    CBC WITH DIFFERENTIAL - Abnormal    WBC 13.21 (*)     RBC 3.98 (*)     HGB 12.1      HCT 38.0      MCV 96      MCH 30.4      MCHC 31.8 (*)     RDW 17.1 (*)     Platelet Count 84 (*)     MPV 11.6      Nucleated RBC 0      Neut % 90.3 (*)     Lymph % 3.0 (*)     Mono % 5.8      Eos % 0.0      Basophil % 0.2      Imm Grans % 0.7 (*)     Neut # 11.95 (*)     Lymph # 0.39 (*)     Mono # 0.76      Eos # 0.00      Baso # 0.02      Imm Grans # 0.09 (*)    OCCULT BLOOD X 1, STOOL - Abnormal    OCCULT BLOOD STOOL Positive (*)    LACTIC ACID, PLASMA - Abnormal    Lactic Acid Level 3.8 (*)     Narrative:     Falsely low lactic acid results can be found in samples containing >=13.0 mg/dL total bilirubin and/or >=3.5 mg/dL direct bilirubin.    PLATELET REVIEW - Abnormal    Platelet Estimate Decreased (*)    LIPASE - Normal    Lipase Level 21     CULTURE, BLOOD   CULTURE, BLOOD   CBC W/ AUTO DIFFERENTIAL    Narrative:     The following orders were created for panel order CBC auto differential.  Procedure                               Abnormality         Status                     ---------                               -----------         ------                     CBC with Differential[[id number]]       Abnormal            Final result                 Please view results for these tests on the individual orders.   URINALYSIS, REFLEX TO URINE CULTURE   AMMONIA   LACTIC ACID, PLASMA   GREY TOP URINE HOLD   URINALYSIS MICROSCOPIC   TYPE & SCREEN    Specimen Outdate 06/27/2025 23:59      Group & Rh A POS      Indirect Obed NEG          ECG Results               EKG 12-lead (Final result)        Collection Time Result Time QRS Duration OHS QTC Calculation    06/24/25 12:03:50 06/24/25 14:08:24 72 420                     Final result by Interface, Lab In [facility] (06/24/25 14:08:29)                   Narrative:    Test Reason : K92.2,    Vent. Rate :  93 BPM     Atrial Rate :  93 BPM     P-R Int : 142 ms          QRS Dur :  72 ms      QT Int : 338 ms       P-R-T Axes :  69  69 -39 degrees    QTcB Int : 420 ms    Normal sinus rhythm  Possible Left atrial enlargement  Nonspecific T wave abnormality  Abnormal ECG  When compared with ECG of 29-Jun-2023 18:34,  Premature ventricular complexes are no longer Present  Nonspecific T wave abnormality, worse in Anterior-lateral leads  Confirmed by Aleks Jimenez ([id number]) on 6/24/2025 2:08:23 PM    Referred By: AAAREFERRAL SELF           Confirmed By: Aleks Jimenez                                  Imaging Results              CT Head Without Contrast (Final result)  Result time 06/24/25 14:19:11      Final result by Chris Downing MD (06/24/25 14:19:11)                   Impression:      *No acute intracranial findings.  *No evidence of detrimental change compared to prior head CT performed 06/29/2023.      Electronically signed by: Chris Downing  Date:    06/24/2025  Time:    14:19               Narrative:    EXAMINATION:  CT HEAD WITHOUT CONTRAST    CLINICAL HISTORY:  Head trauma, minor (Age >= 65y);    TECHNIQUE:  Low dose axial images were obtained through the head.  Coronal and sagittal reformations were also performed. Contrast was not administered.    COMPARISON:  CT head performed 06/29/2023.    FINDINGS:  Blood: No acute intracranial hemorrhage.    Parenchyma: No definite loss of gray-white differentiation to suggest acute or subacute transcortical infarct. Parenchymal volume loss.  Nonspecific areas of white matter hypoattenuation may reflect sequela of chronic small vessel ischemic disease.  Suspect remote  lacunar type infarct right internal capsule.    Ventricles/Extra-axial spaces: No abnormal extra-axial fluid collection. Basal cisterns are patent.    Vessels: Mild calcifications.  Foci of increased density/calcification within lateral right cerebral sulci again noted, may reflect chronic thromboembolic material.    Orbits: Status post left lens replacement.    Scalp: Unremarkable.    Skull: There are no depressed skull fractures or destructive bone lesions.    Sinuses and mastoids: Essentially clear.    Other findings:                                       CT Abdomen Pelvis  Without Contrast (Final result)  Result time 06/24/25 14:24:48      Final result by Ac Zhu III, MD (06/24/25 14:24:48)                   Impression:      Coronary calcifications and cardiomegaly.    Small pleural effusions with compressive areas of adjacent atelectasis.    Stable extrahepatic bile duct dilatation seen.    Left intrahepatic bile duct dilatation but not right.  ERCP may be warranted.    Ascites.    Absence of the left kidney.    Severe plaque of the aorta and its branches.  There is severe stenosis of the infrarenal severe stenosis of the infrarenal abdominal aorta.    The previously seen sacral decubitus ulcer is no longer seen and the right gluteal muscle abscess has resolved.      Electronically signed by: Ac Zhu MD  Date:    06/24/2025  Time:    14:24               Narrative:    EXAMINATION:  CT ABDOMEN PELVIS WITHOUT CONTRAST    CLINICAL HISTORY:  Abdominal pain, acute, nonlocalized;    FINDINGS:  Comparison is 06/29/2023.    There are coronary artery calcifications.  There is cardiomegaly.  There are small pleural effusions with adjacent areas of compressive atelectasis.  There is ascites.  There is left intrahepatic bile duct dilatation but not right.  Gallbladder demonstrates nothing unusual.  No focal liver lesion seen.  The spleen, stomach, and pancreas demonstrate nothing unusual.  Duodenum is  unremarkable.  The adrenal glands are not enlarged.  The left kidney is not seen.  The right kidney is unremarkable.  No adenopathy seen.  There is severe plaque of all the aorta and its branches.  There is severe stenosis of the infrarenal abdominal aorta.  No obstruction, ileus, or perforation seen.  The previously seen right sacral decubitus ulcer and underline gluteal muscle abscess collection has resolved.                                       X-Ray Forearm Right (Final result)  Result time 06/24/25 12:02:28      Final result by Ac Zhu III, MD (06/24/25 12:02:28)                   Narrative:    EXAMINATION:  XR FOREARM RIGHT    CLINICAL HISTORY:  Unspecified injury of shoulder and upper arm, unspecified arm, initial encounter    FINDINGS:  Forearm two views right.    No fracture, dislocation, or bone destruction seen.      Electronically signed by: Ac Zhu MD  Date:    06/24/2025  Time:    12:02                                     Medications   piperacillin-tazobactam (ZOSYN) 4.5 g in D5W 100 mL IVPB (MB+) (0 g Intravenous Stopped 6/24/25 1405)   lactated ringers bolus 1,503 mL (0 mLs Intravenous Stopped 6/24/25 1348)   vancomycin 1750 mg in 0.9% sodium chloride 500 mL IVPB (1,750 mg Intravenous New Bag 6/24/25 1415)     Medical Decision Making  Patient presents to emergency room for increased confusion generalized weakness.  Concern for GI bleed.  Vital signs stable.  Physical exam as stated above.    Differential Diagnosis includes, but is not limited to CVA/TIA, vertigo, anemia/blood loss, cardiogenic shock, arrhythmia, orthostatic hypotension, dehydration, medication side effect, vitamin deficiency, liver disease, hypothyroidism, drug intoxication/withdrawal, or metabolic derangement.  Hemoccult positive.  However, hemoglobin within normal limits.  Patient is hemodynamically stable.  No gross melena or rectal bleeding on physical exam.  She will need serial hemoglobins and GI consult  during admission.  More concern is patient's leukocytosis with increased lactic acid and confusion.  Sepsis workup initiated due to such.  CT abdomen with evidence of ascites.  Daughter states that patient has longstanding history of cirrhosis with multiple paracenteses in the past.  Urinalysis pending.  Regardless, patient will need admission for or further workup regarding confusion in sepsis of unknown origin.  She was started on Zosyn and vancomycin in the emergency room.  Since patient did have fall and she is currently on blood thinners, CT head obtained without acute abnormality.  She also had x-ray of forearm without fracture or dislocation.  Skin tear repaired at bedside without complications.    All available findings were reviewed with the patient/family in detail along with the indications for hospitalization in order to receive IV antibiotics.  All remaining questions and concerns were addressed at this time and the patient/family communicates understanding and agrees to proceed accordingly.  Similarly, all pertinent details of the encounter were discussed with [facility] who agrees to receive the patient at [facility] for further care as outlined above.      Problems Addressed:  Arm injury: acute illness or injury  GI bleed: acute illness or injury  Leukocytosis, unspecified type: acute illness or injury  Sepsis, due to unspecified organism, unspecified whether acute organ dysfunction present: acute illness or injury  Stage 4 chronic kidney disease: chronic illness or injury with exacerbation, progression, or side effects of treatment    Amount and/or Complexity of Data Reviewed  Independent Historian:      Details: Daughter at bedside providing history.  External Data Reviewed: notes.     Details: Patient last admitted to the hospital in 07/2023.  At that time there was concern for critical limb ischemia.  Also found to have a sacral ulcer.  No signs of osteomyelitis at that time.   However, she did have abscess.  Was started on vancomycin and Zosyn.  IR consulted.  Unable to drain abscess.  General surgery did not recommend surgical intervention.  Patient was discharged with wound care.  Antibiotics stopped.  Labs: ordered. Decision-making details documented in ED Course.  Radiology: ordered. Decision-making details documented in ED Course.  ECG/medicine tests: ordered.    Risk  Prescription drug management.  Decision regarding hospitalization.  Diagnosis or treatment significantly limited by social determinants of health.  Risk Details: Comorbidities taken into consideration during the patient's evaluation and treatment include anal cancer, CKD, diabetes mellitus type 2, hyperlipidemia, hypertension, and PVD.    Social determinants of health taken into consideration during development of our treatment plan include difficulty in obtaining follow-up, obtaining medications, health literacy, access to healthy options for preventative/conservative management, and/or support systems due to, but not limited to, transportation limitations, socioeconomic status, and environmental factors.       This patient does have evidence of infective focus  My overall impression is sepsis without organ dysfunction.  Source: Unknown  Antibiotics given-   Antibiotics (72h ago, onward)      Start     Stop Route Frequency Ordered    06/24/25 1245  piperacillin-tazobactam (ZOSYN) 4.5 g in D5W 100 mL IVPB (MB+)  (ED Adult Sepsis Treatment)         06/25/25 1244 IV Every 8 hours (non-standard times) 06/24/25 1241          Latest lactate reviewed-  Recent Labs   Lab 06/24/25  1327   LACTATE 3.8*     Organ dysfunction indicated by none    Fluid challenge Ideal Body Weight- The patient is obese (BMI>30) and their ideal body weight of Ideal body weight: 50.1 kg (110 lb 7.2 oz) will be used to calculate fluid bolus of 30 ml/kg.     Post- resuscitation assessment Yes - I attest a sepsis perfusion exam was performed within 6  hours of sepsis, severe sepsis, or septic shock presentation, following fluid resuscitation.      Will Not start Pressors- Levophed for MAP of 65  Source control achieved by:  Vancomycin and Zosyn.             ED Course as of 06/24/25 1618   Tue Jun 24, 2025   1244 Lactic Acid Level(!!): 4.3  Lactic of 4.3. [BJ]   1244 Due to elevated lactic acid.  Lactated ringer bolus started.  Blood cultures added on.  Zosyn and vancomycin ordered. [BJ]   1251 Comprehensive metabolic panel(!)  CMP with BUN and creatinine elevated to 44 and 2.7 respectively.  Bilirubin elevated 2.2.  GFR of 18.  Slightly decreased from baseline.  LFTs within normal limits. [BJ]   1252 Protime-INR(!) [BJ]   1252 Lipase: 21  Lipase within normal limits. [BJ]   1252 X-Ray Forearm Right  Independently reviewed and agree with radiology reading:  No fracture or dislocation. [BJ]   1326 Occult Blood(!): Positive  Occult blood positive. [BJ]   1326 CBC auto differential(!)  CBC with leukocytosis of 13.21.  Hemoglobin of 12.1. [BJ]   1326 Hemoglobin: 12.1 [BJ]   1400 Lactic Acid Level(!!): 3.8 [BJ]   1409 Platelet Estimate(!): Decreased [BJ]   1435 CT Head Without Contrast  CT head impression without intracranial hemorrhage or fracture. [BJ]   1435 CT Abdomen Pelvis  Without Contrast  Impression:     Coronary calcifications and cardiomegaly.     Small pleural effusions with compressive areas of adjacent atelectasis.     Stable extrahepatic bile duct dilatation seen.     Left intrahepatic bile duct dilatation but not right.  ERCP may be warranted.     Ascites.     Absence of the left kidney.     Severe plaque of the aorta and its branches.  There is severe stenosis of the infrarenal severe stenosis of the infrarenal abdominal aorta.     The previously seen sacral decubitus ulcer is no longer seen and the right gluteal muscle abscess has resolved. [BJ]   1448 Discussed patient with [facility], Dr. Santana, who will accept patient for continued  management of sepsis with unknown source.  Patient has received Zosyn and vancomycin.  Pending urinalysis. [BJ]      ED Course User Index  [BJ] Trish Stout PA-C                           Clinical Impression:  Final diagnoses:  [K92.2] GI bleed  [S49.90XA] Arm injury  [D72.829] Leukocytosis, unspecified type (Primary)  [A41.9] Sepsis, due to unspecified organism, unspecified whether acute organ dysfunction present  [R18.8] Other ascites  [I10] Essential hypertension (Chronic)  [N18.4] Stage 4 chronic kidney disease          ED Disposition Condition    Observation                This note was partially created using "[employer] Voice Recognition software. Typographical and content errors may occur with this process. While efforts are made to detect and correct such errors, in some cases errors will persist. For this reason, wording in this document should be considered in the proper context and not strictly verbatim.          [1]   Social History  Tobacco Use    Smoking status: Former     Current packs/day: 0.00     Average packs/day: 1.5 packs/day for 45.0 years (67.5 ttl pk-yrs)     Types: Cigarettes     Start date: 1968     Quit date: 2013     Years since quittin.0     Passive exposure: Never    Smokeless tobacco: Never   Substance Use Topics    Alcohol use: No     Alcohol/week: 0.0 standard drinks of alcohol    Drug use: No        Trish Stout PA-C  25 1629

## 2025-06-24 NOTE — PHARMACY MED REC
"      [facility]           Pharmacy  Admission Medication History     The home medication history was taken by Nunu Swift.      Medication history obtained from Medications listed below were obtained from: [employer] software- [employer] and Medical records. No current meds listed.    Based on information gathered for medication list, you may go to "Admission" then "Reconcile Home Medications" tabs to review and/or act upon those items.     The home medication list has been updated by the Pharmacy department.   Please read ALL comments highlighted in yellow.   Please address this information as you see fit.    Feel free to contact us if you have any questions or require assistance.        Current Facility-Administered Medications on File Prior to Encounter   Medication Dose Route Frequency Provider Last Rate Last Admin    bevacizumab (Avastin) 25 mg/mL ophthalmic injection syringe 1.25 mg  1.25 mg Intraocular 1 time in Clinic/HOD          Current Outpatient Medications on File Prior to Encounter   Medication Sig Dispense Refill    aspirin 81 MG Chew Take 1 tablet (81 mg total) by mouth once daily. 30 tablet 5    atorvastatin (LIPITOR) 80 MG tablet Take 1 tablet (80 mg total) by mouth once daily. 90 tablet 3    carvediloL (COREG) 25 MG tablet Take 1 tablet by mouth 2 (two) times daily.      clopidogreL (PLAVIX) 75 mg tablet Take 75 mg by mouth once daily.      docusate sodium (COLACE) 100 MG capsule Take 1 capsule (100 mg total) by mouth 2 (two) times daily. 60 capsule 1    ezetimibe (ZETIA) 10 mg tablet Take 10 mg by mouth once daily.      furosemide (LASIX) 20 MG tablet Take 20 mg by mouth once daily.      NIFEdipine (PROCARDIA-XL) 30 MG (OSM) 24 hr tablet Take 1 tablet (30 mg total) by mouth once daily. 30 tablet 11    sodium bicarbonate 650 MG tablet Take 1 tablet (650 mg total) by mouth 3 (three) times daily. 90 tablet 11       Please address this information as you see fit.  Feel free to " contact us if you have any questions or require assistance.    Nunu Swift  [phone]            .

## 2025-06-25 PROBLEM — A41.9 SEPSIS: Status: ACTIVE | Noted: 2025-06-25

## 2025-06-25 LAB
ABSOLUTE EOSINOPHIL (OHS): 0.02 K/UL
ABSOLUTE MONOCYTE (OHS): 0.62 K/UL (ref 0.3–1)
ABSOLUTE NEUTROPHIL COUNT (OHS): 6.77 K/UL (ref 1.8–7.7)
ALBUMIN SERPL BCP-MCNC: 2.6 G/DL (ref 3.5–5.2)
ALP SERPL-CCNC: 69 UNIT/L (ref 40–150)
ALT SERPL W/O P-5'-P-CCNC: 13 UNIT/L (ref 10–44)
ANION GAP (OHS): 7 MMOL/L (ref 8–16)
AST SERPL-CCNC: 33 UNIT/L (ref 11–45)
BASOPHILS # BLD AUTO: 0.01 K/UL
BASOPHILS NFR BLD AUTO: 0.1 %
BILIRUB SERPL-MCNC: 2.2 MG/DL (ref 0.1–1)
BUN SERPL-MCNC: 46 MG/DL (ref 8–23)
CALCIUM SERPL-MCNC: 8.7 MG/DL (ref 8.7–10.5)
CHLORIDE SERPL-SCNC: 114 MMOL/L (ref 95–110)
CO2 SERPL-SCNC: 18 MMOL/L (ref 23–29)
CREAT SERPL-MCNC: 2.7 MG/DL (ref 0.5–1.4)
EAG (OHS): 143 MG/DL (ref 68–131)
ERYTHROCYTE [DISTWIDTH] IN BLOOD BY AUTOMATED COUNT: 17.2 % (ref 11.5–14.5)
FERRITIN SERPL-MCNC: 150.9 NG/ML (ref 20–300)
GFR SERPLBLD CREATININE-BSD FMLA CKD-EPI: 18 ML/MIN/1.73/M2
GLUCOSE SERPL-MCNC: 113 MG/DL (ref 70–110)
HAPTOGLOB SERPL-MCNC: 46 MG/DL (ref 30–250)
HAPTOGLOB SERPL-MCNC: 67 MG/DL (ref 30–250)
HBA1C MFR BLD: 6.6 % (ref 4–5.6)
HCT VFR BLD AUTO: 38.5 % (ref 37–48.5)
HCV AB SERPL QL IA: NORMAL
HGB BLD-MCNC: 12.2 GM/DL (ref 12–16)
HIV 1+2 AB+HIV1 P24 AG SERPL QL IA: NORMAL
HOLD SPECIMEN: NORMAL
IMM GRANULOCYTES # BLD AUTO: 0.03 K/UL (ref 0–0.04)
IMM GRANULOCYTES NFR BLD AUTO: 0.4 % (ref 0–0.5)
IRON SATN MFR SERPL: 16 % (ref 20–50)
IRON SERPL-MCNC: 36 UG/DL (ref 30–160)
LACTATE SERPL-SCNC: 2.2 MMOL/L (ref 0.5–2.2)
LDH SERPL-CCNC: 297 U/L (ref 110–260)
LYMPHOCYTES # BLD AUTO: 0.58 K/UL (ref 1–4.8)
MAGNESIUM SERPL-MCNC: 1.9 MG/DL (ref 1.6–2.6)
MCH RBC QN AUTO: 30.7 PG (ref 27–31)
MCHC RBC AUTO-ENTMCNC: 31.7 G/DL (ref 32–36)
MCV RBC AUTO: 97 FL (ref 82–98)
MRSA PCR SCRN (OHS): NOT DETECTED
NUCLEATED RBC (/100WBC) (OHS): 0 /100 WBC
OHS QRS DURATION: 72 MS
OHS QTC CALCULATION: 464 MS
PLATELET # BLD AUTO: 80 K/UL (ref 150–450)
PLATELET BLD QL SMEAR: ABNORMAL
PMV BLD AUTO: 11.5 FL (ref 9.2–12.9)
POTASSIUM SERPL-SCNC: 4.9 MMOL/L (ref 3.5–5.1)
PROT SERPL-MCNC: 6.6 GM/DL (ref 6–8.4)
RBC # BLD AUTO: 3.98 M/UL (ref 4–5.4)
RELATIVE EOSINOPHIL (OHS): 0.2 %
RELATIVE LYMPHOCYTE (OHS): 7.2 % (ref 18–48)
RELATIVE MONOCYTE (OHS): 7.7 % (ref 4–15)
RELATIVE NEUTROPHIL (OHS): 84.4 % (ref 38–73)
SODIUM SERPL-SCNC: 139 MMOL/L (ref 136–145)
TIBC SERPL-MCNC: 229 UG/DL (ref 250–450)
TRANSFERRIN SERPL-MCNC: 155 MG/DL (ref 200–375)
VANCOMYCIN SERPL-MCNC: 19.1 UG/ML (ref ?–80)
WBC # BLD AUTO: 8.03 K/UL (ref 3.9–12.7)

## 2025-06-25 PROCEDURE — 85025 COMPLETE CBC W/AUTO DIFF WBC: CPT

## 2025-06-25 PROCEDURE — 83605 ASSAY OF LACTIC ACID: CPT

## 2025-06-25 PROCEDURE — 97535 SELF CARE MNGMENT TRAINING: CPT

## 2025-06-25 PROCEDURE — 97116 GAIT TRAINING THERAPY: CPT

## 2025-06-25 PROCEDURE — 97530 THERAPEUTIC ACTIVITIES: CPT

## 2025-06-25 PROCEDURE — 82728 ASSAY OF FERRITIN: CPT

## 2025-06-25 PROCEDURE — 85060 BLOOD SMEAR INTERPRETATION: CPT | Mod: ,,, | Performed by: PATHOLOGY

## 2025-06-25 PROCEDURE — 82040 ASSAY OF SERUM ALBUMIN: CPT

## 2025-06-25 PROCEDURE — 83036 HEMOGLOBIN GLYCOSYLATED A1C: CPT

## 2025-06-25 PROCEDURE — 25000003 PHARM REV CODE 250: Performed by: INTERNAL MEDICINE

## 2025-06-25 PROCEDURE — 97161 PT EVAL LOW COMPLEX 20 MIN: CPT

## 2025-06-25 PROCEDURE — 83010 ASSAY OF HAPTOGLOBIN QUANT: CPT

## 2025-06-25 PROCEDURE — 83540 ASSAY OF IRON: CPT

## 2025-06-25 PROCEDURE — 86803 HEPATITIS C AB TEST: CPT

## 2025-06-25 PROCEDURE — 80202 ASSAY OF VANCOMYCIN: CPT | Performed by: INTERNAL MEDICINE

## 2025-06-25 PROCEDURE — 83735 ASSAY OF MAGNESIUM: CPT

## 2025-06-25 PROCEDURE — 87389 HIV-1 AG W/HIV-1&-2 AB AG IA: CPT

## 2025-06-25 PROCEDURE — 97165 OT EVAL LOW COMPLEX 30 MIN: CPT

## 2025-06-25 PROCEDURE — 25000003 PHARM REV CODE 250

## 2025-06-25 PROCEDURE — 92610 EVALUATE SWALLOWING FUNCTION: CPT

## 2025-06-25 PROCEDURE — 21400001 HC TELEMETRY ROOM

## 2025-06-25 PROCEDURE — 63600175 PHARM REV CODE 636 W HCPCS

## 2025-06-25 PROCEDURE — 83615 LACTATE (LD) (LDH) ENZYME: CPT

## 2025-06-25 PROCEDURE — 36415 COLL VENOUS BLD VENIPUNCTURE: CPT

## 2025-06-25 PROCEDURE — 63600175 PHARM REV CODE 636 W HCPCS: Performed by: INTERNAL MEDICINE

## 2025-06-25 RX ORDER — HYDROCODONE BITARTRATE AND ACETAMINOPHEN 500; 5 MG/1; MG/1
TABLET ORAL
Status: DISCONTINUED | OUTPATIENT
Start: 2025-06-25 | End: 2025-06-27 | Stop reason: HOSPADM

## 2025-06-25 RX ORDER — FUROSEMIDE 40 MG/1
40 TABLET ORAL DAILY
Status: DISCONTINUED | OUTPATIENT
Start: 2025-06-25 | End: 2025-06-27 | Stop reason: HOSPADM

## 2025-06-25 RX ORDER — SPIRONOLACTONE 25 MG/1
100 TABLET ORAL DAILY
Status: DISCONTINUED | OUTPATIENT
Start: 2025-06-25 | End: 2025-06-27 | Stop reason: HOSPADM

## 2025-06-25 RX ORDER — SODIUM CHLORIDE, SODIUM LACTATE, POTASSIUM CHLORIDE, CALCIUM CHLORIDE 600; 310; 30; 20 MG/100ML; MG/100ML; MG/100ML; MG/100ML
INJECTION, SOLUTION INTRAVENOUS CONTINUOUS
Status: ACTIVE | OUTPATIENT
Start: 2025-06-25 | End: 2025-06-25

## 2025-06-25 RX ORDER — NIFEDIPINE 30 MG/1
30 TABLET, EXTENDED RELEASE ORAL DAILY
Status: DISCONTINUED | OUTPATIENT
Start: 2025-06-25 | End: 2025-06-27 | Stop reason: HOSPADM

## 2025-06-25 RX ADMIN — CARVEDILOL 3.12 MG: 3.12 TABLET, FILM COATED ORAL at 10:06

## 2025-06-25 RX ADMIN — NIFEDIPINE 30 MG: 30 TABLET, FILM COATED, EXTENDED RELEASE ORAL at 10:06

## 2025-06-25 RX ADMIN — PIPERACILLIN SODIUM AND TAZOBACTAM SODIUM 4.5 G: 4; .5 INJECTION, POWDER, LYOPHILIZED, FOR SOLUTION INTRAVENOUS at 06:06

## 2025-06-25 RX ADMIN — FUROSEMIDE 40 MG: 40 TABLET ORAL at 02:06

## 2025-06-25 RX ADMIN — SODIUM BICARBONATE 650 MG TABLET 650 MG: at 10:06

## 2025-06-25 RX ADMIN — MUPIROCIN: 20 OINTMENT TOPICAL at 10:06

## 2025-06-25 RX ADMIN — PIPERACILLIN SODIUM AND TAZOBACTAM SODIUM 4.5 G: 4; .5 INJECTION, POWDER, LYOPHILIZED, FOR SOLUTION INTRAVENOUS at 05:06

## 2025-06-25 RX ADMIN — VANCOMYCIN HYDROCHLORIDE 1000 MG: 1 INJECTION, POWDER, LYOPHILIZED, FOR SOLUTION INTRAVENOUS at 11:06

## 2025-06-25 RX ADMIN — SPIRONOLACTONE 100 MG: 25 TABLET, FILM COATED ORAL at 02:06

## 2025-06-25 RX ADMIN — SODIUM BICARBONATE 650 MG TABLET 650 MG: at 02:06

## 2025-06-25 RX ADMIN — ENOXAPARIN SODIUM 30 MG: 30 INJECTION SUBCUTANEOUS at 05:06

## 2025-06-25 RX ADMIN — SODIUM CHLORIDE, POTASSIUM CHLORIDE, SODIUM LACTATE AND CALCIUM CHLORIDE: 600; 310; 30; 20 INJECTION, SOLUTION INTRAVENOUS at 10:06

## 2025-06-25 NOTE — PROGRESS NOTES
"[facility] Medicine Progress Note    Primary Team: [facility] Hospitalist Team B  Attending Physician: Dr. Pleitez  Resident: Marquis  Intern: Max    Subjective:      Patient reports feeling well this morning. Alert and oriented. Denies any complaints but would like to get out of bed     Objective:     Last 24 Hour Vital Signs:  BP  Min: 122/75  Max: 194/73  Temp  Av.1 °F (36.7 °C)  Min: 97.3 °F (36.3 °C)  Max: 98.9 °F (37.2 °C)  Pulse  Av.7  Min: 72  Max: 99  Resp  Av.1  Min: 18  Max: 29  SpO2  Av.6 %  Min: 95 %  Max: 99 %  Height  Av' 2" (157.5 cm)  Min: 5' 2" (157.5 cm)  Max: 5' 2" (157.5 cm)  Weight  Av.6 kg (180 lb)  Min: 81.6 kg (180 lb)  Max: 81.6 kg (180 lb)  I/O last 3 completed shifts:  In: 500.8 [IV Piggyback:500.8]  Out: 200 [Urine:200]    Physical Examination:  Gen: AAOx3, NAD  HEENT: head normocephalic, atraumatic  Cardiac: regular rate and rhythm; no murmurs, rubs, or gallops  Resp: clear to auscultation bilaterally, normal work of breathing  GI: abdomen soft, non-tender, slightly distended; normoactive bowel sounds  Extrem: no clubbing or swelling noted  Skin: no rashes or bruises noted  Neuro: AAOx3, moving all extremities without difficulty       Laboratory:  Laboratory Data Reviewed: yes  Pertinent Findings:  Cr- 2.7    Microbiology Data Reviewed: yes  Pertinent Findings:  Blood cultures pending  Urine cultures pending    Other Results:  EKG (my interpretation): no new EKG    Radiology Data Reviewed: yes  Pertinent Findings:  CXR 25  FINDINGS:  Lines and tubes: Right chest wall Port-A-Cath.     Heart and mediastinum: The left heart border is obscured.  Calcifications of the thoracic aorta.     Pleura: Small-moderate left pleural effusion.  Possible trace right pleural effusion.  No pneumothorax.     Lungs: Lung volumes are moderate.  No convincing pulmonary edema.  Patchy opacities in the left lung base, possibly atelectasis in the setting of an effusion although " aspiration and infection could appear similar.  No focal consolidation on the right.     Soft tissue/bone: Unremarkable.    Current Medications:     Infusions:   lactated ringers   Intravenous Continuous            Scheduled:   carvediloL  3.125 mg Oral BID    enoxparin  30 mg Subcutaneous Daily    mupirocin   Nasal BID    NIFEdipine  30 mg Oral Daily    piperacillin-tazobactam (Zosyn) IV (PEDS and ADULTS) (extended infusion is not appropriate)  4.5 g Intravenous Q8H    sodium bicarbonate  650 mg Oral TID        PRN:    Current Facility-Administered Medications:     dextrose 50%, 12.5 g, Intravenous, PRN    dextrose 50%, 25 g, Intravenous, PRN    glucagon (human recombinant), 1 mg, Intramuscular, PRN    glucose, 16 g, Oral, PRN    glucose, 24 g, Oral, PRN    naloxone, 0.02 mg, Intravenous, PRN    sodium chloride 0.9%, 10 mL, Intravenous, Q12H PRN    Pharmacy to dose Vancomycin consult, , , Once **AND** vancomycin - pharmacy to dose, , Intravenous, pharmacy to manage frequency    Antibiotics and Day Number of Therapy:  Vanc- started 6/24  Zosyn- started 6/24      Assessment:     Steph Monroe is a 79 y.o.female with       Plan:     Acute Encephalopathy  Likely secondary to UTI  Leukocytosis  - AMS for 2 weeks, confusion with time of day/year and unable to perform baseline activities due to weakness  - WBC 13.21, UA showing 2+ LE, 35 WBC, Moderate Bacteria  - Trop 0.158 and EKG showing TWI in anterior leads              - TWI resolved on repeat and with administration of fluids  - Ammonia 29, no evidence of asterxis; AxOx4  - Given Vanc and Zosyn in ED with quick recovery of mentation and overall appearance per daughter  Plan:  - Continue zosyn until cultures return  - Will discontinue vancomycin at this time as MRSA nares negative  - Monitor on telemetry  - Nutrition consult     Decompensated Cirrhosis  Thrombocytopenia  - 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, no history of Hep C, HIV, or Alcohol use  - Previously had taps done  in the past, but had stopped prior to hospice  - Ascites on imaging with elevated PT/INR  - Ammonia 29, unlikely hepatic encephalopathy  - MELD 3.0 - 26  - Nutrition consulted  - Will initiate spironolactone/lasix   - Zosyn as above, would also cover bleeding given reports blood in stool     NAGMA  Lactic acidosis, resolved   - Possibly due to recent episodes of diarrhea  - LA 4.3 and downtrending  - Will continue to re-assess with IVF infusion     CAD  PHTN  - Previously on ASA and Plavix              - Holding at this time given low PLT count and elevated INR in setting of cirrhosis  - Denied SOB, CP  - EKG with TWI in II, II, aVF and trop elevated although possible elevation in the setting of CKD  - Repeat EKG with resolution following IVF  - Will continue to monitor     T2DM  - No current medications  - SSI and POCT glucose while inaptient  - A1c ordered    CKD 3  - Previously noted  - Cr 2.7, baseline ~1.9  - Possibly progression of CKD  - Will re-assess following fluids administration     Normocytic Anemia  - Hgb 12.1, previous baseline of 7-8  - No acute sins of bleeding  - Iron studies ordered     HTN  - No outpatient medications (previously nifedipine and coreg)  - Given infection will continually re-assess  - BP now wnl    Dispo: pending improvement in mentation and infectious work up  Diet: cardiac   DVT Ppx: lovenox  Code: DNR          Laci Cho MD  [facility] Internal Medicine HO-II    [facility] Medicine Hospitalist Pager numbers:   [facility] Hospitalist Medicine Team A (Librado/Ej): [phone]  [facility] Hospitalist Medicine Team B (Marilia/Brisa):  [phone]

## 2025-06-25 NOTE — PT/OT/SLP EVAL
Physical Therapy Evaluation and Treatment    Patient Name: Steph Monroe   MRN: [id number]  Recent Surgery: * No surgery found *      Recommendations:     Discharge Recommendations: Low Intensity Therapy (HH PT/OT)   Discharge Equipment Recommendations: none   Barriers to discharge: impaired endurance    Assessment:     Steph Monroe is a 79 y.o. female admitted with a medical diagnosis of Altered mental status. She presents with the following impairments/functional limitations: weakness, impaired functional mobility, impaired endurance, gait instability, impaired balance, impaired self care skills, impaired cardiopulmonary response to activity, impaired skin, edema. Pt ambulated in room with RW and CGA-SBA. No LOB. Pt demonstrates decreased endurance indicated by increased WOB and SOB with minimal activity. Doffed nasal cannula with MD clearance to assess pt on RA and pt decreased to 90%. SpO2 96% on 2 L O2. Pt would benefit from skilled acute PT services to improve functional mobility/independence and promote safe discharge home.   Recommending low intensity therapy (HH PT/OT). Pt owns recommended DME.    Rehab Prognosis: Good; patient would benefit from acute PT services to address these deficits and reach maximum level of function.    Plan:     During this hospitalization, patient to be seen 3 x/week to address the above listed problems via gait training, therapeutic activities, therapeutic exercises    Plan of Care Expires: 07/25/25    Subjective     Chief Complaint: mild SOB  Patient Comments/Goals: to return home, agreeable to HH therapy  Pain/Comfort:  Pain Rating 1: 0/10  Pain Rating Post-Intervention 1: 0/10    Social History:  Living Environment: Patient lives with their daughter in a 2 story home with number of outside stair(s): 1 small step, bed/bath on 1st floor, can live on 1st floor, walk-in shower, and grab bars  Prior Level of Function: Prior to admission, patient was modified independent with  ADLs using rolling walker PRN for mobility, 1 recent fall walking to the car, pt fell on R forearm   Equipment Used at Home: shower chair, bedside commode, walker, rolling, raised toilet, cane, quad (transport w/c)  DME owned (not currently used): quad cane, bedside commode, and wheelchair  Assistance Upon Discharge: family    Objective:     Communicated with nsg prior to session. Patient found HOB elevated with PureWick, peripheral IV, oxygen upon PT entry to room.    General Precautions: Standard, fall   Orthopedic Precautions: N/A   Braces: N/A    Respiratory Status: Nasal cannula, flow 2 L/min    Exams:  Cognition: Patient is oriented to Person, Place, and Time  RLE ROM: WFL  RLE Strength: grossly 4/5  LLE ROM: WFL  LLE Strength: grossly 4/5  Postural Exam: Patient presented with the following abnormalities:    -       Rounded shoulders  -       Forward head  Sensation: grossly intact, denies any numbness/tingling  Skin Integrity/Edema:     -       Skin integrity: abrasion to R forearm from fall  -       Edema: Mild BLE    Functional Mobility:  Gait belt applied - Yes  Bed Mobility  Scooting: contact guard assistance  Supine to Sit: minimum assistance for trunk management  Transfers  Sit to Stand: contact guard assistance with rolling walker and with cues for hand placement  Toilet Transfer: contact guard assistance with rolling walker and with cues for hand placement using Step Transfer  Gait  Patient ambulated ~8 ft to toilet and ~12 ft to chair with rolling walker and stand by assistance and contact guard assistance. Patient demonstrates steady gait, decreased foot clearance, ambulates outside ISMAEL of RW, flexed posture, and decreased ze. . All lines remained intact throughout ambulation trail. Cues for safe use and sequencing for approach to surfaces to sit and maintaining within ISMAEL of RW  Balance  Sitting: supervision  Standing: stand by assistance      Therapeutic Activities and Exercises:   Patient  educated on role of acute care PT and PT POC, safety while in hospital including calling nurse for mobility, and call light usage  Patient educated about importance of OOB mobility and remaining up in chair most of the day.  Patient educated about pursed lip breathing technique and cued for use with mobility.   Pt demonstrates decreased endurance indicated by increased WOB and SOB with minimal activity. Doffed nasal cannula during functional mobility with MD clearance to assess pt on RA and pt decreased to 90%. SpO2 96% on 2 L O2 at beginning and end of session, 2 L left donned at end      AM-PAC 6 CLICK MOBILITY  Total Score:17    Patient left up in chair with all lines intact, call button in reach, RN notified, and chair alarm on.    GOALS:   Multidisciplinary Problems       Physical Therapy Goals          Problem: Physical Therapy    Goal Priority Disciplines Outcome Interventions   Physical Therapy Goal     PT, PT/OT Progressing    Description: Goals to be met by: 25     Patient will increase functional independence with mobility by performin. Supine to sit with Stand-by Assistance  2. Sit to supine with Stand-by Assistance  3. Sit to stand transfer with Stand-by Assistance  4. Bed to chair transfer with Stand-by Assistance using Rolling Walker  5. Gait  x 100 feet with Stand-by Assistance using Rolling Walker.   6. Ascend/Descend 4 inch curb step with Stand-by Assistance using Rolling Walker.                         DME Justifications:  No DME recommended requiring DME justifications    History:     Past Medical History:   Diagnosis Date    Cancer     rectum    CKD (chronic kidney disease)     Diabetes mellitus, type 2     Hyperlipidemia     Hypertension     Neutropenic fever 2016    PVD (peripheral vascular disease)        Past Surgical History:   Procedure Laterality Date    ANGIOGRAM, LOWER ARTERIAL, UNILATERAL Right 2023    Procedure: Angiogram, Lower Arterial, Unilateral;  Surgeon:  Remington Ribeiro MD;  Location: [facility] CATH LAB/EP;  Service: Cardiology;  Laterality: Right;    ANGIOGRAPHY OF LOWER EXTREMITY N/A 5/15/2023    Procedure: Angiogram Extremity Unilateral;  Surgeon: FERNANDO Cagle II, MD;  Location: [address];  Service: Vascular;  Laterality: N/A;  22.4 min  183.31 mGy  23.5353 Gy.cm  17ml Dye     COLONOSCOPY N/A 3/28/2016    Procedure: COLONOSCOPY;  Surgeon: Mitul Buitrago Jr., MD;  Location: [facility] ENDO;  Service: Endoscopy;  Laterality: N/A;    ENDARTERECTOMY OF FEMORAL ARTERY Right 5/9/2023    Procedure: ENDARTERECTOMY, FEMORAL;  Surgeon: FERNANDO Cagle II, MD;  Location: [address];  Service: Vascular;  Laterality: Right;  contrast 12ml  Flouro time 11.1 min  mgy 217.83  Gycm2 26.3570    EYE SURGERY Left     cataract removal with lens implant.    FEMORAL ARTERY STENT  before 2010    PERIPHERAL ANGIOGRAPHY N/A 5/3/2023    Procedure: Peripheral angiography;  Surgeon: Rasheeda Ny MD;  Location: [facility] CATH LAB/EP;  Service: Cardiology;  Laterality: N/A;    PTA, SUPERFICIAL FEMORAL ARTERY Right 5/15/2023    Procedure: PTA, SUPERFICIAL FEMORAL ARTERY;  Surgeon: FERNANDO Cagle II, MD;  Location: [address];  Service: Vascular;  Laterality: Right;    RENAL ARTERY STENT  2010    STENT, ILIAC ARTERY Right 5/9/2023    Procedure: STENT, ILIAC ARTERY;  Surgeon: FERNANDO Cagle II, MD;  Location: [address];  Service: Vascular;  Laterality: Right;       Time Tracking:     PT Received On: 06/25/25  PT Start Time: 1028  PT Stop Time: 1103  PT Total Time (min): 35 min     Billable Minutes: Evaluation 10, Gait Training 10, and Therapeutic Activity 15    6/25/2025

## 2025-06-25 NOTE — PT/OT/SLP EVAL
Occupational Therapy   Evaluation    Name: Steph Monroe  MRN: [id number]  Admitting Diagnosis: Altered mental status  Recent Surgery: * No surgery found *      Recommendations:     Discharge Recommendations: Low Intensity Therapy (HH PT/OT)  Discharge Equipment Recommendations:  none  Barriers to discharge:  Other (Comment) (pt requires increased assist)    Assessment:     Steph Monroe is a 79 y.o. female with a medical diagnosis of Altered mental status.  She presents with The primary encounter diagnosis was Leukocytosis, unspecified type. Diagnoses of GI bleed, Arm injury, Sepsis, due to unspecified organism, unspecified whether acute organ dysfunction present, Other ascites, Essential hypertension, Stage 4 chronic kidney disease, Chest pain, and Arrhythmia were also pertinent to this visit. Performance deficits affecting function: weakness, impaired functional mobility, impaired endurance, gait instability, impaired balance, impaired self care skills, impaired cardiopulmonary response to activity, impaired skin, edema.      Rehab Prognosis: Good; patient would benefit from acute skilled OT services to address these deficits and reach maximum level of function.       Plan:     Patient to be seen 3 x/week to address the above listed problems via self-care/home management, therapeutic activities, therapeutic exercises  Plan of Care Expires: 07/25/25  Plan of Care Reviewed with: patient    Subjective     Chief Complaint: mild SOB  Patient/Family Comments/goals: return Home    Occupational Profile:  Living Environment: Patient lives with their daughter in a 2 story home with number of outside stair(s): 1 small step, bed/bath on 1st floor, can live on 1st floor, walk-in shower, and grab bars  Prior Level of Function: Prior to admission, patient was modified independent with ADLs using rolling walker PRN for mobility, 1 recent fall walking to the car, pt fell on R forearm   Equipment Used at Home: shower chair,  bedside commode, walker, rolling, raised toilet, cane, quad (transport w/c)  DME owned (not currently used): quad cane, bedside commode, and wheelchair  Assistance Upon Discharge: family    Pain/Comfort:  Pain Rating 1: 0/10    Patients cultural, spiritual, Oriental orthodox conflicts given the current situation: no    Objective:     Communicated with: genaro prior to session.  Patient found HOB elevated with PureWick, peripheral IV, oxygen upon OT entry to room.    General Precautions: Standard, fall  Orthopedic Precautions: N/A  Braces: N/A  Respiratory Status: Nasal cannula, flow 2 L/min    Occupational Performance:    Bed Mobility:    Patient completed Scooting/Bridging with contact guard assistance  Patient completed Supine to Sit with minimum assistance    Functional Mobility/Transfers:  Patient completed Sit <> Stand Transfer with contact guard assistance  with  rolling walker   Patient completed Bed <> Chair Transfer using Step Transfer technique with stand by assistance and contact guard assistance with rolling walker  Patient completed Toilet Transfer Step Transfer technique with contact guard assistance with  rolling walker    Activities of Daily Living:  Toileting: supervision and stand by assistance on toilet for hygiene, Jacoby for clothing management     Cognitive/Visual Perceptual:  Cognitive/Psychosocial Skills:     -       Oriented to: Person, Place, and Time   -       Safety awareness/insight to disability: impaired   -       Mood/Affect/Coping skills/emotional control: Cooperative and Pleasant    Physical Exam:  Upper Extremity Strength: grossly WFL    Strength: grossly WFL    AMPAC 6 Click ADL:  AMPAC Total Score: 18    Treatment & Education:  Pt would benefit from cont OT services in order to maximize functional independence.   Pt reports daughter is home w/her majority of time & PRN assist with ADLs.   Pt performing functional mobility in room this date with SBA-CGA & use of RW.   Doffed nasal cannula  during functional mobility with MD clearance to assess pt on RA and pt decreased to 90%. SpO2 96% on 2 L O2 at beginning and end of session, 2 L left donned at end   Will continue to progress as able.    Patient left up in chair with all lines intact, call button in reach, chair alarm on, and nsg notified    GOALS:   Multidisciplinary Problems       Occupational Therapy Goals          Problem: Occupational Therapy    Goal Priority Disciplines Outcome Interventions   Occupational Therapy Goal     OT, PT/OT Progressing    Description: Goals to be met by: 07/25/2025     Patient will increase functional independence with ADLs by performing:    UE Dressing with Set-up Assistance.  LE Dressing with Stand-by Assistance.  Grooming while standing with Supervision.  Toileting from toilet with Supervision for hygiene and clothing management.   Step transfer with Supervision  Toilet transfer to toilet with Supervision.                       History:     Past Medical History:   Diagnosis Date    Cancer     rectum    CKD (chronic kidney disease)     Diabetes mellitus, type 2     Hyperlipidemia     Hypertension     Neutropenic fever 5/27/2016    PVD (peripheral vascular disease)          Past Surgical History:   Procedure Laterality Date    ANGIOGRAM, LOWER ARTERIAL, UNILATERAL Right 5/5/2023    Procedure: Angiogram, Lower Arterial, Unilateral;  Surgeon: Remington Ribeiro MD;  Location: [facility] CATH LAB/EP;  Service: Cardiology;  Laterality: Right;    ANGIOGRAPHY OF LOWER EXTREMITY N/A 5/15/2023    Procedure: Angiogram Extremity Unilateral;  Surgeon: FERNANDO Cagle II, MD;  Location: [facility] OR [address] [place] [address];  Service: Vascular;  Laterality: N/A;  22.4 min  183.31 mGy  23.5353 Gy.cm  17ml Dye     COLONOSCOPY N/A 3/28/2016    Procedure: COLONOSCOPY;  Surgeon: Mitul Buitrago Jr., MD;  Location: [facility] ENDO;  Service: Endoscopy;  Laterality: N/A;    ENDARTERECTOMY OF FEMORAL ARTERY Right 5/9/2023    Procedure: ENDARTERECTOMY, FEMORAL;   Surgeon: FERNANDO Cagle II, MD;  Location: [address];  Service: Vascular;  Laterality: Right;  contrast 12ml  Flouro time 11.1 min  mgy 217.83  Gycm2 26.3570    EYE SURGERY Left     cataract removal with lens implant.    FEMORAL ARTERY STENT  before 2010    PERIPHERAL ANGIOGRAPHY N/A 5/3/2023    Procedure: Peripheral angiography;  Surgeon: Rasheeda Ny MD;  Location: [facility] CATH LAB/EP;  Service: Cardiology;  Laterality: N/A;    PTA, SUPERFICIAL FEMORAL ARTERY Right 5/15/2023    Procedure: PTA, SUPERFICIAL FEMORAL ARTERY;  Surgeon: FERNANDO Cagle II, MD;  Location: [address];  Service: Vascular;  Laterality: Right;    RENAL ARTERY STENT  2010    STENT, ILIAC ARTERY Right 5/9/2023    Procedure: STENT, ILIAC ARTERY;  Surgeon: FERNANDO Cagle II, MD;  Location: [address];  Service: Vascular;  Laterality: Right;       Time Tracking:     OT Date of Treatment: 06/25/25  OT Start Time: 1027  OT Stop Time: 1102  OT Total Time (min): 35 min    Billable Minutes:Evaluation 10  Self Care/Home Management 15  Therapeutic Activity 10    6/25/2025

## 2025-06-25 NOTE — PT/OT/SLP EVAL
Speech Language Pathology Evaluation  Bedside Swallow    Patient Name:  Steph Monroe   MRN:  [id number]  Admitting Diagnosis: Altered mental status    Recommendations:                 General Recommendations:  follow up with diet   Diet recommendations:  Soft & Bite Sized Diet - IDDSI Level 6, Thin liquids - IDDSI Level 0   Aspiration Precautions:   1. Pt to be fully awake/alert for all PO  2. HOB at 90* for intake  3. Remain upright at least 30 mins s/p intake  4. SMALL bites/sips  5. Alternate bites/sips  6. CUP swallows   7. Whole meds with sips of water     General Precautions: Standard, fall  Communication strategies:  none  Discharge recommendations:  Low Intensity Therapy   Barriers to Discharge:  none    Assessment:     Steph Monroe is a 79 y.o. female admitted with AMS and fall who presents with ability to continue on current diet level.     History per MD      Steph Monroe is a 79 y.o.female with a PMHx of CKD 4, HTN, HLD, PAD s/p R CFA endarterectomy and right iliac artery stenting, T2DM, and Anal cancer who presented for AMS for roughly 2 weeks.      The patient was in their usual state of health until two weeks ago, when her daughter, who she lives with, noticed that she would be more confused on the time of day and more fatigued than usual. At baseline, patient is able to perform all ADLs with a walker for assistance. Additionally, her daughter reports that she had been having more bowel movements than usual, up to 5-6 times per day and noted to be diarrhea. She has felt more weak and fatigued than her baseline. She also has noticed that her abdomen has slowly become more distended. Otherwise, patient denies headache, changes in vision, chest pain, SOB, abdominal pain, nausea, vomiting, constipation, or syncope.      Of note, patient was admitted in 2023 for sepsis and a sacral wound in addition to her cirrhosis. Following this admission, she was placed on hospice, and all medications were  discontinued. She managed to do well, and graduated from hospice after 10 months.     Past Medical History:   Diagnosis Date    Cancer     rectum    CKD (chronic kidney disease)     Diabetes mellitus, type 2     Hyperlipidemia     Hypertension     Neutropenic fever 5/27/2016    PVD (peripheral vascular disease)        Past Surgical History:   Procedure Laterality Date    ANGIOGRAM, LOWER ARTERIAL, UNILATERAL Right 5/5/2023    Procedure: Angiogram, Lower Arterial, Unilateral;  Surgeon: Remington Ribeiro MD;  Location: [facility] CATH LAB/EP;  Service: Cardiology;  Laterality: Right;    ANGIOGRAPHY OF LOWER EXTREMITY N/A 5/15/2023    Procedure: Angiogram Extremity Unilateral;  Surgeon: FERNANDO Cagle II, MD;  Location: [facility] OR [address] [place] [address];  Service: Vascular;  Laterality: N/A;  22.4 min  183.31 mGy  23.5353 Gy.cm  17ml Dye     COLONOSCOPY N/A 3/28/2016    Procedure: COLONOSCOPY;  Surgeon: Mitul Buitrago Jr., MD;  Location: [facility] ENDO;  Service: Endoscopy;  Laterality: N/A;    ENDARTERECTOMY OF FEMORAL ARTERY Right 5/9/2023    Procedure: ENDARTERECTOMY, FEMORAL;  Surgeon: FERNANDO Cagle II, MD;  Location: [facility] OR [address] [place] [address];  Service: Vascular;  Laterality: Right;  contrast 12ml  Flouro time 11.1 min  mgy 217.83  Gycm2 26.3570    EYE SURGERY Left     cataract removal with lens implant.    FEMORAL ARTERY STENT  before 2010    PERIPHERAL ANGIOGRAPHY N/A 5/3/2023    Procedure: Peripheral angiography;  Surgeon: Rasheeda Ny MD;  Location: [facility] CATH LAB/EP;  Service: Cardiology;  Laterality: N/A;    PTA, SUPERFICIAL FEMORAL ARTERY Right 5/15/2023    Procedure: PTA, SUPERFICIAL FEMORAL ARTERY;  Surgeon: FERNANDO Cagle II, MD;  Location: [facility] OR [address] [place] [address];  Service: Vascular;  Laterality: Right;    RENAL ARTERY STENT  2010    STENT, ILIAC ARTERY Right 5/9/2023    Procedure: STENT, ILIAC ARTERY;  Surgeon: FERNANDO Cagle II, MD;  Location: [facility] OR [address] [place] [address];  Service: Vascular;  Laterality: Right;       Social History:  "Patient lives with dtr at home.    Chest X-Rays:   Lungs: Lung volumes are moderate.  No convincing pulmonary edema.  Patchy opacities in the left lung base, possibly atelectasis in the setting of an effusion although aspiration and infection could appear similar.  No focal consolidation on the right.     Prior diet: reg/thin.    Subjective     Pt seen at bedside for swallow eval. Pt is awake and cooperative.   Patient goals: "I was getting ready to eat."     Pain/Comfort:  Pain Rating 1: 0/10    Respiratory Status: room air     Objective:   Pt seen at bedside, seated in recliner and requiring min assist with tray -setup for meal.   Pt able to state name and place. Pt did follow simple commands.     Oral Musculature Evaluation  Oral Musculature: WFL  Dentition: scattered dentition  Secretion Management: adequate  Mucosal Quality: adequate, good  Mandibular Strength and Mobility: WFL  Oral Labial Strength and Mobility: WFL  Lingual Strength and Mobility: WFL  Velar Elevation: WFL  Buccal Strength and Mobility: WFL  Volitional Cough: elicited  Volitional Swallow: timely swallow  Voice Prior to PO Intake: clear voice    Bedside Swallow Eval: also assessed with meal tray  Consistencies Assessed:  Thin liquids sprite, water by cup and straw   Puree whole pudding container, mashed potatoes   Solids cracker, chicken breast pieces and zucchini     Oral Phase:   WFL    Pharyngeal Phase:   no overt clinical signs/symptoms of aspiration  no overt clinical signs/symptoms of pharyngeal dysphagia  multiple spontaneous swallows  No coughing during meal; however, nsg reported coughing with meds earlier    Compensatory Strategies  Slow rate  CUP Swallows     Treatment: SLP provided patient education on SLP recommendations, SLP role, s/s and risks of aspiration, safe swallow precautions, and POC. The patient v/u of all discussed and is in agreement with POC.       Goals:   Multidisciplinary Problems       SLP Goals          Problem: " SLP    Goal Priority Disciplines Outcome   SLP Goal     SLP Progressing   Description: Short Term Goals:  1. Pt will consume soft diet and thin liquids with no audible dysphagia signs.                        Plan:     Patient to be seen:  2 x/week   Plan of Care expires:  07/24/25  Plan of Care reviewed with:  patient   SLP Follow-Up:  Yes       Time Tracking:     SLP Treatment Date:   06/25/25  Speech Start Time:  1132  Speech Stop Time:  1155     Speech Total Time (min):  23 min    Billable Minutes: Eval Swallow and Oral Function 12 and Self Care/Home Management Training 11    06/25/2025

## 2025-06-25 NOTE — NURSING
Patient is confused.  Information obtain from current and past medical records.  Initiated plan of care.

## 2025-06-25 NOTE — PLAN OF CARE
SOCIAL WORK DISCHARGE PLANNING ASSESSMENT     completed discharge planning assessment with pt's daughter Deana ([phone]). Deana was easily engaged and education on the role of  was provided. Deana reported that pt lives with her and pt has good support from family and friends. Deana stated pt has the following DME: rw, bsc and shower chair. Deana reported that pt's house is fully handicap. Deana stated pt is not current with  services. Deana stated pt does not drives, that family drives pt to doctor appointments and Deana will provide transportation home following discharge. Deana was encouraged to call with any questions or concerns. Deana verbalized understanding.     Future Appointments   Date Time Provider Department Center   9/8/2025 10:30 AM Sherrell Perez MD [facility], [place]         06/25/25 1320   Discharge Assessment   Assessment Type Discharge Planning Assessment   Confirmed/corrected address, phone number and insurance Yes   Confirmed Demographics Correct on Facesheet   Source of Information family  (pt's daughter Deana [phone])   Communicated PÉREZ with patient/caregiver Yes   People in Home child(hector), adult   Facility Arrived From: home   Do you expect to return to your current living situation? Yes   Do you have help at home or someone to help you manage your care at home? Yes   Who are your caregiver(s) and their phone number(s)? pt's daughters   Walking or Climbing Stairs Difficulty yes   Walking or Climbing Stairs ambulation difficulty, requires equipment   Dressing/Bathing Difficulty yes   Dressing/Bathing bathing difficulty, requires equipment   Home Accessibility wheelchair accessible   Home Layout Able to live on 1st floor   Equipment Currently Used at Home shower chair;bedside commode;walker, rolling   Readmission within 30 days? No   Patient currently being followed by outpatient case management? No   Do you currently have service(s) that help you manage  your care at home? No   Do you take prescription medications? Yes   Do you have prescription coverage? Yes   Do you have any problems affording any of your prescribed medications? No   Is the patient taking medications as prescribed? yes   Who is going to help you get home at discharge? pt's daughters   How do you get to doctors appointments? family or friend will provide   Are you on dialysis? No   Do you take coumadin? No   Discharge Plan A Home with family   Discharge Plan B Home Health   DME Needed Upon Discharge    (TBD)   Discharge Plan discussed with: Adult children   Transition of Care Barriers None   Physical Activity   On average, how many days per week do you engage in moderate to strenuous exercise (like a brisk walk)? Pt Unable   On average, how many minutes do you engage in exercise at this level? Pt Unable   Financial Resource Strain   How hard is it for you to pay for the very basics like food, housing, medical care, and heating? Pt Unable   Housing Stability   In the last 12 months, was there a time when you were not able to pay the mortgage or rent on time? Pt Unable   At any time in the past 12 months, were you homeless or living in a shelter (including now)? Pt Unable   Transportation Needs   In the past 12 months, has lack of transportation kept you from medical appointments or from getting medications? Pt Unable   In the past 12 months, has lack of transportation kept you from meetings, work, or from getting things needed for daily living? Pt Unable   Food Insecurity   Within the past 12 months, you worried that your food would run out before you got the money to buy more. Pt Unable   Within the past 12 months, the food you bought just didn't last and you didn't have money to get more. Pt Unable   Stress   Do you feel stress - tense, restless, nervous, or anxious, or unable to sleep at night because your mind is troubled all the time - these days? Pt Unable   Social Isolation   How often do you  feel lonely or isolated from those around you?  Patient unable to answer   Alcohol Use   Q1: How often do you have a drink containing alcohol? Pt Unable   Q2: How many drinks containing alcohol do you have on a typical day when you are drinking? Pt Unable   Q3: How often do you have six or more drinks on one occasion? Pt Unable   Utilities   In the past 12 months has the electric, gas, oil, or water company threatened to shut off services in your home? Pt Unable   Health Literacy   How often do you need to have someone help you when you read instructions, pamphlets, or other written material from your doctor or pharmacy? Patient unable to respond

## 2025-06-25 NOTE — PLAN OF CARE
Nutrition Plan of Care:    Recommendations     1. Continue on heart healthy oral diet     2. Recommend commercial beverages: boost or renal formula as appropriate, if po intake <50%    3. Monitor labs and weights     Goals: Patient to consume >75% of EEN prior to RD follow up  Nutrition Goal Status: new  Communication of RD Recs: other (comment) (POC)     Nutrition Discharge Planning      Nutrition Discharge Planning: Therapeutic diet (comments)  Therapeutic diet (comments): Heart healthy     Assessment and Plan  PES Statement  Inadequate energy intake related to Acute illness as evidenced by Intake <75% estimated needs  Status: New        Interventions/Recommendations (treatment strategy):  Decreased sodium and fat modified diet   Commercial beverage medical food supplement therapy       Laurie Mcintosh MS, RDN, LDN

## 2025-06-25 NOTE — PLAN OF CARE
Problem: Physical Therapy  Goal: Physical Therapy Goal  Description: Goals to be met by: 25     Patient will increase functional independence with mobility by performin. Supine to sit with Stand-by Assistance  2. Sit to supine with Stand-by Assistance  3. Sit to stand transfer with Stand-by Assistance  4. Bed to chair transfer with Stand-by Assistance using Rolling Walker  5. Gait  x 100 feet with Stand-by Assistance using Rolling Walker.   6. Ascend/Descend 4 inch curb step with Stand-by Assistance using Rolling Walker.    Outcome: Progressing     PT Eval completed, note to follow.     Pt ambulated in room with RW and CGA-SBA. No LOB. Pt demonstrates decreased endurance indicated by increased WOB and SOB with minimal activity. Doffed nasal cannula with MD clearance to assess pt on RA and pt decreased to 90%. SpO2 96% on 2 L O2. Pt would benefit from skilled acute PT services to improve functional mobility/independence and promote safe discharge home.   Recommending low intensity therapy (HH PT/OT). Pt owns recommended DME.

## 2025-06-25 NOTE — PLAN OF CARE
Pt would benefit from cont OT services in order to maximize functional independence. Recommending low intensity therapy upon d/c ( OT/PT) & family assist. Pt reports daughter is home w/her majority of time & PRN assist with ADLs. Pt performing functional mobility in room this date with SBA-CGA & use of RW. Will continue to progress as able.    Problem: Occupational Therapy  Goal: Occupational Therapy Goal  Description: Goals to be met by: 07/25/2025     Patient will increase functional independence with ADLs by performing:    UE Dressing with Set-up Assistance.  LE Dressing with Stand-by Assistance.  Grooming while standing with Supervision.  Toileting from toilet with Supervision for hygiene and clothing management.   Step transfer with Supervision  Toilet transfer to toilet with Supervision.    Outcome: Progressing

## 2025-06-25 NOTE — PLAN OF CARE
Problem: Adult Inpatient Plan of Care  Goal: Patient-Specific Goal (Individualized)  Outcome: Progressing     Problem: Fall Injury Risk  Goal: Absence of Fall and Fall-Related Injury  Outcome: Progressing     Problem: Confusion Acute  Goal: Optimal Cognitive Function  Outcome: Progressing     Problem: Fatigue  Goal: Improved Activity Tolerance  Outcome: Progressing

## 2025-06-25 NOTE — PLAN OF CARE
Swallow eval completed, cont thin liquids for now and will follow up to ensure no coughing on thin. CUP swallows preferred during meals.

## 2025-06-25 NOTE — ED NOTES
Assumed care of patient. Resting resting in bed. Wheels locked, call light within reach. No needs at this time. VSS. Patient on 4LPM O2, cardiac monitor, spo2 and BP.

## 2025-06-26 LAB
ABSOLUTE EOSINOPHIL (OHS): 0.04 K/UL
ABSOLUTE MONOCYTE (OHS): 0.72 K/UL (ref 0.3–1)
ABSOLUTE NEUTROPHIL COUNT (OHS): 5.82 K/UL (ref 1.8–7.7)
ACB COMPLEX DNA BLD POS QL NAA+NON-PROBE: NOT DETECTED
ALBUMIN SERPL BCP-MCNC: 2.3 G/DL (ref 3.5–5.2)
ALP SERPL-CCNC: 62 UNIT/L (ref 40–150)
ALT SERPL W/O P-5'-P-CCNC: 14 UNIT/L (ref 10–44)
ANION GAP (OHS): 7 MMOL/L (ref 8–16)
AST SERPL-CCNC: 23 UNIT/L (ref 11–45)
B FRAGILIS DNA BLD POS QL NAA+PROBE: NOT DETECTED
BACTERIA UR CULT: NO GROWTH
BASOPHILS # BLD AUTO: 0.01 K/UL
BASOPHILS NFR BLD AUTO: 0.1 %
BILIRUB SERPL-MCNC: 1.5 MG/DL (ref 0.1–1)
BUN SERPL-MCNC: 52 MG/DL (ref 8–23)
C ALBICANS DNA BLD POS QL NAA+PROBE: NOT DETECTED
C AURIS DNA BLD POS QL NAA+NON-PROBE: NOT DETECTED
C GATTII+NEOFOR DNA CSF QL NAA+NON-PROBE: NOT DETECTED
C GLABRATA DNA BLD POS QL NAA+PROBE: NOT DETECTED
C KRUSEI DNA BLD POS QL NAA+PROBE: NOT DETECTED
C PARAP DNA BLD POS QL NAA+PROBE: NOT DETECTED
C TROPICLS DNA BLD POS QL NAA+PROBE: NOT DETECTED
CALCIUM SERPL-MCNC: 8 MG/DL (ref 8.7–10.5)
CHLORIDE SERPL-SCNC: 114 MMOL/L (ref 95–110)
CO2 SERPL-SCNC: 18 MMOL/L (ref 23–29)
COLISTIN RES MCR-1 ISLT/SPM QL: ABNORMAL
CREAT SERPL-MCNC: 2.9 MG/DL (ref 0.5–1.4)
E CLOAC COMP DNA BLD POS QL NAA+PROBE: NOT DETECTED
E COLI DNA BLD POS QL NAA+PROBE: NOT DETECTED
E FAECALIS+OTHR E SP RRNA BLD POS FISH: NOT DETECTED
E FAECIUM HSP60 BLD POS QL PROBE: NOT DETECTED
ENTEROBACTERALES DNA BLD POS NAA+N-PRB: NOT DETECTED
ERYTHROCYTE [DISTWIDTH] IN BLOOD BY AUTOMATED COUNT: 16.7 % (ref 11.5–14.5)
ESBL CFT TO CFT-CLAV IC RTO BD POS IMP: ABNORMAL
GFR SERPLBLD CREATININE-BSD FMLA CKD-EPI: 16 ML/MIN/1.73/M2
GLUCOSE SERPL-MCNC: 110 MG/DL (ref 70–110)
GP B STREP DNA CSF QL NAA+NON-PROBE: NOT DETECTED
HAEM INFLU DNA CSF QL NAA+NON-PROBE: NOT DETECTED
HCT VFR BLD AUTO: 34.2 % (ref 37–48.5)
HGB BLD-MCNC: 11.1 GM/DL (ref 12–16)
IMM GRANULOCYTES # BLD AUTO: 0.02 K/UL (ref 0–0.04)
IMM GRANULOCYTES NFR BLD AUTO: 0.3 % (ref 0–0.5)
IMP CARBAPENEMASE ISLT QL IA.RAPID: ABNORMAL
K OXYTOCA OMPA BLD POS QL PROBE: NOT DETECTED
KLEBSIELLA SP DNA BLD POS QL NAA+NON-PRB: NOT DETECTED
KLEBSIELLA SP DNA BLD POS QL NAA+NON-PRB: NOT DETECTED
KPC CARBAPENEMASE ISLT QL IA.RAPID: ABNORMAL
L MONOCYTOG DNA CSF QL NAA+NON-PROBE: NOT DETECTED
LYMPHOCYTES # BLD AUTO: 0.56 K/UL (ref 1–4.8)
MAGNESIUM SERPL-MCNC: 1.8 MG/DL (ref 1.6–2.6)
MCH RBC QN AUTO: 30.9 PG (ref 27–31)
MCHC RBC AUTO-ENTMCNC: 32.5 G/DL (ref 32–36)
MCV RBC AUTO: 95 FL (ref 82–98)
MECA+MECC NOSE QL NAA+PROBE: NOT DETECTED
MECA+MECC+MREJ ISLT/SPM QL: ABNORMAL
N MEN DNA CSF QL NAA+NON-PROBE: NOT DETECTED
NDM CARBAPENEMASE ISLT QL IA.RAPID: ABNORMAL
NUCLEATED RBC (/100WBC) (OHS): 0 /100 WBC
OXA-48-LIKE CRBPNASE ISLT QL IA.RAPID: ABNORMAL
P AERUGINOSA DNA BLD POS QL NAA+PROBE: NOT DETECTED
PATHOLOGIST REVIEW - CBC/DIFF (OHS): NORMAL
PLATELET # BLD AUTO: 85 K/UL (ref 150–450)
PLATELET BLD QL SMEAR: ABNORMAL
PMV BLD AUTO: 12.1 FL (ref 9.2–12.9)
POTASSIUM SERPL-SCNC: 4.3 MMOL/L (ref 3.5–5.1)
PROT SERPL-MCNC: 6 GM/DL (ref 6–8.4)
PROTEUS SP DNA BLD POS QL NAA+PROBE: NOT DETECTED
RBC # BLD AUTO: 3.59 M/UL (ref 4–5.4)
RELATIVE EOSINOPHIL (OHS): 0.6 %
RELATIVE LYMPHOCYTE (OHS): 7.8 % (ref 18–48)
RELATIVE MONOCYTE (OHS): 10 % (ref 4–15)
RELATIVE NEUTROPHIL (OHS): 81.2 % (ref 38–73)
S AUREUS DNA BLD POS QL NAA+PROBE: NOT DETECTED
S ENT+BONG DNA STL QL NAA+NON-PROBE: NOT DETECTED
S EPIDERMIDIS HSP60 BLD POS QL PROBE: DETECTED
S LUGDUNENSIS SODA BLD POS QL PROBE: NOT DETECTED
S MALTOPH DNA BLD POS QL NAA+PROBE: NOT DETECTED
S MARCESCENS DNA BLD POS QL NAA+PROBE: NOT DETECTED
S PNEUM DNA CSF QL NAA+NON-PROBE: NOT DETECTED
S PYOGENES HSP60 BLD POS QL PROBE: NOT DETECTED
SODIUM SERPL-SCNC: 139 MMOL/L (ref 136–145)
STAPH SP TUF BLD POS QL PROBE: ABNORMAL
STREPTOCOCCUS SP TUF BLD POS QL PROBE: NOT DETECTED
VAN(A+B+C1+C2) GENES ISLT/SPM: ABNORMAL
VIM CARBAPENEMASE ISLT QL IA.RAPID: ABNORMAL
WBC # BLD AUTO: 7.17 K/UL (ref 3.9–12.7)

## 2025-06-26 PROCEDURE — 97116 GAIT TRAINING THERAPY: CPT | Mod: CQ

## 2025-06-26 PROCEDURE — 25000003 PHARM REV CODE 250: Performed by: INTERNAL MEDICINE

## 2025-06-26 PROCEDURE — 85025 COMPLETE CBC W/AUTO DIFF WBC: CPT

## 2025-06-26 PROCEDURE — 63600175 PHARM REV CODE 636 W HCPCS

## 2025-06-26 PROCEDURE — 36415 COLL VENOUS BLD VENIPUNCTURE: CPT

## 2025-06-26 PROCEDURE — 97530 THERAPEUTIC ACTIVITIES: CPT | Mod: CQ

## 2025-06-26 PROCEDURE — 63600175 PHARM REV CODE 636 W HCPCS: Performed by: INTERNAL MEDICINE

## 2025-06-26 PROCEDURE — 92526 ORAL FUNCTION THERAPY: CPT

## 2025-06-26 PROCEDURE — 83735 ASSAY OF MAGNESIUM: CPT

## 2025-06-26 PROCEDURE — 97530 THERAPEUTIC ACTIVITIES: CPT

## 2025-06-26 PROCEDURE — 87040 BLOOD CULTURE FOR BACTERIA: CPT

## 2025-06-26 PROCEDURE — 25000003 PHARM REV CODE 250

## 2025-06-26 PROCEDURE — 80053 COMPREHEN METABOLIC PANEL: CPT

## 2025-06-26 PROCEDURE — 21400001 HC TELEMETRY ROOM

## 2025-06-26 PROCEDURE — 97535 SELF CARE MNGMENT TRAINING: CPT

## 2025-06-26 RX ORDER — MAGNESIUM SULFATE HEPTAHYDRATE 40 MG/ML
2 INJECTION, SOLUTION INTRAVENOUS ONCE
Status: COMPLETED | OUTPATIENT
Start: 2025-06-26 | End: 2025-06-26

## 2025-06-26 RX ADMIN — CARVEDILOL 3.12 MG: 3.12 TABLET, FILM COATED ORAL at 10:06

## 2025-06-26 RX ADMIN — NIFEDIPINE 30 MG: 30 TABLET, FILM COATED, EXTENDED RELEASE ORAL at 10:06

## 2025-06-26 RX ADMIN — SODIUM BICARBONATE 650 MG TABLET 650 MG: at 10:06

## 2025-06-26 RX ADMIN — MUPIROCIN: 20 OINTMENT TOPICAL at 10:06

## 2025-06-26 RX ADMIN — ENOXAPARIN SODIUM 30 MG: 30 INJECTION SUBCUTANEOUS at 06:06

## 2025-06-26 RX ADMIN — SODIUM BICARBONATE 650 MG TABLET 650 MG: at 03:06

## 2025-06-26 RX ADMIN — SPIRONOLACTONE 100 MG: 25 TABLET, FILM COATED ORAL at 10:06

## 2025-06-26 RX ADMIN — PIPERACILLIN SODIUM AND TAZOBACTAM SODIUM 4.5 G: 4; .5 INJECTION, POWDER, LYOPHILIZED, FOR SOLUTION INTRAVENOUS at 06:06

## 2025-06-26 RX ADMIN — SODIUM CHLORIDE 125 MG: 9 INJECTION, SOLUTION INTRAVENOUS at 10:06

## 2025-06-26 RX ADMIN — MAGNESIUM SULFATE HEPTAHYDRATE 2 G: 40 INJECTION, SOLUTION INTRAVENOUS at 10:06

## 2025-06-26 RX ADMIN — FUROSEMIDE 40 MG: 40 TABLET ORAL at 10:06

## 2025-06-26 NOTE — PT/OT/SLP PROGRESS
Speech Language Pathology Treatment    Patient Name:  Steph Monroe   MRN:  [id number]  Admitting Diagnosis: Altered mental status    Recommendations:                 General Recommendations:  Dysphagia therapy  Diet recommendations:  Easy to Chew Diet - IDDSI Level 7, Liquid Diet Level: Thin liquids - IDDSI Level 0   Aspiration Precautions: 1 bite/sip at a time, Alternating bites/sips, Avoid talking while eating, Feed only when awake/alert, Frequent oral care, HOB to 90 degrees, Meds whole 1 at a time, Monitor for s/s of aspiration, No straws, Remain upright 30 minutes post meal, Small bites/sips, and Standard aspiration precautions   General Precautions: Standard, fall, respiratory, hearing impaired  Communication strategies:  none  Discharge recommendations:  Low Intensity Therapy   Barriers to Discharge:  None    Assessment:     Steph Monroe is a 79 y.o. female admitted with AMS and fall who presents with ability to upgrade diet to regular/chopped meat and thin liquids VIA CUP (no straw).    Subjective     Pt was seen this AM at the bedside for dysphagia tx and diet follow up. Pt was alert and awake upon entry. Pt oriented to self, place, time. RN administered pt several meds whole. Pt tolerated meds whole with a sip of water 1 at a time. It was noted pt to cough after swallowing a pill. Pt stated her daughters tell her she has SOB frequently.     Pain/Comfort:  Pain Rating 1: other (see comments) (stated back is starting to hurt)    Social History: Patient lives with daughter at home.     Chest X-Rays:   Lungs: Lung volumes are moderate.  No convincing pulmonary edema.  Patchy opacities in the left lung base, possibly atelectasis in the setting of an effusion although aspiration and infection could appear similar.  No focal consolidation on the right.      Prior diet: reg/thin.    Objective:     Has the patient been evaluated by SLP for swallowing?   Yes  Keep patient NPO? No   Current Respiratory Status:   "      Voice:   -Pt VQ is hoarse, strained, weak. Many times were noted of patient to seem SOB.     Swallowing:  -Pt took several pills by mouth 1 at a time. It was noted pt to cough after swallowing 1 of her pills. Cough was weak but present.  -Pt was observed to throw her head back to swallow each pill and trials of thin liquid.  -PO trials of cup sips of water, straw sip, sequential sips of straw. Pt coughed after sequential sips of thin via straw. It was observed pt to have gurly VQ.  -Following thin liquids several times were noted of the pt to throat clear. Pt stated "I have a frog in my throat."  -Pt had adequate hyolaryngeal elevation/excursion on all trials of thin liquid.   -Pt may have coughed after the swallow possibly due to penetration.   -Pt consumed 2 bites of a cracker with complete mastication. Pt showed adequate A/P bolus transfer. No noticeable pocketing or residue post swallow.     Treatment:  SLP provided education on SLP recommendations, SLP role, s/s and risks of aspiration, safe swallow precautions, and POC. The patient indicated understanding of POC and agreed. Recommended diet for pt is a regular diet/ thin liquids via cup, alternating between each. SLP to follow up on diet.    Goals:   Multidisciplinary Problems       SLP Goals          Problem: SLP    Goal Priority Disciplines Outcome   SLP Goal     SLP Progressing   Description: Short Term Goals:  1. Pt will consume soft diet and thin liquids with no audible dysphagia signs.                        Plan:     Patient to be seen:  2 x/week   Plan of Care expires:  07/24/25  Plan of Care reviewed with:  patient   SLP Follow-Up:  Yes       Time Tracking:     SLP Treatment Date:   06/26/25  Speech Start Time:  1015  Speech Stop Time:  1053     Speech Total Time (min):  38 min    Billable Minutes: Treatment Swallowing Dysfunction 20 and Self Care/Home Management Training 18    06/26/2025    "

## 2025-06-26 NOTE — PLAN OF CARE
SW was informed of pt's anticipated discharge date.     PT/OT have recommended HH services for pt upon discharge. SW sent HH referral via epic.     Pt's daughter will provide transportation home following discharge.     SW will continue to follow.     Future Appointments   Date Time Provider Department Center   9/8/2025 10:30 AM Sherrell Perez MD [facility] [place]         06/26/25 9651   Post-Acute Status   Post-Acute Authorization Home Health   Home Health Status Referrals Sent   Discharge Plan   Discharge Plan A Home with family;Home Health

## 2025-06-26 NOTE — PROGRESS NOTES
Skin tear to R arm- partial thickness- 6x7x0.1cm placed standing orders for skin tears and notified nurse.

## 2025-06-26 NOTE — PT/OT/SLP PROGRESS
Occupational Therapy   Treatment    Name: Steph Monroe  MRN: [id number]  Admitting Diagnosis:  Altered mental status       Recommendations:     Discharge Recommendations: Low Intensity Therapy (& 24/7 fam assist)  Discharge Equipment Recommendations:  none  Barriers to discharge:  Other (Comment) (pt requires increased assist)    Assessment:     Steph Monroe is a 79 y.o. female with a medical diagnosis of Altered mental status.  She presents with The primary encounter diagnosis was Leukocytosis, unspecified type. Diagnoses of GI bleed, Arm injury, Sepsis, due to unspecified organism, unspecified whether acute organ dysfunction present, Other ascites, Essential hypertension, Stage 4 chronic kidney disease, Chest pain, and Arrhythmia were also pertinent to this visit. Performance deficits affecting function are weakness, gait instability, impaired endurance, impaired balance, impaired cardiopulmonary response to activity, decreased lower extremity function, decreased upper extremity function, impaired skin, decreased coordination, impaired functional mobility, impaired self care skills, decreased safety awareness, decreased ROM, edema.     Rehab Prognosis:  Good; patient would benefit from acute skilled OT services to address these deficits and reach maximum level of function.       Plan:     Patient to be seen 3 x/week to address the above listed problems via self-care/home management, therapeutic activities, therapeutic exercises  Plan of Care Expires: 07/25/25  Plan of Care Reviewed with: patient    Subjective     Chief Complaint: pt sleeping upon therapy entrance to room  Patient/Family Comments/goals: agreeable to therapy  Pain/Comfort:  Pain Rating 1: 0/10    Objective:     Communicated with: nscinthya prior to session.  Patient found HOB elevated with bed alarm, PureWick, oxygen upon OT entry to room.    General Precautions: Standard, fall, aspiration, respiratory    Orthopedic Precautions:N/A  Braces:  N/A  Respiratory Status: Nasal cannula     Occupational Performance:     Bed Mobility:    Patient completed Scooting/Bridging with moderate assistance  Patient completed Supine to Sit with moderate assistance     Functional Mobility/Transfers:  Patient completed Sit <> Stand Transfer with contact guard assistance and minimum assistance  with  rolling walker   Patient completed Bed <> Chair Transfer using Step Transfer technique with contact guard assistance with rolling walker & increased time/verbal cues    [facility] 6 Click ADL: 17    Treatment & Education:  Pt agreeable to therapy this date.  Pt educated on importance of OOB ax.  Pt declining need to toilet at this time.  Pt performing BLE exs seated EOB prior to standing.  No c/o dizziness throughout session.  Pt performing t/f to bedside chair as above.  Will continue to progress as able.     Patient left up in chair with all lines intact, call button in reach, chair alarm on, and nsg notified    GOALS:   Multidisciplinary Problems       Occupational Therapy Goals          Problem: Occupational Therapy    Goal Priority Disciplines Outcome Interventions   Occupational Therapy Goal     OT, PT/OT Progressing    Description: Goals to be met by: 07/25/2025     Patient will increase functional independence with ADLs by performing:    UE Dressing with Set-up Assistance.  LE Dressing with Stand-by Assistance.  Grooming while standing with Supervision.  Toileting from toilet with Supervision for hygiene and clothing management.   Step transfer with Supervision  Toilet transfer to toilet with Supervision.                       Time Tracking:     OT Date of Treatment: 06/26/25  OT Start Time: 1556  OT Stop Time: 1611  OT Total Time (min): 15 min    Billable Minutes:Therapeutic Activity 15    OT/CAMILLE: OT     Number of CAMILLE visits since last OT visit: 0    6/26/2025

## 2025-06-26 NOTE — PROGRESS NOTES
[facility]  Pharmacy    Pharmacy Vancomycin/Aminoglycoside Sign-Off    Therapy with vancomycin has been completed and/or the consult has been discontinued by the provider. The pharmacy will now sign off. Please re-consult as needed.    Thank you for allowing us to participate in this patients care.    Melvin Hope, PharmD     [phone]

## 2025-06-26 NOTE — PROGRESS NOTES
"[facility] Medicine Progress Note    Primary Team: \[facility] [place] [facility]\"" Hospitalist Team B  Attending Physician: Dr. Pleitez  Resident: Marquis  Intern: Max    Subjective:      Patient feels well this morning, and tolerating her breakfast well. GPC in blood cultures x2. Mentating well.     Objective:     Last 24 Hour Vital Signs:  BP  Min: 143/68  Max: 180/85  Temp  Av.7 °F (36.5 °C)  Min: 97.4 °F (36.3 °C)  Max: 98.1 °F (36.7 °C)  Pulse  Av.8  Min: 69  Max: 88  Resp  Av.8  Min: 18  Max: 20  SpO2  Av.3 %  Min: 94 %  Max: 99 %  Height  Av' 2" (157.5 cm)  Min: 5' 2" (157.5 cm)  Max: 5' 2" (157.5 cm)  Weight  Av.6 kg (179 lb 14.3 oz)  Min: 81.6 kg (179 lb 14.3 oz)  Max: 81.6 kg (179 lb 14.3 oz)  I/O last 3 completed shifts:  In: 1115.7 [P.O.:480; I.V.:436.3; IV Piggyback:199.3]  Out: 1200 [Urine:1200]    Physical Examination:  Gen: AAOx3, NAD  HEENT: head normocephalic, atraumatic  Cardiac: regular rate and rhythm; no murmurs, rubs, or gallops  Resp: clear to auscultation bilaterally, normal work of breathing  GI: abdomen soft, non-tender, slightly distended; normoactive bowel sounds  Extrem: no clubbing or swelling noted  Skin: no rashes or bruises noted  Neuro: AAOx3, moving all extremities without difficulty       Laboratory:  Laboratory Data Reviewed: yes  Pertinent Findings:  Cr- 2.9    Microbiology Data Reviewed: yes  Pertinent Findings:  Blood cultures 2/4 GPC  Urine cultures pending    Other Results:  EKG (my interpretation): no new EKG    Radiology Data Reviewed: yes  Pertinent Findings:  CXR 25  FINDINGS:  Lines and tubes: Right chest wall Port-A-Cath.     Heart and mediastinum: The left heart border is obscured.  Calcifications of the thoracic aorta.     Pleura: Small-moderate left pleural effusion.  Possible trace right pleural effusion.  No pneumothorax.     Lungs: Lung volumes are moderate.  No convincing pulmonary edema.  Patchy opacities in the left lung base, possibly atelectasis in " the setting of an effusion although aspiration and infection could appear similar.  No focal consolidation on the right.     Soft tissue/bone: Unremarkable.    Current Medications:     Infusions:         Scheduled:   carvediloL  3.125 mg Oral BID    enoxparin  30 mg Subcutaneous Daily    ferric gluconate (Ferrlecit) IVPB  125 mg Intravenous Once    furosemide  40 mg Oral Daily    magnesium sulfate 2 g IVPB  2 g Intravenous Once    mupirocin   Nasal BID    NIFEdipine  30 mg Oral Daily    piperacillin-tazobactam (Zosyn) IV (PEDS and ADULTS) (extended infusion is not appropriate)  4.5 g Intravenous Q12H    sodium bicarbonate  650 mg Oral TID    spironolactone  100 mg Oral Daily        PRN:    Current Facility-Administered Medications:     0.9%  NaCl infusion (for blood administration), , Intravenous, Q24H PRN    dextrose 50%, 12.5 g, Intravenous, PRN    dextrose 50%, 25 g, Intravenous, PRN    glucagon (human recombinant), 1 mg, Intramuscular, PRN    glucose, 16 g, Oral, PRN    glucose, 24 g, Oral, PRN    naloxone, 0.02 mg, Intravenous, PRN    sodium chloride 0.9%, 10 mL, Intravenous, Q12H PRN    Pharmacy to dose Vancomycin consult, , , Once **AND** vancomycin - pharmacy to dose, , Intravenous, pharmacy to manage frequency    Antibiotics and Day Number of Therapy:  Vanc- started 6/24  Zosyn- started 6/24      Assessment:     Steph Monroe is a 79 y.o.female with PMHx of CKD 4, HTN, HLD, PAD s/p R CFA endarterectomy and right iliac artery stenting, T2DM, and Anal cancer who presented for 2 weeks of AMS. Found to have UTI. Admitted to Medicine for IV antibiotics and infectious work up.       Plan:     Acute Encephalopathy  Likely secondary to UTI  Leukocytosis  - AMS for 2 weeks, confusion with time of day/year and unable to perform baseline activities due to weakness  - WBC 13.21, UA showing 2+ LE, 35 WBC, Moderate Bacteria  - Trop 0.158 and EKG showing TWI in anterior leads              - TWI resolved on repeat and  with administration of fluids  - Ammonia 29, no evidence of asterxis; AxOx4  - Given Vanc and Zosyn in ED with quick recovery of mentation and overall appearance per daughter  Plan:  - Continue vanc and zosyn until sensitivities result  - Monitor on telemetry  - Nutrition consult     Decompensated Cirrhosis  Thrombocytopenia  - 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, no history of Hep C, HIV, or Alcohol use  - Previously had taps done in the past, but had stopped prior to hospice  - Ascites on imaging with elevated PT/INR  - Ammonia 29, unlikely hepatic encephalopathy  - MELD 3.0 - 26  - Nutrition consulted  - Continue spironolactone/lasix, tolerating well  - Vanc/Zosyn as above, would also cover bleeding given reports blood in stool     NAGMA  Lactic acidosis, resolved   - Possibly due to recent episodes of diarrhea  - LA 4.3 initially, now normal  - Will continue to re-assess with IVF infusion     CAD  PHTN  - Previously on ASA and Plavix              - Holding at this time given low PLT count and elevated INR in setting of cirrhosis  - Denied SOB, CP  - EKG with TWI in II, II, aVF and trop elevated although possible elevation in the setting of CKD  - Repeat EKG with resolution following IVF  - Will continue to monitor     T2DM  - No current medications  - SSI and POCT glucose while inaptient  - A1c 6.6%  - Will discuss medication initiation at discharge    CKD 3  - Previously noted  - Cr 2.9, baseline ~1.9  - Possibly progression of CKD  - Will re-assess following fluids administration     Normocytic Anemia  - Hgb 12.1, previous baseline of 7-8  - No acute sins of bleeding  - Iron studies showing EDDY  - IV iron ordered     HTN  - No outpatient medications (previously nifedipine and coreg)  - Given infection will continually re-assess  - BP now wnl    Dispo: pending completion of infectious work up; needs  PT set up  Diet: cardiac   DVT Ppx: lovenox  Code: DNR          Laci Cho MD  [facility] HO-II    [facility]  Hospitalist Pager numbers:   [facility] Hospitalist Medicine Team A (Librado/Ej): [phone]  [facility] Hospitalist Medicine Team B (Marilia/Brisa):  [phone]

## 2025-06-26 NOTE — PLAN OF CARE
Problem: Adult Inpatient Plan of Care  Goal: Patient-Specific Goal (Individualized)  Outcome: Progressing     Problem: UTI (Urinary Tract Infection)  Goal: Improved Infection Symptoms  Outcome: Progressing     Problem: Fall Injury Risk  Goal: Absence of Fall and Fall-Related Injury  Outcome: Progressing     Problem: Confusion Acute  Goal: Optimal Cognitive Function  Outcome: Progressing     Problem: Comorbidity Management  Goal: Blood Pressure in Desired Range  Outcome: Progressing     Problem: Skin Injury Risk Increased  Goal: Skin Health and Integrity  Outcome: Progressing     Disoriented to time. Medications administered per MAR. Safety precautions maintained. Call bell within reach. Tele monitored.

## 2025-06-26 NOTE — PROGRESS NOTES
[facility] note    Full consult to follow. On piptazo and vancomycin    CONS in one set BCX.  UCX in process    Would stop vancomycin and follow with piptazo    Please call if any questions   Chris Funes  [facility]  [phone]

## 2025-06-26 NOTE — PT/OT/SLP PROGRESS
Physical Therapy Treatment    Patient Name:  Steph Monroe   MRN:  [id number]    Recommendations:     Discharge Recommendations: Low Intensity Therapy  Discharge Equipment Recommendations: none  Barriers to discharge: decreased mobility,strength and endurance    Assessment:     Steph Monroe is a 79 y.o. female admitted with a medical diagnosis of Altered mental status.  She presents with the following impairments/functional limitations: weakness, impaired endurance, impaired functional mobility, gait instability, impaired balance, decreased lower extremity function, decreased ROM, impaired coordination, impaired skin,pt with good participation and requires assistance with all mobility at this time,pt will benefit from low intensity therapy upon discharge.    Rehab Prognosis: Good; patient would benefit from acute skilled PT services to address these deficits and reach maximum level of function.    Recent Surgery: * No surgery found *      Plan:     During this hospitalization, patient to be seen 3 x/week to address the identified rehab impairments via therapeutic activities, gait training, therapeutic exercises, neuromuscular re-education and progress toward the following goals:    Plan of Care Expires:  07/25/25    Subjective     Chief Complaint: n/a  Patient/Family Comments/goals: pt agreeable to rx.  Pain/Comfort:  Pain Rating 1: 0/10      Objective:     Communicated with nsg prior to session.  Patient found supine with bed alarm, PureWick, peripheral IV, oxygen upon PT entry to room.     General Precautions: Standard, fall  Orthopedic Precautions: N/A  Braces: N/A  Respiratory Status: supplemental O2     Functional Mobility:  Bed Mobility:     Supine to Sit: minimum assistance  Sit to Supine: moderate assistance  Transfers:     Sit to Stand:  minimum assistance with rolling walker  Gait: amb ~14' with RW and Min A with lines in tow  Balance: fair standing balance with RW      AM-PAC 6 CLICK  MOBILITY  Turning over in bed (including adjusting bedclothes, sheets and blankets)?: 3  Sitting down on and standing up from a chair with arms (e.g., wheelchair, bedside commode, etc.): 3  Moving from lying on back to sitting on the side of the bed?: 3  Moving to and from a bed to a chair (including a wheelchair)?: 3  Need to walk in hospital room?: 3  Climbing 3-5 steps with a railing?: 2  Basic Mobility Total Score: 17       Treatment & Education: pt had a liquid BM during ambulation,pt back supine,nsg and pct notified,cleaned up soiled area on floor.      Patient left supine with all lines intact, call button in reach, bed alarm on, and nsg and pct notified..    GOALS: see general POC  Multidisciplinary Problems       Physical Therapy Goals          Problem: Physical Therapy    Goal Priority Disciplines Outcome Interventions   Physical Therapy Goal     PT, PT/OT Progressing    Description: Goals to be met by: 25     Patient will increase functional independence with mobility by performin. Supine to sit with Stand-by Assistance  2. Sit to supine with Stand-by Assistance  3. Sit to stand transfer with Stand-by Assistance  4. Bed to chair transfer with Stand-by Assistance using Rolling Walker  5. Gait  x 100 feet with Stand-by Assistance using Rolling Walker.   6. Ascend/Descend 4 inch curb step with Stand-by Assistance using Rolling Walker.                         DME Justifications:  No DME recommended requiring DME justifications    Time Tracking:     PT Received On: 25  PT Start Time: 906     PT Stop Time: 931  PT Total Time (min): 25 min     Billable Minutes: Gait Training 12 and Therapeutic Activity 13    Treatment Type: Treatment  PT/PTA: PTA     Number of PTA visits since last PT visit: 2025

## 2025-06-26 NOTE — PLAN OF CARE
Problem: Physical Therapy  Goal: Physical Therapy Goal  Description: Goals to be met by: 25     Patient will increase functional independence with mobility by performin. Supine to sit with Stand-by Assistance  2. Sit to supine with Stand-by Assistance  3. Sit to stand transfer with Stand-by Assistance  4. Bed to chair transfer with Stand-by Assistance using Rolling Walker  5. Gait  x 100 feet with Stand-by Assistance using Rolling Walker.   6. Ascend/Descend 4 inch curb step with Stand-by Assistance using Rolling Walker.    Outcome: Progressing

## 2025-06-26 NOTE — PLAN OF CARE
Problem: Adult Inpatient Plan of Care  Goal: Plan of Care Review  Outcome: Progressing  Goal: Absence of Hospital-Acquired Illness or Injury  Outcome: Progressing  Goal: Readiness for Transition of Care  Outcome: Progressing     Problem: Infection  Goal: Absence of Infection Signs and Symptoms  Outcome: Progressing     Problem: Fall Injury Risk  Goal: Absence of Fall and Fall-Related Injury  Outcome: Progressing     Problem: Confusion Acute  Goal: Optimal Cognitive Function  Outcome: Progressing     Problem: Fatigue  Goal: Improved Activity Tolerance  Outcome: Progressing     Problem: Skin Injury Risk Increased  Goal: Skin Health and Integrity  Outcome: Progressing

## 2025-06-26 NOTE — PROGRESS NOTES
Pharmacokinetic Assessment Follow Up: IV Vancomycin    Vancomycin serum concentration assessment(s):    The random level was drawn correctly and can be used to guide therapy at this time. The measurement is within the desired definitive target range of 15 to 20 mcg/mL.    Vancomycin Regimen Plan:     Vancomycin 1000 mg IV x 1 with next serum vancomycin random concentration measured at 1400 prior to next dose on 06/26/25    Drug levels (last 3 results):  Recent Labs   Lab Result Units 06/25/25  1630   Vancomycin Random ug/ml 19.1       Pharmacy will continue to follow and monitor vancomycin.    Please contact pharmacy at extension [phone] for questions regarding this assessment.    Thank you for the consult,   Shwetha Capellan       Patient brief summary:  Steph Monroe is a 79 y.o. female initiated on antimicrobial therapy with IV Vancomycin for treatment of bacteremia    The patient's current regimen is pulse dosing    Drug Allergies:   Review of patient's allergies indicates:   Allergen Reactions    Chantix [varenicline]      Tongue swelling      Wellbutrin [bupropion hcl]      Tongue swelling         Actual Body Weight:   81.6 kg    Renal Function:   Estimated Creatinine Clearance: 17.3 mL/min (A) (based on SCr of 2.7 mg/dL (H)).,     Dialysis Method (if applicable):  N/A    CBC (last 72 hours):  Recent Labs   Lab Result Units 06/24/25  1137 06/25/25  0441 06/25/25  1630   WBC K/uL 13.21*  --  8.03   HGB gm/dL 12.1  --  12.2   Hemoglobin A1c %  --  6.6*  --    HCT % 38.0  --  38.5   Platelet Count K/uL 84*  --  80*   Lymph % % 3.0*  --  7.2*   Mono % % 5.8  --  7.7   Eos % % 0.0  --  0.2   Basophil % % 0.2  --  0.1       Metabolic Panel (last 72 hours):  Recent Labs   Lab Result Units 06/24/25  1137 06/24/25  1603 06/24/25  1645 06/25/25  0512   Sodium mmol/L 140  --   --  139   Urine Sodium mmol/L  --   --  <20*  --    Potassium mmol/L 5.0  --   --  4.9   Chloride mmol/L 113*  --   --  114*   CO2 mmol/L  15*  --   --  18*   Glucose mg/dL 159*  --   --  113*   Glucose, UA   --  Negative  --   --    BUN mg/dL 44*  --   --  46*   Creatinine mg/dL 2.7*  --   --  2.7*   Urine Creatinine mg/dL  --   --  157.0  --    Albumin g/dL 2.8*  --   --  2.6*   Bilirubin Total mg/dL 2.2*  --   --  2.2*   ALP unit/L 78  --   --  69   AST unit/L 35  --   --  33   ALT unit/L 14  --   --  13   Magnesium  mg/dL  --   --   --  1.9       Vancomycin Administrations:  vancomycin given in the last 96 hours                     vancomycin 1750 mg in 0.9% sodium chloride 500 mL IVPB (mg) 1,750 mg New Bag 06/24/25 1415                    Microbiologic Results:  Microbiology Results (last 7 days)       Procedure Component Value Units Date/Time    Blood Culture #1 **CANNOT BE ORDERED STAT** [[id number]]  (Normal) Collected: 06/24/25 1327    Order Status: Completed Specimen: Blood from Peripheral, Antecubital, Right Updated: 06/25/25 1601     Blood Culture No Growth After 6 Hours    Blood Culture #2 **CANNOT BE ORDERED STAT** [[id number]]  (Normal) Collected: 06/24/25 1327    Order Status: Completed Specimen: Blood from Peripheral, Antecubital, Left Updated: 06/25/25 1601     Blood Culture No Growth After 6 Hours    MRSA Screen by PCR [[id number]]  (Normal) Collected: 06/24/25 2203    Order Status: Completed Specimen: Nasal Swab Updated: 06/25/25 0900     MRSA PCR Screen Not Detected    Urine culture [[id number]] Collected: 06/24/25 1603    Order Status: Sent Specimen: Urine Updated: 06/24/25 1620

## 2025-06-27 VITALS
TEMPERATURE: 97 F | WEIGHT: 184.75 LBS | HEIGHT: 62 IN | BODY MASS INDEX: 34 KG/M2 | HEART RATE: 65 BPM | DIASTOLIC BLOOD PRESSURE: 83 MMHG | OXYGEN SATURATION: 93 % | RESPIRATION RATE: 18 BRPM | SYSTOLIC BLOOD PRESSURE: 148 MMHG

## 2025-06-27 PROBLEM — R41.82 ALTERED MENTAL STATUS: Status: RESOLVED | Noted: 2025-06-24 | Resolved: 2025-06-27

## 2025-06-27 PROBLEM — A41.9 SEPSIS: Status: RESOLVED | Noted: 2025-06-25 | Resolved: 2025-06-27

## 2025-06-27 PROBLEM — D72.829 LEUKOCYTOSIS: Status: RESOLVED | Noted: 2025-06-24 | Resolved: 2025-06-27

## 2025-06-27 LAB
ABSOLUTE EOSINOPHIL (OHS): 0.04 K/UL
ABSOLUTE MONOCYTE (OHS): 0.5 K/UL (ref 0.3–1)
ABSOLUTE NEUTROPHIL COUNT (OHS): 5.96 K/UL (ref 1.8–7.7)
ALBUMIN SERPL BCP-MCNC: 2.4 G/DL (ref 3.5–5.2)
ALP SERPL-CCNC: 63 UNIT/L (ref 40–150)
ALT SERPL W/O P-5'-P-CCNC: 14 UNIT/L (ref 10–44)
ANION GAP (OHS): 11 MMOL/L (ref 8–16)
AST SERPL-CCNC: 23 UNIT/L (ref 11–45)
BASOPHILS # BLD AUTO: 0.02 K/UL
BASOPHILS NFR BLD AUTO: 0.3 %
BILIRUB SERPL-MCNC: 1.5 MG/DL (ref 0.1–1)
BUN SERPL-MCNC: 57 MG/DL (ref 8–23)
CALCIUM SERPL-MCNC: 8.2 MG/DL (ref 8.7–10.5)
CHLORIDE SERPL-SCNC: 109 MMOL/L (ref 95–110)
CO2 SERPL-SCNC: 17 MMOL/L (ref 23–29)
CREAT SERPL-MCNC: 3.5 MG/DL (ref 0.5–1.4)
ERYTHROCYTE [DISTWIDTH] IN BLOOD BY AUTOMATED COUNT: 16.7 % (ref 11.5–14.5)
GFR SERPLBLD CREATININE-BSD FMLA CKD-EPI: 13 ML/MIN/1.73/M2
GLUCOSE SERPL-MCNC: 118 MG/DL (ref 70–110)
HCT VFR BLD AUTO: 37 % (ref 37–48.5)
HGB BLD-MCNC: 11.6 GM/DL (ref 12–16)
IMM GRANULOCYTES # BLD AUTO: 0.02 K/UL (ref 0–0.04)
IMM GRANULOCYTES NFR BLD AUTO: 0.3 % (ref 0–0.5)
LYMPHOCYTES # BLD AUTO: 0.43 K/UL (ref 1–4.8)
MAGNESIUM SERPL-MCNC: 2.4 MG/DL (ref 1.6–2.6)
MCH RBC QN AUTO: 30.1 PG (ref 27–31)
MCHC RBC AUTO-ENTMCNC: 31.4 G/DL (ref 32–36)
MCV RBC AUTO: 96 FL (ref 82–98)
NUCLEATED RBC (/100WBC) (OHS): 0 /100 WBC
PLATELET # BLD AUTO: 89 K/UL (ref 150–450)
PLATELET BLD QL SMEAR: ABNORMAL
PMV BLD AUTO: 12.1 FL (ref 9.2–12.9)
POTASSIUM SERPL-SCNC: 4.8 MMOL/L (ref 3.5–5.1)
PROT SERPL-MCNC: 6.5 GM/DL (ref 6–8.4)
RBC # BLD AUTO: 3.85 M/UL (ref 4–5.4)
RELATIVE EOSINOPHIL (OHS): 0.6 %
RELATIVE LYMPHOCYTE (OHS): 6.2 % (ref 18–48)
RELATIVE MONOCYTE (OHS): 7.2 % (ref 4–15)
RELATIVE NEUTROPHIL (OHS): 85.4 % (ref 38–73)
SODIUM SERPL-SCNC: 137 MMOL/L (ref 136–145)
WBC # BLD AUTO: 6.97 K/UL (ref 3.9–12.7)

## 2025-06-27 PROCEDURE — 99900035 HC TECH TIME PER 15 MIN (STAT)

## 2025-06-27 PROCEDURE — 36415 COLL VENOUS BLD VENIPUNCTURE: CPT

## 2025-06-27 PROCEDURE — 92526 ORAL FUNCTION THERAPY: CPT

## 2025-06-27 PROCEDURE — 94761 N-INVAS EAR/PLS OXIMETRY MLT: CPT

## 2025-06-27 PROCEDURE — 80053 COMPREHEN METABOLIC PANEL: CPT

## 2025-06-27 PROCEDURE — 85025 COMPLETE CBC W/AUTO DIFF WBC: CPT

## 2025-06-27 PROCEDURE — 25000003 PHARM REV CODE 250: Performed by: INTERNAL MEDICINE

## 2025-06-27 PROCEDURE — 27000221 HC OXYGEN, UP TO 24 HOURS

## 2025-06-27 PROCEDURE — 63600175 PHARM REV CODE 636 W HCPCS: Performed by: INTERNAL MEDICINE

## 2025-06-27 PROCEDURE — 25000003 PHARM REV CODE 250

## 2025-06-27 PROCEDURE — 83735 ASSAY OF MAGNESIUM: CPT

## 2025-06-27 PROCEDURE — 97530 THERAPEUTIC ACTIVITIES: CPT | Mod: CQ

## 2025-06-27 RX ORDER — NIFEDIPINE 30 MG/1
30 TABLET, EXTENDED RELEASE ORAL DAILY
Qty: 30 TABLET | Refills: 11 | Status: SHIPPED | OUTPATIENT
Start: 2025-06-27 | End: 2026-06-27

## 2025-06-27 RX ORDER — AZITHROMYCIN 250 MG/1
500 TABLET, FILM COATED ORAL DAILY
Qty: 6 TABLET | Refills: 0 | Status: SHIPPED | OUTPATIENT
Start: 2025-06-27 | End: 2025-06-30

## 2025-06-27 RX ORDER — FUROSEMIDE 40 MG/1
40 TABLET ORAL DAILY
Qty: 30 TABLET | Refills: 11 | Status: SHIPPED | OUTPATIENT
Start: 2025-06-28 | End: 2026-06-28

## 2025-06-27 RX ORDER — SPIRONOLACTONE 100 MG/1
100 TABLET, FILM COATED ORAL DAILY
Qty: 30 TABLET | Refills: 11 | Status: SHIPPED | OUTPATIENT
Start: 2025-06-28 | End: 2026-06-28

## 2025-06-27 RX ORDER — CARVEDILOL 3.12 MG/1
3.12 TABLET ORAL 2 TIMES DAILY
Qty: 180 TABLET | Refills: 3 | Status: SHIPPED | OUTPATIENT
Start: 2025-06-27 | End: 2026-06-27

## 2025-06-27 RX ORDER — CIPROFLOXACIN 500 MG/1
250 TABLET, FILM COATED ORAL EVERY 12 HOURS
Qty: 2 TABLET | Refills: 0 | Status: SHIPPED | OUTPATIENT
Start: 2025-06-27 | End: 2025-06-29

## 2025-06-27 RX ADMIN — SODIUM BICARBONATE 650 MG TABLET 650 MG: at 08:06

## 2025-06-27 RX ADMIN — NIFEDIPINE 30 MG: 30 TABLET, FILM COATED, EXTENDED RELEASE ORAL at 08:06

## 2025-06-27 RX ADMIN — CARVEDILOL 3.12 MG: 3.12 TABLET, FILM COATED ORAL at 08:06

## 2025-06-27 RX ADMIN — SPIRONOLACTONE 100 MG: 25 TABLET, FILM COATED ORAL at 08:06

## 2025-06-27 RX ADMIN — PIPERACILLIN SODIUM AND TAZOBACTAM SODIUM 4.5 G: 4; .5 INJECTION, POWDER, LYOPHILIZED, FOR SOLUTION INTRAVENOUS at 05:06

## 2025-06-27 RX ADMIN — MUPIROCIN: 20 OINTMENT TOPICAL at 08:06

## 2025-06-27 RX ADMIN — FUROSEMIDE 40 MG: 40 TABLET ORAL at 08:06

## 2025-06-27 NOTE — PLAN OF CARE
Problem: Adult Inpatient Plan of Care  Goal: Plan of Care Review  Outcome: Met  Goal: Patient-Specific Goal (Individualized)  Outcome: Met  Goal: Absence of Hospital-Acquired Illness or Injury  Outcome: Met  Goal: Optimal Comfort and Wellbeing  Outcome: Met  Goal: Readiness for Transition of Care  Outcome: Met     Problem: Diabetes Comorbidity  Goal: Blood Glucose Level Within Targeted Range  Outcome: Met     Problem: Infection  Goal: Absence of Infection Signs and Symptoms  Outcome: Met     Problem: UTI (Urinary Tract Infection)  Goal: Improved Infection Symptoms  Outcome: Met     Problem: Fall Injury Risk  Goal: Absence of Fall and Fall-Related Injury  Outcome: Met     Problem: Confusion Acute  Goal: Optimal Cognitive Function  Outcome: Met     Problem: Fatigue  Goal: Improved Activity Tolerance  Outcome: Met     Problem: Comorbidity Management  Goal: Blood Pressure in Desired Range  Outcome: Met     Problem: Skin Injury Risk Increased  Goal: Skin Health and Integrity  Outcome: Met     Problem: Wound  Goal: Optimal Coping  Outcome: Met  Goal: Optimal Functional Ability  Outcome: Met  Goal: Absence of Infection Signs and Symptoms  Outcome: Met  Goal: Improved Oral Intake  Outcome: Met  Goal: Optimal Pain Control and Function  Outcome: Met  Goal: Skin Health and Integrity  Outcome: Met  Goal: Optimal Wound Healing  Outcome: Met     Problem: Sepsis/Septic Shock  Goal: Optimal Coping  Outcome: Met  Goal: Absence of Bleeding  Outcome: Met  Goal: Blood Glucose Level Within Targeted Range  Outcome: Met  Goal: Absence of Infection Signs and Symptoms  Outcome: Met  Goal: Optimal Nutrition Intake  Outcome: Met     Problem: SLP  Goal: SLP Goal  Description: Short Term Goals:  1. Pt will consume soft diet and thin liquids with no audible dysphagia signs.   Outcome: Met     Problem: Physical Therapy  Goal: Physical Therapy Goal  Description: Goals to be met by: 7/25/25     Patient will increase functional independence with  mobility by performin. Supine to sit with Stand-by Assistance  2. Sit to supine with Stand-by Assistance  3. Sit to stand transfer with Stand-by Assistance  4. Bed to chair transfer with Stand-by Assistance using Rolling Walker  5. Gait  x 100 feet with Stand-by Assistance using Rolling Walker.   6. Ascend/Descend 4 inch curb step with Stand-by Assistance using Rolling Walker.    Outcome: Met     Problem: Occupational Therapy  Goal: Occupational Therapy Goal  Description: Goals to be met by: 2025     Patient will increase functional independence with ADLs by performing:    UE Dressing with Set-up Assistance.  LE Dressing with Stand-by Assistance.  Grooming while standing with Supervision.  Toileting from toilet with Supervision for hygiene and clothing management.   Step transfer with Supervision  Toilet transfer to toilet with Supervision.    Outcome: Met

## 2025-06-27 NOTE — PT/OT/SLP PROGRESS
Physical Therapy Treatment    Patient Name:  Steph Monroe   MRN:  [id number]    Recommendations:     Discharge Recommendations: Low Intensity Therapy  Discharge Equipment Recommendations: none  Barriers to discharge: decreased mobility,strength and endurance    Assessment:     Steph Monroe is a 79 y.o. female admitted with a medical diagnosis of Altered mental status.  She presents with the following impairments/functional limitations: weakness, impaired endurance, impaired functional mobility, gait instability, impaired balance, decreased lower extremity function, impaired coordination, impaired skin,pt with improving status ane will be discharged home today with  services.    Rehab Prognosis: Good; patient would benefit from acute skilled PT services to address these deficits and reach maximum level of function.    Recent Surgery: * No surgery found *      Plan:     During this hospitalization, patient to be seen 3 x/week to address the identified rehab impairments via gait training, therapeutic activities, therapeutic exercises, neuromuscular re-education and progress toward the following goals:    Plan of Care Expires:  07/25/25    Subjective     Chief Complaint: n/a  Patient/Family Comments/goals: pt is going home.  Pain/Comfort:  Pain Rating 1: 0/10      Objective:     Communicated with nsg prior to session.  Patient found supine with bed alarm, oxygen, PureWick upon PT entry to room.     General Precautions: Standard, fall  Orthopedic Precautions: N/A  Braces: N/A  Respiratory Status: Room air     Functional Mobility:  Gait: n/a      AM-PAC 6 CLICK MOBILITY  Turning over in bed (including adjusting bedclothes, sheets and blankets)?: 3  Sitting down on and standing up from a chair with arms (e.g., wheelchair, bedside commode, etc.): 3  Moving from lying on back to sitting on the side of the bed?: 3  Moving to and from a bed to a chair (including a wheelchair)?: 3  Need to walk in hospital room?:  3  Climbing 3-5 steps with a railing?: 3  Basic Mobility Total Score: 18       Treatment & Education: issued pt HEP and reviewed pt safety.      Patient left supine with all lines intact, call button in reach, and nsg and daughter present..    GOALS: see general POC  Multidisciplinary Problems       Physical Therapy Goals          Problem: Physical Therapy    Goal Priority Disciplines Outcome Interventions   Physical Therapy Goal     PT, PT/OT Progressing    Description: Goals to be met by: 25     Patient will increase functional independence with mobility by performin. Supine to sit with Stand-by Assistance  2. Sit to supine with Stand-by Assistance  3. Sit to stand transfer with Stand-by Assistance  4. Bed to chair transfer with Stand-by Assistance using Rolling Walker  5. Gait  x 100 feet with Stand-by Assistance using Rolling Walker.   6. Ascend/Descend 4 inch curb step with Stand-by Assistance using Rolling Walker.                         DME Justifications:  No DME recommended requiring DME justifications    Time Tracking:     PT Received On: 25  PT Start Time: 1308     PT Stop Time: 1320  PT Total Time (min): 12 min     Billable Minutes: Therapeutic Activity 12    Treatment Type: Treatment  PT/PTA: PTA     Number of PTA visits since last PT visit: 2     2025

## 2025-06-27 NOTE — PT/OT/SLP PROGRESS
"Speech Language Pathology Treatment    Patient Name:  Steph Monroe   MRN:  [id number]  Admitting Diagnosis: Altered mental status    Recommendations:                 General Recommendations:  Follow-up not indicated  Diet recommendations:  Regular, ETC diet  Aspiration Precautions:  , 1 bite/sip at a time, Alternating bites/sips, Avoid talking while eating, Double swallow with each bite/sip, Frequent oral care, HOB to 90 degrees, Meds crushed in puree, Remain upright 30 minutes post meal, and Small bites/sips   General Precautions: Standard, fall  Communication strategies:  none  Discharge recommendations:  Low Intensity Therapy (24/7 fam assist)   Barriers to Discharge:  None    Assessment:     Steph Monroe is a 79 y.o. female admitted with AMS and fall who presents with ability to continue on current diet level.     Subjective     Pt in bed awake and alert upon entry. Pt in good spirits  Patient goals: "I'm going home today"     Pain/Comfort:  Pain Rating 1: 0/10    Respiratory Status: Room air    Objective:     Has the patient been evaluated by SLP for swallowing?   Yes  Keep patient NPO? No   Current Respiratory Status:        Cognition/Communication: Upon entry, pt excited and stated, "I'm going home today!"Pt then told st about her 2 dtr's and how they both work here at [facility] [employer]. Pt then stated that she will live with her dtr at home and pt does not cook or pay bills indly. Pt stated that her dtr assist with her ASL's. Pt was able to express all wants and needs. Pt appears to be at her cognitive baseline.     Swallowing: Pt appears to be tolerating upgraded diet. Pt reported no difficulty and pt reports no overt s/s of aspiration. Pt deferred po trials this date. Pt is safe to continue Regular, Easy to Chew po diet with THIN liquids following standard swallow precautions:  -meds- whole 1 at a time  -1:1 assist with all po intake  -SMALL bites/sips  -alternate bites/sips  -1 bite/sip at a time  -HOB " to 90 degrees during all po intake  -remain upright for 30 min s/p meals              Goals:   Multidisciplinary Problems       SLP Goals          Problem: SLP    Goal Priority Disciplines Outcome   SLP Goal     SLP Progressing   Description: Short Term Goals:  1. Pt will consume soft diet and thin liquids with no audible dysphagia signs.                        Plan:     Patient to be seen:  2 x/week   Plan of Care expires:  07/24/25  Plan of Care reviewed with:  patient, family   SLP Follow-Up:  No       Time Tracking:     SLP Treatment Date:   06/27/25  Speech Start Time:  1052  Speech Stop Time:  1107     Speech Total Time (min):  15 min    Billable Minutes: Treatment Swallowing Dysfunction 15    06/27/2025

## 2025-06-27 NOTE — NURSING
Assumed care of patient from ALVARO Graham, patient is AAOX3, disoriented to time, 0.5 LNC, port a cath - not accessed, bilateral forearm bruising and skin tears, foam applied on bilateral arms,no current c/o pain, purewick in place, SBAX1 if needed, redness in groin area protective cream applied, risk monitor camera in room, SCD's refused, Q2 turns, meds one at a time, all safety precautions are in place, call bell and tray table are within reach of the patient and no additional questions or concerns from the patient at this time.

## 2025-06-27 NOTE — PROGRESS NOTES
[facility]      HOME HEALTH ORDERS  FACE TO FACE ENCOUNTER    Patient Name: Steph Monroe  YOB: 1945    PCP: Mane Carmona MD   PCP Address: [address] [place] [facility]  PCP Phone Number: [phone]  PCP Fax: [phone]    Encounter Date: 6/24/25    Admit to Home Health    Diagnoses:  Active Hospital Problems    Diagnosis  POA    *Altered mental status [R41.82]  Unknown    Sepsis [A41.9]  Unknown    Leukocytosis [D72.829]  Unknown    CKD (chronic kidney disease) [N18.9]  Unknown    Ascites [R18.8]  Unknown    Cirrhosis [K74.60]  Yes      Resolved Hospital Problems   No resolved problems to display.       Follow Up Appointments:  Future Appointments   Date Time Provider Department Center   9/8/2025 10:30 AM Sherrell Perez MD [facility] [place]       Allergies:  Review of patient's allergies indicates:   Allergen Reactions    Chantix [varenicline]      Tongue swelling      Wellbutrin [bupropion hcl]      Tongue swelling         Medications: Review discharge medications with patient and family and provide education.    Current Medications[1]     Medication List        ASK your doctor about these medications      atorvastatin 80 MG tablet  Commonly known as: LIPITOR  Take 1 tablet (80 mg total) by mouth once daily.     NANDA CHEWABLE ASPIRIN 81 mg Chew  Generic drug: aspirin  Take 1 tablet (81 mg total) by mouth once daily.     carvediloL 25 MG tablet  Commonly known as: COREG  Take 1 tablet by mouth 2 (two) times daily.     clopidogreL 75 mg tablet  Commonly known as: PLAVIX  Take 75 mg by mouth once daily.     docusate sodium 100 MG capsule  Commonly known as: COLACE  Take 1 capsule (100 mg total) by mouth 2 (two) times daily.     ezetimibe 10 mg tablet  Commonly known as: ZETIA  Take 10 mg by mouth once daily.     furosemide 20 MG tablet  Commonly known as: LASIX  Take 20 mg by mouth once daily.     NIFEdipine 30 MG (OSM) 24 hr tablet  Commonly known as:  PROCARDIA-XL  Take 1 tablet (30 mg total) by mouth once daily.     sodium bicarbonate 650 MG tablet  Take 1 tablet (650 mg total) by mouth 3 (three) times daily.                I have seen and examined this patient within the last 30 days. My clinical findings that support the need for the home health skilled services and home bound status are the following:no   Medical restrictions requiring assistance of another human to leave home due to  Unstable ambulation.     Diet:   cardiac diet    Labs:  Report Lab results to PCP.    Referrals/ Consults  Physical Therapy to evaluate and treat. Evaluate for home safety and equipment needs; Establish/upgrade home exercise program. Perform / instruct on therapeutic exercises, gait training, transfer training, and Range of Motion.  Occupational Therapy to evaluate and treat. Evaluate home environment for safety and equipment needs. Perform/Instruct on transfers, ADL training, ROM, and therapeutic exercises.    Activities:   activity as tolerated    Nursing:   Agency to admit patient within 24 hours of hospital discharge unless specified on physician order or at patient request    SN to complete comprehensive assessment including routine vital signs. Instruct on disease process and s/s of complications to report to MD. Review/verify medication list sent home with the patient at time of discharge  and instruct patient/caregiver as needed. Frequency may be adjusted depending on start of care date.     Skilled nurse to perform up to 3 visits PRN for symptoms related to diagnosis    Notify MD if SBP > 160 or < 90; DBP > 90 or < 50; HR > 120 or < 50; Temp > 101; O2 < 88%    Ok to schedule additional visits based on staff availability and patient request on consecutive days within the home health episode.    When multiple disciplines ordered:    Start of Care occurs on Sunday - Wednesday schedule remaining discipline evaluations as ordered on separate consecutive days following the  start of care.    Thursday SOC -schedule subsequent evaluations Friday and Monday the following week.     Friday - Saturday SOC - schedule subsequent discipline evaluations on consecutive days starting Monday of the following week.    For all post-discharge communication and subsequent orders please contact patient's primary care physician. If unable to reach primary care physician or do not receive response within 30 minutes, please contact clinical staff order clarification    Miscellaneous   None    Home Health Aide:  Physical Therapy Three times weekly and Occupational Therapy Three times weekly    Wound Care Orders  no    I certify that this patient is confined to her home and needs physical therapy and occupational therapy.    Laci Cho MD  [facility] Internal Medicine, HO-II           [1]   Current Facility-Administered Medications   Medication Dose Route Frequency Provider Last Rate Last Admin    0.9%  NaCl infusion (for blood administration)   Intravenous Q24H PRN Laci Cho MD        carvediloL tablet 3.125 mg  3.125 mg Oral BID Laci Cho MD   3.125 mg at 06/26/25 2221    dextrose 50% injection 12.5 g  12.5 g Intravenous PRN Laci Cho MD        dextrose 50% injection 25 g  25 g Intravenous PRN Laci Cho MD        enoxaparin injection 30 mg  30 mg Subcutaneous Daily Vern Ledezma MD   30 mg at 06/26/25 1815    furosemide tablet 40 mg  40 mg Oral Daily Laci Cho MD   40 mg at 06/26/25 1019    glucagon (human recombinant) injection 1 mg  1 mg Intramuscular PRN Laci Cho MD        glucose chewable tablet 16 g  16 g Oral PRN Laci Cho MD        glucose chewable tablet 24 g  24 g Oral PRN Laci Cho MD        mupirocin 2 % ointment   Nasal BID Vern Ledezma MD   Given at 06/26/25 2221    naloxone 0.4 mg/mL injection 0.02 mg  0.02 mg Intravenous PRN Laci Cho MD        NIFEdipine 24 hr tablet 30 mg  30 mg Oral Daily Laci Cho MD   30 mg at 06/26/25 1019     piperacillin-tazobactam (ZOSYN) 4.5 g in D5W 100 mL IVPB (MB+)  4.5 g Intravenous Q12H Vern Ledezma MD 25 mL/hr at 06/27/25 0521 Rate Verify at 06/27/25 0521    sodium bicarbonate tablet 650 mg  650 mg Oral TID Laci Cho MD   650 mg at 06/26/25 2221    sodium chloride 0.9% flush 10 mL  10 mL Intravenous Q12H PRN Laci Cho MD        spironolactone tablet 100 mg  100 mg Oral Daily Laci Cho MD   100 mg at 06/26/25 1019

## 2025-06-27 NOTE — PT/OT/SLP PROGRESS
Occupational Therapy      Patient Name:  Steph Monroe   MRN:  [id number]    Attempts made 1:38 PM and 3:00 PM Patient not seen today secondary to  (MDs rounding at bedside upon first attempt, discharged upon subsequent attempt). Will follow-up as able.    6/27/2025

## 2025-06-27 NOTE — PROGRESS NOTES
Discharge orders noted. Additional clinical references attached. Patient's discharge instructions given by bedside RN and reviewed via this VN.  Education provided on new and previous medications, diagnosis, and follow-up appointments.  New medications delivered by pharmacy. Patient verbalized understanding and teach back method was used. Patient's ride/transportation home at bedside. All questions answered. Transport to [facility] requested. Floor nurse notified.              06/27/25 1413    Notification    Notified Of Discharge Status   Admission   Communication Issues? None   Shift   Virtual Nurse - Rounding Complete   Virtual Nurse - Patient Verbalized Approval Of Camera Use   Safety/Activity   Patient Rounds bed in low position;call light in patient/parent reach;clutter free environment maintained;visualized patient   Safety Promotion/Fall Prevention side rails raised x 2;family to remain at bedside   Activity Management Up in chair - L3

## 2025-06-27 NOTE — NURSING
New Results - Micro  Sorted by update time  Updated   Order  06/26/25 2201  Blood Culture #1 **CANNOT BE ORDERED STAT**  Collected: 06/24/25 1327  Preliminary result  Specimen: Blood from Peripheral, Antecubital, Right       Blood Culture No Growth After 36 Hours P

## 2025-06-27 NOTE — PLAN OF CARE
Problem: Adult Inpatient Plan of Care  Goal: Plan of Care Review  Outcome: Progressing  Goal: Patient-Specific Goal (Individualized)  Outcome: Progressing  Goal: Absence of Hospital-Acquired Illness or Injury  Outcome: Progressing  Goal: Optimal Comfort and Wellbeing  Outcome: Progressing  Goal: Readiness for Transition of Care  Outcome: Progressing     Problem: Diabetes Comorbidity  Goal: Blood Glucose Level Within Targeted Range  Outcome: Progressing     Problem: Infection  Goal: Absence of Infection Signs and Symptoms  Outcome: Progressing     Problem: UTI (Urinary Tract Infection)  Goal: Improved Infection Symptoms  Outcome: Progressing     Problem: Fall Injury Risk  Goal: Absence of Fall and Fall-Related Injury  Outcome: Progressing     Problem: Confusion Acute  Goal: Optimal Cognitive Function  Outcome: Progressing     Problem: Fatigue  Goal: Improved Activity Tolerance  Outcome: Progressing     Problem: Comorbidity Management  Goal: Blood Pressure in Desired Range  Outcome: Progressing     Problem: Skin Injury Risk Increased  Goal: Skin Health and Integrity  Outcome: Progressing     Problem: Wound  Goal: Optimal Coping  Outcome: Progressing  Goal: Optimal Functional Ability  Outcome: Progressing  Goal: Absence of Infection Signs and Symptoms  Outcome: Progressing  Goal: Improved Oral Intake  Outcome: Progressing  Goal: Optimal Pain Control and Function  Outcome: Progressing  Goal: Skin Health and Integrity  Outcome: Progressing  Goal: Optimal Wound Healing  Outcome: Progressing     Problem: Sepsis/Septic Shock  Goal: Optimal Coping  Outcome: Progressing  Goal: Absence of Bleeding  Outcome: Progressing  Goal: Blood Glucose Level Within Targeted Range  Outcome: Progressing  Goal: Absence of Infection Signs and Symptoms  Outcome: Progressing  Goal: Optimal Nutrition Intake  Outcome: Progressing

## 2025-06-27 NOTE — PLAN OF CARE
D/c orders noted.     SW met with pt to discuss d/c plans. Pt's family was at bedside. Pt stated she feels good and is ready to discharge home. Pt is agreeable to discharge home with  services. Pt's family will provide transportation home following discharge.     Pt is cleared to go from CM standpoint.     Future Appointments   Date Time Provider Department Center   9/8/2025 10:30 AM Sherrell Perez MD [facility]         06/27/25 1202   Final Note   Assessment Type Final Discharge Note   Anticipated Discharge Disposition Home   What phone number can be called within the next 1-3 days to see how you are doing after discharge? [phone]   Post-Acute Status   Post-Acute Authorization Home Health   Discharge Delays None known at this time

## 2025-06-27 NOTE — PROGRESS NOTES
[facility] Medicine Progress Note    Primary Team: [facility] Hospitalist Team B  Attending Physician: Dr. Pleitez  Resident: Marquis  Intern: Max    Subjective:      Patient sleeping this morning. NAEON. Urine culture with no growth and repeat blood cultures with no growth. ID folowing     Objective:     Last 24 Hour Vital Signs:  BP  Min: 121/76  Max: 166/83  Temp  Av.1 °F (36.2 °C)  Min: 96.1 °F (35.6 °C)  Max: 97.5 °F (36.4 °C)  Pulse  Av.5  Min: 69  Max: 79  Resp  Avg: 15.3  Min: 6  Max: 18  SpO2  Av.1 %  Min: 91 %  Max: 96 %  Weight  Av.8 kg (184 lb 11.9 oz)  Min: 83.8 kg (184 lb 11.9 oz)  Max: 83.8 kg (184 lb 11.9 oz)  I/O last 3 completed shifts:  In: 824.4 [P.O.:120; I.V.:48; IV Piggyback:656.4]  Out: 1250 [Urine:1250]    Physical Examination:  Gen: sleeping  HEENT: head normocephalic, atraumatic  Cardiac: regular rate and rhythm; no murmurs, rubs, or gallops  Resp: clear to auscultation bilaterally, normal work of breathing    Laboratory:  Laboratory Data Reviewed: yes  Pertinent Findings:  Cr- 3.5    Microbiology Data Reviewed: yes  Pertinent Findings:  Blood cultures 2/4 GPC  Repeat BC without growth  Urine cultures showing no growth    Other Results:  EKG (my interpretation): no new EKG    Radiology Data Reviewed: yes  Pertinent Findings:  CXR 25  FINDINGS:  Lines and tubes: Right chest wall Port-A-Cath.     Heart and mediastinum: The left heart border is obscured.  Calcifications of the thoracic aorta.     Pleura: Small-moderate left pleural effusion.  Possible trace right pleural effusion.  No pneumothorax.     Lungs: Lung volumes are moderate.  No convincing pulmonary edema.  Patchy opacities in the left lung base, possibly atelectasis in the setting of an effusion although aspiration and infection could appear similar.  No focal consolidation on the right.     Soft tissue/bone: Unremarkable.    Current Medications:     Infusions:         Scheduled:   carvediloL  3.125 mg Oral  BID    enoxparin  30 mg Subcutaneous Daily    furosemide  40 mg Oral Daily    mupirocin   Nasal BID    NIFEdipine  30 mg Oral Daily    piperacillin-tazobactam (Zosyn) IV (PEDS and ADULTS) (extended infusion is not appropriate)  4.5 g Intravenous Q12H    sodium bicarbonate  650 mg Oral TID    spironolactone  100 mg Oral Daily        PRN:    Current Facility-Administered Medications:     0.9%  NaCl infusion (for blood administration), , Intravenous, Q24H PRN    dextrose 50%, 12.5 g, Intravenous, PRN    dextrose 50%, 25 g, Intravenous, PRN    glucagon (human recombinant), 1 mg, Intramuscular, PRN    glucose, 16 g, Oral, PRN    glucose, 24 g, Oral, PRN    naloxone, 0.02 mg, Intravenous, PRN    sodium chloride 0.9%, 10 mL, Intravenous, Q12H PRN    Antibiotics and Day Number of Therapy:  Vanc- started 6/24  Zosyn- started 6/24      Assessment:     Steph Monroe is a 79 y.o.female with PMHx of CKD 4, HTN, HLD, PAD s/p R CFA endarterectomy and right iliac artery stenting, T2DM, and Anal cancer who presented for 2 weeks of AMS. Found to have UTI. Admitted to Medicine for IV antibiotics and infectious work up.       Plan:     Acute Encephalopathy  Likely secondary to UTI  Leukocytosis  - AMS for 2 weeks, confusion with time of day/year and unable to perform baseline activities due to weakness  - WBC 13.21, UA showing 2+ LE, 35 WBC, Moderate Bacteria  - Trop 0.158 and EKG showing TWI in anterior leads              - TWI resolved on repeat and with administration of fluids  - Ammonia 29, no evidence of asterxis; AxOx4  - Given Vanc and Zosyn in ED with quick recovery of mentation and overall appearance per daughter  Plan:  - Continue vanc and zosyn until sensitivities result  - ID consulted for advise on possible contamination   - Repeat BC without growth, discontinue vanc   - Will continue zosyn while hospitalized and transition to PO upon discharge  - Monitor on telemetry  - Nutrition consult     Decompensated  Cirrhosis  Thrombocytopenia  - 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, no history of Hep C, HIV, or Alcohol use  - Previously had taps done in the past, but had stopped prior to hospice  - Ascites on imaging with elevated PT/INR  - Ammonia 29, unlikely hepatic encephalopathy  - MELD 3.0 - 26  - Nutrition consulted  - Continue spironolactone/lasix, tolerating well  - Vanc/Zosyn as above, would also cover bleeding given reports blood in stool     NAGMA  Lactic acidosis, resolved   - Possibly due to recent episodes of diarrhea  - LA 4.3 initially, now normal  - Will continue to re-assess with IVF infusion     CAD  PHTN  - Previously on ASA and Plavix              - Holding at this time given low PLT count and elevated INR in setting of cirrhosis  - Denied SOB, CP  - EKG with TWI in II, II, aVF and trop elevated although possible elevation in the setting of CKD  - Repeat EKG with resolution following IVF  - Will continue to monitor     T2DM  - No current medications  - SSI and POCT glucose while inaptient  - A1c 6.6%  - Will discuss medication initiation at discharge    ALESIA on CKD 3  - Cr 2.9, baseline ~1.9  - Likely is progression of CKD  - CR 3.5 today, so would be indicative of ALESIA  - Will obtain urine studies  - Possibly related to Zosyn administration  - Still producing urine   - Will give small IV bolus      Normocytic Anemia  - Hgb 12.1, previous baseline of 7-8  - No acute sins of bleeding  - Iron studies showing EDDY  - IV iron ordered     HTN  - No outpatient medications (previously nifedipine and coreg)  - Given infection will continually re-assess  - BP now wnl    Dispo: pending completion of infectious work up; needs  PT set up  Diet: cardiac   DVT Ppx: lovenox  Code: DNCHARLES Cho MD  [facility] Internal Medicine HO-II    [facility] Medicine Hospitalist Pager numbers:   [facility] Hospitalist Medicine Team A (Librado/Ej): [phone]  [facility] Hospitalist Medicine Team B (Marilia/Brisa):  [phone]

## 2025-06-27 NOTE — CONSULTS
[facility]ious Diseases Consult Note    Primary Attending Physician: Vern Ledezma MD     Reason for Consult:     Abnormal urinalysis and positive blood culture for coagulase-negative staph    Assessment (see problem list below):     Steph Monroe is a 79 y.o. female with:  Abnormal urine.  Culture negative.  Currently on piperacillin tazobactam  Blood culture with 1 bottle positive for coag-negative staph.  Has received 1 dose of vancomycin.  Diabetes, hypertension, hyperlipidemia, anal cancer, CKD, Port-A-Cath placement, endarterectomy of the femoral artery, renal artery stenosis        Plan:     Discontinue vancomycin and piptazo  Going home with     Thank you for allowing us to participate in the care of this patient. Please contact me if you have any questions regarding this consult.    Chris Simmsoa  [facility]  Pager: [phone]    Subjective:      History of Present Illness:  Steph Monroe is a 79 y.o. female with diabetes, hypertension, hyperlipidemia, anal cancer, CKD, Port-A-Cath placement, endarterectomy of the femoral artery, renal artery stent presents with confusion mental status changes over the last several weeks    06/29/2023 patient was admitted with weakness and falls.  Patient had healing sacral decubitus and was very weak and deconditioned.  Patient was discharged on 07/03/2023 with home health and hospice.    Patient was seen for visual loss by retina specialist.  No documented intervention otherwise.    06/24/2025 patient seen in the emergency department after daughter noting confusion over the last several weeks.  She states that the patient has been having more bowel movements unusual as well as decreased ability to do ADLs.  More weak and fatigued and baseline.  Her abdomen has been increasingly more distended.  Upon evaluation, the patient was afebrile.  WBC 13.2.  Imaging showed bilateral effusions, mild ascites and hepatic duct dilatation.  Head CT was unremarkable.   Patient was empirically started on vancomycin and piperacillin tazobactam.  Blood cultures were obtained and 1 bottle is positive for coag-negative staph.  Urine culture no growth.  6/26/25 pt doing better. Going home with HH    Infectious disease was consulted for abnormal urinalysis and positive blood culture for coagulase-negative staph    Past Medical History:  Past Medical History:   Diagnosis Date    Cancer     rectum    CKD (chronic kidney disease)     Diabetes mellitus, type 2     Hyperlipidemia     Hypertension     Neutropenic fever 5/27/2016    PVD (peripheral vascular disease)        Past Surgical History:  Past Surgical History:   Procedure Laterality Date    ANGIOGRAM, LOWER ARTERIAL, UNILATERAL Right 5/5/2023    Procedure: Angiogram, Lower Arterial, Unilateral;  Surgeon: Remington Ribeiro MD;  Location: [facility] CATH LAB/EP;  Service: Cardiology;  Laterality: Right;    ANGIOGRAPHY OF LOWER EXTREMITY N/A 5/15/2023    Procedure: Angiogram Extremity Unilateral;  Surgeon: FERNANDO Cagle II, MD;  Location: [address];  Service: Vascular;  Laterality: N/A;  22.4 min  183.31 mGy  23.5353 Gy.cm  17ml Dye     COLONOSCOPY N/A 3/28/2016    Procedure: COLONOSCOPY;  Surgeon: Mitul Buitrago Jr., MD;  Location: [facility] ENDO;  Service: Endoscopy;  Laterality: N/A;    ENDARTERECTOMY OF FEMORAL ARTERY Right 5/9/2023    Procedure: ENDARTERECTOMY, FEMORAL;  Surgeon: FERNANDO Cagle II, MD;  Location: [address];  Service: Vascular;  Laterality: Right;  contrast 12ml  Flouro time 11.1 min  mgy 217.83  Gycm2 26.3570    EYE SURGERY Left     cataract removal with lens implant.    FEMORAL ARTERY STENT  before 2010    PERIPHERAL ANGIOGRAPHY N/A 5/3/2023    Procedure: Peripheral angiography;  Surgeon: Rasheeda Ny MD;  Location: [facility] CATH LAB/EP;  Service: Cardiology;  Laterality: N/A;    PTA, SUPERFICIAL FEMORAL ARTERY Right 5/15/2023    Procedure: PTA, SUPERFICIAL FEMORAL ARTERY;  Surgeon: FERNANDO Cagle II,  MD;  Location: [address];  Service: Vascular;  Laterality: Right;    RENAL ARTERY STENT  2010    STENT, ILIAC ARTERY Right 2023    Procedure: STENT, ILIAC ARTERY;  Surgeon: FERNANDO Cagle II, MD;  Location: [address];  Service: Vascular;  Laterality: Right;       Allergies:  Review of patient's allergies indicates:   Allergen Reactions    Chantix [varenicline]      Tongue swelling      Wellbutrin [bupropion hcl]      Tongue swelling         Medications:  Reviewed  Antibiotics  Piperacillin tazobactam  Vancomycin x1  Family History:  Family History   Problem Relation Name Age of Onset    Diabetes Father      Heart attack Mother      Hypertension Sister         Social History:  Social History[1]  ROS    Objective:   Last 24 Hour Vital Signs:  BP  Min: 124/64  Max: 170/84  Temp  Av.4 °F (36.3 °C)  Min: 96.1 °F (35.6 °C)  Max: 98.1 °F (36.7 °C)  Pulse  Av.9  Min: 69  Max: 79  Resp  Av.3  Min: 16  Max: 20  SpO2  Av.7 %  Min: 91 %  Max: 96 %  Weight  Av.8 kg (184 lb 11.9 oz)  Min: 83.8 kg (184 lb 11.9 oz)  Max: 83.8 kg (184 lb 11.9 oz)  I/O last 3 completed shifts:  In: 1716 [P.O.:480; I.V.:484.3; IV Piggyback:751.7]  Out: 1000 [Urine:1000]    Physical Exam  Vitals and nursing note reviewed.   Neurological:      Mental Status: She is alert.     Sitting in chair    Devices:  Port-A-Cath  Peripheral IV  Laboratory Results: reviewed  Most Recent Data:  CBC:   Lab Results   Component Value Date    WBC 7.17 2025    HGB 11.1 (L) 2025    HCT 34.2 (L) 2025    PLT 85 (L) 2025    MCV 95 2025    RDW 16.7 (H) 2025     BMP:   Lab Results   Component Value Date     2025    K 4.3 2025     (H) 2025    CO2 18 (L) 2025    BUN 52 (H) 2025     2025    CALCIUM 8.0 (L) 2025    MG 1.8 2025    PHOS 3.8 2023    PHOS 3.8 2023     LFTs:   Lab Results   Component Value Date    PROT 6.0  2025    ALBUMIN 2.3 (L) 2025    BILITOT 1.5 (H) 2025    AST 23 2025    ALKPHOS 62 2025    ALT 14 2025     Urinalysis:   Lab Results   Component Value Date    LABURIN No Growth 2025    COLORU Yellow 2025    SPECGRAV 1.020 2025    NITRITE Negative 2025    KETONESU Trace (A) 2023    UROBILINOGEN Negative 2025       Trended Lab Data:  Recent Labs   Lab 25  1137 25  0512 25  1630 25  0313   WBC 13.21*  --  8.03 7.17   HGB 12.1  --  12.2 11.1*   HCT 38.0  --  38.5 34.2*   PLT 84*  --  80* 85*   MCV 96  --  97 95   RDW 17.1*  --  17.2* 16.7*    139  --  139   K 5.0 4.9  --  4.3   * 114*  --  114*   CO2 15* 18*  --  18*   BUN 44* 46*  --  52*   * 113*  --  110   PROT 6.8 6.6  --  6.0   ALBUMIN 2.8* 2.6*  --  2.3*   BILITOT 2.2* 2.2*  --  1.5*   AST 35 33  --  23   ALKPHOS 78 69  --  62   ALT 14 13  --  14       Microbiology Data:  Blood cultures 25 1 bottle positive for coagulase-negative staph  Blood cultures 2025 no growth so far    Other Results:    Radiology:  Reviewed  Chest x-ray with Small left pleural effusion.  Port-A-Cath in place  Ultrasound abdomen with mild ascites and hepatic steatosis  Abdominal CT with coronary calcifications, small effusions, hepatic dilatation, ascites, have some left kidney, atherosclerotic plaques  PET-CT cranial findings  Right forearm x-ray without fracture      Problem List  Problem List[2]  See above for impression and recommendations.         [1]   Social History  Tobacco Use    Smoking status: Former     Current packs/day: 0.00     Average packs/day: 1.5 packs/day for 45.0 years (67.5 ttl pk-yrs)     Types: Cigarettes     Start date: 1968     Quit date: 2013     Years since quittin.0     Passive exposure: Never    Smokeless tobacco: Never   Substance Use Topics    Alcohol use: No     Alcohol/week: 0.0 standard drinks of alcohol    Drug use: No    [2]   Patient Active Problem List  Diagnosis    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 hypertension    Essential hypertension    HLD (hyperlipidemia)    PAD (peripheral artery disease)    Coronary artery disease involving native coronary artery of native heart without angina pectoris    History of rectal or anal cancer    Arm swelling    Swelling of limb    Swelling    Weakness    Decubitus ulcer of sacral region, stage 2    Debility    Critical limb ischemia of right lower extremity    Chronic heart failure with preserved ejection fraction    Pulmonary emphysema    Cirrhosis    Metabolic acidosis    Aortic stenosis    Arthritis    Lung nodule    Pulmonary hypertension    Renal artery stenosis    Vitamin D deficiency    Anemia of chronic disease    Thrombocytopenia    Stage 3b chronic kidney disease    ACP (advance care planning)    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

## 2025-06-28 LAB
BACTERIA BLD CULT: ABNORMAL
GRAM STN SPEC: ABNORMAL

## 2025-06-28 NOTE — DISCHARGE SUMMARY
[facility] Medicine Discharge Summary    Primary Team: [facility] Hospitalist Team B  Attending Physician: Dr. Pleitez  Resident: Marquis  Intern: Max    Date of Admit: 6/24/2025  Date of Discharge: 6/28/2025    Discharge to: Home  Condition: Stable    Discharge Diagnoses     Problem List[1]    Consultants and Procedures     Consultants:  Infectious Disease    Procedures:   None    Imaging:  CT Head  Ct Abdomen/Pelvis    Brief History of Present Illness      Steph Monroe is a 79 y.o.female with a PMHx of CKD 4, HTN, HLD, PAD s/p R CFA endarterectomy and right iliac artery stenting, T2DM, and Anal cancer who presented for AMS for roughly 2 weeks.      The patient was in their usual state of health until two weeks ago, when her daughter, who she lives with, noticed that she would be more confused on the time of day and more fatigued than usual. At baseline, patient is able to perform all ADLs with a walker for assistance. Additionally, her daughter reports that she had been having more bowel movements than usual, up to 5-6 times per day and noted to be diarrhea. She has felt more weak and fatigued than her baseline. She also has noticed that her abdomen has slowly become more distended. Otherwise, patient denies headache, changes in vision, chest pain, SOB, abdominal pain, nausea, vomiting, constipation, or syncope.      Of note, patient was admitted in 2023 for sepsis and a sacral wound in addition to her cirrhosis. Following this admission, she was placed on hospice, and all medications were discontinued. She managed to do well, and graduated from hospice after 10 months.     During hospitalization, patient opted for no paracentesis and attempt at fluid removal with diuresis. Blood cultures 2/4 grew staph epi determined to be contaminant. Urine cultures had no growth. She was discharged with two days of ciprofloxacin and 3 days of azithromycin to cover possible PNA as well.    For the full HPI please refer to the  History & Physical from this admission.    Hospital Course By Problem with Pertinent Findings       Acute Encephalopathy  Likely secondary to UTI  Leukocytosis  - AMS for 2 weeks, confusion with time of day/year and unable to perform baseline activities due to weakness  - WBC 13.21, UA showing 2+ LE, 35 WBC, Moderate Bacteria  - Trop 0.158 and EKG showing TWI in anterior leads              - TWI resolved on repeat and with administration of fluids  - Ammonia 29, no evidence of asterxis; AxOx4  - Given Vanc and Zosyn in ED with quick recovery of mentation and overall appearance per daughter  Plan:  - Continued vanc and zosyn until sensitivities result  - ID consulted for advise on possible contamination              - Repeat BC without growth, discontinued vanc              - Continued zosyn while hospitalized and transition to PO upon discharge  - Monitored on telemetry  - Nutrition consulted  - IR referral on discharge     Decompensated Cirrhosis  Thrombocytopenia  - 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, no history of Hep C, HIV, or Alcohol use  - Previously had taps done in the past, but had stopped prior to hospice  - Ascites on imaging with elevated PT/INR  - Ammonia 29, unlikely hepatic encephalopathy  - MELD 3.0 - 26  - Nutrition consulted  - Continued spironolactone/lasix, tolerated well  - Vanc/Zosyn as above, would also cover bleeding given reports blood in stool     NAGMA  Lactic acidosis, resolved   - Possibly due to recent episodes of diarrhea  - LA 4.3 initially, now normal  - Continued to re-assess with IVF infusion     CAD  PHTN  - Previously on ASA and Plavix              - Holding at this time given low PLT count and elevated INR in setting of cirrhosis  - Denied SOB, CP  - EKG with TWI in II, II, aVF and trop elevated although possible elevation in the setting of CKD  - Repeated EKG with resolution following IVF  - Continued to monitor     T2DM  - No current medications  - SSI and POCT glucose while inaptient  -  A1c 6.6%  - Discussed medication initiation at discharge  - To be followed up at PCP appointment    ALESIA on CKD 3  - Cr 2.9, baseline ~1.9  - Likely is progression of CKD  - CR 3.5 today, so would be indicative of ALESIA  - Possibly related to Zosyn administration and LAsix  - Still produced urine   - Priority clinic for lab work following discharge     Normocytic Anemia  - Hgb 12.1, previous baseline of 7-8  - No acute sins of bleeding  - Iron studies showing EDDY  - IV iron ordered     HTN  - No outpatient medications (previously nifedipine and coreg)  - Given infection will continually re-assess  - BP now wnl       Discharge Medications        Medication List        START taking these medications      azithromycin 250 MG tablet  Commonly known as: Z-ROXANN  Take 2 tablets (500 mg total) by mouth once daily. for 3 days     ciprofloxacin HCl 500 MG tablet  Commonly known as: CIPRO  Take 0.5 tablets (250 mg total) by mouth every 12 (twelve) hours. for 2 days     spironolactone 100 MG tablet  Commonly known as: ALDACTONE  Take 1 tablet (100 mg total) by mouth once daily.            CHANGE how you take these medications      carvediloL 3.125 MG tablet  Commonly known as: COREG  Take 1 tablet (3.125 mg total) by mouth 2 (two) times daily.  What changed:   medication strength  how much to take     furosemide 40 MG tablet  Commonly known as: LASIX  Take 1 tablet (40 mg total) by mouth once daily.  What changed:   medication strength  how much to take            CONTINUE taking these medications      atorvastatin 80 MG tablet  Commonly known as: LIPITOR  Take 1 tablet (80 mg total) by mouth once daily.     BioConsortia CHEWABLE ASPIRIN 81 mg Chew  Generic drug: aspirin  Take 1 tablet (81 mg total) by mouth once daily.     NIFEdipine 30 MG (OSM) 24 hr tablet  Commonly known as: PROCARDIA-XL  Take 1 tablet (30 mg total) by mouth once daily.     sodium bicarbonate 650 MG tablet  Take 1 tablet (650 mg total) by mouth 3 (three) times  daily.            STOP taking these medications      clopidogreL 75 mg tablet  Commonly known as: PLAVIX     docusate sodium 100 MG capsule  Commonly known as: COLACE     ezetimibe 10 mg tablet  Commonly known as: ZETIA               Where to Get Your Medications        These medications were sent to [facility]  [address] [place] [address]      Hours: Mon-Fri, 8a-5:30p Phone: [phone]   azithromycin 250 MG tablet  carvediloL 3.125 MG tablet  ciprofloxacin HCl 500 MG tablet  furosemide 40 MG tablet  NIFEdipine 30 MG (OSM) 24 hr tablet  spironolactone 100 MG tablet         Discharge Information:   Diet:  Cardiac, Low Sodium    Physical Activity:  As tolerated             Instructions:  1. Take all medications as prescribed  2. Keep all follow-up appointments  3. Return to the hospital or call your primary care physicians if any worsening symptoms such as fever, chest pain, shortness of breath, return of symptoms, or any other concerns.    Follow-Up Appointments:  IR  PCP  Nephrology  ANTIONE Cho MD  [facility]                   [1]   Patient Active Problem List  Diagnosis    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 hypertension    Essential hypertension    HLD (hyperlipidemia)    PAD (peripheral artery disease)    Coronary artery disease involving native coronary artery of native heart without angina pectoris    History of rectal or anal cancer    Arm swelling    Swelling of limb    Swelling    Weakness    Decubitus ulcer of sacral region, stage 2    Debility    Critical limb ischemia of right lower extremity    Chronic heart failure with preserved ejection fraction    Pulmonary emphysema    Cirrhosis    Metabolic acidosis    Aortic stenosis    Arthritis    Lung nodule    Pulmonary hypertension    Renal artery stenosis    Vitamin D deficiency    Anemia of chronic disease    Thrombocytopenia    Stage 3b chronic kidney disease    ACP (advance care planning)    Stage IV  pressure ulcer of right buttock    Palliative care encounter    Central retinal vein occlusion, left eye, with macular edema    Hypertensive retinopathy, bilateral    NS (nuclear sclerosis), bilateral    CKD (chronic kidney disease)    Ascites

## 2025-06-30 LAB — BACTERIA BLD CULT: NORMAL

## 2025-07-01 LAB
BACTERIA BLD CULT: NORMAL
BACTERIA BLD CULT: NORMAL

## 2025-07-03 ENCOUNTER — OFFICE VISIT (OUTPATIENT)
Dept: PALLIATIVE MEDICINE | Facility: CLINIC | Age: 80
End: 2025-07-03
Payer: MEDICARE

## 2025-07-03 VITALS
HEART RATE: 94 BPM | SYSTOLIC BLOOD PRESSURE: 126 MMHG | DIASTOLIC BLOOD PRESSURE: 84 MMHG | OXYGEN SATURATION: 87 % | TEMPERATURE: 97 F

## 2025-07-03 DIAGNOSIS — I35.0 AORTIC VALVE STENOSIS, ETIOLOGY OF CARDIAC VALVE DISEASE UNSPECIFIED: ICD-10-CM

## 2025-07-03 DIAGNOSIS — R18.8 CIRRHOSIS OF LIVER WITH ASCITES, UNSPECIFIED HEPATIC CIRRHOSIS TYPE: ICD-10-CM

## 2025-07-03 DIAGNOSIS — I50.32 CHRONIC HEART FAILURE WITH PRESERVED EJECTION FRACTION: Primary | ICD-10-CM

## 2025-07-03 DIAGNOSIS — K74.60 CIRRHOSIS OF LIVER WITH ASCITES, UNSPECIFIED HEPATIC CIRRHOSIS TYPE: ICD-10-CM

## 2025-07-03 DIAGNOSIS — Z51.5 PALLIATIVE CARE ENCOUNTER: ICD-10-CM

## 2025-07-03 DIAGNOSIS — I25.10 CORONARY ARTERY DISEASE INVOLVING NATIVE CORONARY ARTERY OF NATIVE HEART WITHOUT ANGINA PECTORIS: Chronic | ICD-10-CM

## 2025-07-03 NOTE — PROGRESS NOTES
Palliative Medicine Home Visit Note - [facility]    Consult Requested By: No ref. provider found  Primary Care Physician:   Mane Carmona MD    ASSESSMENT/PLAN:     Ms. Julisa Monroe is a 79 year old woman with relevant history of HFpEF, CAD, critical limb ischemia RLE, pHTN, DAYTON, CKD, HTN, HLD, hx of rectal cancer, T2DM, cirrhosis, hx of stage IV pressure ulcer right buttock,  establishing with palliative care via home visit for management of symptoms and advance care planning. Patient seen in their home accompanied by her daughter Deana.    She has been home for a few days since recent admission to [facility] for altered mental status, found to be likely due to bacterial pneumonia.  She completed her antibiotics within the last few days and was suffering from frequent diarrhea which has started to slow down.  Since returning home, her functional status has been significantly declined.  She has been less interested in eating, has been weaker and less able to get up from the bed, even to use the bathroom.  Her daughter says that she has refused to take her diuretics for the last day and a half because of the discomfort caused by having to urinate into a brief.  As a result, her respiratory status is declined and edema seems to be worse in her legs and abdomen than described at discharge.    Of note, she was receiving hospice benefits for heart failure and cirrhosis from summer 2023 to spring 2024, after which she discharged for failure to decline.  She had been doing quite well from that time until the days leading up to her recent admission, walking with a walker with assistance, eating well, and able to enjoy her time.  Long conversation had with daughter Deana, her goals are consistent with returning to hospice care.  I do believe this is a resource she qualifies for at this time, especially with her respiratory and functional decline.  This will also be the fastest way for us to get her the comfort  measures that she needs to keep her from having to return to the hospital. Deana states that the last three days, her mom has been telling her she wants to go be with her parents, who are , and with Deana's son, who passed away several years ago at the age of 14. She has never heard her speak like that before this.    Deana is in agreement, they would like to reinitiate services with [facility].  I communicated with nursing contact at [facility] this who will admit her to services today, provide oxygen and a hospital bed as soon as possible.  We discussed that if her ascites was to worsen to the point of needing paracentesis, we could arrange an outpatient visit with Interventional Radiology for a procedure while receiving hospice services.  Based on my exam today, she does not have tense ascites or abdominal pain, and I would recommend starting her back on her oral diuretics.    Advance Directives:   [place]POST: No    Do Not Resuscitate Status: Yes    Medical Power of : No    Agent's Name:  Daughter Deana Reddy   Agent's Contact Number:  [phone]    Decision Making:  Patient answered questions and Family answered questions  Goals of Care: The patient and family endorses that what is most important right now is to focus on spending time at home, avoiding the hospital, symptom/pain control, and quality of life, even if it means sacrificing a little time    Accordingly, we have decided that the best plan to meet the patient's goals includes enrolling in hospice care.    #Skin tears - completed some basic wound dressings on small left arm blister and right arm 3x3cm skin tear. Wound care to be taken up by [facility]. I do not think the right arm is exhibiting signs of infection, more that she has edema in her arm and dependent erythema. She was recently on antibiotics and Deana will monitor that symptom in the coming days. She could receive oral antibiotics with hospice care, and is open to that  intervention if offered.    #Edema  #Dyspnea - counseled Deana to give her the lasix and spironolactone she has at home, this will help her feel better from a respiratory standpoint and from the discomfort of edema. These meds have been held the last two days at patient's request. With more equipment nearby (bedside commode, for example), I'm hopeful that this is going to make her much more comfortable.     #Med review - counseled that if she returns to eating meals, we can continue sodium bicarbonate as tolerated as a phosphate binder. Currently she is not taking much oral intake for food or drink, so no need to give her this medicine TID. We also discussed holding off on nifedipine and GDMT coreg in favor of using the diuretics instead. Family would like her to continue her aspirin as long as she will tolerate this medicine.    Understanding of Disease and Illness Trajectory: Patient and Family  has  good understanding of her illness, they can benefit from continued education on what to expect in the future.     Follow up: PRN, they will reach out if they need assistance with scheduling paracentesis in the future. Left direct clinic number with family.  Plan discussed with patient to the degree she was able to participate, and with her daughter Deana.    Fauzia Nix MD  Outpatient Palliative Medicine  [facility] and [facility]    SUBJECTIVE:     History of Present Illness / Interval History:  Discussed the role of palliative care, in that we can be helpful in helping patients feel better, aid in communication, and planning for future difficult decisions.     Review of Symptoms      Symptom Assessment (ESAS 0-10 Scale)  Unable to complete assessment due to Acuity of condition     CAM / Delirium:  Positive  Constipation:  Negative  Diarrhea:  Positive      Bowel Management Plan (BMP):  Yes      Pain Assessment in Advanced Demential Scale (PAINAD)   Breathing - Independent of vocalization:  1 (not active dementia  diagnosis, but does have some degree of AMS today. Only intermittently taking part in exam and history.)  Negative vocalization:  1  Facial expression:  1  Body language:  1  Consolability:  1  Total:  5    Performance Status:  30    Living Arrangements:  Lives with family    Psychosocial/Cultural:   See Palliative Psychosocial Note: No  Social Issues Identified: Coping deficit pt/family and New Diagnosis/Trauma  Bereavement Risk: No  Caregiver Needs Discussed. Caregiver Distress: No: Caregiver support and community resources discussed.    Cultural: n/a  **Primary  to Follow**  Palliative Care  Consult: No     Time-Based Charting:  Yes  Chart Review: 30 minutes  Face to Face: 45 minutes  Advance Care Plannin minutes    Total Time Spent: 91 minutes    Limited ROS completed today. Positive for worsened dyspnea, edema, and ascites. Denies pain, but is groaning occasionally.     Active Diagnoses & Disease History:  See above    Medications:  Current Medications[1]    External  database queried on 7/3/25 by Mayte Nix.   The results reviewed and considered with the clinical data in the decision whether or not to prescribe a controlled substance.  Past Medical History:   Diagnosis Date    Cancer     rectum    CKD (chronic kidney disease)     Diabetes mellitus, type 2     Hyperlipidemia     Hypertension     Neutropenic fever 2016    PVD (peripheral vascular disease)      Past Surgical History:   Procedure Laterality Date    ANGIOGRAM, LOWER ARTERIAL, UNILATERAL Right 2023    Procedure: Angiogram, Lower Arterial, Unilateral;  Surgeon: Remington Ribeiro MD;  Location: [facility] CATH LAB/EP;  Service: Cardiology;  Laterality: Right;    ANGIOGRAPHY OF LOWER EXTREMITY N/A 5/15/2023    Procedure: Angiogram Extremity Unilateral;  Surgeon: FERNANDO Cagle II, MD;  Location: [facility] OR [address] [place] [address];  Service: Vascular;  Laterality: N/A;  22.4 min  183.31 mGy  23.5353 Gy.cm  17ml Dye     COLONOSCOPY  N/A 3/28/2016    Procedure: COLONOSCOPY;  Surgeon: Mitul Buitrago Jr., MD;  Location: [facility] ENDO;  Service: Endoscopy;  Laterality: N/A;    ENDARTERECTOMY OF FEMORAL ARTERY Right 5/9/2023    Procedure: ENDARTERECTOMY, FEMORAL;  Surgeon: FERNANDO Cagle II, MD;  Location: [facility] OR [address] [place] [address];  Service: Vascular;  Laterality: Right;  contrast 12ml  Flouro time 11.1 min  mgy 217.83  Gycm2 26.3570    EYE SURGERY Left     cataract removal with lens implant.    FEMORAL ARTERY STENT  before 2010    PERIPHERAL ANGIOGRAPHY N/A 5/3/2023    Procedure: Peripheral angiography;  Surgeon: Rasheeda Ny MD;  Location: [facility] CATH LAB/EP;  Service: Cardiology;  Laterality: N/A;    PTA, SUPERFICIAL FEMORAL ARTERY Right 5/15/2023    Procedure: PTA, SUPERFICIAL FEMORAL ARTERY;  Surgeon: FERNANDO Cagle II, MD;  Location: [facility] OR [address] [place] [address];  Service: Vascular;  Laterality: Right;    RENAL ARTERY STENT  2010    STENT, ILIAC ARTERY Right 5/9/2023    Procedure: STENT, ILIAC ARTERY;  Surgeon: FERNANDO Cagle II, MD;  Location: [facility] OR [address] [place] [address];  Service: Vascular;  Laterality: Right;     Family History   Problem Relation Name Age of Onset    Diabetes Father      Heart attack Mother      Hypertension Sister       Review of patient's allergies indicates:   Allergen Reactions    Chantix [varenicline]      Tongue swelling      Wellbutrin [bupropion hcl]      Tongue swelling       OBJECTIVE:     Vitals: Temp: 97.4 °F (36.3 °C) (07/03/25 1139)  Pulse: 94 (07/03/25 1139)  BP: 126/84 (07/03/25 1139)  SpO2: (!) 87 % (07/03/25 1139)  Physical Exam  Constitutional:       General: She is in acute distress.      Appearance: She is ill-appearing.   HENT:      Head: Normocephalic and atraumatic.      Nose: No congestion.      Mouth/Throat:      Mouth: Mucous membranes are dry.      Pharynx: No oropharyngeal exudate.   Eyes:      General: No scleral icterus.     Extraocular Movements: Extraocular movements intact.   Cardiovascular:      Rate and Rhythm:  Normal rate and regular rhythm.      Comments: Loud systolic murmur.  Pulmonary:      Effort: Respiratory distress present.      Breath sounds: No wheezing.      Comments: Increased accessory muscle use. Laying flat.  Abdominal:      General: There is distension.      Palpations: Abdomen is soft.      Comments: Ascites present, but not tense and not painful on palpation.   Musculoskeletal:      Right lower leg: Edema present.      Left lower leg: Edema present.   Skin:     General: Skin is warm and dry.      Coloration: Skin is not jaundiced.      Comments: Large skin tear on right forearm, small skin tear on left forearm. Some dependent edema and erythema on right upper arm where the skin touches the bed, but not warm to touch, no purulence present, does not appear infected.   Neurological:      Comments: Required full assistance to sit up in bed, globally weak, unable to stand.   Psychiatric:      Comments: Intermittently able to participate in conversation, was able to state she remembered being on hospice care and she wants to return to that level of care.     Labs:    WBC   Date Value Ref Range Status   06/27/2025 6.97 3.90 - 12.70 K/uL Final     Hemoglobin   Date Value Ref Range Status   07/03/2023 6.8 (L) 12.0 - 16.0 g/dL Final     HGB   Date Value Ref Range Status   06/27/2025 11.6 (L) 12.0 - 16.0 gm/dL Final     POC Hematocrit   Date Value Ref Range Status   05/09/2023 20 (LL) 36 - 54 %PCV Final     Hematocrit   Date Value Ref Range Status   07/03/2023 22.6 (L) 37.0 - 48.5 % Final     HCT   Date Value Ref Range Status   06/27/2025 37.0 37.0 - 48.5 % Final     MCV   Date Value Ref Range Status   06/27/2025 96 82 - 98 fL Final   07/03/2023 85 82 - 98 fL Final     Platelet Count   Date Value Ref Range Status   06/27/2025 89 (L) 150 - 450 K/uL Final     Platelets   Date Value Ref Range Status   07/03/2023 206 150 - 450 K/uL Final     Lab Results   Component Value Date    CREATININE 3.5 (H) 06/27/2025    BUN 57  (H) 06/27/2025     06/27/2025    K 4.8 06/27/2025     06/27/2025    CO2 17 (L) 06/27/2025      Lab Results   Component Value Date    AST 23 06/27/2025    ALKPHOS 63 06/27/2025    BILITOT 1.5 (H) 06/27/2025     Albumin   Date Value Ref Range Status   06/27/2025 2.4 (L) 3.5 - 5.2 g/dL Final   07/02/2023 1.9 (L) 3.5 - 5.2 g/dL Final     Protein Total   Date Value Ref Range Status   06/27/2025 6.5 6.0 - 8.4 gm/dL Final     Total Protein   Date Value Ref Range Status   07/02/2023 5.9 (L) 6.0 - 8.4 g/dL Final     Radiology:  X-Ray Chest AP Portable  Narrative: EXAMINATION:  XR CHEST AP PORTABLE    CLINICAL HISTORY:  concern for aspiration;    TECHNIQUE:  Single frontal view of the chest was performed.    COMPARISON:  Chest radiograph 05/02/2023    FINDINGS:  Lines and tubes: Right chest wall Port-A-Cath.    Heart and mediastinum: The left heart border is obscured.  Calcifications of the thoracic aorta.    Pleura: Small-moderate left pleural effusion.  Possible trace right pleural effusion.  No pneumothorax.    Lungs: Lung volumes are moderate.  No convincing pulmonary edema.  Patchy opacities in the left lung base, possibly atelectasis in the setting of an effusion although aspiration and infection could appear similar.  No focal consolidation on the right.    Soft tissue/bone: Unremarkable.  Impression: As above.    Electronically signed by: Tobias Arias  Date:    06/25/2025  Time:    11:24            [1]   Current Outpatient Medications:     aspirin 81 MG Chew, Take 1 tablet (81 mg total) by mouth once daily., Disp: 30 tablet, Rfl: 5    atorvastatin (LIPITOR) 80 MG tablet, Take 1 tablet (80 mg total) by mouth once daily., Disp: 90 tablet, Rfl: 3    carvediloL (COREG) 3.125 MG tablet, Take 1 tablet (3.125 mg total) by mouth 2 (two) times daily., Disp: 180 tablet, Rfl: 3    furosemide (LASIX) 40 MG tablet, Take 1 tablet (40 mg total) by mouth once daily., Disp: 30 tablet, Rfl: 11    NIFEdipine (PROCARDIA-XL) 30  MG (OSM) 24 hr tablet, Take 1 tablet (30 mg total) by mouth once daily., Disp: 30 tablet, Rfl: 11    sodium bicarbonate 650 MG tablet, Take 1 tablet (650 mg total) by mouth 3 (three) times daily., Disp: 90 tablet, Rfl: 11    spironolactone (ALDACTONE) 100 MG tablet, Take 1 tablet (100 mg total) by mouth once daily., Disp: 30 tablet, Rfl: 11

## (undated) DEVICE — DRESSING TRANS 4X4 TEGADERM

## (undated) DEVICE — LOOP VESSEL YELLOW MAXI

## (undated) DEVICE — PACK UNIVERSAL SPLIT II

## (undated) DEVICE — SUT PROLENE 6-0 24 BV-1

## (undated) DEVICE — CATH GLIDECATH ANG 4FR 150CM

## (undated) DEVICE — KIT LEFT HEART MANIFOLD CUSTOM

## (undated) DEVICE — SHEATH FLEXOR ANSEL 5FR 110CM

## (undated) DEVICE — SET MICROPUNCTURE

## (undated) DEVICE — STOPCOCK 3 WAY MED PRESSURE

## (undated) DEVICE — SET MICRO PUNCT 4FR/MPIS-401

## (undated) DEVICE — GLIDESHEATH SLENDER SS 5FR10CM

## (undated) DEVICE — KIT INTRO MICRO NIT VSI 4FR

## (undated) DEVICE — HEMOSTAT VASC BAND REG 24CM

## (undated) DEVICE — ADHESIVE DERMABOND ADVANCED

## (undated) DEVICE — COVER INSTR ELASTIC BAND 40X20

## (undated) DEVICE — INTRODUCER VASC RADPQ 5FRX10CM

## (undated) DEVICE — CATH ALL PUR URTHL RR 20FR

## (undated) DEVICE — TUBING SET ANGIASSIST MED GAS

## (undated) DEVICE — CATH ANGLED GLIDE CATH 5FR

## (undated) DEVICE — CATH GLIDECATH PV MLTCRV 4FR

## (undated) DEVICE — Device

## (undated) DEVICE — SHEATH INTRODUCER 4FR 11CM

## (undated) DEVICE — GUIDEWIRE WHOLEY STD STR 260CM

## (undated) DEVICE — GUIDEWIRE ADVNTG 035X260CM ANG

## (undated) DEVICE — CATH GLIDE HYDROPHILIC C2 5FR

## (undated) DEVICE — CATH ANG PIGTAIL 4FR INFINITY

## (undated) DEVICE — BLLN MUSTANG 6 X 100 X 75

## (undated) DEVICE — CATH INFINITI JUDKINS JR4

## (undated) DEVICE — SOL 9P NACL IRR PIC IL

## (undated) DEVICE — COVER BAND BAG 40 X 40

## (undated) DEVICE — BANDAGE ADHESIVE PLAS STRL 1X3

## (undated) DEVICE — CATH IMPULSE 5FR PIGTAIL 125CM

## (undated) DEVICE — SUT PROLENE 5-0 24 C-1 BL

## (undated) DEVICE — HEMOSTAT VASC BAND SHORT 21CM

## (undated) DEVICE — GUIDEWIRE EMERALD .035IN 260CM

## (undated) DEVICE — CATH IMA INFINITI 4FRX100CM

## (undated) DEVICE — BLLN MUSTANG 10 X 40 X 75

## (undated) DEVICE — GUIDEWIRE ADVNTG 018X180CM ANG

## (undated) DEVICE — GUIDEWIRE V-18 CONTROL .18

## (undated) DEVICE — APPLICATOR CHLORAPREP ORN 26ML

## (undated) DEVICE — PAD DEFIB CADENCE ADULT R2

## (undated) DEVICE — NDL ONE PART 21G 7CM

## (undated) DEVICE — SUT MCRYL PLUS 4-0 PS2 27IN

## (undated) DEVICE — COVER PROBE US 5.5X58L NON LTX

## (undated) DEVICE — GUIDEWIRE STD .035X260CM ANG

## (undated) DEVICE — DRAPE ANGIO BRACH 38X44IN

## (undated) DEVICE — SUT 2-0 12-18IN SILK

## (undated) DEVICE — CATH SEEKER .035 X65CM

## (undated) DEVICE — KIT GLIDESHEATH SLEND 6FR 10CM

## (undated) DEVICE — BAND TR WITH INFLATOR

## (undated) DEVICE — CATH ANGIO SPR TRQ HYDRO CT 5F

## (undated) DEVICE — SHEATH 7FR 25CM

## (undated) DEVICE — GUIDEWIRE STF .035X180CM ANG

## (undated) DEVICE — COVERS PROBE NR-48 STERILE

## (undated) DEVICE — GUIDEWIRE STF .035X350CM ANG

## (undated) DEVICE — INFLATOR ENCORE

## (undated) DEVICE — SUT SILK 2-0 SH 18IN BLACK

## (undated) DEVICE — ADAPTER CHECK FLO ADAPTER

## (undated) DEVICE — SOL NS 1000CC

## (undated) DEVICE — GAUZE SPONGE PEANUT STRL

## (undated) DEVICE — SUT 7/0 18IN PROLENE BL MO

## (undated) DEVICE — SET DECANTER MEDICHOICE

## (undated) DEVICE — CLIP MED TICALL

## (undated) DEVICE — NDL HYPO REG 25G X 1 1/2

## (undated) DEVICE — ELECTRODE REM PLYHSV RETURN 9

## (undated) DEVICE — SUT PROLENE 6-0 C-1 30IN BL

## (undated) DEVICE — SYS LABEL CORRECT MED

## (undated) DEVICE — VISE RADIFOCUS MULTI TORQUE

## (undated) DEVICE — SPONGE DERMACEA 4X4IN 12PLY

## (undated) DEVICE — TRAY PERIPHERAL VASCULAR OMC

## (undated) DEVICE — GUIDEWIRE ADVNTG 018X300CM ANG

## (undated) DEVICE — COVER LIGHT HANDLE 80/CA

## (undated) DEVICE — PLEDGET SUT SOFT 3/8X3/16X1/16

## (undated) DEVICE — SUT 2/0 36IN COATED VICRYL

## (undated) DEVICE — PLEDGET TFE POLYMER 3/16

## (undated) DEVICE — GUIDEWIRE STF .035X260CM ANG

## (undated) DEVICE — SUT VICRYL 3-0 27 SH

## (undated) DEVICE — BLADE SCALP OPHTL BEVEL STR

## (undated) DEVICE — SUT 3-0 12-18IN SILK

## (undated) DEVICE — TOWEL OR DISP STRL BLUE 4/PK

## (undated) DEVICE — BLLN MUSTANG 4 X 20 X 75